# Patient Record
Sex: MALE | Race: WHITE | NOT HISPANIC OR LATINO | Employment: FULL TIME | URBAN - METROPOLITAN AREA
[De-identification: names, ages, dates, MRNs, and addresses within clinical notes are randomized per-mention and may not be internally consistent; named-entity substitution may affect disease eponyms.]

---

## 2018-01-19 ENCOUNTER — RESOLUTE PROFESSIONAL BILLING HOSPITAL PROF FEE (OUTPATIENT)
Dept: HOSPITALIST | Facility: MEDICAL CENTER | Age: 63
End: 2018-01-19
Payer: COMMERCIAL

## 2018-01-19 ENCOUNTER — HOSPITAL ENCOUNTER (INPATIENT)
Facility: MEDICAL CENTER | Age: 63
LOS: 6 days | DRG: 439 | End: 2018-01-25
Attending: EMERGENCY MEDICINE | Admitting: HOSPITALIST
Payer: COMMERCIAL

## 2018-01-19 ENCOUNTER — APPOINTMENT (OUTPATIENT)
Dept: RADIOLOGY | Facility: MEDICAL CENTER | Age: 63
DRG: 439 | End: 2018-01-19
Attending: EMERGENCY MEDICINE
Payer: COMMERCIAL

## 2018-01-19 DIAGNOSIS — R10.13 EPIGASTRIC PAIN: ICD-10-CM

## 2018-01-19 DIAGNOSIS — K85.90 ACUTE PANCREATITIS, UNSPECIFIED COMPLICATION STATUS, UNSPECIFIED PANCREATITIS TYPE: ICD-10-CM

## 2018-01-19 PROBLEM — E66.09 OBESITY DUE TO EXCESS CALORIES: Status: ACTIVE | Noted: 2018-01-19

## 2018-01-19 PROBLEM — M1A.9XX0 CHRONIC GOUT: Chronic | Status: ACTIVE | Noted: 2018-01-19

## 2018-01-19 PROBLEM — I10 ESSENTIAL HYPERTENSION: Chronic | Status: ACTIVE | Noted: 2018-01-19

## 2018-01-19 PROBLEM — N28.89 KIDNEY MASS: Status: ACTIVE | Noted: 2018-01-19

## 2018-01-19 PROBLEM — R73.9 HYPERGLYCEMIA: Status: ACTIVE | Noted: 2018-01-19

## 2018-01-19 PROBLEM — F10.10 ALCOHOL ABUSE: Chronic | Status: ACTIVE | Noted: 2018-01-19

## 2018-01-19 PROBLEM — E66.09 OBESITY DUE TO EXCESS CALORIES: Chronic | Status: ACTIVE | Noted: 2018-01-19

## 2018-01-19 LAB
ALBUMIN SERPL BCP-MCNC: 3.9 G/DL (ref 3.2–4.9)
ALBUMIN/GLOB SERPL: 1.5 G/DL
ALP SERPL-CCNC: 99 U/L (ref 30–99)
ALT SERPL-CCNC: 93 U/L (ref 2–50)
ANION GAP SERPL CALC-SCNC: 12 MMOL/L (ref 0–11.9)
APTT PPP: 22.4 SEC (ref 24.7–36)
AST SERPL-CCNC: 197 U/L (ref 12–45)
BASOPHILS # BLD AUTO: 0.2 % (ref 0–1.8)
BASOPHILS # BLD: 0.02 K/UL (ref 0–0.12)
BILIRUB SERPL-MCNC: 1.9 MG/DL (ref 0.1–1.5)
BNP SERPL-MCNC: 27 PG/ML (ref 0–100)
BUN SERPL-MCNC: 19 MG/DL (ref 8–22)
CALCIUM SERPL-MCNC: 9.1 MG/DL (ref 8.4–10.2)
CHLORIDE SERPL-SCNC: 103 MMOL/L (ref 96–112)
CO2 SERPL-SCNC: 24 MMOL/L (ref 20–33)
CREAT SERPL-MCNC: 1.26 MG/DL (ref 0.5–1.4)
EKG IMPRESSION: NORMAL
EOSINOPHIL # BLD AUTO: 0.13 K/UL (ref 0–0.51)
EOSINOPHIL NFR BLD: 1.3 % (ref 0–6.9)
ERYTHROCYTE [DISTWIDTH] IN BLOOD BY AUTOMATED COUNT: 40.9 FL (ref 35.9–50)
GLOBULIN SER CALC-MCNC: 2.6 G/DL (ref 1.9–3.5)
GLUCOSE SERPL-MCNC: 200 MG/DL (ref 65–99)
HCT VFR BLD AUTO: 40.2 % (ref 42–52)
HGB BLD-MCNC: 14.5 G/DL (ref 14–18)
IMM GRANULOCYTES # BLD AUTO: 0.04 K/UL (ref 0–0.11)
IMM GRANULOCYTES NFR BLD AUTO: 0.4 % (ref 0–0.9)
INR PPP: 1.07 (ref 0.87–1.13)
LIPASE SERPL-CCNC: >400 U/L (ref 7–58)
LYMPHOCYTES # BLD AUTO: 1.27 K/UL (ref 1–4.8)
LYMPHOCYTES NFR BLD: 13.2 % (ref 22–41)
MCH RBC QN AUTO: 31.4 PG (ref 27–33)
MCHC RBC AUTO-ENTMCNC: 36.1 G/DL (ref 33.7–35.3)
MCV RBC AUTO: 87 FL (ref 81.4–97.8)
MONOCYTES # BLD AUTO: 0.22 K/UL (ref 0–0.85)
MONOCYTES NFR BLD AUTO: 2.3 % (ref 0–13.4)
NEUTROPHILS # BLD AUTO: 7.96 K/UL (ref 1.82–7.42)
NEUTROPHILS NFR BLD: 82.6 % (ref 44–72)
NRBC # BLD AUTO: 0 K/UL
NRBC BLD-RTO: 0 /100 WBC
PLATELET # BLD AUTO: 122 K/UL (ref 164–446)
PMV BLD AUTO: 10.1 FL (ref 9–12.9)
POTASSIUM SERPL-SCNC: 3.5 MMOL/L (ref 3.6–5.5)
PROT SERPL-MCNC: 6.5 G/DL (ref 6–8.2)
PROTHROMBIN TIME: 13.5 SEC (ref 12–14.6)
RBC # BLD AUTO: 4.62 M/UL (ref 4.7–6.1)
SODIUM SERPL-SCNC: 139 MMOL/L (ref 135–145)
TROPONIN I SERPL-MCNC: <0.02 NG/ML (ref 0–0.04)
WBC # BLD AUTO: 9.6 K/UL (ref 4.8–10.8)

## 2018-01-19 PROCEDURE — 85025 COMPLETE CBC W/AUTO DIFF WBC: CPT

## 2018-01-19 PROCEDURE — 700105 HCHG RX REV CODE 258: Performed by: HOSPITALIST

## 2018-01-19 PROCEDURE — 85730 THROMBOPLASTIN TIME PARTIAL: CPT

## 2018-01-19 PROCEDURE — 83880 ASSAY OF NATRIURETIC PEPTIDE: CPT

## 2018-01-19 PROCEDURE — 96374 THER/PROPH/DIAG INJ IV PUSH: CPT

## 2018-01-19 PROCEDURE — 99223 1ST HOSP IP/OBS HIGH 75: CPT | Performed by: HOSPITALIST

## 2018-01-19 PROCEDURE — 93005 ELECTROCARDIOGRAM TRACING: CPT | Performed by: EMERGENCY MEDICINE

## 2018-01-19 PROCEDURE — 76705 ECHO EXAM OF ABDOMEN: CPT

## 2018-01-19 PROCEDURE — 85610 PROTHROMBIN TIME: CPT

## 2018-01-19 PROCEDURE — 99285 EMERGENCY DEPT VISIT HI MDM: CPT

## 2018-01-19 PROCEDURE — 83690 ASSAY OF LIPASE: CPT

## 2018-01-19 PROCEDURE — 700111 HCHG RX REV CODE 636 W/ 250 OVERRIDE (IP): Performed by: EMERGENCY MEDICINE

## 2018-01-19 PROCEDURE — 770006 HCHG ROOM/CARE - MED/SURG/GYN SEMI*

## 2018-01-19 PROCEDURE — 36415 COLL VENOUS BLD VENIPUNCTURE: CPT

## 2018-01-19 PROCEDURE — 84484 ASSAY OF TROPONIN QUANT: CPT

## 2018-01-19 PROCEDURE — 80053 COMPREHEN METABOLIC PANEL: CPT

## 2018-01-19 PROCEDURE — 700111 HCHG RX REV CODE 636 W/ 250 OVERRIDE (IP): Performed by: HOSPITALIST

## 2018-01-19 PROCEDURE — HZ2ZZZZ DETOXIFICATION SERVICES FOR SUBSTANCE ABUSE TREATMENT: ICD-10-PCS | Performed by: HOSPITALIST

## 2018-01-19 PROCEDURE — 71045 X-RAY EXAM CHEST 1 VIEW: CPT

## 2018-01-19 RX ORDER — ALLOPURINOL 300 MG/1
300 TABLET ORAL DAILY
Status: DISCONTINUED | OUTPATIENT
Start: 2018-01-20 | End: 2018-01-25 | Stop reason: HOSPADM

## 2018-01-19 RX ORDER — HEPARIN SODIUM 5000 [USP'U]/ML
5000 INJECTION, SOLUTION INTRAVENOUS; SUBCUTANEOUS EVERY 8 HOURS
Status: DISCONTINUED | OUTPATIENT
Start: 2018-01-19 | End: 2018-01-25 | Stop reason: HOSPADM

## 2018-01-19 RX ORDER — LABETALOL HYDROCHLORIDE 5 MG/ML
10 INJECTION, SOLUTION INTRAVENOUS EVERY 4 HOURS PRN
Status: DISCONTINUED | OUTPATIENT
Start: 2018-01-19 | End: 2018-01-25 | Stop reason: HOSPADM

## 2018-01-19 RX ORDER — SODIUM CHLORIDE, SODIUM LACTATE, POTASSIUM CHLORIDE, CALCIUM CHLORIDE 600; 310; 30; 20 MG/100ML; MG/100ML; MG/100ML; MG/100ML
INJECTION, SOLUTION INTRAVENOUS CONTINUOUS
Status: DISCONTINUED | OUTPATIENT
Start: 2018-01-19 | End: 2018-01-24

## 2018-01-19 RX ORDER — LISINOPRIL 20 MG/1
20 TABLET ORAL
Status: DISCONTINUED | OUTPATIENT
Start: 2018-01-20 | End: 2018-01-20

## 2018-01-19 RX ORDER — ONDANSETRON 2 MG/ML
4 INJECTION INTRAMUSCULAR; INTRAVENOUS ONCE
Status: COMPLETED | OUTPATIENT
Start: 2018-01-19 | End: 2018-01-19

## 2018-01-19 RX ORDER — PROMETHAZINE HYDROCHLORIDE 25 MG/1
12.5-25 TABLET ORAL EVERY 4 HOURS PRN
Status: DISCONTINUED | OUTPATIENT
Start: 2018-01-19 | End: 2018-01-25 | Stop reason: HOSPADM

## 2018-01-19 RX ORDER — POLYETHYLENE GLYCOL 3350 17 G/17G
1 POWDER, FOR SOLUTION ORAL
Status: DISCONTINUED | OUTPATIENT
Start: 2018-01-19 | End: 2018-01-25 | Stop reason: HOSPADM

## 2018-01-19 RX ORDER — SIMVASTATIN 20 MG
40 TABLET ORAL DAILY
Status: DISCONTINUED | OUTPATIENT
Start: 2018-01-20 | End: 2018-01-20

## 2018-01-19 RX ORDER — ONDANSETRON 4 MG/1
4 TABLET, ORALLY DISINTEGRATING ORAL EVERY 4 HOURS PRN
Status: DISCONTINUED | OUTPATIENT
Start: 2018-01-19 | End: 2018-01-25 | Stop reason: HOSPADM

## 2018-01-19 RX ORDER — BISACODYL 10 MG
10 SUPPOSITORY, RECTAL RECTAL
Status: DISCONTINUED | OUTPATIENT
Start: 2018-01-19 | End: 2018-01-25 | Stop reason: HOSPADM

## 2018-01-19 RX ORDER — AMOXICILLIN 250 MG
2 CAPSULE ORAL 2 TIMES DAILY
Status: DISCONTINUED | OUTPATIENT
Start: 2018-01-19 | End: 2018-01-25 | Stop reason: HOSPADM

## 2018-01-19 RX ORDER — ONDANSETRON 2 MG/ML
4 INJECTION INTRAMUSCULAR; INTRAVENOUS EVERY 4 HOURS PRN
Status: DISCONTINUED | OUTPATIENT
Start: 2018-01-19 | End: 2018-01-25 | Stop reason: HOSPADM

## 2018-01-19 RX ORDER — PROMETHAZINE HYDROCHLORIDE 25 MG/1
12.5-25 SUPPOSITORY RECTAL EVERY 4 HOURS PRN
Status: DISCONTINUED | OUTPATIENT
Start: 2018-01-19 | End: 2018-01-25 | Stop reason: HOSPADM

## 2018-01-19 RX ORDER — ACETAMINOPHEN 325 MG/1
650 TABLET ORAL EVERY 6 HOURS PRN
Status: DISCONTINUED | OUTPATIENT
Start: 2018-01-19 | End: 2018-01-25 | Stop reason: HOSPADM

## 2018-01-19 RX ADMIN — ONDANSETRON 4 MG: 2 INJECTION INTRAMUSCULAR; INTRAVENOUS at 21:15

## 2018-01-19 RX ADMIN — HYDROMORPHONE HYDROCHLORIDE 0.5 MG: 1 INJECTION, SOLUTION INTRAMUSCULAR; INTRAVENOUS; SUBCUTANEOUS at 23:18

## 2018-01-19 RX ADMIN — ONDANSETRON 4 MG: 2 INJECTION INTRAMUSCULAR; INTRAVENOUS at 23:18

## 2018-01-19 RX ADMIN — SODIUM CHLORIDE, POTASSIUM CHLORIDE, SODIUM LACTATE AND CALCIUM CHLORIDE: 600; 310; 30; 20 INJECTION, SOLUTION INTRAVENOUS at 23:00

## 2018-01-19 ASSESSMENT — LIFESTYLE VARIABLES
ALCOHOL_USE: YES
AVERAGE NUMBER OF DAYS PER WEEK YOU HAVE A DRINK CONTAINING ALCOHOL: 7
HAVE YOU EVER FELT YOU SHOULD CUT DOWN ON YOUR DRINKING: NO
HAVE PEOPLE ANNOYED YOU BY CRITICIZING YOUR DRINKING: NO
CONSUMPTION TOTAL: POSITIVE
EVER_SMOKED: YES
TOTAL SCORE: 0
HOW MANY TIMES IN THE PAST YEAR HAVE YOU HAD 5 OR MORE DRINKS IN A DAY: 0
EVER HAD A DRINK FIRST THING IN THE MORNING TO STEADY YOUR NERVES TO GET RID OF A HANGOVER: NO
TOTAL SCORE: 0
TOTAL SCORE: 0
ON A TYPICAL DAY WHEN YOU DRINK ALCOHOL HOW MANY DRINKS DO YOU HAVE: 3
EVER FELT BAD OR GUILTY ABOUT YOUR DRINKING: NO

## 2018-01-19 ASSESSMENT — ENCOUNTER SYMPTOMS
ABDOMINAL PAIN: 1
VOMITING: 0
WEAKNESS: 0
COUGH: 0
CHILLS: 0
FOCAL WEAKNESS: 0
NAUSEA: 1
MYALGIAS: 0
DIZZINESS: 0
CONSTIPATION: 0
DIARRHEA: 0
PALPITATIONS: 0
HEADACHES: 0
SHORTNESS OF BREATH: 0
FEVER: 0

## 2018-01-19 ASSESSMENT — PAIN SCALES - GENERAL
PAINLEVEL_OUTOF10: 8
PAINLEVEL_OUTOF10: 8

## 2018-01-19 ASSESSMENT — PATIENT HEALTH QUESTIONNAIRE - PHQ9
1. LITTLE INTEREST OR PLEASURE IN DOING THINGS: NOT AT ALL
2. FEELING DOWN, DEPRESSED, IRRITABLE, OR HOPELESS: NOT AT ALL
SUM OF ALL RESPONSES TO PHQ QUESTIONS 1-9: 0
SUM OF ALL RESPONSES TO PHQ9 QUESTIONS 1 AND 2: 0

## 2018-01-20 PROBLEM — E87.6 HYPOKALEMIA: Status: ACTIVE | Noted: 2018-01-20

## 2018-01-20 PROBLEM — N17.9 AKI (ACUTE KIDNEY INJURY) (HCC): Status: ACTIVE | Noted: 2018-01-20

## 2018-01-20 PROBLEM — E66.01 MORBID OBESITY WITH BMI OF 40.0-44.9, ADULT (HCC): Status: ACTIVE | Noted: 2018-01-19

## 2018-01-20 PROBLEM — R74.01 TRANSAMINITIS: Status: ACTIVE | Noted: 2018-01-20

## 2018-01-20 PROBLEM — F10.20 ALCOHOL DEPENDENCE (HCC): Status: ACTIVE | Noted: 2018-01-19

## 2018-01-20 LAB
ALBUMIN SERPL BCP-MCNC: 3.5 G/DL (ref 3.2–4.9)
ALBUMIN/GLOB SERPL: 1.5 G/DL
ALP SERPL-CCNC: 101 U/L (ref 30–99)
ALT SERPL-CCNC: 166 U/L (ref 2–50)
ANION GAP SERPL CALC-SCNC: 9 MMOL/L (ref 0–11.9)
AST SERPL-CCNC: 272 U/L (ref 12–45)
BILIRUB SERPL-MCNC: 4.2 MG/DL (ref 0.1–1.5)
BUN SERPL-MCNC: 17 MG/DL (ref 8–22)
CALCIUM SERPL-MCNC: 8.8 MG/DL (ref 8.4–10.2)
CHLORIDE SERPL-SCNC: 105 MMOL/L (ref 96–112)
CHOLEST SERPL-MCNC: 95 MG/DL (ref 100–199)
CK SERPL-CCNC: 139 U/L (ref 0–154)
CO2 SERPL-SCNC: 24 MMOL/L (ref 20–33)
CORTIS SERPL-MCNC: 25.2 UG/DL (ref 0–23)
CREAT SERPL-MCNC: 1.15 MG/DL (ref 0.5–1.4)
EST. AVERAGE GLUCOSE BLD GHB EST-MCNC: 123 MG/DL
FERRITIN SERPL-MCNC: >1500 NG/ML (ref 22–322)
GLOBULIN SER CALC-MCNC: 2.3 G/DL (ref 1.9–3.5)
GLUCOSE SERPL-MCNC: 142 MG/DL (ref 65–99)
HAV IGM SERPL QL IA: NEGATIVE
HBA1C MFR BLD: 5.9 % (ref 0–5.6)
HBV CORE IGM SER QL: NEGATIVE
HBV SURFACE AG SER QL: NEGATIVE
HCV AB SER QL: NEGATIVE
HDLC SERPL-MCNC: 29 MG/DL
LDLC SERPL CALC-MCNC: 49 MG/DL
LIPASE SERPL-CCNC: >400 U/L (ref 7–58)
POTASSIUM SERPL-SCNC: 3.9 MMOL/L (ref 3.6–5.5)
PROT SERPL-MCNC: 5.8 G/DL (ref 6–8.2)
SODIUM SERPL-SCNC: 138 MMOL/L (ref 135–145)
TRIGL SERPL-MCNC: 85 MG/DL (ref 0–149)
TSH SERPL DL<=0.005 MIU/L-ACNC: 0.61 UIU/ML (ref 0.38–5.33)

## 2018-01-20 PROCEDURE — 700105 HCHG RX REV CODE 258: Performed by: HOSPITALIST

## 2018-01-20 PROCEDURE — A9270 NON-COVERED ITEM OR SERVICE: HCPCS | Performed by: HOSPITALIST

## 2018-01-20 PROCEDURE — 80053 COMPREHEN METABOLIC PANEL: CPT

## 2018-01-20 PROCEDURE — 700102 HCHG RX REV CODE 250 W/ 637 OVERRIDE(OP): Performed by: INTERNAL MEDICINE

## 2018-01-20 PROCEDURE — 83690 ASSAY OF LIPASE: CPT

## 2018-01-20 PROCEDURE — 82550 ASSAY OF CK (CPK): CPT

## 2018-01-20 PROCEDURE — 36415 COLL VENOUS BLD VENIPUNCTURE: CPT

## 2018-01-20 PROCEDURE — 82728 ASSAY OF FERRITIN: CPT

## 2018-01-20 PROCEDURE — 700102 HCHG RX REV CODE 250 W/ 637 OVERRIDE(OP): Performed by: HOSPITALIST

## 2018-01-20 PROCEDURE — 99233 SBSQ HOSP IP/OBS HIGH 50: CPT | Performed by: INTERNAL MEDICINE

## 2018-01-20 PROCEDURE — 770006 HCHG ROOM/CARE - MED/SURG/GYN SEMI*

## 2018-01-20 PROCEDURE — 700111 HCHG RX REV CODE 636 W/ 250 OVERRIDE (IP): Performed by: HOSPITALIST

## 2018-01-20 PROCEDURE — 80074 ACUTE HEPATITIS PANEL: CPT

## 2018-01-20 PROCEDURE — 96374 THER/PROPH/DIAG INJ IV PUSH: CPT

## 2018-01-20 PROCEDURE — 83036 HEMOGLOBIN GLYCOSYLATED A1C: CPT

## 2018-01-20 PROCEDURE — 82533 TOTAL CORTISOL: CPT

## 2018-01-20 PROCEDURE — 84443 ASSAY THYROID STIM HORMONE: CPT

## 2018-01-20 PROCEDURE — 80061 LIPID PANEL: CPT

## 2018-01-20 PROCEDURE — 99285 EMERGENCY DEPT VISIT HI MDM: CPT

## 2018-01-20 PROCEDURE — A9270 NON-COVERED ITEM OR SERVICE: HCPCS | Performed by: INTERNAL MEDICINE

## 2018-01-20 RX ORDER — OMEPRAZOLE 20 MG/1
20 CAPSULE, DELAYED RELEASE ORAL DAILY
Status: DISCONTINUED | OUTPATIENT
Start: 2018-01-20 | End: 2018-01-25 | Stop reason: HOSPADM

## 2018-01-20 RX ORDER — LORAZEPAM 2 MG/ML
1 INJECTION INTRAMUSCULAR
Status: DISCONTINUED | OUTPATIENT
Start: 2018-01-20 | End: 2018-01-21

## 2018-01-20 RX ORDER — ASPIRIN 325 MG
325 TABLET ORAL DAILY
Status: DISCONTINUED | OUTPATIENT
Start: 2018-01-20 | End: 2018-01-25 | Stop reason: HOSPADM

## 2018-01-20 RX ORDER — OXYCODONE HYDROCHLORIDE AND ACETAMINOPHEN 5; 325 MG/1; MG/1
1 TABLET ORAL EVERY 4 HOURS PRN
Status: DISCONTINUED | OUTPATIENT
Start: 2018-01-20 | End: 2018-01-24

## 2018-01-20 RX ADMIN — SODIUM CHLORIDE, POTASSIUM CHLORIDE, SODIUM LACTATE AND CALCIUM CHLORIDE: 600; 310; 30; 20 INJECTION, SOLUTION INTRAVENOUS at 06:30

## 2018-01-20 RX ADMIN — ASPIRIN 325 MG ORAL TABLET 325 MG: 325 PILL ORAL at 12:46

## 2018-01-20 RX ADMIN — ACETAMINOPHEN 650 MG: 325 TABLET, FILM COATED ORAL at 14:21

## 2018-01-20 RX ADMIN — ALLOPURINOL 300 MG: 300 TABLET ORAL at 08:50

## 2018-01-20 RX ADMIN — OMEPRAZOLE 20 MG: 20 CAPSULE, DELAYED RELEASE ORAL at 12:46

## 2018-01-20 RX ADMIN — SODIUM CHLORIDE, POTASSIUM CHLORIDE, SODIUM LACTATE AND CALCIUM CHLORIDE: 600; 310; 30; 20 INJECTION, SOLUTION INTRAVENOUS at 02:47

## 2018-01-20 RX ADMIN — SODIUM CHLORIDE, POTASSIUM CHLORIDE, SODIUM LACTATE AND CALCIUM CHLORIDE: 600; 310; 30; 20 INJECTION, SOLUTION INTRAVENOUS at 18:38

## 2018-01-20 RX ADMIN — HYDROMORPHONE HYDROCHLORIDE 0.5 MG: 1 INJECTION, SOLUTION INTRAMUSCULAR; INTRAVENOUS; SUBCUTANEOUS at 02:47

## 2018-01-20 ASSESSMENT — PAIN SCALES - GENERAL
PAINLEVEL_OUTOF10: 8
PAINLEVEL_OUTOF10: 2

## 2018-01-20 NOTE — DIETARY
NUTRITION SERVICES: BMI - Pt with BMI >40 (40.3). Weight loss counseling not appropriate in acute care setting. RECOMMEND - Referral to outpatient nutrition services for weight management after D/C.

## 2018-01-20 NOTE — H&P
" Hospital Medicine History and Physical    Date of Service  1/19/2018    Chief Complaint  Chief Complaint   Patient presents with   • Epigastric Pain     Started 2 hours ago, pt was \"just hanging out.\" Pain does not radiate. States \"it may have become worst after I ate\", hx GERD.    • Vomiting     Started an hour ago. Denies hematemesis.        History of Presenting Illness  62 y.o. male who presented 1/19/2018 with abdominal pain that started earlier this evening, about 2 hours prior to presentation at the ER. He has been fine until now. He does drink about 3 bourbons a night, but hardly ever more than that. He describes it as sharp epigastric pain without radiation of 8/10 in severity, though it is better now. His lipase in the ER is >400. He has never had pancreatitis previously. He has cholesterol problems but thinks it was generally okay as he takes medication for it.     Primary Care Physician  Juan Manuel FLANAGAN M.D.    Consultants  None    Code Status  Full    Review of Systems  Review of Systems   Constitutional: Negative for chills and fever.   Respiratory: Negative for cough and shortness of breath.    Cardiovascular: Negative for chest pain and palpitations.   Gastrointestinal: Positive for abdominal pain and nausea. Negative for constipation, diarrhea and vomiting.   Genitourinary: Negative for dysuria.   Musculoskeletal: Negative for myalgias.   Skin: Negative for itching.   Neurological: Negative for dizziness, focal weakness, weakness and headaches.   All other systems reviewed and are negative.       Past Medical History  Past Medical History:   Diagnosis Date   • CAD (coronary artery disease)    • HTN (hypertension)    • Hypertension    • Kidney stones    • Near-syncope     worked up with neg results   • Nerve compression    • Tinnitus     pt states x 10 years and stopped tonight       Surgical History  No past surgical history on file.    Medications  No current facility-administered medications " on file prior to encounter.      Current Outpatient Prescriptions on File Prior to Encounter   Medication Sig Dispense Refill   • oxycodone-acetaminophen (PERCOCET) 5-325 MG TABS Take 1 Tab by mouth every four hours as needed. 10 Tab 0   • SPIRONOLACTONE 25 MG PO TABS Take 1 Tab by mouth every day.     • SIMVASTATIN 40 MG PO TABS Take 1 Tab by mouth every day.     • ALLOPURINOL 300 MG PO TABS Take 1 Tab by mouth every day.     • PRILOSEC OTC PO 1 Cap by Oral route QDAY.      • ASPIRIN 325 mg every day.     • LISINOPRIL PO          Family History  History reviewed. No pertinent family history.    Social History  Social History   Substance Use Topics   • Smoking status: Never Smoker   • Smokeless tobacco: Never Used   • Alcohol use Yes      Comment: i drink per night       Allergies  Allergies   Allergen Reactions   • Hydrocodone-Acetaminophen Nausea     Pt states he is sensitive to Vicodin   • Hydrocodone-Acetaminophen Vomiting   • Nkda [No Known Drug Allergy]         Physical Exam  Laboratory   Hemodynamics  Temp (24hrs), Av.6 °C (97.8 °F), Min:36.6 °C (97.8 °F), Max:36.6 °C (97.8 °F)   Temperature: 36.6 °C (97.8 °F)  Pulse  Av  Min: 72  Max: 98    Blood Pressure: 118/63, NIBP: 119/68      Respiratory      Respiration: 18, Pulse Oximetry: 92 %             Physical Exam   Constitutional: He is oriented to person, place, and time. He appears well-developed.   Markedly overnourished    HENT:   Head: Normocephalic and atraumatic.   Mouth/Throat: Oropharynx is clear and moist.   Eyes: Conjunctivae and EOM are normal. Pupils are equal, round, and reactive to light. No scleral icterus.   Neck: Normal range of motion. Neck supple. No tracheal deviation present. No thyromegaly present.   Cardiovascular: Normal rate, regular rhythm, normal heart sounds and intact distal pulses.    No murmur heard.  Pulmonary/Chest: Effort normal and breath sounds normal. No respiratory distress. He has no wheezes.   Abdominal: Soft.  Bowel sounds are normal. He exhibits no distension. There is tenderness (mild at the epigastrum).   Musculoskeletal: Normal range of motion. He exhibits no edema or tenderness.   Lymphadenopathy:     He has no cervical adenopathy.        Right: No supraclavicular adenopathy present.        Left: No supraclavicular adenopathy present.   Neurological: He is alert and oriented to person, place, and time. No cranial nerve deficit.   Skin: Skin is warm and dry.   Vitals reviewed.      Recent Labs      01/19/18 2027   WBC  9.6   RBC  4.62*   HEMOGLOBIN  14.5   HEMATOCRIT  40.2*   MCV  87.0   MCH  31.4   MCHC  36.1*   RDW  40.9   PLATELETCT  122*   MPV  10.1     Recent Labs      01/19/18 2027   SODIUM  139   POTASSIUM  3.5*   CHLORIDE  103   CO2  24   GLUCOSE  200*   BUN  19   CREATININE  1.26   CALCIUM  9.1     Recent Labs      01/19/18 2027   ALTSGPT  93*   ASTSGOT  197*   ALKPHOSPHAT  99   TBILIRUBIN  1.9*   LIPASE  >400*   GLUCOSE  200*     Recent Labs      01/19/18 2027   APTT  22.4*   INR  1.07     Recent Labs      01/19/18 2027   BNPBTYPENAT  27         Lab Results   Component Value Date    TROPONINI <0.02 01/19/2018     Urinalysis:    Lab Results  Component Value Date/Time   SPECGRAVITY 1.025 02/28/2007 1138   GLUCOSEUR Negative 02/28/2007 1138   KETONES 40 (A) 02/28/2007 1138   NITRITE Negative 02/28/2007 1138   WBCURINE 2-5 (A) 02/28/2007 1138   RBCURINE >150 (A) 02/28/2007 1138   BACTERIA Few (A) 02/28/2007 1138   EPITHELCELL Few 02/28/2007 1138        Imaging  I reviewed his ultrasound which does not demonstrate any obvious gallstones.    Assessment/Plan     I anticipate this patient will require at least two midnights for appropriate medical management, necessitating inpatient admission.    Acute pancreatitis- (present on admission)   Assessment & Plan    Due to drinking? He has been drinking the same amount for years without frankly binging, so this is a little unusual but not impossible. No obvious  gallstones. Will treat with NPO and LR infusion. Will trend pain.         Hyperglycemia- (present on admission)   Assessment & Plan    Reactive? Will check HbA1c to rule in/out DM        Obesity due to excess calories- (present on admission)   Assessment & Plan    Counseled, frustrated about trying to eat less but not losing weight.         Alcohol abuse- (present on admission)   Assessment & Plan    He drinks 3 or so 1.5 oz [place] every night, but rarely drinks more than that. I discussed cutting back, but he will think about it.         Questionable kidney mass (on abd u/s)- (present on admission)   Assessment & Plan    Needs outpatient follow up        Essential hypertension- (present on admission)   Assessment & Plan    SBP goal less than 140 mmHg  DBP goal less than 90 mmHg  PRN and antihypertensives to titrate by hospitalist towards goal  Most recent Blood Pressure: 118/63         Chronic gout- (present on admission)   Assessment & Plan    Continue allopurinol.            VTE prophylaxis: heparin.

## 2018-01-20 NOTE — PROGRESS NOTES
[facility] Hospitalist Progress Note    Date of Service: 2018    Chief Complaint  62 y.o. male with CAD, hypertension, GERD, alcohol dependence, drinks 3 bourbons at night, admitted 2018 with acute onset epigastric pain, nausea, and vomiting. Lipase was noted to be elevated at greater than 400. No leukocytosis. Potassium 3.5, creatinine 1.26. BG was elevated at 200. AST was 197, and ALT of 93 with normal alkaline phosphatase, total bilirubin of 1.9. BNP and troponins were negative. Urinalysis showed no pyuria. Gallbladder ultrasound did not show any gallstones or evidence of cholecystitis, with echogenic liver suggesting fatty infiltration, with note of possible right kidney mass, not well evaluated. Started on bowel rest, and IV fluids. Low potassium was replaced.      Interval Problem Update  2018 - no overnight events. Remains hemodynamically stable and afebrile. Stable on RA.  Potassium normalized at 3.9. Creatinine improved 1.15. AST and ALT remain elevated, bilirubin 4.2. LDL 49, HDL 29, triglyceride 85.    > Seen and examined. Still with epigastric pain, rated 4/10 with intermittent flares. No nausea, vomiting. No CP, SOB, leg edema. Denied drinking more than usual 3 bourbons which he has done for several decades now. Denied new meds.       Consultants/Specialty  None    Disposition  Monitor on the floors.         ROS     Pertinent positives/negatives as mentioned above.     A complete review of systems was done. All other systems were negative.      Physical Exam  Laboratory/Imaging   Hemodynamics  Temp (24hrs), Av.8 °C (98.2 °F), Min:36.4 °C (97.5 °F), Max:37.1 °C (98.7 °F)   Temperature: 37.1 °C (98.7 °F)  Pulse  Av.6  Min: 72  Max: 98    Blood Pressure: 102/66, NIBP: 119/68      Respiratory      Respiration: 18, Pulse Oximetry: 91 %             Fluids    Intake/Output Summary (Last 24 hours) at 18 0804  Last data filed at 18 0600   Gross per 24 hour   Intake              1750 ml   Output              500 ml   Net             1250 ml       Nutrition  Orders Placed This Encounter   Procedures   • Diet NPO     Standing Status:   Standing     Number of Occurrences:   1     Order Specific Question:   Restrict to:     Answer:   Sips with Medications [3]     Physical Exam   Constitutional: He is oriented to person, place, and time. He appears well-developed. No distress.   morbid obesity. Body mass index is 40.33 kg/m².   HENT:   Head: Normocephalic and atraumatic.   Mouth/Throat: Oropharynx is clear and moist. No oropharyngeal exudate.   Eyes: EOM are normal. Pupils are equal, round, and reactive to light. Right eye exhibits no discharge. Left eye exhibits no discharge. No scleral icterus.   Neck: Normal range of motion. Neck supple. No thyromegaly present.   Cardiovascular: Normal rate and regular rhythm.  Exam reveals no gallop and no friction rub.    No murmur heard.  Pulmonary/Chest: Effort normal and breath sounds normal. He has no wheezes. He has no rales. He exhibits no tenderness.   Abdominal: Soft. Bowel sounds are normal. There is tenderness (epigastrium). There is no rebound and no guarding.   Musculoskeletal: Normal range of motion. He exhibits no edema or tenderness.   Lymphadenopathy:     He has no cervical adenopathy.   Neurological: He is alert and oriented to person, place, and time. No cranial nerve deficit. Coordination normal.   Skin: Skin is warm and dry. No rash noted. He is not diaphoretic. No erythema.   Psychiatric: He has a normal mood and affect. His behavior is normal. Judgment and thought content normal.   Vitals reviewed.      Recent Labs      01/19/18 2027   WBC  9.6   RBC  4.62*   HEMOGLOBIN  14.5   HEMATOCRIT  40.2*   MCV  87.0   MCH  31.4   MCHC  36.1*   RDW  40.9   PLATELETCT  122*   MPV  10.1     Recent Labs      01/19/18 2027 01/20/18   0420   SODIUM  139  138   POTASSIUM  3.5*  3.9   CHLORIDE  103  105   CO2  24  24   GLUCOSE  200*  142*   BUN   19  17   CREATININE  1.26  1.15   CALCIUM  9.1  8.8     Recent Labs      01/19/18 2027   APTT  22.4*   INR  1.07     Recent Labs      01/19/18 2027   BNPBTYPENAT  27     Recent Labs      01/20/18   0420   TRIGLYCERIDE  85   HDL  29*   LDL  49          Assessment/Plan     Acute pancreatitis- (present on admission)   Assessment & Plan    - Unclear etiology. He does drink regularly, but has been doing so for several decades without any increase in consumption. He does not have any new medications which can explain this. No gallstones on ultrasound. Triglyceride levels were normal.  - Continue supportive care with bowel rest, and aggressive IV fluids with LR at 200 mL per hour. Continue pain control with IV Dilaudid, along with as needed antiemetics.  - Given his concomitant liver function test elevation, I will obtain an MRCP to rule out ductal pathology/obstruction/mass.  - Trend lipase levels.        Transaminitis- (present on admission)   Assessment & Plan    - Likely from pancreatitis. MRCP as above.  Check CPK, cortisol, TSH, ferritin, and viral hepatitis panel.  - Continue to trend. Continue IV fluids.        Hypokalemia- (present on admission)   Assessment & Plan    - Replaced. Potassium normal today. Repeat BMP tomorrow.        LE (acute kidney injury), prerenal (CMS-HCC)- (present on admission)   Assessment & Plan    - Continue IV fluids. Hold lisinopril for now. Continue to monitor renal function closely. Avoid nephrotoxins, and continue to renally dose all medications.          Hyperglycemia- (present on admission)   Assessment & Plan    - Likely reactive from pancreatitis. Await hemoglobin A1c. Continue to monitor.        Questionable kidney mass (on abd u/s)- (present on admission)   Assessment & Plan    - Will see if MRCP would be able to visualize the kidneys. Otherwise, may need dedicated renal ultrasound and/or outpatient follow-up.        Essential hypertension- (present on admission)    Assessment & Plan    - Good control. Holding lisinopril for now due to LE and pancreatitis. Start as needed IV labetalol for significant hypertension. Monitor blood pressure.        Chronic gout- (present on admission)   Assessment & Plan    - Continue allopurinol.        Morbid obesity with BMI of 40.0-44.9, adult (CMS-HCC)- (present on admission)   Assessment & Plan    - Body mass index is 40.33 kg/m²..  - outpatient referral for outpatient weight management.        Alcohol dependence (CMS-HCC)- (present on admission)   Assessment & Plan    - No increased consumption in the last several days, consistent on regular intake.   - Monitor signs for withdrawal.  - Counseled on alcohol drinking cessation.            Reviewed items::  Labs reviewed, Medications reviewed and Radiology images reviewed  Joseph catheter::  No Joseph  DVT prophylaxis pharmacological::  Heparin  Ulcer Prophylaxis::  Not indicated

## 2018-01-20 NOTE — ED NOTES
Hospitalist in with patient.  Patient states that he drank ETOH before he came in tonight.  He says he does every night.

## 2018-01-20 NOTE — ED NOTES
"Chief Complaint   Patient presents with   • Epigastric Pain     Started 2 hours ago, pt was \"just hanging out.\" Pain does not radiate. States \"it may have become worst after I ate\", hx GERD.    • Vomiting     Started an hour ago. Denies hematemesis.      /61   Pulse 72   Temp 36.6 °C (97.8 °F)   Resp 18   Ht 1.829 m (6')   Wt (!) 134.9 kg (297 lb 6.4 oz)   SpO2 94%   BMI 40.33 kg/m²     "

## 2018-01-20 NOTE — PROGRESS NOTES
Patient arrived to floor via gurney.  Ambulated independently from [facility] to hospital bed and to restroom.  Patient had a BM and a bout of emesis upon arrival.  States his pain is 8/10, medicated per MAR.  Patient is nauseous and vomiting, medicated per MAR.  VSS.  Patient is currently NPO and educated regarding the need.  Safety precautions in place.  Patient educated to call for assistance with mobility as he is in pain and receiving pain medication.  No other needs expressed at this time.  Admit profile and skin check complete.  Will continue to monitor.

## 2018-01-20 NOTE — PROGRESS NOTES
Received report from night RN.  Assumed pt care. C/O 3/10 epigastric pain, tolerable. Updated pt w/ POC. Will continue with care.

## 2018-01-20 NOTE — ED PROVIDER NOTES
"ED Provider Note    CHIEF COMPLAINT  Chief Complaint   Patient presents with   • Epigastric Pain     Started 2 hours ago, pt was \"just hanging out.\" Pain does not radiate. States \"it may have become worst after I ate\", hx GERD.    • Vomiting     Started an hour ago. Denies hematemesis.        HPI  Sharon Guardado is a 62 y.o. male who presents for evaluation of epigastric pain. Patient states that 2 hours ago he started having pain. He points to his mid sternal region down to his epigastric region as his site of discomfort. He's had no recent illness. He states he was completely asymptomatic until his symptoms came on. He states he ate a baked potato and then had onset of his symptoms. He states he's never had pain like this before. When the pain was at its worst he rated it as an 8 out of scale of 10. Currently he states it's barely there and at less than 1 on a scale of 10. He did have vomiting and some slight shortness of breath but denies diaphoresis. He has no history of previous abdominal surgeries. Cardiac risk factors are negative for tobacco, negative for diabetes, positive for hypertension, positive for cholesterol, and negative for family history. He states that the pain was constant in nature and did not seem to radiate. When asked to describe the pain he states it just hurt.    REVIEW OF SYSTEMS  See HPI for further details. All other systems are negative.     PAST MEDICAL HISTORY  Past Medical History:   Diagnosis Date   • CAD (coronary artery disease)    • HTN (hypertension)    • Hypertension    • Kidney stones    • Near-syncope     worked up with neg results   • Nerve compression    • Tinnitus     pt states x 10 years and stopped tonight       FAMILY HISTORY  History reviewed. No pertinent family history.    SOCIAL HISTORY  Social History     Social History   • Marital status:      Spouse name: N/A   • Number of children: N/A   • Years of education: N/A     Social History Main Topics "   • Smoking status: Never Smoker   • Smokeless tobacco: Never Used   • Alcohol use Yes      Comment: i drink per night   • Drug use: No   • Sexual activity: Not on file     Other Topics Concern   • Not on file     Social History Narrative    ** Merged History Encounter **            SURGICAL HISTORY  History reviewed. No pertinent surgical history.    CURRENT MEDICATIONS  Home Medications     Reviewed by Dipti Parson R.N. (Registered Nurse) on 01/19/18 at 1954  Med List Status: Partial   Medication Last Dose Status   ALLOPURINOL 300 MG PO TABS 1/19/2018 Active   ASPIRIN 1/19/2018 Active   LISINOPRIL PO  Active   oxycodone-acetaminophen (PERCOCET) 5-325 MG TABS  Active   PRILOSEC OTC PO 5/3/2015 Active   SIMVASTATIN 40 MG PO TABS 1/19/2018 Active   SPIRONOLACTONE 25 MG PO TABS 1/19/2018 Active                ALLERGIES  Allergies   Allergen Reactions   • Hydrocodone-Acetaminophen Nausea     Pt states he is sensitive to Vicodin   • Hydrocodone-Acetaminophen Vomiting   • Nkda [No Known Drug Allergy]        PHYSICAL EXAM  VITAL SIGNS: /61   Pulse 72   Temp 36.6 °C (97.8 °F)   Resp 18   Ht 1.829 m (6')   Wt (!) 134.9 kg (297 lb 6.4 oz)   SpO2 94%   BMI 40.33 kg/m²     Constitutional: Well developed, morbidly obese, No acute distress, Non-toxic appearance.   HENT: Normocephalic, Atraumatic.   Eyes:  EOMI, Conjunctiva normal, No discharge.   Cardiovascular: Normal heart rate, Normal rhythm, No murmurs, No rubs, No gallops.   Thorax & Lungs: Lungs clear to auscultation bilaterally without wheezes, rales or rhonchi. No respiratory distress. No chest tenderness.   Abdomen:  Soft, No tenderness, No masses, No pulsatile masses. No guarding and no rebound.  Skin: Warm, Dry.   Extremities:  No edema, No cyanosis. No calf tenderness or swelling.  Musculoskeletal: Good range of motion in all major joints.  Neurologic: Awake alert, No focal deficits noted.            RADIOLOGY/PROCEDURES  DX-CHEST-PORTABLE (1  VIEW)   Final Result      Hypoinflation without other evidence for acute cardiopulmonary disease.      US-GALLBLADDER    (Results Pending)         COURSE & MEDICAL DECISION MAKING  Pertinent Labs & Imaging studies reviewed. (See chart for details)  Is a 62-year-old here for evaluation of epigastric and chest pain. The patient has multiple cardiac risk factors. His EKG shows no evidence of acute ischemic changes. Laboratories are obtained. These included chemistries which included a glucose of 200. Potassium is minimally low at 3.5. AST of 197 with an ALT of 93. Total bilirubin is elevated at 1.9. Significantly his lipase is elevated at greater than 400. His troponin I is less than 0.02. INR is normal. CBC shows normal white count with differential of 82 polys and 13 lymphocytes. Chest x-ray shows no acute cardiopulmonary process. Results from an gallbladder ultrasound are pending. In speaking with the patient after his return from ultrasound it sounds as if he is a drinking man. I discussed the results of the tests with him. I suspect that this represents acute alcoholic pancreatitis. I have discussed the case with Dr. Suarez of the hospitalist service and he will be the primary admitting physician. The patient had vomiting while over in ultrasound and was treated with Zofran with improvement.    FINAL IMPRESSION  1. Epigastric abdominal pain  2. Acute pancreatitis likely secondary to alcohol  3. Nausea and vomiting  4. Hyperglycemia in a morbidly obese gentleman suggesting possible type II diabetes  5. Elevated liver function studies  6. Patient required 35 minutes of critical care time         Electronically signed by: Rafa Kyle, 1/19/2018 8:13 PM

## 2018-01-20 NOTE — CARE PLAN
Problem: Communication  Goal: The ability to communicate needs accurately and effectively will improve  Outcome: PROGRESSING AS EXPECTED    Intervention: Walls patient and significant other/support system to call light to alert staff of needs  Patient oriented to surroundings and unit policies/routines.  Educated and understands the use of the call button.  Patient calls appropriately.      Problem: Infection  Goal: Will remain free from infection  Outcome: PROGRESSING AS EXPECTED    Intervention: Implement standard precautions and perform hand washing before and after patient contact  Patient educated and understands the importance of proper hand hygiene for herself, her visitors, and her care team.

## 2018-01-21 ENCOUNTER — APPOINTMENT (OUTPATIENT)
Dept: RADIOLOGY | Facility: MEDICAL CENTER | Age: 63
DRG: 439 | End: 2018-01-21
Attending: INTERNAL MEDICINE
Payer: COMMERCIAL

## 2018-01-21 LAB
ALBUMIN SERPL BCP-MCNC: 3.8 G/DL (ref 3.2–4.9)
ALBUMIN/GLOB SERPL: 1.5 G/DL
ALP SERPL-CCNC: 97 U/L (ref 30–99)
ALT SERPL-CCNC: 175 U/L (ref 2–50)
ANION GAP SERPL CALC-SCNC: 12 MMOL/L (ref 0–11.9)
AST SERPL-CCNC: 165 U/L (ref 12–45)
BILIRUB CONJ SERPL-MCNC: 1.4 MG/DL (ref 0.1–0.5)
BILIRUB INDIRECT SERPL-MCNC: 1.8 MG/DL (ref 0–1)
BILIRUB SERPL-MCNC: 3.2 MG/DL (ref 0.1–1.5)
BUN SERPL-MCNC: 14 MG/DL (ref 8–22)
CALCIUM SERPL-MCNC: 9.1 MG/DL (ref 8.4–10.2)
CHLORIDE SERPL-SCNC: 101 MMOL/L (ref 96–112)
CO2 SERPL-SCNC: 22 MMOL/L (ref 20–33)
CREAT SERPL-MCNC: 1.05 MG/DL (ref 0.5–1.4)
GLOBULIN SER CALC-MCNC: 2.6 G/DL (ref 1.9–3.5)
GLUCOSE SERPL-MCNC: 117 MG/DL (ref 65–99)
LIPASE SERPL-CCNC: >400 U/L (ref 7–58)
POTASSIUM SERPL-SCNC: 3.8 MMOL/L (ref 3.6–5.5)
PROT SERPL-MCNC: 6.4 G/DL (ref 6–8.2)
SODIUM SERPL-SCNC: 135 MMOL/L (ref 135–145)

## 2018-01-21 PROCEDURE — 83690 ASSAY OF LIPASE: CPT

## 2018-01-21 PROCEDURE — 82248 BILIRUBIN DIRECT: CPT

## 2018-01-21 PROCEDURE — 700105 HCHG RX REV CODE 258: Performed by: HOSPITALIST

## 2018-01-21 PROCEDURE — 700102 HCHG RX REV CODE 250 W/ 637 OVERRIDE(OP): Performed by: INTERNAL MEDICINE

## 2018-01-21 PROCEDURE — A9270 NON-COVERED ITEM OR SERVICE: HCPCS | Performed by: HOSPITALIST

## 2018-01-21 PROCEDURE — 700102 HCHG RX REV CODE 250 W/ 637 OVERRIDE(OP): Performed by: HOSPITALIST

## 2018-01-21 PROCEDURE — 36415 COLL VENOUS BLD VENIPUNCTURE: CPT

## 2018-01-21 PROCEDURE — 770006 HCHG ROOM/CARE - MED/SURG/GYN SEMI*

## 2018-01-21 PROCEDURE — 700105 HCHG RX REV CODE 258: Performed by: INTERNAL MEDICINE

## 2018-01-21 PROCEDURE — A9270 NON-COVERED ITEM OR SERVICE: HCPCS | Performed by: INTERNAL MEDICINE

## 2018-01-21 PROCEDURE — 700111 HCHG RX REV CODE 636 W/ 250 OVERRIDE (IP): Performed by: HOSPITALIST

## 2018-01-21 PROCEDURE — 99233 SBSQ HOSP IP/OBS HIGH 50: CPT | Performed by: INTERNAL MEDICINE

## 2018-01-21 PROCEDURE — 700101 HCHG RX REV CODE 250: Performed by: INTERNAL MEDICINE

## 2018-01-21 PROCEDURE — 80053 COMPREHEN METABOLIC PANEL: CPT

## 2018-01-21 PROCEDURE — 700111 HCHG RX REV CODE 636 W/ 250 OVERRIDE (IP): Performed by: INTERNAL MEDICINE

## 2018-01-21 RX ORDER — LORAZEPAM 2 MG/ML
1.5 INJECTION INTRAMUSCULAR
Status: DISCONTINUED | OUTPATIENT
Start: 2018-01-21 | End: 2018-01-24

## 2018-01-21 RX ORDER — LORAZEPAM 1 MG/1
2 TABLET ORAL
Status: DISCONTINUED | OUTPATIENT
Start: 2018-01-21 | End: 2018-01-24

## 2018-01-21 RX ORDER — LORAZEPAM 1 MG/1
3 TABLET ORAL
Status: DISCONTINUED | OUTPATIENT
Start: 2018-01-21 | End: 2018-01-24

## 2018-01-21 RX ORDER — LORAZEPAM 0.5 MG/1
0.5 TABLET ORAL EVERY 4 HOURS PRN
Status: DISCONTINUED | OUTPATIENT
Start: 2018-01-21 | End: 2018-01-24

## 2018-01-21 RX ORDER — THIAMINE MONONITRATE (VIT B1) 100 MG
100 TABLET ORAL DAILY
Status: DISCONTINUED | OUTPATIENT
Start: 2018-01-22 | End: 2018-01-24

## 2018-01-21 RX ORDER — LORAZEPAM 2 MG/ML
0.5 INJECTION INTRAMUSCULAR EVERY 4 HOURS PRN
Status: DISCONTINUED | OUTPATIENT
Start: 2018-01-21 | End: 2018-01-24

## 2018-01-21 RX ORDER — LORAZEPAM 2 MG/ML
1 INJECTION INTRAMUSCULAR
Status: DISCONTINUED | OUTPATIENT
Start: 2018-01-21 | End: 2018-01-24

## 2018-01-21 RX ORDER — LORAZEPAM 1 MG/1
1 TABLET ORAL EVERY 4 HOURS PRN
Status: DISCONTINUED | OUTPATIENT
Start: 2018-01-21 | End: 2018-01-24

## 2018-01-21 RX ORDER — LORAZEPAM 1 MG/1
4 TABLET ORAL
Status: DISCONTINUED | OUTPATIENT
Start: 2018-01-21 | End: 2018-01-24

## 2018-01-21 RX ORDER — LORAZEPAM 2 MG/ML
2 INJECTION INTRAMUSCULAR
Status: DISCONTINUED | OUTPATIENT
Start: 2018-01-21 | End: 2018-01-24

## 2018-01-21 RX ORDER — FOLIC ACID 1 MG/1
1 TABLET ORAL DAILY
Status: DISCONTINUED | OUTPATIENT
Start: 2018-01-22 | End: 2018-01-24

## 2018-01-21 RX ADMIN — SODIUM CHLORIDE, POTASSIUM CHLORIDE, SODIUM LACTATE AND CALCIUM CHLORIDE: 600; 310; 30; 20 INJECTION, SOLUTION INTRAVENOUS at 22:31

## 2018-01-21 RX ADMIN — OMEPRAZOLE 20 MG: 20 CAPSULE, DELAYED RELEASE ORAL at 08:50

## 2018-01-21 RX ADMIN — SODIUM CHLORIDE, POTASSIUM CHLORIDE, SODIUM LACTATE AND CALCIUM CHLORIDE: 600; 310; 30; 20 INJECTION, SOLUTION INTRAVENOUS at 05:29

## 2018-01-21 RX ADMIN — ONDANSETRON 4 MG: 2 INJECTION INTRAMUSCULAR; INTRAVENOUS at 06:48

## 2018-01-21 RX ADMIN — LORAZEPAM 1 MG: 2 INJECTION INTRAMUSCULAR; INTRAVENOUS at 07:41

## 2018-01-21 RX ADMIN — POTASSIUM CHLORIDE: 2 INJECTION, SOLUTION, CONCENTRATE INTRAVENOUS at 17:21

## 2018-01-21 RX ADMIN — DOCUSATE SODIUM AND SENNOSIDES 2 TABLET: 8.6; 5 TABLET, FILM COATED ORAL at 20:56

## 2018-01-21 RX ADMIN — LORAZEPAM 1 MG: 1 TABLET ORAL at 17:21

## 2018-01-21 RX ADMIN — LORAZEPAM 1 MG: 2 INJECTION INTRAMUSCULAR; INTRAVENOUS at 14:09

## 2018-01-21 RX ADMIN — ASPIRIN 325 MG ORAL TABLET 325 MG: 325 PILL ORAL at 08:50

## 2018-01-21 RX ADMIN — SODIUM CHLORIDE, POTASSIUM CHLORIDE, SODIUM LACTATE AND CALCIUM CHLORIDE: 600; 310; 30; 20 INJECTION, SOLUTION INTRAVENOUS at 17:21

## 2018-01-21 RX ADMIN — LORAZEPAM 1 MG: 1 TABLET ORAL at 20:55

## 2018-01-21 RX ADMIN — ALLOPURINOL 300 MG: 300 TABLET ORAL at 08:50

## 2018-01-21 ASSESSMENT — PAIN SCALES - GENERAL
PAINLEVEL_OUTOF10: 2
PAINLEVEL_OUTOF10: 2
PAINLEVEL_OUTOF10: 0

## 2018-01-21 ASSESSMENT — LIFESTYLE VARIABLES
ANXIETY: *
HEADACHE, FULLNESS IN HEAD: NOT PRESENT
PAROXYSMAL SWEATS: *
VISUAL DISTURBANCES: NOT PRESENT
TOTAL SCORE: 10
TREMOR: TREMOR NOT VISIBLE BUT CAN BE FELT, FINGERTIP TO FINGERTIP
HEADACHE, FULLNESS IN HEAD: MILD
ORIENTATION AND CLOUDING OF SENSORIUM: DATE DISORIENTATION BY NO MORE THAN TWO CALENDAR DAYS
ORIENTATION AND CLOUDING OF SENSORIUM: ORIENTED AND CAN DO SERIAL ADDITIONS
ANXIETY: MILDLY ANXIOUS
VISUAL DISTURBANCES: NOT PRESENT
NAUSEA AND VOMITING: NO NAUSEA AND NO VOMITING
AUDITORY DISTURBANCES: NOT PRESENT
AUDITORY DISTURBANCES: NOT PRESENT
NAUSEA AND VOMITING: NO NAUSEA AND NO VOMITING
TOTAL SCORE: 8
TREMOR: TREMOR NOT VISIBLE BUT CAN BE FELT, FINGERTIP TO FINGERTIP
AGITATION: SOMEWHAT MORE THAN NORMAL ACTIVITY
AGITATION: *
PAROXYSMAL SWEATS: BARELY PERCEPTIBLE SWEATING. PALMS MOIST

## 2018-01-21 NOTE — PROGRESS NOTES
Report received. Assessment complete. Pt lines and gtts verified. Pt A&O x4, c/o pain 2/10 headache, declines pain meds at this time and verbalizes feeling comfortable, in bed resting/calm. Pt turns self without difficulty. Pt voiding up to bathroom with standby assist.   Fall precautions in place, side rails up x3, call light within reach at all times. Will continue to monitor.

## 2018-01-21 NOTE — PROGRESS NOTES
[facility]ist Progress Note    Date of Service: 2018    Chief Complaint  62 y.o. male with CAD, hypertension, GERD, alcohol dependence, drinks 3 bourbons at night, admitted 2018 with acute onset epigastric pain, nausea, and vomiting. Denied drinking more than usual 3 bourbons which he has done for several decades now. Denied new meds. Lipase was noted to be elevated at greater than 400. No leukocytosis. Potassium 3.5, creatinine 1.26. BG was elevated at 200. AST was 197, and ALT of 93 with normal alkaline phosphatase, total bilirubin of 1.9. BNP and troponins were negative. Urinalysis showed no pyuria. Gallbladder ultrasound did not show any gallstones or evidence of cholecystitis, with echogenic liver suggesting fatty infiltration, with note of possible right kidney mass, not well evaluated. Started on bowel rest, and IV fluids. Low potassium was replaced. LDL 49, HDL 29, triglyceride 85. MRCP ordered.       Interval Problem Update    2018 - uneventful night. VSS. Afebrile. Saturating well on RA.  AST and ALT remain elevated, Alk phos normalized. TB 3.2, both DB and IB high. Lipase remains >400. Ferritin >1500, with high cortisol. Viral hepatitis panel negative. TSH WNL. MRCP pending.     > Seen and examined. Wants to go home, but still with abd pain. No nausea, vomiting, diarrhea.       Consultants/Specialty  None    Disposition  Monitor on the floors.         ROS     Pertinent positives/negatives as mentioned above.     A complete review of systems was done. All other systems were negative.      Physical Exam  Laboratory/Imaging   Hemodynamics  Temp (24hrs), Av °C (98.6 °F), Min:36.7 °C (98 °F), Max:37.3 °C (99.1 °F)   Temperature: 37.1 °C (98.7 °F)  Pulse  Av.3  Min: 72  Max: 106    Blood Pressure: 138/57      Respiratory      Respiration: 18, Pulse Oximetry: 90 %             Fluids    Intake/Output Summary (Last 24 hours) at 18 0721  Last data filed at 18 0600   Gross  per 24 hour   Intake             3000 ml   Output                0 ml   Net             3000 ml       Nutrition  Orders Placed This Encounter   Procedures   • Diet NPO     Standing Status:   Standing     Number of Occurrences:   1     Order Specific Question:   Restrict to:     Answer:   Sips with Medications [3]     Physical Exam   Constitutional: He is oriented to person, place, and time. He appears well-developed. No distress.   Morbid obesity.    HENT:   Head: Normocephalic and atraumatic.   Mouth/Throat: Oropharynx is clear and moist. No oropharyngeal exudate.   Eyes: EOM are normal. Pupils are equal, round, and reactive to light. Right eye exhibits no discharge. Left eye exhibits no discharge. No scleral icterus.   Neck: Normal range of motion. Neck supple. No thyromegaly present.   Cardiovascular: Normal rate and regular rhythm.  Exam reveals no gallop and no friction rub.    No murmur heard.  Pulmonary/Chest: Effort normal and breath sounds normal. He has no wheezes. He has no rales. He exhibits no tenderness.   Abdominal: Soft. Bowel sounds are normal. There is tenderness (modest tenderness epigastrium). There is no rebound and no guarding.   Musculoskeletal: Normal range of motion. He exhibits no edema or tenderness.   Lymphadenopathy:     He has no cervical adenopathy.   Neurological: He is alert and oriented to person, place, and time. No cranial nerve deficit. Coordination normal.   Skin: Skin is warm and dry. No rash noted. He is not diaphoretic. No erythema.   Psychiatric: He has a normal mood and affect. His behavior is normal. Judgment and thought content normal.   Vitals reviewed.      Recent Labs      01/19/18 2027   WBC  9.6   RBC  4.62*   HEMOGLOBIN  14.5   HEMATOCRIT  40.2*   MCV  87.0   MCH  31.4   MCHC  36.1*   RDW  40.9   PLATELETCT  122*   MPV  10.1     Recent Labs      01/19/18 2027 01/20/18   0420  01/21/18   0451   SODIUM  139  138  135   POTASSIUM  3.5*  3.9  3.8   CHLORIDE  103   105  101   CO2  24  24  22   GLUCOSE  200*  142*  117*   BUN  19  17  14   CREATININE  1.26  1.15  1.05   CALCIUM  9.1  8.8  9.1     Recent Labs      01/19/18 2027   APTT  22.4*   INR  1.07     Recent Labs      01/19/18 2027   BNPBTYPENAT  27     Recent Labs      01/20/18   0420   TRIGLYCERIDE  85   HDL  29*   LDL  49          Assessment/Plan     Acute pancreatitis- (present on admission)   Assessment & Plan    - Etiology remains unclear. Likely viral, but he does drink regularly although has been doing so for several decades without any increase in consumption. He does not have any new medications which can explain this. No gallstones on ultrasound. Triglyceride levels were normal.  - Lipase remain significantly elevated. Continue supportive care with aggressive IV fluids with LR at 200 mL per hour. Keep NPO for now. Continue pain control with IV Dilaudid, along with PRN antiemetics.  - Awaiting MRCP to rule out ductal pathology/obstruction/mass.  - Repeat lipase level in the morning.         Transaminitis- (present on admission)   Assessment & Plan    - Likely from pancreatitis. Awaits MRCP as above.    - Continue to trend. Continue IV fluids.        Hypokalemia- (present on admission)   Assessment & Plan    - Replaced. Continue to trend.        LE (acute kidney injury), prerenal (CMS-HCC)- (present on admission)   Assessment & Plan    - Continue IV fluids. Continued to hold lisinopril for now. Continue to monitor renal function closely. Avoid nephrotoxins, and continue to renally dose all medications.          Hyperglycemia- (present on admission)   Assessment & Plan    - HbA1c 5.9.   - Likely reactive from pancreatitis. Continue to monitor.        Questionable kidney mass (on abd u/s)- (present on admission)   Assessment & Plan    - Await MRCP to see if would be able to visualize the kidneys. Otherwise, may need dedicated renal ultrasound and/or outpatient follow-up.        Essential hypertension- (present  on admission)   Assessment & Plan    -Blood pressure started to go up. Continue holding lisinopril for now due to LE and pancreatitis. Continue  PRN IV labetalol for significant hypertension. Continued to monitor blood pressure.        Chronic gout- (present on admission)   Assessment & Plan    - Continue allopurinol.        Morbid obesity with BMI of 40.0-44.9, adult (CMS-HCC)- (present on admission)   Assessment & Plan    - Body mass index is 40.33 kg/m²..  - outpatient referral for outpatient weight management.        Alcohol dependence (CMS-HCC)- (present on admission)   Assessment & Plan    - No increased consumption in the last several days, consistent on regular intake.   - Continue to monitor signs for withdrawal.  - Counseled on alcohol drinking cessation.            Reviewed items::  Labs reviewed, Medications reviewed and Radiology images reviewed  Joseph catheter::  No Joseph  DVT prophylaxis pharmacological::  Heparin  Ulcer Prophylaxis::  Not indicated

## 2018-01-21 NOTE — CARE PLAN
Problem: Safety  Goal: Will remain free from injury    Intervention: Provide assistance with mobility  All safety precautions in place, side rails up x3, call light within reach at all times and pt uses it appropriately. Will continue to monitor.      Problem: Knowledge Deficit  Goal: Knowledge of disease process/condition, treatment plan, diagnostic tests, and medications will improve  Outcome: PROGRESSING AS EXPECTED  POC discussed with pt regarding monitoring vital signs, pain management, NPO for bowel rest with IV fluids per MD order, discussed fall precautions, pt verbalizes understanding, denies further questions/concerns at this time. Will continue to monitor.

## 2018-01-21 NOTE — PROGRESS NOTES
Dr. Bucio at bedside for rounds. Dr. Bucio made aware pt did not tolerate MRCP. Pt has Ativan available and ordered for MRCP cluster phobia. Okay for pt to go down and retry MRCP, per MD. MRI to call RN if additional ativan is needed during procedure. MRI tech made aware, and will be available to do scan around 1400.

## 2018-01-21 NOTE — PROGRESS NOTES
Report given to day RN. Pt sitting at edge of bed resting and calm, verbalizes relief after prn zofran administered per MAR, pt verbalizes all needs met at this time, no signs/symptoms of distress noted.

## 2018-01-21 NOTE — PROGRESS NOTES
Pt off the floor for MRI. Pt premedicated as ordered. Pt and MRI technician made aware to notify RN if pt needs additional dose of ativan during procedure.

## 2018-01-21 NOTE — PROGRESS NOTES
Anjana, from MRI called, and states pt is agitated and not tolerating th MRCP although pt was premedicated. Per tech, she does not think pt will be able to continue with procedure.  Dr. aldrich updated.

## 2018-01-21 NOTE — PROGRESS NOTES
Pt back on IRMA, no signs of distress. Pt alert and oriented, but drowsy. Pulse ox in place, pt continues to be on room air, saturation of 92%. Pt educated on calling before getting out of bed due to the side effects of ativan and drowsiness. Bed alarm in place.

## 2018-01-21 NOTE — DISCHARGE PLANNING
Care Transition Team Assessment    Patient lives with his spouse at home and plans to return home when medically able.  SS will continue to monitor and assist as needed.     Information Source  Orientation : Oriented x 4  Information Given By: Patient  Informant's Name: Sharon  Who is responsible for making decisions for patient? : Patient    Readmission Evaluation  Is this a readmission?: No    Elopement Risk  Legal Hold: No  Ambulatory or Self Mobile in Wheelchair: Yes  Disoriented: No  Psychiatric Symptoms: None  History of Wandering: No  Elopement this Admit: No  Vocalizing Wanting to Leave: No  Displays Behaviors, Body Language Wanting to Leave: No-Not at Risk for Elopement  Elopement Risk: Not at Risk for Elopement    Interdisciplinary Discharge Planning  Does Admitting Nurse Feel This Could be a Complex Discharge?: No  Primary Care Physician: Amrita  Lives with - Patient's Self Care Capacity: Spouse  Patient or legal guardian wants to designate a caregiver (see row info): No  Support Systems: Children, Spouse / Significant Other  Housing / Facility: [address]  Do You Take your Prescribed Medications Regularly: Yes  Able to Return to Previous ADL's: Yes  Mobility Issues: No  Prior Services: None  Patient Expects to be Discharged to:: Home  Assistance Needed: No  Durable Medical Equipment: Not Applicable    Discharge Preparedness  What is your plan after discharge?: Home with help  What are your discharge supports?: Spouse  Prior Functional Level: Independent with Activities of Daily Living    Functional Assesment  Prior Functional Level: Independent with Activities of Daily Living    Finances  Financial Barriers to Discharge: No  Prescription Coverage: Yes    Vision / Hearing Impairment  Vision Impairment : Yes  Right Eye Vision: Impaired, Wears Glasses  Left Eye Vision: Impaired, Wears Glasses  Hearing Impairment : No    Values / Beliefs / Concerns  Values / Beliefs Concerns : No    Advance  Directive  Advance Directive?: None    Domestic Abuse  Have you ever been the victim of abuse or violence?: No  Physical Abuse or Sexual Abuse: No  Verbal Abuse or Emotional Abuse: No  Possible Abuse Reported to:: Not Applicable    Psychological Assessment  History of Substance Abuse: Alcohol  Date Last Used - Alcohol: 1/19  Substance Abuse Comments: 3 drinks per night  History of Psychiatric Problems: No    Discharge Risks or Barriers  Discharge risks or barriers?: Substance abuse    Anticipated Discharge Information  Anticipated discharge disposition: Home

## 2018-01-22 ENCOUNTER — APPOINTMENT (OUTPATIENT)
Dept: RADIOLOGY | Facility: MEDICAL CENTER | Age: 63
DRG: 439 | End: 2018-01-22
Attending: INTERNAL MEDICINE
Payer: COMMERCIAL

## 2018-01-22 PROBLEM — F10.939 ALCOHOL WITHDRAWAL (HCC): Status: ACTIVE | Noted: 2018-01-22

## 2018-01-22 LAB
ALBUMIN SERPL BCP-MCNC: 3.6 G/DL (ref 3.2–4.9)
ALP SERPL-CCNC: 94 U/L (ref 30–99)
ALT SERPL-CCNC: 124 U/L (ref 2–50)
ANION GAP SERPL CALC-SCNC: 8 MMOL/L (ref 0–11.9)
AST SERPL-CCNC: 104 U/L (ref 12–45)
BILIRUB CONJ SERPL-MCNC: 0.9 MG/DL (ref 0.1–0.5)
BILIRUB INDIRECT SERPL-MCNC: 1.1 MG/DL (ref 0–1)
BILIRUB SERPL-MCNC: 2 MG/DL (ref 0.1–1.5)
BUN SERPL-MCNC: 11 MG/DL (ref 8–22)
CALCIUM SERPL-MCNC: 8.9 MG/DL (ref 8.4–10.2)
CHLORIDE SERPL-SCNC: 102 MMOL/L (ref 96–112)
CO2 SERPL-SCNC: 23 MMOL/L (ref 20–33)
CREAT SERPL-MCNC: 1 MG/DL (ref 0.5–1.4)
GLUCOSE SERPL-MCNC: 142 MG/DL (ref 65–99)
LIPASE SERPL-CCNC: 188 U/L (ref 7–58)
MAGNESIUM SERPL-MCNC: 1.5 MG/DL (ref 1.5–2.5)
PHOSPHATE SERPL-MCNC: 2.2 MG/DL (ref 2.5–4.5)
POTASSIUM SERPL-SCNC: 3.7 MMOL/L (ref 3.6–5.5)
PROT SERPL-MCNC: 6.3 G/DL (ref 6–8.2)
SODIUM SERPL-SCNC: 133 MMOL/L (ref 135–145)

## 2018-01-22 PROCEDURE — A9270 NON-COVERED ITEM OR SERVICE: HCPCS | Performed by: HOSPITALIST

## 2018-01-22 PROCEDURE — 84100 ASSAY OF PHOSPHORUS: CPT

## 2018-01-22 PROCEDURE — 700111 HCHG RX REV CODE 636 W/ 250 OVERRIDE (IP): Performed by: HOSPITALIST

## 2018-01-22 PROCEDURE — 700102 HCHG RX REV CODE 250 W/ 637 OVERRIDE(OP): Performed by: HOSPITALIST

## 2018-01-22 PROCEDURE — 99233 SBSQ HOSP IP/OBS HIGH 50: CPT | Performed by: INTERNAL MEDICINE

## 2018-01-22 PROCEDURE — 80048 BASIC METABOLIC PNL TOTAL CA: CPT

## 2018-01-22 PROCEDURE — 36415 COLL VENOUS BLD VENIPUNCTURE: CPT

## 2018-01-22 PROCEDURE — 83690 ASSAY OF LIPASE: CPT

## 2018-01-22 PROCEDURE — 80076 HEPATIC FUNCTION PANEL: CPT

## 2018-01-22 PROCEDURE — 83735 ASSAY OF MAGNESIUM: CPT

## 2018-01-22 PROCEDURE — 700111 HCHG RX REV CODE 636 W/ 250 OVERRIDE (IP): Performed by: INTERNAL MEDICINE

## 2018-01-22 PROCEDURE — 700102 HCHG RX REV CODE 250 W/ 637 OVERRIDE(OP): Performed by: INTERNAL MEDICINE

## 2018-01-22 PROCEDURE — A9270 NON-COVERED ITEM OR SERVICE: HCPCS | Performed by: INTERNAL MEDICINE

## 2018-01-22 PROCEDURE — 700105 HCHG RX REV CODE 258: Performed by: INTERNAL MEDICINE

## 2018-01-22 PROCEDURE — 770006 HCHG ROOM/CARE - MED/SURG/GYN SEMI*

## 2018-01-22 RX ORDER — HYDROMORPHONE HYDROCHLORIDE 2 MG/ML
0.5 INJECTION, SOLUTION INTRAMUSCULAR; INTRAVENOUS; SUBCUTANEOUS
Status: DISCONTINUED | OUTPATIENT
Start: 2018-01-22 | End: 2018-01-23

## 2018-01-22 RX ORDER — LISINOPRIL 20 MG/1
20 TABLET ORAL
Status: DISCONTINUED | OUTPATIENT
Start: 2018-01-22 | End: 2018-01-25 | Stop reason: HOSPADM

## 2018-01-22 RX ORDER — CHLORDIAZEPOXIDE HYDROCHLORIDE 25 MG/1
25 CAPSULE, GELATIN COATED ORAL EVERY 8 HOURS
Status: DISCONTINUED | OUTPATIENT
Start: 2018-01-22 | End: 2018-01-23

## 2018-01-22 RX ORDER — GABAPENTIN 300 MG/1
300 CAPSULE ORAL 3 TIMES DAILY
Status: DISCONTINUED | OUTPATIENT
Start: 2018-01-22 | End: 2018-01-24

## 2018-01-22 RX ADMIN — LORAZEPAM 1 MG: 2 INJECTION INTRAMUSCULAR; INTRAVENOUS at 18:43

## 2018-01-22 RX ADMIN — CHLORDIAZEPOXIDE HYDROCHLORIDE 25 MG: 25 CAPSULE ORAL at 21:24

## 2018-01-22 RX ADMIN — LORAZEPAM 1.5 MG: 2 INJECTION INTRAMUSCULAR; INTRAVENOUS at 16:26

## 2018-01-22 RX ADMIN — LORAZEPAM 1.5 MG: 2 INJECTION INTRAMUSCULAR; INTRAVENOUS at 03:36

## 2018-01-22 RX ADMIN — CHLORDIAZEPOXIDE HYDROCHLORIDE 25 MG: 25 CAPSULE ORAL at 08:49

## 2018-01-22 RX ADMIN — HEPARIN SODIUM 5000 UNITS: 5000 INJECTION, SOLUTION INTRAVENOUS; SUBCUTANEOUS at 16:31

## 2018-01-22 RX ADMIN — LORAZEPAM 1.5 MG: 2 INJECTION INTRAMUSCULAR; INTRAVENOUS at 11:37

## 2018-01-22 RX ADMIN — GABAPENTIN 300 MG: 300 CAPSULE ORAL at 21:24

## 2018-01-22 RX ADMIN — OMEPRAZOLE 20 MG: 20 CAPSULE, DELAYED RELEASE ORAL at 08:50

## 2018-01-22 RX ADMIN — LORAZEPAM 1.5 MG: 2 INJECTION INTRAMUSCULAR; INTRAVENOUS at 12:55

## 2018-01-22 RX ADMIN — ACETAMINOPHEN 650 MG: 325 TABLET, FILM COATED ORAL at 11:37

## 2018-01-22 RX ADMIN — LORAZEPAM 1.5 MG: 2 INJECTION INTRAMUSCULAR; INTRAVENOUS at 08:50

## 2018-01-22 RX ADMIN — LORAZEPAM 1.5 MG: 2 INJECTION INTRAMUSCULAR; INTRAVENOUS at 05:02

## 2018-01-22 RX ADMIN — SODIUM CHLORIDE, POTASSIUM CHLORIDE, SODIUM LACTATE AND CALCIUM CHLORIDE: 600; 310; 30; 20 INJECTION, SOLUTION INTRAVENOUS at 12:54

## 2018-01-22 RX ADMIN — ASPIRIN 325 MG ORAL TABLET 325 MG: 325 PILL ORAL at 08:50

## 2018-01-22 RX ADMIN — LORAZEPAM 2 MG: 2 INJECTION INTRAMUSCULAR; INTRAVENOUS at 04:35

## 2018-01-22 RX ADMIN — LORAZEPAM 2 MG: 1 TABLET ORAL at 00:18

## 2018-01-22 RX ADMIN — MULTIVITAMIN TABLET 1 TABLET: TABLET at 08:50

## 2018-01-22 RX ADMIN — LORAZEPAM 2 MG: 1 TABLET ORAL at 02:26

## 2018-01-22 RX ADMIN — HEPARIN SODIUM 5000 UNITS: 5000 INJECTION, SOLUTION INTRAVENOUS; SUBCUTANEOUS at 22:45

## 2018-01-22 RX ADMIN — ALLOPURINOL 300 MG: 300 TABLET ORAL at 08:49

## 2018-01-22 RX ADMIN — GABAPENTIN 300 MG: 300 CAPSULE ORAL at 08:51

## 2018-01-22 RX ADMIN — Medication 100 MG: at 08:50

## 2018-01-22 RX ADMIN — LORAZEPAM 1 MG: 2 INJECTION INTRAMUSCULAR; INTRAVENOUS at 22:26

## 2018-01-22 RX ADMIN — SODIUM CHLORIDE, POTASSIUM CHLORIDE, SODIUM LACTATE AND CALCIUM CHLORIDE: 600; 310; 30; 20 INJECTION, SOLUTION INTRAVENOUS at 09:02

## 2018-01-22 RX ADMIN — FOLIC ACID 1 MG: 1 TABLET ORAL at 08:50

## 2018-01-22 RX ADMIN — LORAZEPAM 1.5 MG: 2 INJECTION INTRAMUSCULAR; INTRAVENOUS at 05:53

## 2018-01-22 ASSESSMENT — LIFESTYLE VARIABLES
HEADACHE, FULLNESS IN HEAD: MODERATE
NAUSEA AND VOMITING: NO NAUSEA AND NO VOMITING
ANXIETY: *
TREMOR: *
PAROXYSMAL SWEATS: BARELY PERCEPTIBLE SWEATING. PALMS MOIST
TOTAL SCORE: 13
NAUSEA AND VOMITING: NO NAUSEA AND NO VOMITING
PAROXYSMAL SWEATS: BARELY PERCEPTIBLE SWEATING. PALMS MOIST
HEADACHE, FULLNESS IN HEAD: NOT PRESENT
TOTAL SCORE: 20
VISUAL DISTURBANCES: NOT PRESENT
ANXIETY: *
TREMOR: *
ANXIETY: *
ORIENTATION AND CLOUDING OF SENSORIUM: DISORIENTED FOR PLACE AND / OR PERSON
NAUSEA AND VOMITING: NO NAUSEA AND NO VOMITING
ORIENTATION AND CLOUDING OF SENSORIUM: DISORIENTED FOR PLACE AND / OR PERSON
HEADACHE, FULLNESS IN HEAD: MODERATE
AUDITORY DISTURBANCES: NOT PRESENT
TOTAL SCORE: 13
VISUAL DISTURBANCES: NOT PRESENT
ORIENTATION AND CLOUDING OF SENSORIUM: DISORIENTED FOR PLACE AND / OR PERSON
AUDITORY DISTURBANCES: NOT PRESENT
TOTAL SCORE: 13
PAROXYSMAL SWEATS: BARELY PERCEPTIBLE SWEATING. PALMS MOIST
AGITATION: *
NAUSEA AND VOMITING: NO NAUSEA AND NO VOMITING
ANXIETY: MODERATELY ANXIOUS OR GUARDED, SO ANXIETY IS INFERRED
HEADACHE, FULLNESS IN HEAD: MILD
TOTAL SCORE: 13
HEADACHE, FULLNESS IN HEAD: NOT PRESENT
VISUAL DISTURBANCES: NOT PRESENT
ORIENTATION AND CLOUDING OF SENSORIUM: DISORIENTED FOR PLACE AND / OR PERSON
TOTAL SCORE: 13
ORIENTATION AND CLOUDING OF SENSORIUM: DISORIENTED FOR PLACE AND / OR PERSON
PAROXYSMAL SWEATS: *
NAUSEA AND VOMITING: NO NAUSEA AND NO VOMITING
AGITATION: *
ORIENTATION AND CLOUDING OF SENSORIUM: DISORIENTED FOR PLACE AND / OR PERSON
NAUSEA AND VOMITING: NO NAUSEA AND NO VOMITING
AUDITORY DISTURBANCES: NOT PRESENT
AGITATION: *
ORIENTATION AND CLOUDING OF SENSORIUM: DISORIENTED FOR PLACE AND / OR PERSON
HEADACHE, FULLNESS IN HEAD: MODERATE
NAUSEA AND VOMITING: NO NAUSEA AND NO VOMITING
VISUAL DISTURBANCES: NOT PRESENT
ANXIETY: *
TOTAL SCORE: 20
AUDITORY DISTURBANCES: NOT PRESENT
TREMOR: *
PAROXYSMAL SWEATS: *
AUDITORY DISTURBANCES: NOT PRESENT
TREMOR: *
HEADACHE, FULLNESS IN HEAD: MILD
AGITATION: *
HEADACHE, FULLNESS IN HEAD: MILD
TOTAL SCORE: 13
PAROXYSMAL SWEATS: BARELY PERCEPTIBLE SWEATING. PALMS MOIST
AGITATION: MODERATELY FIDGETY AND RESTLESS
VISUAL DISTURBANCES: NOT PRESENT
PAROXYSMAL SWEATS: *
ANXIETY: *
TREMOR: *
TOTAL SCORE: 14
ANXIETY: *
TREMOR: *
AUDITORY DISTURBANCES: NOT PRESENT
VISUAL DISTURBANCES: NOT PRESENT
VISUAL DISTURBANCES: NOT PRESENT
ORIENTATION AND CLOUDING OF SENSORIUM: DATE DISORIENTATION BY MORE THAN TWO CALENDAR DAYS
TREMOR: *
TOTAL SCORE: 12
AGITATION: *
TREMOR: *
ANXIETY: *
VISUAL DISTURBANCES: NOT PRESENT
AGITATION: *
TOTAL SCORE: 26
NAUSEA AND VOMITING: NO NAUSEA AND NO VOMITING
TREMOR: *
AGITATION: *
AUDITORY DISTURBANCES: NOT PRESENT
AUDITORY DISTURBANCES: NOT PRESENT
ORIENTATION AND CLOUDING OF SENSORIUM: DISORIENTED FOR PLACE AND / OR PERSON
PAROXYSMAL SWEATS: BEADS OF SWEAT OBVIOUS ON FOREHEAD
ORIENTATION AND CLOUDING OF SENSORIUM: DATE DISORIENTATION BY NO MORE THAN TWO CALENDAR DAYS
AUDITORY DISTURBANCES: NOT PRESENT
ANXIETY: *
TREMOR: *
TREMOR: *
PAROXYSMAL SWEATS: BARELY PERCEPTIBLE SWEATING. PALMS MOIST
HEADACHE, FULLNESS IN HEAD: MODERATE
NAUSEA AND VOMITING: NO NAUSEA AND NO VOMITING
TOTAL SCORE: 20
PAROXYSMAL SWEATS: *
ORIENTATION AND CLOUDING OF SENSORIUM: DISORIENTED FOR PLACE AND / OR PERSON
AUDITORY DISTURBANCES: NOT PRESENT
AGITATION: *
ANXIETY: *
AGITATION: *
PAROXYSMAL SWEATS: BARELY PERCEPTIBLE SWEATING. PALMS MOIST
AGITATION: SOMEWHAT MORE THAN NORMAL ACTIVITY
TREMOR: *
AUDITORY DISTURBANCES: NOT PRESENT
AUDITORY DISTURBANCES: NOT PRESENT
TOTAL SCORE: 19
ORIENTATION AND CLOUDING OF SENSORIUM: CANNOT DO SERIAL ADDITIONS OR IS UNCERTAIN ABOUT DATE
VISUAL DISTURBANCES: NOT PRESENT
ANXIETY: *
ORIENTATION AND CLOUDING OF SENSORIUM: DISORIENTED FOR PLACE AND / OR PERSON
TREMOR: *
NAUSEA AND VOMITING: NO NAUSEA AND NO VOMITING
TOTAL SCORE: 13
AGITATION: MODERATELY FIDGETY AND RESTLESS
PAROXYSMAL SWEATS: BEADS OF SWEAT OBVIOUS ON FOREHEAD
ORIENTATION AND CLOUDING OF SENSORIUM: DISORIENTED FOR PLACE AND / OR PERSON
NAUSEA AND VOMITING: NO NAUSEA AND NO VOMITING
NAUSEA AND VOMITING: NO NAUSEA AND NO VOMITING
VISUAL DISTURBANCES: NOT PRESENT
NAUSEA AND VOMITING: NO NAUSEA AND NO VOMITING
NAUSEA AND VOMITING: NO NAUSEA AND NO VOMITING
HEADACHE, FULLNESS IN HEAD: NOT PRESENT
VISUAL DISTURBANCES: NOT PRESENT
HEADACHE, FULLNESS IN HEAD: MILD
AGITATION: *
ANXIETY: *
VISUAL DISTURBANCES: NOT PRESENT
AUDITORY DISTURBANCES: NOT PRESENT
AUDITORY DISTURBANCES: NOT PRESENT
PAROXYSMAL SWEATS: BARELY PERCEPTIBLE SWEATING. PALMS MOIST
VISUAL DISTURBANCES: NOT PRESENT
ANXIETY: *
PAROXYSMAL SWEATS: *
HEADACHE, FULLNESS IN HEAD: MILD
VISUAL DISTURBANCES: NOT PRESENT
HEADACHE, FULLNESS IN HEAD: MILD
AGITATION: *
ANXIETY: *
HEADACHE, FULLNESS IN HEAD: MILD
TREMOR: *

## 2018-01-22 ASSESSMENT — PAIN SCALES - GENERAL
PAINLEVEL_OUTOF10: 5
PAINLEVEL_OUTOF10: 0

## 2018-01-22 NOTE — PROGRESS NOTES
Assessment completed.  CIWA score 20.  Medicated.  Agitated and restless, wife Alessandra at bedside assisted with given PO meds.  Pt is resting at this time.

## 2018-01-22 NOTE — PROGRESS NOTES
Pt increasingly agitated, diaphoretic, confused, c/o headache. Pt frequently setting off bed alarm, very unsteady. CIWA 20-25, medicating with Ativan per MAR. Continuous pulse ox in use.

## 2018-01-22 NOTE — PROGRESS NOTES
Pt frequently setting off bed alarm, refusing to change soiled gown. Increasingly agitated. Denies any pain or nausea. CIWA score 13, medicated per MAR.

## 2018-01-22 NOTE — CARE PLAN
Problem: Safety  Goal: Will remain free from injury  CIWA protocol in place, bed alarm on, pulse oximetry monitor in place.

## 2018-01-22 NOTE — CARE PLAN
Problem: Safety  Goal: Will remain free from falls    Intervention: Assess risk factors for falls  Poor safety awareness, impulsive, bed alarm on for safety, and frequent rounding.       Problem: Knowledge Deficit  Goal: Knowledge of disease process/condition, treatment plan, diagnostic tests, and medications will improve    Intervention: Assess knowledge level of disease process/condition, treatment plan, diagnostic tests, and medications  POC dicussed with wife, monitor CIWA and medicate.

## 2018-01-22 NOTE — PROGRESS NOTES
Report received from NOC RN, POC discussed, walking rounds completed, pt restless, all orders, medications, and lines verified, no s/s distress, call light within reach and will continue to monitor.

## 2018-01-22 NOTE — PROGRESS NOTES
Pt sleeping at this time, bed alarm in place. Pt is easily aroused. Pt does have some anxiety and agitation. Dr. aldrich made aware. Pt's wife sates last drink was about two days ago. [facility] protocol ordered.

## 2018-01-22 NOTE — PROGRESS NOTES
[facility] Hospitalist Progress Note    Date of Service: 2018    Chief Complaint  62 y.o. male with CAD, hypertension, GERD, alcohol dependence, drinks 3 bourbons at night, admitted 2018 with acute onset epigastric pain, nausea, and vomiting. Denied drinking more than usual 3 bourbons which he has done for several decades now. Denied new meds. Lipase was noted to be elevated at greater than 400. No leukocytosis. Potassium 3.5, creatinine 1.26. BG was elevated at 200. AST was 197, and ALT of 93 with normal alkaline phosphatase, total bilirubin of 1.9. BNP and troponins were negative. Urinalysis showed no pyuria. Gallbladder ultrasound did not show any gallstones or evidence of cholecystitis, with echogenic liver suggesting fatty infiltration, with note of possible right kidney mass, not well evaluated. Started on bowel rest, and IV fluids. Low potassium was replaced. LDL 49, HDL 29, triglyceride 85. MRCP ordered, but unable to tolerate. Ferritin >1500, with high cortisol. Viral hepatitis panel negative. TSH WNL.    Interval Problem Update  2018 - Remains hemodynamically stable and afebrile. Stable on 2L O2 NC.  Sodium 133. AST and ALT improved, with improved total bilirubin. Lipase down to 188. Phosphorus 2.2, magnesium 1.5. Patient again unable to tolerate MRCP. Had some confusion prior to MRCP sedation, and subsequent agitations felt to have early withdrawal. Scored 20 on CIWA overnight.    > Seen and examined. Confused, trying to get out of bed. Does not recognize his wife.       Consultants/Specialty  None    Disposition  Monitor on the floors.         ROS     Unable to obtain due to confusion     Physical Exam  Laboratory/Imaging   Hemodynamics  Temp (24hrs), Av.2 °C (98.9 °F), Min:37.1 °C (98.7 °F), Max:37.6 °C (99.6 °F)   Temperature: 37.6 °C (99.6 °F)  Pulse  Av.4  Min: 72  Max: 106    Blood Pressure: 147/65      Respiratory      Respiration: 18, Pulse Oximetry: 94 %              Fluids    Intake/Output Summary (Last 24 hours) at 01/22/18 0761  Last data filed at 01/22/18 0444   Gross per 24 hour   Intake             1473 ml   Output              450 ml   Net             1023 ml       Nutrition  Orders Placed This Encounter   Procedures   • Diet NPO     Standing Status:   Standing     Number of Occurrences:   1     Order Specific Question:   Restrict to:     Answer:   Sips with Medications [3]     Physical Exam   Constitutional: He appears well-developed. No distress.   Morbid obesity.    HENT:   Head: Normocephalic and atraumatic.   Mouth/Throat: Oropharynx is clear and moist. No oropharyngeal exudate.   Eyes: EOM are normal. Pupils are equal, round, and reactive to light. Right eye exhibits no discharge. Left eye exhibits no discharge. No scleral icterus.   Neck: Normal range of motion. Neck supple. No thyromegaly present.   Cardiovascular: Normal rate and regular rhythm.  Exam reveals no gallop and no friction rub.    No murmur heard.  Pulmonary/Chest: Effort normal and breath sounds normal. He has no wheezes. He has no rales. He exhibits no tenderness.   Abdominal: Soft. Bowel sounds are normal. There is no tenderness (modest tenderness epigastrium). There is no rebound and no guarding.   Musculoskeletal: Normal range of motion. He exhibits no edema or tenderness.   Lymphadenopathy:     He has no cervical adenopathy.   Neurological: He is alert. No cranial nerve deficit. Coordination normal.   Confused, does not answer coherently. No tremors. Sweaty.   Skin: Skin is warm and dry. No rash noted. He is not diaphoretic. No erythema.   Psychiatric:   Unable to assess. Confused.   Vitals reviewed.      Recent Labs      01/19/18 2027   WBC  9.6   RBC  4.62*   HEMOGLOBIN  14.5   HEMATOCRIT  40.2*   MCV  87.0   MCH  31.4   MCHC  36.1*   RDW  40.9   PLATELETCT  122*   MPV  10.1     Recent Labs      01/20/18   0420  01/21/18   0451  01/22/18   0532   SODIUM  138  135  133*   POTASSIUM  3.9   3.8  3.7   CHLORIDE  105  101  102   CO2  24  22  23   GLUCOSE  142*  117*  142*   BUN  17  14  11   CREATININE  1.15  1.05  1.00   CALCIUM  8.8  9.1  8.9     Recent Labs      01/19/18 2027   APTT  22.4*   INR  1.07     Recent Labs      01/19/18 2027   BNPBTYPENAT  27     Recent Labs      01/20/18   0420   TRIGLYCERIDE  85   HDL  29*   LDL  49          Assessment/Plan     Acute pancreatitis- (present on admission)   Assessment & Plan    - Etiology unclear. Likely viral, in setting of regular alcohol intake. Attempted to obtain MRCP, but patient unable to tolerate due to his alcohol withdrawal and claustrophobia.   - Lipase has normalized. Decrease IV fluids with lactated Ringer to 100 mL per hour. Advance diet to full liquids.  - May need to reattempt MRCP once out of alcohol withdrawal.        Alcohol withdrawal (CMS-[facility])   Assessment & Plan    - I will start him on Librium every 6 hours, in addition to as needed Ativan per [facility] protocol. Continue multivitamins, thiamine, and folate. Start Neurontin 3 times a day.  - Monitor closely for worsening withdrawal/DT as may need higher level of care particularly benzodiazepine drip in the ICU if he does go into severe alcohol withdrawal.        Transaminitis- (present on admission)   Assessment & Plan    - Likely from pancreatitis. Improving. Unable to tolerate MRCP, may be reasonable to try again once we control will improve.   - Continue to trend. Continue IV fluids.        Hypokalemia- (present on admission)   Assessment & Plan    - Replaced. Continue to trend.        LE (acute kidney injury), prerenal (CMS-HCC)- (present on admission)   Assessment & Plan    - Resolved. Continue IV fluids, decrease rate. Can resume home dose lisinopril. Continue to monitor renal function closely. Avoid nephrotoxins, and continue to renally dose all medications.          Hyperglycemia- (present on admission)   Assessment & Plan    - HbA1c 5.9.   - Likely reactive from pancreatitis.  Continue to monitor.        Questionable kidney mass (on abd u/s)- (present on admission)   Assessment & Plan    -Will obtain dedicated renal ultrasound.        Essential hypertension- (present on admission)   Assessment & Plan    - Resume home dose lisinopril. Continue PRN IV labetalol for significant hypertension. Continue to monitor blood pressure.        Chronic gout- (present on admission)   Assessment & Plan    - Continue allopurinol.        Morbid obesity with BMI of 40.0-44.9, adult (CMS-HCC)- (present on admission)   Assessment & Plan    - Body mass index is 40.33 kg/m²..  - outpatient referral for outpatient weight management.        Alcohol dependence (CMS-HCC)- (present on admission)   Assessment & Plan    - Now in withdrawal.            Reviewed items::  Labs reviewed, Medications reviewed and Radiology images reviewed  Joseph catheter::  No Joseph  DVT prophylaxis pharmacological::  Heparin  Ulcer Prophylaxis::  Not indicated

## 2018-01-22 NOTE — CARE PLAN
Problem: Venous Thromboembolism (VTW)/Deep Vein Thrombosis (DVT) Prevention:  Goal: Patient will participate in Venous Thrombosis (VTE)/Deep Vein Thrombosis (DVT)Prevention Measures   01/21/18 0900   OTHER   Risk Assessment Score 3   VTE RISK High   Pharmacologic Prophylaxis Used Unfractionated Heparin   Pt refusing. Pt educated and agrees to ambulate frequently.

## 2018-01-22 NOTE — CARE PLAN
Problem: Safety  Goal: Will remain free from injury  Outcome: PROGRESSING AS EXPECTED  Bed in low locked position, upper bed rails up, treaded slipper socks in place, room free of clutter, bed alarm activated. Pt very unsteady and impulsive. Hourly rounding in place to ensure pt safety and needs are met.         Problem: Psychosocial Needs:  Goal: Level of anxiety will decrease  Outcome: PROGRESSING SLOWER THAN EXPECTED  Medicating with Ativan per CIWA protocol

## 2018-01-22 NOTE — PROGRESS NOTES
"Pt initially sleeping at change of shift, then set off bed alarm attempting to ambulate to the bathroom. Pt irritable and seemed slightly disoriented, poorly following commands, unsteady gait. Pt reports feeling \"terrible\" but refuses to clarify, denies any pain, headache, nausea, itchiness, visual/auditory disturbances. CIWA score 10, medicated with Ativan per protocol. Rally bag infusing.  "

## 2018-01-23 ENCOUNTER — APPOINTMENT (OUTPATIENT)
Dept: RADIOLOGY | Facility: MEDICAL CENTER | Age: 63
DRG: 439 | End: 2018-01-23
Attending: INTERNAL MEDICINE
Payer: COMMERCIAL

## 2018-01-23 LAB
ALBUMIN SERPL BCP-MCNC: 3 G/DL (ref 3.2–4.9)
ALBUMIN/GLOB SERPL: 1.1 G/DL
ALP SERPL-CCNC: 104 U/L (ref 30–99)
ALT SERPL-CCNC: 114 U/L (ref 2–50)
ANION GAP SERPL CALC-SCNC: 8 MMOL/L (ref 0–11.9)
AST SERPL-CCNC: 112 U/L (ref 12–45)
BILIRUB SERPL-MCNC: 1.6 MG/DL (ref 0.1–1.5)
BUN SERPL-MCNC: 11 MG/DL (ref 8–22)
CALCIUM SERPL-MCNC: 8.5 MG/DL (ref 8.4–10.2)
CHLORIDE SERPL-SCNC: 105 MMOL/L (ref 96–112)
CO2 SERPL-SCNC: 24 MMOL/L (ref 20–33)
CREAT SERPL-MCNC: 0.98 MG/DL (ref 0.5–1.4)
GLOBULIN SER CALC-MCNC: 2.8 G/DL (ref 1.9–3.5)
GLUCOSE SERPL-MCNC: 109 MG/DL (ref 65–99)
MAGNESIUM SERPL-MCNC: 1.7 MG/DL (ref 1.5–2.5)
PHOSPHATE SERPL-MCNC: 3.1 MG/DL (ref 2.5–4.5)
POTASSIUM SERPL-SCNC: 3.5 MMOL/L (ref 3.6–5.5)
PROT SERPL-MCNC: 5.8 G/DL (ref 6–8.2)
SODIUM SERPL-SCNC: 137 MMOL/L (ref 135–145)

## 2018-01-23 PROCEDURE — 770006 HCHG ROOM/CARE - MED/SURG/GYN SEMI*

## 2018-01-23 PROCEDURE — 99233 SBSQ HOSP IP/OBS HIGH 50: CPT | Performed by: HOSPITALIST

## 2018-01-23 PROCEDURE — 83735 ASSAY OF MAGNESIUM: CPT

## 2018-01-23 PROCEDURE — 700111 HCHG RX REV CODE 636 W/ 250 OVERRIDE (IP): Performed by: INTERNAL MEDICINE

## 2018-01-23 PROCEDURE — 36415 COLL VENOUS BLD VENIPUNCTURE: CPT

## 2018-01-23 PROCEDURE — 700111 HCHG RX REV CODE 636 W/ 250 OVERRIDE (IP): Performed by: HOSPITALIST

## 2018-01-23 PROCEDURE — A9270 NON-COVERED ITEM OR SERVICE: HCPCS | Performed by: HOSPITALIST

## 2018-01-23 PROCEDURE — 700102 HCHG RX REV CODE 250 W/ 637 OVERRIDE(OP): Performed by: HOSPITALIST

## 2018-01-23 PROCEDURE — 76775 US EXAM ABDO BACK WALL LIM: CPT

## 2018-01-23 PROCEDURE — 84100 ASSAY OF PHOSPHORUS: CPT

## 2018-01-23 PROCEDURE — A9270 NON-COVERED ITEM OR SERVICE: HCPCS | Performed by: INTERNAL MEDICINE

## 2018-01-23 PROCEDURE — 80053 COMPREHEN METABOLIC PANEL: CPT

## 2018-01-23 PROCEDURE — 700102 HCHG RX REV CODE 250 W/ 637 OVERRIDE(OP): Performed by: INTERNAL MEDICINE

## 2018-01-23 RX ORDER — POTASSIUM CHLORIDE 20 MEQ/1
40 TABLET, EXTENDED RELEASE ORAL ONCE
Status: COMPLETED | OUTPATIENT
Start: 2018-01-23 | End: 2018-01-23

## 2018-01-23 RX ORDER — MAGNESIUM SULFATE HEPTAHYDRATE 40 MG/ML
2 INJECTION, SOLUTION INTRAVENOUS ONCE
Status: COMPLETED | OUTPATIENT
Start: 2018-01-23 | End: 2018-01-23

## 2018-01-23 RX ORDER — HALOPERIDOL 5 MG/ML
1 INJECTION INTRAMUSCULAR EVERY 4 HOURS PRN
Status: DISCONTINUED | OUTPATIENT
Start: 2018-01-23 | End: 2018-01-25 | Stop reason: HOSPADM

## 2018-01-23 RX ORDER — DIVALPROEX SODIUM 250 MG/1
250 TABLET, DELAYED RELEASE ORAL EVERY 8 HOURS
Status: DISCONTINUED | OUTPATIENT
Start: 2018-01-23 | End: 2018-01-24

## 2018-01-23 RX ADMIN — LORAZEPAM 1 MG: 2 INJECTION INTRAMUSCULAR; INTRAVENOUS at 00:33

## 2018-01-23 RX ADMIN — MAGNESIUM SULFATE IN WATER 2 G: 40 INJECTION, SOLUTION INTRAVENOUS at 10:25

## 2018-01-23 RX ADMIN — CHLORDIAZEPOXIDE HYDROCHLORIDE 25 MG: 25 CAPSULE ORAL at 05:54

## 2018-01-23 RX ADMIN — Medication 100 MG: at 07:45

## 2018-01-23 RX ADMIN — HALOPERIDOL LACTATE 1 MG: 5 INJECTION, SOLUTION INTRAMUSCULAR at 13:23

## 2018-01-23 RX ADMIN — LISINOPRIL 20 MG: 20 TABLET ORAL at 07:45

## 2018-01-23 RX ADMIN — FOLIC ACID 1 MG: 1 TABLET ORAL at 07:44

## 2018-01-23 RX ADMIN — OMEPRAZOLE 20 MG: 20 CAPSULE, DELAYED RELEASE ORAL at 07:44

## 2018-01-23 RX ADMIN — LORAZEPAM 1.5 MG: 2 INJECTION INTRAMUSCULAR; INTRAVENOUS at 08:01

## 2018-01-23 RX ADMIN — GABAPENTIN 300 MG: 300 CAPSULE ORAL at 22:08

## 2018-01-23 RX ADMIN — HEPARIN SODIUM 5000 UNITS: 5000 INJECTION, SOLUTION INTRAVENOUS; SUBCUTANEOUS at 05:58

## 2018-01-23 RX ADMIN — LORAZEPAM 1 MG: 2 INJECTION INTRAMUSCULAR; INTRAVENOUS at 05:59

## 2018-01-23 RX ADMIN — ASPIRIN 325 MG ORAL TABLET 325 MG: 325 PILL ORAL at 07:44

## 2018-01-23 RX ADMIN — MULTIVITAMIN TABLET 1 TABLET: TABLET at 07:45

## 2018-01-23 RX ADMIN — HEPARIN SODIUM 5000 UNITS: 5000 INJECTION, SOLUTION INTRAVENOUS; SUBCUTANEOUS at 22:08

## 2018-01-23 RX ADMIN — ACETAMINOPHEN 650 MG: 325 TABLET, FILM COATED ORAL at 23:24

## 2018-01-23 RX ADMIN — GABAPENTIN 300 MG: 300 CAPSULE ORAL at 07:44

## 2018-01-23 RX ADMIN — DIVALPROEX SODIUM 250 MG: 250 TABLET, DELAYED RELEASE ORAL at 22:08

## 2018-01-23 RX ADMIN — DOCUSATE SODIUM AND SENNOSIDES 2 TABLET: 8.6; 5 TABLET, FILM COATED ORAL at 22:08

## 2018-01-23 RX ADMIN — ALLOPURINOL 300 MG: 300 TABLET ORAL at 07:44

## 2018-01-23 RX ADMIN — DIVALPROEX SODIUM 250 MG: 250 TABLET, DELAYED RELEASE ORAL at 13:20

## 2018-01-23 RX ADMIN — POTASSIUM CHLORIDE 40 MEQ: 1500 TABLET, EXTENDED RELEASE ORAL at 13:21

## 2018-01-23 RX ADMIN — HALOPERIDOL LACTATE 1 MG: 5 INJECTION, SOLUTION INTRAMUSCULAR at 23:28

## 2018-01-23 RX ADMIN — HEPARIN SODIUM 5000 UNITS: 5000 INJECTION, SOLUTION INTRAVENOUS; SUBCUTANEOUS at 13:26

## 2018-01-23 ASSESSMENT — LIFESTYLE VARIABLES
AGITATION: SOMEWHAT MORE THAN NORMAL ACTIVITY
AUDITORY DISTURBANCES: NOT PRESENT
PAROXYSMAL SWEATS: *
NAUSEA AND VOMITING: NO NAUSEA AND NO VOMITING
ORIENTATION AND CLOUDING OF SENSORIUM: ORIENTED AND CAN DO SERIAL ADDITIONS
ORIENTATION AND CLOUDING OF SENSORIUM: DISORIENTED FOR PLACE AND / OR PERSON
NAUSEA AND VOMITING: NO NAUSEA AND NO VOMITING
ORIENTATION AND CLOUDING OF SENSORIUM: DATE DISORIENTATION BY MORE THAN TWO CALENDAR DAYS
TOTAL SCORE: 13
TREMOR: NO TREMOR
ANXIETY: NO ANXIETY (AT EASE)
NAUSEA AND VOMITING: NO NAUSEA AND NO VOMITING
NAUSEA AND VOMITING: NO NAUSEA AND NO VOMITING
TREMOR: NO TREMOR
HEADACHE, FULLNESS IN HEAD: VERY MILD
PAROXYSMAL SWEATS: NO SWEAT VISIBLE
ANXIETY: MODERATELY ANXIOUS OR GUARDED, SO ANXIETY IS INFERRED
AGITATION: MODERATELY FIDGETY AND RESTLESS
VISUAL DISTURBANCES: NOT PRESENT
TREMOR: TREMOR NOT VISIBLE BUT CAN BE FELT, FINGERTIP TO FINGERTIP
ANXIETY: *
HEADACHE, FULLNESS IN HEAD: NOT PRESENT
VISUAL DISTURBANCES: NOT PRESENT
VISUAL DISTURBANCES: NOT PRESENT
ORIENTATION AND CLOUDING OF SENSORIUM: CANNOT DO SERIAL ADDITIONS OR IS UNCERTAIN ABOUT DATE
AUDITORY DISTURBANCES: NOT PRESENT
ORIENTATION AND CLOUDING OF SENSORIUM: DATE DISORIENTATION BY MORE THAN TWO CALENDAR DAYS
NAUSEA AND VOMITING: NO NAUSEA AND NO VOMITING
HEADACHE, FULLNESS IN HEAD: VERY MILD
HEADACHE, FULLNESS IN HEAD: VERY MILD
TOTAL SCORE: 4
TOTAL SCORE: 16
AUDITORY DISTURBANCES: NOT PRESENT
AUDITORY DISTURBANCES: NOT PRESENT
ORIENTATION AND CLOUDING OF SENSORIUM: ORIENTED AND CAN DO SERIAL ADDITIONS
VISUAL DISTURBANCES: NOT PRESENT
PAROXYSMAL SWEATS: BARELY PERCEPTIBLE SWEATING. PALMS MOIST
AUDITORY DISTURBANCES: NOT PRESENT
ANXIETY: *
TOTAL SCORE: 8
PAROXYSMAL SWEATS: NO SWEAT VISIBLE
ANXIETY: *
HEADACHE, FULLNESS IN HEAD: NOT PRESENT
TREMOR: NO TREMOR
PAROXYSMAL SWEATS: BARELY PERCEPTIBLE SWEATING. PALMS MOIST
ANXIETY: *
AGITATION: *
TREMOR: *
TOTAL SCORE: 4
AGITATION: *
AUDITORY DISTURBANCES: NOT PRESENT
AGITATION: SOMEWHAT MORE THAN NORMAL ACTIVITY
ANXIETY: *
VISUAL DISTURBANCES: NOT PRESENT
HEADACHE, FULLNESS IN HEAD: MILD
AGITATION: SOMEWHAT MORE THAN NORMAL ACTIVITY
PAROXYSMAL SWEATS: BARELY PERCEPTIBLE SWEATING. PALMS MOIST
VISUAL DISTURBANCES: NOT PRESENT
NAUSEA AND VOMITING: NO NAUSEA AND NO VOMITING
ORIENTATION AND CLOUDING OF SENSORIUM: DATE DISORIENTATION BY MORE THAN TWO CALENDAR DAYS
HEADACHE, FULLNESS IN HEAD: VERY MILD
AUDITORY DISTURBANCES: NOT PRESENT
NAUSEA AND VOMITING: NO NAUSEA AND NO VOMITING
VISUAL DISTURBANCES: NOT PRESENT
TREMOR: NO TREMOR
TOTAL SCORE: 12
AGITATION: *
TOTAL SCORE: 13
TREMOR: NO TREMOR
PAROXYSMAL SWEATS: NO SWEAT VISIBLE

## 2018-01-23 ASSESSMENT — PAIN SCALES - GENERAL
PAINLEVEL_OUTOF10: 0
PAINLEVEL_OUTOF10: 0

## 2018-01-23 NOTE — PROGRESS NOTES
[facility] Hospitalist Progress Note    Date of Service: 2018    Chief Complaint  62 y.o. male with acute onset epigastric pain, nausea, and vomiting. Found to have acute pancreatitis. Denied drinking more than usual 3 bourbons which he has done for several decades now. Gallbladder ultrasound did not show any gallstones or evidence of cholecystitis, with echogenic liver suggesting fatty infiltration, with note of possible right kidney mass, not well evaluated. Hospital course complicated by delirium and ?etoh withdrawal.         Interval Problem Update  Sleeping today a lot. Pain controlled.     K  Mag  Stop librium and use depakote  Stop iv dilaudid    Consultants/Specialty  None    Disposition  Monitor on the floors.         Review of Systems   Unable to perform ROS: Medical condition        Unable to obtain due to confusion     Physical Exam  Laboratory/Imaging   Hemodynamics  Temp (24hrs), Av.2 °C (99 °F), Min:36.9 °C (98.5 °F), Max:37.5 °C (99.5 °F)   Temperature: 37.3 °C (99.1 °F)  Pulse  Av.8  Min: 65  Max: 106    Blood Pressure: 143/84      Respiratory      Respiration: 18, Pulse Oximetry: 98 %        RUL Breath Sounds: Clear, RLL Breath Sounds: Diminished, NANCY Breath Sounds: Clear, LLL Breath Sounds: Diminished    Fluids    Intake/Output Summary (Last 24 hours) at 18 0848  Last data filed at 18 0700   Gross per 24 hour   Intake             2400 ml   Output              800 ml   Net             1600 ml       Nutrition  Orders Placed This Encounter   Procedures   • DIET ORDER     Standing Status:   Standing     Number of Occurrences:   1     Order Specific Question:   Diet:     Answer:   Full Liquid [11]     Physical Exam   Constitutional: He appears well-developed and well-nourished. He appears lethargic. No distress.   HENT:   Right Ear: External ear normal.   Left Ear: External ear normal.   Nose: Nose normal.   Eyes: Right eye exhibits no discharge. Left eye exhibits no discharge. No  scleral icterus.   Neck: No JVD present. No tracheal deviation present.   Cardiovascular: Normal rate, normal heart sounds and intact distal pulses.    No murmur heard.  Pulmonary/Chest: Effort normal and breath sounds normal. No respiratory distress. He has no wheezes. He has no rales.   Abdominal: Soft. Bowel sounds are normal. He exhibits no distension. There is no tenderness. There is no guarding.   Musculoskeletal: He exhibits no edema or tenderness.   Neurological: He appears lethargic. He is disoriented.   Skin: Skin is warm and dry. He is not diaphoretic. No erythema.   Psychiatric: He has a normal mood and affect. His behavior is normal.   Nursing note and vitals reviewed.          Recent Labs      01/21/18   0451  01/22/18   0532  01/23/18   0505   SODIUM  135  133*  137   POTASSIUM  3.8  3.7  3.5*   CHLORIDE  101  102  105   CO2  22  23  24   GLUCOSE  117*  142*  109*   BUN  14  11  11   CREATININE  1.05  1.00  0.98   CALCIUM  9.1  8.9  8.5                      Assessment/Plan     Acute pancreatitis- (present on admission)   Assessment & Plan    - Etiology unclear. ??etoh vs viral  rina looks ok  MRI pending but may not be needed  Iv fluids an dpain control.         Alcohol withdrawal (CMS-HCC)   Assessment & Plan    ???real. His delirium started with ativen given for MRI.   Hold librium  Use depakote and minimize ativan if possible. Add prn haldol and will cont neurontin fo rnow    [facility] protocol. Continue multivitamins, thiamine, and folate. Start Neurontin 3 times a day.          Transaminitis- (present on admission)   Assessment & Plan    - Likely viral related of etoh hepatitis. Workup normal. Numbers consistent with hepatitis related to etoh. Improving. Unable to tolerate MRCP, may be reasonable to try again once we control will improve.   - Continue to trend. Continue IV fluids.        Hypokalemia- (present on admission)   Assessment & Plan    - Replaced. Continue to trend.        LE (acute kidney  injury), prerenal (CMS-HCC)- (present on admission)   Assessment & Plan    - Resolved with IV fluids          Hyperglycemia- (present on admission)   Assessment & Plan    reactive from pancreatitis. Continue to monitor.        Questionable kidney mass (on abd u/s)- (present on admission)   Assessment & Plan    -Will obtain dedicated renal ultrasound.        Essential hypertension- (present on admission)   Assessment & Plan    - Resume home dose lisinopril. PRN IV labetalol         Chronic gout- (present on admission)   Assessment & Plan    - Continue allopurinol.        Morbid obesity with BMI of 40.0-44.9, adult (CMS-HCC)- (present on admission)   Assessment & Plan    - Body mass index is 40.33 kg/m²..  - outpatient referral for outpatient weight management.        Alcohol dependence (CMS-HCC)- (present on admission)   Assessment & Plan    - Now in withdrawal.            Reviewed items::  Labs reviewed, Medications reviewed, Radiology images reviewed and EKG reviewed  Joseph catheter::  No Joseph  DVT prophylaxis pharmacological::  Heparin  Ulcer Prophylaxis::  Not indicated

## 2018-01-23 NOTE — PROGRESS NOTES
BS report received. Patient in with eyes closed. Recently medicated with Lorazepam. Breathing even and unlabored and no distress noted at this time. Bed low and locked, bed alarm on. Call light and belonging within reach. Fall precaution in effect. Hourly rounding in place.

## 2018-01-23 NOTE — PROGRESS NOTES
Patient is awake and restless. Given Librium and Gabapentin with apple sauce, able to swallow well. Williams medicated with Ativan IV.

## 2018-01-23 NOTE — PROGRESS NOTES
Report received from night shift RN and care assumed at 0700.  Pt A+Ox3 (person, place, event), unable to get thorough assessment d/t patient's confusion and non compliance.  No complaints of pain at this time. CIWA 16 d/t agitation/anxiety/diaphoresis/stripping of clothes/threats. Administered 1.5 mg ativan. Will continue to monitor Q1.    Lungs clear/diminished on 1L NC.  BS normoactive, +flatus, -N/V, not tolerating PO intake at this time d/t condition, LBM 1/22, voiding well. Urination frequent and urgent.   IV in place and patent at this time, running LR at 100cc/hr.   Patient unable to ambulate d/t withdrawals. Bed alarm on at this time.   Patient resting comfortably in bed and has no further questions or complaints at this time.  Call light within reach. Will check in with patient in an hour.

## 2018-01-23 NOTE — CARE PLAN
Problem: Safety  Goal: Will remain free from falls    Intervention: Implement fall precautions  Room/floor clear. Non skid socks on. Proper signs up. Bed alarm on, bed low and locked. Call light and belonging within reach. Hourly rounding in place to make sure needs are met.         Problem: Infection  Goal: Will remain free from infection    Intervention: Implement standard precautions and perform hand washing before and after patient contact  Hand washing every encounter. IV ports scrubbed with alcohol when hanging medicine. Patient watch for s/s of infection. Patient taught to report s/s of infection, verbalized understanding.         Problem: Respiratory:  Goal: Respiratory status will improve    Intervention: Educate and encourage coughing and deep breathing  Encouraged to perform coughing and deep breathing, no evidence of learning.

## 2018-01-24 LAB
ALBUMIN SERPL BCP-MCNC: 3 G/DL (ref 3.2–4.9)
ALBUMIN/GLOB SERPL: 1 G/DL
ALP SERPL-CCNC: 115 U/L (ref 30–99)
ALT SERPL-CCNC: 115 U/L (ref 2–50)
ANION GAP SERPL CALC-SCNC: 9 MMOL/L (ref 0–11.9)
AST SERPL-CCNC: 112 U/L (ref 12–45)
BASOPHILS # BLD AUTO: 0.5 % (ref 0–1.8)
BASOPHILS # BLD: 0.03 K/UL (ref 0–0.12)
BILIRUB SERPL-MCNC: 1.4 MG/DL (ref 0.1–1.5)
BUN SERPL-MCNC: 10 MG/DL (ref 8–22)
CALCIUM SERPL-MCNC: 8.7 MG/DL (ref 8.4–10.2)
CHLORIDE SERPL-SCNC: 104 MMOL/L (ref 96–112)
CO2 SERPL-SCNC: 24 MMOL/L (ref 20–33)
CREAT SERPL-MCNC: 0.92 MG/DL (ref 0.5–1.4)
EOSINOPHIL # BLD AUTO: 0.23 K/UL (ref 0–0.51)
EOSINOPHIL NFR BLD: 3.7 % (ref 0–6.9)
ERYTHROCYTE [DISTWIDTH] IN BLOOD BY AUTOMATED COUNT: 42.5 FL (ref 35.9–50)
GLOBULIN SER CALC-MCNC: 2.9 G/DL (ref 1.9–3.5)
GLUCOSE SERPL-MCNC: 128 MG/DL (ref 65–99)
HCT VFR BLD AUTO: 41 % (ref 42–52)
HGB BLD-MCNC: 14.2 G/DL (ref 14–18)
IMM GRANULOCYTES # BLD AUTO: 0.02 K/UL (ref 0–0.11)
IMM GRANULOCYTES NFR BLD AUTO: 0.3 % (ref 0–0.9)
LYMPHOCYTES # BLD AUTO: 1.84 K/UL (ref 1–4.8)
LYMPHOCYTES NFR BLD: 29.7 % (ref 22–41)
MAGNESIUM SERPL-MCNC: 1.9 MG/DL (ref 1.5–2.5)
MCH RBC QN AUTO: 30.6 PG (ref 27–33)
MCHC RBC AUTO-ENTMCNC: 34.6 G/DL (ref 33.7–35.3)
MCV RBC AUTO: 88.4 FL (ref 81.4–97.8)
MONOCYTES # BLD AUTO: 0.91 K/UL (ref 0–0.85)
MONOCYTES NFR BLD AUTO: 14.7 % (ref 0–13.4)
NEUTROPHILS # BLD AUTO: 3.16 K/UL (ref 1.82–7.42)
NEUTROPHILS NFR BLD: 51.1 % (ref 44–72)
NRBC # BLD AUTO: 0 K/UL
NRBC BLD-RTO: 0 /100 WBC
PHOSPHATE SERPL-MCNC: 3.3 MG/DL (ref 2.5–4.5)
PLATELET # BLD AUTO: 108 K/UL (ref 164–446)
PMV BLD AUTO: 10.6 FL (ref 9–12.9)
POTASSIUM SERPL-SCNC: 3.6 MMOL/L (ref 3.6–5.5)
PROT SERPL-MCNC: 5.9 G/DL (ref 6–8.2)
RBC # BLD AUTO: 4.64 M/UL (ref 4.7–6.1)
SODIUM SERPL-SCNC: 137 MMOL/L (ref 135–145)
WBC # BLD AUTO: 6.2 K/UL (ref 4.8–10.8)

## 2018-01-24 PROCEDURE — A9270 NON-COVERED ITEM OR SERVICE: HCPCS | Performed by: INTERNAL MEDICINE

## 2018-01-24 PROCEDURE — 36415 COLL VENOUS BLD VENIPUNCTURE: CPT

## 2018-01-24 PROCEDURE — 700111 HCHG RX REV CODE 636 W/ 250 OVERRIDE (IP): Performed by: HOSPITALIST

## 2018-01-24 PROCEDURE — 700111 HCHG RX REV CODE 636 W/ 250 OVERRIDE (IP): Performed by: INTERNAL MEDICINE

## 2018-01-24 PROCEDURE — 83735 ASSAY OF MAGNESIUM: CPT

## 2018-01-24 PROCEDURE — 85025 COMPLETE CBC W/AUTO DIFF WBC: CPT

## 2018-01-24 PROCEDURE — 700102 HCHG RX REV CODE 250 W/ 637 OVERRIDE(OP): Performed by: HOSPITALIST

## 2018-01-24 PROCEDURE — 84100 ASSAY OF PHOSPHORUS: CPT

## 2018-01-24 PROCEDURE — 80053 COMPREHEN METABOLIC PANEL: CPT

## 2018-01-24 PROCEDURE — 99233 SBSQ HOSP IP/OBS HIGH 50: CPT | Performed by: HOSPITALIST

## 2018-01-24 PROCEDURE — 700102 HCHG RX REV CODE 250 W/ 637 OVERRIDE(OP): Performed by: INTERNAL MEDICINE

## 2018-01-24 PROCEDURE — A9270 NON-COVERED ITEM OR SERVICE: HCPCS | Performed by: HOSPITALIST

## 2018-01-24 PROCEDURE — 770006 HCHG ROOM/CARE - MED/SURG/GYN SEMI*

## 2018-01-24 RX ORDER — MAGNESIUM SULFATE HEPTAHYDRATE 40 MG/ML
2 INJECTION, SOLUTION INTRAVENOUS ONCE
Status: COMPLETED | OUTPATIENT
Start: 2018-01-24 | End: 2018-01-24

## 2018-01-24 RX ORDER — POTASSIUM CHLORIDE 20 MEQ/1
40 TABLET, EXTENDED RELEASE ORAL ONCE
Status: COMPLETED | OUTPATIENT
Start: 2018-01-24 | End: 2018-01-24

## 2018-01-24 RX ADMIN — ALLOPURINOL 300 MG: 300 TABLET ORAL at 10:15

## 2018-01-24 RX ADMIN — GABAPENTIN 300 MG: 300 CAPSULE ORAL at 09:32

## 2018-01-24 RX ADMIN — OMEPRAZOLE 20 MG: 20 CAPSULE, DELAYED RELEASE ORAL at 09:33

## 2018-01-24 RX ADMIN — Medication 100 MG: at 09:32

## 2018-01-24 RX ADMIN — ACETAMINOPHEN 650 MG: 325 TABLET, FILM COATED ORAL at 20:23

## 2018-01-24 RX ADMIN — MAGNESIUM SULFATE IN WATER 2 G: 40 INJECTION, SOLUTION INTRAVENOUS at 09:33

## 2018-01-24 RX ADMIN — LISINOPRIL 20 MG: 20 TABLET ORAL at 09:32

## 2018-01-24 RX ADMIN — MULTIVITAMIN TABLET 1 TABLET: TABLET at 09:32

## 2018-01-24 RX ADMIN — LORAZEPAM 1 MG: 2 INJECTION INTRAMUSCULAR; INTRAVENOUS at 00:04

## 2018-01-24 RX ADMIN — ASPIRIN 325 MG ORAL TABLET 325 MG: 325 PILL ORAL at 09:32

## 2018-01-24 RX ADMIN — POTASSIUM CHLORIDE 40 MEQ: 1500 TABLET, EXTENDED RELEASE ORAL at 09:32

## 2018-01-24 RX ADMIN — DIVALPROEX SODIUM 250 MG: 250 TABLET, DELAYED RELEASE ORAL at 06:43

## 2018-01-24 RX ADMIN — HEPARIN SODIUM 5000 UNITS: 5000 INJECTION, SOLUTION INTRAVENOUS; SUBCUTANEOUS at 06:43

## 2018-01-24 RX ADMIN — FOLIC ACID 1 MG: 1 TABLET ORAL at 09:32

## 2018-01-24 ASSESSMENT — LIFESTYLE VARIABLES
TREMOR: NO TREMOR
PAROXYSMAL SWEATS: NO SWEAT VISIBLE
HEADACHE, FULLNESS IN HEAD: MILD
AGITATION: SOMEWHAT MORE THAN NORMAL ACTIVITY
AGITATION: SOMEWHAT MORE THAN NORMAL ACTIVITY
NAUSEA AND VOMITING: NO NAUSEA AND NO VOMITING
VISUAL DISTURBANCES: NOT PRESENT
VISUAL DISTURBANCES: NOT PRESENT
NAUSEA AND VOMITING: NO NAUSEA AND NO VOMITING
PAROXYSMAL SWEATS: NO SWEAT VISIBLE
AUDITORY DISTURBANCES: NOT PRESENT
TOTAL SCORE: 4
TOTAL SCORE: 4
AUDITORY DISTURBANCES: NOT PRESENT
HEADACHE, FULLNESS IN HEAD: MILD
ORIENTATION AND CLOUDING OF SENSORIUM: ORIENTED AND CAN DO SERIAL ADDITIONS
ANXIETY: MILDLY ANXIOUS
ANXIETY: MILDLY ANXIOUS
ORIENTATION AND CLOUDING OF SENSORIUM: ORIENTED AND CAN DO SERIAL ADDITIONS
TREMOR: NO TREMOR

## 2018-01-24 ASSESSMENT — ENCOUNTER SYMPTOMS
ABDOMINAL PAIN: 0
BACK PAIN: 0
DIZZINESS: 0
EYE PAIN: 0
DEPRESSION: 0
PALPITATIONS: 0
NAUSEA: 0
SHORTNESS OF BREATH: 0
NECK PAIN: 0
FEVER: 0
COUGH: 0
TINGLING: 0
SORE THROAT: 0
VOMITING: 0
CHILLS: 0
BLURRED VISION: 0
INSOMNIA: 0
HEADACHES: 0

## 2018-01-24 ASSESSMENT — PAIN SCALES - GENERAL
PAINLEVEL_OUTOF10: ASSUMED PAIN PRESENT
PAINLEVEL_OUTOF10: 0

## 2018-01-24 NOTE — CARE PLAN
Problem: Safety  Goal: Will remain free from falls  Outcome: PROGRESSING AS EXPECTED    Intervention: Implement fall precautions  Room/floor clear. Non skid socks on. Proper signs up. Bed alarm on, bed low and locked.  Call light and belongings within reach.  Hourly rounding in place to make sure needs are met.      Problem: Psychosocial Needs:  Goal: Level of anxiety will decrease  Outcome: PROGRESSING AS EXPECTED    Intervention: Identify and develop with patient strategies to cope with anxiety triggers  Listening to patient. Answering questions.  Medicate per Dr's orders.

## 2018-01-24 NOTE — PROGRESS NOTES
Received report from night RN.  Assumed pt care.  Pt is agitated, trying to get out of bed and asking for toast.  Updated pt w/ POC.  Bed alarm on.  Will continue with care.

## 2018-01-24 NOTE — PROGRESS NOTES
[facility] Hospitalist Progress Note    Date of Service: 2018    Chief Complaint  62 y.o. male with acute onset epigastric pain, nausea, and vomiting. Found to have acute pancreatitis. Denied drinking more than usual 3 bourbons which he has done for several decades now. Gallbladder ultrasound did not show any gallstones or evidence of cholecystitis, with echogenic liver suggesting fatty infiltration, with note of possible right kidney mass, not well evaluated. Hospital course complicated by delirium and ?etoh withdrawal.         Interval Problem Update  Sleeping today a lot. Pain controlled.     K replaced    Consultants/Specialty  None    Disposition  Monitor on the floors.     Us renal:  Focal renal lower pole cortical thickening is worrisome for a solid mass. Enhanced renal mass protocol MRI over CT is recommended to further characterize    No regional lymphadenopathy or hydronephrosis      Review of Systems   Unable to perform ROS: Medical condition   Constitutional: Negative for chills and fever.   HENT: Negative for sore throat.    Eyes: Negative for blurred vision and pain.   Respiratory: Negative for cough and shortness of breath.    Cardiovascular: Negative for chest pain and palpitations.   Gastrointestinal: Negative for abdominal pain, nausea and vomiting.   Genitourinary: Negative for dysuria and urgency.   Musculoskeletal: Negative for back pain and neck pain.   Skin: Negative for itching and rash.   Neurological: Negative for dizziness, tingling and headaches.   Psychiatric/Behavioral: Negative for depression. The patient does not have insomnia.    All other systems reviewed and are negative.       Unable to obtain due to confusion     Physical Exam  Laboratory/Imaging   Hemodynamics  Temp (24hrs), Av.6 °C (97.8 °F), Min:36.6 °C (97.8 °F), Max:36.6 °C (97.9 °F)   Temperature: 36.6 °C (97.8 °F)  Pulse  Av.8  Min: 65  Max: 106    Blood Pressure: 132/70      Respiratory      Respiration: 18,  Pulse Oximetry: 94 %        RUL Breath Sounds: Clear, RLL Breath Sounds: Diminished, NANCY Breath Sounds: Clear, LLL Breath Sounds: Diminished    Fluids    Intake/Output Summary (Last 24 hours) at 01/24/18 0757  Last data filed at 01/24/18 0600   Gross per 24 hour   Intake             3240 ml   Output             4200 ml   Net             -960 ml       Nutrition  Orders Placed This Encounter   Procedures   • DIET ORDER     Standing Status:   Standing     Number of Occurrences:   1     Order Specific Question:   Diet:     Answer:   Full Liquid [11]     Physical Exam   Constitutional: He appears well-developed and well-nourished. No distress.   HENT:   Right Ear: External ear normal.   Left Ear: External ear normal.   Nose: Nose normal.   Eyes: Right eye exhibits no discharge. Left eye exhibits no discharge. No scleral icterus.   Neck: No JVD present. No tracheal deviation present.   Cardiovascular: Normal rate, normal heart sounds and intact distal pulses.    No murmur heard.  Pulmonary/Chest: Effort normal and breath sounds normal. No respiratory distress. He has no wheezes. He has no rales.   Abdominal: Soft. Bowel sounds are normal. He exhibits no distension. There is no tenderness. There is no guarding.   Musculoskeletal: He exhibits no edema or tenderness.   Neurological: He is alert. He is disoriented.   Skin: Skin is warm and dry. He is not diaphoretic. No erythema.   Psychiatric:   impulsive A little slurred   Nursing note and vitals reviewed.      Recent Labs      01/24/18   0515   WBC  6.2   RBC  4.64*   HEMOGLOBIN  14.2   HEMATOCRIT  41.0*   MCV  88.4   MCH  30.6   MCHC  34.6   RDW  42.5   PLATELETCT  108*   MPV  10.6     Recent Labs      01/22/18   0532  01/23/18   0505  01/24/18   0515   SODIUM  133*  137  137   POTASSIUM  3.7  3.5*  3.6   CHLORIDE  102  105  104   CO2  23  24  24   GLUCOSE  142*  109*  128*   BUN  11  11  10   CREATININE  1.00  0.98  0.92   CALCIUM  8.9  8.5  8.7                       Assessment/Plan     Acute pancreatitis- (present on admission)   Assessment & Plan    - Etiology unclear. ??etoh vs viral  rina looks ok  MRI unable to be tolerated  Pain resolved.         Alcohol withdrawal (CMS-[facility])   Assessment & Plan    ???real. His delirium started with ativen given for MRI.   Holding librium has helped a lot  Stop depakote/ativan now and try to not use haldol if p[ossible            Transaminitis- (present on admission)   Assessment & Plan    - Likely viral or etoh hepatitis. Workup normal. Numbers consistent with hepatitis related to etoh. Improving. Unable to tolerate MRCP, may be reasonable to try again once we control will improve.   - Continue to trend. Continue IV fluids.        Hypokalemia- (present on admission)   Assessment & Plan    - Replaced. Continue to trend.        LE (acute kidney injury), prerenal (CMS-[facility])- (present on admission)   Assessment & Plan    - Resolved with IV fluids          Hyperglycemia- (present on admission)   Assessment & Plan    reactive from pancreatitis. Continue to monitor.        Questionable kidney mass (on abd u/s)- (present on admission)   Assessment & Plan    Reviewed ultrasound and this does show a mass. He cant tolerate an MRI- likely needs outpatient followup with urology.         Essential hypertension- (present on admission)   Assessment & Plan    - Resume home dose lisinopril. PRN IV labetalol         Chronic gout- (present on admission)   Assessment & Plan    - Continue allopurinol.        Morbid obesity with BMI of 40.0-44.9, adult (CMS-[facility])- (present on admission)   Assessment & Plan    - Body mass index is 40.33 kg/m²..  - outpatient referral for outpatient weight management.        Alcohol dependence (CMS-[facility])- (present on admission)   Assessment & Plan    - Now in withdrawal.            Reviewed items::  Labs reviewed, Medications reviewed, Radiology images reviewed and EKG reviewed  Joseph catheter::  No Joseph  DVT prophylaxis  pharmacological::  Heparin  Ulcer Prophylaxis::  Not indicated

## 2018-01-25 ENCOUNTER — PATIENT OUTREACH (OUTPATIENT)
Dept: HEALTH INFORMATION MANAGEMENT | Facility: OTHER | Age: 63
End: 2018-01-25

## 2018-01-25 VITALS
OXYGEN SATURATION: 90 % | SYSTOLIC BLOOD PRESSURE: 133 MMHG | HEIGHT: 72 IN | BODY MASS INDEX: 40.28 KG/M2 | TEMPERATURE: 97.3 F | WEIGHT: 297.4 LBS | HEART RATE: 78 BPM | RESPIRATION RATE: 20 BRPM | DIASTOLIC BLOOD PRESSURE: 80 MMHG

## 2018-01-25 LAB
ALBUMIN SERPL BCP-MCNC: 3.3 G/DL (ref 3.2–4.9)
ALBUMIN/GLOB SERPL: 1 G/DL
ALP SERPL-CCNC: 133 U/L (ref 30–99)
ALT SERPL-CCNC: 100 U/L (ref 2–50)
ANION GAP SERPL CALC-SCNC: 10 MMOL/L (ref 0–11.9)
AST SERPL-CCNC: 80 U/L (ref 12–45)
BASOPHILS # BLD AUTO: 0.4 % (ref 0–1.8)
BASOPHILS # BLD: 0.03 K/UL (ref 0–0.12)
BILIRUB SERPL-MCNC: 1.2 MG/DL (ref 0.1–1.5)
BUN SERPL-MCNC: 13 MG/DL (ref 8–22)
CALCIUM SERPL-MCNC: 9.4 MG/DL (ref 8.4–10.2)
CHLORIDE SERPL-SCNC: 104 MMOL/L (ref 96–112)
CO2 SERPL-SCNC: 22 MMOL/L (ref 20–33)
CREAT SERPL-MCNC: 1.02 MG/DL (ref 0.5–1.4)
EOSINOPHIL # BLD AUTO: 0.16 K/UL (ref 0–0.51)
EOSINOPHIL NFR BLD: 2.3 % (ref 0–6.9)
ERYTHROCYTE [DISTWIDTH] IN BLOOD BY AUTOMATED COUNT: 40.3 FL (ref 35.9–50)
GLOBULIN SER CALC-MCNC: 3.2 G/DL (ref 1.9–3.5)
GLUCOSE SERPL-MCNC: 178 MG/DL (ref 65–99)
HCT VFR BLD AUTO: 40.2 % (ref 42–52)
HGB BLD-MCNC: 14.4 G/DL (ref 14–18)
IMM GRANULOCYTES # BLD AUTO: 0.03 K/UL (ref 0–0.11)
IMM GRANULOCYTES NFR BLD AUTO: 0.4 % (ref 0–0.9)
LYMPHOCYTES # BLD AUTO: 2.3 K/UL (ref 1–4.8)
LYMPHOCYTES NFR BLD: 33.5 % (ref 22–41)
MAGNESIUM SERPL-MCNC: 2 MG/DL (ref 1.5–2.5)
MCH RBC QN AUTO: 30.4 PG (ref 27–33)
MCHC RBC AUTO-ENTMCNC: 35.8 G/DL (ref 33.7–35.3)
MCV RBC AUTO: 85 FL (ref 81.4–97.8)
MONOCYTES # BLD AUTO: 0.69 K/UL (ref 0–0.85)
MONOCYTES NFR BLD AUTO: 10 % (ref 0–13.4)
NEUTROPHILS # BLD AUTO: 3.66 K/UL (ref 1.82–7.42)
NEUTROPHILS NFR BLD: 53.4 % (ref 44–72)
NRBC # BLD AUTO: 0 K/UL
NRBC BLD-RTO: 0 /100 WBC
PLATELET # BLD AUTO: 146 K/UL (ref 164–446)
PMV BLD AUTO: 10.4 FL (ref 9–12.9)
POTASSIUM SERPL-SCNC: 3.9 MMOL/L (ref 3.6–5.5)
PROT SERPL-MCNC: 6.5 G/DL (ref 6–8.2)
RBC # BLD AUTO: 4.73 M/UL (ref 4.7–6.1)
SODIUM SERPL-SCNC: 136 MMOL/L (ref 135–145)
WBC # BLD AUTO: 6.9 K/UL (ref 4.8–10.8)

## 2018-01-25 PROCEDURE — 85025 COMPLETE CBC W/AUTO DIFF WBC: CPT

## 2018-01-25 PROCEDURE — 83735 ASSAY OF MAGNESIUM: CPT

## 2018-01-25 PROCEDURE — 80053 COMPREHEN METABOLIC PANEL: CPT

## 2018-01-25 PROCEDURE — 99239 HOSP IP/OBS DSCHRG MGMT >30: CPT | Performed by: HOSPITALIST

## 2018-01-25 PROCEDURE — 36415 COLL VENOUS BLD VENIPUNCTURE: CPT

## 2018-01-25 ASSESSMENT — PAIN SCALES - GENERAL: PAINLEVEL_OUTOF10: 0

## 2018-01-25 NOTE — PROGRESS NOTES
Discharging pt per MD orders.  Pt states understandings of all educational handouts and discharge instructions.  IV discontinued.  Pt off unit by wc with CNA and family,

## 2018-01-25 NOTE — DISCHARGE INSTRUCTIONS
Acute Pancreatitis  Acute pancreatitis is a disease in which the pancreas becomes suddenly irritated (inflamed). The pancreas is a large gland behind your stomach. The pancreas makes enzymes that help digest food. The pancreas also makes 2 hormones that help control your blood sugar. Acute pancreatitis happens when the enzymes attack and damage the pancreas. Most attacks last a couple of days and can cause serious problems.  HOME CARE  · Follow your doctor's diet instructions. You may need to avoid alcohol and limit fat in your diet.  · Eat small meals often.  · Drink enough fluids to keep your pee (urine) clear or pale yellow.  · Only take medicines as told by your doctor.  · Avoid drinking alcohol if it caused your disease.  · Do not smoke.  · Get plenty of rest.  · Check your blood sugar at home as told by your doctor.  · Keep all doctor visits as told.  GET HELP IF:  · You do not get better as quickly as expected.  · You have new or worsening symptoms.  · You have lasting pain, weakness, or feel sick to your stomach (nauseous).  · You get better and then have another pain attack.  GET HELP RIGHT AWAY IF:   · You are unable to eat or keep fluids down.  · Your pain becomes severe.  · You have a fever or lasting symptoms for more than 2 to 3 days.  · You have a fever and your symptoms suddenly get worse.  · Your skin or the white part of your eyes turn yellow (jaundice).  · You throw up (vomit).  · You feel dizzy, or you pass out (faint).  · Your blood sugar is high (over 300 mg/dL).  MAKE SURE YOU:   · Understand these instructions.  · Will watch your condition.  · Will get help right away if you are not doing well or get worse.     This information is not intended to replace advice given to you by your health care provider. Make sure you discuss any questions you have with your health care provider.     Document Released: 06/05/2009 Document Revised: 01/08/2016 Document Reviewed: 03/28/2013  [employer]  Patient Education ©2016 [employer]    Discharge Instructions    Discharged to home by car with relative. Discharged via wheelchair, hospital escort: Yes.  Special equipment needed: Not Applicable    Be sure to schedule a follow-up appointment with your primary care doctor or any specialists as instructed.     Discharge Plan:   Influenza Vaccine Indication: Patient Refuses    I understand that a diet low in cholesterol, fat, and sodium is recommended for good health. Unless I have been given specific instructions below for another diet, I accept this instruction as my diet prescription.   Other diet: Regular    Special Instructions: None    · Is patient discharged on Warfarin / Coumadin?   No     Depression / Suicide Risk    As you are discharged from this [facility] facility, it is important to learn how to keep safe from harming yourself.    Recognize the warning signs:  · Abrupt changes in personality, positive or negative- including increase in energy   · Giving away possessions  · Change in eating patterns- significant weight changes-  positive or negative  · Change in sleeping patterns- unable to sleep or sleeping all the time   · Unwillingness or inability to communicate  · Depression  · Unusual sadness, discouragement and loneliness  · Talk of wanting to die  · Neglect of personal appearance   · Rebelliousness- reckless behavior  · Withdrawal from people/activities they love  · Confusion- inability to concentrate     If you or a loved one observes any of these behaviors or has concerns about self-harm, here's what you can do:  · Talk about it- your feelings and reasons for harming yourself  · Remove any means that you might use to hurt yourself (examples: pills, rope, extension cords, firearm)  · Get professional help from the community (Mental Health, Substance Abuse, psychological counseling)  · Do not be alone:Call your Safe Contact- someone whom you trust who will be there for you.  · Call your  local CRISIS HOTLINE 575-4722 or 096-354-9760  · Call your local Children's Mobile Crisis Response Team [place] (950) 079-1132 or www.Skwibl  · Call the toll free National Suicide Prevention Hotlines   · National Suicide Prevention Lifeline 483-606-ALBY (4119)  · National [employer] Bassline Line Network 800-SUICIDE (468-8361)

## 2018-01-25 NOTE — PROGRESS NOTES
Assumed care of patient at 1900.  Patient is alert and up out of bed frequently, despite bed alarm.  Patient has a steady gait at this time.  Encouraged patient to stay in his room until we can walk with him in the treadwell.

## 2018-01-25 NOTE — DISCHARGE SUMMARY
"CHIEF COMPLAINT ON ADMISSION  Chief Complaint   Patient presents with   • Epigastric Pain     Started 2 hours ago, pt was \"just hanging out.\" Pain does not radiate. States \"it may have become worst after I ate\", hx GERD.    • Vomiting     Started an hour ago. Denies hematemesis.        CODE STATUS  Full Code    HPI & HOSPITAL COURSE  This is a 62 y.o. male with acute onset epigastric pain, nausea, and vomiting. Found to have acute pancreatitis. Thought to be viral vs alcohol related. Likely the former. Gallbladder ultrasound did not show any gallstones or evidence of cholecystitis, with echogenic liver suggesting fatty infiltration, with note of possible right kidney mass. This was evaluated with an ultrasound and appears to be a solid renal mass that he will need outpatient follow up. Hospital course complicated by delirium and possible etoh withdrawal. However this is more likely delirium from ativan given here. He and his wife were very clear that he has been drinking a marked 1 ounce glass of burbon nightly for many years without issues.    His symptoms resolved with discontinuation of meds for alcohol withdrawal and his pancreatitis sx resolved with bowel rest, fluids and pain control.     Therefore, he is discharged in good and stable condition with close outpatient follow-up.    SPECIFIC OUTPATIENT FOLLOW-UP  Pcp and urology    DISCHARGE PROBLEM LIST  Active Problems:    Acute pancreatitis POA: Yes    Hyperglycemia POA: Yes    LE (acute kidney injury), prerenal (CMS-HCC) POA: Yes    Hypokalemia POA: Yes    Transaminitis POA: Yes    Alcohol withdrawal (CMS-HCC) POA: No    Alcohol dependence (CMS-HCC) POA: Yes    Morbid obesity with BMI of 40.0-44.9, adult (CMS-HCC) POA: Yes    Chronic gout (Chronic) POA: Yes    Essential hypertension (Chronic) POA: Yes    Questionable kidney mass (on abd u/s) POA: Yes  Resolved Problems:    * No resolved hospital problems. *      FOLLOW UP  No future appointments.  Juan Manuel" Anastacio FLANAGAN M.D.  [address]  [place] [address]  [phone]            MEDICATIONS ON DISCHARGE   [place], Sharon Grey   Home Medication Instructions CARIN:[id number]    Printed on:01/25/18 0743   Medication Information                      ALLOPURINOL 300 MG PO TABS  Take 1 Tab by mouth every day.             ASPIRIN  325 mg every day.             LISINOPRIL PO               oxycodone-acetaminophen (PERCOCET) 5-325 MG TABS  Take 1 Tab by mouth every four hours as needed.             PRILOSEC OTC PO  1 Cap by Oral route QDAY.              SIMVASTATIN 40 MG PO TABS  Take 1 Tab by mouth every day.             SPIRONOLACTONE 25 MG PO TABS  Take 1 Tab by mouth every day.                 DIET  Orders Placed This Encounter   Procedures   • DIET ORDER     Standing Status:   Standing     Number of Occurrences:   1     Order Specific Question:   Diet:     Answer:   Regular [1]       ACTIVITY  As tolerated.  Weight bearing as tolerated      CONSULTATIONS  none    PROCEDURES  none    LABORATORY  Lab Results   Component Value Date/Time    SODIUM 136 01/25/2018 05:32 AM    POTASSIUM 3.9 01/25/2018 05:32 AM    CHLORIDE 104 01/25/2018 05:32 AM    CO2 22 01/25/2018 05:32 AM    GLUCOSE 178 (H) 01/25/2018 05:32 AM    BUN 13 01/25/2018 05:32 AM    CREATININE 1.02 01/25/2018 05:32 AM    CREATININE 1.0 05/13/2009 03:30 PM        Lab Results   Component Value Date/Time    WBC 6.9 01/25/2018 05:32 AM    HEMOGLOBIN 14.4 01/25/2018 05:32 AM    HEMATOCRIT 40.2 (L) 01/25/2018 05:32 AM    PLATELETCT 146 (L) 01/25/2018 05:32 AM        Total time of the discharge process exceeds 32 minutes

## 2018-01-25 NOTE — CARE PLAN
Problem: Safety  Goal: Will remain free from injury  Outcome: PROGRESSING AS EXPECTED  Bed in low and locked position.  Side rails up X2.  Room free of clutter as much as possible.  Patient encouraged to call for assistance with ambulation and bed alarm remains on.  Patient's room is near the nurses station.  Frequent rounding, at least every hour continues.    Problem: Venous Thromboembolism (VTW)/Deep Vein Thrombosis (DVT) Prevention:  Goal: Patient will participate in Venous Thrombosis (VTE)/Deep Vein Thrombosis (DVT)Prevention Measures  Outcome: PROGRESSING AS EXPECTED   01/24/18 2025   OTHER   Risk Assessment Score 3   VTE RISK High   Pharmacologic Prophylaxis Used Unfractionated Heparin

## 2018-02-23 ENCOUNTER — APPOINTMENT (RX ONLY)
Dept: URBAN - METROPOLITAN AREA CLINIC 4 | Facility: CLINIC | Age: 63
Setting detail: DERMATOLOGY
End: 2018-02-23

## 2018-02-23 DIAGNOSIS — L82.1 OTHER SEBORRHEIC KERATOSIS: ICD-10-CM

## 2018-02-23 DIAGNOSIS — L91.8 OTHER HYPERTROPHIC DISORDERS OF THE SKIN: ICD-10-CM

## 2018-02-23 DIAGNOSIS — D22 MELANOCYTIC NEVI: ICD-10-CM

## 2018-02-23 DIAGNOSIS — L81.4 OTHER MELANIN HYPERPIGMENTATION: ICD-10-CM

## 2018-02-23 DIAGNOSIS — D18.0 HEMANGIOMA: ICD-10-CM

## 2018-02-23 DIAGNOSIS — L73.8 OTHER SPECIFIED FOLLICULAR DISORDERS: ICD-10-CM

## 2018-02-23 PROBLEM — D22.5 MELANOCYTIC NEVI OF TRUNK: Status: ACTIVE | Noted: 2018-02-23

## 2018-02-23 PROBLEM — D48.5 NEOPLASM OF UNCERTAIN BEHAVIOR OF SKIN: Status: ACTIVE | Noted: 2018-02-23

## 2018-02-23 PROBLEM — D18.01 HEMANGIOMA OF SKIN AND SUBCUTANEOUS TISSUE: Status: ACTIVE | Noted: 2018-02-23

## 2018-02-23 PROBLEM — D22.61 MELANOCYTIC NEVI OF RIGHT UPPER LIMB, INCLUDING SHOULDER: Status: ACTIVE | Noted: 2018-02-23

## 2018-02-23 PROBLEM — D22.62 MELANOCYTIC NEVI OF LEFT UPPER LIMB, INCLUDING SHOULDER: Status: ACTIVE | Noted: 2018-02-23

## 2018-02-23 PROCEDURE — ? BIOPSY BY SHAVE METHOD

## 2018-02-23 PROCEDURE — 11100: CPT

## 2018-02-23 PROCEDURE — 99203 OFFICE O/P NEW LOW 30 MIN: CPT | Mod: 25

## 2018-02-23 PROCEDURE — 11101: CPT

## 2018-02-23 PROCEDURE — ? LIQUID NITROGEN

## 2018-02-23 PROCEDURE — ? COUNSELING

## 2018-02-23 ASSESSMENT — LOCATION SIMPLE DESCRIPTION DERM
LOCATION SIMPLE: RIGHT LOWER BACK
LOCATION SIMPLE: LEFT FOREARM
LOCATION SIMPLE: LEFT AXILLARY VAULT
LOCATION SIMPLE: RIGHT HAND
LOCATION SIMPLE: RIGHT UPPER ARM
LOCATION SIMPLE: LEFT ELBOW
LOCATION SIMPLE: LEFT ANTERIOR NECK
LOCATION SIMPLE: LOWER BACK
LOCATION SIMPLE: RIGHT CHEEK
LOCATION SIMPLE: RIGHT ANTERIOR NECK
LOCATION SIMPLE: RIGHT FOREARM
LOCATION SIMPLE: RIGHT AXILLARY VAULT
LOCATION SIMPLE: ABDOMEN
LOCATION SIMPLE: LEFT UPPER BACK
LOCATION SIMPLE: LEFT UPPER ARM
LOCATION SIMPLE: CHEST
LOCATION SIMPLE: LEFT HAND
LOCATION SIMPLE: NOSE

## 2018-02-23 ASSESSMENT — LOCATION DETAILED DESCRIPTION DERM
LOCATION DETAILED: LEFT INFERIOR UPPER BACK
LOCATION DETAILED: LEFT INFERIOR LATERAL NECK
LOCATION DETAILED: LEFT RADIAL DORSAL HAND
LOCATION DETAILED: LEFT ANTERIOR DISTAL UPPER ARM
LOCATION DETAILED: LEFT VENTRAL PROXIMAL FOREARM
LOCATION DETAILED: LEFT AXILLARY VAULT
LOCATION DETAILED: RIGHT CENTRAL MALAR CHEEK
LOCATION DETAILED: LEFT VENTRAL DISTAL FOREARM
LOCATION DETAILED: RIGHT RADIAL DORSAL HAND
LOCATION DETAILED: LEFT MEDIAL INFERIOR CHEST
LOCATION DETAILED: EPIGASTRIC SKIN
LOCATION DETAILED: RIGHT ANTERIOR DISTAL UPPER ARM
LOCATION DETAILED: NASAL INFRATIP
LOCATION DETAILED: RIGHT SUPERIOR MEDIAL MIDBACK
LOCATION DETAILED: RIGHT AXILLARY VAULT
LOCATION DETAILED: RIGHT CLAVICULAR NECK
LOCATION DETAILED: RIGHT VENTRAL PROXIMAL FOREARM
LOCATION DETAILED: SUPERIOR LUMBAR SPINE
LOCATION DETAILED: LEFT ANTECUBITAL SKIN

## 2018-02-23 ASSESSMENT — LOCATION ZONE DERM
LOCATION ZONE: TRUNK
LOCATION ZONE: HAND
LOCATION ZONE: ARM
LOCATION ZONE: AXILLAE
LOCATION ZONE: FACE
LOCATION ZONE: NOSE
LOCATION ZONE: NECK

## 2018-02-23 NOTE — PROCEDURE: BIOPSY BY SHAVE METHOD
Bill 34221 For Specimen Handling/Conveyance To Laboratory?: no
Additional Anesthesia Volume In Cc (Will Not Render If 0): 0
Cryotherapy Text: The wound bed was treated with cryotherapy after the biopsy was performed.
Electrodesiccation And Curettage Text: The wound bed was treated with electrodesiccation and curettage after the biopsy was performed.
Biopsy Type: H and E
Detail Level: Detailed
Lab Facility: 
Lab: [id number]
Biopsy Method: Dermablade
Size Of Lesion In Cm: 0.3
Post-Care Instructions: I reviewed with the patient in detail post-care instructions. Patient is to keep the biopsy site dry overnight, and then apply bacitracin twice daily until healed. Patient may apply hydrogen peroxide soaks to remove any crusting.
Hemostasis: Electrocautery
Silver Nitrate Text: The wound bed was treated with silver nitrate after the biopsy was performed.
Consent: Written consent was obtained and risks were reviewed including but not limited to scarring, infection, bleeding, scabbing, incomplete removal, nerve damage and allergy to anesthesia.
Billing Type: Third-Party Bill
Curettage Text: The wound bed was treated with curettage after the biopsy was performed.
Anesthesia Type: 1% lidocaine with epinephrine
Wound Care: Vaseline
Electrodesiccation Text: The wound bed was treated with electrodesiccation after the biopsy was performed.
Dressing: bandage
Notification Instructions: Patient will be notified of biopsy results. However, patient instructed to call the office if not contacted within 2 weeks.
Anesthesia Volume In Cc: 0.5
Type Of Destruction Used: Electrodesiccation
X Size Of Lesion In Cm: 1.4
Size Of Lesion In Cm: 2.1
Size Of Lesion In Cm: 0.6

## 2018-02-23 NOTE — PROCEDURE: LIQUID NITROGEN
Bill Insurance (You Assume Risk Of Denial Or Audit By Selecting Yes): No
Detail Level: Detailed
Medical Necessity Clause: This procedure was medically necessary because the lesions that were treated were:
Medical Necessity Information: It is in your best interest to select a reason for this procedure from the list below. All of these items fulfill various CMS LCD requirements except the new and changing color options.
Post-Care Instructions: I reviewed with the patient in detail post-care instructions. Patient is to wear sunprotection, and avoid picking at any of the treated lesions. Pt may apply Vaseline to crusted or scabbing areas.
Consent: The patient's consent was obtained including but not limited to risks of crusting, scabbing, blistering, scarring, darker or lighter pigmentary change, recurrence, incomplete removal and infection.
Duration Of Freeze Thaw-Cycle (Seconds): 0

## 2018-03-21 DIAGNOSIS — Z01.812 PRE-OPERATIVE LABORATORY EXAMINATION: ICD-10-CM

## 2018-03-21 LAB
ANION GAP SERPL CALC-SCNC: 10 MMOL/L (ref 0–11.9)
BUN SERPL-MCNC: 15 MG/DL (ref 8–22)
CALCIUM SERPL-MCNC: 10 MG/DL (ref 8.5–10.5)
CHLORIDE SERPL-SCNC: 104 MMOL/L (ref 96–112)
CO2 SERPL-SCNC: 23 MMOL/L (ref 20–33)
CREAT SERPL-MCNC: 0.88 MG/DL (ref 0.5–1.4)
GLUCOSE SERPL-MCNC: 168 MG/DL (ref 65–99)
POTASSIUM SERPL-SCNC: 4 MMOL/L (ref 3.6–5.5)
SODIUM SERPL-SCNC: 137 MMOL/L (ref 135–145)

## 2018-03-21 PROCEDURE — 80048 BASIC METABOLIC PNL TOTAL CA: CPT

## 2018-03-21 PROCEDURE — 36415 COLL VENOUS BLD VENIPUNCTURE: CPT

## 2018-03-28 ENCOUNTER — APPOINTMENT (RX ONLY)
Dept: URBAN - METROPOLITAN AREA CLINIC 4 | Facility: CLINIC | Age: 63
Setting detail: DERMATOLOGY
End: 2018-03-28

## 2018-03-28 DIAGNOSIS — D22 MELANOCYTIC NEVI: ICD-10-CM

## 2018-03-28 PROBLEM — D48.5 NEOPLASM OF UNCERTAIN BEHAVIOR OF SKIN: Status: ACTIVE | Noted: 2018-03-28

## 2018-03-28 PROBLEM — C43.59 MALIGNANT MELANOMA OF OTHER PART OF TRUNK: Status: ACTIVE | Noted: 2018-03-28

## 2018-03-28 PROCEDURE — 13102 CMPLX RPR TRUNK ADDL 5CM/<: CPT

## 2018-03-28 PROCEDURE — 11604 EXC TR-EXT MAL+MARG 3.1-4 CM: CPT

## 2018-03-28 PROCEDURE — ? EXCISION

## 2018-03-28 PROCEDURE — ? COUNSELING

## 2018-03-28 PROCEDURE — 13101 CMPLX RPR TRUNK 2.6-7.5 CM: CPT

## 2018-03-28 PROCEDURE — ? BIOPSY BY SHAVE METHOD

## 2018-03-28 PROCEDURE — 11100: CPT | Mod: 59

## 2018-03-28 ASSESSMENT — LOCATION DETAILED DESCRIPTION DERM: LOCATION DETAILED: RIGHT CENTRAL MALAR CHEEK

## 2018-03-28 ASSESSMENT — LOCATION ZONE DERM: LOCATION ZONE: FACE

## 2018-03-28 ASSESSMENT — LOCATION SIMPLE DESCRIPTION DERM: LOCATION SIMPLE: RIGHT CHEEK

## 2018-03-28 NOTE — PROCEDURE: BIOPSY BY SHAVE METHOD
Type Of Destruction Used: Curettage
Cryotherapy Text: The wound bed was treated with cryotherapy after the biopsy was performed.
Size Of Lesion In Cm: 0
Render Post-Care Instructions In Note?: no
Dressing: bandage
Biopsy Method: Personna blade
Detail Level: Detailed
Curettage Text: The wound bed was treated with curettage after the biopsy was performed.
Wound Care: Petrolatum
Notification Instructions: Patient will be notified of biopsy results. However, patient instructed to call the office if not contacted within 2 weeks.
Lab: [id number]
Biopsy Type: H and E
Billing Type: Third-Party Bill
Electrodesiccation Text: The wound bed was treated with electrodesiccation after the biopsy was performed.
Lab Facility: 
Silver Nitrate Text: The wound bed was treated with silver nitrate after the biopsy was performed.
Anesthesia Type: 1% lidocaine with epinephrine
Electrodesiccation And Curettage Text: The wound bed was treated with electrodesiccation and curettage after the biopsy was performed.
Hemostasis: Drysol
Post-Care Instructions: I reviewed with the patient in detail post-care instructions. Patient is to keep the biopsy site dry overnight, and then apply bacitracin twice daily until healed. Patient may apply hydrogen peroxide soaks to remove any crusting.
Anesthesia Volume In Cc: 0.5
Consent: Written consent was obtained and risks were reviewed including but not limited to scarring, infection, bleeding, scabbing, incomplete removal, nerve damage and allergy to anesthesia.

## 2018-03-28 NOTE — PROCEDURE: EXCISION
O-T Advancement Flap Text: The defect edges were debeveled with a #15 scalpel blade.  Given the location of the defect, shape of the defect and the proximity to free margins an O-T advancement flap was deemed most appropriate.  Using a sterile surgical marker, an appropriate advancement flap was drawn incorporating the defect and placing the expected incisions within the relaxed skin tension lines where possible.    The area thus outlined was incised deep to adipose tissue with a #15 scalpel blade.  The skin margins were undermined to an appropriate distance in all directions utilizing iris scissors.
Lazy S Complex Repair Preamble Text (Leave Blank If You Do Not Want): Extensive wide undermining was performed.
Purse String (Simple) Text: Given the location of the defect and the characteristics of the surrounding skin a purse string simple closure was deemed most appropriate.  Undermining was performed circumferentially around the surgical defect.  A purse string suture was then placed and tightened.
Slit Excision Additional Text (Leave Blank If You Do Not Want): A linear line was drawn on the skin overlying the lesion. An incision was made slowly until the lesion was visualized.  Once visualized, the lesion was removed with blunt dissection.
Show Anatomic Location From Referring Provider Variable: Yes
Lab Facility: [id number]
Previous Accession (Optional): [id number]
Repair Performed By Another Provider Text (Leave Blank If You Do Not Want): After the tissue was excised the defect was repaired by another provider.
Suture Removal: 14 days
Complex Repair And W Plasty Text: The defect edges were debeveled with a #15 scalpel blade.  The primary defect was closed partially with a complex linear closure.  Given the location of the remaining defect, shape of the defect and the proximity to free margins a W plasty was deemed most appropriate for complete closure of the defect.  Using a sterile surgical marker, an appropriate advancement flap was drawn incorporating the defect and placing the expected incisions within the relaxed skin tension lines where possible.    The area thus outlined was incised deep to adipose tissue with a #15 scalpel blade.  The skin margins were undermined to an appropriate distance in all directions utilizing iris scissors.
Bilobed Flap Text: The defect edges were debeveled with a #15 scalpel blade.  Given the location of the defect and the proximity to free margins a bilobe flap was deemed most appropriate.  Using a sterile surgical marker, an appropriate bilobe flap drawn around the defect.    The area thus outlined was incised deep to adipose tissue with a #15 scalpel blade.  The skin margins were undermined to an appropriate distance in all directions utilizing iris scissors.
Intermediate / Complex Repair - Final Wound Length In Cm: 10
Graft Donor Site Bandage (Optional-Leave Blank If You Don't Want In Note): Steri-strips and a pressure bandage were applied to the donor site.
Skin Substitute Text: The defect edges were debeveled with a #15 scalpel blade.  Given the location of the defect, shape of the defect and the proximity to free margins a skin substitute graft was deemed most appropriate.  The graft material was trimmed to fit the size of the defect. The graft was then placed in the primary defect and oriented appropriately.
Dermal Autograft Text: The defect edges were debeveled with a #15 scalpel blade.  Given the location of the defect, shape of the defect and the proximity to free margins a dermal autograft was deemed most appropriate.  Using a sterile surgical marker, the primary defect shape was transferred to the donor site. The area thus outlined was incised deep to adipose tissue with a #15 scalpel blade.  The harvested graft was then trimmed of adipose and epidermal tissue until only dermis was left.  The skin graft was then placed in the primary defect and oriented appropriately.
Spiral Flap Text: The defect edges were debeveled with a #15 scalpel blade.  Given the location of the defect, shape of the defect and the proximity to free margins a spiral flap was deemed most appropriate.  Using a sterile surgical marker, an appropriate rotation flap was drawn incorporating the defect and placing the expected incisions within the relaxed skin tension lines where possible. The area thus outlined was incised deep to adipose tissue with a #15 scalpel blade.  The skin margins were undermined to an appropriate distance in all directions utilizing iris scissors.
Anesthesia Type: 1% lidocaine with epinephrine
Melolabial Transposition Flap Text: The defect edges were debeveled with a #15 scalpel blade.  Given the location of the defect and the proximity to free margins a melolabial flap was deemed most appropriate.  Using a sterile surgical marker, an appropriate melolabial transposition flap was drawn incorporating the defect.    The area thus outlined was incised deep to adipose tissue with a #15 scalpel blade.  The skin margins were undermined to an appropriate distance in all directions utilizing iris scissors.
Complex Repair And Single Advancement Flap Text: The defect edges were debeveled with a #15 scalpel blade.  The primary defect was closed partially with a complex linear closure.  Given the location of the remaining defect, shape of the defect and the proximity to free margins a single advancement flap was deemed most appropriate for complete closure of the defect.  Using a sterile surgical marker, an appropriate advancement flap was drawn incorporating the defect and placing the expected incisions within the relaxed skin tension lines where possible.    The area thus outlined was incised deep to adipose tissue with a #15 scalpel blade.  The skin margins were undermined to an appropriate distance in all directions utilizing iris scissors.
Ftsg Text: The defect edges were debeveled with a #15 scalpel blade.  Given the location of the defect, shape of the defect and the proximity to free margins a full thickness skin graft was deemed most appropriate.  Using a sterile surgical marker, the primary defect shape was transferred to the donor site. The area thus outlined was incised deep to adipose tissue with a #15 scalpel blade.  The harvested graft was then trimmed of adipose tissue until only dermis and epidermis was left.  The skin margins of the secondary defect were undermined to an appropriate distance in all directions utilizing iris scissors.  The secondary defect was closed with interrupted buried subcutaneous sutures.  The skin edges were then re-apposed with running  sutures.  The skin graft was then placed in the primary defect and oriented appropriately.
Epidermal Closure: simple interrupted
Double Island Pedicle Flap Text: The defect edges were debeveled with a #15 scalpel blade.  Given the location of the defect, shape of the defect and the proximity to free margins a double island pedicle advancement flap was deemed most appropriate.  Using a sterile surgical marker, an appropriate advancement flap was drawn incorporating the defect, outlining the appropriate donor tissue and placing the expected incisions within the relaxed skin tension lines where possible.    The area thus outlined was incised deep to adipose tissue with a #15 scalpel blade.  The skin margins were undermined to an appropriate distance in all directions around the primary defect and laterally outward around the island pedicle utilizing iris scissors.  There was minimal undermining beneath the pedicle flap.
No Repair - Repaired With Adjacent Surgical Defect Text (Leave Blank If You Do Not Want): After the excision the defect was repaired concurrently with another surgical defect which was in close approximation.
S Plasty Text: Given the location and shape of the defect, and the orientation of relaxed skin tension lines, an S-plasty was deemed most appropriate for repair.  Using a sterile surgical marker, the appropriate outline of the S-plasty was drawn, incorporating the defect and placing the expected incisions within the relaxed skin tension lines where possible.  The area thus outlined was incised deep to adipose tissue with a #15 scalpel blade.  The skin margins were undermined to an appropriate distance in all directions utilizing iris scissors. The skin flaps were advanced over the defect.  The opposing margins were then approximated with interrupted buried subcutaneous sutures.
Complex Repair And Modified Advancement Flap Text: The defect edges were debeveled with a #15 scalpel blade.  The primary defect was closed partially with a complex linear closure.  Given the location of the remaining defect, shape of the defect and the proximity to free margins a modified advancement flap was deemed most appropriate for complete closure of the defect.  Using a sterile surgical marker, an appropriate advancement flap was drawn incorporating the defect and placing the expected incisions within the relaxed skin tension lines where possible.    The area thus outlined was incised deep to adipose tissue with a #15 scalpel blade.  The skin margins were undermined to an appropriate distance in all directions utilizing iris scissors.
Island Pedicle Flap-Requiring Vessel Identification Text: The defect edges were debeveled with a #15 scalpel blade.  Given the location of the defect, shape of the defect and the proximity to free margins an island pedicle advancement flap was deemed most appropriate.  Using a sterile surgical marker, an appropriate advancement flap was drawn, based on the axial vessel mentioned above, incorporating the defect, outlining the appropriate donor tissue and placing the expected incisions within the relaxed skin tension lines where possible.    The area thus outlined was incised deep to adipose tissue with a #15 scalpel blade.  The skin margins were undermined to an appropriate distance in all directions around the primary defect and laterally outward around the island pedicle utilizing iris scissors.  There was minimal undermining beneath the pedicle flap.
Purse String (Intermediate) Text: Given the location of the defect and the characteristics of the surrounding skin a purse string intermediate closure was deemed most appropriate.  Undermining was performed circumfirentially around the surgical defect.  A purse string suture was then placed and tightened.
Complex Repair And Tissue Cultured Epidermal Autograft Text: The defect edges were debeveled with a #15 scalpel blade.  The primary defect was closed partially with a complex linear closure.  Given the location of the defect, shape of the defect and the proximity to free margins an tissue cultured epidermal autograft was deemed most appropriate to repair the remaining defect.  The graft was trimmed to fit the size of the remaining defect.  The graft was then placed in the primary defect, oriented appropriately, and sutured into place.
Complex Repair And Dermal Autograft Text: The defect edges were debeveled with a #15 scalpel blade.  The primary defect was closed partially with a complex linear closure.  Given the location of the defect, shape of the defect and the proximity to free margins an dermal autograft was deemed most appropriate to repair the remaining defect.  The graft was trimmed to fit the size of the remaining defect.  The graft was then placed in the primary defect, oriented appropriately, and sutured into place.
Ear Star Wedge Flap Text: The defect edges were debeveled with a #15 blade scalpel.  Given the location of the defect and the proximity to free margins (helical rim) an ear star wedge flap was deemed most appropriate.  Using a sterile surgical marker, the appropriate flap was drawn incorporating the defect and placing the expected incisions between the helical rim and antihelix where possible.  The area thus outlined was incised through and through with a #15 scalpel blade.
Interpolation Flap Text: A decision was made to reconstruct the defect utilizing an interpolation axial flap and a staged reconstruction.  A telfa template was made of the defect.  This telfa template was then used to outline the interpolation flap.  The donor area for the pedicle flap was then injected with anesthesia.  The flap was excised through the skin and subcutaneous tissue down to the layer of the underlying musculature.  The interpolation flap was carefully excised within this deep plane to maintain its blood supply.  The edges of the donor site were undermined.   The donor site was closed in a primary fashion.  The pedicle was then rotated into position and sutured.  Once the tube was sutured into place, adequate blood supply was confirmed with blanching and refill.  The pedicle was then wrapped with xeroform gauze and dressed appropriately with a telfa and gauze bandage to ensure continued blood supply and protect the attached pedicle.
Home Suture Removal Text: Patient was provided a home suture removal kit and will remove their sutures at home.  If they have any questions or difficulties they will call the office.
Additional Anesthesia Volume In Cc: 9
Z Plasty Text: The lesion was extirpated to the level of the fat with a #15 scalpel blade.  Given the location of the defect, shape of the defect and the proximity to free margins a Z-plasty was deemed most appropriate for repair.  Using a sterile surgical marker, the appropriate transposition arms of the Z-plasty were drawn incorporating the defect and placing the expected incisions within the relaxed skin tension lines where possible.    The area thus outlined was incised deep to adipose tissue with a #15 scalpel blade.  The skin margins were undermined to an appropriate distance in all directions utilizing iris scissors.  The opposing transposition arms were then transposed into place in opposite direction and anchored with interrupted buried subcutaneous sutures.
Render The Repair Note As A Separate Paragraph: No
Bilobed Transposition Flap Text: The defect edges were debeveled with a #15 scalpel blade.  Given the location of the defect and the proximity to free margins a bilobed transposition flap was deemed most appropriate.  Using a sterile surgical marker, an appropriate bilobe flap drawn around the defect.    The area thus outlined was incised deep to adipose tissue with a #15 scalpel blade.  The skin margins were undermined to an appropriate distance in all directions utilizing iris scissors.
Alar Island Pedicle Flap Text: The defect edges were debeveled with a #15 scalpel blade.  Given the location of the defect, shape of the defect and the proximity to the alar rim an island pedicle advancement flap was deemed most appropriate.  Using a sterile surgical marker, an appropriate advancement flap was drawn incorporating the defect, outlining the appropriate donor tissue and placing the expected incisions within the nasal ala running parallel to the alar rim. The area thus outlined was incised with a #15 scalpel blade.  The skin margins were undermined minimally to an appropriate distance in all directions around the primary defect and laterally outward around the island pedicle utilizing iris scissors.  There was minimal undermining beneath the pedicle flap.
Crescentic Advancement Flap Text: The defect edges were debeveled with a #15 scalpel blade.  Given the location of the defect and the proximity to free margins a crescentic advancement flap was deemed most appropriate.  Using a sterile surgical marker, the appropriate advancement flap was drawn incorporating the defect and placing the expected incisions within the relaxed skin tension lines where possible.    The area thus outlined was incised deep to adipose tissue with a #15 scalpel blade.  The skin margins were undermined to an appropriate distance in all directions utilizing iris scissors.
Size Of Margin In Cm: 1
Detail Level: Detailed
O-L Flap Text: The defect edges were debeveled with a #15 scalpel blade.  Given the location of the defect, shape of the defect and the proximity to free margins an O-L flap was deemed most appropriate.  Using a sterile surgical marker, an appropriate advancement flap was drawn incorporating the defect and placing the expected incisions within the relaxed skin tension lines where possible.    The area thus outlined was incised deep to adipose tissue with a #15 scalpel blade.  The skin margins were undermined to an appropriate distance in all directions utilizing iris scissors.
Complex Repair And Transposition Flap Text: The defect edges were debeveled with a #15 scalpel blade.  The primary defect was closed partially with a complex linear closure.  Given the location of the remaining defect, shape of the defect and the proximity to free margins a transposition flap was deemed most appropriate for complete closure of the defect.  Using a sterile surgical marker, an appropriate advancement flap was drawn incorporating the defect and placing the expected incisions within the relaxed skin tension lines where possible.    The area thus outlined was incised deep to adipose tissue with a #15 scalpel blade.  The skin margins were undermined to an appropriate distance in all directions utilizing iris scissors.
Lip Wedge Excision Repair Text: Given the location of the defect and the proximity to free margins a full thickness wedge repair was deemed most appropriate.  Using a sterile surgical marker, the appropriate repair was drawn incorporating the defect and placing the expected incisions perpendicular to the vermilion border.  The vermilion border was also meticulously outlined to ensure appropriate reapproximation during the repair.  The area thus outlined was incised through and through with a #15 scalpel blade.  The muscularis and dermis were reaproximated with deep sutures following hemostasis. Care was taken to realign the vermilion border before proceeding with the superficial closure.  Once the vermilion was realigned the superfical and mucosal closure was finished.
Estimated Blood Loss (Cc): minimal
Cheek Interpolation Flap Text: A decision was made to reconstruct the defect utilizing an interpolation axial flap and a staged reconstruction.  A telfa template was made of the defect.  This telfa template was then used to outline the Cheek Interpolation flap.  The donor area for the pedicle flap was then injected with anesthesia.  The flap was excised through the skin and subcutaneous tissue down to the layer of the underlying musculature.  The interpolation flap was carefully excised within this deep plane to maintain its blood supply.  The edges of the donor site were undermined.   The donor site was closed in a primary fashion.  The pedicle was then rotated into position and sutured.  Once the tube was sutured into place, adequate blood supply was confirmed with blanching and refill.  The pedicle was then wrapped with xeroform gauze and dressed appropriately with a telfa and gauze bandage to ensure continued blood supply and protect the attached pedicle.
Lab: [id number]
Complex Repair And Rotation Flap Text: The defect edges were debeveled with a #15 scalpel blade.  The primary defect was closed partially with a complex linear closure.  Given the location of the remaining defect, shape of the defect and the proximity to free margins a rotation flap was deemed most appropriate for complete closure of the defect.  Using a sterile surgical marker, an appropriate advancement flap was drawn incorporating the defect and placing the expected incisions within the relaxed skin tension lines where possible.    The area thus outlined was incised deep to adipose tissue with a #15 scalpel blade.  The skin margins were undermined to an appropriate distance in all directions utilizing iris scissors.
Bilateral Helical Rim Advancement Flap Text: The defect edges were debeveled with a #15 blade scalpel.  Given the location of the defect and the proximity to free margins (helical rim) a bilateral helical rim advancement flap was deemed most appropriate.  Using a sterile surgical marker, the appropriate advancement flaps were drawn incorporating the defect and placing the expected incisions between the helical rim and antihelix where possible.  The area thus outlined was incised through and through with a #15 scalpel blade.  With a skin hook and iris scissors, the flaps were gently and sharply undermined and freed up.
Curvilinear Excision Additional Text (Leave Blank If You Do Not Want): The margin was drawn around the clinically apparent lesion.  A curvilinear shape was then drawn on the skin incorporating the lesion and margins.  Incisions were then made along these lines to the appropriate tissue plane and the lesion was extirpated.
Epidermal Autograft Text: The defect edges were debeveled with a #15 scalpel blade.  Given the location of the defect, shape of the defect and the proximity to free margins an epidermal autograft was deemed most appropriate.  Using a sterile surgical marker, the primary defect shape was transferred to the donor site. The epidermal graft was then harvested.  The skin graft was then placed in the primary defect and oriented appropriately.
Complex Repair And Double Advancement Flap Text: The defect edges were debeveled with a #15 scalpel blade.  The primary defect was closed partially with a complex linear closure.  Given the location of the remaining defect, shape of the defect and the proximity to free margins a double advancement flap was deemed most appropriate for complete closure of the defect.  Using a sterile surgical marker, an appropriate advancement flap was drawn incorporating the defect and placing the expected incisions within the relaxed skin tension lines where possible.    The area thus outlined was incised deep to adipose tissue with a #15 scalpel blade.  The skin margins were undermined to an appropriate distance in all directions utilizing iris scissors.
Complex Repair And Rhombic Flap Text: The defect edges were debeveled with a #15 scalpel blade.  The primary defect was closed partially with a complex linear closure.  Given the location of the remaining defect, shape of the defect and the proximity to free margins a rhombic flap was deemed most appropriate for complete closure of the defect.  Using a sterile surgical marker, an appropriate advancement flap was drawn incorporating the defect and placing the expected incisions within the relaxed skin tension lines where possible.    The area thus outlined was incised deep to adipose tissue with a #15 scalpel blade.  The skin margins were undermined to an appropriate distance in all directions utilizing iris scissors.
Transposition Flap Text: The defect edges were debeveled with a #15 scalpel blade.  Given the location of the defect and the proximity to free margins a transposition flap was deemed most appropriate.  Using a sterile surgical marker, an appropriate transposition flap was drawn incorporating the defect.    The area thus outlined was incised deep to adipose tissue with a #15 scalpel blade.  The skin margins were undermined to an appropriate distance in all directions utilizing iris scissors.
Trilobed Flap Text: The defect edges were debeveled with a #15 scalpel blade.  Given the location of the defect and the proximity to free margins a trilobed flap was deemed most appropriate.  Using a sterile surgical marker, an appropriate trilobed flap drawn around the defect.    The area thus outlined was incised deep to adipose tissue with a #15 scalpel blade.  The skin margins were undermined to an appropriate distance in all directions utilizing iris scissors.
Partial Purse String (Intermediate) Text: Given the location of the defect and the characteristics of the surrounding skin an intermediate purse string closure was deemed most appropriate.  Undermining was performed circumferentially around the surgical defect.  A purse string suture was then placed and tightened. Wound tension of the circular defect prevented complete closure of the wound.
Island Pedicle Flap With Canthal Suspension Text: The defect edges were debeveled with a #15 scalpel blade.  Given the location of the defect, shape of the defect and the proximity to free margins an island pedicle advancement flap was deemed most appropriate.  Using a sterile surgical marker, an appropriate advancement flap was drawn incorporating the defect, outlining the appropriate donor tissue and placing the expected incisions within the relaxed skin tension lines where possible. The area thus outlined was incised deep to adipose tissue with a #15 scalpel blade.  The skin margins were undermined to an appropriate distance in all directions around the primary defect and laterally outward around the island pedicle utilizing iris scissors.  There was minimal undermining beneath the pedicle flap. A suspension suture was placed in the canthal tendon to prevent tension and prevent ectropion.
Burow's Advancement Flap Text: The defect edges were debeveled with a #15 scalpel blade.  Given the location of the defect and the proximity to free margins a Burow's advancement flap was deemed most appropriate.  Using a sterile surgical marker, the appropriate advancement flap was drawn incorporating the defect and placing the expected incisions within the relaxed skin tension lines where possible.    The area thus outlined was incised deep to adipose tissue with a #15 scalpel blade.  The skin margins were undermined to an appropriate distance in all directions utilizing iris scissors.
Excision Method: Elliptical
Crescentic Intermediate Repair Preamble Text (Leave Blank If You Do Not Want): Undermining was performed with blunt dissection.
Muscle Hinge Flap Text: The defect edges were debeveled with a #15 scalpel blade.  Given the size, depth and location of the defect and the proximity to free margins a muscle hinge flap was deemed most appropriate.  Using a sterile surgical marker, an appropriate hinge flap was drawn incorporating the defect. The area thus outlined was incised with a #15 scalpel blade.  The skin margins were undermined to an appropriate distance in all directions utilizing iris scissors.
O-Z Plasty Text: The defect edges were debeveled with a #15 scalpel blade.  Given the location of the defect, shape of the defect and the proximity to free margins an O-Z plasty (double transposition flap) was deemed most appropriate.  Using a sterile surgical marker, the appropriate transposition flaps were drawn incorporating the defect and placing the expected incisions within the relaxed skin tension lines where possible.    The area thus outlined was incised deep to adipose tissue with a #15 scalpel blade.  The skin margins were undermined to an appropriate distance in all directions utilizing iris scissors.  Hemostasis was achieved with electrocautery.  The flaps were then transposed into place, one clockwise and the other counterclockwise, and anchored with interrupted buried subcutaneous sutures.
Secondary Defect Width (In Cm): 0
Complex Repair And Epidermal Autograft Text: The defect edges were debeveled with a #15 scalpel blade.  The primary defect was closed partially with a complex linear closure.  Given the location of the defect, shape of the defect and the proximity to free margins an epidermal autograft was deemed most appropriate to repair the remaining defect.  The graft was trimmed to fit the size of the remaining defect.  The graft was then placed in the primary defect, oriented appropriately, and sutured into place.
Melolabial Interpolation Flap Text: A decision was made to reconstruct the defect utilizing an interpolation axial flap and a staged reconstruction.  A telfa template was made of the defect.  This telfa template was then used to outline the melolabial interpolation flap.  The donor area for the pedicle flap was then injected with anesthesia.  The flap was excised through the skin and subcutaneous tissue down to the layer of the underlying musculature.  The pedicle flap was carefully excised within this deep plane to maintain its blood supply.  The edges of the donor site were undermined.   The donor site was closed in a primary fashion.  The pedicle was then rotated into position and sutured.  Once the tube was sutured into place, adequate blood supply was confirmed with blanching and refill.  The pedicle was then wrapped with xeroform gauze and dressed appropriately with a telfa and gauze bandage to ensure continued blood supply and protect the attached pedicle.
Keystone Flap Text: The defect edges were debeveled with a #15 scalpel blade.  Given the location of the defect, shape of the defect a keystone flap was deemed most appropriate.  Using a sterile surgical marker, an appropriate keystone flap was drawn incorporating the defect, outlining the appropriate donor tissue and placing the expected incisions within the relaxed skin tension lines where possible. The area thus outlined was incised deep to adipose tissue with a #15 scalpel blade.  The skin margins were undermined to an appropriate distance in all directions around the primary defect and laterally outward around the flap utilizing iris scissors.
Complex Repair And O-L Flap Text: The defect edges were debeveled with a #15 scalpel blade.  The primary defect was closed partially with a complex linear closure.  Given the location of the remaining defect, shape of the defect and the proximity to free margins an O-L flap was deemed most appropriate for complete closure of the defect.  Using a sterile surgical marker, an appropriate flap was drawn incorporating the defect and placing the expected incisions within the relaxed skin tension lines where possible.    The area thus outlined was incised deep to adipose tissue with a #15 scalpel blade.  The skin margins were undermined to an appropriate distance in all directions utilizing iris scissors.
Post-Care Instructions: I reviewed with the patient in detail post-care instructions. Patient is not to engage in any heavy lifting, exercise, or swimming for the next 14 days. Should the patient develop any fevers, chills, bleeding, severe pain patient will contact the office immediately.
Fusiform Excision Additional Text (Leave Blank If You Do Not Want): The margin was drawn around the clinically apparent lesion.  A fusiform shape was then drawn on the skin incorporating the lesion and margins.  Incisions were then made along these lines to the appropriate tissue plane and the lesion was extirpated.
Cheek-To-Nose Interpolation Flap Text: A decision was made to reconstruct the defect utilizing an interpolation axial flap and a staged reconstruction.  A telfa template was made of the defect.  This telfa template was then used to outline the Cheek-To-Nose Interpolation flap.  The donor area for the pedicle flap was then injected with anesthesia.  The flap was excised through the skin and subcutaneous tissue down to the layer of the underlying musculature.  The interpolation flap was carefully excised within this deep plane to maintain its blood supply.  The edges of the donor site were undermined.   The donor site was closed in a primary fashion.  The pedicle was then rotated into position and sutured.  Once the tube was sutured into place, adequate blood supply was confirmed with blanching and refill.  The pedicle was then wrapped with xeroform gauze and dressed appropriately with a telfa and gauze bandage to ensure continued blood supply and protect the attached pedicle.
Elliptical Excision Additional Text (Leave Blank If You Do Not Want): The margin was drawn around the clinically apparent lesion.  An elliptical shape was then drawn on the skin incorporating the lesion and margins.  Incisions were then made along these lines to the appropriate tissue plane and the lesion was extirpated.
Modified Advancement Flap Text: The defect edges were debeveled with a #15 scalpel blade.  Given the location of the defect, shape of the defect and the proximity to free margins a modified advancement flap was deemed most appropriate.  Using a sterile surgical marker, an appropriate advancement flap was drawn incorporating the defect and placing the expected incisions within the relaxed skin tension lines where possible.    The area thus outlined was incised deep to adipose tissue with a #15 scalpel blade.  The skin margins were undermined to an appropriate distance in all directions utilizing iris scissors.
Complex Repair And Melolabial Flap Text: The defect edges were debeveled with a #15 scalpel blade.  The primary defect was closed partially with a complex linear closure.  Given the location of the remaining defect, shape of the defect and the proximity to free margins a melolabial flap was deemed most appropriate for complete closure of the defect.  Using a sterile surgical marker, an appropriate advancement flap was drawn incorporating the defect and placing the expected incisions within the relaxed skin tension lines where possible.    The area thus outlined was incised deep to adipose tissue with a #15 scalpel blade.  The skin margins were undermined to an appropriate distance in all directions utilizing iris scissors.
Perilesional Excision Additional Text (Leave Blank If You Do Not Want): The margin was drawn around the clinically apparent lesion. Incisions were then made along these lines to the appropriate tissue plane and the lesion was extirpated.
Pre-Excision Curettage Text (Leave Blank If You Do Not Want): Prior to drawing the surgical margin the visible lesion was removed with electrodesiccation and curettage to clearly define the lesion size.
V-Y Flap Text: The defect edges were debeveled with a #15 scalpel blade.  Given the location of the defect, shape of the defect and the proximity to free margins a V-Y flap was deemed most appropriate.  Using a sterile surgical marker, an appropriate advancement flap was drawn incorporating the defect and placing the expected incisions within the relaxed skin tension lines where possible.    The area thus outlined was incised deep to adipose tissue with a #15 scalpel blade.  The skin margins were undermined to an appropriate distance in all directions utilizing iris scissors.
H Plasty Text: Given the location of the defect, shape of the defect and the proximity to free margins a H-plasty was deemed most appropriate for repair.  Using a sterile surgical marker, the appropriate advancement arms of the H-plasty were drawn incorporating the defect and placing the expected incisions within the relaxed skin tension lines where possible. The area thus outlined was incised deep to adipose tissue with a #15 scalpel blade. The skin margins were undermined to an appropriate distance in all directions utilizing iris scissors.  The opposing advancement arms were then advanced into place in opposite direction and anchored with interrupted buried subcutaneous sutures.
Mucosal Advancement Flap Text: Given the location of the defect, shape of the defect and the proximity to free margins a mucosal advancement flap was deemed most appropriate. Incisions were made with a 15 blade scalpel in the appropriate fashion along the cutaneous vermilion border and the mucosal lip. The remaining actinically damaged mucosal tissue was excised.  The mucosal advancement flap was then elevated to the gingival sulcus with care taken to preserve the neurovascular structures and advanced into the primary defect. Care was taken to ensure that precise realignment of the vermilion border was achieved.
Anesthesia Volume In Cc: 22
Complex Repair And Z Plasty Text: The defect edges were debeveled with a #15 scalpel blade.  The primary defect was closed partially with a complex linear closure.  Given the location of the remaining defect, shape of the defect and the proximity to free margins a Z plasty was deemed most appropriate for complete closure of the defect.  Using a sterile surgical marker, an appropriate advancement flap was drawn incorporating the defect and placing the expected incisions within the relaxed skin tension lines where possible.    The area thus outlined was incised deep to adipose tissue with a #15 scalpel blade.  The skin margins were undermined to an appropriate distance in all directions utilizing iris scissors.
Mastoid Interpolation Flap Text: A decision was made to reconstruct the defect utilizing an interpolation axial flap and a staged reconstruction.  A telfa template was made of the defect.  This telfa template was then used to outline the mastoid interpolation flap.  The donor area for the pedicle flap was then injected with anesthesia.  The flap was excised through the skin and subcutaneous tissue down to the layer of the underlying musculature.  The pedicle flap was carefully excised within this deep plane to maintain its blood supply.  The edges of the donor site were undermined.   The donor site was closed in a primary fashion.  The pedicle was then rotated into position and sutured.  Once the tube was sutured into place, adequate blood supply was confirmed with blanching and refill.  The pedicle was then wrapped with xeroform gauze and dressed appropriately with a telfa and gauze bandage to ensure continued blood supply and protect the attached pedicle.
Complex Repair And V-Y Plasty Text: The defect edges were debeveled with a #15 scalpel blade.  The primary defect was closed partially with a complex linear closure.  Given the location of the remaining defect, shape of the defect and the proximity to free margins a V-Y plasty was deemed most appropriate for complete closure of the defect.  Using a sterile surgical marker, an appropriate advancement flap was drawn incorporating the defect and placing the expected incisions within the relaxed skin tension lines where possible.    The area thus outlined was incised deep to adipose tissue with a #15 scalpel blade.  The skin margins were undermined to an appropriate distance in all directions utilizing iris scissors.
Partial Purse String (Simple) Text: Given the location of the defect and the characteristics of the surrounding skin a simple purse string closure was deemed most appropriate.  Undermining was performed circumferentially around the surgical defect.  A purse string suture was then placed and tightened. Wound tension of the circular defect prevented complete closure of the wound.
Star Wedge Flap Text: The defect edges were debeveled with a #15 scalpel blade.  Given the location of the defect, shape of the defect and the proximity to free margins a star wedge flap was deemed most appropriate.  Using a sterile surgical marker, an appropriate rotation flap was drawn incorporating the defect and placing the expected incisions within the relaxed skin tension lines where possible. The area thus outlined was incised deep to adipose tissue with a #15 scalpel blade.  The skin margins were undermined to an appropriate distance in all directions utilizing iris scissors.
Advancement Flap (Double) Text: The defect edges were debeveled with a #15 scalpel blade.  Given the location of the defect and the proximity to free margins a double advancement flap was deemed most appropriate.  Using a sterile surgical marker, the appropriate advancement flaps were drawn incorporating the defect and placing the expected incisions within the relaxed skin tension lines where possible.    The area thus outlined was incised deep to adipose tissue with a #15 scalpel blade.  The skin margins were undermined to an appropriate distance in all directions utilizing iris scissors.
Consent was obtained from the patient. The risks and benefits to therapy were discussed in detail. Specifically, the risks of infection, scarring, bleeding, prolonged wound healing, incomplete removal, allergy to anesthesia, nerve injury and recurrence were addressed. Prior to the procedure, the treatment site was clearly identified and confirmed by the patient.
Complex Repair And Dorsal Nasal Flap Text: The defect edges were debeveled with a #15 scalpel blade.  The primary defect was closed partially with a complex linear closure.  Given the location of the remaining defect, shape of the defect and the proximity to free margins a dorsal nasal flap was deemed most appropriate for complete closure of the defect.  Using a sterile surgical marker, an appropriate flap was drawn incorporating the defect and placing the expected incisions within the relaxed skin tension lines where possible.    The area thus outlined was incised deep to adipose tissue with a #15 scalpel blade.  The skin margins were undermined to an appropriate distance in all directions utilizing iris scissors.
Hemostasis: Electrocautery
Composite Graft Text: The defect edges were debeveled with a #15 scalpel blade.  Given the location of the defect, shape of the defect, the proximity to free margins and the fact the defect was full thickness a composite graft was deemed most appropriate.  The defect was outline and then transferred to the donor site.  A full thickness graft was then excised from the donor site. The graft was then placed in the primary defect, oriented appropriately and then sutured into place.  The secondary defect was then repaired using a primary closure.
Advancement-Rotation Flap Text: The defect edges were debeveled with a #15 scalpel blade.  Given the location of the defect, shape of the defect and the proximity to free margins an advancement-rotation flap was deemed most appropriate.  Using a sterile surgical marker, an appropriate flap was drawn incorporating the defect and placing the expected incisions within the relaxed skin tension lines where possible. The area thus outlined was incised deep to adipose tissue with a #15 scalpel blade.  The skin margins were undermined to an appropriate distance in all directions utilizing iris scissors.
Complex Repair And Ftsg Text: The defect edges were debeveled with a #15 scalpel blade.  The primary defect was closed partially with a complex linear closure.  Given the location of the defect, shape of the defect and the proximity to free margins a full thickness skin graft was deemed most appropriate to repair the remaining defect.  The graft was trimmed to fit the size of the remaining defect.  The graft was then placed in the primary defect, oriented appropriately, and sutured into place.
Deep Sutures: 3-0 PGA
O-T Plasty Text: The defect edges were debeveled with a #15 scalpel blade.  Given the location of the defect, shape of the defect and the proximity to free margins an O-T plasty was deemed most appropriate.  Using a sterile surgical marker, an appropriate O-T plasty was drawn incorporating the defect and placing the expected incisions within the relaxed skin tension lines where possible.    The area thus outlined was incised deep to adipose tissue with a #15 scalpel blade.  The skin margins were undermined to an appropriate distance in all directions utilizing iris scissors.
Dressing: pressure dressing
Dorsal Nasal Flap Text: The defect edges were debeveled with a #15 scalpel blade.  Given the location of the defect and the proximity to free margins a dorsal nasal flap was deemed most appropriate.  Using a sterile surgical marker, an appropriate dorsal nasal flap was drawn around the defect.    The area thus outlined was incised deep to adipose tissue with a #15 scalpel blade.  The skin margins were undermined to an appropriate distance in all directions utilizing iris scissors.
Rhombic Flap Text: The defect edges were debeveled with a #15 scalpel blade.  Given the location of the defect and the proximity to free margins a rhombic flap was deemed most appropriate.  Using a sterile surgical marker, an appropriate rhombic flap was drawn incorporating the defect.    The area thus outlined was incised deep to adipose tissue with a #15 scalpel blade.  The skin margins were undermined to an appropriate distance in all directions utilizing iris scissors.
Billing Type: Third-Party Bill
Epidermal Sutures: 3-0 Nylon
Complex Repair And Skin Substitute Graft Text: The defect edges were debeveled with a #15 scalpel blade.  The primary defect was closed partially with a complex linear closure.  Given the location of the remaining defect, shape of the defect and the proximity to free margins a skin substitute graft was deemed most appropriate to repair the remaining defect.  The graft was trimmed to fit the size of the remaining defect.  The graft was then placed in the primary defect, oriented appropriately, and sutured into place.
Repair Type: Complex
Tissue Cultured Epidermal Autograft Text: The defect edges were debeveled with a #15 scalpel blade.  Given the location of the defect, shape of the defect and the proximity to free margins a tissue cultured epidermal autograft was deemed most appropriate.  The graft was then trimmed to fit the size of the defect.  The graft was then placed in the primary defect and oriented appropriately.
Size Of Lesion In Cm: 1.5
Excisional Biopsy Additional Text (Leave Blank If You Do Not Want): The margin was drawn around the clinically apparent lesion. An elliptical shape was then drawn on the skin incorporating the lesion and margins.  Incisions were then made along these lines to the appropriate tissue plane and the lesion was extirpated.
W Plasty Text: The lesion was extirpated to the level of the fat with a #15 scalpel blade.  Given the location of the defect, shape of the defect and the proximity to free margins a W-plasty was deemed most appropriate for repair.  Using a sterile surgical marker, the appropriate transposition arms of the W-plasty were drawn incorporating the defect and placing the expected incisions within the relaxed skin tension lines where possible.    The area thus outlined was incised deep to adipose tissue with a #15 scalpel blade.  The skin margins were undermined to an appropriate distance in all directions utilizing iris scissors.  The opposing transposition arms were then transposed into place in opposite direction and anchored with interrupted buried subcutaneous sutures.
Complex Repair And Bilobe Flap Text: The defect edges were debeveled with a #15 scalpel blade.  The primary defect was closed partially with a complex linear closure.  Given the location of the remaining defect, shape of the defect and the proximity to free margins a bilobe flap was deemed most appropriate for complete closure of the defect.  Using a sterile surgical marker, an appropriate advancement flap was drawn incorporating the defect and placing the expected incisions within the relaxed skin tension lines where possible.    The area thus outlined was incised deep to adipose tissue with a #15 scalpel blade.  The skin margins were undermined to an appropriate distance in all directions utilizing iris scissors.
Complex Repair And O-T Advancement Flap Text: The defect edges were debeveled with a #15 scalpel blade.  The primary defect was closed partially with a complex linear closure.  Given the location of the remaining defect, shape of the defect and the proximity to free margins an O-T advancement flap was deemed most appropriate for complete closure of the defect.  Using a sterile surgical marker, an appropriate advancement flap was drawn incorporating the defect and placing the expected incisions within the relaxed skin tension lines where possible.    The area thus outlined was incised deep to adipose tissue with a #15 scalpel blade.  The skin margins were undermined to an appropriate distance in all directions utilizing iris scissors.
Helical Rim Advancement Flap Text: The defect edges were debeveled with a #15 blade scalpel.  Given the location of the defect and the proximity to free margins (helical rim) a double helical rim advancement flap was deemed most appropriate.  Using a sterile surgical marker, the appropriate advancement flaps were drawn incorporating the defect and placing the expected incisions between the helical rim and antihelix where possible.  The area thus outlined was incised through and through with a #15 scalpel blade.  With a skin hook and iris scissors, the flaps were gently and sharply undermined and freed up.
Complex Repair And Xenograft Text: The defect edges were debeveled with a #15 scalpel blade.  The primary defect was closed partially with a complex linear closure.  Given the location of the defect, shape of the defect and the proximity to free margins a xenograft was deemed most appropriate to repair the remaining defect.  The graft was trimmed to fit the size of the remaining defect.  The graft was then placed in the primary defect, oriented appropriately, and sutured into place.
Body Location Override (Optional - Billing Will Still Be Based On Selected Body Map Location If Applicable): left superior upper back
Xenograft Text: The defect edges were debeveled with a #15 scalpel blade.  Given the location of the defect, shape of the defect and the proximity to free margins a xenograft was deemed most appropriate.  The graft was then trimmed to fit the size of the defect.  The graft was then placed in the primary defect and oriented appropriately.
Complex Repair And Double M Plasty Text: The defect edges were debeveled with a #15 scalpel blade.  The primary defect was closed partially with a complex linear closure.  Given the location of the remaining defect, shape of the defect and the proximity to free margins a double M plasty was deemed most appropriate for complete closure of the defect.  Using a sterile surgical marker, an appropriate advancement flap was drawn incorporating the defect and placing the expected incisions within the relaxed skin tension lines where possible.    The area thus outlined was incised deep to adipose tissue with a #15 scalpel blade.  The skin margins were undermined to an appropriate distance in all directions utilizing iris scissors.
V-Y Plasty Text: The defect edges were debeveled with a #15 scalpel blade.  Given the location of the defect, shape of the defect and the proximity to free margins an V-Y advancement flap was deemed most appropriate.  Using a sterile surgical marker, an appropriate advancement flap was drawn incorporating the defect and placing the expected incisions within the relaxed skin tension lines where possible.    The area thus outlined was incised deep to adipose tissue with a #15 scalpel blade.  The skin margins were undermined to an appropriate distance in all directions utilizing iris scissors.
Island Pedicle Flap Text: The defect edges were debeveled with a #15 scalpel blade.  Given the location of the defect, shape of the defect and the proximity to free margins an island pedicle advancement flap was deemed most appropriate.  Using a sterile surgical marker, an appropriate advancement flap was drawn incorporating the defect, outlining the appropriate donor tissue and placing the expected incisions within the relaxed skin tension lines where possible.    The area thus outlined was incised deep to adipose tissue with a #15 scalpel blade.  The skin margins were undermined to an appropriate distance in all directions around the primary defect and laterally outward around the island pedicle utilizing iris scissors.  There was minimal undermining beneath the pedicle flap.
Undermining Location (Optional): in the superficial subcutaneous fat
Rotation Flap Text: The defect edges were debeveled with a #15 scalpel blade.  Given the location of the defect, shape of the defect and the proximity to free margins a rotation flap was deemed most appropriate.  Using a sterile surgical marker, an appropriate rotation flap was drawn incorporating the defect and placing the expected incisions within the relaxed skin tension lines where possible.    The area thus outlined was incised deep to adipose tissue with a #15 scalpel blade.  The skin margins were undermined to an appropriate distance in all directions utilizing iris scissors.
Split-Thickness Skin Graft Text: The defect edges were debeveled with a #15 scalpel blade.  Given the location of the defect, shape of the defect and the proximity to free margins a split thickness skin graft was deemed most appropriate.  Using a sterile surgical marker, the primary defect shape was transferred to the donor site. The split thickness graft was then harvested.  The skin graft was then placed in the primary defect and oriented appropriately.
Paramedian Forehead Flap Text: A decision was made to reconstruct the defect utilizing an interpolation axial flap and a staged reconstruction.  A telfa template was made of the defect.  This telfa template was then used to outline the paramedian forehead pedicle flap.  The donor area for the pedicle flap was then injected with anesthesia.  The flap was excised through the skin and subcutaneous tissue down to the layer of the underlying musculature.  The pedicle flap was carefully excised within this deep plane to maintain its blood supply.  The edges of the donor site were undermined.   The donor site was closed in a primary fashion.  The pedicle was then rotated into position and sutured.  Once the tube was sutured into place, adequate blood supply was confirmed with blanching and refill.  The pedicle was then wrapped with xeroform gauze and dressed appropriately with a telfa and gauze bandage to ensure continued blood supply and protect the attached pedicle.
Path Notes (To The Dermatopathologist): Please check margins.
Complex Repair And Split-Thickness Skin Graft Text: The defect edges were debeveled with a #15 scalpel blade.  The primary defect was closed partially with a complex linear closure.  Given the location of the defect, shape of the defect and the proximity to free margins a split thickness skin graft was deemed most appropriate to repair the remaining defect.  The graft was trimmed to fit the size of the remaining defect.  The graft was then placed in the primary defect, oriented appropriately, and sutured into place.
Cartilage Graft Text: The defect edges were debeveled with a #15 scalpel blade.  Given the location of the defect, shape of the defect, the fact the defect involved a full thickness cartilage defect a cartilage graft was deemed most appropriate.  An appropriate donor site was identified, cleansed, and anesthetized. The cartilage graft was then harvested and transferred to the recipient site, oriented appropriately and then sutured into place.  The secondary defect was then repaired using a primary closure.
Positioning (Leave Blank If You Do Not Want): The patient was placed in a comfortable position exposing the surgical site.
Complex Repair And A-T Advancement Flap Text: The defect edges were debeveled with a #15 scalpel blade.  The primary defect was closed partially with a complex linear closure.  Given the location of the remaining defect, shape of the defect and the proximity to free margins an A-T advancement flap was deemed most appropriate for complete closure of the defect.  Using a sterile surgical marker, an appropriate advancement flap was drawn incorporating the defect and placing the expected incisions within the relaxed skin tension lines where possible.    The area thus outlined was incised deep to adipose tissue with a #15 scalpel blade.  The skin margins were undermined to an appropriate distance in all directions utilizing iris scissors.
Bi-Rhombic Flap Text: The defect edges were debeveled with a #15 scalpel blade.  Given the location of the defect and the proximity to free margins a bi-rhombic flap was deemed most appropriate.  Using a sterile surgical marker, an appropriate rhombic flap was drawn incorporating the defect. The area thus outlined was incised deep to adipose tissue with a #15 scalpel blade.  The skin margins were undermined to an appropriate distance in all directions utilizing iris scissors.
Wound Care: Petrolatum
Advancement Flap (Single) Text: The defect edges were debeveled with a #15 scalpel blade.  Given the location of the defect and the proximity to free margins a single advancement flap was deemed most appropriate.  Using a sterile surgical marker, an appropriate advancement flap was drawn incorporating the defect and placing the expected incisions within the relaxed skin tension lines where possible.    The area thus outlined was incised deep to adipose tissue with a #15 scalpel blade.  The skin margins were undermined to an appropriate distance in all directions utilizing iris scissors.
Saucerization Excision Additional Text (Leave Blank If You Do Not Want): The margin was drawn around the clinically apparent lesion.  Incisions were then made along these lines, in a tangential fashion, to the appropriate tissue plane and the lesion was extirpated.
Complex Repair And M Plasty Text: The defect edges were debeveled with a #15 scalpel blade.  The primary defect was closed partially with a complex linear closure.  Given the location of the remaining defect, shape of the defect and the proximity to free margins an M plasty was deemed most appropriate for complete closure of the defect.  Using a sterile surgical marker, an appropriate advancement flap was drawn incorporating the defect and placing the expected incisions within the relaxed skin tension lines where possible.    The area thus outlined was incised deep to adipose tissue with a #15 scalpel blade.  The skin margins were undermined to an appropriate distance in all directions utilizing iris scissors.
Scalpel Size: 15 blade
Hatchet Flap Text: The defect edges were debeveled with a #15 scalpel blade.  Given the location of the defect, shape of the defect and the proximity to free margins a hatchet flap was deemed most appropriate.  Using a sterile surgical marker, an appropriate hatchet flap was drawn incorporating the defect and placing the expected incisions within the relaxed skin tension lines where possible.    The area thus outlined was incised deep to adipose tissue with a #15 scalpel blade.  The skin margins were undermined to an appropriate distance in all directions utilizing iris scissors.
A-T Advancement Flap Text: The defect edges were debeveled with a #15 scalpel blade.  Given the location of the defect, shape of the defect and the proximity to free margins an A-T advancement flap was deemed most appropriate.  Using a sterile surgical marker, an appropriate advancement flap was drawn incorporating the defect and placing the expected incisions within the relaxed skin tension lines where possible.    The area thus outlined was incised deep to adipose tissue with a #15 scalpel blade.  The skin margins were undermined to an appropriate distance in all directions utilizing iris scissors.
Posterior Auricular Interpolation Flap Text: A decision was made to reconstruct the defect utilizing an interpolation axial flap and a staged reconstruction.  A telfa template was made of the defect.  This telfa template was then used to outline the posterior auricular interpolation flap.  The donor area for the pedicle flap was then injected with anesthesia.  The flap was excised through the skin and subcutaneous tissue down to the layer of the underlying musculature.  The pedicle flap was carefully excised within this deep plane to maintain its blood supply.  The edges of the donor site were undermined.   The donor site was closed in a primary fashion.  The pedicle was then rotated into position and sutured.  Once the tube was sutured into place, adequate blood supply was confirmed with blanching and refill.  The pedicle was then wrapped with xeroform gauze and dressed appropriately with a telfa and gauze bandage to ensure continued blood supply and protect the attached pedicle.
Excision Depth: fascia

## 2018-03-29 ENCOUNTER — HOSPITAL ENCOUNTER (OUTPATIENT)
Facility: MEDICAL CENTER | Age: 63
End: 2018-03-29
Attending: INTERNAL MEDICINE | Admitting: INTERNAL MEDICINE
Payer: MEDICARE

## 2018-03-29 VITALS
OXYGEN SATURATION: 97 % | WEIGHT: 280.43 LBS | DIASTOLIC BLOOD PRESSURE: 79 MMHG | BODY MASS INDEX: 37.98 KG/M2 | TEMPERATURE: 97.3 F | SYSTOLIC BLOOD PRESSURE: 116 MMHG | RESPIRATION RATE: 16 BRPM | HEIGHT: 72 IN | HEART RATE: 68 BPM

## 2018-03-29 LAB — PATHOLOGY CONSULT NOTE: NORMAL

## 2018-03-29 PROCEDURE — 700105 HCHG RX REV CODE 258: Performed by: INTERNAL MEDICINE

## 2018-03-29 PROCEDURE — 160002 HCHG RECOVERY MINUTES (STAT): Performed by: INTERNAL MEDICINE

## 2018-03-29 PROCEDURE — 700111 HCHG RX REV CODE 636 W/ 250 OVERRIDE (IP)

## 2018-03-29 PROCEDURE — 160048 HCHG OR STATISTICAL LEVEL 1-5: Performed by: INTERNAL MEDICINE

## 2018-03-29 PROCEDURE — 502000 HCHG MISC OR IMPLANTS RC 0278: Performed by: INTERNAL MEDICINE

## 2018-03-29 PROCEDURE — 88172 CYTP DX EVAL FNA 1ST EA SITE: CPT

## 2018-03-29 PROCEDURE — 160025 RECOVERY II MINUTES (STATS): Performed by: INTERNAL MEDICINE

## 2018-03-29 PROCEDURE — 160203 HCHG ENDO MINUTES - 1ST 30 MINS LEVEL 4: Performed by: INTERNAL MEDICINE

## 2018-03-29 PROCEDURE — 160046 HCHG PACU - 1ST 60 MINS PHASE II: Performed by: INTERNAL MEDICINE

## 2018-03-29 PROCEDURE — 160035 HCHG PACU - 1ST 60 MINS PHASE I: Performed by: INTERNAL MEDICINE

## 2018-03-29 PROCEDURE — 88341 IMHCHEM/IMCYTCHM EA ADD ANTB: CPT | Mod: 91

## 2018-03-29 PROCEDURE — 160036 HCHG PACU - EA ADDL 30 MINS PHASE I: Performed by: INTERNAL MEDICINE

## 2018-03-29 PROCEDURE — 160009 HCHG ANES TIME/MIN: Performed by: INTERNAL MEDICINE

## 2018-03-29 PROCEDURE — 88173 CYTOPATH EVAL FNA REPORT: CPT

## 2018-03-29 PROCEDURE — 88177 CYTP FNA EVAL EA ADDL: CPT

## 2018-03-29 PROCEDURE — 160208 HCHG ENDO MINUTES - EA ADDL 1 MIN LEVEL 4: Performed by: INTERNAL MEDICINE

## 2018-03-29 PROCEDURE — 88305 TISSUE EXAM BY PATHOLOGIST: CPT

## 2018-03-29 PROCEDURE — 500066 HCHG BITE BLOCK, ECT: Performed by: INTERNAL MEDICINE

## 2018-03-29 PROCEDURE — 88342 IMHCHEM/IMCYTCHM 1ST ANTB: CPT

## 2018-03-29 PROCEDURE — 700101 HCHG RX REV CODE 250

## 2018-03-29 RX ORDER — GLYCOPYRROLATE 0.2 MG/ML
INJECTION INTRAMUSCULAR; INTRAVENOUS
Status: COMPLETED
Start: 2018-03-29 | End: 2018-03-29

## 2018-03-29 RX ORDER — SODIUM CHLORIDE, SODIUM LACTATE, POTASSIUM CHLORIDE, CALCIUM CHLORIDE 600; 310; 30; 20 MG/100ML; MG/100ML; MG/100ML; MG/100ML
1000 INJECTION, SOLUTION INTRAVENOUS
Status: DISCONTINUED | OUTPATIENT
Start: 2018-03-29 | End: 2018-03-29 | Stop reason: HOSPADM

## 2018-03-29 RX ADMIN — SODIUM CHLORIDE, POTASSIUM CHLORIDE, SODIUM LACTATE AND CALCIUM CHLORIDE 1000 ML: 600; 310; 30; 20 INJECTION, SOLUTION INTRAVENOUS at 09:07

## 2018-03-29 RX ADMIN — GLYCOPYRROLATE 0.2 MG: 0.2 INJECTION, SOLUTION INTRAMUSCULAR; INTRAVENOUS at 12:10

## 2018-03-29 ASSESSMENT — PAIN SCALES - GENERAL
PAINLEVEL_OUTOF10: 0

## 2018-03-29 NOTE — OR NURSING
1231 patient to stage 2  Patient settled in recliner chair post short ambulation from [facility] - pt dressed with assist by CNA. Pt denies pain or nausea except for sore throat.   1300 Pt/spouse have questions regarding procedure; Dr Collins no longer here and no note in computer; instructed spouse to call office and ask regarding home medications and questions.  1328 D/Corey to care of family post uneventful stay in PACU 2.

## 2018-03-29 NOTE — OR NURSING
0830 Pt. Allergies and NPO status verified, home medications reconciled. Belongings secured. Pt. Verbalizes understanding of pain scale, expected course of stay and plan of care. Surgical site verified with pt. Pt. And family given home care instructions for discharge planning. IV access established.

## 2018-03-29 NOTE — OR NURSING
To PACU from OR via gurney, side rails up x 2 for safety, lungs clear bilaterally, pt awake and denies pain or nausea.

## 2018-03-29 NOTE — DISCHARGE INSTRUCTIONS
ENDOSCOPY HOME CARE INSTRUCTIONS    GASTROSCOPY OR ERCP  1. Don't eat or drink anything for about an hour after the test. You can then resume your regular diet.  2. Don't drive or drink alcohol for 24 hours. The medication you received will make you too drowsy.  3. Don't take any coffee, tea, or aspirin products until after you see your doctor. These can harm the lining of your stomach.  4. If you begin to vomit bloody material, or develop black or bloody stools, call your doctor as soon as possible.  5. If you have any neck, chest, abdominal pain or temp of 100 degrees, call your doctor.  6. See your doctor as instructed  7. Additional instructions: none  8. Prescriptions: none    Dr. Collins [phone]      You should call 911 if you develop problems with breathing or chest pain.  If any questions arise, call your doctor. If your doctor is not available, please feel free to call [phone]. You can also call the HEALTH HOTLINE open 24 hours/day, 7 days/week and speak to a nurse at [phone], or toll free [phone].    Depression / Suicide Risk    As you are discharged from this [facility] facility, it is important to learn how to keep safe from harming yourself.    Recognize the warning signs:  · Abrupt changes in personality, positive or negative- including increase in energy   · Giving away possessions  · Change in eating patterns- significant weight changes-  positive or negative  · Change in sleeping patterns- unable to sleep or sleeping all the time   · Unwillingness or inability to communicate  · Depression  · Unusual sadness, discouragement and loneliness  · Talk of wanting to die  · Neglect of personal appearance   · Rebelliousness- reckless behavior  · Withdrawal from people/activities they love  · Confusion- inability to concentrate     If you or a loved one observes any of these behaviors or has concerns about self-harm, here's what you can do:  · Talk about it- your feelings and  reasons for harming yourself  · Remove any means that you might use to hurt yourself (examples: pills, rope, extension cords, firearm)  · Get professional help from the community (Mental Health, Substance Abuse, psychological counseling)  · Do not be alone:Call your Safe Contact- someone whom you trust who will be there for you.  · Call your local CRISIS HOTLINE 590-3844 or 721-358-0168  · Call your local Children's Mobile Crisis Response Team [place] (018) 550-1850 or wwwQ-Bot  · Call the toll free National Suicide Prevention Hotlines   · National Suicide Prevention Lifeline 562-438-WJXP (6227)  · National Hope Line Network 800-SUICIDE (948-1848)    I acknowledge receipt and understanding of these Home Care Instructions.  Discharge Education for patients on VICENTE (Obstructive Sleep Apnea) Protocol    Prior to receiving sedation or anesthesia, we screen all patients for Obstructive Sleep Apnea.  During your screening, you were identified as having suspected, but not confirmed Obstructive Sleep Apnea(VICENTE).    What is Obstructive Sleep Apnea?  Sleep apnea (AP-ne-ah) is a common disorder which involves breathing pauses that occur during sleep.  These can last from 10 seconds to a minute or longer.  Normal breathing resumes often with a loud snort or choking sound.    Sleep apnea occurs in all age groups and both genders but is more common in men and people over 40 years of age.  It has been estimated that as many as 18 million Americans have sleep apnea.  Most people who have sleep apnea don’t know they have it because it only occurs during sleep.  A family member and/or bed partner may first notice the signs of sleep apnea.  Sleep apnea is a chronic (ongoing) condition that disrupts the quality and quantity of your sleep repeatedly throughout the night.  This often results in excessive daytime sleepiness or fatigue during the day.  It may also contribute to high blood pressure, heart problems, and  complications following medications used for surgery and procedures.    To establish a definitive diagnosis, further testing from a specialist would be needed.  We recommend that you follow up with your primary care physician.    We recommend that you should be with an adult observer for at least 24 hours after your sedation/anesthesia.  If you have a CPAP machine, you should wear it during any sleep period (day or night) for the week following your procedure.  We encourage you to sleep on your side or in a sitting position, even with napping.  Lying flat on your back increases the risk of apnea and airway obstruction during your post procedure recovery period.    It is important to prevent over-sedation that could increase your risk for apnea.  Please take all pain medication as directed by your physician.  If you are not getting pain relief, please contact your physician to discuss possible approaches to relieving pain while minimizing medications that can affect your breathing and oxygen levels.

## 2018-03-29 NOTE — PROCEDURES
DATE OF SERVICE:  03/29/2018    PROCEDURES PERFORMED:  1.  Esophagogastroduodenoscopy.  2.  Endoscopic ultrasound.  3.  Fine needle aspiration of a duodenal lesion.    INDICATION:  This is a 63-year-old gentleman with history of pancreatitis who   presents for EUS to rule out possible etiology to his pancreatitis.    PHYSICIAN:  Rich Collins MD    ANESTHESIOLOGIST:  Ryley Viera MD    MEDICATION:  Deep sedation.    CONSENT:  Procedure, risks and benefits reviewed thoroughly with the patient,   risks including but not limited to bleeding, perforation, side effects of   medication were informed.  Patient voiced understanding and agreed to proceed.    POSTPROCEDURE FINDINGS:  1.  Normal esophagogastroduodenoscopy.  2.  Endoscopic ultrasound reveals some developing minor and major criteria for   chronic pancreatitis.  We will need to review current guidelines before   formalizing this diagnosis, however.  Did appreciate stranding, lobularity,   nodularity within the head of the pancreas.  3.  Possible macrocalcification either within the pancreatic parenchyma or   within the wall of the splenic artery.  4.  No evidence for micro and macro calcifications in the pancreatic duct, no   evidence for pancreatic duct dilatation, no evidence for pancreatic duct   tortuosity.  5.  Subtle 6.1x6.7 mm expansion of the muscularis propria within the duodenal   wall adjacent to the head of the pancreas, status post final aspiration   revealing no significant pathology.    DESCRIPTION OF PROCEDURES:  ESOPHAGOGASTRODUODENOSCOPY:  Patient was placed in a left lateral decubitus   position after sedation was achieved.  Bite block was inserted in the mouth   and a forward-viewing gastroscope was passed carefully and easily under direct   vision into the esophagus.  All esophageal views, forward and retroflexed   views of the stomach, and forward views of the duodenum were otherwise normal.    Scope was removed.    ENDOSCOPIC ULTRASOUND:   A side-viewing radial echoendoscope was passed   carefully and easily under indirect visualization into the esophagus, passed   to the GE junction station.  There, the aorta was visualized.  Celiac artery,   celiac axis, superior mesenteric artery revealed no abnormalities.  Upon   examination of the divergence of the celiac artery to the splenic artery and   hepatic artery and evaluating the splenic artery as it coursed, I could   appreciate a 3.7x2.7 mm focus of hyperechoic focus, which appeared to be   either within the pancreatic parenchyma or within the wall of the splenic   artery.  The pancreatic duct was not dilated in the body of the pancreas;   therefore, the calcification was not within the pancreatic duct.  The   pancreatic duct was not easily visualized.  The pancreatic duct in the tail of   the pancreas measured approximately 1 mm and the pancreatic duct in the body   of the pancreas was not dilated.  Otherwise, the pancreatic parenchyma   revealed some minor stranding, but no lobularity or nodularity.  There was no   evidence for cystic lesion or pancreatic mass.  The spleen, left adrenal   gland, and left kidney was otherwise normal.  Scope was advanced to the   duodenal station.  There, the common bile duct and pancreatic duct were well   visualized.  One could examine the common bile duct to its origin in the liver   and as it form the ampulla with the pancreatic duct.  Adjacent to the common   bile duct, in the head of the pancreas closest to the ampulla, there was a   slight expansion of the muscularis propria measuring 6.1x6.7 mm.  There was   some very mild vascular flow within this area, but no large vessel structures   leading to or from this area.  It is well circumscribed and dark, completely   hypoechoic.  Within the head of the pancreas, there was stranding, lobularity   and nodularity, but no pancreatic duct dilation, micro or macro calcifications   were visualized.  There was no  pancreatic cystic lesion or adan pancreatic   mass.  Scope was removed.    FINE NEEDLE ASPIRATION:  A side-viewing linear echoendoscope was passed   directly to the duodenal station.  The expansion of muscularis propria was   brought into view.  Two passes with a 22-gauge fine needle biopsy needle was   obtained.  No malignant tissue was appreciated upon rapid read analysis.  The   stomach was suctioned, desufflated, scope was removed.    COMPLICATIONS:  None.    BLOOD LOSS:  None.    SPECIMENS:  Obtained.    RECOMMENDATIONS:  Patient to follow up in clinic to review results.  We did   advise the patient to completely abstain from alcohol to prevent future   events.  Patient will also be scheduled for screening colonoscopy as well.       ____________________________________     Rich MD SHAWN Odom / JONNY    DD:  03/29/2018 11:37:22  DT:  03/29/2018 13:06:46    D#:  [id number]  Job#:  [id number]

## 2018-04-11 ENCOUNTER — APPOINTMENT (RX ONLY)
Dept: URBAN - METROPOLITAN AREA CLINIC 4 | Facility: CLINIC | Age: 63
Setting detail: DERMATOLOGY
End: 2018-04-11

## 2018-04-11 DIAGNOSIS — Z48.02 ENCOUNTER FOR REMOVAL OF SUTURES: ICD-10-CM

## 2018-04-11 PROCEDURE — 99024 POSTOP FOLLOW-UP VISIT: CPT

## 2018-04-11 PROCEDURE — ? SUTURE REMOVAL (GLOBAL PERIOD)

## 2018-04-11 ASSESSMENT — LOCATION DETAILED DESCRIPTION DERM: LOCATION DETAILED: LEFT SUPERIOR UPPER BACK

## 2018-04-11 ASSESSMENT — LOCATION SIMPLE DESCRIPTION DERM: LOCATION SIMPLE: LEFT UPPER BACK

## 2018-04-11 ASSESSMENT — LOCATION ZONE DERM: LOCATION ZONE: TRUNK

## 2018-04-11 NOTE — PROCEDURE: SUTURE REMOVAL (GLOBAL PERIOD)
Detail Level: Simple
Add 57088 Cpt? (Important Note: In 2017 The Use Of 51464 Is Being Tracked By Cms To Determine Future Global Period Reimbursement For Global Periods): yes

## 2018-05-02 ENCOUNTER — APPOINTMENT (RX ONLY)
Dept: URBAN - METROPOLITAN AREA CLINIC 4 | Facility: CLINIC | Age: 63
Setting detail: DERMATOLOGY
End: 2018-05-02

## 2018-05-02 DIAGNOSIS — D22 MELANOCYTIC NEVI: ICD-10-CM

## 2018-05-02 PROBLEM — D22.5 MELANOCYTIC NEVI OF TRUNK: Status: ACTIVE | Noted: 2018-05-02

## 2018-05-02 PROBLEM — C43.59 MALIGNANT MELANOMA OF OTHER PART OF TRUNK: Status: ACTIVE | Noted: 2018-05-02

## 2018-05-02 PROBLEM — D22.62 MELANOCYTIC NEVI OF LEFT UPPER LIMB, INCLUDING SHOULDER: Status: ACTIVE | Noted: 2018-05-02

## 2018-05-02 PROCEDURE — ? OBSERVATION

## 2018-05-02 PROCEDURE — 99213 OFFICE O/P EST LOW 20 MIN: CPT

## 2018-05-02 ASSESSMENT — LOCATION SIMPLE DESCRIPTION DERM
LOCATION SIMPLE: LEFT LOWER BACK
LOCATION SIMPLE: LEFT SHOULDER

## 2018-05-02 ASSESSMENT — LOCATION DETAILED DESCRIPTION DERM
LOCATION DETAILED: LEFT POSTERIOR SHOULDER
LOCATION DETAILED: LEFT SUPERIOR MEDIAL LOWER BACK

## 2018-05-02 ASSESSMENT — LOCATION ZONE DERM
LOCATION ZONE: ARM
LOCATION ZONE: TRUNK

## 2018-06-14 ENCOUNTER — APPOINTMENT (RX ONLY)
Dept: URBAN - METROPOLITAN AREA CLINIC 4 | Facility: CLINIC | Age: 63
Setting detail: DERMATOLOGY
End: 2018-06-14

## 2018-06-14 DIAGNOSIS — L81.4 OTHER MELANIN HYPERPIGMENTATION: ICD-10-CM

## 2018-06-14 DIAGNOSIS — D22 MELANOCYTIC NEVI: ICD-10-CM

## 2018-06-14 DIAGNOSIS — L91.8 OTHER HYPERTROPHIC DISORDERS OF THE SKIN: ICD-10-CM

## 2018-06-14 DIAGNOSIS — Z85.820 PERSONAL HISTORY OF MALIGNANT MELANOMA OF SKIN: ICD-10-CM

## 2018-06-14 DIAGNOSIS — D18.0 HEMANGIOMA: ICD-10-CM

## 2018-06-14 DIAGNOSIS — L82.1 OTHER SEBORRHEIC KERATOSIS: ICD-10-CM

## 2018-06-14 PROBLEM — D22.71 MELANOCYTIC NEVI OF RIGHT LOWER LIMB, INCLUDING HIP: Status: ACTIVE | Noted: 2018-06-14

## 2018-06-14 PROBLEM — D22.5 MELANOCYTIC NEVI OF TRUNK: Status: ACTIVE | Noted: 2018-06-14

## 2018-06-14 PROBLEM — D22.61 MELANOCYTIC NEVI OF RIGHT UPPER LIMB, INCLUDING SHOULDER: Status: ACTIVE | Noted: 2018-06-14

## 2018-06-14 PROBLEM — D22.62 MELANOCYTIC NEVI OF LEFT UPPER LIMB, INCLUDING SHOULDER: Status: ACTIVE | Noted: 2018-06-14

## 2018-06-14 PROBLEM — D22.72 MELANOCYTIC NEVI OF LEFT LOWER LIMB, INCLUDING HIP: Status: ACTIVE | Noted: 2018-06-14

## 2018-06-14 PROBLEM — D18.01 HEMANGIOMA OF SKIN AND SUBCUTANEOUS TISSUE: Status: ACTIVE | Noted: 2018-06-14

## 2018-06-14 PROCEDURE — 99213 OFFICE O/P EST LOW 20 MIN: CPT

## 2018-06-14 PROCEDURE — ? OBSERVATION

## 2018-06-14 PROCEDURE — ? COUNSELING

## 2018-06-14 ASSESSMENT — LOCATION SIMPLE DESCRIPTION DERM
LOCATION SIMPLE: RIGHT UPPER ARM
LOCATION SIMPLE: LEFT ANTERIOR NECK
LOCATION SIMPLE: RIGHT LOWER BACK
LOCATION SIMPLE: ABDOMEN
LOCATION SIMPLE: LEFT EAR
LOCATION SIMPLE: RIGHT POPLITEAL SKIN
LOCATION SIMPLE: RIGHT POSTERIOR THIGH
LOCATION SIMPLE: RIGHT AXILLARY VAULT
LOCATION SIMPLE: LEFT POPLITEAL SKIN
LOCATION SIMPLE: SCALP
LOCATION SIMPLE: LEFT HAND
LOCATION SIMPLE: LEFT UPPER ARM
LOCATION SIMPLE: LEFT AXILLARY VAULT
LOCATION SIMPLE: RIGHT HAND
LOCATION SIMPLE: RIGHT CALF
LOCATION SIMPLE: CHEST
LOCATION SIMPLE: RIGHT UPPER BACK
LOCATION SIMPLE: LEFT FOREHEAD
LOCATION SIMPLE: RIGHT EAR
LOCATION SIMPLE: RIGHT FOREARM
LOCATION SIMPLE: LEFT UPPER BACK
LOCATION SIMPLE: LEFT FOREARM
LOCATION SIMPLE: LEFT LOWER BACK
LOCATION SIMPLE: LEFT ELBOW
LOCATION SIMPLE: LEFT CALF
LOCATION SIMPLE: LEFT POSTERIOR THIGH
LOCATION SIMPLE: LEFT FOREARM

## 2018-06-14 ASSESSMENT — LOCATION DETAILED DESCRIPTION DERM
LOCATION DETAILED: RIGHT POPLITEAL SKIN
LOCATION DETAILED: RIGHT INFERIOR UPPER BACK
LOCATION DETAILED: LEFT SCAPHA
LOCATION DETAILED: RIGHT VENTRAL DISTAL FOREARM
LOCATION DETAILED: LEFT DISTAL POSTERIOR THIGH
LOCATION DETAILED: RIGHT MEDIAL UPPER BACK
LOCATION DETAILED: RIGHT RADIAL DORSAL HAND
LOCATION DETAILED: RIGHT ANTERIOR DISTAL UPPER ARM
LOCATION DETAILED: LEFT SUPERIOR UPPER BACK
LOCATION DETAILED: RIGHT PROXIMAL CALF
LOCATION DETAILED: XIPHOID
LOCATION DETAILED: RIGHT INFERIOR MEDIAL MIDBACK
LOCATION DETAILED: LEFT SUPERIOR ANTERIOR NECK
LOCATION DETAILED: PERIUMBILICAL SKIN
LOCATION DETAILED: LEFT ANTECUBITAL SKIN
LOCATION DETAILED: LEFT VENTRAL DISTAL FOREARM
LOCATION DETAILED: LEFT VENTRAL DISTAL FOREARM
LOCATION DETAILED: LEFT PROXIMAL CALF
LOCATION DETAILED: LEFT RADIAL DORSAL HAND
LOCATION DETAILED: RIGHT DISTAL POSTERIOR THIGH
LOCATION DETAILED: LEFT MEDIAL FOREHEAD
LOCATION DETAILED: RIGHT MEDIAL INFERIOR CHEST
LOCATION DETAILED: LEFT AXILLARY VAULT
LOCATION DETAILED: RIGHT SUPERIOR PARIETAL SCALP
LOCATION DETAILED: LEFT SUPERIOR MEDIAL MIDBACK
LOCATION DETAILED: RIGHT ANTERIOR PROXIMAL UPPER ARM
LOCATION DETAILED: RIGHT VENTRAL PROXIMAL FOREARM
LOCATION DETAILED: RIGHT AXILLARY VAULT
LOCATION DETAILED: LEFT ANTERIOR DISTAL UPPER ARM
LOCATION DETAILED: RIGHT SUPERIOR HELIX
LOCATION DETAILED: LEFT POPLITEAL SKIN
LOCATION DETAILED: EPIGASTRIC SKIN

## 2018-06-14 ASSESSMENT — LOCATION ZONE DERM
LOCATION ZONE: SCALP
LOCATION ZONE: NECK
LOCATION ZONE: TRUNK
LOCATION ZONE: EAR
LOCATION ZONE: ARM
LOCATION ZONE: FACE
LOCATION ZONE: LEG
LOCATION ZONE: ARM
LOCATION ZONE: HAND
LOCATION ZONE: AXILLAE

## 2018-07-02 ENCOUNTER — APPOINTMENT (RX ONLY)
Dept: URBAN - METROPOLITAN AREA CLINIC 4 | Facility: CLINIC | Age: 63
Setting detail: DERMATOLOGY
End: 2018-07-02

## 2018-07-02 DIAGNOSIS — Z85.820 PERSONAL HISTORY OF MALIGNANT MELANOMA OF SKIN: ICD-10-CM

## 2018-07-02 DIAGNOSIS — L72.0 EPIDERMAL CYST: ICD-10-CM

## 2018-07-02 PROCEDURE — ? PRESCRIPTION

## 2018-07-02 PROCEDURE — ? ORDER TESTS

## 2018-07-02 PROCEDURE — ? COUNSELING

## 2018-07-02 PROCEDURE — ? ADDITIONAL NOTES

## 2018-07-02 PROCEDURE — 99213 OFFICE O/P EST LOW 20 MIN: CPT

## 2018-07-02 RX ORDER — CEPHALEXIN 750 MG/1
CAPSULE ORAL
Qty: 42 | Refills: 0 | Status: ERX

## 2018-07-02 ASSESSMENT — LOCATION SIMPLE DESCRIPTION DERM
LOCATION SIMPLE: LEFT UPPER BACK
LOCATION SIMPLE: RIGHT UPPER BACK

## 2018-07-02 ASSESSMENT — LOCATION ZONE DERM: LOCATION ZONE: TRUNK

## 2018-07-02 ASSESSMENT — LOCATION DETAILED DESCRIPTION DERM
LOCATION DETAILED: RIGHT INFERIOR MEDIAL UPPER BACK
LOCATION DETAILED: LEFT SUPERIOR UPPER BACK

## 2018-07-02 NOTE — PROCEDURE: ADDITIONAL NOTES
Additional Notes: Lesion is actively draining, wound culture obtained. Long discussion with patient regarding treatment options including I & D, warm compresses and oral antibiotics, or warm compresses alone. Patient declines I & D at this time, opts for warm compresses QID with oral antibiotics. Will recheck in 1 week. \\nTake probiotic while on antibiotics.

## 2018-07-02 NOTE — HPI: BUMPS
How Severe Are Your Bumps?: moderate
Have Your Bumps Been Treated?: not been treated
Is This A New Presentation, Or A Follow-Up?: Bump
Additional History: Patient states very painful and not sleeping at night.

## 2018-07-09 ENCOUNTER — APPOINTMENT (RX ONLY)
Dept: URBAN - METROPOLITAN AREA CLINIC 4 | Facility: CLINIC | Age: 63
Setting detail: DERMATOLOGY
End: 2018-07-09

## 2018-07-09 DIAGNOSIS — Z48.817 ENCOUNTER FOR SURGICAL AFTERCARE FOLLOWING SURGERY ON THE SKIN AND SUBCUTANEOUS TISSUE: ICD-10-CM

## 2018-07-09 DIAGNOSIS — Z85.820 PERSONAL HISTORY OF MALIGNANT MELANOMA OF SKIN: ICD-10-CM

## 2018-07-09 PROCEDURE — 99024 POSTOP FOLLOW-UP VISIT: CPT

## 2018-07-09 PROCEDURE — ? DEFER

## 2018-07-09 PROCEDURE — ? COUNSELING

## 2018-07-09 PROCEDURE — ? ADDITIONAL NOTES

## 2018-07-09 PROCEDURE — ? POST-OP WOUND CHECK (NO GLOBAL PERIOD)

## 2018-07-09 ASSESSMENT — LOCATION DETAILED DESCRIPTION DERM
LOCATION DETAILED: RIGHT INFERIOR MEDIAL UPPER BACK
LOCATION DETAILED: LEFT SUPERIOR UPPER BACK

## 2018-07-09 ASSESSMENT — LOCATION SIMPLE DESCRIPTION DERM
LOCATION SIMPLE: RIGHT UPPER BACK
LOCATION SIMPLE: LEFT UPPER BACK

## 2018-07-09 ASSESSMENT — LOCATION ZONE DERM: LOCATION ZONE: TRUNK

## 2018-07-09 NOTE — HPI: WOUND CHECK
How Is Your Wound Healing?: healing slowly
Additional History: Patient states it has been draining some odor to it, he is currently taking keflex. He states that the pain has subsided.

## 2018-07-09 NOTE — PROCEDURE: POST-OP WOUND CHECK (NO GLOBAL PERIOD)
Detail Level: Simple
Additional Comments: Wound healing well, improved versus last visit. Actively draining. Wound culture negative.

## 2018-07-09 NOTE — PROCEDURE: ADDITIONAL NOTES
Additional Notes: Discussed [place] report with patient in detail. Per patient he had a PET scar from his GI specialist done at [facility]. Will call to obtain those records due to [place] report testing.\\nDiscussed referral/appointment with oncologist, patient declines/refuses referral to oncology at this time.
Additional Notes: Discussed an option on an I&D and patient declines at this time.

## 2018-07-12 ENCOUNTER — APPOINTMENT (RX ONLY)
Dept: URBAN - METROPOLITAN AREA CLINIC 4 | Facility: CLINIC | Age: 63
Setting detail: DERMATOLOGY
End: 2018-07-12

## 2018-07-12 DIAGNOSIS — L72.0 EPIDERMAL CYST: ICD-10-CM

## 2018-07-12 DIAGNOSIS — Z85.820 PERSONAL HISTORY OF MALIGNANT MELANOMA OF SKIN: ICD-10-CM

## 2018-07-12 PROCEDURE — ? COUNSELING

## 2018-07-12 PROCEDURE — ? ADDITIONAL NOTES

## 2018-07-12 PROCEDURE — ? POST-OP WOUND CHECK

## 2018-07-12 PROCEDURE — 99024 POSTOP FOLLOW-UP VISIT: CPT

## 2018-07-12 ASSESSMENT — LOCATION DETAILED DESCRIPTION DERM
LOCATION DETAILED: INFERIOR THORACIC SPINE
LOCATION DETAILED: LEFT SUPERIOR UPPER BACK
LOCATION DETAILED: RIGHT INFERIOR UPPER BACK

## 2018-07-12 ASSESSMENT — LOCATION SIMPLE DESCRIPTION DERM
LOCATION SIMPLE: LEFT UPPER BACK
LOCATION SIMPLE: RIGHT UPPER BACK
LOCATION SIMPLE: UPPER BACK

## 2018-07-12 ASSESSMENT — LOCATION ZONE DERM: LOCATION ZONE: TRUNK

## 2018-07-12 NOTE — PROCEDURE: ADDITIONAL NOTES
Additional Notes: Improved versus last visit. Patient advised to continue with warm compresses\\nIf no improvement by next office visit, will consider I&D at that time.

## 2018-07-12 NOTE — PROCEDURE: POST-OP WOUND CHECK
Wound Evaluated By: Kayla Bailey PA-C
Wound Assessment Override (Optional): improved versus last visit, wound spontaneously draining with gentle pressure.
Add 67533 Cpt? (Important Note: In 2017 The Use Of 14352 Is Being Tracked By Cms To Determine Future Global Period Reimbursement For Global Periods): yes
Detail Level: Simple

## 2018-07-18 ENCOUNTER — APPOINTMENT (RX ONLY)
Dept: URBAN - METROPOLITAN AREA CLINIC 4 | Facility: CLINIC | Age: 63
Setting detail: DERMATOLOGY
End: 2018-07-18

## 2018-07-18 DIAGNOSIS — L72.0 EPIDERMAL CYST: ICD-10-CM

## 2018-07-18 DIAGNOSIS — Z85.820 PERSONAL HISTORY OF MALIGNANT MELANOMA OF SKIN: ICD-10-CM

## 2018-07-18 DIAGNOSIS — L23.1 ALLERGIC CONTACT DERMATITIS DUE TO ADHESIVES: ICD-10-CM

## 2018-07-18 PROCEDURE — ? INCISION AND DRAINAGE

## 2018-07-18 PROCEDURE — ? ADDITIONAL NOTES

## 2018-07-18 PROCEDURE — 10060 I&D ABSCESS SIMPLE/SINGLE: CPT

## 2018-07-18 PROCEDURE — ? COUNSELING

## 2018-07-18 PROCEDURE — 99213 OFFICE O/P EST LOW 20 MIN: CPT | Mod: 25

## 2018-07-18 ASSESSMENT — LOCATION DETAILED DESCRIPTION DERM
LOCATION DETAILED: LEFT INFERIOR MEDIAL UPPER BACK
LOCATION DETAILED: LEFT SUPERIOR UPPER BACK
LOCATION DETAILED: RIGHT INFERIOR MEDIAL UPPER BACK
LOCATION DETAILED: INFERIOR THORACIC SPINE
LOCATION DETAILED: LEFT INFERIOR UPPER BACK
LOCATION DETAILED: RIGHT MEDIAL UPPER BACK
LOCATION DETAILED: RIGHT INFERIOR UPPER BACK

## 2018-07-18 ASSESSMENT — LOCATION SIMPLE DESCRIPTION DERM
LOCATION SIMPLE: LEFT UPPER BACK
LOCATION SIMPLE: RIGHT UPPER BACK
LOCATION SIMPLE: UPPER BACK

## 2018-07-18 ASSESSMENT — LOCATION ZONE DERM: LOCATION ZONE: TRUNK

## 2018-07-18 NOTE — HPI: WOUND CHECK (FOLLOW-UP)
How Is Your Wound Healing?: healing slowly
Additional History: Patient feels this is unchanged since his last visit. He states this has been draining grayish fluid.

## 2018-07-18 NOTE — PROCEDURE: INCISION AND DRAINAGE
Curette Text (Optional): After the contents were expressed a curette was used to partially remove the cyst wall.
Post-Care Instructions: I reviewed with the patient in detail post-care instructions. Patient should keep wound covered and call the office should any redness, pain, swelling or worsening occur.
Epidermal Sutures: steri-strips
Curette: Yes
Anesthesia Type: 1% lidocaine with epinephrine
Method: 11 blade
Render Postcare In Note?: No
Anesthesia Volume In Cc: 3
Lesion Type: Cyst
Preparation Text: The area was prepped in the usual clean fashion.
Wound Care: Petrolatum
Drainage Amount?: minimal
Suture Text: The incision was partially closed with iodiform.
Detail Level: Detailed
Epidermal Closure: simple interrupted
Drainage Type?: purulent  and cyst-like
Consent was obtained and risks were reviewed including but not limited to delayed wound healing, infection, need for multiple I and D's, and pain.
Dressing: dry sterile dressing

## 2018-07-26 ENCOUNTER — APPOINTMENT (RX ONLY)
Dept: URBAN - METROPOLITAN AREA CLINIC 4 | Facility: CLINIC | Age: 63
Setting detail: DERMATOLOGY
End: 2018-07-26

## 2018-07-26 DIAGNOSIS — Z85.820 PERSONAL HISTORY OF MALIGNANT MELANOMA OF SKIN: ICD-10-CM

## 2018-07-26 DIAGNOSIS — Z48.817 ENCOUNTER FOR SURGICAL AFTERCARE FOLLOWING SURGERY ON THE SKIN AND SUBCUTANEOUS TISSUE: ICD-10-CM

## 2018-07-26 PROCEDURE — ? POST-OP WOUND CHECK

## 2018-07-26 PROCEDURE — ? ADDITIONAL NOTES

## 2018-07-26 PROCEDURE — ? COUNSELING

## 2018-07-26 PROCEDURE — 99024 POSTOP FOLLOW-UP VISIT: CPT

## 2018-07-26 ASSESSMENT — LOCATION SIMPLE DESCRIPTION DERM
LOCATION SIMPLE: RIGHT UPPER BACK
LOCATION SIMPLE: UPPER BACK
LOCATION SIMPLE: LEFT UPPER BACK

## 2018-07-26 ASSESSMENT — LOCATION DETAILED DESCRIPTION DERM
LOCATION DETAILED: LEFT SUPERIOR UPPER BACK
LOCATION DETAILED: RIGHT MEDIAL UPPER BACK
LOCATION DETAILED: INFERIOR THORACIC SPINE

## 2018-07-26 ASSESSMENT — LOCATION ZONE DERM: LOCATION ZONE: TRUNK

## 2018-07-26 NOTE — PROCEDURE: ADDITIONAL NOTES
Additional Notes: Patient advised to avoid picking or trying to make these lesions drain. \\nPatient advised to contact our office if lesions worsen or persist.

## 2018-07-26 NOTE — PROCEDURE: POST-OP WOUND CHECK
Detail Level: Detailed
Add 11086 Cpt? (Important Note: In 2017 The Use Of 90481 Is Being Tracked By Cms To Determine Future Global Period Reimbursement For Global Periods): yes
Wound Evaluated By: Kayla Bailey PA-C

## 2018-08-01 ENCOUNTER — HOSPITAL ENCOUNTER (INPATIENT)
Facility: MEDICAL CENTER | Age: 63
LOS: 2 days | DRG: 871 | End: 2018-08-03
Attending: EMERGENCY MEDICINE | Admitting: INTERNAL MEDICINE
Payer: MEDICARE

## 2018-08-01 ENCOUNTER — APPOINTMENT (OUTPATIENT)
Dept: RADIOLOGY | Facility: MEDICAL CENTER | Age: 63
DRG: 871 | End: 2018-08-01
Attending: EMERGENCY MEDICINE
Payer: MEDICARE

## 2018-08-01 ENCOUNTER — APPOINTMENT (OUTPATIENT)
Dept: RADIOLOGY | Facility: MEDICAL CENTER | Age: 63
DRG: 871 | End: 2018-08-01
Attending: INTERNAL MEDICINE
Payer: MEDICARE

## 2018-08-01 DIAGNOSIS — R41.82 ALTERED MENTAL STATUS, UNSPECIFIED ALTERED MENTAL STATUS TYPE: ICD-10-CM

## 2018-08-01 PROBLEM — G92.8 TOXIC METABOLIC ENCEPHALOPATHY: Status: ACTIVE | Noted: 2018-08-01

## 2018-08-01 PROBLEM — R65.20 SEVERE SEPSIS (HCC): Status: ACTIVE | Noted: 2018-08-01

## 2018-08-01 PROBLEM — A41.9 SEPSIS SECONDARY TO UTI (HCC): Status: ACTIVE | Noted: 2018-08-01

## 2018-08-01 PROBLEM — N39.0 SEPSIS SECONDARY TO UTI (HCC): Status: ACTIVE | Noted: 2018-08-01

## 2018-08-01 PROBLEM — Z85.820 HISTORY OF MELANOMA: Status: ACTIVE | Noted: 2018-08-01

## 2018-08-01 PROBLEM — E78.5 HYPERLIPIDEMIA: Status: ACTIVE | Noted: 2018-08-01

## 2018-08-01 LAB
ALBUMIN SERPL BCP-MCNC: 4.1 G/DL (ref 3.2–4.9)
ALBUMIN/GLOB SERPL: 1.4 G/DL
ALP SERPL-CCNC: 82 U/L (ref 30–99)
ALT SERPL-CCNC: 19 U/L (ref 2–50)
ANION GAP SERPL CALC-SCNC: 11 MMOL/L (ref 0–11.9)
APPEARANCE UR: ABNORMAL
AST SERPL-CCNC: 23 U/L (ref 12–45)
BACTERIA #/AREA URNS HPF: ABNORMAL /HPF
BASOPHILS # BLD AUTO: 0.2 % (ref 0–1.8)
BASOPHILS # BLD: 0.04 K/UL (ref 0–0.12)
BILIRUB SERPL-MCNC: 1.7 MG/DL (ref 0.1–1.5)
BILIRUB UR QL STRIP.AUTO: NEGATIVE
BUN SERPL-MCNC: 14 MG/DL (ref 8–22)
CALCIUM SERPL-MCNC: 9.8 MG/DL (ref 8.4–10.2)
CHLORIDE SERPL-SCNC: 98 MMOL/L (ref 96–112)
CO2 SERPL-SCNC: 21 MMOL/L (ref 20–33)
COLOR UR: YELLOW
CREAT SERPL-MCNC: 1.19 MG/DL (ref 0.5–1.4)
EKG IMPRESSION: NORMAL
EOSINOPHIL # BLD AUTO: 0 K/UL (ref 0–0.51)
EOSINOPHIL NFR BLD: 0 % (ref 0–6.9)
ERYTHROCYTE [DISTWIDTH] IN BLOOD BY AUTOMATED COUNT: 41.1 FL (ref 35.9–50)
GLOBULIN SER CALC-MCNC: 3 G/DL (ref 1.9–3.5)
GLUCOSE SERPL-MCNC: 121 MG/DL (ref 65–99)
GLUCOSE UR STRIP.AUTO-MCNC: NEGATIVE MG/DL
HCT VFR BLD AUTO: 38.4 % (ref 42–52)
HGB BLD-MCNC: 13.8 G/DL (ref 14–18)
IMM GRANULOCYTES # BLD AUTO: 0.08 K/UL (ref 0–0.11)
IMM GRANULOCYTES NFR BLD AUTO: 0.4 % (ref 0–0.9)
KETONES UR STRIP.AUTO-MCNC: 15 MG/DL
LACTATE BLD-SCNC: 1.9 MMOL/L (ref 0.5–2)
LEUKOCYTE ESTERASE UR QL STRIP.AUTO: ABNORMAL
LIPASE SERPL-CCNC: 26 U/L (ref 7–58)
LYMPHOCYTES # BLD AUTO: 1.98 K/UL (ref 1–4.8)
LYMPHOCYTES NFR BLD: 10.3 % (ref 22–41)
MCH RBC QN AUTO: 29.3 PG (ref 27–33)
MCHC RBC AUTO-ENTMCNC: 35.9 G/DL (ref 33.7–35.3)
MCV RBC AUTO: 81.5 FL (ref 81.4–97.8)
MICRO URNS: ABNORMAL
MONOCYTES # BLD AUTO: 1.36 K/UL (ref 0–0.85)
MONOCYTES NFR BLD AUTO: 7.1 % (ref 0–13.4)
NEUTROPHILS # BLD AUTO: 15.77 K/UL (ref 1.82–7.42)
NEUTROPHILS NFR BLD: 82 % (ref 44–72)
NITRITE UR QL STRIP.AUTO: NEGATIVE
NRBC # BLD AUTO: 0 K/UL
NRBC BLD-RTO: 0 /100 WBC
PH UR STRIP.AUTO: 6.5 [PH]
PLATELET # BLD AUTO: 142 K/UL (ref 164–446)
PMV BLD AUTO: 10.2 FL (ref 9–12.9)
POTASSIUM SERPL-SCNC: 3.6 MMOL/L (ref 3.6–5.5)
PROT SERPL-MCNC: 7.1 G/DL (ref 6–8.2)
PROT UR QL STRIP: NEGATIVE MG/DL
RBC # BLD AUTO: 4.71 M/UL (ref 4.7–6.1)
RBC # URNS HPF: ABNORMAL /HPF
RBC UR QL AUTO: ABNORMAL
SODIUM SERPL-SCNC: 130 MMOL/L (ref 135–145)
SP GR UR STRIP.AUTO: 1.02
TROPONIN I SERPL-MCNC: <0.02 NG/ML (ref 0–0.04)
TSH SERPL DL<=0.005 MIU/L-ACNC: 0.33 UIU/ML (ref 0.38–5.33)
WBC # BLD AUTO: 19.2 K/UL (ref 4.8–10.8)
WBC #/AREA URNS HPF: ABNORMAL /HPF

## 2018-08-01 PROCEDURE — 99285 EMERGENCY DEPT VISIT HI MDM: CPT

## 2018-08-01 PROCEDURE — 87040 BLOOD CULTURE FOR BACTERIA: CPT | Mod: 91

## 2018-08-01 PROCEDURE — 96368 THER/DIAG CONCURRENT INF: CPT

## 2018-08-01 PROCEDURE — A9270 NON-COVERED ITEM OR SERVICE: HCPCS | Performed by: EMERGENCY MEDICINE

## 2018-08-01 PROCEDURE — 700111 HCHG RX REV CODE 636 W/ 250 OVERRIDE (IP): Performed by: EMERGENCY MEDICINE

## 2018-08-01 PROCEDURE — 84443 ASSAY THYROID STIM HORMONE: CPT

## 2018-08-01 PROCEDURE — 87077 CULTURE AEROBIC IDENTIFY: CPT

## 2018-08-01 PROCEDURE — 96365 THER/PROPH/DIAG IV INF INIT: CPT

## 2018-08-01 PROCEDURE — 85025 COMPLETE CBC W/AUTO DIFF WBC: CPT

## 2018-08-01 PROCEDURE — 99223 1ST HOSP IP/OBS HIGH 75: CPT | Mod: AI | Performed by: INTERNAL MEDICINE

## 2018-08-01 PROCEDURE — 80053 COMPREHEN METABOLIC PANEL: CPT

## 2018-08-01 PROCEDURE — 71045 X-RAY EXAM CHEST 1 VIEW: CPT

## 2018-08-01 PROCEDURE — 83690 ASSAY OF LIPASE: CPT

## 2018-08-01 PROCEDURE — 700105 HCHG RX REV CODE 258: Performed by: EMERGENCY MEDICINE

## 2018-08-01 PROCEDURE — 70450 CT HEAD/BRAIN W/O DYE: CPT

## 2018-08-01 PROCEDURE — 770020 HCHG ROOM/CARE - TELE (206)

## 2018-08-01 PROCEDURE — 700105 HCHG RX REV CODE 258

## 2018-08-01 PROCEDURE — 83036 HEMOGLOBIN GLYCOSYLATED A1C: CPT

## 2018-08-01 PROCEDURE — 87086 URINE CULTURE/COLONY COUNT: CPT

## 2018-08-01 PROCEDURE — 83605 ASSAY OF LACTIC ACID: CPT

## 2018-08-01 PROCEDURE — 81001 URINALYSIS AUTO W/SCOPE: CPT

## 2018-08-01 PROCEDURE — 87186 SC STD MICRODIL/AGAR DIL: CPT

## 2018-08-01 PROCEDURE — 93005 ELECTROCARDIOGRAM TRACING: CPT | Performed by: EMERGENCY MEDICINE

## 2018-08-01 PROCEDURE — 74176 CT ABD & PELVIS W/O CONTRAST: CPT

## 2018-08-01 PROCEDURE — 93308 TTE F-UP OR LMTD: CPT | Mod: 26 | Performed by: INTERNAL MEDICINE

## 2018-08-01 PROCEDURE — 84484 ASSAY OF TROPONIN QUANT: CPT

## 2018-08-01 PROCEDURE — 36415 COLL VENOUS BLD VENIPUNCTURE: CPT

## 2018-08-01 PROCEDURE — 700102 HCHG RX REV CODE 250 W/ 637 OVERRIDE(OP): Performed by: EMERGENCY MEDICINE

## 2018-08-01 PROCEDURE — 700111 HCHG RX REV CODE 636 W/ 250 OVERRIDE (IP)

## 2018-08-01 RX ORDER — SODIUM CHLORIDE 9 MG/ML
500 INJECTION, SOLUTION INTRAVENOUS
Status: COMPLETED | OUTPATIENT
Start: 2018-08-01 | End: 2018-08-02

## 2018-08-01 RX ORDER — ALLOPURINOL 300 MG/1
300 TABLET ORAL DAILY
Status: DISCONTINUED | OUTPATIENT
Start: 2018-08-02 | End: 2018-08-03 | Stop reason: HOSPADM

## 2018-08-01 RX ORDER — ACETAMINOPHEN 325 MG/1
650 TABLET ORAL ONCE
Status: COMPLETED | OUTPATIENT
Start: 2018-08-01 | End: 2018-08-01

## 2018-08-01 RX ORDER — SIMVASTATIN 20 MG
40 TABLET ORAL DAILY
Status: DISCONTINUED | OUTPATIENT
Start: 2018-08-02 | End: 2018-08-03 | Stop reason: HOSPADM

## 2018-08-01 RX ORDER — SPIRONOLACTONE 25 MG/1
25 TABLET ORAL DAILY
Status: DISCONTINUED | OUTPATIENT
Start: 2018-08-02 | End: 2018-08-01

## 2018-08-01 RX ORDER — L. ACIDOPHILUS/L.BULGARICUS 100MM CELL
1 GRANULES IN PACKET (EA) ORAL
Status: DISCONTINUED | OUTPATIENT
Start: 2018-08-01 | End: 2018-08-02

## 2018-08-01 RX ORDER — ACETAMINOPHEN 325 MG/1
650 TABLET ORAL EVERY 6 HOURS PRN
Status: DISCONTINUED | OUTPATIENT
Start: 2018-08-01 | End: 2018-08-03 | Stop reason: HOSPADM

## 2018-08-01 RX ORDER — SODIUM CHLORIDE 9 MG/ML
INJECTION, SOLUTION INTRAVENOUS CONTINUOUS
Status: DISCONTINUED | OUTPATIENT
Start: 2018-08-01 | End: 2018-08-03

## 2018-08-01 RX ORDER — POLYETHYLENE GLYCOL 3350 17 G/17G
1 POWDER, FOR SOLUTION ORAL
Status: DISCONTINUED | OUTPATIENT
Start: 2018-08-01 | End: 2018-08-03 | Stop reason: HOSPADM

## 2018-08-01 RX ORDER — BISACODYL 10 MG
10 SUPPOSITORY, RECTAL RECTAL
Status: DISCONTINUED | OUTPATIENT
Start: 2018-08-01 | End: 2018-08-03 | Stop reason: HOSPADM

## 2018-08-01 RX ORDER — OMEPRAZOLE 20 MG/1
20 CAPSULE, DELAYED RELEASE ORAL DAILY
Status: DISCONTINUED | OUTPATIENT
Start: 2018-08-02 | End: 2018-08-03 | Stop reason: HOSPADM

## 2018-08-01 RX ORDER — SODIUM CHLORIDE 9 MG/ML
30 INJECTION, SOLUTION INTRAVENOUS ONCE
Status: COMPLETED | OUTPATIENT
Start: 2018-08-01 | End: 2018-08-01

## 2018-08-01 RX ORDER — AMOXICILLIN 250 MG
2 CAPSULE ORAL 2 TIMES DAILY
Status: DISCONTINUED | OUTPATIENT
Start: 2018-08-01 | End: 2018-08-03 | Stop reason: HOSPADM

## 2018-08-01 RX ADMIN — VANCOMYCIN HYDROCHLORIDE: 1 INJECTION, POWDER, LYOPHILIZED, FOR SOLUTION INTRAVENOUS at 21:42

## 2018-08-01 RX ADMIN — ACETAMINOPHEN 650 MG: 325 TABLET, FILM COATED ORAL at 20:34

## 2018-08-01 RX ADMIN — PIPERACILLIN SODIUM AND TAZOBACTAM SODIUM 4.5 G: 4; .5 INJECTION, POWDER, FOR SOLUTION INTRAVENOUS at 21:26

## 2018-08-01 RX ADMIN — SODIUM CHLORIDE 3741 ML: 9 INJECTION, SOLUTION INTRAVENOUS at 20:34

## 2018-08-01 ASSESSMENT — COGNITIVE AND FUNCTIONAL STATUS - GENERAL
MOBILITY SCORE: 24
DAILY ACTIVITIY SCORE: 24
SUGGESTED CMS G CODE MODIFIER MOBILITY: CH
SUGGESTED CMS G CODE MODIFIER DAILY ACTIVITY: CH

## 2018-08-01 ASSESSMENT — PAIN SCALES - GENERAL
PAINLEVEL_OUTOF10: 0
PAINLEVEL_OUTOF10: 0

## 2018-08-01 ASSESSMENT — LIFESTYLE VARIABLES
EVER_SMOKED: YES
ALCOHOL_USE: NO

## 2018-08-01 ASSESSMENT — PATIENT HEALTH QUESTIONNAIRE - PHQ9
2. FEELING DOWN, DEPRESSED, IRRITABLE, OR HOPELESS: NOT AT ALL
SUM OF ALL RESPONSES TO PHQ9 QUESTIONS 1 AND 2: 0
1. LITTLE INTEREST OR PLEASURE IN DOING THINGS: NOT AT ALL

## 2018-08-02 ENCOUNTER — APPOINTMENT (OUTPATIENT)
Dept: RADIOLOGY | Facility: MEDICAL CENTER | Age: 63
DRG: 871 | End: 2018-08-02
Attending: INTERNAL MEDICINE
Payer: MEDICARE

## 2018-08-02 PROBLEM — E87.1 HYPONATREMIA: Status: ACTIVE | Noted: 2018-08-02

## 2018-08-02 LAB
ANION GAP SERPL CALC-SCNC: 8 MMOL/L (ref 0–11.9)
BUN SERPL-MCNC: 13 MG/DL (ref 8–22)
CALCIUM SERPL-MCNC: 8.2 MG/DL (ref 8.4–10.2)
CHLORIDE SERPL-SCNC: 105 MMOL/L (ref 96–112)
CHOLEST SERPL-MCNC: 86 MG/DL (ref 100–199)
CO2 SERPL-SCNC: 20 MMOL/L (ref 20–33)
CREAT SERPL-MCNC: 1.05 MG/DL (ref 0.5–1.4)
ERYTHROCYTE [DISTWIDTH] IN BLOOD BY AUTOMATED COUNT: 43 FL (ref 35.9–50)
EST. AVERAGE GLUCOSE BLD GHB EST-MCNC: 137 MG/DL
GLUCOSE SERPL-MCNC: 169 MG/DL (ref 65–99)
GRAM STN SPEC: NORMAL
HBA1C MFR BLD: 6.4 % (ref 0–5.6)
HCT VFR BLD AUTO: 35 % (ref 42–52)
HDLC SERPL-MCNC: 29 MG/DL
HGB BLD-MCNC: 12.2 G/DL (ref 14–18)
LACTATE BLD-SCNC: 1.4 MMOL/L (ref 0.5–2)
LACTATE BLD-SCNC: 1.6 MMOL/L (ref 0.5–2)
LACTATE BLD-SCNC: 1.9 MMOL/L (ref 0.5–2)
LACTATE BLD-SCNC: 2.3 MMOL/L (ref 0.5–2)
LDLC SERPL CALC-MCNC: 47 MG/DL
LV EJECT FRACT  99904: 60
LV EJECT FRACT MOD 2C 99903: 69.18
LV EJECT FRACT MOD 4C 99902: 65.91
LV EJECT FRACT MOD BP 99901: 65.87
MCH RBC QN AUTO: 28.8 PG (ref 27–33)
MCHC RBC AUTO-ENTMCNC: 34.9 G/DL (ref 33.7–35.3)
MCV RBC AUTO: 82.7 FL (ref 81.4–97.8)
PLATELET # BLD AUTO: 116 K/UL (ref 164–446)
PMV BLD AUTO: 10.1 FL (ref 9–12.9)
POTASSIUM SERPL-SCNC: 3.2 MMOL/L (ref 3.6–5.5)
RBC # BLD AUTO: 4.23 M/UL (ref 4.7–6.1)
SIGNIFICANT IND 70042: NORMAL
SITE SITE: NORMAL
SODIUM SERPL-SCNC: 133 MMOL/L (ref 135–145)
SOURCE SOURCE: NORMAL
TRIGL SERPL-MCNC: 51 MG/DL (ref 0–149)
VANCOMYCIN TROUGH SERPL-MCNC: 12.6 UG/ML (ref 10–20)
WBC # BLD AUTO: 17.1 K/UL (ref 4.8–10.8)

## 2018-08-02 PROCEDURE — A9270 NON-COVERED ITEM OR SERVICE: HCPCS | Performed by: INTERNAL MEDICINE

## 2018-08-02 PROCEDURE — 700102 HCHG RX REV CODE 250 W/ 637 OVERRIDE(OP): Performed by: INTERNAL MEDICINE

## 2018-08-02 PROCEDURE — G8998 SWALLOW D/C STATUS: HCPCS | Mod: CH

## 2018-08-02 PROCEDURE — 87070 CULTURE OTHR SPECIMN AEROBIC: CPT

## 2018-08-02 PROCEDURE — 700111 HCHG RX REV CODE 636 W/ 250 OVERRIDE (IP): Performed by: INTERNAL MEDICINE

## 2018-08-02 PROCEDURE — 700105 HCHG RX REV CODE 258: Performed by: INTERNAL MEDICINE

## 2018-08-02 PROCEDURE — G8978 MOBILITY CURRENT STATUS: HCPCS | Mod: CI

## 2018-08-02 PROCEDURE — 93306 TTE W/DOPPLER COMPLETE: CPT

## 2018-08-02 PROCEDURE — 87186 SC STD MICRODIL/AGAR DIL: CPT

## 2018-08-02 PROCEDURE — 97161 PT EVAL LOW COMPLEX 20 MIN: CPT

## 2018-08-02 PROCEDURE — 700102 HCHG RX REV CODE 250 W/ 637 OVERRIDE(OP): Performed by: HOSPITALIST

## 2018-08-02 PROCEDURE — 92610 EVALUATE SWALLOWING FUNCTION: CPT

## 2018-08-02 PROCEDURE — A9270 NON-COVERED ITEM OR SERVICE: HCPCS | Performed by: HOSPITALIST

## 2018-08-02 PROCEDURE — 700111 HCHG RX REV CODE 636 W/ 250 OVERRIDE (IP)

## 2018-08-02 PROCEDURE — 770020 HCHG ROOM/CARE - TELE (206)

## 2018-08-02 PROCEDURE — 94760 N-INVAS EAR/PLS OXIMETRY 1: CPT

## 2018-08-02 PROCEDURE — 87077 CULTURE AEROBIC IDENTIFY: CPT

## 2018-08-02 PROCEDURE — 99233 SBSQ HOSP IP/OBS HIGH 50: CPT | Performed by: HOSPITALIST

## 2018-08-02 PROCEDURE — 83605 ASSAY OF LACTIC ACID: CPT | Mod: 91

## 2018-08-02 PROCEDURE — 700105 HCHG RX REV CODE 258: Performed by: HOSPITALIST

## 2018-08-02 PROCEDURE — 93880 EXTRACRANIAL BILAT STUDY: CPT

## 2018-08-02 PROCEDURE — G8979 MOBILITY GOAL STATUS: HCPCS | Mod: CI

## 2018-08-02 PROCEDURE — G8980 MOBILITY D/C STATUS: HCPCS | Mod: CI

## 2018-08-02 PROCEDURE — G8996 SWALLOW CURRENT STATUS: HCPCS | Mod: CH

## 2018-08-02 PROCEDURE — 87205 SMEAR GRAM STAIN: CPT

## 2018-08-02 PROCEDURE — 80048 BASIC METABOLIC PNL TOTAL CA: CPT

## 2018-08-02 PROCEDURE — 80202 ASSAY OF VANCOMYCIN: CPT

## 2018-08-02 PROCEDURE — G8997 SWALLOW GOAL STATUS: HCPCS | Mod: CH

## 2018-08-02 PROCEDURE — 95819 EEG AWAKE AND ASLEEP: CPT

## 2018-08-02 PROCEDURE — 700111 HCHG RX REV CODE 636 W/ 250 OVERRIDE (IP): Performed by: HOSPITALIST

## 2018-08-02 PROCEDURE — 80061 LIPID PANEL: CPT

## 2018-08-02 PROCEDURE — 4A10X4Z MONITORING OF CENTRAL NERVOUS ELECTRICAL ACTIVITY, EXTERNAL APPROACH: ICD-10-PCS | Performed by: PSYCHIATRY & NEUROLOGY

## 2018-08-02 PROCEDURE — 85027 COMPLETE CBC AUTOMATED: CPT

## 2018-08-02 PROCEDURE — 700105 HCHG RX REV CODE 258

## 2018-08-02 RX ORDER — HYDROXYZINE HYDROCHLORIDE 25 MG/ML
25 INJECTION, SOLUTION INTRAMUSCULAR ONCE
Status: DISPENSED | OUTPATIENT
Start: 2018-08-02 | End: 2018-08-03

## 2018-08-02 RX ORDER — SODIUM CHLORIDE 9 MG/ML
500 INJECTION, SOLUTION INTRAVENOUS ONCE
Status: COMPLETED | OUTPATIENT
Start: 2018-08-02 | End: 2018-08-02

## 2018-08-02 RX ORDER — LACTOBACILLUS RHAMNOSUS GG 10B CELL
1 CAPSULE ORAL
Status: DISCONTINUED | OUTPATIENT
Start: 2018-08-02 | End: 2018-08-03 | Stop reason: HOSPADM

## 2018-08-02 RX ADMIN — ACETAMINOPHEN 650 MG: 325 TABLET, FILM COATED ORAL at 01:10

## 2018-08-02 RX ADMIN — SODIUM CHLORIDE: 9 INJECTION, SOLUTION INTRAVENOUS at 00:21

## 2018-08-02 RX ADMIN — PIPERACILLIN AND TAZOBACTAM 3.38 G: 3; .375 INJECTION, POWDER, LYOPHILIZED, FOR SOLUTION INTRAVENOUS; PARENTERAL at 21:20

## 2018-08-02 RX ADMIN — ASPIRIN 325 MG: 325 TABLET, DELAYED RELEASE ORAL at 06:08

## 2018-08-02 RX ADMIN — VITAMIN D, TAB 1000IU (100/BT) 1000 UNITS: 25 TAB at 12:33

## 2018-08-02 RX ADMIN — SODIUM CHLORIDE 500 ML: 9 INJECTION, SOLUTION INTRAVENOUS at 09:15

## 2018-08-02 RX ADMIN — OMEPRAZOLE 20 MG: 20 CAPSULE, DELAYED RELEASE ORAL at 06:08

## 2018-08-02 RX ADMIN — PIPERACILLIN SODIUM AND TAZOBACTAM SODIUM 4.5 G: 4; .5 INJECTION, POWDER, FOR SOLUTION INTRAVENOUS at 09:09

## 2018-08-02 RX ADMIN — ALLOPURINOL 300 MG: 300 TABLET ORAL at 06:08

## 2018-08-02 RX ADMIN — SODIUM CHLORIDE: 9 INJECTION, SOLUTION INTRAVENOUS at 09:15

## 2018-08-02 RX ADMIN — VITAMIN D, TAB 1000IU (100/BT) 1000 UNITS: 25 TAB at 17:30

## 2018-08-02 RX ADMIN — SODIUM CHLORIDE 500 ML: 9 INJECTION, SOLUTION INTRAVENOUS at 10:29

## 2018-08-02 RX ADMIN — VANCOMYCIN HYDROCHLORIDE 1700 MG: 500 INJECTION, POWDER, LYOPHILIZED, FOR SOLUTION INTRAVENOUS at 12:33

## 2018-08-02 RX ADMIN — PIPERACILLIN SODIUM AND TAZOBACTAM SODIUM 4.5 G: 4; .5 INJECTION, POWDER, LYOPHILIZED, FOR SOLUTION INTRAVENOUS at 00:26

## 2018-08-02 RX ADMIN — Medication 1 CAPSULE: at 09:09

## 2018-08-02 RX ADMIN — SIMVASTATIN 40 MG: 20 TABLET, FILM COATED ORAL at 21:26

## 2018-08-02 RX ADMIN — VITAMIN D, TAB 1000IU (100/BT) 1000 UNITS: 25 TAB at 06:08

## 2018-08-02 ASSESSMENT — ENCOUNTER SYMPTOMS
WEAKNESS: 0
HEMOPTYSIS: 0
FEVER: 0
COUGH: 0

## 2018-08-02 ASSESSMENT — PAIN SCALES - GENERAL
PAINLEVEL_OUTOF10: 0
PAINLEVEL_OUTOF10: 0

## 2018-08-02 ASSESSMENT — GAIT ASSESSMENTS
GAIT LEVEL OF ASSIST: SUPERVISED
DISTANCE (FEET): 700

## 2018-08-02 NOTE — PROGRESS NOTES
[facility] Hospitalist Progress Note    Date of Service: 2018    Chief Complaint  63 y.o. male admitted 2018 with severe Sepsis with AMS, hx of TIA however, also found to have UTI    Interval Problem Update  Patient doing ok this morning, refusing MRI in the morning, states ativan doesn't work, ordered hydroxyzine, however patient refusing it as well. Getting EEG right now    Vitals stable  Afebrile  White count better however still 17.1    Consultants/Specialty  none    Disposition  tbd        Review of Systems   Unable to perform ROS: Acuity of condition   Constitutional: Negative for fever and malaise/fatigue.   HENT: Negative for congestion and hearing loss.    Respiratory: Negative for cough and hemoptysis.    Cardiovascular: Negative for chest pain.   Skin: Negative for itching and rash.   Neurological: Negative for weakness.      Physical Exam  Laboratory/Imaging   Hemodynamics  Temp (24hrs), Av.6 °C (99.7 °F), Min:36.3 °C (97.3 °F), Max:38.7 °C (101.6 °F)   Temperature: 37.6 °C (99.6 °F)  Pulse  Av  Min: 68  Max: 113    Blood Pressure : 119/68, NIBP: 147/75      Respiratory      Respiration: 20, Pulse Oximetry: 96 %, O2 Daily Delivery Respiratory : Room Air with O2 Available             Fluids    Intake/Output Summary (Last 24 hours) at 18 1020  Last data filed at 18 0900   Gross per 24 hour   Intake             1620 ml   Output             2350 ml   Net             -730 ml       Nutrition  Orders Placed This Encounter   Procedures   • Diet Order Cardiac     Standing Status:   Standing     Number of Occurrences:   1     Order Specific Question:   Diet:     Answer:   Cardiac [6]     Physical Exam  Constitutional: He appears well-developed and well-nourished. Just finished getting EEG    HENT:   Head: Normocephalic and atraumatic. afebrile  Eyes: Conjunctivae and EOM are normal. No scleral icterus.   Neck: Normal range of motion. Neck supple.   Cardiovascular: Normal rate and regular  rhythm.  Exam reveals no gallop and no friction rub.    No murmur heard.  Distant   Pulmonary/Chest: Effort normal and breath sounds normal. No respiratory distress. He has no wheezes. He has no rales.   Distant lung sounds   Abdominal: Soft. Bowel sounds are normal. He exhibits distension (Obese). There is no tenderness. There is no rebound and no guarding.   Musculoskeletal: He exhibits no edema (Trace bilateral LE) or tenderness.   Neurological: He is alert.  Confusion  Cognitive deficits  Poor memory   Skin: Skin is warm. Rash (Multiple moles in the back, dysplastic; there are three lesions midback where wife pointed out was draining and has been bandaged; currently not draining) noted. He is diaphoretic.   Psychiatric: He has a normal mood and affect. His behavior is normal.   Recent Labs      08/01/18 2002 08/02/18 0237   WBC  19.2*  17.1*   RBC  4.71  4.23*   HEMOGLOBIN  13.8*  12.2*   HEMATOCRIT  38.4*  35.0*   MCV  81.5  82.7   MCH  29.3  28.8   MCHC  35.9*  34.9   RDW  41.1  43.0   PLATELETCT  142*  116*   MPV  10.2  10.1     Recent Labs      08/01/18 2002 08/02/18   0237   SODIUM  130*  133*   POTASSIUM  3.6  3.2*   CHLORIDE  98  105   CO2  21  20   GLUCOSE  121*  169*   BUN  14  13   CREATININE  1.19  1.05   CALCIUM  9.8  8.2*             Recent Labs      08/02/18   0237   TRIGLYCERIDE  51   HDL  29*   LDL  47     CT-RENAL COLIC EVALUATION(A/P W/O) [[id number]] Resulted: 08/01/18 2255   Order Status: Completed Updated: 08/01/18 2258   Narrative:       8/1/2018 10:02 PM    HISTORY/REASON FOR EXAM:  ALTERED LEVEL OF CONSCIOUSNESS  FEVER  Fever, sepsis. R/O hydronephrosis and UTI    TECHNIQUE/EXAM DESCRIPTION AND NUMBER OF VIEWS:  CT scan renal/colic without contrast.    5 mm helical images of the abdomen and pelvis were obtained from the diaphragmatic domes through the pubic symphysis.    Low dose optimization technique was utilized for this CT exam including automated exposure control and  adjustment of the mA and/or kV according to patient size.    COMPARISON: None.    FINDINGS:    Renal stone: No urinary tract calculus identified. Atrophic bilateral kidneys. No hydronephrosis.    Unremarkable urinary bladder.      Lung Bases:    No pulmonary nodules at the lung bases. No pleural or pericardial fluid.    Abdomen:    Within the limits of noncontrast technique, the liver, spleen, pancreas, and adrenal glands are unremarkable in appearance.    The gallbladder is unremarkable    There is no biliary dilatation. There is no bowel wall thickening or abnormal dilatation.    Few sigmoid diverticula.    Moderate atherosclerotic disease of the abdominal aorta and its branches.    Pelvis:    Enlarged prostate.    Fat-containing right inguinal hernia.    No lymph node enlargement.    No free fluid, or free air in the abdomen or pelvis.    No aggressive bone lesions are seen.    _____________________________________   Impression:         1. No urinary tract calculus identified. Atrophic bilateral kidneys. No hydronephrosis    2. Enlarged prostate.    3. Fat-containing right inguinal hernia.   MR-BRAIN-W/O [[id number]]    Order Status: Sent    CT-HEAD W/O [[id number]] Resulted: 08/01/18 2102   Order Status: Completed Updated: 08/01/18 2105   Narrative:       8/1/2018 8:21 PM    HISTORY/REASON FOR EXAM:  Altered Mental Status      TECHNIQUE/EXAM DESCRIPTION AND NUMBER OF VIEWS:  CT of the head without contrast.    The study was performed on a helical multidetector CT scanner. Contiguous 2.5 mm axial sections were obtained from the skull base through the vertex.    Up to date radiation dose reduction adjustments have been utilized to meet ALARA standards for radiation dose reduction.    COMPARISON:  3/13/2007    FINDINGS:  There is no evidence of acute intracranial hemorrhage, mass, mass-effect or shift of midline structures.    Gray-white matter differentiation is grossly preserved.    Ventricle size and brain  parenchymal volume are appropriate for this patient's stated age.    The basal cisterns are patent.    There are no abnormal extra-axial fluid collections.    No depressed or widely  calvarial fracture is seen.    The visualized paranasal sinuses and temporal bone structures are aerated.    ___________________________________   Impression:         1. No acute intracranial abnormality. No evidence of acute intracranial hemorrhage or mass lesion.       DX-CHEST-PORTABLE (1 VIEW) [[id number]] Resulted: 08/01/18 2038   Order Status: Completed Updated: 08/01/18 2040   Narrative:       8/1/2018 7:51 PM    HISTORY/REASON FOR EXAM:  Fever.      TECHNIQUE/EXAM DESCRIPTION AND NUMBER OF VIEWS:  Single portable view of the chest.    COMPARISON: 1/19/2018    FINDINGS:        No pulmonary infiltrates or consolidations are noted.  No pleural effusion. No pneumothorax.    Stable cardiopericardial silhouette.     Impression:         1. No acute cardiopulmonary abnormalities are identified.            Assessment/Plan     * Severe sepsis due to UTI ([facility])   Assessment & Plan    This is severe sepsis with the following associated acute organ dysfunction(s): metabolic/septic encephalopathy.   Source possible UTI or intraabdominal  U/A with UTI  Also has some draining melanocytic lesions in the back, could have cellulitis there, covered with abx, continue  Has history of alcohol related pancreatitis, currently not drinking  CT renal is neg  Zosyn and vanco continue for now and Follow cultures, monitor vancomycin trough and kidney function        Toxic metabolic encephalopathy   Assessment & Plan    Probably related to sepsis however he also has a history of TIAs  continue stroke work-up Telermetry, neuro-checks, MRI brain( patient refusing), carotid Duplex, ASA, lipids,  PT/OT/ST  Repeat EKG; consider loop recorder or Holter in the outpatient  when discharged          Hypokalemia- (present on admission)   Assessment & Plan     Replace and monitor        Hyponatremia   Assessment & Plan    133 Na  monitor        History of melanoma   Assessment & Plan      Wound Care consulted.        Hyperlipidemia   Assessment & Plan    Continue statin        Essential hypertension- (present on admission)   Assessment & Plan    Hold off spironolactone in the setting of sepsis        Morbid obesity with BMI of 40.0-44.9, adult (HCC)- (present on admission)   Assessment & Plan    When better encourage weight loss, lifestyle changes          Quality-Core Measures   Reviewed items::  Labs reviewed, Medications reviewed and Radiology images reviewed  Joseph catheter::  No Joseph  Antibiotics:  Treating active infection/contamination beyond 24 hours perioperative coverage

## 2018-08-02 NOTE — ASSESSMENT & PLAN NOTE
Probably related to sepsis however he also has a history of TIAs  continue stroke work-up Telermetry, neuro-checks, MRI brain( patient refusing), carotid Duplex, ASA, lipids,  PT/OT/ST  Repeat EKG; consider loop recorder or Holter in the outpatient  when discharged

## 2018-08-02 NOTE — PROGRESS NOTES
Gave bedside report to KOFI Curiel. Plan of care discussed. Safety precautions in place. Personal belongings and call light are with in reach. Patient has no additional needs at this time.

## 2018-08-02 NOTE — PROGRESS NOTES
Received telephone report from Dede, ER nurse. Plan of care discussed. Patient sitting at bedside.

## 2018-08-02 NOTE — PROGRESS NOTES
2 RN skin assessment done with KOFI Lawson; skin WDL except cysts on back that are intact per patient and report they are known to drain. They are not draining at this time.

## 2018-08-02 NOTE — PROGRESS NOTES
"Pt refusing MRI at this time. Refusing to try vistaril IM. Spouse at bedside. Pt insistent he has to be sedated to get MRI done. Explained to pt that we usually try medication first before full sedation and that our facility is not capable of MRI with sedation. Pt states \"well then just write me a script and schedule me at C.\" Explained to pt that he would have to discuss that with MD.   "

## 2018-08-02 NOTE — ASSESSMENT & PLAN NOTE
This is severe sepsis with the following associated acute organ dysfunction(s): metabolic/septic encephalopathy.   Source possible UTI or intraabdominal  U/A with UTI  Also has some draining melanocytic lesions in the back, could have cellulitis there, covered with abx, continue  Has history of alcohol related pancreatitis, currently not drinking  CT renal is neg  Zosyn and vanco continue for now and Follow cultures, monitor vancomycin trough and kidney function

## 2018-08-02 NOTE — PROGRESS NOTES
Bedside report received from Yakov KIRBY. Assumed care. POC discussed. Pt resting comfortably in bed. Safety precautions in place.

## 2018-08-02 NOTE — THERAPY
"Speech Language Therapy Clinical Swallow Evaluation completed.  Functional Status: Clinical swallow evaluation completed on this date.  Patient verbose but pleasant and agreeable; AA&O x4.  He followed directives to the oral [facility] exam with no gross deficits noted.  Presentation of PO included ice chips, purees, mixed consistencies, dry solids, and thin liquids.  The patient presented with functional mastication and bolus manipulation, timely initiation of swallow trigger and complete laryngeal elevation upon palpation. He maintained clear vocal quality throughout the session and had no overt s/sx of aspiration with any consistency consumed.  At this time, recommend the patient continue Cardiac Regular/thins diet as tolerated.  SLP to complete cognitive evaluation as appropriate.     Recommendations - Diet: Diet / Liquid Recommendation: Thin Liquid, Regular                          Strategies: Head of Bed at 90 Degrees                          Medication Administration: Medication Administration : Whole with Liquid Wash  Plan of Care: Patient with no further skilled SLP needs in the acute care setting at this time  Post-Acute Therapy: Currently anticipate no further skilled therapy needs once patient is discharged from the inpatient setting.    See \"Rehab Therapy-Acute\" Patient Summary Report for complete documentation.   "

## 2018-08-02 NOTE — ED NOTES
"Chief Complaint   Patient presents with   • ALOC     Starting today, per family pt has been very confused. Pt has 3 cysts on his back that his Wife states she drains everyday. No other complaints   • Fever     /84   Pulse (!) 113   Temp (!) 38.7 °C (101.6 °F)   Resp 18   Ht 1.88 m (6' 2\")   Wt 124.7 kg (274 lb 14.6 oz)   SpO2 92%   BMI 35.30 kg/m²     Cysts for 6-7 weeks  "

## 2018-08-02 NOTE — DISCHARGE PLANNING
Aware of PMR referral from Dr. Terry. 63 y.o. male admitted for altered mental status and fever. Sepsis/Stroke protocol. W/U ongoing. At present, there is not sufficient information to determine post acute needs. No Physiatry consult ordered per protocol. Would appreciate initial PT/OT evals, as medically appropriate. TCC will follow.

## 2018-08-02 NOTE — PROGRESS NOTES
"Assessment completed. Pt AAOx4. No complaints of pain, numbness/tingling. Neuro check WNL. No deficits noted. Pt repeatedly asking about \"why are they doing all these stupid tests, why are they putting all this stuff in me,\" wants to discharge, spouse demanding copies of all tests. Advised pt and spouse on POC and protocol regarding obtaining medical records. Call light in reach, will monitor.  "

## 2018-08-02 NOTE — ED PROVIDER NOTES
ED Provider Note    CHIEF COMPLAINT  Chief Complaint   Patient presents with   • ALOC     Starting today, per family pt has been very confused. Pt has 3 cysts on his back that his Wife states she drains everyday. No other complaints   • Fever       HPI  Sharon Guardado is a 63 y.o. male who presents to the emergency department for altered mental status and fever.  Wife says that the patient had gone to work earlier today.  Reportedly the patient had left work and went out to his motorcycle to come home at which point he was found by his coworkers with confusion.  They drove him home and the wife is unable to speak with him before they left.  She notes that when she spoke to her  he was quite confused and she sat him at the kitchen table.  She was concerned that he might be having another TIA.  She denies that he felt hot.  Over the last 2 hours since he arrived home he has had progressive improvement of his previous altered mentation.  Currently the patient states that he is feeling well with no complaints.  He notes he does have a mild headache although he states this is chronic and unchanged from baseline.  Denies any chest pain or shortness of breath.  No recent cough.  No leg pain or swelling.  No difficulty with recent urination or diarrhea or vomiting.    REVIEW OF SYSTEMS  See HPI for further details. All other systems are negative.     PAST MEDICAL HISTORY   has a past medical history of Bowel habit changes; CAD (coronary artery disease); Cancer (HCC); HTN (hypertension); Hypertension; Indigestion; Kidney stones; Near-syncope; Nerve compression; and Tinnitus.    SOCIAL HISTORY  Social History     Social History Main Topics   • Smoking status: Never Smoker   • Smokeless tobacco: Never Used   • Alcohol use Yes      Comment: i drink per night   • Drug use: No   • Sexual activity: Not on file       SURGICAL HISTORY   has a past surgical history that includes gastroscopy (3/29/2018); egd  w/endoscopic ultrasound (3/29/2018); and egd with asp/bx (3/29/2018).    CURRENT MEDICATIONS  Home Medications     Reviewed by Yakov Salinas R.N. (Registered Nurse) on 08/01/18 at 2349  Med List Status: Complete   Medication Last Dose Status   ALLOPURINOL 300 MG PO TABS 7/31/2018 Active   ASPIRIN 8/1/2018 Active   PRILOSEC OTC PO 8/1/2018 Active   SIMVASTATIN 40 MG PO TABS 7/31/2018 Active   SPIRONOLACTONE 25 MG PO TABS 7/31/2018 Active   vitamin D (CHOLECALCIFEROL) 1000 UNIT Tab 8/1/2018 Active                ALLERGIES  Allergies   Allergen Reactions   • Ativan    • Vicodin [Hydrocodone-Acetaminophen] Rash     Rash, confusion         PHYSICAL EXAM  VITAL SIGNS: /84   Pulse (!) 104   Temp (!) 38.7 °C (101.6 °F)   Resp 18   Ht 1.829 m (6')   Wt 124.7 kg (274 lb 14.6 oz)   SpO2 93%   BMI 37.29 kg/m²  @NICKOLAS[[id number]::@   Pulse ox interpretation: I interpret this pulse ox as normal.  Constitutional: Alert in no apparent distress.  HENT: No signs of trauma, Bilateral external ears normal, Nose normal.   Eyes: Pupils are equal and reactive, Conjunctiva normal, Non-icteric.   Neck: Normal range of motion, No tenderness, Supple, No stridor.   Cardiovascular: Regular rate and rhythm, no murmurs.   Thorax & Lungs: Normal breath sounds, No respiratory distress, No wheezing, No chest tenderness.   Abdomen: Bowel sounds normal, Soft, No tenderness, No masses, No pulsatile masses. No peritoneal signs.  Skin: Hot, Dry, No erythema, No rash.   Back: No bony tenderness, No CVA tenderness.   Extremities: Intact distal pulses, No edema, No tenderness, No cyanosis  Musculoskeletal: Good range of motion in all major joints. No tenderness to palpation or major deformities noted.   Neurologic: Alert , Normal motor function, Normal sensory function, No focal deficits noted.   Psychiatric: Affect normal, Judgment normal, Mood normal.       DIAGNOSTIC STUDIES / PROCEDURES    EKG  Sinus tach 107, nonspecific ST  changes    LABS  Results for orders placed or performed during the hospital encounter of 08/01/18   Lactic acid (lactate)   Result Value Ref Range    Lactic Acid 1.9 0.5 - 2.0 mmol/L   CBC WITH DIFFERENTIAL   Result Value Ref Range    WBC 19.2 (H) 4.8 - 10.8 K/uL    RBC 4.71 4.70 - 6.10 M/uL    Hemoglobin 13.8 (L) 14.0 - 18.0 g/dL    Hematocrit 38.4 (L) 42.0 - 52.0 %    MCV 81.5 81.4 - 97.8 fL    MCH 29.3 27.0 - 33.0 pg    MCHC 35.9 (H) 33.7 - 35.3 g/dL    RDW 41.1 35.9 - 50.0 fL    Platelet Count 142 (L) 164 - 446 K/uL    MPV 10.2 9.0 - 12.9 fL    Neutrophils-Polys 82.00 (H) 44.00 - 72.00 %    Lymphocytes 10.30 (L) 22.00 - 41.00 %    Monocytes 7.10 0.00 - 13.40 %    Eosinophils 0.00 0.00 - 6.90 %    Basophils 0.20 0.00 - 1.80 %    Immature Granulocytes 0.40 0.00 - 0.90 %    Nucleated RBC 0.00 /100 WBC    Neutrophils (Absolute) 15.77 (H) 1.82 - 7.42 K/uL    Lymphs (Absolute) 1.98 1.00 - 4.80 K/uL    Monos (Absolute) 1.36 (H) 0.00 - 0.85 K/uL    Eos (Absolute) 0.00 0.00 - 0.51 K/uL    Baso (Absolute) 0.04 0.00 - 0.12 K/uL    Immature Granulocytes (abs) 0.08 0.00 - 0.11 K/uL    NRBC (Absolute) 0.00 K/uL   COMP METABOLIC PANEL   Result Value Ref Range    Sodium 130 (L) 135 - 145 mmol/L    Potassium 3.6 3.6 - 5.5 mmol/L    Chloride 98 96 - 112 mmol/L    Co2 21 20 - 33 mmol/L    Anion Gap 11.0 0.0 - 11.9    Glucose 121 (H) 65 - 99 mg/dL    Bun 14 8 - 22 mg/dL    Creatinine 1.19 0.50 - 1.40 mg/dL    Calcium 9.8 8.4 - 10.2 mg/dL    AST(SGOT) 23 12 - 45 U/L    ALT(SGPT) 19 2 - 50 U/L    Alkaline Phosphatase 82 30 - 99 U/L    Total Bilirubin 1.7 (H) 0.1 - 1.5 mg/dL    Albumin 4.1 3.2 - 4.9 g/dL    Total Protein 7.1 6.0 - 8.2 g/dL    Globulin 3.0 1.9 - 3.5 g/dL    A-G Ratio 1.4 g/dL   URINALYSIS   Result Value Ref Range    Color Yellow     Character Hazy (A)     Specific Gravity 1.020 <1.035    Ph 6.5 5.0 - 8.0    Glucose Negative Negative mg/dL    Ketones 15 (A) Negative mg/dL    Protein Negative Negative mg/dL    Bilirubin  Negative Negative    Nitrite Negative Negative    Leukocyte Esterase Moderate (A) Negative    Occult Blood Trace (A) Negative    Micro Urine Req Microscopic    TROPONIN   Result Value Ref Range    Troponin I <0.02 0.00 - 0.04 ng/mL   LIPASE   Result Value Ref Range    Lipase 26 7 - 58 U/L   ESTIMATED GFR   Result Value Ref Range    GFR If African American >60 >60 mL/min/1.73 m 2    GFR If Non African American >60 >60 mL/min/1.73 m 2   URINE MICROSCOPIC (W/UA)   Result Value Ref Range    WBC  (A) /hpf    RBC 0-2 (A) /hpf    Bacteria Many (A) None /hpf   TSH (Thyroid Stimulating Hormone)   Result Value Ref Range    TSH 0.330 (L) 0.380 - 5.330 uIU/mL   EKG   Result Value Ref Range    Report       [facility] Emergency Dept[facility]    Test Date:  2018  Pt Name:    MEHRAN GARCIA               Department: [facility]  MRN:        [id number]                      [facility]  Gender:     Male                         Technician: [id number]  :        1955                   Requested By:SAVANNAH VU  Order #:    [id number]                    Reading MD:    Measurements  Intervals                                Axis  Rate:       107                          P:  WI:         171                          QRS:        22  QRSD:       80                           T:          46  QT:         318  QTc:        425    Interpretive Statements  Atrial-ventricular dual-paced complexes  No further rhythm analysis attempted due to paced rhythm  Low voltage, extremity and precordial leads  Minimal ST elevation, inferior leads  Artifact in lead(s) I,III,aVR,aVL,aVF  Compared to ECG 2018 19:41:23  ST (T wave) deviation now present  Sinus rhythm no longer present         RADIOLOGY  CT-RENAL COLIC EVALUATION(A/P W/O)   Final Result         1. No urinary tract calculus identified. Atrophic bilateral kidneys. No hydronephrosis      2. Enlarged prostate.      3. Fat-containing right inguinal hernia.       CT-HEAD W/O   Final Result         1. No acute intracranial abnormality. No evidence of acute intracranial hemorrhage or mass lesion.               DX-CHEST-PORTABLE (1 VIEW)   Final Result         1. No acute cardiopulmonary abnormalities are identified.      MR-BRAIN-W/O    (Results Pending)   Carotid Duplex ([facility] Only) - Do not order if CTA - Neck done in ER    (Results Pending)   ECHOCARDIOGRAM COMP W/O CONT    (Results Pending)       CRITICAL CARE  The very real possibilty of a deterioration of this patient's condition required the highest level of my preparedness for sudden, emergent intervention.  I provided critical care services, which included medication orders, frequent reevaluations of the patient's condition and response to treatment, ordering and reviewing test results, and discussing the case with various consultants.  The critical care time associated with the care of the patient was 35 minutes. Review chart for interventions. This time is exclusive of any other billable procedures.       COURSE & MEDICAL DECISION MAKING  Pertinent Labs & Imaging studies reviewed. (See chart for details)  Patient presenting with the above complaint.  Patient has a history of prior TIA which her symptoms certainly would be concerning for this again.  Patient is however positive for sepsis with evidence of infection in urine.  Patient was started on broad-spectrum antibiotics before this was a identified and antibiotics can can be continued and narrowed given this etiology.  This point I do not believe the patient will require lumbar puncture.  Initial head CT does not show any acute hemorrhagic stroke.  His altered mental status may certainly be secondary to his fever.  He will need ongoing antipyretics as well.  IV fluids were given as per sepsis protocol and do believe that these will be continued although she does appear slightly better after initial treatment here in the ER.  Wife at bedside  has been made aware of evaluation today and ongoing plan for inpatient care.        FINAL IMPRESSION  1. Altered mental status, unspecified altered mental status type            Electronically signed by: Ollie Ho, 8/1/2018 8:16 PM

## 2018-08-02 NOTE — WOUND TEAM
Wound Team consulted to see pt for discolored area around the T-spine.  Notes indicate that this area drains at times.  Pt states he is followed by a dermatologist.  He states that he had a melanoma excised about 6 months ago and now follows up every 3 months with the dermatologist.  Pt can not remember the dermatologists name.  Today there is an area of 4.0 cm x 1.3 cm with 3 circular areas that are dusky purple with a slightly fluctuant center.  One of the 3 areas is draining a scant amount of serosanguinous fluid.  A culture was taken of this fluid although there are no cardinal signs of infection, the failure of the area to heal is suspicious.  No wound care or dressings are necessary at this time since there is no open areas and drainage during pt's stay here has been none to scant.      Wound Team Recommendations: follow up with dermatologist upon discharge from hospital.

## 2018-08-02 NOTE — CARE PLAN
Problem: Communication  Goal: The ability to communicate needs accurately and effectively will improve  Outcome: PROGRESSING AS EXPECTED  POC discussed including echo, PT/OT, MRI, carotid US, IV ABX. Pt resistant to tests/treatment, questions RN about all procedures. Pt needs some reinforcement with this.    Problem: Safety  Goal: Will remain free from injury  Outcome: PROGRESSING AS EXPECTED  Pt instructed to use call light for assistance. SBA with steady gait to bathroom. Pt verbalizes understanding of safety due to TIA workup.

## 2018-08-02 NOTE — THERAPY
"Physical Therapy Evaluation completed.   Bed Mobility:  Supine to Sit: Modified Independent  Transfers: Sit to Stand: Supervised  Gait: Level Of Assist: Supervised with No Equipment Needed       Plan of Care: Patient with no further skilled PT needs in the acute care setting at this time  Discharge Recommendations: Equipment: No Equipment Needed. Post-acute therapy Currently anticipate no further skilled therapy needs once patient is discharged from the inpatient setting.    See \"Rehab Therapy-Acute\" Patient Summary Report for complete documentation.   Pt is a 63 y.o.m. who presented to the hospital with AMS.  Pt lives with his wife and helps care for his grandchildren.  He is active and exercises daily.  PT states he has slowed down with his tolerance for exercise, and he thinks it it due to age and weight gain.  Pt demonstrated good strength bilat.  No coordination deficits noted.  No visual disturbances noted.  Pt had equal tracking bilat.  Pt demonstrated safe gait w/o LOB.  No further skilled PT indicated in acute care setting.  "

## 2018-08-02 NOTE — H&P
"Hospital Medicine History and Physical    Date of Service  8/1/2018    Chief Complaint  Chief Complaint   Patient presents with   • ALOC     Starting today, per family pt has been very confused. Pt has 3 cysts on his back that his Wife states she drains everyday. No other complaints   • Fever       History of Presenting Illness  63 y.o. male who presented 8/1/2018 with ALOC (Starting today, per family pt has been very confused. Pt has 3 cysts on his back that his Wife states she drains everyday. No other complaints) and Fever  Severe cognitive deficits, not able to give meaningful story. He feels he is fine. Around 2 to 3PM today he was brought home by his son who mentioned he was confused and slow to thought. His wife noted he was incoherent and could not recognize grandchildren quickly. This gradually got better at the ER, but he still remained slow to thought with cognitive deficits. No visual complaints. He has chronic tinnitus. No facial asymmetry. He was ataxic but no apparent focal weakness.  Per wife, he has history of TIAs. He has chronic headaches and follows Neurology. He also has a history of melanoma s/p biopsy located at the back. Per wife, he has a bandage there and sometimes drains but has been draining more than usual. Fluid is reported somewhat cloudy. Wife also mentioned belly may be slightly distended however patient denies pain or tenderness and it is soft on palpation.  At the ED, he was found to be febrile, bit hemodynamically stable. Head CT no head bleed. CXR read as no acute cardiopulmonary findings. EKG unclear if sinus but read as \"paced\". He has no pacemeaker. There are P waves however and seems like sinus tachy. Discussed with ED physician. Leukocytosis. Mild hyponatremia.   When I saw him at ED, he is confused but not agitated. Wife did mention when he was admited for alcohol related pancreatitis before, he easily gets agitated in the hospital. Given body habitus distant heart and lung " sounds/ Trace LE edema. Neurologically generalized weakness. 3 moles in the back, currently not draining, slight erythema around it. Bandage noted.    Primary Care Physician  Juan Manuel FLANAGAN M.D.    Consultants      Code Status  Full    Review of Systems  Review of Systems   Unable to perform ROS: Mental acuity        Past Medical History  Past Medical History:   Diagnosis Date   • Bowel habit changes     constipation   • CAD (coronary artery disease)    • Cancer (HCC)     melanoma skin cancer on his back   • HTN (hypertension)    • Hypertension    • Indigestion    • Kidney stones    • Near-syncope     worked up with neg results   • Nerve compression    • Tinnitus     pt states x 10 years and stopped tonight       Surgical History  Past Surgical History:   Procedure Laterality Date   • GASTROSCOPY  3/29/2018    Procedure: GASTROSCOPY - W/POSS BIOPSY, DILATION, POLYPECTOMY, CONTROL OF HEMORRHAGE;  Surgeon: Rich Collins M.D.;  Location: [facility];  Service: EUS   • EGD W/ENDOSCOPIC ULTRASOUND  3/29/2018    Procedure: EGD W/ENDOSCOPIC ULTRASOUND;  Surgeon: Rich Collins M.D.;  Location: [facility];  Service: EUS   • EGD WITH ASP/BX  3/29/2018    Procedure: EGD WITH ASP/BX - FNA;  Surgeon: Rich Collins M.D.;  Location: [facility];  Service: EUS       Medications  No current facility-administered medications on file prior to encounter.      Current Outpatient Prescriptions on File Prior to Encounter   Medication Sig Dispense Refill   • vitamin D (CHOLECALCIFEROL) 1000 UNIT Tab Take 1,000 Units by mouth 3 times a day.     • SPIRONOLACTONE 25 MG PO TABS Take 1 Tab by mouth every day.     • SIMVASTATIN 40 MG PO TABS Take 1 Tab by mouth every day.     • ALLOPURINOL 300 MG PO TABS Take 1 Tab by mouth every day.     • PRILOSEC OTC PO 1 Cap by Oral route QDAY.      • ASPIRIN 325 mg every day.         Family History  History reviewed. No pertinent family history.    Social  History  Social History   Substance Use Topics   • Smoking status: Never Smoker   • Smokeless tobacco: Never Used   • Alcohol use Yes      Comment: i drink per night       Allergies  Allergies   Allergen Reactions   • Ativan    • Vicodin [Hydrocodone-Acetaminophen] Rash     Rash, confusion          Physical Exam  Laboratory   Hemodynamics  Temp (24hrs), Av.7 °C (101.6 °F), Min:38.7 °C (101.6 °F), Max:38.7 °C (101.6 °F)   Temperature: (!) 38.7 °C (101.6 °F)  Pulse  Av.5  Min: 108  Max: 113    Blood Pressure : 148/84, NIBP: 131/67      Respiratory      Respiration: 18, Pulse Oximetry: 91 %             Physical Exam   Constitutional: He appears well-developed and well-nourished.   Febrile   HENT:   Head: Normocephalic and atraumatic.   Eyes: Conjunctivae and EOM are normal. No scleral icterus.   Neck: Normal range of motion. Neck supple.   Cardiovascular: Normal rate and regular rhythm.  Exam reveals no gallop and no friction rub.    No murmur heard.  Distant   Pulmonary/Chest: Effort normal and breath sounds normal. No respiratory distress. He has no wheezes. He has no rales.   Distant lung sounds   Abdominal: Soft. Bowel sounds are normal. He exhibits distension (Obese). There is no tenderness. There is no rebound and no guarding.   Musculoskeletal: He exhibits no edema (Trace bilateral LE) or tenderness.   Neurological: He is alert.   Generalized weakness  Confusion  Cognitive deficits  Poor memory   Skin: Skin is warm. Rash (Multiple moles in the back, dysplastic; there are three lesions midback where wife pointed out was draining and has been bandaged; currently not draining) noted. He is diaphoretic.   Psychiatric: He has a normal mood and affect. His behavior is normal.       Recent Labs      18   WBC  19.2*   RBC  4.71   HEMOGLOBIN  13.8*   HEMATOCRIT  38.4*   MCV  81.5   MCH  29.3   MCHC  35.9*   RDW  41.1   PLATELETCT  142*   MPV  10.2     Recent Labs      18   SODIUM  130*    POTASSIUM  3.6   CHLORIDE  98   CO2  21   GLUCOSE  121*   BUN  14   CREATININE  1.19   CALCIUM  9.8     Recent Labs      08/01/18 2002   ALTSGPT  19   ASTSGOT  23   ALKPHOSPHAT  82   TBILIRUBIN  1.7*   GLUCOSE  121*                 Lab Results   Component Value Date    TROPONINI <0.02 01/19/2018     Urinalysis:    Lab Results  Component Value Date/Time   SPECGRAVITY 1.020 08/01/2018 2031   GLUCOSEUR Negative 08/01/2018 2031   KETONES 15 (A) 08/01/2018 2031   NITRITE Negative 08/01/2018 2031   WBCURINE  (A) 08/01/2018 2031   RBCURINE 0-2 (A) 08/01/2018 2031   BACTERIA Many (A) 08/01/2018 2031   EPITHELCELL Few 02/28/2007 1138        Imaging  Ct-head W/o    Result Date: 8/1/2018 8/1/2018 8:21 PM HISTORY/REASON FOR EXAM:  Altered Mental Status TECHNIQUE/EXAM DESCRIPTION AND NUMBER OF VIEWS: CT of the head without contrast. The study was performed on a helical multidetector CT scanner. Contiguous 2.5 mm axial sections were obtained from the skull base through the vertex. Up to date radiation dose reduction adjustments have been utilized to meet ALARA standards for radiation dose reduction. COMPARISON:  3/13/2007 FINDINGS: There is no evidence of acute intracranial hemorrhage, mass, mass-effect or shift of midline structures. Gray-white matter differentiation is grossly preserved. Ventricle size and brain parenchymal volume are appropriate for this patient's stated age. The basal cisterns are patent. There are no abnormal extra-axial fluid collections. No depressed or widely  calvarial fracture is seen. The visualized paranasal sinuses and temporal bone structures are aerated. ___________________________________     1. No acute intracranial abnormality. No evidence of acute intracranial hemorrhage or mass lesion.     Dx-chest-portable (1 View)    Result Date: 8/1/2018 8/1/2018 7:51 PM HISTORY/REASON FOR EXAM:  Fever. TECHNIQUE/EXAM DESCRIPTION AND NUMBER OF VIEWS: Single portable view of the chest.  COMPARISON: 1/19/2018 FINDINGS: No pulmonary infiltrates or consolidations are noted. No pleural effusion. No pneumothorax. Stable cardiopericardial silhouette.     1. No acute cardiopulmonary abnormalities are identified.     Assessment/Plan     I anticipate this patient will require at least two midnights for appropriate medical management, necessitating inpatient admission.    * Severe sepsis due to UTI (HCC)   Assessment & Plan    This is severe sepsis with the following associated acute organ dysfunction(s): metabolic/septic encephalopathy.   Source possible UTI or intraabdominal  U/A pending, ultimatey came back with evidence of UTI  Also has some draining melanocytic lesions in the back, could have cellulitis there  Has history of alcohol related pancreatitis, currently not drinking  Will get CT AB/P   Zosyn and vanco started at ED, continue for now  Follow cultures        Toxic metabolic encephalopathy   Assessment & Plan    Probably related to sepsis however he also has a history of TIAs  Will do stroke work-up Telermetry, neuro-checks, MRI brain, carotid Duplex, ASA, ordered lipid panel, PT/OT/ST  Repeat EKG; consider loop recorder or Holter in the outpatient  when discharged          History of melanoma   Assessment & Plan    Ha draining lesion in the midback.  If this is infected, on Zosyn and vanco anyway  Wound Care consulted.        Hyperlipidemia   Assessment & Plan    Continue statin        Essential hypertension- (present on admission)   Assessment & Plan    Hold off spironolactone in the setting of sepsis        Morbid obesity with BMI of 40.0-44.9, adult (HCC)- (present on admission)   Assessment & Plan    When better encourage weight loss, lifestyle changes            VTE prophylaxis: Pharmacologic.    I spent 80 minutes, reviewing the chart, notes, vitals, labs, imaging, ordering labs, evaluating Sharon Guardado for assessment, enacting the plan above. 50% of the time was spent in  counseling Sharon Guardado and answering questions. Discussed with ED physician. Medical decision making is therefore complex. Time was devoted to counseling and coordinating care including review of records, pertinent lab data and studies, as well as discussing diagnostic evaluation and work up, planned therapeutic interventions and future disposition of care. Where indicated, the assessment and plan reflect discussion of patient with consultants, other healthcare providers, family members, and additional research needed to obtain further information in formulating the plan of care for Sharon Guardado.

## 2018-08-02 NOTE — THERAPY
Occupational therapy- OT orders rec'd.  Pt up mobilizing well per PT with no further PT need, and per SLP appears functionally cognitively intact per her interaction with him during swallow eval this am.  Per RN, pt has been up to bathroom with staff, participating in ADL's and appears to be clearing. He has family at home. Pt does not appear to need formal OT eval at this time.  Will defer OT eval at this time based on recommendations from PT, SLP and RN.  Please re-order OT if further needs arise.

## 2018-08-03 VITALS
HEIGHT: 72 IN | BODY MASS INDEX: 37.09 KG/M2 | RESPIRATION RATE: 18 BRPM | SYSTOLIC BLOOD PRESSURE: 120 MMHG | DIASTOLIC BLOOD PRESSURE: 72 MMHG | TEMPERATURE: 98 F | WEIGHT: 273.81 LBS | HEART RATE: 70 BPM | OXYGEN SATURATION: 94 %

## 2018-08-03 PROBLEM — R65.20 SEVERE SEPSIS (HCC): Status: RESOLVED | Noted: 2018-08-01 | Resolved: 2018-08-03

## 2018-08-03 PROBLEM — N30.00 ACUTE CYSTITIS WITHOUT HEMATURIA: Status: ACTIVE | Noted: 2018-08-03

## 2018-08-03 PROBLEM — G92.8 TOXIC METABOLIC ENCEPHALOPATHY: Status: RESOLVED | Noted: 2018-08-01 | Resolved: 2018-08-03

## 2018-08-03 PROBLEM — E87.1 HYPONATREMIA: Status: RESOLVED | Noted: 2018-08-02 | Resolved: 2018-08-03

## 2018-08-03 PROBLEM — L03.312 CELLULITIS OF BACK EXCEPT BUTTOCK: Status: ACTIVE | Noted: 2018-08-03

## 2018-08-03 PROBLEM — E87.6 HYPOKALEMIA: Status: RESOLVED | Noted: 2018-01-20 | Resolved: 2018-08-03

## 2018-08-03 PROBLEM — A41.9 SEVERE SEPSIS (HCC): Status: RESOLVED | Noted: 2018-08-01 | Resolved: 2018-08-03

## 2018-08-03 LAB
ANION GAP SERPL CALC-SCNC: 8 MMOL/L (ref 0–11.9)
BASOPHILS # BLD AUTO: 0.2 % (ref 0–1.8)
BASOPHILS # BLD: 0.02 K/UL (ref 0–0.12)
BUN SERPL-MCNC: 10 MG/DL (ref 8–22)
CALCIUM SERPL-MCNC: 8.9 MG/DL (ref 8.4–10.2)
CHLORIDE SERPL-SCNC: 105 MMOL/L (ref 96–112)
CO2 SERPL-SCNC: 22 MMOL/L (ref 20–33)
CREAT SERPL-MCNC: 1.01 MG/DL (ref 0.5–1.4)
EOSINOPHIL # BLD AUTO: 0.08 K/UL (ref 0–0.51)
EOSINOPHIL NFR BLD: 0.6 % (ref 0–6.9)
ERYTHROCYTE [DISTWIDTH] IN BLOOD BY AUTOMATED COUNT: 42.9 FL (ref 35.9–50)
GLUCOSE SERPL-MCNC: 191 MG/DL (ref 65–99)
HCT VFR BLD AUTO: 37.9 % (ref 42–52)
HGB BLD-MCNC: 13.2 G/DL (ref 14–18)
IMM GRANULOCYTES # BLD AUTO: 0.05 K/UL (ref 0–0.11)
IMM GRANULOCYTES NFR BLD AUTO: 0.4 % (ref 0–0.9)
LYMPHOCYTES # BLD AUTO: 1.64 K/UL (ref 1–4.8)
LYMPHOCYTES NFR BLD: 12.7 % (ref 22–41)
MCH RBC QN AUTO: 29 PG (ref 27–33)
MCHC RBC AUTO-ENTMCNC: 34.8 G/DL (ref 33.7–35.3)
MCV RBC AUTO: 83.3 FL (ref 81.4–97.8)
MONOCYTES # BLD AUTO: 0.63 K/UL (ref 0–0.85)
MONOCYTES NFR BLD AUTO: 4.9 % (ref 0–13.4)
NEUTROPHILS # BLD AUTO: 10.53 K/UL (ref 1.82–7.42)
NEUTROPHILS NFR BLD: 81.2 % (ref 44–72)
NRBC # BLD AUTO: 0 K/UL
NRBC BLD-RTO: 0 /100 WBC
PLATELET # BLD AUTO: 146 K/UL (ref 164–446)
PMV BLD AUTO: 10.6 FL (ref 9–12.9)
POTASSIUM SERPL-SCNC: 3.6 MMOL/L (ref 3.6–5.5)
RBC # BLD AUTO: 4.55 M/UL (ref 4.7–6.1)
SODIUM SERPL-SCNC: 135 MMOL/L (ref 135–145)
WBC # BLD AUTO: 13 K/UL (ref 4.8–10.8)

## 2018-08-03 PROCEDURE — 700102 HCHG RX REV CODE 250 W/ 637 OVERRIDE(OP): Performed by: INTERNAL MEDICINE

## 2018-08-03 PROCEDURE — 99239 HOSP IP/OBS DSCHRG MGMT >30: CPT | Performed by: HOSPITALIST

## 2018-08-03 PROCEDURE — A9270 NON-COVERED ITEM OR SERVICE: HCPCS | Performed by: INTERNAL MEDICINE

## 2018-08-03 PROCEDURE — 80048 BASIC METABOLIC PNL TOTAL CA: CPT

## 2018-08-03 PROCEDURE — 700111 HCHG RX REV CODE 636 W/ 250 OVERRIDE (IP): Performed by: HOSPITALIST

## 2018-08-03 PROCEDURE — 700105 HCHG RX REV CODE 258: Performed by: HOSPITALIST

## 2018-08-03 PROCEDURE — 700105 HCHG RX REV CODE 258: Performed by: INTERNAL MEDICINE

## 2018-08-03 PROCEDURE — 85025 COMPLETE CBC W/AUTO DIFF WBC: CPT

## 2018-08-03 RX ORDER — SULFAMETHOXAZOLE AND TRIMETHOPRIM 800; 160 MG/1; MG/1
1 TABLET ORAL 2 TIMES DAILY
Qty: 14 TAB | Refills: 0 | Status: SHIPPED | OUTPATIENT
Start: 2018-08-03 | End: 2018-08-10

## 2018-08-03 RX ORDER — LEVOFLOXACIN 500 MG/1
500 TABLET, FILM COATED ORAL DAILY
Qty: 5 TAB | Refills: 0 | Status: SHIPPED | OUTPATIENT
Start: 2018-08-03 | End: 2018-08-08

## 2018-08-03 RX ADMIN — PIPERACILLIN AND TAZOBACTAM 3.38 G: 3; .375 INJECTION, POWDER, LYOPHILIZED, FOR SOLUTION INTRAVENOUS; PARENTERAL at 05:31

## 2018-08-03 RX ADMIN — OMEPRAZOLE 20 MG: 20 CAPSULE, DELAYED RELEASE ORAL at 05:37

## 2018-08-03 RX ADMIN — VITAMIN D, TAB 1000IU (100/BT) 1000 UNITS: 25 TAB at 12:08

## 2018-08-03 RX ADMIN — SODIUM CHLORIDE: 9 INJECTION, SOLUTION INTRAVENOUS at 05:32

## 2018-08-03 RX ADMIN — VITAMIN D, TAB 1000IU (100/BT) 1000 UNITS: 25 TAB at 05:37

## 2018-08-03 RX ADMIN — ALLOPURINOL 300 MG: 300 TABLET ORAL at 05:37

## 2018-08-03 RX ADMIN — ASPIRIN 325 MG: 325 TABLET, DELAYED RELEASE ORAL at 05:37

## 2018-08-03 ASSESSMENT — PATIENT HEALTH QUESTIONNAIRE - PHQ9
1. LITTLE INTEREST OR PLEASURE IN DOING THINGS: NOT AT ALL
2. FEELING DOWN, DEPRESSED, IRRITABLE, OR HOPELESS: NOT AT ALL
SUM OF ALL RESPONSES TO PHQ9 QUESTIONS 1 AND 2: 0

## 2018-08-03 ASSESSMENT — PAIN SCALES - GENERAL
PAINLEVEL_OUTOF10: 0
PAINLEVEL_OUTOF10: 0

## 2018-08-03 NOTE — PROGRESS NOTES
Bedside report given to Corinna KIRBY. POC discussed. Pt resting comfortably in bed. Safety precautions in place.    Tele strip at 0652 shows SR w/ HR of 89  Measurements: .20/.08/.36    Tele Shift Summary:  Rhythm: SR   Rate: 80's-90's  Ectopy: Per CCT Cecelia, pt had rare PVC's.    Telemetry monitoring strips placed in pt chart.

## 2018-08-03 NOTE — PROGRESS NOTES
Assessment complete. Due medications discussed and given. Pt is upself, steady gait, no confusion, no dizzines or slurred speech. Drank water with medications, no swallow issues.

## 2018-08-03 NOTE — PROGRESS NOTES
Bedside report received from KOFI Weinstein. POC discussed and updated. Pt. Alert and oriented, sitting on edge of bed.

## 2018-08-03 NOTE — DISCHARGE INSTRUCTIONS
Discharge Instructions    Discharged to home by car with relative. Discharged via walking, hospital escort: Refused.  Special equipment needed: Not Applicable    Be sure to schedule a follow-up appointment with your primary care doctor or any specialists as instructed.     Discharge Plan:   Diet Plan: Discussed  Activity Level: Discussed  Confirmed Follow up Appointment: Patient to Call and Schedule Appointment  Confirmed Symptoms Management: Discussed  Medication Reconciliation Updated: Yes  Pneumococcal Vaccine Administered/Refused: Not given - Patient refused pneumococcal vaccine  Influenza Vaccine Indication: Patient Refuses    I understand that a diet low in cholesterol, fat, and sodium is recommended for good health. Unless I have been given specific instructions below for another diet, I accept this instruction as my diet prescription.   Other diet: regular    Special Instructions: Sulfamethoxazole; Trimethoprim, SMX-TMP tablets  What is this medicine?  SULFAMETHOXAZOLE; TRIMETHOPRIM or SMX-TMP (suhl fuh meth OK lupe zohl; trye METH oh prim) is a combination of a sulfonamide antibiotic and a second antibiotic, trimethoprim. It is used to treat or prevent certain kinds of bacterial infections. It will not work for colds, flu, or other viral infections.  This medicine may be used for other purposes; ask your health care provider or pharmacist if you have questions.  COMMON BRAND NAME(S): Bacter-Aid DS, Bactrim, Bactrim DS, Septra, Septra DS  What should I tell my health care provider before I take this medicine?  They need to know if you have any of these conditions:  -anemia  -asthma  -being treated with anticonvulsants  -if you frequently drink alcohol containing drinks  -kidney disease  -liver disease  -low level of folic acid or glucose-6-phosphate dehydrogenase  -poor nutrition or malabsorption  -porphyria  -severe allergies  -thyroid disorder  -an unusual or allergic reaction to sulfamethoxazole,  trimethoprim, sulfa drugs, other medicines, foods, dyes, or preservatives  -pregnant or trying to get pregnant  -breast-feeding  How should I use this medicine?  Take this medicine by mouth with a full glass of water. Follow the directions on the prescription label. Take your medicine at regular intervals. Do not take it more often than directed. Do not skip doses or stop your medicine early.  Talk to your pediatrician regarding the use of this medicine in children. Special care may be needed. This medicine has been used in children as young as 2 months of age.  Overdosage: If you think you have taken too much of this medicine contact a poison control center or emergency room at once.  NOTE: This medicine is only for you. Do not share this medicine with others.  What if I miss a dose?  If you miss a dose, take it as soon as you can. If it is almost time for your next dose, take only that dose. Do not take double or extra doses.  What may interact with this medicine?  Do not take this medicine with any of the following medications:  -aminobenzoate potassium  -dofetilide  -metronidazole  This medicine may also interact with the following medications:  -ACE inhibitors like benazepril, enalapril, lisinopril, and ramipril  -birth control pills  -cyclosporine  -digoxin  -diuretics  -indomethacin  -medicines for diabetes  -methenamine  -methotrexate  -phenytoin  -potassium supplements  -pyrimethamine  -sulfinpyrazone  -tricyclic antidepressants  -warfarin  This list may not describe all possible interactions. Give your health care provider a list of all the medicines, herbs, non-prescription drugs, or dietary supplements you use. Also tell them if you smoke, drink alcohol, or use illegal drugs. Some items may interact with your medicine.  What should I watch for while using this medicine?  Tell your doctor or health care professional if your symptoms do not improve. Drink several glasses of water a day to reduce the risk  of kidney problems.  Do not treat diarrhea with over the counter products. Contact your doctor if you have diarrhea that lasts more than 2 days or if it is severe and watery.  This medicine can make you more sensitive to the sun. Keep out of the sun. If you cannot avoid being in the sun, wear protective clothing and use a sunscreen. Do not use sun lamps or tanning beds/booths.  What side effects may I notice from receiving this medicine?  Side effects that you should report to your doctor or health care professional as soon as possible:  -allergic reactions like skin rash or hives, swelling of the face, lips, or tongue  -breathing problems  -fever or chills, sore throat  -irregular heartbeat, chest pain  -joint or muscle pain  -pain or difficulty passing urine  -red pinpoint spots on skin  -redness, blistering, peeling or loosening of the skin, including inside the mouth  -unusual bleeding or bruising  -unusually weak or tired  -yellowing of the eyes or skin  Side effects that usually do not require medical attention (report to your doctor or health care professional if they continue or are bothersome):  -diarrhea  -dizziness  -headache  -loss of appetite  -nausea, vomiting  -nervousness  This list may not describe all possible side effects. Call your doctor for medical advice about side effects. You may report side effects to FDA at 2-807-FDA-4314.  Where should I keep my medicine?  Keep out of the reach of children.  Store at room temperature between 20 to 25 degrees C (68 to 77 degrees F). Protect from light. Throw away any unused medicine after the expiration date.  NOTE: This sheet is a summary. It may not cover all possible information. If you have questions about this medicine, talk to your doctor, pharmacist, or health care provider.  © 2018 Elsevier/Gold Standard (2014-07-25 14:38:26)    Acute Urinary Retention, Male  Acute urinary retention is when you are unable to pee (urinate). Acute urinary retention  is common in older men. Prostates can get bigger, which blocks the flow of pee.  Follow these instructions at home:  · Drink enough fluids to keep your pee clear or pale yellow.  · If you are sent home with a tube that drains the bladder (catheter), there will be a drainage bag attached to it. There are two types of bags. One is big that you can wear at night without having to empty it. One is smaller and needs to be emptied more often.  ¨ Keep the drainage bag empty.  ¨ Keep the drainage bag lower than your catheter.  · Only take medicine as told by your doctor.  Contact a doctor if:  · You have a low-grade fever.  · You have spasms or you are leaking pee when you have spasms.  Get help right away if:  · You have chills or a fever.  · Your catheter stops draining pee.  · Your catheter falls out.  · You have increased bleeding that does not stop after you have rested and increased the amount of fluids you had been drinking.  This information is not intended to replace advice given to you by your health care provider. Make sure you discuss any questions you have with your health care provider.  Document Released: 06/05/2009 Document Revised: 05/25/2017 Document Reviewed: 05/29/2014  [employer] Interactive Patient Education © 2017 [employer]  Sepsis, Adult  Sepsis is a serious infection of your blood or tissues that affects your whole body. The infection that causes sepsis may be bacterial, viral, fungal, or parasitic. Sepsis may be life threatening. Sepsis can cause your blood pressure to drop. This may result in shock. Shock causes your central nervous system and your organs to stop working correctly.  What increases the risk?  Sepsis can happen in anyone, but it is more likely to happen in people who have weakened immune systems.  What are the signs or symptoms?  Symptoms of sepsis can include:  · Fever or low body temperature (hypothermia).  · Rapid breathing (hyperventilation).  · Chills.  · Rapid heartbeat  (tachycardia).  · Confusion or light-headedness.  · Trouble breathing.  · Urinating much less than usual.  · Cool, clammy skin or red, flushed skin.  · Other problems with the heart, kidneys, or brain.  How is this diagnosed?  Your health care provider will likely do tests to look for an infection, to see if the infection has spread to your blood, and to see how serious your condition is. Tests can include:  · Blood tests, including cultures of your blood.  · Cultures of other fluids from your body, such as:  ¨ Urine.  ¨ Pus from wounds.  ¨ Mucus coughed up from your lungs.  · Urine tests other than cultures.  · X-ray exams or other imaging tests.  How is this treated?  Treatment will begin with elimination of the source of infection. If your sepsis is likely caused by a bacterial or fungal infection, you will be given antibiotic or antifungal medicines.  You may also receive:  · Oxygen.  · Fluids through an IV tube.  · Medicines to increase your blood pressure.  · A machine to clean your blood (dialysis) if your kidneys fail.  · A machine to help you breathe if your lungs fail.  Get help right away if:  You get an infection or develop any of the signs and symptoms of sepsis after surgery or a hospitalization.  This information is not intended to replace advice given to you by your health care provider. Make sure you discuss any questions you have with your health care provider.  Document Released: 09/15/2004 Document Revised: 05/25/2017 Document Reviewed: 08/25/2014  [employer] Interactive Patient Education © 2017 [employer]      · Is patient discharged on Warfarin / Coumadin?   No     Depression / Suicide Risk    As you are discharged from this [facility] facility, it is important to learn how to keep safe from harming yourself.    Recognize the warning signs:  · Abrupt changes in personality, positive or negative- including increase in energy   · Giving away possessions  · Change in eating patterns-  significant weight changes-  positive or negative  · Change in sleeping patterns- unable to sleep or sleeping all the time   · Unwillingness or inability to communicate  · Depression  · Unusual sadness, discouragement and loneliness  · Talk of wanting to die  · Neglect of personal appearance   · Rebelliousness- reckless behavior  · Withdrawal from people/activities they love  · Confusion- inability to concentrate     If you or a loved one observes any of these behaviors or has concerns about self-harm, here's what you can do:  · Talk about it- your feelings and reasons for harming yourself  · Remove any means that you might use to hurt yourself (examples: pills, rope, extension cords, firearm)  · Get professional help from the community (Mental Health, Substance Abuse, psychological counseling)  · Do not be alone:Call your Safe Contact- someone whom you trust who will be there for you.  · Call your local CRISIS HOTLINE 365-4138 or 938-041-2249  · Call your local Children's Mobile Crisis Response Team [place] (770) 220-7838 or www.[employer]  · Call the toll free National Suicide Prevention Hotlines   · National Suicide Prevention Lifeline 835-223-ZKKN (0095)  · National Hope Line Network 800-SUICIDE (305-8038)

## 2018-08-03 NOTE — DISCHARGE SUMMARY
Discharge Summary    CHIEF COMPLAINT ON ADMISSION  Chief Complaint   Patient presents with   • ALOC     Starting today, per family pt has been very confused. Pt has 3 cysts on his back that his Wife states she drains everyday. No other complaints   • Fever       Reason for Admission  Other     Admission Date  8/1/2018    CODE STATUS  Full Code    HPI & HOSPITAL COURSE  This is a 63 y.o. Male with hx of TIA admitted for sepsis 2/2 UTI and wound on his back. He also presented with some confusion as per his wife which resolved shortly after arrival. Patient was started on IV antibiotics for his UTI and back wound infection. Patient has hx of melanoma and recently had 3 cysts removed from his back which have been draining and erythematous recently. Wound cultures were sent and are + for Staph aureus and urine is + for GNR. Syncope workup was also done and was negative for the carotid US, echo was normal, labs were essentially unremarkable. May need loop recorder /holter outpatient. EEG was done however has not resulted yet. Patient refused MRI brain.  I discussed that we should wait for the sensitives to come back to choose the most appropriate antibiotics, however patient wants to leave AMA and understands the risks of not being on the appropriate antibiotic. I will dc him on oral antibiotics bactrim and levaquin, fu outpatient with PCP. ER precautions given. Patient and his wife understood and agreed.     Therefore, he is discharged in good and stable condition against medcial advice.    The patient recovered much more quickly than anticipated on admission.    Discharge Date  8/3/2018    FOLLOW UP ITEMS POST DISCHARGE  pcp    DISCHARGE DIAGNOSES  Principal Problem (Resolved):    Severe sepsis due to UTI (HCC) POA: Unknown  Active Problems:    Morbid obesity with BMI of 40.0-44.9, adult (HCC) POA: Yes    Essential hypertension (Chronic) POA: Yes    Hyperlipidemia POA: Unknown    History of melanoma POA: Unknown     Acute cystitis without hematuria POA: Unknown    Cellulitis of back except buttock POA: Unknown  Resolved Problems:    Toxic metabolic encephalopathy POA: Unknown    Hypokalemia POA: Yes    Hyponatremia POA: Unknown      FOLLOW UP  No future appointments.  No follow-up provider specified.    MEDICATIONS ON DISCHARGE     Medication List      START taking these medications      Instructions   levoFLOXacin 500 MG tablet  Commonly known as:  LEVAQUIN   Take 1 Tab by mouth every day for 5 days.  Dose:  500 mg     sulfamethoxazole-trimethoprim 800-160 MG tablet  Commonly known as:  BACTRIM DS   Take 1 Tab by mouth 2 times a day for 7 days.  Dose:  1 Tab        CHANGE how you take these medications      Instructions   * aspirin EC 81 MG Tbec  What changed:  You were already taking a medication with the same name, and this prescription was added. Make sure you understand how and when to take each.  Commonly known as:  ECOTRIN   Take 1 Tab by mouth every day.  Dose:  81 mg     * ASPIRIN  What changed:  Another medication with the same name was added. Make sure you understand how and when to take each.   325 mg every day.  Dose:  325 mg        * This list has 2 medication(s) that are the same as other medications prescribed for you. Read the directions carefully, and ask your doctor or other care provider to review them with you.            CONTINUE taking these medications      Instructions   allopurinol 300 MG Tabs  Commonly known as:  ZYLOPRIM   Take 1 Tab by mouth every day.  Dose:  1 Tab     PRILOSEC OTC PO   1 Cap by Oral route QDAY.  Dose:  1 Cap     simvastatin 40 MG Tabs  Commonly known as:  ZOCOR   Take 1 Tab by mouth every day.  Dose:  1 Tab     spironolactone 25 MG Tabs  Commonly known as:  ALDACTONE   Take 1 Tab by mouth every day.  Dose:  1 Tab     vitamin D 1000 UNIT Tabs  Commonly known as:  cholecalciferol   Take 1,000 Units by mouth 3 times a day.  Dose:  1000 Units            Allergies  Allergies   Allergen  Reactions   • Ativan    • Vicodin [Hydrocodone-Acetaminophen] Rash     Rash, confusion         DIET  Orders Placed This Encounter   Procedures   • Diet Order Cardiac     Standing Status:   Standing     Number of Occurrences:   1     Order Specific Question:   Diet:     Answer:   Cardiac [6]       ACTIVITY  As tolerated.  Weight bearing as tolerated    CONSULTATIONS  none    PROCEDURES  none    LABORATORY  Lab Results   Component Value Date    SODIUM 135 08/03/2018    POTASSIUM 3.6 08/03/2018    CHLORIDE 105 08/03/2018    CO2 22 08/03/2018    GLUCOSE 191 (H) 08/03/2018    BUN 10 08/03/2018    CREATININE 1.01 08/03/2018    CREATININE 1.0 05/13/2009        Lab Results   Component Value Date    WBC 13.0 (H) 08/03/2018    HEMOGLOBIN 13.2 (L) 08/03/2018    HEMATOCRIT 37.9 (L) 08/03/2018    PLATELETCT 146 (L) 08/03/2018        Total time of the discharge process exceeds 40 minutes.

## 2018-08-03 NOTE — PROCEDURES
DATE OF SERVICE:  08/02/2018    HISTORY OF PRESENT ILLNESS:  The patient is a 63-year-old male with a history   of altered sensorium and confusion.  An EEG is requested to rule out   underlying seizure activity.    CONDITION OF RECORDING:  This is a 21-channel, portable, digital video EEG   tracing of approximately 30-minute duration.  Bipolar and referential montages   are used.  Only photic stimulation is done.  The patient is awake, drowsy,   and asleep for the tracing.  He is on Zyloprim and aspirin.    TRACING DESCRIPTION:  As the tracing begins, when the patient is awake, alert,   and with his eyes closed, a very slow alpha frequency rhythm is seen   posteriorly at about 7.5 Hz.  Otherwise, the tracing does continue more   generalized pattern of slowing bilaterally, there is no asymmetry to the   slowing, consisting mostly of higher amplitude theta activity.  Occasional   delta activity is seen, with a frontal accentuation.    Drowsiness and sleep are seen, but otherwise the patterns are mostly of   generalized slowing with frontal and delta activity seen.  No paroxysmal   activity is seen.  No seizure activity is seen.    Photic stimulation revealed little driving response, there is no activation.    IMPRESSION:  This is an abnormal EEG for a patient of this age consistent with   diffuse cerebral dysfunction.  No seizure activity is seen.  Clinical   correlation is suggested.           ____________________________________     MD MOHAMUD FRIEND / JONNY    DD:  08/02/2018 19:02:37  DT:  08/02/2018 19:42:45    D#:  [id number]  Job#:  [id number]

## 2018-08-03 NOTE — PROGRESS NOTES
Dr. Oropeza talked to the pt and spouse regarding POC and new lab result. Pt. Concerns (d/c home & antibiotic meds) were clarified. Pt and spouse verbalized understanding.

## 2018-08-03 NOTE — DISCHARGE PLANNING
Follow up for rehab chart reviewed no therapy need for IRF level of care. No physiatry consult ordered per protocol.

## 2018-08-03 NOTE — PROGRESS NOTES
Tele Strip at 1856 shows SR at 81.       Measurements from am strip were as follows:  WV=0.18  QRS=0.08  QT=0.34     Tele Shift Summary:     Rhythm : SR/ST  Rate : 60s-100s  Ectopy : Per CCT Naresh, pt had no ectopy.      Telemetry monitoring strips placed in pt chart.

## 2018-08-03 NOTE — THERAPY
"SPEECH THERAPY NOTE  Patient politely declined cognitive-linguistic assessment. He and his wife report his cognition is \"back to normal.\" He is alert and Ox4. Although not formally assessed, his motor speech, verbal expression, auditory comprehension, and attention skills appear WNL during our brief conversation. Bedside swallow evaluation completed 8/2/18 indicated normal swallow function. Therefore, no further speech therapy services indicated at this time. Thank you.  "

## 2018-08-03 NOTE — CARE PLAN
Problem: Infection  Goal: Will remain free from infection  Outcome: PROGRESSING AS EXPECTED    Intervention: Give CDC handouts for infection prevention (infection prevention/hand washing, disease specific, and device specific) and document in Education  Educated patient on infection prevention, proper hydration, and hand washing.      Problem: Knowledge Deficit  Goal: Knowledge of disease process/condition, treatment plan, diagnostic tests, and medications will improve  Outcome: PROGRESSING AS EXPECTED  Educate pt on antibiotic use and UTI treatment plan.

## 2018-08-04 LAB
BACTERIA UR CULT: ABNORMAL
BACTERIA UR CULT: ABNORMAL
BACTERIA WND AEROBE CULT: ABNORMAL
BACTERIA WND AEROBE CULT: ABNORMAL
GRAM STN SPEC: ABNORMAL
SIGNIFICANT IND 70042: ABNORMAL
SIGNIFICANT IND 70042: ABNORMAL
SITE SITE: ABNORMAL
SITE SITE: ABNORMAL
SOURCE SOURCE: ABNORMAL
SOURCE SOURCE: ABNORMAL

## 2018-08-06 LAB
BACTERIA BLD CULT: NORMAL
SIGNIFICANT IND 70042: NORMAL
SITE SITE: NORMAL
SOURCE SOURCE: NORMAL

## 2018-08-08 LAB
BACTERIA BLD CULT: NORMAL
SIGNIFICANT IND 70042: NORMAL
SITE SITE: NORMAL
SOURCE SOURCE: NORMAL

## 2018-09-07 ENCOUNTER — APPOINTMENT (RX ONLY)
Dept: URBAN - METROPOLITAN AREA CLINIC 4 | Facility: CLINIC | Age: 63
Setting detail: DERMATOLOGY
End: 2018-09-07

## 2018-09-07 DIAGNOSIS — L72.8 OTHER FOLLICULAR CYSTS OF THE SKIN AND SUBCUTANEOUS TISSUE: ICD-10-CM

## 2018-09-07 PROCEDURE — ? COUNSELING

## 2018-09-07 PROCEDURE — ? PRESCRIPTION

## 2018-09-07 PROCEDURE — ? CONSULTATION EXCISION

## 2018-09-07 PROCEDURE — 99212 OFFICE O/P EST SF 10 MIN: CPT

## 2018-09-07 RX ORDER — SULFAMETHOXAZOLE AND TRIMETHOPRIM 800; 160 MG/1; MG/1
TABLET ORAL
Qty: 14 | Refills: 0 | Status: ERX

## 2018-09-07 ASSESSMENT — LOCATION SIMPLE DESCRIPTION DERM: LOCATION SIMPLE: UPPER BACK

## 2018-09-07 ASSESSMENT — LOCATION DETAILED DESCRIPTION DERM: LOCATION DETAILED: INFERIOR THORACIC SPINE

## 2018-09-07 ASSESSMENT — LOCATION ZONE DERM: LOCATION ZONE: TRUNK

## 2018-09-07 NOTE — HPI: CYST
How Severe Is Your Cyst?: moderate
Is This A New Presentation, Or A Follow-Up?: Cysts
Additional History: Patient had these cultured in the hospital 8/3/18. Results showed staph infection. Patient was prescribed antibiotics and had finished the treatment but still has complaints about irritation in that area.

## 2018-09-07 NOTE — PROCEDURE: COUNSELING
Detail Level: Detailed
Patient Specific Counseling (Will Not Stick From Patient To Patient): Patient instructed to leave cyst alone to determine whether it fills and comes back or if past treatment has gotten rid of it. If it returns, we will schedule an excision to remove it completely.  Pt. concerned that he still has a staph infection.

## 2018-10-15 ENCOUNTER — APPOINTMENT (RX ONLY)
Dept: URBAN - METROPOLITAN AREA CLINIC 20 | Facility: CLINIC | Age: 63
Setting detail: DERMATOLOGY
End: 2018-10-15

## 2018-10-15 DIAGNOSIS — D22 MELANOCYTIC NEVI: ICD-10-CM

## 2018-10-15 DIAGNOSIS — L30.0 NUMMULAR DERMATITIS: ICD-10-CM

## 2018-10-15 DIAGNOSIS — Z85.820 PERSONAL HISTORY OF MALIGNANT MELANOMA OF SKIN: ICD-10-CM

## 2018-10-15 PROBLEM — D48.5 NEOPLASM OF UNCERTAIN BEHAVIOR OF SKIN: Status: ACTIVE | Noted: 2018-10-15

## 2018-10-15 PROCEDURE — ? PRESCRIPTION

## 2018-10-15 PROCEDURE — 99214 OFFICE O/P EST MOD 30 MIN: CPT | Mod: 25

## 2018-10-15 PROCEDURE — 11100: CPT

## 2018-10-15 PROCEDURE — ? ADDITIONAL NOTES

## 2018-10-15 PROCEDURE — ? BIOPSY BY SHAVE METHOD

## 2018-10-15 PROCEDURE — ? COUNSELING

## 2018-10-15 PROCEDURE — 11101: CPT

## 2018-10-15 RX ORDER — TRIAMCINOLONE ACETONIDE 1 MG/G
CREAM TOPICAL BID
Qty: 1 | Refills: 0 | Status: CANCELLED
Stop reason: CLARIF

## 2018-10-15 RX ORDER — FLUOCINONIDE 0.5 MG/G
CREAM TOPICAL
Qty: 1 | Refills: 3 | Status: ERX | COMMUNITY
Start: 2018-10-15

## 2018-10-15 RX ADMIN — FLUOCINONIDE 1: 0.5 CREAM TOPICAL at 00:00

## 2018-10-15 ASSESSMENT — LOCATION DETAILED DESCRIPTION DERM
LOCATION DETAILED: LEFT MID-UPPER BACK
LOCATION DETAILED: RIGHT INFERIOR MEDIAL UPPER BACK
LOCATION DETAILED: LEFT PROXIMAL DORSAL FOREARM

## 2018-10-15 ASSESSMENT — LOCATION ZONE DERM
LOCATION ZONE: ARM
LOCATION ZONE: TRUNK

## 2018-10-15 ASSESSMENT — LOCATION SIMPLE DESCRIPTION DERM
LOCATION SIMPLE: LEFT FOREARM
LOCATION SIMPLE: LEFT UPPER BACK
LOCATION SIMPLE: RIGHT UPPER BACK

## 2018-10-15 NOTE — PROCEDURE: ADDITIONAL NOTES
Detail Level: Simple
Additional Notes: Cerave cream to skin
Additional Notes: Will refer to Dr. augustin at [facility].

## 2018-10-15 NOTE — PROCEDURE: BIOPSY BY SHAVE METHOD
Notification Instructions: Patient will be notified of biopsy results. However, patient instructed to call the office if not contacted within 2 weeks.
Wound Care: Petrolatum
Cryotherapy Text: The wound bed was treated with cryotherapy after the biopsy was performed.
Lab: [id number]
Biopsy Type: H and E
Silver Nitrate Text: The wound bed was treated with silver nitrate after the biopsy was performed.
Render Post-Care Instructions In Note?: no
Additional Anesthesia Volume In Cc (Will Not Render If 0): 0
Post-Care Instructions: I reviewed with the patient in detail post-care instructions. Patient is to keep the biopsy site dry overnight, and then apply bacitracin twice daily until healed. Patient may apply hydrogen peroxide soaks to remove any crusting.
Electrodesiccation And Curettage Text: The wound bed was treated with electrodesiccation and curettage after the biopsy was performed.
Type Of Destruction Used: Curettage
Curettage Text: The wound bed was treated with curettage after the biopsy was performed.
Electrodesiccation Text: The wound bed was treated with electrodesiccation after the biopsy was performed.
Anesthesia Type: 1% lidocaine with epinephrine
Anesthesia Volume In Cc: 0.5
Billing Type: Third-Party Bill
Dressing: bandage
Biopsy Method: Personna blade
Depth Of Biopsy: dermis
Hemostasis: Drysol
Detail Level: Detailed
Consent: Written consent was obtained and risks were reviewed including but not limited to scarring, infection, bleeding, scabbing, incomplete removal, nerve damage and allergy to anesthesia.
Lab Facility: 
Was A Bandage Applied: Yes

## 2018-11-05 ENCOUNTER — APPOINTMENT (RX ONLY)
Dept: URBAN - METROPOLITAN AREA CLINIC 4 | Facility: CLINIC | Age: 63
Setting detail: DERMATOLOGY
End: 2018-11-05

## 2018-11-05 PROBLEM — C44.519 BASAL CELL CARCINOMA OF SKIN OF OTHER PART OF TRUNK: Status: ACTIVE | Noted: 2018-11-05

## 2018-11-05 PROCEDURE — ? CURETTAGE AND DESTRUCTION

## 2018-11-05 PROCEDURE — 17261 DSTRJ MAL LES T/A/L .6-1.0CM: CPT

## 2018-11-05 NOTE — PROCEDURE: CURETTAGE AND DESTRUCTION
Additional Information: (Optional): The wound was cleaned, and a pressure dressing was applied.  The patient received detailed post-op instructions.
Anesthesia Type: 1% lidocaine with epinephrine
Hide Accession Number?: No
Consent was obtained from the patient. The risks, benefits and alternatives to therapy were discussed in detail. Specifically, the risks of infection, scarring, bleeding, prolonged wound healing, nerve injury, incomplete removal, allergy to anesthesia and recurrence were addressed. Alternatives to ED&C, such as: surgical removal and XRT were also discussed.  Prior to the procedure, the treatment site was clearly identified and confirmed by the patient. All components of Universal Protocol/PAUSE Rule completed.
Total Volume (Ccs): 1
Biopsy Photograph Reviewed: Yes
Bill As A Line Item Or As Units: Line Item
Size Of Lesion In Cm: 0.8
What Was Performed First?: Curettage
Number Of Curettages: 3
Cautery Type: electrodesiccation
Detail Level: Detailed
Concentration (Mg/Ml Or Millions Of Plaque Forming Units/Cc): 0.01
Post-Care Instructions: I reviewed with the patient in detail post-care instructions. Patient is to keep the area dry for 48 hours, and not to engage in any swimming until the area is healed. Should the patient develop any fevers, chills, bleeding, severe pain patient will contact the office immediately.

## 2018-11-20 ENCOUNTER — OFFICE VISIT (OUTPATIENT)
Dept: HEMATOLOGY ONCOLOGY | Facility: MEDICAL CENTER | Age: 63
End: 2018-11-20
Payer: MEDICARE

## 2018-11-20 VITALS
RESPIRATION RATE: 16 BRPM | HEART RATE: 74 BPM | TEMPERATURE: 97.9 F | BODY MASS INDEX: 39.28 KG/M2 | SYSTOLIC BLOOD PRESSURE: 110 MMHG | HEIGHT: 72 IN | DIASTOLIC BLOOD PRESSURE: 80 MMHG | WEIGHT: 290.01 LBS | OXYGEN SATURATION: 96 %

## 2018-11-20 DIAGNOSIS — Z85.820 HISTORY OF MELANOMA: ICD-10-CM

## 2018-11-20 PROCEDURE — 99203 OFFICE O/P NEW LOW 30 MIN: CPT | Performed by: INTERNAL MEDICINE

## 2018-11-20 RX ORDER — AMLODIPINE BESYLATE 5 MG/1
5 TABLET ORAL DAILY
COMMUNITY
End: 2022-07-27 | Stop reason: SDUPTHER

## 2018-11-20 RX ORDER — LOSARTAN POTASSIUM 50 MG/1
50 TABLET ORAL DAILY
COMMUNITY
End: 2022-07-27 | Stop reason: SDUPTHER

## 2018-11-20 ASSESSMENT — PAIN SCALES - GENERAL: PAINLEVEL: NO PAIN

## 2018-11-20 NOTE — PROGRESS NOTES
Consult Note: Oncology    Date of consultation: 11/20/2018 1:55 PM    Referring provider: Deondre Atkins, *    Reason for consultation: Stage I melanoma    History of presenting illness:     Status post wide resection of melanoma of the left superior upper back-  Initial path        Sent to [facility] for second opinion      He also had severe episode of presumed alcohol related pancreatitis and had an EUS done which showed a duodenal lesion, pathology was consistent with gist.  He had a PET scan done 5/2018 which was reportedly negative.  He is following up closely with dermatology and recently had one biopsies done.  He is here as he had a [place] DX gene profiling done from his prior melanoma which apparently came back showing class IIA risk.  He currently denies any acute complaints.      Past Medical History:    Past Medical History:   Diagnosis Date   • Bowel habit changes     constipation   • CAD (coronary artery disease)    • Cancer (HCC)     melanoma skin cancer on his back   • HTN (hypertension)    • Hypertension    • Indigestion    • Kidney stones    • Near-syncope     worked up with neg results   • Nerve compression    • Tinnitus     pt states x 10 years and stopped tonight       Past surgical history:    Past Surgical History:   Procedure Laterality Date   • GASTROSCOPY  3/29/2018    Procedure: GASTROSCOPY - W/POSS BIOPSY, DILATION, POLYPECTOMY, CONTROL OF HEMORRHAGE;  Surgeon: Rich Collins M.D.;  Location: [facility];  Service: EUS   • EGD W/ENDOSCOPIC ULTRASOUND  3/29/2018    Procedure: EGD W/ENDOSCOPIC ULTRASOUND;  Surgeon: Rich Collins M.D.;  Location: [facility];  Service: EUS   • EGD WITH ASP/BX  3/29/2018    Procedure: EGD WITH ASP/BX - FNA;  Surgeon: Rich Collins M.D.;  Location: [facility];  Service: EUS       Allergies:  Ativan and Vicodin [hydrocodone-acetaminophen]    Medications:    Current Outpatient Prescriptions   Medication Sig Dispense  Refill   • amLODIPine (NORVASC) 5 MG Tab Take 5 mg by mouth every day.     • losartan (COZAAR) 50 MG Tab Take 50 mg by mouth every day.     • aspirin EC (ECOTRIN) 81 MG Tablet Delayed Response Take 1 Tab by mouth every day. 30 Tab 0   • vitamin D (CHOLECALCIFEROL) 1000 UNIT Tab Take 1,000 Units by mouth 3 times a day.     • SPIRONOLACTONE 25 MG PO TABS Take 1 Tab by mouth every day.     • SIMVASTATIN 40 MG PO TABS Take 1 Tab by mouth every day.     • ALLOPURINOL 300 MG PO TABS Take 1 Tab by mouth every day.     • PRILOSEC OTC PO 1 Cap by Oral route QDAY.      • ASPIRIN 325 mg every day.       No current facility-administered medications for this visit.        Social History:     Social History     Social History   • Marital status:      Spouse name: N/A   • Number of children: N/A   • Years of education: N/A     Occupational History   • Not on file.     Social History Main Topics   • Smoking status: Never Smoker   • Smokeless tobacco: Never Used   • Alcohol use Yes      Comment: i drink per night   • Drug use: No   • Sexual activity: Not on file     Other Topics Concern   • Not on file     Social History Narrative    ** Merged History Encounter **            Family History:   No family history on file.    Review of Systems:  All other review of systems are negative except what was mentioned above in the HPI.    Constitutional: No fever, chills, weight loss ,malaise/fatigue.    HEENT: No new auditory or visual complaints. No sore throat and neck pain.     Respiratory:No new cough, sputum production, shortness of breath and wheezing.    Cardiovascular: No new chest pain, palpitations, orthopnea and leg swelling.    Gastrointestinal: No heartburn, nausea, vomiting ,abdominal pain, hematochezia or melena     Genitourinary: Negative for dysuria, hematuria    Musculoskeletal: No new arthralgias or myalgias   Skin: Negative for rash and itching.    Neurological: Negative for focal weakness or headaches.     Endo/Heme/Allergies: No abnormal bleed/bruise.    Psychiatric/Behavioral: No new depression/anxiety.    Physical Exam:  Vitals:   /80 (BP Location: Right arm, Patient Position: Sitting, BP Cuff Size: Large adult)   Pulse 74   Temp 36.6 °C (97.9 °F) (Temporal)   Resp 16   Ht 1.829 m (6')   Wt (!) 131.5 kg (290 lb 0.2 oz)   SpO2 96%   BMI 39.33 kg/m²     General: Not in acute distress, alert and oriented x 3  HEENT: No pallor, icterus. Oropharynx clear.   Neck: Supple, no palpable masses.  Lymph nodes: No palpable cervical, supraclavicular, axillary or inguinal lymphadenopathy.    CVS: regular rate and rhythm, no rubs or gallops  RESP: Clear to auscultate bilaterally, no wheezing or crackles.   ABD: Soft, non tender, non distended, positive bowel sounds, no palpable organomegaly  EXT: No edema or cyanosis  CNS: Alert and oriented x3, No focal deficits.  Skin-multiple skin lesions and evidence of prior biopsies    Labs:   No results for input(s): RBC, HEMOGLOBIN, HEMATOCRIT, PLATELETCT, PROTHROMBTM, APTT, INR, IRON, FERRITIN, TOTIRONBC in the last 72 hours.  Lab Results   Component Value Date/Time    SODIUM 135 08/03/2018 08:39 AM    POTASSIUM 3.6 08/03/2018 08:39 AM    CHLORIDE 105 08/03/2018 08:39 AM    CO2 22 08/03/2018 08:39 AM    GLUCOSE 191 (H) 08/03/2018 08:39 AM    BUN 10 08/03/2018 08:39 AM    CREATININE 1.01 08/03/2018 08:39 AM    CREATININE 1.0 05/13/2009 03:30 PM        Assessment and Plan:     Stage I melanoma T1aNx of the left upper back.  He reportedly had wide resection.  There is no documented ulceration.  Mitotic figures were low.  [facility] second opinion did take into account possible retraction of the melanoma giving it 7 mm depth.  He did not have sentinel lymph node biopsy done at that time.  He had a PET scan done in 5/2018 which did not reveal any obvious metastatic disease.   [place] DX GEP reportedly shows high risk indicating her risk of systemic relapse and metastasis.  Informed  the patient that currently gene expression profiling data is not used for any standard recommendation when it comes to surveillance or treatment options.    Given the high risk, I would recommend a follow-up PET scan one year from the last one which will be in May 2019.  Will return to the clinic after that.  Annual surveillance CT scans after that will be reasonable.  Also discussed the fact that he had a small gist tumor found in the duodenum and will also keep on eye on the imaging studies to make sure that these are not progressing.  No other recommendation at this point        He agreed and verbalized his agreement and understanding with the current plan.  I answered all questions and concerns he has at this time.              Thank you for allowing me to participate in his care.    Please note that this dictation was created using voice recognition software. I have made every reasonable attempt to correct obvious errors, but I expect that there are errors of grammar and possibly content that I did not discover before finalizing the note.      SIGNATURES:  Dinesh Barr    CC:  KYA Madrigal Jeffrey B, *

## 2019-01-21 ENCOUNTER — APPOINTMENT (RX ONLY)
Dept: URBAN - METROPOLITAN AREA CLINIC 4 | Facility: CLINIC | Age: 64
Setting detail: DERMATOLOGY
End: 2019-01-21

## 2019-01-21 DIAGNOSIS — L91.8 OTHER HYPERTROPHIC DISORDERS OF THE SKIN: ICD-10-CM

## 2019-01-21 DIAGNOSIS — L82.1 OTHER SEBORRHEIC KERATOSIS: ICD-10-CM

## 2019-01-21 DIAGNOSIS — Z85.820 PERSONAL HISTORY OF MALIGNANT MELANOMA OF SKIN: ICD-10-CM

## 2019-01-21 DIAGNOSIS — Z85.828 PERSONAL HISTORY OF OTHER MALIGNANT NEOPLASM OF SKIN: ICD-10-CM

## 2019-01-21 DIAGNOSIS — D22 MELANOCYTIC NEVI: ICD-10-CM

## 2019-01-21 PROBLEM — D22.5 MELANOCYTIC NEVI OF TRUNK: Status: ACTIVE | Noted: 2019-01-21

## 2019-01-21 PROBLEM — D22.4 MELANOCYTIC NEVI OF SCALP AND NECK: Status: ACTIVE | Noted: 2019-01-21

## 2019-01-21 PROCEDURE — 99214 OFFICE O/P EST MOD 30 MIN: CPT

## 2019-01-21 PROCEDURE — ? OBSERVATION

## 2019-01-21 PROCEDURE — ? COUNSELING

## 2019-01-21 ASSESSMENT — LOCATION DETAILED DESCRIPTION DERM
LOCATION DETAILED: LEFT CLAVICULAR NECK
LOCATION DETAILED: RIGHT OCCIPITAL SCALP
LOCATION DETAILED: EPIGASTRIC SKIN
LOCATION DETAILED: LEFT POSTERIOR SHOULDER
LOCATION DETAILED: RIGHT SUPERIOR LATERAL LOWER BACK
LOCATION DETAILED: LEFT SUPERIOR UPPER BACK

## 2019-01-21 ASSESSMENT — LOCATION SIMPLE DESCRIPTION DERM
LOCATION SIMPLE: LEFT ANTERIOR NECK
LOCATION SIMPLE: LEFT UPPER BACK
LOCATION SIMPLE: LEFT SHOULDER
LOCATION SIMPLE: ABDOMEN
LOCATION SIMPLE: RIGHT LOWER BACK
LOCATION SIMPLE: POSTERIOR SCALP

## 2019-01-21 ASSESSMENT — LOCATION ZONE DERM
LOCATION ZONE: TRUNK
LOCATION ZONE: NECK
LOCATION ZONE: ARM
LOCATION ZONE: SCALP

## 2019-04-22 ENCOUNTER — TELEPHONE (OUTPATIENT)
Dept: HEMATOLOGY ONCOLOGY | Facility: MEDICAL CENTER | Age: 64
End: 2019-04-22

## 2019-04-22 ENCOUNTER — APPOINTMENT (OUTPATIENT)
Dept: HEMATOLOGY ONCOLOGY | Facility: MEDICAL CENTER | Age: 64
End: 2019-04-22
Payer: MEDICARE

## 2019-04-22 NOTE — TELEPHONE ENCOUNTER
1st attempt:     Patient left a message on Sunday stating he would need to reschedule his appointment for today. I left a message for him to contact our office to confirm the new date and time of his next appointment.

## 2019-04-23 ENCOUNTER — APPOINTMENT (OUTPATIENT)
Dept: HEMATOLOGY ONCOLOGY | Facility: MEDICAL CENTER | Age: 64
End: 2019-04-23
Payer: MEDICARE

## 2019-04-23 NOTE — TELEPHONE ENCOUNTER
2nd attempt:     I spoke with the patient to reschedule his appointment. He is picking up his kids and will call the office back.

## 2019-05-22 ENCOUNTER — APPOINTMENT (RX ONLY)
Dept: URBAN - METROPOLITAN AREA CLINIC 4 | Facility: CLINIC | Age: 64
Setting detail: DERMATOLOGY
End: 2019-05-22

## 2019-05-22 DIAGNOSIS — Z85.820 PERSONAL HISTORY OF MALIGNANT MELANOMA OF SKIN: ICD-10-CM

## 2019-05-22 DIAGNOSIS — Z85.828 PERSONAL HISTORY OF OTHER MALIGNANT NEOPLASM OF SKIN: ICD-10-CM

## 2019-05-22 DIAGNOSIS — D22 MELANOCYTIC NEVI: ICD-10-CM

## 2019-05-22 PROBLEM — D22.5 MELANOCYTIC NEVI OF TRUNK: Status: ACTIVE | Noted: 2019-05-22

## 2019-05-22 PROCEDURE — 99214 OFFICE O/P EST MOD 30 MIN: CPT

## 2019-05-22 PROCEDURE — ? COUNSELING

## 2019-05-22 ASSESSMENT — LOCATION DETAILED DESCRIPTION DERM
LOCATION DETAILED: LEFT INFERIOR MEDIAL MIDBACK
LOCATION DETAILED: LEFT POSTERIOR SHOULDER
LOCATION DETAILED: RIGHT MEDIAL UPPER BACK
LOCATION DETAILED: RIGHT SUPERIOR MEDIAL MIDBACK
LOCATION DETAILED: LEFT SUPERIOR UPPER BACK

## 2019-05-22 ASSESSMENT — LOCATION SIMPLE DESCRIPTION DERM
LOCATION SIMPLE: RIGHT UPPER BACK
LOCATION SIMPLE: LEFT SHOULDER
LOCATION SIMPLE: LEFT LOWER BACK
LOCATION SIMPLE: RIGHT LOWER BACK
LOCATION SIMPLE: LEFT UPPER BACK

## 2019-05-22 ASSESSMENT — LOCATION ZONE DERM
LOCATION ZONE: ARM
LOCATION ZONE: TRUNK

## 2019-05-22 NOTE — PROCEDURE: COUNSELING
When Should The Patient Follow-Up For Their Next Full-Body Skin Exam?: 3 Months
Quality 137: Melanoma: Continuity Of Care - Recall System: Patient information entered into a recall system that includes: target date for the next exam specified AND a process to follow up with patients regarding missed or unscheduled appointments
Detail Level: Detailed
Detail Level: Generalized

## 2019-09-11 ENCOUNTER — APPOINTMENT (RX ONLY)
Dept: URBAN - METROPOLITAN AREA CLINIC 4 | Facility: CLINIC | Age: 64
Setting detail: DERMATOLOGY
End: 2019-09-11

## 2019-09-11 DIAGNOSIS — L82.1 OTHER SEBORRHEIC KERATOSIS: ICD-10-CM

## 2019-09-11 DIAGNOSIS — D18.0 HEMANGIOMA: ICD-10-CM

## 2019-09-11 DIAGNOSIS — D22 MELANOCYTIC NEVI: ICD-10-CM

## 2019-09-11 DIAGNOSIS — Z85.820 PERSONAL HISTORY OF MALIGNANT MELANOMA OF SKIN: ICD-10-CM

## 2019-09-11 PROBLEM — D18.01 HEMANGIOMA OF SKIN AND SUBCUTANEOUS TISSUE: Status: ACTIVE | Noted: 2019-09-11

## 2019-09-11 PROBLEM — D22.5 MELANOCYTIC NEVI OF TRUNK: Status: ACTIVE | Noted: 2019-09-11

## 2019-09-11 PROCEDURE — ? OBSERVATION

## 2019-09-11 PROCEDURE — ? COUNSELING

## 2019-09-11 PROCEDURE — 99214 OFFICE O/P EST MOD 30 MIN: CPT

## 2019-09-11 ASSESSMENT — LOCATION DETAILED DESCRIPTION DERM
LOCATION DETAILED: EPIGASTRIC SKIN
LOCATION DETAILED: RIGHT MEDIAL UPPER BACK
LOCATION DETAILED: RIGHT INFERIOR MEDIAL UPPER BACK
LOCATION DETAILED: LEFT SUPERIOR LATERAL MIDBACK
LOCATION DETAILED: LEFT MID-UPPER BACK

## 2019-09-11 ASSESSMENT — LOCATION SIMPLE DESCRIPTION DERM
LOCATION SIMPLE: LEFT UPPER BACK
LOCATION SIMPLE: ABDOMEN
LOCATION SIMPLE: RIGHT UPPER BACK
LOCATION SIMPLE: LEFT LOWER BACK

## 2019-09-11 ASSESSMENT — LOCATION ZONE DERM: LOCATION ZONE: TRUNK

## 2019-09-11 NOTE — PROCEDURE: COUNSELING
Detail Level: Zone
Detail Level: Generalized
Detail Level: Detailed
Quality 137: Melanoma: Continuity Of Care - Recall System: Patient information entered into a recall system that includes: target date for the next exam specified AND a process to follow up with patients regarding missed or unscheduled appointments
When Should The Patient Follow-Up For Their Next Full-Body Skin Exam?: 3 Months

## 2019-09-11 NOTE — HPI: EVALUATION OF SKIN LESION(S)
What Type Of Note Output Would You Prefer (Optional)?: Standard Output
Hpi Title: Evaluation of Skin Lesions
Location: Left upper back
Year Removed: 2016

## 2019-12-11 ENCOUNTER — APPOINTMENT (RX ONLY)
Dept: URBAN - METROPOLITAN AREA CLINIC 4 | Facility: CLINIC | Age: 64
Setting detail: DERMATOLOGY
End: 2019-12-11

## 2019-12-11 DIAGNOSIS — D22 MELANOCYTIC NEVI: ICD-10-CM

## 2019-12-11 DIAGNOSIS — Z85.820 PERSONAL HISTORY OF MALIGNANT MELANOMA OF SKIN: ICD-10-CM

## 2019-12-11 DIAGNOSIS — L82.1 OTHER SEBORRHEIC KERATOSIS: ICD-10-CM

## 2019-12-11 PROBLEM — D23.0 OTHER BENIGN NEOPLASM OF SKIN OF LIP: Status: ACTIVE | Noted: 2019-12-11

## 2019-12-11 PROBLEM — D22.5 MELANOCYTIC NEVI OF TRUNK: Status: ACTIVE | Noted: 2019-12-11

## 2019-12-11 PROCEDURE — 99214 OFFICE O/P EST MOD 30 MIN: CPT

## 2019-12-11 PROCEDURE — ? COUNSELING

## 2019-12-11 PROCEDURE — ? OBSERVATION

## 2019-12-11 ASSESSMENT — LOCATION SIMPLE DESCRIPTION DERM
LOCATION SIMPLE: RIGHT UPPER BACK
LOCATION SIMPLE: LEFT UPPER BACK

## 2019-12-11 ASSESSMENT — LOCATION DETAILED DESCRIPTION DERM
LOCATION DETAILED: LEFT LATERAL UPPER BACK
LOCATION DETAILED: RIGHT INFERIOR MEDIAL UPPER BACK
LOCATION DETAILED: LEFT SUPERIOR UPPER BACK

## 2019-12-11 ASSESSMENT — LOCATION ZONE DERM: LOCATION ZONE: TRUNK

## 2019-12-11 NOTE — PROCEDURE: OBSERVATION
Size Of Lesion In Cm (Optional): 0
Morphology Per Location (Optional): Skin colored papule
Detail Level: Detailed

## 2019-12-11 NOTE — HPI: EVALUATION OF SKIN LESION(S)
What Type Of Note Output Would You Prefer (Optional)?: Bullet Format
Hpi Title: Evaluation of a Skin Lesion
Location: Left superior upper back 0.7 mm
Year Removed: 2018

## 2019-12-11 NOTE — PROCEDURE: COUNSELING
Detail Level: Generalized
When Should The Patient Follow-Up For Their Next Full-Body Skin Exam?: 3 Months
Quality 137: Melanoma: Continuity Of Care - Recall System: Patient information entered into a recall system that includes: target date for the next exam specified AND a process to follow up with patients regarding missed or unscheduled appointments
Detail Level: Detailed

## 2020-03-11 ENCOUNTER — RX ONLY (OUTPATIENT)
Age: 65
Setting detail: RX ONLY
End: 2020-03-11

## 2020-03-11 ENCOUNTER — APPOINTMENT (RX ONLY)
Dept: URBAN - METROPOLITAN AREA CLINIC 4 | Facility: CLINIC | Age: 65
Setting detail: DERMATOLOGY
End: 2020-03-11

## 2020-03-11 DIAGNOSIS — L20.89 OTHER ATOPIC DERMATITIS: ICD-10-CM

## 2020-03-11 DIAGNOSIS — D22 MELANOCYTIC NEVI: ICD-10-CM | Status: UNCHANGED

## 2020-03-11 DIAGNOSIS — L82.1 OTHER SEBORRHEIC KERATOSIS: ICD-10-CM

## 2020-03-11 DIAGNOSIS — Z85.828 PERSONAL HISTORY OF OTHER MALIGNANT NEOPLASM OF SKIN: ICD-10-CM

## 2020-03-11 DIAGNOSIS — Z85.820 PERSONAL HISTORY OF MALIGNANT MELANOMA OF SKIN: ICD-10-CM

## 2020-03-11 PROBLEM — D22.5 MELANOCYTIC NEVI OF TRUNK: Status: ACTIVE | Noted: 2020-03-11

## 2020-03-11 PROBLEM — D23.0 OTHER BENIGN NEOPLASM OF SKIN OF LIP: Status: ACTIVE | Noted: 2020-03-11

## 2020-03-11 PROBLEM — L20.84 INTRINSIC (ALLERGIC) ECZEMA: Status: ACTIVE | Noted: 2020-03-11

## 2020-03-11 PROCEDURE — 99214 OFFICE O/P EST MOD 30 MIN: CPT

## 2020-03-11 PROCEDURE — ? OBSERVATION

## 2020-03-11 PROCEDURE — ? COUNSELING

## 2020-03-11 RX ORDER — FLUOCINONIDE 1 MG/G
CREAM TOPICAL
Qty: 1 | Refills: 2 | Status: ERX | COMMUNITY
Start: 2020-03-11

## 2020-03-11 ASSESSMENT — LOCATION SIMPLE DESCRIPTION DERM
LOCATION SIMPLE: LEFT UPPER BACK
LOCATION SIMPLE: RIGHT LOWER BACK
LOCATION SIMPLE: LEFT SHOULDER
LOCATION SIMPLE: LEFT TEMPLE

## 2020-03-11 ASSESSMENT — LOCATION DETAILED DESCRIPTION DERM
LOCATION DETAILED: LEFT CENTRAL TEMPLE
LOCATION DETAILED: LEFT SUPERIOR UPPER BACK
LOCATION DETAILED: LEFT POSTERIOR SHOULDER
LOCATION DETAILED: RIGHT SUPERIOR LATERAL LOWER BACK

## 2020-03-11 ASSESSMENT — LOCATION ZONE DERM
LOCATION ZONE: FACE
LOCATION ZONE: ARM
LOCATION ZONE: TRUNK

## 2020-03-11 NOTE — PROCEDURE: MIPS QUALITY
Quality 111:Pneumonia Vaccination Status For Older Adults: Pneumococcal Vaccination Previously Received
Detail Level: Detailed
Quality 137: Melanoma: Continuity Of Care - Recall System: Patient information entered into a recall system that includes: target date for the next exam specified AND a process to follow up with patients regarding missed or unscheduled appointments
Quality 397: Melanoma: Reporting: The pathology report includes a pT Category and statement on thickness and ulceration for pT1, mitotic rate.
Quality 130: Documentation Of Current Medications In The Medical Record: Current Medications Documented
Quality 138: Melanoma: Coordination Of Care: A treatment plan was communicated to the physicians providing continuing care within one month of diagnosis outlining: diagnosis, tumor thickness and a plan for surgery or alternate care.
Quality 265: Biopsy Follow-Up: Biopsy results reviewed, communicated, tracked, and documented

## 2020-03-11 NOTE — PROCEDURE: COUNSELING
Detail Level: Detailed
Quality 137: Melanoma: Continuity Of Care - Recall System: Patient information entered into a recall system that includes: target date for the next exam specified AND a process to follow up with patients regarding missed or unscheduled appointments
When Should The Patient Follow-Up For Their Next Full-Body Skin Exam?: 3 Months
Detail Level: Simple

## 2020-03-17 RX ORDER — FLUOCINONIDE 0.5 MG/G
CREAM TOPICAL
Qty: 1 | Refills: 3 | Status: ERX

## 2020-09-14 ENCOUNTER — APPOINTMENT (RX ONLY)
Dept: URBAN - METROPOLITAN AREA CLINIC 4 | Facility: CLINIC | Age: 65
Setting detail: DERMATOLOGY
End: 2020-09-14

## 2020-09-14 DIAGNOSIS — I87.2 VENOUS INSUFFICIENCY (CHRONIC) (PERIPHERAL): ICD-10-CM

## 2020-09-14 DIAGNOSIS — L82.1 OTHER SEBORRHEIC KERATOSIS: ICD-10-CM

## 2020-09-14 DIAGNOSIS — D22 MELANOCYTIC NEVI: ICD-10-CM

## 2020-09-14 DIAGNOSIS — Z85.820 PERSONAL HISTORY OF MALIGNANT MELANOMA OF SKIN: ICD-10-CM

## 2020-09-14 PROBLEM — D22.5 MELANOCYTIC NEVI OF TRUNK: Status: ACTIVE | Noted: 2020-09-14

## 2020-09-14 PROCEDURE — ? COUNSELING

## 2020-09-14 PROCEDURE — ? TREATMENT REGIMEN

## 2020-09-14 PROCEDURE — 99214 OFFICE O/P EST MOD 30 MIN: CPT

## 2020-09-14 PROCEDURE — ? OBSERVATION

## 2020-09-14 PROCEDURE — ? PRESCRIPTION

## 2020-09-14 RX ORDER — DOXYCYCLINE HYCLATE 100 MG/1
CAPSULE, GELATIN COATED ORAL
Qty: 20 | Refills: 0 | Status: ERX | COMMUNITY
Start: 2020-09-14

## 2020-09-14 RX ORDER — TRIAMCINOLONE ACETONIDE 1 MG/G
OINTMENT TOPICAL
Qty: 1 | Refills: 0 | Status: ERX | COMMUNITY
Start: 2020-09-14

## 2020-09-14 RX ADMIN — DOXYCYCLINE HYCLATE: 100 CAPSULE, GELATIN COATED ORAL at 00:00

## 2020-09-14 RX ADMIN — TRIAMCINOLONE ACETONIDE 1: 1 OINTMENT TOPICAL at 00:00

## 2020-09-14 ASSESSMENT — LOCATION DETAILED DESCRIPTION DERM
LOCATION DETAILED: LEFT POSTERIOR EAR
LOCATION DETAILED: RIGHT PROXIMAL PRETIBIAL REGION
LOCATION DETAILED: LEFT SUPERIOR LATERAL MIDBACK
LOCATION DETAILED: LEFT PROXIMAL PRETIBIAL REGION
LOCATION DETAILED: LEFT SUPERIOR UPPER BACK

## 2020-09-14 ASSESSMENT — LOCATION SIMPLE DESCRIPTION DERM
LOCATION SIMPLE: LEFT LOWER BACK
LOCATION SIMPLE: LEFT PRETIBIAL REGION
LOCATION SIMPLE: LEFT UPPER BACK
LOCATION SIMPLE: RIGHT PRETIBIAL REGION
LOCATION SIMPLE: LEFT EAR

## 2020-09-14 ASSESSMENT — LOCATION ZONE DERM
LOCATION ZONE: LEG
LOCATION ZONE: TRUNK
LOCATION ZONE: EAR

## 2020-09-14 NOTE — PROCEDURE: TREATMENT REGIMEN
Detail Level: Zone
Discontinue Regimen: Rubbing alcohol
Initiate Treatment: Compression stockings to the knees, 20mm strength . Can order this online. Refer back to pcp for possibly different blood pressure medicine. Have lungs evaluated to make sure Pts is not retaining fluid in the lungs.

## 2020-09-14 NOTE — HPI: RASH
What Type Of Note Output Would You Prefer (Optional)?: Standard Output
Is The Patient Presenting As Previously Scheduled?: Yes
How Severe Is Your Rash?: moderate
Is This A New Presentation, Or A Follow-Up?: Rash
Additional History: History of gout, no recent visits with pcp.

## 2020-09-17 ENCOUNTER — HOSPITAL ENCOUNTER (OUTPATIENT)
Dept: LAB | Facility: MEDICAL CENTER | Age: 65
End: 2020-09-17
Attending: FAMILY MEDICINE
Payer: MEDICARE

## 2020-09-17 ENCOUNTER — HOSPITAL ENCOUNTER (OUTPATIENT)
Dept: RADIOLOGY | Facility: MEDICAL CENTER | Age: 65
End: 2020-09-17
Attending: FAMILY MEDICINE
Payer: MEDICARE

## 2020-09-17 DIAGNOSIS — R05.9 COUGH: ICD-10-CM

## 2020-09-17 LAB
ALBUMIN SERPL BCP-MCNC: 4.3 G/DL (ref 3.2–4.9)
ALBUMIN/GLOB SERPL: 1.6 G/DL
ALP SERPL-CCNC: 100 U/L (ref 30–99)
ALT SERPL-CCNC: 22 U/L (ref 2–50)
ANION GAP SERPL CALC-SCNC: 16 MMOL/L (ref 7–16)
APPEARANCE UR: CLEAR
AST SERPL-CCNC: 30 U/L (ref 12–45)
BASOPHILS # BLD AUTO: 0.2 % (ref 0–1.8)
BASOPHILS # BLD: 0.02 K/UL (ref 0–0.12)
BILIRUB SERPL-MCNC: 0.8 MG/DL (ref 0.1–1.5)
BILIRUB UR QL STRIP.AUTO: NEGATIVE
BUN SERPL-MCNC: 23 MG/DL (ref 8–22)
CALCIUM SERPL-MCNC: 9.6 MG/DL (ref 8.4–10.2)
CHLORIDE SERPL-SCNC: 97 MMOL/L (ref 96–112)
CO2 SERPL-SCNC: 24 MMOL/L (ref 20–33)
COLOR UR: YELLOW
CREAT SERPL-MCNC: 0.93 MG/DL (ref 0.5–1.4)
EOSINOPHIL # BLD AUTO: 0.38 K/UL (ref 0–0.51)
EOSINOPHIL NFR BLD: 4.5 % (ref 0–6.9)
ERYTHROCYTE [DISTWIDTH] IN BLOOD BY AUTOMATED COUNT: 42 FL (ref 35.9–50)
FASTING STATUS PATIENT QL REPORTED: NORMAL
GLOBULIN SER CALC-MCNC: 2.7 G/DL (ref 1.9–3.5)
GLUCOSE SERPL-MCNC: 180 MG/DL (ref 65–99)
GLUCOSE UR STRIP.AUTO-MCNC: NEGATIVE MG/DL
HCT VFR BLD AUTO: 44.5 % (ref 42–52)
HGB BLD-MCNC: 15.6 G/DL (ref 14–18)
IMM GRANULOCYTES # BLD AUTO: 0.03 K/UL (ref 0–0.11)
IMM GRANULOCYTES NFR BLD AUTO: 0.4 % (ref 0–0.9)
KETONES UR STRIP.AUTO-MCNC: NEGATIVE MG/DL
LEUKOCYTE ESTERASE UR QL STRIP.AUTO: NEGATIVE
LYMPHOCYTES # BLD AUTO: 1.47 K/UL (ref 1–4.8)
LYMPHOCYTES NFR BLD: 17.5 % (ref 22–41)
MCH RBC QN AUTO: 29.3 PG (ref 27–33)
MCHC RBC AUTO-ENTMCNC: 35.1 G/DL (ref 33.7–35.3)
MCV RBC AUTO: 83.6 FL (ref 81.4–97.8)
MICRO URNS: NORMAL
MONOCYTES # BLD AUTO: 0.58 K/UL (ref 0–0.85)
MONOCYTES NFR BLD AUTO: 6.9 % (ref 0–13.4)
NEUTROPHILS # BLD AUTO: 5.91 K/UL (ref 1.82–7.42)
NEUTROPHILS NFR BLD: 70.5 % (ref 44–72)
NITRITE UR QL STRIP.AUTO: NEGATIVE
NRBC # BLD AUTO: 0 K/UL
NRBC BLD-RTO: 0 /100 WBC
NT-PROBNP SERPL IA-MCNC: 12 PG/ML (ref 0–125)
PH UR STRIP.AUTO: 5.5 [PH] (ref 5–8)
PLATELET # BLD AUTO: 120 K/UL (ref 164–446)
PMV BLD AUTO: 10.8 FL (ref 9–12.9)
POTASSIUM SERPL-SCNC: 4.3 MMOL/L (ref 3.6–5.5)
PROT SERPL-MCNC: 7 G/DL (ref 6–8.2)
PROT UR QL STRIP: NEGATIVE MG/DL
PSA SERPL-MCNC: 9.94 NG/ML (ref 0–4)
RBC # BLD AUTO: 5.32 M/UL (ref 4.7–6.1)
RBC UR QL AUTO: NEGATIVE
SODIUM SERPL-SCNC: 137 MMOL/L (ref 135–145)
SP GR UR REFRACTOMETRY: 1.03
WBC # BLD AUTO: 8.4 K/UL (ref 4.8–10.8)

## 2020-09-17 PROCEDURE — 84153 ASSAY OF PSA TOTAL: CPT

## 2020-09-17 PROCEDURE — 85025 COMPLETE CBC W/AUTO DIFF WBC: CPT

## 2020-09-17 PROCEDURE — 36415 COLL VENOUS BLD VENIPUNCTURE: CPT

## 2020-09-17 PROCEDURE — 71046 X-RAY EXAM CHEST 2 VIEWS: CPT

## 2020-09-17 PROCEDURE — 81003 URINALYSIS AUTO W/O SCOPE: CPT

## 2020-09-17 PROCEDURE — 83880 ASSAY OF NATRIURETIC PEPTIDE: CPT

## 2020-09-17 PROCEDURE — 80053 COMPREHEN METABOLIC PANEL: CPT

## 2020-09-20 ENCOUNTER — OFFICE VISIT (OUTPATIENT)
Dept: URGENT CARE | Facility: CLINIC | Age: 65
End: 2020-09-20
Payer: MEDICARE

## 2020-09-20 ENCOUNTER — HOSPITAL ENCOUNTER (OUTPATIENT)
Dept: RADIOLOGY | Facility: MEDICAL CENTER | Age: 65
End: 2020-09-20
Attending: FAMILY MEDICINE
Payer: MEDICARE

## 2020-09-20 VITALS
WEIGHT: 307 LBS | DIASTOLIC BLOOD PRESSURE: 72 MMHG | SYSTOLIC BLOOD PRESSURE: 140 MMHG | TEMPERATURE: 97.5 F | HEIGHT: 72 IN | RESPIRATION RATE: 14 BRPM | OXYGEN SATURATION: 94 % | HEART RATE: 84 BPM | BODY MASS INDEX: 41.58 KG/M2

## 2020-09-20 DIAGNOSIS — M79.661 RIGHT CALF PAIN: ICD-10-CM

## 2020-09-20 DIAGNOSIS — L50.9 URTICARIA: ICD-10-CM

## 2020-09-20 PROCEDURE — 93971 EXTREMITY STUDY: CPT | Mod: RT

## 2020-09-20 PROCEDURE — 99204 OFFICE O/P NEW MOD 45 MIN: CPT | Performed by: FAMILY MEDICINE

## 2020-09-20 RX ORDER — PREDNISONE 20 MG/1
40 TABLET ORAL DAILY
Qty: 10 TAB | Refills: 0 | Status: SHIPPED | OUTPATIENT
Start: 2020-09-20 | End: 2020-09-25

## 2020-09-20 RX ORDER — SILDENAFIL 25 MG/1
TABLET, FILM COATED ORAL
COMMUNITY
End: 2022-07-27

## 2020-09-20 RX ORDER — NAPROXEN 500 MG/1
TABLET ORAL
COMMUNITY
End: 2022-07-27

## 2020-09-20 ASSESSMENT — ENCOUNTER SYMPTOMS
NERVOUS/ANXIOUS: 0
NAUSEA: 0
EYE DISCHARGE: 0
FLANK PAIN: 0
HEMOPTYSIS: 0
SHORTNESS OF BREATH: 1
MYALGIAS: 0
WEIGHT LOSS: 0
PALPITATIONS: 0
COUGH: 0
FOCAL WEAKNESS: 0
SENSORY CHANGE: 0
VOMITING: 0
DEPRESSION: 0
EYE REDNESS: 0

## 2020-09-20 ASSESSMENT — FIBROSIS 4 INDEX: FIB4 SCORE: 3.57

## 2020-09-20 NOTE — PROGRESS NOTES
Subjective:      Sharon Guardado is a 65 y.o. male who presents with Rash (arm legs feet)            Chronic lower extremity edema with stasis dermatitis.  Right leg is significantly more swollen with mild calf pain.  No chest pain or shortness of breath.  No PMH/FH DVT/PE/thrombophilia.  He has been treating skin changes of lower extremities with topical corticosteroid and oral doxycycline.  He has had minimal relief of symptoms and now has itching rash extending to upper extremity.  He has tried topical antihistamine but no oral antihistamine.      Review of Systems   Constitutional: Negative for malaise/fatigue and weight loss.   HENT: Negative for ear discharge and ear pain.         No loss of taste or smell   Eyes: Negative for discharge and redness.   Respiratory: Positive for shortness of breath (mild unchanged). Negative for cough and hemoptysis.    Cardiovascular: Negative for chest pain and palpitations.   Gastrointestinal: Negative for nausea and vomiting.   Genitourinary: Negative for flank pain and hematuria.   Musculoskeletal: Negative for joint pain and myalgias.   Skin: Positive for itching and rash.   Neurological: Negative for sensory change and focal weakness.   Psychiatric/Behavioral: Negative for depression. The patient is not nervous/anxious.      .  Medications, Allergies, and current problem list reviewed today in Epic  PMH:  has a past medical history of Bowel habit changes, CAD (coronary artery disease), Cancer (HCC), HTN (hypertension), Hypertension, Indigestion, Kidney stones, Near-syncope, Nerve compression, and Tinnitus.  MEDS:   Current Outpatient Medications:   •  TADALAFIL PO, Tadalafil lozenges 17.5 mg  take one lozenge 30-60 min prior to intercourse prn, Disp: , Rfl:   •  predniSONE (DELTASONE) 20 MG Tab, Take 2 Tabs by mouth every day for 5 days., Disp: 10 Tab, Rfl: 0  •  amLODIPine (NORVASC) 5 MG Tab, Take 5 mg by mouth every day., Disp: , Rfl:   •  losartan (COZAAR) 50 MG  Tab, Take 50 mg by mouth every day., Disp: , Rfl:   •  vitamin D (CHOLECALCIFEROL) 1000 UNIT Tab, Take 1,000 Units by mouth 3 times a day., Disp: , Rfl:   •  SPIRONOLACTONE 25 MG PO TABS, Take 1 Tab by mouth every day., Disp: , Rfl:   •  SIMVASTATIN 40 MG PO TABS, Take 1 Tab by mouth every day., Disp: , Rfl:   •  ALLOPURINOL 300 MG PO TABS, Take 1 Tab by mouth every day., Disp: , Rfl:   •  PRILOSEC OTC PO, 1 Cap by Oral route QDAY. , Disp: , Rfl:   •  ASPIRIN, 325 mg every day., Disp: , Rfl:   •  sildenafil citrate (VIAGRA) 25 MG Tab, sildenafil 25 mg tablet  take 1-4 tabs by mouth 30-60 min before intercourse. max dose 100mg per 24 hours. Do not take with alpha-blocker and nitroglycerin, Disp: , Rfl:   •  naproxen (NAPROSYN) 500 MG Tab, naproxen 500 mg tablet, Disp: , Rfl:   ALLERGIES:   Allergies   Allergen Reactions   • Ativan    • Vicodin [Hydrocodone-Acetaminophen] Rash     Rash, confusion       SURGHX:   Past Surgical History:   Procedure Laterality Date   • GASTROSCOPY  3/29/2018    Procedure: GASTROSCOPY - W/POSS BIOPSY, DILATION, POLYPECTOMY, CONTROL OF HEMORRHAGE;  Surgeon: Rich Collins M.D.;  Location: [facility];  Service: EUS   • EGD W/ENDOSCOPIC ULTRASOUND  3/29/2018    Procedure: EGD W/ENDOSCOPIC ULTRASOUND;  Surgeon: Rich Collins M.D.;  Location: [facility];  Service: EUS   • EGD WITH ASP/BX  3/29/2018    Procedure: EGD WITH ASP/BX - FNA;  Surgeon: Rich Collins M.D.;  Location: [facility];  Service: EUS     SOCHX:  reports that he has never smoked. He has never used smokeless tobacco. He reports current alcohol use. He reports that he does not use drugs.  FH: Reviewed with patient, not pertinent to this visit.        Objective:     /72   Pulse 84   Temp 36.4 °C (97.5 °F) (Temporal)   Resp 14   Ht 1.829 m (6')   Wt (!) 139.3 kg (307 lb)   SpO2 94%   BMI 41.64 kg/m²      Physical Exam  Constitutional:       General: He is not in acute  distress.     Appearance: He is well-developed.   HENT:      Head: Normocephalic and atraumatic.      Nose: Nose normal.      Mouth/Throat:      Mouth: Mucous membranes are moist.      Pharynx: No oropharyngeal exudate.   Eyes:      Conjunctiva/sclera: Conjunctivae normal.   Neck:      Musculoskeletal: Normal range of motion and neck supple.   Cardiovascular:      Rate and Rhythm: Normal rate and regular rhythm.      Heart sounds: Normal heart sounds. No murmur.      Comments: No JVD  Pulmonary:      Effort: Pulmonary effort is normal.      Breath sounds: Normal breath sounds. No wheezing.   Musculoskeletal:      Comments: 3+ pitting edema right pretibial.  Entire leg is swollen.  Calf tenderness without cords.    2+ pitting edema to left.  No calf tenderness or cords.  Both legs exhibit venous stasis skin changes.  Right leg has redness and warmth past the knee.   Skin:     General: Skin is warm and dry.      Findings: Rash ( Diffuse pruritic wheals most notable on forearms and thighs.) present.   Neurological:      Mental Status: He is alert and oriented to person, place, and time.   Psychiatric:         Behavior: Behavior normal.                 Assessment/Plan:   Lab results from 9/17/2020 reviewed.      1. Urticaria  predniSONE (DELTASONE) 20 MG Tab   2. Right calf pain  US-EXTREMITY VENOUS LOWER UNILAT RIGHT     Differential diagnosis, natural history, supportive care, and indications for immediate follow-up discussed at length.     R/O DVT, will f/u US    Discussed risk versus benefit of oral corticosteroid at length.  He does not have lab findings consistent with heart failure.  He does have an elevated blood sugar and is treated for high blood pressure.  He and his family understand that corticosteroids will elevate both blood pressure and blood sugar.  They will start with systemic antihistamine to try and improve rash but if no resolution or worse will use prednisone.    Patient will discuss with primary  care regarding calcium channel blocker and lower extremity edema.

## 2020-09-22 ENCOUNTER — HOSPITAL ENCOUNTER (OUTPATIENT)
Dept: RADIOLOGY | Facility: MEDICAL CENTER | Age: 65
End: 2020-09-22
Attending: FAMILY MEDICINE
Payer: MEDICARE

## 2020-09-22 DIAGNOSIS — R18.8 OTHER ASCITES: ICD-10-CM

## 2020-09-22 PROCEDURE — 74177 CT ABD & PELVIS W/CONTRAST: CPT

## 2020-09-22 PROCEDURE — 700117 HCHG RX CONTRAST REV CODE 255: Performed by: FAMILY MEDICINE

## 2020-09-22 RX ADMIN — IOHEXOL 100 ML: 350 INJECTION, SOLUTION INTRAVENOUS at 15:09

## 2020-09-22 RX ADMIN — IOHEXOL 25 ML: 240 INJECTION, SOLUTION INTRATHECAL; INTRAVASCULAR; INTRAVENOUS; ORAL at 15:10

## 2020-10-12 ENCOUNTER — RX ONLY (OUTPATIENT)
Age: 65
Setting detail: RX ONLY
End: 2020-10-12

## 2020-10-12 ENCOUNTER — APPOINTMENT (RX ONLY)
Dept: URBAN - METROPOLITAN AREA CLINIC 4 | Facility: CLINIC | Age: 65
Setting detail: DERMATOLOGY
End: 2020-10-12

## 2020-10-12 DIAGNOSIS — R21 RASH AND OTHER NONSPECIFIC SKIN ERUPTION: ICD-10-CM

## 2020-10-12 DIAGNOSIS — L30.2 CUTANEOUS AUTOSENSITIZATION: ICD-10-CM

## 2020-10-12 PROCEDURE — ? INTRAMUSCULAR KENALOG

## 2020-10-12 PROCEDURE — 96372 THER/PROPH/DIAG INJ SC/IM: CPT

## 2020-10-12 PROCEDURE — 99214 OFFICE O/P EST MOD 30 MIN: CPT | Mod: 25

## 2020-10-12 PROCEDURE — ? TREATMENT REGIMEN

## 2020-10-12 RX ORDER — TRIAMCINOLONE ACETONIDE 1 MG/G
CREAM TOPICAL
Qty: 1 | Refills: 3 | Status: ERX | COMMUNITY
Start: 2020-10-12

## 2020-10-12 ASSESSMENT — LOCATION SIMPLE DESCRIPTION DERM
LOCATION SIMPLE: LEFT FOREARM
LOCATION SIMPLE: RIGHT FOREARM
LOCATION SIMPLE: LEFT SHOULDER

## 2020-10-12 ASSESSMENT — LOCATION DETAILED DESCRIPTION DERM
LOCATION DETAILED: RIGHT VENTRAL DISTAL FOREARM
LOCATION DETAILED: LEFT LATERAL SHOULDER
LOCATION DETAILED: LEFT VENTRAL DISTAL FOREARM

## 2020-10-12 ASSESSMENT — LOCATION ZONE DERM: LOCATION ZONE: ARM

## 2020-10-12 NOTE — PROCEDURE: INTRAMUSCULAR KENALOG
Lot # (Optional): [id number]
Concentration (Mg/Ml): 40.0
Expiration Date (Optional): feb2022
Total Volume (Ccs): 1.5
Kenalog Preparation: kenalog
Concentration (Mg/Ml) Of Additional Medication: 2.5
Add Option For Additional Mediation: No
Detail Level: None
Consent: The risks of atrophy were reviewed with the patient.

## 2020-10-12 NOTE — PROCEDURE: TREATMENT REGIMEN
Initiate Treatment: Cerave cream to entire body \\nCerave itch relief cream prn\\nTriamcinalone 0.1% cream bid to rash x three weeks
Discontinue Regimen: Allegra
Detail Level: Zone
Plan: Intramuscular TAC given today, return in one month to clinic. If not better will further investigate cause. Pt advised to return to pcp to possibly increase Las in dose due to swelling in lower legs. Pt advised to continue to wear compression stockings everyday.

## 2021-01-06 ENCOUNTER — HOSPITAL ENCOUNTER (OUTPATIENT)
Dept: LAB | Facility: MEDICAL CENTER | Age: 66
End: 2021-01-06
Attending: FAMILY MEDICINE
Payer: MEDICARE

## 2021-01-06 LAB
ALBUMIN SERPL BCP-MCNC: 4.4 G/DL (ref 3.2–4.9)
ALBUMIN/GLOB SERPL: 1.6 G/DL
ALP SERPL-CCNC: 109 U/L (ref 30–99)
ALT SERPL-CCNC: 49 U/L (ref 2–50)
ANION GAP SERPL CALC-SCNC: 13 MMOL/L (ref 7–16)
AST SERPL-CCNC: 95 U/L (ref 12–45)
BILIRUB SERPL-MCNC: 0.7 MG/DL (ref 0.1–1.5)
BUN SERPL-MCNC: 17 MG/DL (ref 8–22)
CALCIUM SERPL-MCNC: 9.6 MG/DL (ref 8.4–10.2)
CHLORIDE SERPL-SCNC: 103 MMOL/L (ref 96–112)
CO2 SERPL-SCNC: 23 MMOL/L (ref 20–33)
CREAT SERPL-MCNC: 0.88 MG/DL (ref 0.5–1.4)
FASTING STATUS PATIENT QL REPORTED: NORMAL
GLOBULIN SER CALC-MCNC: 2.7 G/DL (ref 1.9–3.5)
GLUCOSE SERPL-MCNC: 166 MG/DL (ref 65–99)
POTASSIUM SERPL-SCNC: 4.3 MMOL/L (ref 3.6–5.5)
PROT SERPL-MCNC: 7.1 G/DL (ref 6–8.2)
SODIUM SERPL-SCNC: 139 MMOL/L (ref 135–145)

## 2021-01-06 PROCEDURE — 86038 ANTINUCLEAR ANTIBODIES: CPT

## 2021-01-06 PROCEDURE — 36415 COLL VENOUS BLD VENIPUNCTURE: CPT

## 2021-01-06 PROCEDURE — 80053 COMPREHEN METABOLIC PANEL: CPT

## 2021-01-06 PROCEDURE — 86200 CCP ANTIBODY: CPT

## 2021-01-08 LAB
CCP IGG SERPL-ACNC: 3 UNITS (ref 0–19)
NUCLEAR IGG SER QL IA: NORMAL

## 2021-03-03 DIAGNOSIS — Z23 NEED FOR VACCINATION: ICD-10-CM

## 2021-03-23 ENCOUNTER — HOSPITAL ENCOUNTER (OUTPATIENT)
Dept: LAB | Facility: MEDICAL CENTER | Age: 66
End: 2021-03-23
Attending: INTERNAL MEDICINE
Payer: MEDICARE

## 2021-03-23 LAB
ANION GAP SERPL CALC-SCNC: 15 MMOL/L (ref 7–16)
BUN SERPL-MCNC: 25 MG/DL (ref 8–22)
CALCIUM SERPL-MCNC: 9.8 MG/DL (ref 8.4–10.2)
CHLORIDE SERPL-SCNC: 95 MMOL/L (ref 96–112)
CO2 SERPL-SCNC: 25 MMOL/L (ref 20–33)
CREAT SERPL-MCNC: 1.03 MG/DL (ref 0.5–1.4)
GLUCOSE SERPL-MCNC: 203 MG/DL (ref 65–99)
POTASSIUM SERPL-SCNC: 4.1 MMOL/L (ref 3.6–5.5)
SODIUM SERPL-SCNC: 135 MMOL/L (ref 135–145)

## 2021-03-23 PROCEDURE — 36415 COLL VENOUS BLD VENIPUNCTURE: CPT

## 2021-03-23 PROCEDURE — 80048 BASIC METABOLIC PNL TOTAL CA: CPT

## 2021-06-10 ENCOUNTER — HOSPITAL ENCOUNTER (EMERGENCY)
Facility: MEDICAL CENTER | Age: 66
End: 2021-06-10
Attending: EMERGENCY MEDICINE
Payer: MEDICARE

## 2021-06-10 ENCOUNTER — APPOINTMENT (OUTPATIENT)
Dept: RADIOLOGY | Facility: MEDICAL CENTER | Age: 66
End: 2021-06-10
Attending: EMERGENCY MEDICINE
Payer: MEDICARE

## 2021-06-10 VITALS
OXYGEN SATURATION: 96 % | SYSTOLIC BLOOD PRESSURE: 160 MMHG | RESPIRATION RATE: 16 BRPM | BODY MASS INDEX: 41.45 KG/M2 | WEIGHT: 306 LBS | TEMPERATURE: 97.7 F | DIASTOLIC BLOOD PRESSURE: 84 MMHG | HEIGHT: 72 IN | HEART RATE: 74 BPM

## 2021-06-10 DIAGNOSIS — R07.9 CHEST PAIN, UNSPECIFIED TYPE: ICD-10-CM

## 2021-06-10 LAB
ALBUMIN SERPL BCP-MCNC: 4.5 G/DL (ref 3.2–4.9)
ALBUMIN/GLOB SERPL: 1.7 G/DL
ALP SERPL-CCNC: 135 U/L (ref 30–99)
ALT SERPL-CCNC: 46 U/L (ref 2–50)
ANION GAP SERPL CALC-SCNC: 14 MMOL/L (ref 7–16)
AST SERPL-CCNC: 68 U/L (ref 12–45)
BASOPHILS # BLD AUTO: 0.3 % (ref 0–1.8)
BASOPHILS # BLD: 0.03 K/UL (ref 0–0.12)
BILIRUB SERPL-MCNC: 0.8 MG/DL (ref 0.1–1.5)
BUN SERPL-MCNC: 20 MG/DL (ref 8–22)
CALCIUM SERPL-MCNC: 9.9 MG/DL (ref 8.4–10.2)
CHLORIDE SERPL-SCNC: 94 MMOL/L (ref 96–112)
CO2 SERPL-SCNC: 26 MMOL/L (ref 20–33)
CREAT SERPL-MCNC: 1.12 MG/DL (ref 0.5–1.4)
EKG IMPRESSION: NORMAL
EOSINOPHIL # BLD AUTO: 0.06 K/UL (ref 0–0.51)
EOSINOPHIL NFR BLD: 0.6 % (ref 0–6.9)
ERYTHROCYTE [DISTWIDTH] IN BLOOD BY AUTOMATED COUNT: 40.7 FL (ref 35.9–50)
GLOBULIN SER CALC-MCNC: 2.7 G/DL (ref 1.9–3.5)
GLUCOSE SERPL-MCNC: 264 MG/DL (ref 65–99)
HCT VFR BLD AUTO: 43.2 % (ref 42–52)
HGB BLD-MCNC: 15.1 G/DL (ref 14–18)
IMM GRANULOCYTES # BLD AUTO: 0.05 K/UL (ref 0–0.11)
IMM GRANULOCYTES NFR BLD AUTO: 0.5 % (ref 0–0.9)
LYMPHOCYTES # BLD AUTO: 1.86 K/UL (ref 1–4.8)
LYMPHOCYTES NFR BLD: 19.7 % (ref 22–41)
MCH RBC QN AUTO: 29.8 PG (ref 27–33)
MCHC RBC AUTO-ENTMCNC: 35 G/DL (ref 33.7–35.3)
MCV RBC AUTO: 85.4 FL (ref 81.4–97.8)
MONOCYTES # BLD AUTO: 0.6 K/UL (ref 0–0.85)
MONOCYTES NFR BLD AUTO: 6.4 % (ref 0–13.4)
NEUTROPHILS # BLD AUTO: 6.82 K/UL (ref 1.82–7.42)
NEUTROPHILS NFR BLD: 72.5 % (ref 44–72)
NRBC # BLD AUTO: 0 K/UL
NRBC BLD-RTO: 0 /100 WBC
PLATELET # BLD AUTO: 152 K/UL (ref 164–446)
PMV BLD AUTO: 10.8 FL (ref 9–12.9)
POTASSIUM SERPL-SCNC: 4.7 MMOL/L (ref 3.6–5.5)
PROT SERPL-MCNC: 7.2 G/DL (ref 6–8.2)
RBC # BLD AUTO: 5.06 M/UL (ref 4.7–6.1)
SODIUM SERPL-SCNC: 134 MMOL/L (ref 135–145)
TROPONIN T SERPL-MCNC: 13 NG/L (ref 6–19)
WBC # BLD AUTO: 9.4 K/UL (ref 4.8–10.8)

## 2021-06-10 PROCEDURE — 85025 COMPLETE CBC W/AUTO DIFF WBC: CPT

## 2021-06-10 PROCEDURE — 80053 COMPREHEN METABOLIC PANEL: CPT

## 2021-06-10 PROCEDURE — 700111 HCHG RX REV CODE 636 W/ 250 OVERRIDE (IP): Performed by: EMERGENCY MEDICINE

## 2021-06-10 PROCEDURE — 99283 EMERGENCY DEPT VISIT LOW MDM: CPT

## 2021-06-10 PROCEDURE — 93005 ELECTROCARDIOGRAM TRACING: CPT | Performed by: EMERGENCY MEDICINE

## 2021-06-10 PROCEDURE — 36415 COLL VENOUS BLD VENIPUNCTURE: CPT

## 2021-06-10 PROCEDURE — 84484 ASSAY OF TROPONIN QUANT: CPT

## 2021-06-10 PROCEDURE — 93005 ELECTROCARDIOGRAM TRACING: CPT

## 2021-06-10 RX ORDER — ONDANSETRON 4 MG/1
4 TABLET, ORALLY DISINTEGRATING ORAL ONCE
Status: COMPLETED | OUTPATIENT
Start: 2021-06-10 | End: 2021-06-10

## 2021-06-10 RX ORDER — FAMOTIDINE 20 MG/1
20 TABLET, FILM COATED ORAL 2 TIMES DAILY
Qty: 60 TABLET | Refills: 0 | Status: SHIPPED | OUTPATIENT
Start: 2021-06-10 | End: 2022-07-27

## 2021-06-10 RX ADMIN — ONDANSETRON 4 MG: 4 TABLET, ORALLY DISINTEGRATING ORAL at 21:36

## 2021-06-10 ASSESSMENT — PAIN DESCRIPTION - PAIN TYPE: TYPE: ACUTE PAIN

## 2021-06-10 ASSESSMENT — FIBROSIS 4 INDEX: FIB4 SCORE: 7.69

## 2021-06-11 NOTE — ED PROVIDER NOTES
ED Provider Note    ER Provider Note         CHIEF COMPLAINT  Chief Complaint   Patient presents with   • Dizziness     Patient report of dizziness and heart burn for a couple of days.  Patient took tums with no relief.    • Gastrophageal Reflux   • Nausea       HPI  Sharon Guardado is a 66 y.o. male who presents to the Emergency Department with some mild dizziness as well as heartburn has been on for the past couple days.  The patient says he has none of the symptoms when he is exerting himself.  The patient took some Tums and did not have any relief.  He describes it as burning.  He says there is some nausea associated with it.  He denies it being positional and again denies exertional.  He denies any leg swelling.  There is no cough.  There is no shortness of breath.    REVIEW OF SYSTEMS  See HPI for further details. All other systems are negative.     PAST MEDICAL HISTORY   has a past medical history of Bowel habit changes, CAD (coronary artery disease), Cancer (HCC), HTN (hypertension), Hypertension, Indigestion, Kidney stones, Near-syncope, Nerve compression, and Tinnitus.    SURGICAL HISTORY   has a past surgical history that includes gastroscopy (3/29/2018); egd w/endoscopic ultrasound (3/29/2018); and egd with asp/bx (3/29/2018).    SOCIAL HISTORY  Social History     Tobacco Use   • Smoking status: Never Smoker   • Smokeless tobacco: Never Used   Vaping Use   • Vaping Use: Never used   Substance Use Topics   • Alcohol use: Yes     Comment: i drink per night   • Drug use: No      Social History     Substance and Sexual Activity   Drug Use No       FAMILY HISTORY  History reviewed. No pertinent family history.    CURRENT MEDICATIONS  Home Medications     Reviewed by Sonia Mcclelland R.N. (Registered Nurse) on 06/10/21 at 2113  Med List Status: Complete   Medication Last Dose Status   ALLOPURINOL 300 MG PO TABS 6/10/2021 Active   amLODIPine (NORVASC) 5 MG Tab 6/10/2021 Active   aspirin EC (ECOTRIN) 81 MG  Tablet Delayed Response 6/10/2021 Active   losartan (COZAAR) 50 MG Tab  Active   naproxen (NAPROSYN) 500 MG Tab  Active   PRILOSEC OTC PO 6/10/2021 Active   sildenafil citrate (VIAGRA) 25 MG Tab  Active   SIMVASTATIN 40 MG PO TABS 6/10/2021 Active   SPIRONOLACTONE 25 MG PO TABS 6/10/2021 Active   TADALAFIL PO  Active   vitamin D (CHOLECALCIFEROL) 1000 UNIT Tab 6/10/2021 Active                ALLERGIES  Allergies   Allergen Reactions   • Ativan    • Vicodin [Hydrocodone-Acetaminophen] Rash     Rash, confusion         PHYSICAL EXAM  VITAL SIGNS: /84   Pulse 74   Temp 36.5 °C (97.7 °F) (Temporal)   Resp 16   Ht 1.829 m (6')   Wt (!) 139 kg (306 lb)   SpO2 96%   BMI 41.50 kg/m²      Constitutional: Alert in no apparent distress.  HENT: No signs of trauma, Bilateral external ears normal, Nose normal.   Eyes: Pupils are equal and reactive, Conjunctiva normal, Non-icteric.   Neck: Normal range of motion, No tenderness, Supple, No stridor.   Lymphatic: No lymphadenopathy noted.   Cardiovascular: Regular rate and rhythm, nondisplaced PMI  Thorax & Lungs: No respiratory distress,  No chest tenderness.   Abdomen: Bowel sounds normal, Soft, No tenderness, No masses, No pulsatile masses. No peritoneal signs.  Skin: Warm, Dry, No erythema, No rash.   Back: No bony tenderness, No CVA tenderness.   Extremities: Intact distal pulses, No edema, No tenderness, No cyanosis.  Musculoskeletal: Good range of motion in all major joints. No tenderness to palpation or major deformities noted.   Neurologic: Alert , Normal motor function, Normal sensory function, No focal deficits noted.   Psychiatric: Affect normal, Judgment normal, Mood normal.     DIAGNOSTIC STUDIES / PROCEDURES    EKG Interpretation:  Interpreted by me  Sinus rate 86 poor R wave progression no ST elevation, depression or T wave inversion.       LABS  Labs Reviewed   CBC WITH DIFFERENTIAL - Abnormal; Notable for the following components:       Result Value     Platelet Count 152 (*)     Neutrophils-Polys 72.50 (*)     Lymphocytes 19.70 (*)     All other components within normal limits   COMP METABOLIC PANEL - Abnormal; Notable for the following components:    Sodium 134 (*)     Chloride 94 (*)     Glucose 264 (*)     AST(SGOT) 68 (*)     Alkaline Phosphatase 135 (*)     All other components within normal limits   TROPONIN   ESTIMATED GFR       All labs reviewed by me.    RADIOLOGY  No orders to display      declines cxr    The radiologist's interpretation of all radiological studies have been reviewed by me.    COURSE & MEDICAL DECISION MAKING  Pertinent Labs & Imaging studies reviewed. (See chart for details)    This is a 66 y.o. male that presents with some chest pain.  Is atypical.  His EKG shows no evidence of ischemia.  There is no change from prior.  At this time I will get basic labs including a troponin to evaluate for myocardial ischemia.  The patient is declining chest x-ray at this time.  I do doubt that his pneumonia pneumothorax given there is no cough and he is not having any difficulty breathing..     10:40 PM - Patient seen and examined at bedside.    Patient has a mildly elevated AST.  There is no anemia or leukocytosis.  At this time he is stable discharge home with strict return precautions and follow-up.  I will give him Pepcid for home.          FINAL IMPRESSION  1. Chest pain, unspecified type              Electronically signed by: Pal Sexton M.D., 6/10/2021

## 2021-06-11 NOTE — ED TRIAGE NOTES
66 yr old male to triage  Chief Complaint   Patient presents with   • Dizziness     Patient report of dizziness and heart burn for a couple of days.  Patient took tums with no relief.    • Gastrophageal Reflux     /85   Pulse 85   Temp 36.6 °C (97.8 °F) (Oral)   Resp 16   Ht 1.829 m (6')   Wt (!) 139 kg (306 lb)   SpO2 93%   BMI 41.50 kg/m²

## 2021-08-11 ENCOUNTER — HOSPITAL ENCOUNTER (OUTPATIENT)
Dept: LAB | Facility: MEDICAL CENTER | Age: 66
End: 2021-08-11
Attending: INTERNAL MEDICINE
Payer: MEDICARE

## 2021-08-11 LAB
ALBUMIN SERPL BCP-MCNC: 4.1 G/DL (ref 3.2–4.9)
ALBUMIN/GLOB SERPL: 1.5 G/DL
ALP SERPL-CCNC: 115 U/L (ref 30–99)
ALT SERPL-CCNC: 34 U/L (ref 2–50)
ANION GAP SERPL CALC-SCNC: 11 MMOL/L (ref 7–16)
AST SERPL-CCNC: 48 U/L (ref 12–45)
BILIRUB SERPL-MCNC: 0.7 MG/DL (ref 0.1–1.5)
BUN SERPL-MCNC: 17 MG/DL (ref 8–22)
CALCIUM SERPL-MCNC: 9.6 MG/DL (ref 8.4–10.2)
CHLORIDE SERPL-SCNC: 101 MMOL/L (ref 96–112)
CO2 SERPL-SCNC: 25 MMOL/L (ref 20–33)
CREAT SERPL-MCNC: 0.9 MG/DL (ref 0.5–1.4)
GLOBULIN SER CALC-MCNC: 2.8 G/DL (ref 1.9–3.5)
GLUCOSE SERPL-MCNC: 146 MG/DL (ref 65–99)
POTASSIUM SERPL-SCNC: 3.9 MMOL/L (ref 3.6–5.5)
PROT SERPL-MCNC: 6.9 G/DL (ref 6–8.2)
SODIUM SERPL-SCNC: 137 MMOL/L (ref 135–145)
URATE SERPL-MCNC: 5 MG/DL (ref 2.5–8.3)

## 2021-08-11 PROCEDURE — 80053 COMPREHEN METABOLIC PANEL: CPT

## 2021-08-11 PROCEDURE — 36415 COLL VENOUS BLD VENIPUNCTURE: CPT

## 2021-08-11 PROCEDURE — 84550 ASSAY OF BLOOD/URIC ACID: CPT

## 2021-08-20 ENCOUNTER — OFFICE VISIT (OUTPATIENT)
Dept: URGENT CARE | Facility: CLINIC | Age: 66
End: 2021-08-20
Payer: MEDICARE

## 2021-08-20 ENCOUNTER — HOSPITAL ENCOUNTER (OUTPATIENT)
Dept: LAB | Facility: MEDICAL CENTER | Age: 66
End: 2021-08-20
Attending: PHYSICIAN ASSISTANT
Payer: MEDICARE

## 2021-08-20 VITALS
HEIGHT: 72 IN | RESPIRATION RATE: 16 BRPM | HEART RATE: 75 BPM | OXYGEN SATURATION: 95 % | SYSTOLIC BLOOD PRESSURE: 110 MMHG | TEMPERATURE: 97.3 F | DIASTOLIC BLOOD PRESSURE: 64 MMHG | WEIGHT: 307 LBS | BODY MASS INDEX: 41.58 KG/M2

## 2021-08-20 DIAGNOSIS — R60.0 BILATERAL LOWER EXTREMITY EDEMA: ICD-10-CM

## 2021-08-20 DIAGNOSIS — M79.661 PAIN IN BOTH LOWER LEGS: ICD-10-CM

## 2021-08-20 DIAGNOSIS — M79.662 PAIN IN BOTH LOWER LEGS: ICD-10-CM

## 2021-08-20 PROBLEM — R97.20 RAISED PROSTATE SPECIFIC ANTIGEN: Status: ACTIVE | Noted: 2020-02-13

## 2021-08-20 PROBLEM — N52.9 PRIMARY ERECTILE DYSFUNCTION: Status: ACTIVE | Noted: 2020-02-13

## 2021-08-20 LAB
D DIMER PPP IA.FEU-MCNC: 0.28 UG/ML (FEU) (ref 0–0.5)
NT-PROBNP SERPL IA-MCNC: 31 PG/ML (ref 0–125)

## 2021-08-20 PROCEDURE — 85379 FIBRIN DEGRADATION QUANT: CPT

## 2021-08-20 PROCEDURE — 83880 ASSAY OF NATRIURETIC PEPTIDE: CPT | Mod: GA

## 2021-08-20 PROCEDURE — 36415 COLL VENOUS BLD VENIPUNCTURE: CPT

## 2021-08-20 PROCEDURE — 99214 OFFICE O/P EST MOD 30 MIN: CPT | Performed by: PHYSICIAN ASSISTANT

## 2021-08-20 RX ORDER — TADALAFIL 20 MG/1
20 TABLET ORAL PRN
COMMUNITY
End: 2023-03-19

## 2021-08-20 RX ORDER — NAPROXEN 500 MG/1
TABLET ORAL
Qty: 30 TABLET | Refills: 0 | Status: SHIPPED | OUTPATIENT
Start: 2021-08-20 | End: 2022-07-27

## 2021-08-20 RX ORDER — POTASSIUM CHLORIDE 750 MG/1
TABLET, EXTENDED RELEASE ORAL
COMMUNITY
Start: 2021-07-30 | End: 2021-08-20

## 2021-08-20 RX ORDER — FUROSEMIDE 40 MG/1
TABLET ORAL
COMMUNITY
Start: 2021-06-05 | End: 2021-08-20

## 2021-08-20 RX ORDER — POTASSIUM CHLORIDE 750 MG/1
TABLET, EXTENDED RELEASE ORAL
Status: ON HOLD | COMMUNITY
End: 2022-07-19

## 2021-08-20 RX ORDER — TRIAMCINOLONE ACETONIDE 1 MG/G
1 CREAM TOPICAL EVERY EVENING
COMMUNITY
Start: 2021-08-07 | End: 2023-09-24

## 2021-08-20 RX ORDER — FUROSEMIDE 40 MG/1
TABLET ORAL
Status: ON HOLD | COMMUNITY
End: 2022-07-19

## 2021-08-20 RX ORDER — METHYLPREDNISOLONE 4 MG/1
TABLET ORAL
COMMUNITY
End: 2022-07-27

## 2021-08-20 RX ORDER — TRIAMCINOLONE ACETONIDE 1 MG/G
OINTMENT TOPICAL
COMMUNITY
End: 2022-07-27

## 2021-08-20 RX ORDER — PREDNISONE 20 MG/1
TABLET ORAL
COMMUNITY
End: 2021-08-20

## 2021-08-20 RX ORDER — TRIAMCINOLONE ACETONIDE 5 MG/G
CREAM TOPICAL
COMMUNITY
End: 2021-08-20

## 2021-08-20 ASSESSMENT — FIBROSIS 4 INDEX: FIB4 SCORE: 3.68

## 2021-08-20 NOTE — PROGRESS NOTES
Subjective:   Sharon Guardado is a 66 y.o. male who presents for Leg Pain (x several weeks, bilateral leg pain, swelling, R hip/knee/ankle, L knee)      HPI  Patient is a 66-year-old male who presents to clinic with complaints of bilateral lower extremity swelling onset approximately 2 weeks ago.  History of similar swelling before in the past.  He was told by his PCP this was congestive heart failure.  He was referred to cardiologist and cardiologist reports he has fluid.  He was prescribed Lasix.  The patient states he has been compliant with his Lasix and potassium.  He has associated pain more so with weightbearing.  He states he is in a chair half the time of the day and walking around half the time of the day.  He had ultrasound last year which showed no DVT.  He has some chronic intermittent bilateral knee pain.  Denies any fever, chills, cough, chest pain, shortness of breath, hemoptysis.  He has no other complaints concerns.        Medications:    • allopurinol Tabs  • amLODIPine Tabs  • aspirin EC Tbec  • diclofenac sodium Gel  • famotidine Tabs  • furosemide Tabs  • losartan Tabs  • methylPREDNISolone Tabs  • naproxen Tabs  • potassium chloride SA Tbcr  • PRILOSEC OTC PO  • sildenafil citrate Tabs  • simvastatin Tabs  • spironolactone Tabs  • triamcinolone acetonide Crea  • triamcinolone acetonide Oint  • vitamin D Tabs    Allergies: Ativan and Vicodin [hydrocodone-acetaminophen]    Problem List: Sharon Guardado does not have any pertinent problems on file.    Surgical History:  Past Surgical History:   Procedure Laterality Date   • GASTROSCOPY  3/29/2018    Procedure: GASTROSCOPY - W/POSS BIOPSY, DILATION, POLYPECTOMY, CONTROL OF HEMORRHAGE;  Surgeon: Rich Collins M.D.;  Location: [facility];  Service: EUS   • EGD W/ENDOSCOPIC ULTRASOUND  3/29/2018    Procedure: EGD W/ENDOSCOPIC ULTRASOUND;  Surgeon: Rich Collins M.D.;  Location: [facility];  Service: EUS    • EGD WITH ASP/BX  3/29/2018    Procedure: EGD WITH ASP/BX - FNA;  Surgeon: Rich Collins M.D.;  Location: [facility];  Service: EUS       Past Social Hx: Sharon Guardado  reports that he has never smoked. He has never used smokeless tobacco. He reports current alcohol use. He reports that he does not use drugs.     Past Family Hx:  Sharon Guardado family history is not on file.     Problem list, medications, and allergies reviewed by myself today in Epic.     Objective:     /64 (BP Location: Left arm, Patient Position: Sitting, BP Cuff Size: Adult)   Pulse 75   Temp 36.3 °C (97.3 °F) (Temporal)   Resp 16   Ht 1.829 m (6')   Wt (!) 139 kg (307 lb)   SpO2 95%   BMI 41.64 kg/m²     Physical Exam  Vitals reviewed.   Constitutional:       General: He is not in acute distress.     Appearance: He is well-developed. He is not ill-appearing or toxic-appearing.   Eyes:      Conjunctiva/sclera: Conjunctivae normal.      Pupils: Pupils are equal, round, and reactive to light.   Cardiovascular:      Rate and Rhythm: Normal rate and regular rhythm.      Heart sounds: Normal heart sounds.   Pulmonary:      Effort: Pulmonary effort is normal. No respiratory distress.      Breath sounds: Normal breath sounds. No wheezing, rhonchi or rales.   Musculoskeletal:      Cervical back: Neck supple.      Right lower le+ Edema present.      Left lower le+ Edema present.      Comments: Pulses 2+ to dorsalis pedis and posterior tibial  Capillary refill less than 2 seconds.  No pallor or cool to touch.     Negative Homans bilaterally  Negative tenderness to palpation to distal legs bilaterally  Negative palpable cords, masses.   Lymphadenopathy:      Cervical: No cervical adenopathy.   Skin:     General: Skin is warm and dry.   Neurological:      General: No focal deficit present.      Mental Status: He is alert and oriented to person, place, and time.   Psychiatric:         Mood and Affect:  Mood normal.         Behavior: Behavior normal.         Diagnosis and associated orders:     1. Bilateral lower extremity edema  proBrain Natriuretic Peptide, NT    D-DIMER    REFERRAL TO LYMPHEDEMA-PHYSICAL THERAPY      Comments/MDM:     • Discussed with patient this is most likely dependent edema.  He does have a history of this.  Does have a history of lymphedema.   • I have low suspicion for DVT, however we will evaluate with D-dimer.  I have very little to no suspicion for CHF exacerbation, however we will evaluate with BNP.  Will call with results and appropriate further management or evaluation.  • Recommend Tylenol.  Elevation to the level of heart or above, compression stockings.  Continue Lasix as prescribed.  • Recommend calling cardiologist for follow up, and consideration of possible adjustment of Lasix,.    Spoke to the patient on the phone regarding negative D-dimer and negative BNP.        I personally reviewed prior external notes and test results pertinent to today's visit. Red flags discussed and indications to present to the Emergency Department. Supportive care, natural history, differential diagnoses, and indications for immediate follow-up discussed. Patient expresses understanding and agrees to plan. Patient denies any other questions or concerns.     Advised the patient to follow-up with the primary care physician for recheck, reevaluation, and consideration of further management.    Please note that this dictation was created using voice recognition software. I have made a reasonable attempt to correct obvious errors, but I expect that there are errors of grammar and possibly content that I did not discover before finalizing the note.    This note was electronically signed by Haris Souza PA-C

## 2021-10-06 NOTE — PROCEDURE: COUNSELING
Quality 137: Melanoma: Continuity Of Care - Recall System: Patient information entered into a recall system that includes: target date for the next exam specified AND a process to follow up with patients regarding missed or unscheduled appointments
Detail Level: Detailed
Detail Level: Generalized
Detail Level: Zone
RHINORRHEA/CONGESTION

## 2022-07-15 ENCOUNTER — HOSPITAL ENCOUNTER (INPATIENT)
Facility: MEDICAL CENTER | Age: 67
LOS: 4 days | DRG: 872 | End: 2022-07-19
Attending: EMERGENCY MEDICINE | Admitting: INTERNAL MEDICINE
Payer: MEDICARE

## 2022-07-15 ENCOUNTER — APPOINTMENT (OUTPATIENT)
Dept: RADIOLOGY | Facility: MEDICAL CENTER | Age: 67
DRG: 872 | End: 2022-07-15
Attending: EMERGENCY MEDICINE
Payer: MEDICARE

## 2022-07-15 DIAGNOSIS — E11.9 TYPE 2 DIABETES MELLITUS WITHOUT COMPLICATION, WITHOUT LONG-TERM CURRENT USE OF INSULIN (HCC): ICD-10-CM

## 2022-07-15 DIAGNOSIS — R60.0 BILATERAL LOWER EXTREMITY EDEMA: ICD-10-CM

## 2022-07-15 PROBLEM — R50.9 FEVER: Status: ACTIVE | Noted: 2022-07-15

## 2022-07-15 PROBLEM — A41.9 SEPSIS (HCC): Status: ACTIVE | Noted: 2022-07-15

## 2022-07-15 PROBLEM — G93.40 ACUTE ENCEPHALOPATHY: Status: ACTIVE | Noted: 2018-08-01

## 2022-07-15 LAB
ALBUMIN SERPL BCP-MCNC: 4.3 G/DL (ref 3.2–4.9)
ALBUMIN/GLOB SERPL: 1.6 G/DL
ALP SERPL-CCNC: 125 U/L (ref 30–99)
ALT SERPL-CCNC: 34 U/L (ref 2–50)
ANION GAP SERPL CALC-SCNC: 15 MMOL/L (ref 7–16)
APPEARANCE UR: CLEAR
AST SERPL-CCNC: 51 U/L (ref 12–45)
BASOPHILS # BLD AUTO: 0.2 % (ref 0–1.8)
BASOPHILS # BLD: 0.03 K/UL (ref 0–0.12)
BILIRUB SERPL-MCNC: 1 MG/DL (ref 0.1–1.5)
BILIRUB UR QL STRIP.AUTO: NEGATIVE
BUN SERPL-MCNC: 18 MG/DL (ref 8–22)
CALCIUM SERPL-MCNC: 9.5 MG/DL (ref 8.5–10.5)
CHLORIDE SERPL-SCNC: 96 MMOL/L (ref 96–112)
CO2 SERPL-SCNC: 21 MMOL/L (ref 20–33)
COLOR UR: YELLOW
CREAT SERPL-MCNC: 0.99 MG/DL (ref 0.5–1.4)
EOSINOPHIL # BLD AUTO: 0 K/UL (ref 0–0.51)
EOSINOPHIL NFR BLD: 0 % (ref 0–6.9)
ERYTHROCYTE [DISTWIDTH] IN BLOOD BY AUTOMATED COUNT: 42.4 FL (ref 35.9–50)
FLUAV RNA SPEC QL NAA+PROBE: NEGATIVE
FLUBV RNA SPEC QL NAA+PROBE: NEGATIVE
GFR SERPLBLD CREATININE-BSD FMLA CKD-EPI: 82 ML/MIN/1.73 M 2
GLOBULIN SER CALC-MCNC: 2.7 G/DL (ref 1.9–3.5)
GLUCOSE SERPL-MCNC: 243 MG/DL (ref 65–99)
GLUCOSE UR STRIP.AUTO-MCNC: 500 MG/DL
HCT VFR BLD AUTO: 43 % (ref 42–52)
HGB BLD-MCNC: 15.1 G/DL (ref 14–18)
IMM GRANULOCYTES # BLD AUTO: 0.1 K/UL (ref 0–0.11)
IMM GRANULOCYTES NFR BLD AUTO: 0.6 % (ref 0–0.9)
KETONES UR STRIP.AUTO-MCNC: 15 MG/DL
LACTATE SERPL-SCNC: 4.2 MMOL/L (ref 0.5–2)
LEUKOCYTE ESTERASE UR QL STRIP.AUTO: NEGATIVE
LIPASE SERPL-CCNC: 25 U/L (ref 11–82)
LYMPHOCYTES # BLD AUTO: 0.78 K/UL (ref 1–4.8)
LYMPHOCYTES NFR BLD: 5 % (ref 22–41)
MCH RBC QN AUTO: 30.1 PG (ref 27–33)
MCHC RBC AUTO-ENTMCNC: 35.1 G/DL (ref 33.7–35.3)
MCV RBC AUTO: 85.8 FL (ref 81.4–97.8)
MICRO URNS: ABNORMAL
MONOCYTES # BLD AUTO: 0.92 K/UL (ref 0–0.85)
MONOCYTES NFR BLD AUTO: 5.9 % (ref 0–13.4)
NEUTROPHILS # BLD AUTO: 13.71 K/UL (ref 1.82–7.42)
NEUTROPHILS NFR BLD: 88.3 % (ref 44–72)
NITRITE UR QL STRIP.AUTO: NEGATIVE
NRBC # BLD AUTO: 0 K/UL
NRBC BLD-RTO: 0 /100 WBC
PH UR STRIP.AUTO: 6.5 [PH] (ref 5–8)
PLATELET # BLD AUTO: 127 K/UL (ref 164–446)
PMV BLD AUTO: 11.3 FL (ref 9–12.9)
POTASSIUM SERPL-SCNC: 4.2 MMOL/L (ref 3.6–5.5)
PROT SERPL-MCNC: 7 G/DL (ref 6–8.2)
PROT UR QL STRIP: NEGATIVE MG/DL
RBC # BLD AUTO: 5.01 M/UL (ref 4.7–6.1)
RBC UR QL AUTO: NEGATIVE
RSV RNA SPEC QL NAA+PROBE: NEGATIVE
SARS-COV-2 RNA RESP QL NAA+PROBE: NOTDETECTED
SODIUM SERPL-SCNC: 132 MMOL/L (ref 135–145)
SP GR UR STRIP.AUTO: 1.03
SPECIMEN SOURCE: NORMAL
UROBILINOGEN UR STRIP.AUTO-MCNC: 1 MG/DL
WBC # BLD AUTO: 15.5 K/UL (ref 4.8–10.8)

## 2022-07-15 PROCEDURE — 89051 BODY FLUID CELL COUNT: CPT

## 2022-07-15 PROCEDURE — 80053 COMPREHEN METABOLIC PANEL: CPT

## 2022-07-15 PROCEDURE — 81003 URINALYSIS AUTO W/O SCOPE: CPT

## 2022-07-15 PROCEDURE — 99285 EMERGENCY DEPT VISIT HI MDM: CPT

## 2022-07-15 PROCEDURE — A9270 NON-COVERED ITEM OR SERVICE: HCPCS | Performed by: EMERGENCY MEDICINE

## 2022-07-15 PROCEDURE — 82140 ASSAY OF AMMONIA: CPT

## 2022-07-15 PROCEDURE — 86788 WEST NILE VIRUS AB IGM: CPT

## 2022-07-15 PROCEDURE — 80307 DRUG TEST PRSMV CHEM ANLYZR: CPT

## 2022-07-15 PROCEDURE — 700105 HCHG RX REV CODE 258: Performed by: INTERNAL MEDICINE

## 2022-07-15 PROCEDURE — C9803 HOPD COVID-19 SPEC COLLECT: HCPCS | Performed by: EMERGENCY MEDICINE

## 2022-07-15 PROCEDURE — 62270 DX LMBR SPI PNXR: CPT

## 2022-07-15 PROCEDURE — 84443 ASSAY THYROID STIM HORMONE: CPT

## 2022-07-15 PROCEDURE — 82803 BLOOD GASES ANY COMBINATION: CPT

## 2022-07-15 PROCEDURE — 70450 CT HEAD/BRAIN W/O DYE: CPT | Mod: MG

## 2022-07-15 PROCEDURE — 700105 HCHG RX REV CODE 258: Performed by: EMERGENCY MEDICINE

## 2022-07-15 PROCEDURE — 770020 HCHG ROOM/CARE - TELE (206)

## 2022-07-15 PROCEDURE — 87205 SMEAR GRAM STAIN: CPT

## 2022-07-15 PROCEDURE — 96365 THER/PROPH/DIAG IV INF INIT: CPT

## 2022-07-15 PROCEDURE — 700102 HCHG RX REV CODE 250 W/ 637 OVERRIDE(OP): Performed by: EMERGENCY MEDICINE

## 2022-07-15 PROCEDURE — 84157 ASSAY OF PROTEIN OTHER: CPT

## 2022-07-15 PROCEDURE — 87070 CULTURE OTHR SPECIMN AEROBIC: CPT

## 2022-07-15 PROCEDURE — 700111 HCHG RX REV CODE 636 W/ 250 OVERRIDE (IP): Performed by: EMERGENCY MEDICINE

## 2022-07-15 PROCEDURE — 82077 ASSAY SPEC XCP UR&BREATH IA: CPT

## 2022-07-15 PROCEDURE — 82550 ASSAY OF CK (CPK): CPT

## 2022-07-15 PROCEDURE — 83605 ASSAY OF LACTIC ACID: CPT

## 2022-07-15 PROCEDURE — 700111 HCHG RX REV CODE 636 W/ 250 OVERRIDE (IP): Performed by: INTERNAL MEDICINE

## 2022-07-15 PROCEDURE — 87040 BLOOD CULTURE FOR BACTERIA: CPT

## 2022-07-15 PROCEDURE — 83690 ASSAY OF LIPASE: CPT

## 2022-07-15 PROCEDURE — 0241U HCHG SARS-COV-2 COVID-19 NFCT DS RESP RNA 4 TRGT MIC: CPT

## 2022-07-15 PROCEDURE — 85025 COMPLETE CBC W/AUTO DIFF WBC: CPT

## 2022-07-15 PROCEDURE — 87483 CNS DNA AMP PROBE TYPE 12-25: CPT

## 2022-07-15 PROCEDURE — 71045 X-RAY EXAM CHEST 1 VIEW: CPT

## 2022-07-15 PROCEDURE — 82945 GLUCOSE OTHER FLUID: CPT

## 2022-07-15 PROCEDURE — 86789 WEST NILE VIRUS ANTIBODY: CPT

## 2022-07-15 PROCEDURE — 87086 URINE CULTURE/COLONY COUNT: CPT

## 2022-07-15 PROCEDURE — 009U3ZX DRAINAGE OF SPINAL CANAL, PERCUTANEOUS APPROACH, DIAGNOSTIC: ICD-10-PCS | Performed by: EMERGENCY MEDICINE

## 2022-07-15 PROCEDURE — 99223 1ST HOSP IP/OBS HIGH 75: CPT | Mod: AI | Performed by: INTERNAL MEDICINE

## 2022-07-15 PROCEDURE — 96375 TX/PRO/DX INJ NEW DRUG ADDON: CPT

## 2022-07-15 RX ORDER — CEFTRIAXONE 2 G/1
2 INJECTION, POWDER, FOR SOLUTION INTRAMUSCULAR; INTRAVENOUS ONCE
Status: COMPLETED | OUTPATIENT
Start: 2022-07-15 | End: 2022-07-15

## 2022-07-15 RX ORDER — ENOXAPARIN SODIUM 100 MG/ML
40 INJECTION SUBCUTANEOUS DAILY
Status: DISCONTINUED | OUTPATIENT
Start: 2022-07-16 | End: 2022-07-19 | Stop reason: HOSPADM

## 2022-07-15 RX ORDER — ONDANSETRON 4 MG/1
4 TABLET, ORALLY DISINTEGRATING ORAL EVERY 4 HOURS PRN
Status: DISCONTINUED | OUTPATIENT
Start: 2022-07-15 | End: 2022-07-19 | Stop reason: HOSPADM

## 2022-07-15 RX ORDER — DEXTROSE MONOHYDRATE 25 G/50ML
25 INJECTION, SOLUTION INTRAVENOUS
Status: DISCONTINUED | OUTPATIENT
Start: 2022-07-15 | End: 2022-07-18

## 2022-07-15 RX ORDER — LOSARTAN POTASSIUM 50 MG/1
50 TABLET ORAL DAILY
Status: DISCONTINUED | OUTPATIENT
Start: 2022-07-16 | End: 2022-07-19 | Stop reason: HOSPADM

## 2022-07-15 RX ORDER — POLYETHYLENE GLYCOL 3350 17 G/17G
1 POWDER, FOR SOLUTION ORAL
Status: DISCONTINUED | OUTPATIENT
Start: 2022-07-15 | End: 2022-07-19 | Stop reason: HOSPADM

## 2022-07-15 RX ORDER — AMOXICILLIN 250 MG
2 CAPSULE ORAL 2 TIMES DAILY
Status: DISCONTINUED | OUTPATIENT
Start: 2022-07-15 | End: 2022-07-19 | Stop reason: HOSPADM

## 2022-07-15 RX ORDER — FAMOTIDINE 20 MG/1
20 TABLET, FILM COATED ORAL 2 TIMES DAILY
Status: DISCONTINUED | OUTPATIENT
Start: 2022-07-16 | End: 2022-07-19 | Stop reason: HOSPADM

## 2022-07-15 RX ORDER — HYDRALAZINE HYDROCHLORIDE 20 MG/ML
10 INJECTION INTRAMUSCULAR; INTRAVENOUS EVERY 4 HOURS PRN
Status: DISCONTINUED | OUTPATIENT
Start: 2022-07-15 | End: 2022-07-19 | Stop reason: HOSPADM

## 2022-07-15 RX ORDER — SODIUM CHLORIDE, SODIUM LACTATE, POTASSIUM CHLORIDE, AND CALCIUM CHLORIDE .6; .31; .03; .02 G/100ML; G/100ML; G/100ML; G/100ML
1000 INJECTION, SOLUTION INTRAVENOUS ONCE
Status: COMPLETED | OUTPATIENT
Start: 2022-07-15 | End: 2022-07-15

## 2022-07-15 RX ORDER — ONDANSETRON 2 MG/ML
4 INJECTION INTRAMUSCULAR; INTRAVENOUS EVERY 4 HOURS PRN
Status: DISCONTINUED | OUTPATIENT
Start: 2022-07-15 | End: 2022-07-19 | Stop reason: HOSPADM

## 2022-07-15 RX ORDER — FUROSEMIDE 40 MG/1
40 TABLET ORAL
Status: DISCONTINUED | OUTPATIENT
Start: 2022-07-16 | End: 2022-07-19 | Stop reason: HOSPADM

## 2022-07-15 RX ORDER — BISACODYL 10 MG
10 SUPPOSITORY, RECTAL RECTAL
Status: DISCONTINUED | OUTPATIENT
Start: 2022-07-15 | End: 2022-07-19 | Stop reason: HOSPADM

## 2022-07-15 RX ADMIN — ACYCLOVIR SODIUM 776 MG: 50 INJECTION, SOLUTION INTRAVENOUS at 23:45

## 2022-07-15 RX ADMIN — SODIUM CHLORIDE, POTASSIUM CHLORIDE, SODIUM LACTATE AND CALCIUM CHLORIDE 1000 ML: 600; 310; 30; 20 INJECTION, SOLUTION INTRAVENOUS at 21:10

## 2022-07-15 RX ADMIN — IBUPROFEN 800 MG: 600 TABLET ORAL at 21:10

## 2022-07-15 RX ADMIN — CEFTRIAXONE SODIUM 2 G: 2 INJECTION, POWDER, FOR SOLUTION INTRAMUSCULAR; INTRAVENOUS at 23:39

## 2022-07-15 ASSESSMENT — FIBROSIS 4 INDEX: FIB4 SCORE: 3.74

## 2022-07-16 ENCOUNTER — APPOINTMENT (OUTPATIENT)
Dept: RADIOLOGY | Facility: MEDICAL CENTER | Age: 67
DRG: 872 | End: 2022-07-16
Attending: NURSE PRACTITIONER
Payer: MEDICARE

## 2022-07-16 PROBLEM — I25.10 CAD (CORONARY ARTERY DISEASE): Status: ACTIVE | Noted: 2022-07-16

## 2022-07-16 PROBLEM — R73.9 HYPERGLYCEMIA: Status: RESOLVED | Noted: 2018-01-19 | Resolved: 2022-07-16

## 2022-07-16 PROBLEM — K21.9 GERD (GASTROESOPHAGEAL REFLUX DISEASE): Status: ACTIVE | Noted: 2022-07-16

## 2022-07-16 PROBLEM — E11.9 TYPE 2 DIABETES MELLITUS WITHOUT COMPLICATION, WITHOUT LONG-TERM CURRENT USE OF INSULIN (HCC): Status: ACTIVE | Noted: 2022-07-16

## 2022-07-16 LAB
ALBUMIN SERPL BCP-MCNC: 4.3 G/DL (ref 3.2–4.9)
ALBUMIN/GLOB SERPL: 1.7 G/DL
ALP SERPL-CCNC: 114 U/L (ref 30–99)
ALT SERPL-CCNC: 34 U/L (ref 2–50)
AMMONIA PLAS-SCNC: 18 UMOL/L (ref 11–45)
AMPHET UR QL SCN: NEGATIVE
ANION GAP SERPL CALC-SCNC: 14 MMOL/L (ref 7–16)
AST SERPL-CCNC: 49 U/L (ref 12–45)
BARBITURATES UR QL SCN: NEGATIVE
BASE EXCESS BLDV CALC-SCNC: -2 MMOL/L
BASOPHILS # BLD AUTO: 0.2 % (ref 0–1.8)
BASOPHILS # BLD: 0.03 K/UL (ref 0–0.12)
BENZODIAZ UR QL SCN: NEGATIVE
BILIRUB SERPL-MCNC: 0.8 MG/DL (ref 0.1–1.5)
BODY TEMPERATURE: ABNORMAL CENTIGRADE
BUN SERPL-MCNC: 17 MG/DL (ref 8–22)
BURR CELLS/RBC NFR CSF MANUAL: <1 %
BZE UR QL SCN: NEGATIVE
C GATTII+NEOFOR DNA CSF QL NAA+NON-PROBE: NOT DETECTED
CALCIUM SERPL-MCNC: 9.4 MG/DL (ref 8.5–10.5)
CANNABINOIDS UR QL SCN: NEGATIVE
CHLORIDE SERPL-SCNC: 99 MMOL/L (ref 96–112)
CK SERPL-CCNC: 224 U/L (ref 0–154)
CLARITY CSF: CLEAR
CMV DNA CSF QL NAA+NON-PROBE: NOT DETECTED
CO2 SERPL-SCNC: 22 MMOL/L (ref 20–33)
COLOR CSF: COLORLESS
COLOR SPUN CSF: COLORLESS
CREAT SERPL-MCNC: 0.92 MG/DL (ref 0.5–1.4)
E COLI K1 DNA CSF QL NAA+NON-PROBE: NOT DETECTED
EOSINOPHIL # BLD AUTO: 0 K/UL (ref 0–0.51)
EOSINOPHIL NFR BLD: 0 % (ref 0–6.9)
ERYTHROCYTE [DISTWIDTH] IN BLOOD BY AUTOMATED COUNT: 42.3 FL (ref 35.9–50)
EST. AVERAGE GLUCOSE BLD GHB EST-MCNC: 206 MG/DL
ETHANOL BLD-MCNC: <10.1 MG/DL
EV RNA CSF QL NAA+NON-PROBE: NOT DETECTED
GFR SERPLBLD CREATININE-BSD FMLA CKD-EPI: 90 ML/MIN/1.73 M 2
GLOBULIN SER CALC-MCNC: 2.6 G/DL (ref 1.9–3.5)
GLUCOSE BLD STRIP.AUTO-MCNC: 192 MG/DL (ref 65–99)
GLUCOSE BLD STRIP.AUTO-MCNC: 217 MG/DL (ref 65–99)
GLUCOSE BLD STRIP.AUTO-MCNC: 219 MG/DL (ref 65–99)
GLUCOSE BLD STRIP.AUTO-MCNC: 240 MG/DL (ref 65–99)
GLUCOSE CSF-MCNC: 143 MG/DL (ref 40–80)
GLUCOSE SERPL-MCNC: 222 MG/DL (ref 65–99)
GP B STREP DNA CSF QL NAA+NON-PROBE: NOT DETECTED
GRAM STN SPEC: NORMAL
HAEM INFLU DNA CSF QL NAA+NON-PROBE: NOT DETECTED
HBA1C MFR BLD: 8.8 % (ref 4–5.6)
HCO3 BLDV-SCNC: 21 MMOL/L (ref 24–28)
HCT VFR BLD AUTO: 40.7 % (ref 42–52)
HGB BLD-MCNC: 14.5 G/DL (ref 14–18)
HHV6 DNA CSF QL NAA+NON-PROBE: NOT DETECTED
HSV1 DNA CSF QL NAA+NON-PROBE: NOT DETECTED
HSV2 DNA CSF QL NAA+NON-PROBE: NOT DETECTED
IMM GRANULOCYTES # BLD AUTO: 0.08 K/UL (ref 0–0.11)
IMM GRANULOCYTES NFR BLD AUTO: 0.5 % (ref 0–0.9)
INR PPP: 1.18 (ref 0.87–1.13)
L MONOCYTOG DNA CSF QL NAA+NON-PROBE: NOT DETECTED
LACTATE SERPL-SCNC: 1.9 MMOL/L (ref 0.5–2)
LACTATE SERPL-SCNC: 2.7 MMOL/L (ref 0.5–2)
LACTATE SERPL-SCNC: 2.9 MMOL/L (ref 0.5–2)
LACTATE SERPL-SCNC: 2.9 MMOL/L (ref 0.5–2)
LACTATE SERPL-SCNC: 3.4 MMOL/L (ref 0.5–2)
LACTATE SERPL-SCNC: 5.3 MMOL/L (ref 0.5–2)
LYMPHOCYTES # BLD AUTO: 1.2 K/UL (ref 1–4.8)
LYMPHOCYTES NFR BLD: 7.7 % (ref 22–41)
LYMPHOCYTES NFR CSF: 33 %
MCH RBC QN AUTO: 30 PG (ref 27–33)
MCHC RBC AUTO-ENTMCNC: 35.6 G/DL (ref 33.7–35.3)
MCV RBC AUTO: 84.1 FL (ref 81.4–97.8)
METHADONE UR QL SCN: NEGATIVE
MONOCYTES # BLD AUTO: 0.76 K/UL (ref 0–0.85)
MONOCYTES NFR BLD AUTO: 4.9 % (ref 0–13.4)
MONONUC CELLS NFR CSF: 67 %
N MEN DNA CSF QL NAA+NON-PROBE: NOT DETECTED
NEUTROPHILS # BLD AUTO: 13.55 K/UL (ref 1.82–7.42)
NEUTROPHILS NFR BLD: 86.7 % (ref 44–72)
NRBC # BLD AUTO: 0 K/UL
NRBC BLD-RTO: 0 /100 WBC
NT-PROBNP SERPL IA-MCNC: 186 PG/ML (ref 0–125)
OPIATES UR QL SCN: NEGATIVE
OXYCODONE UR QL SCN: NEGATIVE
PARECHOVIRUS A RNA CSF QL NAA+NON-PROBE: NOT DETECTED
PCO2 BLDV: 30.5 MMHG (ref 41–51)
PCP UR QL SCN: NEGATIVE
PH BLDV: 7.45 [PH] (ref 7.31–7.45)
PLATELET # BLD AUTO: 137 K/UL (ref 164–446)
PMV BLD AUTO: 10.6 FL (ref 9–12.9)
PO2 BLDV: 37 MMHG (ref 25–40)
POTASSIUM SERPL-SCNC: 3.8 MMOL/L (ref 3.6–5.5)
PROPOXYPH UR QL SCN: NEGATIVE
PROT CSF-MCNC: 69 MG/DL (ref 15–45)
PROT SERPL-MCNC: 6.9 G/DL (ref 6–8.2)
PROTHROMBIN TIME: 14.8 SEC (ref 12–14.6)
RBC # BLD AUTO: 4.84 M/UL (ref 4.7–6.1)
RBC # CSF: 1 CELLS/UL
S PNEUM DNA CSF QL NAA+NON-PROBE: NOT DETECTED
SAO2 % BLDV: 69.7 %
SCCMEC + MECA PNL NOSE NAA+PROBE: NEGATIVE
SIGNIFICANT IND 70042: NORMAL
SITE SITE: NORMAL
SODIUM SERPL-SCNC: 135 MMOL/L (ref 135–145)
SOURCE SOURCE: NORMAL
SPECIMEN VOL CSF: 8 ML
TSH SERPL DL<=0.005 MIU/L-ACNC: 0.42 UIU/ML (ref 0.38–5.33)
TUBE # CSF: 3
TUBE # CSF: 4
VZV DNA CSF QL NAA+NON-PROBE: NOT DETECTED
WBC # BLD AUTO: 15.6 K/UL (ref 4.8–10.8)
WBC # CSF: 1 CELLS/UL (ref 0–10)

## 2022-07-16 PROCEDURE — 36415 COLL VENOUS BLD VENIPUNCTURE: CPT

## 2022-07-16 PROCEDURE — 82962 GLUCOSE BLOOD TEST: CPT | Mod: 91

## 2022-07-16 PROCEDURE — 700111 HCHG RX REV CODE 636 W/ 250 OVERRIDE (IP): Performed by: INTERNAL MEDICINE

## 2022-07-16 PROCEDURE — 700102 HCHG RX REV CODE 250 W/ 637 OVERRIDE(OP): Performed by: NURSE PRACTITIONER

## 2022-07-16 PROCEDURE — 700102 HCHG RX REV CODE 250 W/ 637 OVERRIDE(OP): Performed by: INTERNAL MEDICINE

## 2022-07-16 PROCEDURE — 87040 BLOOD CULTURE FOR BACTERIA: CPT

## 2022-07-16 PROCEDURE — 85610 PROTHROMBIN TIME: CPT

## 2022-07-16 PROCEDURE — 83880 ASSAY OF NATRIURETIC PEPTIDE: CPT

## 2022-07-16 PROCEDURE — 83036 HEMOGLOBIN GLYCOSYLATED A1C: CPT

## 2022-07-16 PROCEDURE — 87641 MR-STAPH DNA AMP PROBE: CPT

## 2022-07-16 PROCEDURE — 770020 HCHG ROOM/CARE - TELE (206)

## 2022-07-16 PROCEDURE — 700105 HCHG RX REV CODE 258: Performed by: INTERNAL MEDICINE

## 2022-07-16 PROCEDURE — A9270 NON-COVERED ITEM OR SERVICE: HCPCS | Performed by: INTERNAL MEDICINE

## 2022-07-16 PROCEDURE — A9270 NON-COVERED ITEM OR SERVICE: HCPCS | Performed by: NURSE PRACTITIONER

## 2022-07-16 PROCEDURE — 99232 SBSQ HOSP IP/OBS MODERATE 35: CPT | Performed by: NURSE PRACTITIONER

## 2022-07-16 PROCEDURE — 71260 CT THORAX DX C+: CPT | Mod: MG

## 2022-07-16 PROCEDURE — 86694 HERPES SIMPLEX NES ANTBDY: CPT

## 2022-07-16 PROCEDURE — 700105 HCHG RX REV CODE 258: Performed by: EMERGENCY MEDICINE

## 2022-07-16 PROCEDURE — 700117 HCHG RX CONTRAST REV CODE 255: Performed by: NURSE PRACTITIONER

## 2022-07-16 PROCEDURE — 80053 COMPREHEN METABOLIC PANEL: CPT

## 2022-07-16 PROCEDURE — 83605 ASSAY OF LACTIC ACID: CPT

## 2022-07-16 PROCEDURE — 85025 COMPLETE CBC W/AUTO DIFF WBC: CPT

## 2022-07-16 RX ORDER — ALLOPURINOL 300 MG/1
300 TABLET ORAL DAILY
Status: DISCONTINUED | OUTPATIENT
Start: 2022-07-16 | End: 2022-07-19 | Stop reason: HOSPADM

## 2022-07-16 RX ORDER — SIMVASTATIN 40 MG
40 TABLET ORAL DAILY
Status: DISCONTINUED | OUTPATIENT
Start: 2022-07-16 | End: 2022-07-16

## 2022-07-16 RX ORDER — SODIUM CHLORIDE, SODIUM LACTATE, POTASSIUM CHLORIDE, CALCIUM CHLORIDE 600; 310; 30; 20 MG/100ML; MG/100ML; MG/100ML; MG/100ML
1000 INJECTION, SOLUTION INTRAVENOUS ONCE
Status: COMPLETED | OUTPATIENT
Start: 2022-07-16 | End: 2022-07-16

## 2022-07-16 RX ORDER — TRAMADOL HYDROCHLORIDE 50 MG/1
50 TABLET ORAL EVERY 6 HOURS PRN
Status: DISCONTINUED | OUTPATIENT
Start: 2022-07-16 | End: 2022-07-19 | Stop reason: HOSPADM

## 2022-07-16 RX ORDER — KETOROLAC TROMETHAMINE 30 MG/ML
15 INJECTION, SOLUTION INTRAMUSCULAR; INTRAVENOUS ONCE
Status: ACTIVE | OUTPATIENT
Start: 2022-07-16 | End: 2022-07-17

## 2022-07-16 RX ORDER — SIMVASTATIN 40 MG
40 TABLET ORAL EVERY EVENING
Status: DISCONTINUED | OUTPATIENT
Start: 2022-07-16 | End: 2022-07-19 | Stop reason: HOSPADM

## 2022-07-16 RX ADMIN — ACYCLOVIR SODIUM 776 MG: 50 INJECTION, SOLUTION INTRAVENOUS at 21:03

## 2022-07-16 RX ADMIN — TRAMADOL HYDROCHLORIDE 50 MG: 50 TABLET ORAL at 21:09

## 2022-07-16 RX ADMIN — IOHEXOL 75 ML: 350 INJECTION, SOLUTION INTRAVENOUS at 13:00

## 2022-07-16 RX ADMIN — ACYCLOVIR SODIUM 776 MG: 50 INJECTION, SOLUTION INTRAVENOUS at 14:00

## 2022-07-16 RX ADMIN — SIMVASTATIN 40 MG: 40 TABLET, FILM COATED ORAL at 18:06

## 2022-07-16 RX ADMIN — FAMOTIDINE 20 MG: 20 TABLET ORAL at 06:45

## 2022-07-16 RX ADMIN — TRAMADOL HYDROCHLORIDE 50 MG: 50 TABLET ORAL at 09:30

## 2022-07-16 RX ADMIN — INSULIN HUMAN 2 UNITS: 100 INJECTION, SOLUTION PARENTERAL at 08:01

## 2022-07-16 RX ADMIN — ASPIRIN 81 MG: 81 TABLET, COATED ORAL at 06:45

## 2022-07-16 RX ADMIN — SODIUM CHLORIDE, POTASSIUM CHLORIDE, SODIUM LACTATE AND CALCIUM CHLORIDE 1000 ML: 600; 310; 30; 20 INJECTION, SOLUTION INTRAVENOUS at 00:30

## 2022-07-16 RX ADMIN — FAMOTIDINE 20 MG: 20 TABLET ORAL at 18:06

## 2022-07-16 RX ADMIN — INSULIN HUMAN 2 UNITS: 100 INJECTION, SOLUTION PARENTERAL at 21:14

## 2022-07-16 RX ADMIN — VANCOMYCIN HYDROCHLORIDE 3 G: 500 INJECTION, POWDER, LYOPHILIZED, FOR SOLUTION INTRAVENOUS at 08:45

## 2022-07-16 RX ADMIN — CEFTRIAXONE SODIUM 2 G: 10 INJECTION, POWDER, FOR SOLUTION INTRAVENOUS at 06:45

## 2022-07-16 RX ADMIN — ACYCLOVIR SODIUM 776 MG: 50 INJECTION, SOLUTION INTRAVENOUS at 06:46

## 2022-07-16 RX ADMIN — ENOXAPARIN SODIUM 40 MG: 40 INJECTION SUBCUTANEOUS at 18:06

## 2022-07-16 RX ADMIN — ALLOPURINOL 300 MG: 300 TABLET ORAL at 15:01

## 2022-07-16 ASSESSMENT — ENCOUNTER SYMPTOMS
PALPITATIONS: 0
WEIGHT LOSS: 0
COUGH: 0
SEIZURES: 0
HEADACHES: 0
WEAKNESS: 0
ORTHOPNEA: 0
CHILLS: 0
VOMITING: 0
DIARRHEA: 0
TINGLING: 0
FALLS: 0
SPEECH CHANGE: 0
INSOMNIA: 0
ABDOMINAL PAIN: 0
NERVOUS/ANXIOUS: 0
SHORTNESS OF BREATH: 0
FLANK PAIN: 0
FEVER: 0
BLURRED VISION: 0
SORE THROAT: 0
NAUSEA: 0
DOUBLE VISION: 0
DIZZINESS: 0
TREMORS: 0
MYALGIAS: 0

## 2022-07-16 ASSESSMENT — COPD QUESTIONNAIRES
DO YOU EVER COUGH UP ANY MUCUS OR PHLEGM?: NO/ONLY WITH OCCASIONAL COLDS OR INFECTIONS
HAVE YOU SMOKED AT LEAST 100 CIGARETTES IN YOUR ENTIRE LIFE: NO/DON'T KNOW
DURING THE PAST 4 WEEKS HOW MUCH DID YOU FEEL SHORT OF BREATH: NONE/LITTLE OF THE TIME
COPD SCREENING SCORE: 3

## 2022-07-16 ASSESSMENT — LIFESTYLE VARIABLES
SUBSTANCE_ABUSE: 1
EVER FELT BAD OR GUILTY ABOUT YOUR DRINKING: NO
HAVE YOU EVER FELT YOU SHOULD CUT DOWN ON YOUR DRINKING: NO
DOES PATIENT WANT TO STOP DRINKING: NO
HOW MANY TIMES IN THE PAST YEAR HAVE YOU HAD 5 OR MORE DRINKS IN A DAY: 0
AVERAGE NUMBER OF DAYS PER WEEK YOU HAVE A DRINK CONTAINING ALCOHOL: 1
HAVE PEOPLE ANNOYED YOU BY CRITICIZING YOUR DRINKING: NO
CONSUMPTION TOTAL: NEGATIVE
EVER HAD A DRINK FIRST THING IN THE MORNING TO STEADY YOUR NERVES TO GET RID OF A HANGOVER: NO
TOTAL SCORE: 0
TOTAL SCORE: 0
ALCOHOL_USE: YES
ON A TYPICAL DAY WHEN YOU DRINK ALCOHOL HOW MANY DRINKS DO YOU HAVE: 4
TOTAL SCORE: 0

## 2022-07-16 ASSESSMENT — PAIN DESCRIPTION - PAIN TYPE: TYPE: ACUTE PAIN

## 2022-07-16 ASSESSMENT — FIBROSIS 4 INDEX: FIB4 SCORE: 4.23

## 2022-07-16 NOTE — PROGRESS NOTES
Brief neurology note    Brief neurology evaluation on arrival.  Patient was brought in a stroke alert.  Last known well was 1 PM today.  Patient is febrile on arrival.  Stroke alert was called due to left-sided weakness.  However on arrival no obvious weakness questionable left lower leg weakness only.  Patient is awake and alert he is following commands able to repeat words and simple phrases.  Somewhat slow response.  Given nonspecific findings and concern for metabolic encephalopathy stroke alert was canceled.  Neurology will sign off at this time please call back if new issues arises.

## 2022-07-16 NOTE — ASSESSMENT & PLAN NOTE
History of.   Reports he drinks bourbon daily on nonwork days  Blood alcohol levels <0.10  No signs of withdrawal, patient out of timeframe for withdrawals  AST mildly elevated.    CTAP and RUQ ultrasound showed hepatic steatosis.  Encourage cessation

## 2022-07-16 NOTE — ASSESSMENT & PLAN NOTE
"Per OP clinic note 08/20/2021, patient reported he was told by PCP that he had CHF and referred to cardiologist who stated he had \"fluid\". No record of either encounter with PCP or cardiologist.   Last echo in 2018 was normal.   Patient wife states patient on diuretic for fluid retention but does not take when working, has had 20 lb weight gain.   No cardiomegaly or pulmonary edema on CXR.  BNP ordered and will consider echo if elevated.     Continue losartan, Lasix, hold amlodipine and spironolactone  Resuming amlodipine  Monitor blood pressure  "

## 2022-07-16 NOTE — PROGRESS NOTES
[facility] Medicine Daily Progress Note    Date of Service  7/16/2022    Chief Complaint  Sharon Guardado is a 67 y.o. male admitted 7/15/2022 with altered level of consciousness, fever    Hospital Course  This is a 67-year-old male with a past medical history of hypertension, coronary artery disease, melanoma, alcohol dependence who presented 7/15/2022 with altered mental status, fever.  Last known normal was 7/15/2022 1300.  Patient initially presented to the emergency department as a code stroke due to questionable left-sided weakness, this canceled by neurology.  Patient son reported at 1800, patient was going to the restroom and spent an hour sitting and stairs confused.  In the emergency department, patient met sepsis criteria with fever, tachycardia, leukocytosis.  He was awake and alert but confused/disoriented.  Lumbar puncture was performed, blood alcohol <0.10, UDS negative, CT head negative for acute findings, no metabolic acidosis but lactic acidosis noted greater than 5. No seizures or trauma is reported.  Patient was started on Rocephin IV and admitted for further evaluation.      Interval Problem Update  7/16/2022: Patient AAOx4.  Denies pain, chest pain, headache, shortness of breath.  Patient's wife at bedside.  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  She states patient returned home after working his night shift and full gear, and had slept approximately half an hour before he was awake at 1300.  She states patient had difficulty with ambulation and later in the evening and had vomited, raising concern for heat exhaustion.  Furthermore, she states patient is supposed to be on a diuretic but does not take diuretics as prescribed when he is working and has gained approximately 20 pounds in the last couple months despite eating moderate amounts of food including salads, eggs- but patient had a Mendez soup for lunch.     I have  discussed this patient's plan of care and discharge plan at IDT rounds today with Case Management, Nursing, Nursing leadership, and other members of the IDT team.    Consultants/Specialty  neurology-signed off    Code Status  Full Code    Disposition  Patient is not medically cleared for discharge.   Anticipate discharge to to home with close outpatient follow-up.  I have placed the appropriate orders for post-discharge needs.    Review of Systems  Review of Systems   Constitutional: Negative for chills, fever and weight loss.        +20lb weight gain over last few months   HENT: Negative for congestion and sore throat.    Eyes: Negative for blurred vision and double vision.   Respiratory: Negative for cough and shortness of breath.    Cardiovascular: Positive for leg swelling. Negative for chest pain, palpitations and orthopnea.   Gastrointestinal: Negative for abdominal pain, diarrhea, nausea and vomiting.   Genitourinary: Negative for dysuria and flank pain.   Musculoskeletal: Negative for falls and myalgias.   Neurological: Negative for dizziness, tingling, tremors, speech change, seizures, weakness and headaches.   Psychiatric/Behavioral: Positive for substance abuse (alcohol). The patient is not nervous/anxious and does not have insomnia.    All other systems reviewed and are negative.       Physical Exam  Temp:  [37.3 °C (99.1 °F)-38.7 °C (101.6 °F)] 37.3 °C (99.2 °F)  Pulse:  [] 95  Resp:  [17-31] 18  BP: ()/(56-95) 124/75  SpO2:  [89 %-95 %] 91 %    Physical Exam  Vitals and nursing note reviewed.   Constitutional:       General: He is not in acute distress.     Appearance: Normal appearance. He is morbidly obese. He is not ill-appearing or toxic-appearing.   HENT:      Head: Normocephalic.      Nose: Nose normal.      Mouth/Throat:      Mouth: Mucous membranes are moist.      Pharynx: Oropharynx is clear.   Eyes:      General: No scleral icterus.     Conjunctiva/sclera: Conjunctivae normal.       Pupils: Pupils are equal, round, and reactive to light.   Cardiovascular:      Rate and Rhythm: Normal rate and regular rhythm.      Pulses: Normal pulses.      Heart sounds: Normal heart sounds. No murmur heard.    No gallop.   Pulmonary:      Effort: Pulmonary effort is normal. No respiratory distress.      Breath sounds: Normal breath sounds. No wheezing or rhonchi.   Chest:      Chest wall: No tenderness.   Abdominal:      General: Abdomen is flat. Bowel sounds are normal. There is no distension.      Palpations: Abdomen is soft.      Tenderness: There is no abdominal tenderness.   Musculoskeletal:         General: Normal range of motion.      Cervical back: Normal range of motion and neck supple. No rigidity or tenderness.      Right lower le+ Edema present.      Left lower le+ Edema present.   Skin:     General: Skin is warm and dry.      Capillary Refill: Capillary refill takes less than 2 seconds.      Coloration: Skin is not jaundiced.   Neurological:      General: No focal deficit present.      Mental Status: He is alert and oriented to person, place, and time. Mental status is at baseline.      Motor: No weakness.   Psychiatric:         Mood and Affect: Mood normal.         Behavior: Behavior normal.         Thought Content: Thought content normal.         Judgment: Judgment normal.         Fluids    Intake/Output Summary (Last 24 hours) at 2022 1316  Last data filed at 2022 1202  Gross per 24 hour   Intake 1920 ml   Output 500 ml   Net 1420 ml       Laboratory  Recent Labs     07/15/22  2043 07/16/22  0105   WBC 15.5* 15.6*   RBC 5.01 4.84   HEMOGLOBIN 15.1 14.5   HEMATOCRIT 43.0 40.7*   MCV 85.8 84.1   MCH 30.1 30.0   MCHC 35.1 35.6*   RDW 42.4 42.3   PLATELETCT 127* 137*   MPV 11.3 10.6     Recent Labs     07/15/22  2043 07/16/22  0105   SODIUM 132* 135   POTASSIUM 4.2 3.8   CHLORIDE 96 99   CO2 21 22   GLUCOSE 243* 222*   BUN 18 17   CREATININE 0.99 0.92   CALCIUM 9.5 9.4     Recent  Labs     07/16/22  0105   INR 1.18*               Imaging  CT-CHEST,ABDOMEN,PELVIS WITH   Final Result      1.  No acute infectious process in the chest, abdomen or pelvis.   2.  Hepatic steatosis.   3.  Cholelithiasis.   4.  Colonic diverticulosis.      DX-CHEST-PORTABLE (1 VIEW)   Final Result         1. No acute cardiopulmonary abnormalities are identified.      CT-HEAD W/O   Final Result         1. No acute intracranial abnormality. No evidence of acute intracranial hemorrhage or mass lesion.                          Assessment/Plan  * Sepsis (HCC)- (present on admission)  Assessment & Plan  This is Sepsis Present on admission  SIRS criteria identified on my evaluation include: Tachycardia, with heart rate greater than 90 BPM and Leukocytosis, with WBC greater than 12,000  Source is unknown  Sepsis protocol initiated  Fluid resuscitation ordered per protocol  Crystalloid Fluid Administration: Fluid resuscitation ordered per standard protocol - 30 mL/kg per current or ideal body weight  IV antibiotics as appropriate for source of sepsis  Reassessment: I have reassessed the patient's hemodynamic status     7/16: Leukocytosis on morning labs. Vancomycin IV started.   MRSA PCR ordered.   Tachycardic in morning, improved by afternoon, afebrile.   CSF unremarkable.   UA negative  CT chest, abdomen, pelvis ordered- negative for acute findings  Lactic acid down trending    Fever  Assessment & Plan  On admission 100.6  COVID-19/influenza/RSV negative.    RVP pending.  CSF unremarkable, no meningeal signs on exam, therefore bacterial meningitis seems to be less likely.        Acute encephalopathy  Assessment & Plan  Resolved.  Unknown etiology- Suspect may been temporarily due to sleep deprivation, heat exposure, decreased oral intake.  No metabolic disturbances including metabolic acidosis, DKA  No hypoxia or hypercapnia  Patient AAO x4, no focal weakness, aphasia, neurological deficits-CT head negative for acute  "findings on admission  Patient hyperglycemic in 200s without acidosis  No seizures or traumas reported/witnessed  No uremia  Ammonia normal  UDS and alcohol levels negative.  CSF fluids unremarkable- cultures pending  Lactic acidosis improved after IV fluids.  Continue IV/p.o. thiamine    GERD (gastroesophageal reflux disease)  Assessment & Plan  Continue home prilosec    CAD (coronary artery disease)  Assessment & Plan  Continue atorvastatin and asa    Type 2 diabetes mellitus without complication, without long-term current use of insulin ([facility])  Assessment & Plan  New onset.  A1c 8.8  SSI orders in place  Diabetic diet  Diabetic education and nutrition consultation orders placed  Accuchecks and hypoglycemic protocol ordered.       Essential hypertension- (present on admission)  Assessment & Plan  Per OP clinic note 08/20/2021, patient reported he was told by PCP that he had CHF and referred to cardiologist who stated he had \"fluid\". No record of either encounter with PCP or cardiologist.   Last echo in 2018 was normal.   Patient wife states patient on diuretic for fluid retention but does not take when working, has had 20 lb weight gain.   No cardiomegaly or pulmonary edema on CXR.  BNP ordered and will consider echo if elevated.     Continue losartan, Lasix, hold amlodipine and spironolactone  Monitor blood pressure    Chronic gout- (present on admission)  Assessment & Plan  Continue allopurinol    Morbid obesity with BMI of 40.0-44.9, adult ([facility])- (present on admission)  Assessment & Plan  Encourage lifestyle and diet modifications.     Alcohol dependence ([facility])- (present on admission)  Assessment & Plan  History of.   Blood alcohol levels <0.10  No signs of withdrawal   AST mildly elevated.   Continue to monitor for any signs of withdrawals and initiate CIWA protocol PRN       VTE prophylaxis: enoxaparin ppx and Xarelto 10 mg daily as prophylaxis    I have performed a physical exam and reviewed and updated ROS " and Plan today (7/16/2022). In review of yesterday's note (7/15/2022), there are no changes except as documented above.

## 2022-07-16 NOTE — PROGRESS NOTES
Pharmacy Vancomycin Kinetics Note for 7/16/2022     67 y.o. male on Vancomycin day # 1     Vancomycin Indication (AUC Dosing): Sepsis     Provider specified end date:  (TBD)    Active Antibiotics (From admission, onward)    Ordered     Ordering Provider       Sat Jul 16, 2022 10:18 AM    07/16/22 1018  vancomycin (VANCOCIN) 2,000 mg in  mL IVPB  (vancomycin (VANCOCIN) IV (LD + Maintenance))  EVERY 18 HOURS         Isra Yu M.D.       Sat Jul 16, 2022  8:22 AM    07/16/22 0822  vancomycin (VANCOCIN) 3 g in  mL IVPB  (vancomycin (VANCOCIN) IV (LD + Maintenance))  ONCE         Isra Yu M.D.       Sat Jul 16, 2022  7:13 AM    07/16/22 0713  MD Alert...Vancomycin per Pharmacy  PHARMACY TO DOSE        Question:  Indication(s) for vancomycin?  Answer:  Unknown source of infection    Isra Yu M.D.       Fri Jul 15, 2022 11:28 PM    07/15/22 2328  cefTRIAXone (Rocephin) syringe 2 g  EVERY 24 HOURS         Ulises Baer M.D.       Fri Jul 15, 2022 11:28 PM    07/15/22 2328  acyclovir (ZOVIRAX) 776 mg in  mL IVPB  EVERY 8 HOURS         Ulises Baer M.D.          Dosing Weight: 134 kg (295 lb 6.7 oz)      Admission History: Admitted on 7/15/2022 for Sepsis (HCC) [A41.9]  Pertinent history: 66 yo male admitted on 7/15/22 for ALOC diagnosied with Sepsis of unknown origin and encephalopathy.    Allergies:     Ativan and Vicodin [hydrocodone-acetaminophen]     Pertinent cultures to date:     Results     Procedure Component Value Units Date/Time    Blood Culture [[id number]] Collected: 07/16/22 0105    Order Status: Completed Specimen: Blood from Peripheral Updated: 07/16/22 0744     Significant Indicator NEG     Source BLD     Site PERIPHERAL     Culture Result No Growth  Note: Blood cultures are incubated for 5 days and  are monitored continuously.Positive blood cultures  are called to the RN and reported as soon as  they are identified.      Narrative:      2 of 2 blood culture x2  Sites order. Per  "Hospital Policy:  Only change Specimen Src: to \"Line\" if specified by physician  order.  Left Forearm/Arm    Blood Culture [[id number]] Collected: 07/15/22 2045    Order Status: Completed Specimen: Blood from Peripheral Updated: 07/16/22 0744     Significant Indicator NEG     Source BLD     Site PERIPHERAL     Culture Result No Growth  Note: Blood cultures are incubated for 5 days and  are monitored continuously.Positive blood cultures  are called to the RN and reported as soon as  they are identified.      Narrative:      1 of 2 for Blood Culture x 2 sites order. Per Hospital  Policy: Only change Specimen Src: to \"Line\" if specified by  physician order.  No site indicated    GRAM STAIN [[id number]] Collected: 07/15/22 2300    Order Status: Completed Specimen: CSF Updated: 07/16/22 0038     Significant Indicator .     Source CSF     Site TAP     Gram Stain Result Rare WBCs.  No organisms seen.      CSF CULTURE [[id number]] Collected: 07/15/22 2300    Order Status: Sent Specimen: CSF from Tap Updated: 07/16/22 0036     Significant Indicator NEG     Source CSF     Site TAP     Culture Result -     Gram Stain Result Rare WBCs.  No organisms seen.      Respiratory Panel By PCR [[id number]]     Order Status: Sent Specimen: Respirate from Nasopharyngeal     GRAM STAIN ONLY [[id number]] Collected: 07/15/22 2300    Order Status: Canceled Specimen: Other from Spinal Fluid     CoV-2, FLU A/B, and RSV by PCR (2-4 Hours CEPHEID) : Collect NP swab in VTM [[id number]] Collected: 07/15/22 2043    Order Status: Completed Specimen: Respirate Updated: 07/15/22 2159     Influenza virus A RNA Negative     Influenza virus B, PCR Negative     RSV, PCR Negative     SARS-CoV-2 by PCR NotDetected     Comment: PATIENTS: Important information regarding your results and instructions can  be found at [email or web address]   \"After your  Covid-19 Test\"    [facility] providers: PLEASE REFER TO DE-ESCALATION AND RETESTING " PROTOCOL  on insideSt. [email or web address]    **The [facility] GeneXpert Xpress SARS-CoV-2 RT-PCR Test has been made  available for use under the Emergency Use Authorization (EUA) only.          SARS-CoV-2 Source NP Swab    Urinalysis [[id number]]  (Abnormal) Collected: 07/15/22 2115    Order Status: Completed Specimen: Urine Updated: 07/15/22 2128     Color Yellow     Character Clear     Specific Gravity 1.027     Ph 6.5     Glucose 500 mg/dL      Ketones 15 mg/dL      Protein Negative mg/dL      Bilirubin Negative     Urobilinogen, Urine 1.0     Nitrite Negative     Leukocyte Esterase Negative     Occult Blood Negative     Micro Urine Req see below     Comment: Microscopic examination not performed when specimen is clear  and chemically negative for protein, blood, leukocyte esterase  and nitrite.         Narrative:      Indication for culture:->Evaluation for sepsis without a  clear source of infection    Urine Culture (NEW) [[id number]] Collected: 07/15/22 2115    Order Status: Sent Specimen: Urine Updated: 07/15/22 2119    Narrative:      Indication for culture:->Evaluation for sepsis without a  clear source of infection          Labs:     Estimated Creatinine Clearance: 107.2 mL/min (by C-G formula based on SCr of 0.92 mg/dL).  Recent Labs     07/15/22  2043 07/16/22  0105   WBC 15.5* 15.6*   NEUTSPOLYS 88.30* 86.70*     Recent Labs     07/15/22  2043 07/16/22  0105   BUN 18 17   CREATININE 0.99 0.92   ALBUMIN 4.3 4.3       Intake/Output Summary (Last 24 hours) at 2022 1018  Last data filed at 2022 0508  Gross per 24 hour   Intake 1800 ml   Output 500 ml   Net 1300 ml      BP (!) 147/77   Pulse (!) 109   Temp 37.8 °C (100.1 °F) (Temporal)   Resp 18   Ht 1.829 m (6')   Wt (!) 134 kg (295 lb 10.2 oz)   SpO2 92%  Temp (24hrs), Av.9 °C (100.2 °F), Min:37.3 °C (99.1 °F), Max:38.7 °C (101.6 °F)      List concerns for Vancomycin clearance:     Age;Obesity     Pharmacokinetics:     AUC kinetics:   Ke (hr ^-1):  0.0934 hr^-1  Half life: 7.42 hr  Clearance: 5.091  Estimated TDD: 2545.5  Estimated Dose: 997  Estimated interval: 9.4      A/P:     -  Vancomycin dose: Loading dose 3,000 mg (25 mg/kg) IV x one this morning at ~ 0900 followed by 2,000 mg (15 mg/kg)  IV q18h starting tomorrow (7/17) at 0300.     -  Next vancomycin level(s): TBD, pending continuation     -  Predicted vancomycin AUC from initial AUC test calculator: 524 mg·hr/L    Veena Mcnamara PharmD, BCPS

## 2022-07-16 NOTE — H&P
Hospital Medicine History & Physical Note    Date of Service  7/15/2022    Primary Care Physician  Juan Manuel FLANAGAN M.D.    Consultants  Neurology Dr. Guardado    Code Status  Full Code    Chief Complaint  Chief Complaint   Patient presents with   • ALOC     LKW at 1300, pt with L sided weakness, expressive aphasia. Pt A&Ox2-3 disoriented to time and place. Pt found w/ temp of 102.1f.        History of Presenting Illness  Sharon Guardado is a 67 y.o. male with past medical history of hypertension, CAD, melanoma, alcohol dependence/abuse, who presented 7/15/2022 with  altered mental status and fever, onset a few hours ago.  Last known normal 1 PM.  Initially presented as a code stroke due to questionable left-sided weakness, that was canceled per neurology.  According to his son, at 6 PM he was going to the bathroom and then spent 1 hour sitting on stairs, being confused.  Patient and son denies any focal weakness.  In ER patient noted to meet sepsis criteria with fever, tachycardia, leukocytosis.  He is alert, confused and disoriented in time and place.  Following commands.  He was started on ceftriaxone.  LP obtained, analysis is pending..  CT head without contrast showed no acute findings.  Neurology recommended encephalopathy work-up  According to patient's son, he is independent and does not have dementia.    I discussed the plan of care with family.    Review of Systems  Review of Systems   Unable to perform ROS: Mental status change       Past Medical History   has a past medical history of Bowel habit changes, CAD (coronary artery disease), Cancer (HCC), HTN (hypertension), Hypertension, Indigestion, Kidney stones, Near-syncope, Nerve compression, and Tinnitus.    Surgical History   has a past surgical history that includes gastroscopy (3/29/2018); egd w/endoscopic ultrasound (3/29/2018); and egd with asp/bx (3/29/2018).     Family History     Family history reviewed with patient. There is no family  history that is pertinent to the chief complaint.     Social History   reports that he has never smoked. He has never used smokeless tobacco. He reports current alcohol use. He reports that he does not use drugs.    Allergies  Allergies   Allergen Reactions   • Ativan    • Vicodin [Hydrocodone-Acetaminophen] Rash     Rash, confusion         Medications  Prior to Admission Medications   Prescriptions Last Dose Informant Patient Reported? Taking?   ALLOPURINOL 300 MG PO TABS   Yes No   Sig: Take 1 Tab by mouth every day.   PRILOSEC OTC PO   Yes No   Si Cap by Oral route QDAY.    SIMVASTATIN 40 MG PO TABS   Yes No   Sig: Take 1 Tab by mouth every day.   SPIRONOLACTONE 25 MG PO TABS   Yes No   Sig: Take 1 Tab by mouth every day.   Tadalafil (CIALIS PO)   Yes No   Sig: tadalafil   15mg   amLODIPine (NORVASC) 5 MG Tab   Yes No   Sig: Take 5 mg by mouth every day.   aspirin EC (ECOTRIN) 81 MG Tablet Delayed Response   Yes No   Sig: Take 81 mg by mouth every day.   diclofenac sodium (VOLTAREN) 1 % Gel   Yes No   Sig: diclofenac 1 % topical gel   APPLY 2 GRAMS TO THE AFFECTED AREA(S) BY TOPICAL ROUTE 4 TIMES PER DAY AS NEEDED FOR HAND PAIN   famotidine (PEPCID) 20 MG Tab   No No   Sig: Take 1 tablet by mouth 2 times a day.   furosemide (LASIX) 40 MG Tab   Yes No   Sig: furosemide 40 mg tablet   TAKE 1 TABLET BY MOUTH TWICE A DAY   losartan (COZAAR) 50 MG Tab   Yes No   Sig: Take 50 mg by mouth every day.   methylPREDNISolone (MEDROL) 4 MG Tab   Yes No   Sig: methylprednisolone 4 mg tablets in a dose pack   TAKE 6 TABLETS ON DAY 1 AS DIRECTED ON PACKAGE AND DECREASE BY 1 TAB EACH DAY FOR A TOTAL OF 6 DAYS   naproxen (NAPROSYN) 500 MG Tab   Yes No   Sig: naproxen 500 mg tablet   naproxen (NAPROSYN) 500 MG Tab   No No   Sig: Take one tablet by mouth once daily with meals   potassium chloride SA (K-DUR) 10 MEQ Tab CR   Yes No   Sig: potassium chloride ER 10 mEq tablet,extended release(part/cryst)   sildenafil citrate  (VIAGRA) 25 MG Tab   Yes No   Sig: sildenafil 25 mg tablet   take 1-4 tabs by mouth 30-60 min before intercourse. max dose 100mg per 24 hours. Do not take with alpha-blocker and nitroglycerin   triamcinolone acetonide (KENALOG) 0.1 % Cream   Yes No   triamcinolone acetonide (KENALOG) 0.1 % Ointment   Yes No   Sig: triamcinolone acetonide 0.1 % topical ointment   APPLY TO AFFECTED AREA TWICE A DAY FOR 2 WEEKS   vitamin D (CHOLECALCIFEROL) 1000 UNIT Tab   Yes No   Sig: Take 1,000 Units by mouth 3 times a day.      Facility-Administered Medications: None       Physical Exam  Temp:  [37.8 °C (100.1 °F)-38.7 °C (101.6 °F)] 37.8 °C (100.1 °F)  Pulse:  [113-123] 119  Resp:  [20-31] 20  BP: (134-199)/(70-95) 149/70  SpO2:  [89 %-95 %] 90 %  Blood Pressure : (!) 149/70   Temperature: 37.8 °C (100.1 °F)   Pulse: (!) 119   Respiration: 20   Pulse Oximetry: 90 %       Physical Exam  Vitals and nursing note reviewed.   Constitutional:       General: He is not in acute distress.     Appearance: Normal appearance.   HENT:      Head: Normocephalic and atraumatic.      Nose: Nose normal.      Mouth/Throat:      Mouth: Mucous membranes are moist.   Eyes:      Extraocular Movements: Extraocular movements intact.      Pupils: Pupils are equal, round, and reactive to light.   Neck:      Comments: No neck rigidity on exam  Cardiovascular:      Rate and Rhythm: Normal rate and regular rhythm.   Pulmonary:      Effort: Pulmonary effort is normal.      Breath sounds: Normal breath sounds.   Abdominal:      General: Abdomen is flat. There is no distension.      Tenderness: There is no abdominal tenderness.   Musculoskeletal:         General: No swelling or deformity. Normal range of motion.      Cervical back: Normal range of motion and neck supple.   Skin:     General: Skin is warm and dry.   Neurological:      General: No focal deficit present.      Mental Status: He is alert. He is disoriented.   Psychiatric:         Mood and Affect: Mood  is elated. Affect is flat.         Behavior: Behavior is slowed.         Laboratory:  Recent Labs     07/15/22  2043   WBC 15.5*   RBC 5.01   HEMOGLOBIN 15.1   HEMATOCRIT 43.0   MCV 85.8   MCH 30.1   MCHC 35.1   RDW 42.4   PLATELETCT 127*   MPV 11.3     Recent Labs     07/15/22  2043   SODIUM 132*   POTASSIUM 4.2   CHLORIDE 96   CO2 21   GLUCOSE 243*   BUN 18   CREATININE 0.99   CALCIUM 9.5     Recent Labs     07/15/22  2043   ALTSGPT 34   ASTSGOT 51*   ALKPHOSPHAT 125*   TBILIRUBIN 1.0   LIPASE 25   GLUCOSE 243*         No results for input(s): NTPROBNP in the last 72 hours.      No results for input(s): TROPONINT in the last 72 hours.    Imaging:  DX-CHEST-PORTABLE (1 VIEW)   Final Result         1. No acute cardiopulmonary abnormalities are identified.      CT-HEAD W/O   Final Result         1. No acute intracranial abnormality. No evidence of acute intracranial hemorrhage or mass lesion.                         X-Ray:  I have personally reviewed the images and compared with prior images.    Assessment/Plan:  Justification for Admission Status  I anticipate this patient will require at least two midnights for appropriate medical management, necessitating inpatient admission because Sepsis, encephalopathy    * Sepsis (HCC)- (present on admission)  Assessment & Plan  This is Sepsis Present on admission  SIRS criteria identified on my evaluation include: Tachycardia, with heart rate greater than 90 BPM and Leukocytosis, with WBC greater than 12,000  Source is unknown  Sepsis protocol initiated  Fluid resuscitation ordered per protocol  Crystalloid Fluid Administration: Fluid resuscitation ordered per standard protocol - 30 mL/kg per current or ideal body weight  IV antibiotics as appropriate for source of sepsis  Reassessment: I have reassessed the patient's hemodynamic status          Fever  Assessment & Plan  COVID-19/influenza/RSV negative.  Ordered extended PCR respiratory panel.  Sepsis work-up, empiric  antibiotics  No meningeal signs on exam, therefore bacterial meningitis seems to be less likely.  Will cover with acyclovir until CSF HSV PCR is back      Acute encephalopathy  Assessment & Plan  ? secondary to sepsis versus alcohol related  No definite motor deficit.  CT head without contrast negative for acute findings  Plan: Obtain ammonia, urine drug screen, alcohol level  Neurochecks every 4 hours, treatment for sepsis with with ceftriaxone and acyclovir until CSF studies back,   Since patient does not have neck rigidity, bacterial meningitis seems to be less likely, therefore we will not start vancomycin and amoxicillin  IV/p.o. thiamine      Hyperglycemia- (present on admission)  Assessment & Plan  Denies history of diabetes, random blood sugar 200  Ordered insulin sliding scale  A1c    Essential hypertension- (present on admission)  Assessment & Plan  Continue losartan, Lasix, hold amlodipine and spironolactone  Monitor blood pressure      VTE prophylaxis: enoxaparin ppx

## 2022-07-16 NOTE — ED NOTES
Pt restless, pulling off monitor cords, trying to get up. Pt assisted to use urinal then back to side of bed, set up for LP.

## 2022-07-16 NOTE — CARE PLAN
The patient is Stable - Low risk of patient condition declining or worsening    Shift Goals  Clinical Goals: rest, monitor lactic, Q4 neuro  Patient Goals: sleep    Progress made toward(s) clinical / shift goals:  progressing    Patient is not progressing towards the following goals:

## 2022-07-16 NOTE — ASSESSMENT & PLAN NOTE
Resolved.  Unknown etiology- Suspect may been temporarily due to sleep deprivation, heat exposure, decreased oral intake.  No metabolic disturbances including metabolic acidosis, DKA  No hypoxia or hypercapnia  Patient AAO x4, no focal weakness, aphasia, neurological deficits-CT head negative for acute findings on admission  Patient hyperglycemic in 200s without acidosis  No seizures or traumas reported/witnessed  No uremia  Ammonia normal  UDS and alcohol levels negative.  CSF fluids unremarkable-preliminary cultures no growth to date. Final cultures pending  Lactic acidosis improved after IV and oral fluids.  Continue IV/p.o. thiamine

## 2022-07-16 NOTE — PROGRESS NOTES
Patient transferred to the floor on a zol monitor. Patient walked from the gurney to the bed. Patient AxO 4, O2 2 L thru nasal cannula, VSS. Patient hooked up to the tele box, monitor room notified. Bed alarm on. Call light within reach.

## 2022-07-16 NOTE — ASSESSMENT & PLAN NOTE
Encourage lifestyle and diet modifications.   Hemoglobin A1c 8.8.  New diagnosis of type 2 diabetes mellitus.  Diabetic education pending

## 2022-07-16 NOTE — PROGRESS NOTES
4 Eyes Skin Assessment Completed by KOFI Carrasco and KOFI Carballo.    Head WDL  Ears WDL  Nose WDL  Mouth WDL  Neck WDL  Breast/Chest WDL  Shoulder Blades WDL  Spine WDL spinal tap site with band aid over the top  (R) Arm/Elbow/Hand WDL  (L) Arm/Elbow/Hand WDL  Abdomen WDL  Groin WDL  Scrotum/Coccyx/Buttocks WDL  (R) Leg Edema  (L) Leg Edema  (R) Heel/Foot/Toe Discolored, dry, toe nails discolored  (L) Heel/Foot/Toe  Discolored, dry, toe nails discolored/ missing          Devices In Places Tele Box, Blood Pressure Cuff, Pulse Ox and Nasal Cannula      Interventions In Place Pillows    Possible Skin Injury No    Pictures Uploaded Into Epic Yes  Wound Consult Placed N/A  RN Wound Prevention Protocol Ordered No

## 2022-07-16 NOTE — ED NOTES
Pt no longer restless/agitated, GCS 14, oriented to person/place/situation but not to year. Pt's son is with him, states that is his baseline.

## 2022-07-16 NOTE — ASSESSMENT & PLAN NOTE
New onset.  A1c 8.8  7/18/2022 patient remains hyperglycemic overnight-Lantus 10 units ordered and SSI increased to medium scale  Diabetic diet  Dietary consult completed  Diabetic education pending  Accuchecks and hypoglycemic protocol ordered.

## 2022-07-16 NOTE — ASSESSMENT & PLAN NOTE
This is Sepsis Present on admission  SIRS criteria identified on my evaluation include: Tachycardia, with heart rate greater than 90 BPM and Leukocytosis, with WBC greater than 12,000  Source is unknown  Sepsis protocol initiated  Fluid resuscitation ordered per protocol  Crystalloid Fluid Administration: Fluid resuscitation ordered per standard protocol - 30 mL/kg per current or ideal body weight  IV antibiotics as appropriate for source of sepsis  Reassessment: I have reassessed the patient's hemodynamic status     7/16: Leukocytosis on morning labs. Vancomycin IV started.   MRSA PCR ordered.   Tachycardic in morning, improved by afternoon, afebrile.   CSF unremarkable.   UA negative  CT chest, abdomen, pelvis ordered- negative for acute findings  Lactic acid down trending    7/17/2022: Resolved.  No leukocytosis.  Vital signs stable, afebrile.  MRSA by PCR negative, discontinued vancomycin.  Repeat lactic acid normal.

## 2022-07-16 NOTE — ASSESSMENT & PLAN NOTE
Resolved  On admission 100.6  COVID-19/influenza/RSV negative.    RVP pending.  CSF unremarkable, no meningeal signs on exam, therefore bacterial meningitis seems to be less likely.

## 2022-07-16 NOTE — ED PROVIDER NOTES
ED Provider Note    CHIEF COMPLAINT  Chief Complaint   Patient presents with   • ALOC     LKW at 1300, pt with L sided weakness, expressive aphasia. Pt A&Ox2-3 disoriented to time and place. Pt found w/ temp of 102.1f.        HPI  Sharon Guardado is a 67 y.o. male who presents to the emergency department as a chief complaint of a code stroke.  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  When EMS arrived there is a questionable expressive aphasia versus some weakness on the left side.  Patient was found to be febrile currently he will open his eyes he will attempt to follow commands he is following all commands and does not appear to have a focal deficit while this aphasia seems more to be just global confusion especially in the light of his fever.  No history of trauma but history is limited to EMS report glucose was within normal limits    REVIEW OF SYSTEMS  Limited secondary to altered mental status  All other review of systems are negative    PAST MEDICAL HISTORY   has a past medical history of Bowel habit changes, CAD (coronary artery disease), Cancer (HCC), HTN (hypertension), Hypertension, Indigestion, Kidney stones, Near-syncope, Nerve compression, and Tinnitus.    SOCIAL HISTORY  Social History     Tobacco Use   • Smoking status: Never Smoker   • Smokeless tobacco: Never Used   Vaping Use   • Vaping Use: Never used   Substance and Sexual Activity   • Alcohol use: Yes     Comment: i drink per night   • Drug use: No   • Sexual activity: Not on file       SURGICAL HISTORY   has a past surgical history that includes gastroscopy (3/29/2018); egd w/endoscopic ultrasound (3/29/2018); and egd with asp/bx (3/29/2018).    CURRENT MEDICATIONS  Home Medications    **Home medications have not yet been reviewed for this encounter**         ALLERGIES  Allergies   Allergen Reactions   • Ativan    • Vicodin  [Hydrocodone-Acetaminophen] Rash     Rash, confusion         PHYSICAL EXAM  VITAL SIGNS: BP (!) 149/70   Pulse (!) 119   Temp (!) 38.7 °C (101.6 °F) (Temporal)   Resp (!) 31   Ht 1.829 m (6')   Wt (!) 138 kg (305 lb)   SpO2 90%   BMI 41.37 kg/m²    Pulse ox interpretation: I interpret this pulse ox as normal.  Constitutional: Aler somewhat sleepy but not in any acute distress  HENT: Normocephalic atraumatic, MMM  Eyes: PER, Conjunctiva normal, Non-icteric.   Neck: Normal range of motion, No tenderness, Supple, No stridor.   Cardiovascular: Irregular tachycardic, no murmurs.   Thorax & Lungs: Normal breath sounds, No respiratory distress, No wheezing, No chest tenderness.   Abdomen: Bowel sounds normal, Soft, No tenderness, No pulsatile masses. No peritoneal signs.  Skin: Hot to touch, Dry, No erythema, No rash.   Back: No bony tenderness, No CVA tenderness.   Extremities/MSK: Intact equal distal pulses, No edema, No tenderness, No cyanosis, no major deformities noted  Neurologic: Alert and oriented to person cranial nerves II through XII appear to be intact strength 5 out of 5 bilateral upper lower extremity sensation intact light touch distally the left lower leg may be appears mildly weak on the initial examination but repeat examination showed equal strength  Patient is little repetitive but not really having any slurred speech more just confused      DIFFERENTIAL DIAGNOSIS AND WORK UP PLAN    This is a 67 y.o. male who presents with a little bit of confusion otherwise a nonfocal neurologic examination neurology was at the bedside and agree that the code stroke should be called off at this time we agreed for head CT she has fact that he has altered mental status as well as a fever I suspect this is likely a sepsis leading to his altered mental status.  Will evaluate for viral pathogens urinary tract infection abdomen is soft nontender there is no evidence of cellulitis or abscess on my examination his  "lungs are clear although he is requiring 1 to 2 L nasal cannula.  This could be pneumonia but this could also be an meningitis.    DIAGNOSTIC STUDIES / PROCEDURES      LABS  Pertinent Lab Findings    Labs Reviewed   CBC WITH DIFFERENTIAL - Abnormal; Notable for the following components:       Result Value    WBC 15.5 (*)     Platelet Count 127 (*)     Neutrophils-Polys 88.30 (*)     Lymphocytes 5.00 (*)     Neutrophils (Absolute) 13.71 (*)     Lymphs (Absolute) 0.78 (*)     Monos (Absolute) 0.92 (*)     All other components within normal limits   COMP METABOLIC PANEL - Abnormal; Notable for the following components:    Sodium 132 (*)     Glucose 243 (*)     AST(SGOT) 51 (*)     Alkaline Phosphatase 125 (*)     All other components within normal limits   LACTIC ACID - Abnormal; Notable for the following components:    Lactic Acid 4.2 (*)     All other components within normal limits   URINALYSIS - Abnormal; Notable for the following components:    Glucose 500 (*)     Ketones 15 (*)     All other components within normal limits    Narrative:     Indication for culture:->Evaluation for sepsis without a  clear source of infection   LIPASE   COV-2, FLU A/B, AND RSV BY PCR ([employer])   ESTIMATED GFR   LACTIC ACID   LACTIC ACID   BLOOD CULTURE    Narrative:     1 of 2 for Blood Culture x 2 sites order. Per Hospital  Policy: Only change Specimen Src: to \"Line\" if specified by  physician order.   BLOOD CULTURE   URINE CULTURE(NEW)    Narrative:     Indication for culture:->Evaluation for sepsis without a  clear source of infection   CSF CELL COUNT   CSF CULTURE   CSF GLUCOSE   CSF PROTEIN   WEST NILE VIRUS IGG/IGM CSF   HSV 1/2 IGG W/ TYPE SPECIFIC RFLX   GRAM STAIN ONLY       RADIOLOGY  DX-CHEST-PORTABLE (1 VIEW)   Final Result         1. No acute cardiopulmonary abnormalities are identified.      CT-HEAD W/O   Final Result         1. No acute intracranial abnormality. No evidence of acute intracranial hemorrhage or mass " lesion.                       The radiologist's interpretation of all radiological studies have been reviewed by me.    LUMBAR PUNCTURE PROCEDURE NOTE  Patient identification was confirmed and consent was obtained.   Indication: AMS, fever  Puncture Site: L4/5  Sterile procedures observed  Patient position: upright  Needle size: 20  Anesthetic used (type and amt): lidocaine 1% without epi  Intracranial pressure: na  Amount CSF collected: 12  Color of CSF collected: clear  Site anesthetized, puncture made at indicated site, CSF collected and sent for further lab testing (see lab ). Pt tolerated procedure well without complications.       COURSE & MEDICAL DECISION MAKING  Pertinent Labs & Imaging studies reviewed. (See chart for details)    10:59 PM  I reassessed patient at the bedside with the son he stated that the patient was in his normal state health this afternoon when he woke up just kind of altered he had been complaining of anything before today the patient is still maybe a little agitated but responding to fluids appropriately.  His chest x-ray reveals no signs of pneumonia normal COVID influenza RSV his abdomen examination is soft and nontender no evidence of bacterial pneumonia on the chest.  At this time the patient tolerated a lumbar puncture for concern for sepsis without a cause and altered mental status.  Patient tolerated the LP quite well    11:06 PM  Spoke w Dr Le with the hospitalist service who is excepted the patient for hospitalization for sepsis with an unknown cause at this time    After lumbar puncture the patient was treated with IV Rocephin      CRITICAL CARE  The very real possibilty of a deterioration of this patient's condition required the highest level of my preparedness for sudden, emergent intervention.  I provided critical care services, which included medication orders, frequent reevaluations of the patient's condition and response to treatment, ordering and reviewing  test results, and discussing the case with various consultants.  The critical care time associated with the care of the patient was 35 minutes. Review chart for interventions. This time is exclusive of any other billable procedures.       I verified that the patient was wearing a mask and I was wearing appropriate PPE every time I entered the room. The patient's mask was on the patient at all times during my encounter except for a brief view of the oropharynx.          FINAL IMPRESSION  1. Sepsis  2. ALOC  3. Lactic acidosis    Critical care time 35 min   Lumbar puncture       Electronically signed by: Shara Lr M.D., 7/15/2022 8:26 PM    This dictation has been created using voice recognition software and/or scribes. The accuracy of the dictation is limited by the abilities of the software and the expertise of the scribes. I expect there may be some errors of grammar and possibly content. I made every attempt to manually correct the errors within my dictation. However, errors related to voice recognition software and/or scribes may still exist and should be interpreted within the appropriate context.

## 2022-07-16 NOTE — HOSPITAL COURSE
This is a 67-year-old male with a past medical history of hypertension, coronary artery disease, melanoma, alcohol dependence who presented 7/15/2022 with altered mental status, fever.  Last known normal was 7/15/2022 1300.  Patient initially presented to the emergency department as a code stroke due to questionable left-sided weakness, this canceled by neurology.  Patient son reported at 1800, patient was going to the restroom and spent an hour sitting and stairs confused.  In the emergency department, patient met sepsis criteria with fever, tachycardia, leukocytosis.  He was awake and alert but confused/disoriented.  Lumbar puncture was performed, blood alcohol <0.10, UDS negative, CT head negative for acute findings, no metabolic acidosis but lactic acidosis noted greater than 5. No seizures or trauma is reported.  Patient was started on Rocephin IV and admitted for further evaluation.

## 2022-07-17 ENCOUNTER — APPOINTMENT (OUTPATIENT)
Dept: RADIOLOGY | Facility: MEDICAL CENTER | Age: 67
DRG: 872 | End: 2022-07-17
Attending: NURSE PRACTITIONER
Payer: MEDICARE

## 2022-07-17 LAB
ANION GAP SERPL CALC-SCNC: 12 MMOL/L (ref 7–16)
BUN SERPL-MCNC: 13 MG/DL (ref 8–22)
CALCIUM SERPL-MCNC: 8.7 MG/DL (ref 8.5–10.5)
CHLORIDE SERPL-SCNC: 98 MMOL/L (ref 96–112)
CO2 SERPL-SCNC: 21 MMOL/L (ref 20–33)
CREAT SERPL-MCNC: 0.72 MG/DL (ref 0.5–1.4)
ERYTHROCYTE [DISTWIDTH] IN BLOOD BY AUTOMATED COUNT: 44.2 FL (ref 35.9–50)
GFR SERPLBLD CREATININE-BSD FMLA CKD-EPI: 99 ML/MIN/1.73 M 2
GLUCOSE BLD STRIP.AUTO-MCNC: 226 MG/DL (ref 65–99)
GLUCOSE BLD STRIP.AUTO-MCNC: 237 MG/DL (ref 65–99)
GLUCOSE BLD STRIP.AUTO-MCNC: 247 MG/DL (ref 65–99)
GLUCOSE BLD STRIP.AUTO-MCNC: 256 MG/DL (ref 65–99)
GLUCOSE SERPL-MCNC: 231 MG/DL (ref 65–99)
HCT VFR BLD AUTO: 40.4 % (ref 42–52)
HGB BLD-MCNC: 13.8 G/DL (ref 14–18)
MCH RBC QN AUTO: 29.6 PG (ref 27–33)
MCHC RBC AUTO-ENTMCNC: 34.2 G/DL (ref 33.7–35.3)
MCV RBC AUTO: 86.5 FL (ref 81.4–97.8)
PLATELET # BLD AUTO: 107 K/UL (ref 164–446)
PMV BLD AUTO: 11.1 FL (ref 9–12.9)
POTASSIUM SERPL-SCNC: 3.7 MMOL/L (ref 3.6–5.5)
RBC # BLD AUTO: 4.67 M/UL (ref 4.7–6.1)
SODIUM SERPL-SCNC: 131 MMOL/L (ref 135–145)
WBC # BLD AUTO: 7.3 K/UL (ref 4.8–10.8)

## 2022-07-17 PROCEDURE — 76705 ECHO EXAM OF ABDOMEN: CPT

## 2022-07-17 PROCEDURE — A9270 NON-COVERED ITEM OR SERVICE: HCPCS | Performed by: INTERNAL MEDICINE

## 2022-07-17 PROCEDURE — 700102 HCHG RX REV CODE 250 W/ 637 OVERRIDE(OP): Performed by: NURSE PRACTITIONER

## 2022-07-17 PROCEDURE — 770020 HCHG ROOM/CARE - TELE (206)

## 2022-07-17 PROCEDURE — 700105 HCHG RX REV CODE 258: Performed by: INTERNAL MEDICINE

## 2022-07-17 PROCEDURE — 700111 HCHG RX REV CODE 636 W/ 250 OVERRIDE (IP): Performed by: INTERNAL MEDICINE

## 2022-07-17 PROCEDURE — 82962 GLUCOSE BLOOD TEST: CPT | Mod: 91

## 2022-07-17 PROCEDURE — 700102 HCHG RX REV CODE 250 W/ 637 OVERRIDE(OP): Performed by: INTERNAL MEDICINE

## 2022-07-17 PROCEDURE — 80048 BASIC METABOLIC PNL TOTAL CA: CPT

## 2022-07-17 PROCEDURE — A9270 NON-COVERED ITEM OR SERVICE: HCPCS | Performed by: NURSE PRACTITIONER

## 2022-07-17 PROCEDURE — 85027 COMPLETE CBC AUTOMATED: CPT

## 2022-07-17 PROCEDURE — 99232 SBSQ HOSP IP/OBS MODERATE 35: CPT | Performed by: NURSE PRACTITIONER

## 2022-07-17 PROCEDURE — 36415 COLL VENOUS BLD VENIPUNCTURE: CPT

## 2022-07-17 RX ADMIN — SIMVASTATIN 40 MG: 40 TABLET, FILM COATED ORAL at 18:00

## 2022-07-17 RX ADMIN — INSULIN HUMAN 3 UNITS: 100 INJECTION, SOLUTION PARENTERAL at 14:08

## 2022-07-17 RX ADMIN — ACYCLOVIR SODIUM 776 MG: 50 INJECTION, SOLUTION INTRAVENOUS at 21:33

## 2022-07-17 RX ADMIN — ASPIRIN 81 MG: 81 TABLET, COATED ORAL at 05:09

## 2022-07-17 RX ADMIN — ENOXAPARIN SODIUM 40 MG: 40 INJECTION SUBCUTANEOUS at 18:00

## 2022-07-17 RX ADMIN — ACYCLOVIR SODIUM 776 MG: 50 INJECTION, SOLUTION INTRAVENOUS at 05:15

## 2022-07-17 RX ADMIN — ALLOPURINOL 300 MG: 300 TABLET ORAL at 05:09

## 2022-07-17 RX ADMIN — INSULIN HUMAN 3 UNITS: 100 INJECTION, SOLUTION PARENTERAL at 08:37

## 2022-07-17 RX ADMIN — INSULIN HUMAN 2 UNITS: 100 INJECTION, SOLUTION PARENTERAL at 18:14

## 2022-07-17 RX ADMIN — VANCOMYCIN HYDROCHLORIDE 2000 MG: 500 INJECTION, POWDER, LYOPHILIZED, FOR SOLUTION INTRAVENOUS at 02:08

## 2022-07-17 RX ADMIN — FAMOTIDINE 20 MG: 20 TABLET ORAL at 18:00

## 2022-07-17 RX ADMIN — INSULIN HUMAN 2 UNITS: 100 INJECTION, SOLUTION PARENTERAL at 21:42

## 2022-07-17 RX ADMIN — FAMOTIDINE 20 MG: 20 TABLET ORAL at 05:09

## 2022-07-17 RX ADMIN — LOSARTAN POTASSIUM 50 MG: 50 TABLET, FILM COATED ORAL at 05:09

## 2022-07-17 RX ADMIN — ACYCLOVIR SODIUM 776 MG: 50 INJECTION, SOLUTION INTRAVENOUS at 14:00

## 2022-07-17 RX ADMIN — TRAMADOL HYDROCHLORIDE 50 MG: 50 TABLET ORAL at 03:58

## 2022-07-17 RX ADMIN — CEFTRIAXONE SODIUM 2 G: 10 INJECTION, POWDER, FOR SOLUTION INTRAVENOUS at 05:11

## 2022-07-17 ASSESSMENT — COGNITIVE AND FUNCTIONAL STATUS - GENERAL
DAILY ACTIVITIY SCORE: 24
MOBILITY SCORE: 24
SUGGESTED CMS G CODE MODIFIER MOBILITY: CH
SUGGESTED CMS G CODE MODIFIER DAILY ACTIVITY: CH

## 2022-07-17 ASSESSMENT — PAIN DESCRIPTION - PAIN TYPE: TYPE: ACUTE PAIN

## 2022-07-17 ASSESSMENT — ENCOUNTER SYMPTOMS
HEADACHES: 1
VOMITING: 0
ABDOMINAL PAIN: 0
DOUBLE VISION: 0
SPEECH CHANGE: 0
DIARRHEA: 0
COUGH: 0
INSOMNIA: 0
PALPITATIONS: 0
SORE THROAT: 0
SHORTNESS OF BREATH: 0
ORTHOPNEA: 0
BLURRED VISION: 0
NERVOUS/ANXIOUS: 0
TREMORS: 0
WEAKNESS: 0
SENSORY CHANGE: 1
FLANK PAIN: 0
WEIGHT LOSS: 0
MYALGIAS: 0
SEIZURES: 0
TINGLING: 0
NAUSEA: 0
DIZZINESS: 0
FALLS: 0
FEVER: 0
CHILLS: 0

## 2022-07-17 ASSESSMENT — LIFESTYLE VARIABLES: SUBSTANCE_ABUSE: 1

## 2022-07-17 NOTE — DIETARY
Nutrition Services: Diabetes Education Consult   Day 2 of admit.  Sharon Guardado is a 67 y.o. male with admitting DX of Sepsis (HCC) [A41.9]    RD able to visit pt at bedside to provide diabetic education. RD discussed carb controlled diet, provided handout reinforcing topics discussed. Pt demonstrated appropriate readiness and adequate evidence of learning. RD able to answer all questions to patient's satisfaction.     No other education needs identified at this time. Consider referral to outpatient nutrition services for continuation of education as indicated or per pt preferences.     Pt with BMI >40 (=Body mass index is 40.1 kg/m².), Class III obesity. Weight loss counseling not appropriate in acute care setting.     If appropriate at DC please refer to outpatient nutrition services for weight management.     Please re-consult RD as indicated.

## 2022-07-17 NOTE — PROGRESS NOTES
Received bedside report from day RN, pt care assumed. VS WDL, pt assessment complete. Pt A&Ox4, no c/o pain at this time. POC discussed with pt and verbalizes no questions. Pt denies any additional needs at this time. Bed locked and in lowest position, bed alarm off as client is up to self. Pt educated on fall risk and verbalized understanding, call light within reach, hourly rounding initiated.

## 2022-07-17 NOTE — CARE PLAN
"The patient is Stable - Low risk of patient condition declining or worsening    Shift Goals  Clinical Goals: Patient will maintain baseline neuro status. Patient will acheive adequate pain relief throughout shift.  Patient Goals: \"Less pain. Sleep well.\"  Family Goals: DEENA    Progress made toward(s) clinical / shift goals:  Provided patient with a verbal discussion related to POC. Patient verbalized understanding and had no questions or concerns. Educated patient about 1-10 pain scale, regular pain assessments, and available pharmaciological and non-pharmacological pain relief. Patient expressed understanding and had no further questions at this time. Pt remains free from falls at this time. Safety precautions in place. Pt educated on calling for assistance when needed.        Patient is not progressing towards the following goals: N/A       "

## 2022-07-17 NOTE — CARE PLAN
"The patient is Stable - Low risk of patient condition declining or worsening    Shift Goals  Clinical Goals: Patient will maintain baseline neuro status. Patient will acheive adequate pain relief throughout shift.  Patient Goals: \"Less pain. Sleep well.\"  Family Goals: DEENA    Progress made toward(s) clinical / shift goals:  progressing    Patient is not progressing towards the following goals:      "

## 2022-07-17 NOTE — PROGRESS NOTES
[facility] Medicine Daily Progress Note    Date of Service  7/17/2022    Chief Complaint  Sharon Guardado is a 67 y.o. male admitted 7/15/2022 with altered level of consciousness, fever    Hospital Course  This is a 67-year-old male with a past medical history of hypertension, coronary artery disease, melanoma, alcohol dependence who presented 7/15/2022 with altered mental status, fever.  Last known normal was 7/15/2022 1300.  Patient initially presented to the emergency department as a code stroke due to questionable left-sided weakness, this canceled by neurology.  Patient son reported at 1800, patient was going to the restroom and spent an hour sitting and stairs confused.  In the emergency department, patient met sepsis criteria with fever, tachycardia, leukocytosis.  He was awake and alert but confused/disoriented.  Lumbar puncture was performed, blood alcohol <0.10, UDS negative, CT head negative for acute findings, no metabolic acidosis but lactic acidosis noted greater than 5. No seizures or trauma is reported.  Patient was started on Rocephin IV and admitted for further evaluation.      Interval Problem Update  7/16/2022: Patient AAOx4.  Denies pain, chest pain, headache, shortness of breath.  Patient's wife at bedside.  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  She states patient returned home after working his night shift and full gear, and had slept approximately half an hour before he was awake at 1300.  She states patient had difficulty with ambulation and later in the evening and had vomited, raising concern for heat exhaustion.  Furthermore, she states patient is supposed to be on a diuretic but does not take diuretics as prescribed when he is working and has gained approximately 20 pounds in the last couple months despite eating moderate amounts of food including salads, eggs- but patient had a Mendez soup for lunch.     7/17/2022:  "Patient AAOx4.  Reports ongoing/chronic headache.  Wife at bedside.  Discussed with patient and wife glycohemoglobin levels diagnostic for diabetes mellitus and consultation with diabetic educator has been ordered.  On chart review, clinic note from 8/20/2021 states patient was told by PCP he had CHF and was referred to cardiologist who then told patient he had \"fluid\".  Neither encounter available in [facility].  However, patient is on losartan, amlodipine, Lasix, spironolactone.  Last echocardiogram was 2018.  Repeat echocardiogram ordered.  Steatosis seen on CT- Right upper quadrant ultrasound ordered to fully evaluate for any liver disease.       I have discussed this patient's plan of care and discharge plan at IDT rounds today with Case Management, Nursing, Nursing leadership, and other members of the IDT team.    Consultants/Specialty  neurology-signed off    Code Status  Full Code    Disposition  Patient is not medically cleared for discharge.   Anticipate discharge to to home with close outpatient follow-up.  I have placed the appropriate orders for post-discharge needs.    Review of Systems  Review of Systems   Constitutional: Negative for chills, fever and weight loss.        +20lb weight gain over last few months   HENT: Negative for congestion and sore throat.    Eyes: Negative for blurred vision and double vision.   Respiratory: Negative for cough and shortness of breath.    Cardiovascular: Positive for leg swelling. Negative for chest pain, palpitations and orthopnea.   Gastrointestinal: Negative for abdominal pain, diarrhea, nausea and vomiting.   Genitourinary: Negative for dysuria and flank pain.   Musculoskeletal: Negative for falls and myalgias.   Neurological: Positive for sensory change ( Numbness to bilateral hands) and headaches. Negative for dizziness, tingling, tremors, speech change, seizures and weakness.   Psychiatric/Behavioral: Positive for substance abuse (alcohol-Daily bourbon on nonwork " days). The patient is not nervous/anxious and does not have insomnia.    All other systems reviewed and are negative.       Physical Exam  Temp:  [-17.7 °C (0.2 °F)-36.8 °C (98.2 °F)] 36.1 °C (96.9 °F)  Pulse:  [80-97] 82  Resp:  [14-18] 16  BP: (124-146)/(76-85) 124/76  SpO2:  [1 %-96 %] 95 %    Physical Exam  Vitals and nursing note reviewed.   Constitutional:       General: He is not in acute distress.     Appearance: Normal appearance. He is morbidly obese. He is not ill-appearing or toxic-appearing.   HENT:      Head: Normocephalic.      Nose: Nose normal.      Mouth/Throat:      Mouth: Mucous membranes are moist.      Pharynx: Oropharynx is clear.   Eyes:      General: No scleral icterus.     Conjunctiva/sclera: Conjunctivae normal.      Pupils: Pupils are equal, round, and reactive to light.   Cardiovascular:      Rate and Rhythm: Normal rate and regular rhythm.      Pulses: Normal pulses.      Heart sounds: Normal heart sounds. No murmur heard.    No gallop.   Pulmonary:      Effort: Pulmonary effort is normal. No respiratory distress.      Breath sounds: Normal breath sounds. No wheezing or rhonchi.   Chest:      Chest wall: No tenderness.   Abdominal:      General: Abdomen is flat. Bowel sounds are normal. There is no distension.      Palpations: Abdomen is soft.      Tenderness: There is no abdominal tenderness. There is no guarding.   Musculoskeletal:         General: No tenderness. Normal range of motion.      Cervical back: Normal range of motion and neck supple. No rigidity or tenderness.      Right lower le+ Edema present.      Left lower le+ Edema present.   Skin:     General: Skin is warm and dry.      Capillary Refill: Capillary refill takes less than 2 seconds.      Coloration: Skin is not jaundiced.   Neurological:      General: No focal deficit present.      Mental Status: He is alert and oriented to person, place, and time. Mental status is at baseline.      Motor: No weakness.    Psychiatric:         Mood and Affect: Mood normal.         Behavior: Behavior normal.         Thought Content: Thought content normal.         Judgment: Judgment normal.         Fluids    Intake/Output Summary (Last 24 hours) at 7/17/2022 1232  Last data filed at 7/17/2022 1114  Gross per 24 hour   Intake 3100 ml   Output 1600 ml   Net 1500 ml       Laboratory  Recent Labs     07/15/22  2043 07/16/22  0105 07/17/22  0319   WBC 15.5* 15.6* 7.3   RBC 5.01 4.84 4.67*   HEMOGLOBIN 15.1 14.5 13.8*   HEMATOCRIT 43.0 40.7* 40.4*   MCV 85.8 84.1 86.5   MCH 30.1 30.0 29.6   MCHC 35.1 35.6* 34.2   RDW 42.4 42.3 44.2   PLATELETCT 127* 137* 107*   MPV 11.3 10.6 11.1     Recent Labs     07/15/22  2043 07/16/22  0105 07/17/22  0319   SODIUM 132* 135 131*   POTASSIUM 4.2 3.8 3.7   CHLORIDE 96 99 98   CO2 21 22 21   GLUCOSE 243* 222* 231*   BUN 18 17 13   CREATININE 0.99 0.92 0.72   CALCIUM 9.5 9.4 8.7     Recent Labs     07/16/22  0105   INR 1.18*               Imaging  CT-CHEST,ABDOMEN,PELVIS WITH   Final Result      1.  No acute infectious process in the chest, abdomen or pelvis.   2.  Hepatic steatosis.   3.  Cholelithiasis.   4.  Colonic diverticulosis.      DX-CHEST-PORTABLE (1 VIEW)   Final Result         1. No acute cardiopulmonary abnormalities are identified.      CT-HEAD W/O   Final Result         1. No acute intracranial abnormality. No evidence of acute intracranial hemorrhage or mass lesion.                     EC-ECHOCARDIOGRAM COMPLETE W/O CONT    (Results Pending)   US-RUQ    (Results Pending)        Assessment/Plan  * Sepsis (HCC)- (present on admission)  Assessment & Plan  This is Sepsis Present on admission  SIRS criteria identified on my evaluation include: Tachycardia, with heart rate greater than 90 BPM and Leukocytosis, with WBC greater than 12,000  Source is unknown  Sepsis protocol initiated  Fluid resuscitation ordered per protocol  Crystalloid Fluid Administration: Fluid resuscitation ordered per  standard protocol - 30 mL/kg per current or ideal body weight  IV antibiotics as appropriate for source of sepsis  Reassessment: I have reassessed the patient's hemodynamic status     7/16: Leukocytosis on morning labs. Vancomycin IV started.   MRSA PCR ordered.   Tachycardic in morning, improved by afternoon, afebrile.   CSF unremarkable.   UA negative  CT chest, abdomen, pelvis ordered- negative for acute findings  Lactic acid down trending    7/17/2022: Resolved.  No leukocytosis.  Vital signs stable, afebrile.  MRSA by PCR negative, discontinued vancomycin.  Repeat lactic acid normal.    Fever  Assessment & Plan  Resolved  On admission 100.6  COVID-19/influenza/RSV negative.    RVP pending.  CSF unremarkable, no meningeal signs on exam, therefore bacterial meningitis seems to be less likely.        Acute encephalopathy  Assessment & Plan  Resolved.  Unknown etiology- Suspect may been temporarily due to sleep deprivation, heat exposure, decreased oral intake.  No metabolic disturbances including metabolic acidosis, DKA  No hypoxia or hypercapnia  Patient AAO x4, no focal weakness, aphasia, neurological deficits-CT head negative for acute findings on admission  Patient hyperglycemic in 200s without acidosis  No seizures or traumas reported/witnessed  No uremia  Ammonia normal  UDS and alcohol levels negative.  CSF fluids unremarkable-preliminary cultures no growth to date. Final cultures pending  Lactic acidosis improved after IV and oral fluids.  Continue IV/p.o. thiamine    Type 2 diabetes mellitus without complication, without long-term current use of insulin (HCC)  Assessment & Plan  New onset.  A1c 8.8  SSI orders in place  Diabetic diet  Diabetic education and nutrition consultation evaluations pending  Accuchecks and hypoglycemic protocol ordered.       Essential hypertension- (present on admission)  Assessment & Plan  Per OP clinic note 08/20/2021, patient reported he was told by PCP that he had CHF and  "referred to cardiologist who stated he had \"fluid\". No record of either encounter with PCP or cardiologist.   Last echo in 2018 was normal.   Patient wife states patient on diuretic for fluid retention but does not take when working, has had 20 lb weight gain.   No cardiomegaly or pulmonary edema on CXR.  BNP ordered and will consider echo if elevated.     Continue losartan, Lasix, hold amlodipine and spironolactone  Consider resuming amlodipine tomorrow  Monitor blood pressure    Alcohol dependence (HCC)- (present on admission)  Assessment & Plan  History of.   Reports he drinks bourbon daily on nonwork days  Blood alcohol levels <0.10  No signs of withdrawal   AST mildly elevated.    CTAP showed hepatic steatosis.  Right upper quadrant ultrasound ordered  Continue to monitor for any signs of withdrawals and initiate CIWA protocol PRN    Morbid obesity with BMI of 40.0-44.9, adult (HCC)- (present on admission)  Assessment & Plan  Encourage lifestyle and diet modifications.   Hemoglobin A1c 8.8.  New diagnosis of type 2 diabetes mellitus.  Diabetic education ordered.    GERD (gastroesophageal reflux disease)  Assessment & Plan  Continue home prilosec    CAD (coronary artery disease)  Assessment & Plan  Continue atorvastatin and asa    Chronic gout- (present on admission)  Assessment & Plan  Continue allopurinol       VTE prophylaxis: enoxaparin ppx and Xarelto 10 mg daily as prophylaxis    I have performed a physical exam and reviewed and updated ROS and Plan today (7/17/2022). In review of yesterday's note (7/16/2022), there are no changes except as documented above.      "

## 2022-07-17 NOTE — PROGRESS NOTES
Patient complaining of numbness to his hands bilaterally. Patient denies known history of neuropathy, but does have history of diabetes. Patient claims he has expereinces numbness like this in the past. Denies chest pain, SOB, but claims to have head ache 3/10 relieved by dimming lights and resting.

## 2022-07-18 ENCOUNTER — APPOINTMENT (OUTPATIENT)
Dept: CARDIOLOGY | Facility: MEDICAL CENTER | Age: 67
DRG: 872 | End: 2022-07-18
Attending: INTERNAL MEDICINE
Payer: MEDICARE

## 2022-07-18 LAB
ANION GAP SERPL CALC-SCNC: 12 MMOL/L (ref 7–16)
BACTERIA UR CULT: NORMAL
BUN SERPL-MCNC: 12 MG/DL (ref 8–22)
CALCIUM SERPL-MCNC: 9 MG/DL (ref 8.5–10.5)
CHLORIDE SERPL-SCNC: 104 MMOL/L (ref 96–112)
CO2 SERPL-SCNC: 22 MMOL/L (ref 20–33)
CREAT SERPL-MCNC: 0.83 MG/DL (ref 0.5–1.4)
ERYTHROCYTE [DISTWIDTH] IN BLOOD BY AUTOMATED COUNT: 43.1 FL (ref 35.9–50)
GFR SERPLBLD CREATININE-BSD FMLA CKD-EPI: 95 ML/MIN/1.73 M 2
GLUCOSE BLD STRIP.AUTO-MCNC: 184 MG/DL (ref 65–99)
GLUCOSE BLD STRIP.AUTO-MCNC: 195 MG/DL (ref 65–99)
GLUCOSE BLD STRIP.AUTO-MCNC: 214 MG/DL (ref 65–99)
GLUCOSE BLD STRIP.AUTO-MCNC: 248 MG/DL (ref 65–99)
GLUCOSE SERPL-MCNC: 227 MG/DL (ref 65–99)
HCT VFR BLD AUTO: 43.1 % (ref 42–52)
HGB BLD-MCNC: 14.8 G/DL (ref 14–18)
LV EJECT FRACT  99904: 60
LV EJECT FRACT MOD 2C 99903: 51.6
LV EJECT FRACT MOD 4C 99902: 45.33
LV EJECT FRACT MOD BP 99901: 48.55
MCH RBC QN AUTO: 29.5 PG (ref 27–33)
MCHC RBC AUTO-ENTMCNC: 34.3 G/DL (ref 33.7–35.3)
MCV RBC AUTO: 86 FL (ref 81.4–97.8)
PLATELET # BLD AUTO: 116 K/UL (ref 164–446)
PMV BLD AUTO: 12.1 FL (ref 9–12.9)
POTASSIUM SERPL-SCNC: 3.9 MMOL/L (ref 3.6–5.5)
RBC # BLD AUTO: 5.01 M/UL (ref 4.7–6.1)
SIGNIFICANT IND 70042: NORMAL
SITE SITE: NORMAL
SODIUM SERPL-SCNC: 138 MMOL/L (ref 135–145)
SOURCE SOURCE: NORMAL
WBC # BLD AUTO: 5.8 K/UL (ref 4.8–10.8)

## 2022-07-18 PROCEDURE — A9270 NON-COVERED ITEM OR SERVICE: HCPCS | Performed by: NURSE PRACTITIONER

## 2022-07-18 PROCEDURE — 700105 HCHG RX REV CODE 258: Performed by: INTERNAL MEDICINE

## 2022-07-18 PROCEDURE — 82962 GLUCOSE BLOOD TEST: CPT | Mod: 91

## 2022-07-18 PROCEDURE — 93306 TTE W/DOPPLER COMPLETE: CPT

## 2022-07-18 PROCEDURE — A9270 NON-COVERED ITEM OR SERVICE: HCPCS | Performed by: INTERNAL MEDICINE

## 2022-07-18 PROCEDURE — 93306 TTE W/DOPPLER COMPLETE: CPT | Mod: 26 | Performed by: INTERNAL MEDICINE

## 2022-07-18 PROCEDURE — 700102 HCHG RX REV CODE 250 W/ 637 OVERRIDE(OP): Performed by: INTERNAL MEDICINE

## 2022-07-18 PROCEDURE — 80048 BASIC METABOLIC PNL TOTAL CA: CPT

## 2022-07-18 PROCEDURE — 700102 HCHG RX REV CODE 250 W/ 637 OVERRIDE(OP): Performed by: NURSE PRACTITIONER

## 2022-07-18 PROCEDURE — 99232 SBSQ HOSP IP/OBS MODERATE 35: CPT | Performed by: NURSE PRACTITIONER

## 2022-07-18 PROCEDURE — 85027 COMPLETE CBC AUTOMATED: CPT

## 2022-07-18 PROCEDURE — 36415 COLL VENOUS BLD VENIPUNCTURE: CPT

## 2022-07-18 PROCEDURE — 770020 HCHG ROOM/CARE - TELE (206)

## 2022-07-18 PROCEDURE — 700111 HCHG RX REV CODE 636 W/ 250 OVERRIDE (IP): Performed by: INTERNAL MEDICINE

## 2022-07-18 RX ORDER — AMLODIPINE BESYLATE 5 MG/1
5 TABLET ORAL DAILY
Status: DISCONTINUED | OUTPATIENT
Start: 2022-07-18 | End: 2022-07-19 | Stop reason: HOSPADM

## 2022-07-18 RX ORDER — DEXTROSE MONOHYDRATE 25 G/50ML
25 INJECTION, SOLUTION INTRAVENOUS
Status: DISCONTINUED | OUTPATIENT
Start: 2022-07-18 | End: 2022-07-19 | Stop reason: HOSPADM

## 2022-07-18 RX ADMIN — LOSARTAN POTASSIUM 50 MG: 50 TABLET, FILM COATED ORAL at 06:22

## 2022-07-18 RX ADMIN — ALLOPURINOL 300 MG: 300 TABLET ORAL at 06:21

## 2022-07-18 RX ADMIN — INSULIN HUMAN 1 UNITS: 100 INJECTION, SOLUTION PARENTERAL at 08:23

## 2022-07-18 RX ADMIN — FAMOTIDINE 20 MG: 20 TABLET ORAL at 17:30

## 2022-07-18 RX ADMIN — SIMVASTATIN 40 MG: 40 TABLET, FILM COATED ORAL at 17:30

## 2022-07-18 RX ADMIN — ACYCLOVIR SODIUM 776 MG: 50 INJECTION, SOLUTION INTRAVENOUS at 06:21

## 2022-07-18 RX ADMIN — INSULIN GLARGINE-YFGN 10 UNITS: 100 INJECTION, SOLUTION SUBCUTANEOUS at 17:34

## 2022-07-18 RX ADMIN — FAMOTIDINE 20 MG: 20 TABLET ORAL at 06:21

## 2022-07-18 RX ADMIN — ENOXAPARIN SODIUM 40 MG: 40 INJECTION SUBCUTANEOUS at 17:30

## 2022-07-18 RX ADMIN — ASPIRIN 81 MG: 81 TABLET, COATED ORAL at 06:21

## 2022-07-18 RX ADMIN — CEFTRIAXONE SODIUM 2 G: 10 INJECTION, POWDER, FOR SOLUTION INTRAVENOUS at 06:21

## 2022-07-18 RX ADMIN — SENNOSIDES AND DOCUSATE SODIUM 2 TABLET: 50; 8.6 TABLET ORAL at 17:30

## 2022-07-18 RX ADMIN — AMLODIPINE BESYLATE 5 MG: 5 TABLET ORAL at 15:26

## 2022-07-18 ASSESSMENT — ENCOUNTER SYMPTOMS
DOUBLE VISION: 0
ABDOMINAL PAIN: 0
NERVOUS/ANXIOUS: 0
SPEECH CHANGE: 0
VOMITING: 0
MYALGIAS: 0
SORE THROAT: 0
TREMORS: 0
WEIGHT LOSS: 0
DIARRHEA: 0
INSOMNIA: 0
TINGLING: 0
NAUSEA: 0
BLURRED VISION: 0
WEAKNESS: 0
FALLS: 0
SENSORY CHANGE: 1
SHORTNESS OF BREATH: 0
DIZZINESS: 0
PALPITATIONS: 0
CHILLS: 0
HEADACHES: 0
ORTHOPNEA: 0
FLANK PAIN: 0
SEIZURES: 0
FEVER: 0
COUGH: 0

## 2022-07-18 ASSESSMENT — LIFESTYLE VARIABLES: SUBSTANCE_ABUSE: 1

## 2022-07-18 ASSESSMENT — FIBROSIS 4 INDEX: FIB4 SCORE: 5

## 2022-07-18 NOTE — PROGRESS NOTES
[facility] Medicine Daily Progress Note    Date of Service  7/18/2022    Chief Complaint  Sharon Guardado is a 67 y.o. male admitted 7/15/2022 with altered level of consciousness, fever    Hospital Course  This is a 67-year-old male with a past medical history of hypertension, coronary artery disease, melanoma, alcohol dependence who presented 7/15/2022 with altered mental status, fever.  Last known normal was 7/15/2022 1300.  Patient initially presented to the emergency department as a code stroke due to questionable left-sided weakness, this canceled by neurology.  Patient son reported at 1800, patient was going to the restroom and spent an hour sitting and stairs confused.  In the emergency department, patient met sepsis criteria with fever, tachycardia, leukocytosis.  He was awake and alert but confused/disoriented.  Lumbar puncture was performed, blood alcohol <0.10, UDS negative, CT head negative for acute findings, no metabolic acidosis but lactic acidosis noted greater than 5. No seizures or trauma is reported.  Patient was started on Rocephin IV and admitted for further evaluation.      Interval Problem Update  7/16/2022: Patient AAOx4.  Denies pain, chest pain, headache, shortness of breath.  Patient's wife at bedside.  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  She states patient returned home after working his night shift and full gear, and had slept approximately half an hour before he was awake at 1300.  She states patient had difficulty with ambulation and later in the evening and had vomited, raising concern for heat exhaustion.  Furthermore, she states patient is supposed to be on a diuretic but does not take diuretics as prescribed when he is working and has gained approximately 20 pounds in the last couple months despite eating moderate amounts of food including salads, eggs- but patient had a Mendez soup for lunch.     7/17/2022:  "Patient AAOx4.  Reports ongoing/chronic headache.  Wife at bedside.  Discussed with patient and wife glycohemoglobin levels diagnostic for diabetes mellitus and consultation with diabetic educator has been ordered.  On chart review, clinic note from 8/20/2021 states patient was told by PCP he had CHF and was referred to cardiologist who then told patient he had \"fluid\".  Neither encounter available in [facility].  However, patient is on losartan, amlodipine, Lasix, spironolactone.  Last echocardiogram was 2018.  Repeat echocardiogram ordered.  Steatosis seen on CT- Right upper quadrant ultrasound ordered to fully evaluate for any liver disease.     7/18/2022: Patient AAOx4.  Patient reports feeling well and states bilateral lower extremity edema has much improved.  Echocardiogram done, EF 60%.  Right upper quadrant ultrasound showing hepatic steatosis, no nodules or masses seen.  Diabetic education pending.     I have discussed this patient's plan of care and discharge plan at IDT rounds today with Case Management, Nursing, Nursing leadership, and other members of the IDT team.    Consultants/Specialty  neurology-signed off    Code Status  Full Code    Disposition  Patient is not medically cleared for discharge.   Anticipate discharge to to home with close outpatient follow-up.  I have placed the appropriate orders for post-discharge needs.    Review of Systems  Review of Systems   Constitutional: Negative for chills, fever and weight loss.        +20lb weight gain over last few months   HENT: Negative for congestion and sore throat.    Eyes: Negative for blurred vision and double vision.   Respiratory: Negative for cough and shortness of breath.    Cardiovascular: Negative for chest pain, palpitations, orthopnea and leg swelling.   Gastrointestinal: Negative for abdominal pain, diarrhea, nausea and vomiting.   Genitourinary: Negative for dysuria and flank pain.   Musculoskeletal: Negative for falls and myalgias. "   Neurological: Positive for sensory change ( Numbness to bilateral hands). Negative for dizziness, tingling, tremors, speech change, seizures, weakness and headaches.   Psychiatric/Behavioral: Positive for substance abuse (alcohol-Daily bourbon on nonwork days). The patient is not nervous/anxious and does not have insomnia.    All other systems reviewed and are negative.       Physical Exam  Temp:  [36.2 °C (97.1 °F)-36.7 °C (98.1 °F)] 36.3 °C (97.3 °F)  Pulse:  [70-86] 86  Resp:  [16-18] 18  BP: (119-147)/(62-82) 147/78  SpO2:  [92 %-95 %] 92 %    Physical Exam  Vitals and nursing note reviewed.   Constitutional:       General: He is not in acute distress.     Appearance: Normal appearance. He is morbidly obese. He is not ill-appearing or toxic-appearing.   HENT:      Head: Normocephalic.      Nose: Nose normal.      Mouth/Throat:      Mouth: Mucous membranes are moist.      Pharynx: Oropharynx is clear.   Eyes:      General: No scleral icterus.     Conjunctiva/sclera: Conjunctivae normal.      Pupils: Pupils are equal, round, and reactive to light.   Cardiovascular:      Rate and Rhythm: Normal rate and regular rhythm.      Pulses: Normal pulses.      Heart sounds: Normal heart sounds. No murmur heard.    No gallop.   Pulmonary:      Effort: Pulmonary effort is normal. No respiratory distress.      Breath sounds: Normal breath sounds. No wheezing or rhonchi.   Chest:      Chest wall: No tenderness.   Abdominal:      General: Abdomen is flat. Bowel sounds are normal. There is no distension.      Palpations: Abdomen is soft.      Tenderness: There is no abdominal tenderness. There is no guarding.   Musculoskeletal:         General: No tenderness. Normal range of motion.      Cervical back: Normal range of motion and neck supple. No rigidity or tenderness.      Right lower leg: Edema ( Trace) present.      Left lower leg: Edema ( Trace) present.   Skin:     General: Skin is warm and dry.      Capillary Refill:  Capillary refill takes less than 2 seconds.      Coloration: Skin is not jaundiced.   Neurological:      General: No focal deficit present.      Mental Status: He is alert and oriented to person, place, and time. Mental status is at baseline.      Motor: No weakness.   Psychiatric:         Mood and Affect: Mood normal.         Behavior: Behavior normal.         Thought Content: Thought content normal.         Judgment: Judgment normal.         Fluids    Intake/Output Summary (Last 24 hours) at 7/18/2022 1449  Last data filed at 7/18/2022 1029  Gross per 24 hour   Intake 960 ml   Output 475 ml   Net 485 ml       Laboratory  Recent Labs     07/16/22  0105 07/17/22  0319 07/18/22  0210   WBC 15.6* 7.3 5.8   RBC 4.84 4.67* 5.01   HEMOGLOBIN 14.5 13.8* 14.8   HEMATOCRIT 40.7* 40.4* 43.1   MCV 84.1 86.5 86.0   MCH 30.0 29.6 29.5   MCHC 35.6* 34.2 34.3   RDW 42.3 44.2 43.1   PLATELETCT 137* 107* 116*   MPV 10.6 11.1 12.1     Recent Labs     07/16/22  0105 07/17/22  0319 07/18/22  0210   SODIUM 135 131* 138   POTASSIUM 3.8 3.7 3.9   CHLORIDE 99 98 104   CO2 22 21 22   GLUCOSE 222* 231* 227*   BUN 17 13 12   CREATININE 0.92 0.72 0.83   CALCIUM 9.4 8.7 9.0     Recent Labs     07/16/22  0105   INR 1.18*               Imaging  EC-ECHOCARDIOGRAM COMPLETE W/O CONT   Final Result      US-RUQ   Final Result      1.  Hepatomegaly and hepatic steatosis.   2.  Gallbladder neck stone is not seen on this ultrasound, but was well seen on the recent CT. No sonographic evidence of acute cholecystitis.      CT-CHEST,ABDOMEN,PELVIS WITH   Final Result      1.  No acute infectious process in the chest, abdomen or pelvis.   2.  Hepatic steatosis.   3.  Cholelithiasis.   4.  Colonic diverticulosis.      DX-CHEST-PORTABLE (1 VIEW)   Final Result         1. No acute cardiopulmonary abnormalities are identified.      CT-HEAD W/O   Final Result         1. No acute intracranial abnormality. No evidence of acute intracranial hemorrhage or mass  lesion.                          Assessment/Plan  * Sepsis (HCC)- (present on admission)  Assessment & Plan  This is Sepsis Present on admission  SIRS criteria identified on my evaluation include: Tachycardia, with heart rate greater than 90 BPM and Leukocytosis, with WBC greater than 12,000  Source is unknown  Sepsis protocol initiated  Fluid resuscitation ordered per protocol  Crystalloid Fluid Administration: Fluid resuscitation ordered per standard protocol - 30 mL/kg per current or ideal body weight  IV antibiotics as appropriate for source of sepsis  Reassessment: I have reassessed the patient's hemodynamic status     7/16: Leukocytosis on morning labs. Vancomycin IV started.   MRSA PCR ordered.   Tachycardic in morning, improved by afternoon, afebrile.   CSF unremarkable.   UA negative  CT chest, abdomen, pelvis ordered- negative for acute findings  Lactic acid down trending    7/17/2022: Resolved.  No leukocytosis.  Vital signs stable, afebrile.  MRSA by PCR negative, discontinued vancomycin.  Repeat lactic acid normal.    Fever  Assessment & Plan  Resolved  On admission 100.6  COVID-19/influenza/RSV negative.    RVP pending.  CSF unremarkable, no meningeal signs on exam, therefore bacterial meningitis seems to be less likely.        Acute encephalopathy  Assessment & Plan  Resolved.  Unknown etiology- Suspect may been temporarily due to sleep deprivation, heat exposure, decreased oral intake.  No metabolic disturbances including metabolic acidosis, DKA  No hypoxia or hypercapnia  Patient AAO x4, no focal weakness, aphasia, neurological deficits-CT head negative for acute findings on admission  Patient hyperglycemic in 200s without acidosis  No seizures or traumas reported/witnessed  No uremia  Ammonia normal  UDS and alcohol levels negative.  CSF fluids unremarkable-preliminary cultures no growth to date. Final cultures pending  Lactic acidosis improved after IV and oral fluids.  Continue IV/p.o.  "thiamine    Type 2 diabetes mellitus without complication, without long-term current use of insulin (HCC)  Assessment & Plan  New onset.  A1c 8.8  7/18/2022 patient remains hyperglycemic overnight-Lantus 10 units ordered and SSI increased to medium scale  Diabetic diet  Dietary consult completed  Diabetic education pending  Accuchecks and hypoglycemic protocol ordered.       Essential hypertension- (present on admission)  Assessment & Plan  Per OP clinic note 08/20/2021, patient reported he was told by PCP that he had CHF and referred to cardiologist who stated he had \"fluid\". No record of either encounter with PCP or cardiologist.   Last echo in 2018 was normal.   Patient wife states patient on diuretic for fluid retention but does not take when working, has had 20 lb weight gain.   No cardiomegaly or pulmonary edema on CXR.  BNP ordered and will consider echo if elevated.     Continue losartan, Lasix, hold amlodipine and spironolactone  Resuming amlodipine  Monitor blood pressure    Alcohol dependence (HCC)- (present on admission)  Assessment & Plan  History of.   Reports he drinks bourbon daily on nonwork days  Blood alcohol levels <0.10  No signs of withdrawal, patient out of timeframe for withdrawals  AST mildly elevated.    CTAP and RUQ ultrasound showed hepatic steatosis.  Encourage cessation    Morbid obesity with BMI of 40.0-44.9, adult (HCC)- (present on admission)  Assessment & Plan  Encourage lifestyle and diet modifications.   Hemoglobin A1c 8.8.  New diagnosis of type 2 diabetes mellitus.  Diabetic education pending    GERD (gastroesophageal reflux disease)  Assessment & Plan  Continue home prilosec    CAD (coronary artery disease)  Assessment & Plan  Continue atorvastatin and asa    Chronic gout- (present on admission)  Assessment & Plan  Continue allopurinol       VTE prophylaxis: enoxaparin ppx and Xarelto 10 mg daily as prophylaxis    I have performed a physical exam and reviewed and updated ROS " and Plan today (7/18/2022). In review of yesterday's note (7/17/2022), there are no changes except as documented above.

## 2022-07-18 NOTE — PROGRESS NOTES
12 hour chart check.    Have a great shift!    Monitor Summary     SB/SR 46-79  1st degree  25/05/42

## 2022-07-18 NOTE — PROGRESS NOTES
Handoff report received from day shift nurse. Patient care assumed. Patient is currently resting in bed. Plan of care discussed with the patient and the patient verbalizes no questions at this time. Patient is A&O x4, on room air, telemetry monitoring in place and rhythm verified, and vital signs are stable. Patient states their pain is 0/10 at this time. Call light and belongings within reach, patient educated on the use of call light. All fall precautions are in place, bed is in locked and lowest position. Hourly rounding in place. Will continue plan of care.

## 2022-07-18 NOTE — CARE PLAN
"The patient is Watcher - Medium risk of patient condition declining or worsening    Shift Goals  Clinical Goals: Patient will maintain baseline neuro status. Patient will acheive adequate pain relief throughout shift.  Patient Goals: \"Less pain. Sleep well.\"  Family Goals: DEENA    Progress made toward(s) clinical / shift goals:    Problem: Knowledge Deficit - Standard  Goal: Patient and family/care givers will demonstrate understanding of plan of care, disease process/condition, diagnostic tests and medications  Outcome: Progressing     Problem: Hemodynamics  Goal: Patient's hemodynamics, fluid balance and neurologic status will be stable or improve  Outcome: Progressing     Problem: Fluid Volume  Goal: Fluid volume balance will be maintained  Outcome: Progressing     Problem: Urinary - Renal Perfusion  Goal: Ability to achieve and maintain adequate renal perfusion and functioning will improve  Outcome: Progressing       Patient is not progressing towards the following goals:      "

## 2022-07-18 NOTE — DISCHARGE PLANNING
Case Management Discharge Planning    Admission Date: 7/15/2022  GMLOS: 4.8  ALOS: 3    6-Clicks ADL Score: 24  6-Clicks Mobility Score: 24      Anticipated Discharge Dispo: Discharge Disposition: Discharged to home/self care (01)    DME Needed: No    Action(s) Taken: OTHER-chart review    Escalations Completed: None    Medically Clear: No    Next Steps: f/u with pt and medical team to discuss dc needs and barriers.    Barriers to Discharge: Medical clearance

## 2022-07-19 ENCOUNTER — PHARMACY VISIT (OUTPATIENT)
Dept: PHARMACY | Facility: MEDICAL CENTER | Age: 67
End: 2022-07-19
Payer: MEDICARE

## 2022-07-19 VITALS
TEMPERATURE: 97.1 F | RESPIRATION RATE: 18 BRPM | HEART RATE: 73 BPM | SYSTOLIC BLOOD PRESSURE: 121 MMHG | HEIGHT: 72 IN | DIASTOLIC BLOOD PRESSURE: 83 MMHG | BODY MASS INDEX: 39.42 KG/M2 | WEIGHT: 291.01 LBS | OXYGEN SATURATION: 94 %

## 2022-07-19 PROBLEM — G93.40 ACUTE ENCEPHALOPATHY: Status: RESOLVED | Noted: 2018-08-01 | Resolved: 2022-07-19

## 2022-07-19 PROBLEM — A41.9 SEPSIS (HCC): Status: RESOLVED | Noted: 2022-07-15 | Resolved: 2022-07-19

## 2022-07-19 PROBLEM — R50.9 FEVER: Status: RESOLVED | Noted: 2022-07-15 | Resolved: 2022-07-19

## 2022-07-19 LAB
ANION GAP SERPL CALC-SCNC: 13 MMOL/L (ref 7–16)
BACTERIA CSF CULT: NORMAL
BUN SERPL-MCNC: 14 MG/DL (ref 8–22)
CALCIUM SERPL-MCNC: 9.3 MG/DL (ref 8.5–10.5)
CHLORIDE SERPL-SCNC: 104 MMOL/L (ref 96–112)
CO2 SERPL-SCNC: 21 MMOL/L (ref 20–33)
CREAT SERPL-MCNC: 0.77 MG/DL (ref 0.5–1.4)
ERYTHROCYTE [DISTWIDTH] IN BLOOD BY AUTOMATED COUNT: 43 FL (ref 35.9–50)
GFR SERPLBLD CREATININE-BSD FMLA CKD-EPI: 97 ML/MIN/1.73 M 2
GLUCOSE SERPL-MCNC: 204 MG/DL (ref 65–99)
GRAM STN SPEC: NORMAL
HCT VFR BLD AUTO: 42.1 % (ref 42–52)
HGB BLD-MCNC: 14.5 G/DL (ref 14–18)
HSV1+2 IGG SER IA-ACNC: 0.28 IV
MCH RBC QN AUTO: 29.3 PG (ref 27–33)
MCHC RBC AUTO-ENTMCNC: 34.4 G/DL (ref 33.7–35.3)
MCV RBC AUTO: 85.1 FL (ref 81.4–97.8)
PLATELET # BLD AUTO: 144 K/UL (ref 164–446)
PMV BLD AUTO: 10.3 FL (ref 9–12.9)
POTASSIUM SERPL-SCNC: 3.8 MMOL/L (ref 3.6–5.5)
RBC # BLD AUTO: 4.95 M/UL (ref 4.7–6.1)
SIGNIFICANT IND 70042: NORMAL
SITE SITE: NORMAL
SODIUM SERPL-SCNC: 138 MMOL/L (ref 135–145)
SOURCE SOURCE: NORMAL
WBC # BLD AUTO: 6.6 K/UL (ref 4.8–10.8)
WNV IGG CSF IA-ACNC: 0.08 IV
WNV IGM CSF IA-ACNC: 0.01 IV

## 2022-07-19 PROCEDURE — RXMED WILLOW AMBULATORY MEDICATION CHARGE: Performed by: INTERNAL MEDICINE

## 2022-07-19 PROCEDURE — 700102 HCHG RX REV CODE 250 W/ 637 OVERRIDE(OP): Performed by: INTERNAL MEDICINE

## 2022-07-19 PROCEDURE — 36415 COLL VENOUS BLD VENIPUNCTURE: CPT

## 2022-07-19 PROCEDURE — A9270 NON-COVERED ITEM OR SERVICE: HCPCS | Performed by: INTERNAL MEDICINE

## 2022-07-19 PROCEDURE — 700111 HCHG RX REV CODE 636 W/ 250 OVERRIDE (IP): Performed by: INTERNAL MEDICINE

## 2022-07-19 PROCEDURE — 85027 COMPLETE CBC AUTOMATED: CPT

## 2022-07-19 PROCEDURE — A9270 NON-COVERED ITEM OR SERVICE: HCPCS | Performed by: NURSE PRACTITIONER

## 2022-07-19 PROCEDURE — 99239 HOSP IP/OBS DSCHRG MGMT >30: CPT | Performed by: INTERNAL MEDICINE

## 2022-07-19 PROCEDURE — 82962 GLUCOSE BLOOD TEST: CPT

## 2022-07-19 PROCEDURE — 700102 HCHG RX REV CODE 250 W/ 637 OVERRIDE(OP): Performed by: NURSE PRACTITIONER

## 2022-07-19 PROCEDURE — 80048 BASIC METABOLIC PNL TOTAL CA: CPT

## 2022-07-19 RX ORDER — GLIPIZIDE 10 MG/1
10 TABLET ORAL DAILY
Qty: 30 TABLET | Refills: 0 | Status: SHIPPED | OUTPATIENT
Start: 2022-07-19 | End: 2022-07-27 | Stop reason: SDUPTHER

## 2022-07-19 RX ADMIN — AMLODIPINE BESYLATE 5 MG: 5 TABLET ORAL at 06:27

## 2022-07-19 RX ADMIN — ASPIRIN 81 MG: 81 TABLET, COATED ORAL at 06:27

## 2022-07-19 RX ADMIN — FAMOTIDINE 20 MG: 20 TABLET ORAL at 06:27

## 2022-07-19 RX ADMIN — LOSARTAN POTASSIUM 50 MG: 50 TABLET, FILM COATED ORAL at 06:27

## 2022-07-19 RX ADMIN — ALLOPURINOL 300 MG: 300 TABLET ORAL at 06:27

## 2022-07-19 RX ADMIN — CEFTRIAXONE SODIUM 2 G: 10 INJECTION, POWDER, FOR SOLUTION INTRAVENOUS at 06:26

## 2022-07-19 RX ADMIN — SENNOSIDES AND DOCUSATE SODIUM 2 TABLET: 50; 8.6 TABLET ORAL at 06:26

## 2022-07-19 ASSESSMENT — PAIN DESCRIPTION - PAIN TYPE: TYPE: ACUTE PAIN

## 2022-07-19 NOTE — CONSULTS
Diabetes education: Pt is newly dx with diabetes. Blood sugar on admit was 243 and Hga1c was 8.8%.  Pt is currently on Glargine 10  units pm with regular insulin per sliding scale ac and hs with blood sugars of  214 ( 3 units)  and 184 ( 2 units).  Met with pt this afternoon. Reviewed what diabetes was, what effects blood sugars, goals for blood sugars, hyperglycemia and hypoglycemia. Discussed Hga1c, and insulin as well. Asked that the pt give his insulin and stick his finger with nursing ( pt states he has an old meter from long ago).   Pt wants wife present for insulin education.   Plan: CDE to return tomorrow when wife available to instruct on insulin pens and new meter ( One touch verio reflect/flex). Pt will need prescriptions for Lantus solostar pen and pen needles if discharging on insulin ( enough for 30 days with one refill), a one touch verio flex meter, test strips and lancets . Please use diabetes supplies, F2 for correct meter, supplies and dx code. Not sure if pt's [employer] is his part D?

## 2022-07-19 NOTE — PROGRESS NOTES
Bedside report received, pt care assumed, tele box on. VSS, pt assessment complete. Pt aaox4, no signs of distress noted at this time. POC discussed with pt and verbalizes no questions.Pt denies any additional needs at this time. Bed in lowest position, pt educated on fall risk and verbalized understanding, call light within reach.

## 2022-07-19 NOTE — CARE PLAN
The patient is Watcher - Medium risk of patient condition declining or worsening    Shift Goals  Clinical Goals: glucose control, echo, diabetes education  Patient Goals: shower, rest, comfort  Family Goals: DEENA    Progress made toward(s) clinical / shift goals:    Problem: Knowledge Deficit - Standard  Goal: Patient and family/care givers will demonstrate understanding of plan of care, disease process/condition, diagnostic tests and medications  Outcome: Progressing     Problem: Hemodynamics  Goal: Patient's hemodynamics, fluid balance and neurologic status will be stable or improve  Outcome: Progressing     Problem: Fluid Volume  Goal: Fluid volume balance will be maintained  Outcome: Progressing     Problem: Urinary - Renal Perfusion  Goal: Ability to achieve and maintain adequate renal perfusion and functioning will improve  Outcome: Progressing     Problem: Respiratory  Goal: Patient will achieve/maintain optimum respiratory ventilation and gas exchange  Outcome: Progressing       Patient is not progressing towards the following goals:

## 2022-07-19 NOTE — CARE PLAN
"The patient is Stable - Low risk of patient condition declining or worsening    Shift Goals  Clinical Goals: diabetes education'  Patient Goals: \"Go home\"  Family Goals: DEENA      Problem: Knowledge Deficit - Standard  Goal: Patient and family/care givers will demonstrate understanding of plan of care, disease process/condition, diagnostic tests and medications  Outcome: Progressing     Problem: Fall Risk  Goal: Patient will remain free from falls  Outcome: Progressing     Problem: Pain - Standard  Goal: Alleviation of pain or a reduction in pain to the patient’s comfort goal  Outcome: Progressing         "

## 2022-07-19 NOTE — PROGRESS NOTES
Diabetes education: met with pt this afternoon. Please see consult note.  Plan: CDE to return tomorrow when wife available to instruct on insulin pens and new meter ( One touch verio reflect/flex). Pt will need prescriptions for Lantus solostar pen and pen needles if discharging on insulin ( enough for 30 days with one refill), a one touch verio flex meter, test strips and lancets . Please use diabetes supplies, F2 for correct meter, supplies and dx code. Not sure if pt's [employer] is his part D?

## 2022-07-20 LAB
BACTERIA BLD CULT: NORMAL
GLUCOSE BLD STRIP.AUTO-MCNC: 247 MG/DL (ref 65–99)
SIGNIFICANT IND 70042: NORMAL
SITE SITE: NORMAL
SOURCE SOURCE: NORMAL

## 2022-07-20 NOTE — PROGRESS NOTES
Diabetes education: met with pt and wife before discharge. Pt had an old one touch ultra mini with strips from 2008. CDE gave and instructed on a One touch verio reflect meter and delica plus lancet device. Reviewed finger sticks and diabetes with pt and wife. Questions answered.

## 2022-07-21 ENCOUNTER — TELEPHONE (OUTPATIENT)
Dept: SCHEDULING | Facility: IMAGING CENTER | Age: 67
End: 2022-07-21

## 2022-07-21 LAB
BACTERIA BLD CULT: NORMAL
SIGNIFICANT IND 70042: NORMAL
SITE SITE: NORMAL
SOURCE SOURCE: NORMAL

## 2022-07-26 NOTE — DISCHARGE SUMMARY
Discharge Summary    CHIEF COMPLAINT ON ADMISSION  Chief Complaint   Patient presents with   • ALOC     LKW at 1300, pt with L sided weakness, expressive aphasia. Pt A&Ox2-3 disoriented to time and place. Pt found w/ temp of 102.1f.        Reason for Admission  Stroke     Admission Date  7/15/2022    CODE STATUS  Prior    HPI & HOSPITAL COURSE    This is a 67-year-old male with a past medical history of hypertension, coronary artery disease, melanoma, alcohol dependence who presented 7/15/2022 with altered mental status, fever.  Last known normal was 7/15/2022 1300.  Patient initially presented to the emergency department as a code stroke due to questionable left-sided weakness, this canceled by neurology.  Patient son reported at 1800, patient was going to the restroom and spent an hour sitting and stairs confused.  In the emergency department, patient met sepsis criteria with fever, tachycardia, leukocytosis.  He was awake and alert but confused/disoriented.  Lumbar puncture was performed, blood alcohol <0.10, UDS negative, CT head negative for acute findings, no metabolic acidosis but lactic acidosis noted greater than 5. No seizures or trauma is reported.  Patient was started on IV fluids, Rocephin IV and admitted for further evaluation.  Lactic acidosis resolved.  Patient underwent 2D echo that revealed LVEF 60%.  Patient symptoms improved and she was resumed on her home regimen of medications.  She has been instructed to follow-up with her PCP      Therefore, he is discharged in fair and stable condition to home with close outpatient follow-up.    The patient met 2-midnight criteria for an inpatient stay at the time of discharge.    Discharge Date  7/19/2022    FOLLOW UP ITEMS POST DISCHARGE  Follow-up with PCP    DISCHARGE DIAGNOSES  Principal Problem (Resolved):    Sepsis (HCC) POA: Yes  Active Problems:    Alcohol dependence (HCC) POA: Yes    Morbid obesity with BMI of 40.0-44.9, adult (HCC) POA: Yes     Chronic gout (Chronic) POA: Yes    Essential hypertension (Chronic) POA: Yes    Type 2 diabetes mellitus without complication, without long-term current use of insulin (HCC) POA: Yes    CAD (coronary artery disease) POA: Yes    GERD (gastroesophageal reflux disease) POA: Yes  Resolved Problems:    Hyperglycemia POA: Yes    Acute encephalopathy POA: Unknown    Fever POA: Unknown      FOLLOW UP  Future Appointments   Date Time Provider Department Center   7/27/2022  9:40 AM KYA Ralph M.D.  [address]  [facility] [address]  [phone]            MEDICATIONS ON DISCHARGE     Medication List      START taking these medications      Instructions   glipiZIDE 10 MG Tabs  Commonly known as: GLUCOTROL   Take 1 Tablet by mouth every day for 30 days.  Dose: 10 mg     metFORMIN 500 MG Tabs  Commonly known as: GLUCOPHAGE   Take 1 Tablet by mouth 2 times a day with meals for 30 days.  Dose: 500 mg        CONTINUE taking these medications      Instructions   allopurinol 300 MG Tabs  Commonly known as: ZYLOPRIM   Take 1 Tab by mouth every day.  Dose: 1 Tablet     amLODIPine 5 MG Tabs  Commonly known as: NORVASC   Take 5 mg by mouth every day.  Dose: 5 mg     aspirin EC 81 MG Tbec  Commonly known as: ECOTRIN   Take 81 mg by mouth every day.  Dose: 81 mg     CIALIS PO   tadalafil   15mg     diclofenac sodium 1 % Gel  Commonly known as: Voltaren   diclofenac 1 % topical gel   APPLY 2 GRAMS TO THE AFFECTED AREA(S) BY TOPICAL ROUTE 4 TIMES PER DAY AS NEEDED FOR HAND PAIN     famotidine 20 MG Tabs  Commonly known as: PEPCID   Take 1 tablet by mouth 2 times a day.  Dose: 20 mg     losartan 50 MG Tabs  Commonly known as: COZAAR   Take 50 mg by mouth every day.  Dose: 50 mg     methylPREDNISolone 4 MG Tabs  Commonly known as: MEDROL   methylprednisolone 4 mg tablets in a dose pack   TAKE 6 TABLETS ON DAY 1 AS DIRECTED ON PACKAGE AND DECREASE BY 1 TAB EACH DAY FOR A TOTAL  OF 6 DAYS     * naproxen 500 MG Tabs  Commonly known as: NAPROSYN   naproxen 500 mg tablet     * naproxen 500 MG Tabs  Commonly known as: NAPROSYN   Take one tablet by mouth once daily with meals     PRILOSEC OTC PO   1 Cap by Oral route QDAY.  Dose: 1 Capsule     sildenafil citrate 25 MG Tabs  Commonly known as: VIAGRA   sildenafil 25 mg tablet   take 1-4 tabs by mouth 30-60 min before intercourse. max dose 100mg per 24 hours. Do not take with alpha-blocker and nitroglycerin     simvastatin 40 MG Tabs  Commonly known as: ZOCOR   Take 1 Tab by mouth every day.  Dose: 1 Tablet     spironolactone 25 MG Tabs  Commonly known as: ALDACTONE   Take 1 Tab by mouth every day.  Dose: 1 Tablet     * triamcinolone acetonide 0.1 % Oint  Commonly known as: KENALOG   triamcinolone acetonide 0.1 % topical ointment   APPLY TO AFFECTED AREA TWICE A DAY FOR 2 WEEKS     * triamcinolone acetonide 0.1 % Crea  Commonly known as: KENALOG      vitamin D 1000 Unit (25 mcg) Tabs  Commonly known as: cholecalciferol   Take 1,000 Units by mouth 3 times a day.  Dose: 1,000 Units         * This list has 4 medication(s) that are the same as other medications prescribed for you. Read the directions carefully, and ask your doctor or other care provider to review them with you.            STOP taking these medications    furosemide 40 MG Tabs  Commonly known as: LASIX     potassium chloride SA 10 MEQ Tbcr  Commonly known as: K-DUR            Allergies  Allergies   Allergen Reactions   • Ativan    • Vicodin [Hydrocodone-Acetaminophen] Rash     Rash, confusion         DIET  No orders of the defined types were placed in this encounter.      ACTIVITY  As tolerated.  Weight bearing as tolerated    CONSULTATIONS  None    PROCEDURES  None    LABORATORY  Lab Results   Component Value Date    SODIUM 138 07/19/2022    POTASSIUM 3.8 07/19/2022    CHLORIDE 104 07/19/2022    CO2 21 07/19/2022    GLUCOSE 204 (H) 07/19/2022    BUN 14 07/19/2022    CREATININE 0.77  07/19/2022    CREATININE 1.0 05/13/2009        Lab Results   Component Value Date    WBC 6.6 07/19/2022    HEMOGLOBIN 14.5 07/19/2022    HEMATOCRIT 42.1 07/19/2022    PLATELETCT 144 (L) 07/19/2022        Total time of the discharge process exceeds 35 minutes.

## 2022-07-27 ENCOUNTER — OFFICE VISIT (OUTPATIENT)
Dept: MEDICAL GROUP | Facility: MEDICAL CENTER | Age: 67
End: 2022-07-27
Payer: MEDICARE

## 2022-07-27 ENCOUNTER — HOSPITAL ENCOUNTER (OUTPATIENT)
Facility: MEDICAL CENTER | Age: 67
End: 2022-07-27
Attending: INTERNAL MEDICINE
Payer: MEDICARE

## 2022-07-27 VITALS
DIASTOLIC BLOOD PRESSURE: 68 MMHG | HEART RATE: 72 BPM | OXYGEN SATURATION: 93 % | BODY MASS INDEX: 41.66 KG/M2 | HEIGHT: 70 IN | WEIGHT: 291.01 LBS | TEMPERATURE: 97.2 F | SYSTOLIC BLOOD PRESSURE: 138 MMHG

## 2022-07-27 DIAGNOSIS — I10 ESSENTIAL HYPERTENSION: Chronic | ICD-10-CM

## 2022-07-27 DIAGNOSIS — E11.9 TYPE 2 DIABETES MELLITUS WITHOUT COMPLICATION, WITHOUT LONG-TERM CURRENT USE OF INSULIN (HCC): ICD-10-CM

## 2022-07-27 DIAGNOSIS — H26.9 CATARACT OF BOTH EYES, UNSPECIFIED CATARACT TYPE: ICD-10-CM

## 2022-07-27 DIAGNOSIS — M1A.9XX0 CHRONIC GOUT WITHOUT TOPHUS, UNSPECIFIED CAUSE, UNSPECIFIED SITE: Chronic | ICD-10-CM

## 2022-07-27 DIAGNOSIS — Z12.11 COLON CANCER SCREENING: ICD-10-CM

## 2022-07-27 DIAGNOSIS — Z09 HOSPITAL DISCHARGE FOLLOW-UP: ICD-10-CM

## 2022-07-27 DIAGNOSIS — E11.9 NEWLY DIAGNOSED DIABETES (HCC): ICD-10-CM

## 2022-07-27 PROBLEM — L03.312 CELLULITIS OF BACK EXCEPT BUTTOCK: Status: RESOLVED | Noted: 2018-08-03 | Resolved: 2022-07-27

## 2022-07-27 PROBLEM — K57.90 DIVERTICULOSIS: Status: ACTIVE | Noted: 2022-07-27

## 2022-07-27 PROBLEM — N17.9 AKI (ACUTE KIDNEY INJURY) (HCC): Status: RESOLVED | Noted: 2018-01-20 | Resolved: 2022-07-27

## 2022-07-27 PROBLEM — F10.939 ALCOHOL WITHDRAWAL (HCC): Status: RESOLVED | Noted: 2018-01-22 | Resolved: 2022-07-27

## 2022-07-27 PROBLEM — K76.0 HEPATIC STEATOSIS: Status: ACTIVE | Noted: 2022-07-27

## 2022-07-27 PROCEDURE — 82043 UR ALBUMIN QUANTITATIVE: CPT

## 2022-07-27 PROCEDURE — 82570 ASSAY OF URINE CREATININE: CPT

## 2022-07-27 PROCEDURE — 99214 OFFICE O/P EST MOD 30 MIN: CPT | Performed by: INTERNAL MEDICINE

## 2022-07-27 RX ORDER — GLIPIZIDE 10 MG/1
10 TABLET ORAL DAILY
Qty: 90 TABLET | Refills: 2 | Status: SHIPPED | OUTPATIENT
Start: 2022-07-27 | End: 2022-08-26

## 2022-07-27 RX ORDER — LOSARTAN POTASSIUM 50 MG/1
50 TABLET ORAL DAILY
Qty: 90 TABLET | Refills: 2 | Status: SHIPPED | OUTPATIENT
Start: 2022-07-27 | End: 2023-03-10 | Stop reason: SDUPTHER

## 2022-07-27 RX ORDER — AMLODIPINE BESYLATE 5 MG/1
5 TABLET ORAL DAILY
Qty: 90 TABLET | Refills: 2 | Status: SHIPPED | OUTPATIENT
Start: 2022-07-27 | End: 2023-03-14

## 2022-07-27 ASSESSMENT — ENCOUNTER SYMPTOMS
DEPRESSION: 0
VOMITING: 0
FEVER: 0
CHILLS: 0
NAUSEA: 0

## 2022-07-27 ASSESSMENT — FIBROSIS 4 INDEX: FIB4 SCORE: 4.03

## 2022-07-27 NOTE — LETTER
[employer] [facility]  Noreen Wong M.D.  [address]  [place] [address]  Fax: [phone]   Authorization for Release/Disclosure of   Protected Health Information   Name: MEHRAN GUARDADO : 1955 SSN: [id number]   Address: [address] [place] [address]  [place] [address] Phone:    [phone] (home)    I authorize the entity listed below to release/disclose the PHI below to:   [facility]/Noreen Wong M.D. and Noreen Wong M.D.   Provider or Entity Name:  [facility]   Address   City, State, [facility]   Phone:      Fax:     Reason for request: continuity of care   Information to be released:    [  ] LAST COLONOSCOPY,  including any PATH REPORT and follow-up  [  ] LAST FIT/COLOGUARD RESULT [  ] LAST DEXA  [  ] LAST MAMMOGRAM  [  ] LAST PAP  [  ] LAST LABS [  ] RETINA EXAM REPORT  [  ] IMMUNIZATION RECORDS  [  ] Release all info      [  ] Check here and initial the line next to each item to release ALL health information INCLUDING  _____ Care and treatment for drug and / or alcohol abuse  _____ HIV testing, infection status, or AIDS  _____ Genetic Testing    DATES OF SERVICE OR TIME PERIOD TO BE DISCLOSED: _____________  I understand and acknowledge that:  * This Authorization may be revoked at any time by you in writing, except if your health information has already been used or disclosed.  * Your health information that will be used or disclosed as a result of you signing this authorization could be re-disclosed by the recipient. If this occurs, your re-disclosed health information may no longer be protected by State or Federal laws.  * You may refuse to sign this Authorization. Your refusal will not affect your ability to obtain treatment.  * This Authorization becomes effective upon signing and will  on (date) __________.      If no date is indicated, this Authorization will  one (1) year from the signature date.    Name: Mehran Guardado    Signature:   Date:      7/27/2022       PLEASE FAX REQUESTED RECORDS BACK TO: [phone]

## 2022-07-27 NOTE — PROGRESS NOTES
Subjective:      Diagnoses of Hospital discharge follow-up, Type 2 diabetes mellitus without complication, without long-term current use of insulin (HCC), Colon cancer screening, Cataract of both eyes, unspecified cataract type, Chronic gout without tophus, unspecified cause, unspecified site, Essential hypertension, and Newly diagnosed diabetes ([facility]) were pertinent to this visit.    HISTORY OF THE PRESENT ILLNESS: Patient is a 67 y.o. male. This pleasant patient is here today to establish care. His prior PCP was Dr. Chaves.    Problem   Hospital Discharge Follow-Up    Patient was admitted from 7/16/2022 to 7/19/2022 due to altered mental status and suspected stroke.  Patient was treated for sepsis with IV antibiotics and IV fluids.  He has undergone extensive work-up including UDS, lumbar puncture, CT of the head without contrast.           Cataracts, Bilateral    Records requested from optometry.     Newly Diagnosed Diabetes (Hcc)    This is a chronic condition.  Current medications:    - Glipizide 10 mg daily, metformin 500 mg twice daily.  Last A1c:   Lab Results   Component Value Date/Time    HBA1C 8.8 (H) 07/16/2022 0105    AVGLUC 206 07/16/2022 0105     Fasting sugars: 90-160s  Last diabetic foot exam: 7/27/2022  Last retinal eye exam: 3 weeks ago, records requested   ACEi/ARB? Losartan  Statin: simvastatin  Aspirin:  81 mg   Concomitant HTN: Yes   Nightly foot checks: advised        Chronic Gout    Stable on allopurinol.     Essential Hypertension    This is a chronic problem, controlled on amlodipine 5 and losartan 40 mg daily.        Past Medical History:   Diagnosis Date   • LE (acute kidney injury), prerenal (CMS-HCC) 1/20/2018   • Alcohol withdrawal (HCC) 1/22/2018   • Bowel habit changes     constipation   • CAD (coronary artery disease)    • Cancer (HCC)     melanoma skin cancer on his back   • Cellulitis of back except buttock 8/3/2018   • HTN (hypertension)    • Hyperglycemia 1/19/2018   •  Hypertension    • Indigestion    • Kidney stones    • Near-syncope     worked up with neg results   • Nerve compression    • Tinnitus     pt states x 10 years and stopped tonight     Past Surgical History:   Procedure Laterality Date   • GASTROSCOPY  3/29/2018    Procedure: GASTROSCOPY - W/POSS BIOPSY, DILATION, POLYPECTOMY, CONTROL OF HEMORRHAGE;  Surgeon: Rich Collins M.D.;  Location: [facility];  Service: EUS   • EGD W/ENDOSCOPIC ULTRASOUND  3/29/2018    Procedure: EGD W/ENDOSCOPIC ULTRASOUND;  Surgeon: Rich Collins M.D.;  Location: [facility];  Service: EUS   • EGD WITH ASP/BX  3/29/2018    Procedure: EGD WITH ASP/BX - FNA;  Surgeon: Rich Collins M.D.;  Location: [facility];  Service: EUS     History reviewed. No pertinent family history.  Social History     Tobacco Use   • Smoking status: Never Smoker   • Smokeless tobacco: Never Used   Vaping Use   • Vaping Use: Never used   Substance Use Topics   • Alcohol use: Yes     Comment: i drink per night   • Drug use: No     Current Outpatient Medications Ordered in Epic   Medication Sig Dispense Refill   • glucose blood strip 1 Strip by Other route 2 (two) times a day. 200 Strip 2   • amLODIPine (NORVASC) 5 MG Tab Take 1 Tablet by mouth every day. 90 Tablet 2   • glipiZIDE (GLUCOTROL) 10 MG Tab Take 1 Tablet by mouth every day for 30 days. 90 Tablet 2   • losartan (COZAAR) 50 MG Tab Take 1 Tablet by mouth every day. 90 Tablet 2   • metFORMIN (GLUCOPHAGE) 500 MG Tab Take 1 Tablet by mouth 2 times a day with meals for 30 days. 180 Tablet 2   • triamcinolone acetonide (KENALOG) 0.1 % Cream      • Tadalafil (CIALIS PO) tadalafil   15mg     • aspirin EC (ECOTRIN) 81 MG Tablet Delayed Response Take 81 mg by mouth every day.     • vitamin D (CHOLECALCIFEROL) 1000 UNIT Tab Take 1,000 Units by mouth 3 times a day.     • SPIRONOLACTONE 25 MG PO TABS Take 1 Tab by mouth every day.     • SIMVASTATIN 40 MG PO TABS Take 1 Tab by  "mouth every day.     • ALLOPURINOL 300 MG PO TABS Take 1 Tab by mouth every day.     • PRILOSEC OTC PO 1 Cap by Oral route QDAY.        No current Epic-ordered facility-administered medications on file.     Health Maintenance: Requested from prior PCP    Review of Systems   Constitutional: Negative for chills and fever.   Cardiovascular: Negative for chest pain.   Gastrointestinal: Negative for nausea and vomiting.   Psychiatric/Behavioral: Negative for depression.     Objective:     Exam: /68 (BP Location: Left arm, Patient Position: Sitting, BP Cuff Size: Adult)   Pulse 72   Temp 36.2 °C (97.2 °F) (Temporal)   Ht 1.778 m (5' 10\")   Wt (!) 132 kg (291 lb 0.1 oz)   SpO2 93%  Body mass index is 41.76 kg/m².    Physical Exam  Constitutional:       Appearance: Normal appearance. He is obese.   HENT:      Head: Normocephalic and atraumatic.   Eyes:      General: No scleral icterus.  Cardiovascular:      Rate and Rhythm: Normal rate and regular rhythm.      Pulses: Normal pulses.      Heart sounds: Normal heart sounds.   Neurological:      Mental Status: He is alert and oriented to person, place, and time.       Monofilament testing with a 10 gram force: sensation intact: decreased bilaterally  Visual Inspection: Feet without maceration, ulcers, fissures.  Pedal pulses: intact bilaterally    Labs: I have reviewed CBC from 7/18/2022, BMP from 7/17/2022, A1c from 7/16/2022.    Assessment & Plan:   67 y.o. male with the following -    Problem List Items Addressed This Visit     Chronic gout (Chronic)     Continue allopurinol.           Essential hypertension (Chronic)     Continue losartan and amlodipine.           Relevant Medications    amLODIPine (NORVASC) 5 MG Tab    losartan (COZAAR) 50 MG Tab    Newly diagnosed diabetes (HCC) (Chronic)     Newly diagnosed, uncontrolled.  Upon the discharge patient was started on glipizide 10 mg daily and metformin 500 mg twice daily.   I have recommended to decrease the " dose of glipizide to 5 mg daily if patient starts experiencing symptoms of hypoglycemia and or fasting blood glucose will run below .  Referred to diabetic education endocrinology.  Advised on low carbohydrate diet.             Relevant Medications    glucose blood strip    glipiZIDE (GLUCOTROL) 10 MG Tab    metFORMIN (GLUCOPHAGE) 500 MG Tab    Cataracts, bilateral     Records requested from optometry.  Patient is not aware of any details.           Hospital discharge follow-up      Other Visit Diagnoses     Colon cancer screening        Relevant Orders    Referral to GI for Colonoscopy        Return in about 3 months (around 10/27/2022), or if symptoms worsen or fail to improve.    Please note that this dictation was created using voice recognition software. I have made every reasonable attempt to correct obvious errors, but I expect that there are errors of grammar and possibly content that I did not discover before finalizing the note.

## 2022-07-27 NOTE — LETTER
[employer]  Noreen Wong M.D.  [address]  [place] [address]  Fax: [phone]   Authorization for Release/Disclosure of   Protected Health Information   Name: MEHRAN GARCIA : 1955 SSN: [id number]   Address: [address] [place] [address]  [place] [address] Phone:    [phone] (home)    I authorize the entity listed below to release/disclose the PHI below to:   [employer]/Noreen Wong M.D. and Noreen Wong M.D.   Provider or Entity Name:  [employer]    Address   City, State, [facility]   Phone:      Fax:     Reason for request: continuity of care   Information to be released:    [  ] LAST COLONOSCOPY,  including any PATH REPORT and follow-up  [  ] LAST FIT/COLOGUARD RESULT [  ] LAST DEXA  [  ] LAST MAMMOGRAM  [  ] LAST PAP  [  ] LAST LABS [  ] RETINA EXAM REPORT  [  ] IMMUNIZATION RECORDS  [  ] Release all info      [  ] Check here and initial the line next to each item to release ALL health information INCLUDING  _____ Care and treatment for drug and / or alcohol abuse  _____ HIV testing, infection status, or AIDS  _____ Genetic Testing    DATES OF SERVICE OR TIME PERIOD TO BE DISCLOSED: _____________  I understand and acknowledge that:  * This Authorization may be revoked at any time by you in writing, except if your health information has already been used or disclosed.  * Your health information that will be used or disclosed as a result of you signing this authorization could be re-disclosed by the recipient. If this occurs, your re-disclosed health information may no longer be protected by State or Federal laws.  * You may refuse to sign this Authorization. Your refusal will not affect your ability to obtain treatment.  * This Authorization becomes effective upon signing and will  on (date) __________.      If no date is indicated, this Authorization will  one (1) year from the signature date.    Name: Mehran Edmonds  Geo    Signature:   Date:     7/27/2022       PLEASE FAX REQUESTED RECORDS BACK TO: [phone]

## 2022-07-27 NOTE — ASSESSMENT & PLAN NOTE
Newly diagnosed, uncontrolled.  Upon the discharge patient was started on glipizide 10 mg daily and metformin 500 mg twice daily.   I have recommended to decrease the dose of glipizide to 5 mg daily if patient starts experiencing symptoms of hypoglycemia and or fasting blood glucose will run below .  Referred to diabetic education endocrinology.  Advised on low carbohydrate diet.

## 2022-07-27 NOTE — LETTER
[employer] [facility]  Noreen Wong M.D.  [address]  [place] [address]  Fax: [phone]   Authorization for Release/Disclosure of   Protected Health Information   Name: MEHRAN GARCIA : 1955 SSN: [id number]   Address: [address] [place] [address]  [place] [address] Phone:    [phone] (home)    I authorize the entity listed below to release/disclose the PHI below to:   [facility]/Noreen Wong M.D. and Noreen Wogn M.D.   Provider or Entity Name:  Dr. Chaves    Address   City, [facility]   Phone:      Fax:     Reason for request: continuity of care   Information to be released:    [  ] LAST COLONOSCOPY,  including any PATH REPORT and follow-up  [  ] LAST FIT/COLOGUARD RESULT [  ] LAST DEXA  [  ] LAST MAMMOGRAM  [  ] LAST PAP  [  ] LAST LABS [  ] RETINA EXAM REPORT  [  ] IMMUNIZATION RECORDS  [  ] Release all info      [  ] Check here and initial the line next to each item to release ALL health information INCLUDING  _____ Care and treatment for drug and / or alcohol abuse  _____ HIV testing, infection status, or AIDS  _____ Genetic Testing    DATES OF SERVICE OR TIME PERIOD TO BE DISCLOSED: _____________  I understand and acknowledge that:  * This Authorization may be revoked at any time by you in writing, except if your health information has already been used or disclosed.  * Your health information that will be used or disclosed as a result of you signing this authorization could be re-disclosed by the recipient. If this occurs, your re-disclosed health information may no longer be protected by State or Federal laws.  * You may refuse to sign this Authorization. Your refusal will not affect your ability to obtain treatment.  * This Authorization becomes effective upon signing and will  on (date) __________.      If no date is indicated, this Authorization will  one (1) year from the signature date.    Name: Mehran Edmonds  Geo    Signature:   Date:     7/27/2022       PLEASE FAX REQUESTED RECORDS BACK TO: [phone]

## 2022-07-28 DIAGNOSIS — E11.9 TYPE 2 DIABETES MELLITUS WITHOUT COMPLICATION, WITHOUT LONG-TERM CURRENT USE OF INSULIN (HCC): ICD-10-CM

## 2022-07-28 LAB
CREAT UR-MCNC: 75.62 MG/DL
MICROALBUMIN UR-MCNC: <1.2 MG/DL
MICROALBUMIN/CREAT UR: NORMAL MG/G (ref 0–30)

## 2022-11-10 ENCOUNTER — PATIENT MESSAGE (OUTPATIENT)
Dept: HEALTH INFORMATION MANAGEMENT | Facility: OTHER | Age: 67
End: 2022-11-10

## 2023-02-03 ENCOUNTER — HOSPITAL ENCOUNTER (OUTPATIENT)
Facility: MEDICAL CENTER | Age: 68
End: 2023-02-03
Attending: PHYSICIAN ASSISTANT
Payer: MEDICARE

## 2023-02-03 LAB — PSA SERPL-MCNC: 15.3 NG/ML (ref 0–4)

## 2023-02-03 PROCEDURE — 84153 ASSAY OF PSA TOTAL: CPT

## 2023-03-10 DIAGNOSIS — I10 ESSENTIAL HYPERTENSION: Chronic | ICD-10-CM

## 2023-03-10 RX ORDER — LOSARTAN POTASSIUM 50 MG/1
50 TABLET ORAL DAILY
Qty: 90 TABLET | Refills: 2 | Status: SHIPPED | OUTPATIENT
Start: 2023-03-10

## 2023-03-10 NOTE — TELEPHONE ENCOUNTER
Received request via: Pharmacy    Was the patient seen in the last year in this department? Yes  7/27/2022  Does the patient have an active prescription (recently filled or refills available) for medication(s) requested? No    Does the patient have [facility] Plus and need 100 day supply (blood pressure, diabetes and cholesterol meds only)? Patient does not have SCP

## 2023-03-14 DIAGNOSIS — I10 ESSENTIAL HYPERTENSION: Chronic | ICD-10-CM

## 2023-03-14 RX ORDER — AMLODIPINE BESYLATE 5 MG/1
5 TABLET ORAL DAILY
Qty: 90 TABLET | Refills: 2 | Status: SHIPPED | OUTPATIENT
Start: 2023-03-14

## 2023-03-14 NOTE — TELEPHONE ENCOUNTER
Received request via: Pharmacy    Was the patient seen in the last year in this department? Yes    Does the patient have an active prescription (recently filled or refills available) for medication(s) requested? No    Does the patient have [facility] Plus and need 100 day supply (blood pressure, diabetes and cholesterol meds only)? Patient does not have SCP    Requested Prescriptions     Pending Prescriptions Disp Refills    amLODIPine (NORVASC) 5 MG Tab [Pharmacy Med Name: AMLODIPINE BESYLATE 5 MG TAB] 90 Tablet 2     Sig: TAKE 1 TABLET BY MOUTH EVERY DAY

## 2023-03-15 ENCOUNTER — APPOINTMENT (OUTPATIENT)
Dept: RADIOLOGY | Facility: MEDICAL CENTER | Age: 68
End: 2023-03-15
Attending: EMERGENCY MEDICINE
Payer: MEDICARE

## 2023-03-15 ENCOUNTER — HOSPITAL ENCOUNTER (EMERGENCY)
Facility: MEDICAL CENTER | Age: 68
End: 2023-03-15
Attending: EMERGENCY MEDICINE
Payer: MEDICARE

## 2023-03-15 VITALS
RESPIRATION RATE: 16 BRPM | OXYGEN SATURATION: 91 % | WEIGHT: 300.71 LBS | SYSTOLIC BLOOD PRESSURE: 152 MMHG | DIASTOLIC BLOOD PRESSURE: 73 MMHG | BODY MASS INDEX: 40.73 KG/M2 | TEMPERATURE: 95.6 F | HEIGHT: 72 IN | HEART RATE: 85 BPM

## 2023-03-15 DIAGNOSIS — R05.1 ACUTE COUGH: ICD-10-CM

## 2023-03-15 DIAGNOSIS — M25.562 ACUTE PAIN OF LEFT KNEE: ICD-10-CM

## 2023-03-15 DIAGNOSIS — S06.6X0A SUBARACHNOID HEMORRHAGE FOLLOWING INJURY, NO LOSS OF CONSCIOUSNESS, INITIAL ENCOUNTER (HCC): ICD-10-CM

## 2023-03-15 LAB
FLUAV RNA SPEC QL NAA+PROBE: NEGATIVE
FLUBV RNA SPEC QL NAA+PROBE: NEGATIVE
RSV RNA SPEC QL NAA+PROBE: NEGATIVE
SARS-COV-2 RNA RESP QL NAA+PROBE: NOTDETECTED
SPECIMEN SOURCE: NORMAL

## 2023-03-15 PROCEDURE — 0241U HCHG SARS-COV-2 COVID-19 NFCT DS RESP RNA 4 TRGT MIC: CPT

## 2023-03-15 PROCEDURE — 700102 HCHG RX REV CODE 250 W/ 637 OVERRIDE(OP): Performed by: EMERGENCY MEDICINE

## 2023-03-15 PROCEDURE — C9803 HOPD COVID-19 SPEC COLLECT: HCPCS | Performed by: EMERGENCY MEDICINE

## 2023-03-15 PROCEDURE — 73564 X-RAY EXAM KNEE 4 OR MORE: CPT | Mod: LT

## 2023-03-15 PROCEDURE — 99284 EMERGENCY DEPT VISIT MOD MDM: CPT

## 2023-03-15 PROCEDURE — A9270 NON-COVERED ITEM OR SERVICE: HCPCS | Performed by: EMERGENCY MEDICINE

## 2023-03-15 PROCEDURE — 71046 X-RAY EXAM CHEST 2 VIEWS: CPT

## 2023-03-15 PROCEDURE — 70450 CT HEAD/BRAIN W/O DYE: CPT

## 2023-03-15 RX ORDER — BENZONATATE 100 MG/1
100 CAPSULE ORAL 3 TIMES DAILY PRN
Qty: 30 CAPSULE | Refills: 0 | Status: SHIPPED | OUTPATIENT
Start: 2023-03-15 | End: 2023-03-25

## 2023-03-15 RX ORDER — LEVETIRACETAM 500 MG/1
500 TABLET ORAL 2 TIMES DAILY
Qty: 8 TABLET | Refills: 0 | Status: SHIPPED | OUTPATIENT
Start: 2023-03-15 | End: 2023-03-19

## 2023-03-15 RX ORDER — LEVETIRACETAM 500 MG/1
500 TABLET ORAL ONCE
Status: COMPLETED | OUTPATIENT
Start: 2023-03-15 | End: 2023-03-15

## 2023-03-15 RX ADMIN — LEVETIRACETAM 500 MG: 500 TABLET, FILM COATED ORAL at 07:55

## 2023-03-15 ASSESSMENT — FIBROSIS 4 INDEX: FIB4 SCORE: 4.09

## 2023-03-15 NOTE — ED TRIAGE NOTES
Pt into ER with  Chief Complaint   Patient presents with    Knee Pain     Pt into ER with L knee pain that started x2 days ago after a GLF that happened x4 days ago. Pt reports recently within the last 2 days he developed a cough and has been having coughing fits. Pt also reports a recent cortizone shot to his L knee x3 days ago. Pt denies sick contact.    Cough     BP (!) 172/78   Pulse (!) 108   Temp 36.4 °C (97.5 °F) (Temporal)   Resp 20   Ht 1.829 m (6')   Wt (!) 136 kg (300 lb 11.3 oz)   SpO2 94%   BMI 40.78 kg/m²

## 2023-03-15 NOTE — DISCHARGE INSTRUCTIONS
You were seen in the emergency department for a cough, knee pain, and a slip on ice while at work.  You were found to have a very small bleed inside of your head.  Please hold your aspirin for the next 10 days.  You are being prescribed several days of medication to prevent seizure.  Please take this as prescribed.    For pain, you may take acetaminophen (Tylenol) 1000 mg every 8 hours.  Please do not exceed 3000 mg in any 24-hour period.  Please note that cough medications such as NyQuil often contain acetaminophen.    Your chest x-ray shows no signs of pneumonia.  Your COVID-19 testing was reassuring, there is no signs of COVID-19, influenza, or RSV.  You likely have a different virus causing your cough.  This should improve over time.      You are being sent home with a cough medication to use as needed.  You may also use over-the-counter cough medications too.    For your knee pain, the x-ray shows no signs of fracture.  You do have some arthritis.  Please follow this up with your orthopedic provider.    Please return to the emergency department seek medical attention if you develop:  Seizure, progressive worsening headache, confusion, vomiting, difficulty breathing, any other new or concerning findings

## 2023-03-15 NOTE — ED PROVIDER NOTES
ED Provider Note        CHIEF COMPLAINT  Chief Complaint   Patient presents with    Knee Pain     Pt into ER with L knee pain that started x2 days ago after a GLF that happened x4 days ago. Pt reports recently within the last 2 days he developed a cough and has been having coughing fits. Pt also reports a recent cortizone shot to his L knee x3 days ago. Pt denies sick contact.    Cough         [facility]  LIMITATION TO HISTORY   Select: : None  Additional historian: Wife    Sharon Guardado is a 68 y.o. male who presents to the Emergency Department with 2 separate complaints.  He reports that approximate 4 days ago he was at work whether there was poor clearing of the ice.  He suffered a slip and fall, injuring his left knee, causing a scrape to his left foot, and striking his head.  He denies any loss of conscious but did have an episode of being dazed.  He takes aspirin.  He reports that since that time he has been having ongoing left knee pain, making it difficult for him to walk.  He did get a cortisone injection approximately 3 days ago in the affected knee.  He also reports for the past 2 days that he has been having a dry cough.  He denies any fevers, change in nasal congestion, abdominal pain, chest pain, shortness of breath.  He did have 1 episode of posttussive emesis.    Upon arrival later, wife reports that after the patient initiated his coughing, other family members developed a cough as well.  Reports that he has been behaving his normal state of mentation since the fall.    REVIEW OF SYSTEMS  See HPI for further details. All other systems are negative.     PAST MEDICAL HISTORY     Past Medical History:   Diagnosis Date    LE (acute kidney injury), prerenal (CMS-HCC) 1/20/2018    Alcohol withdrawal (HCC) 1/22/2018    Bowel habit changes     constipation    CAD (coronary artery disease)     Cancer ([facility])     melanoma skin cancer on his back    Cellulitis of back except buttock 8/3/2018    HTN  (hypertension)     Hyperglycemia 1/19/2018    Hypertension     Indigestion     Kidney stones     Near-syncope     worked up with neg results    Nerve compression     Tinnitus     pt states x 10 years and stopped tonight       SURGICAL HISTORY  Past Surgical History:   Procedure Laterality Date    GASTROSCOPY  3/29/2018    Procedure: GASTROSCOPY - W/POSS BIOPSY, DILATION, POLYPECTOMY, CONTROL OF HEMORRHAGE;  Surgeon: Rich Collins M.D.;  Location: [facility];  Service: EUS    EGD W/ENDOSCOPIC ULTRASOUND  3/29/2018    Procedure: EGD W/ENDOSCOPIC ULTRASOUND;  Surgeon: Rich Collins M.D.;  Location: SUR[facility];  Service: EUS    EGD WITH ASP/BX  3/29/2018    Procedure: EGD WITH ASP/BX - FNA;  Surgeon: Rich Collins M.D.;  Location: [facility];  Service: EUS       FAMILY HISTORY  History reviewed. No pertinent family history.    SOCIAL HISTORY    reports that he has never smoked. He has never used smokeless tobacco. He reports current alcohol use. He reports that he does not use drugs.    CURRENT MEDICATIONS  Home Medications       Reviewed by Vishal Lisa R.N. (Registered Nurse) on 03/15/23 at 0552  Med List Status: Not Addressed     Medication Last Dose Status   ALLOPURINOL 300 MG PO TABS  Active   amLODIPine (NORVASC) 5 MG Tab  Active   aspirin EC (ECOTRIN) 81 MG Tablet Delayed Response  Active   glucose blood strip  Active   losartan (COZAAR) 50 MG Tab  Active   PRILOSEC OTC PO  Active   SIMVASTATIN 40 MG PO TABS  Active   SPIRONOLACTONE 25 MG PO TABS  Active   Tadalafil (CIALIS PO)  Active   triamcinolone acetonide (KENALOG) 0.1 % Cream  Active   vitamin D (CHOLECALCIFEROL) 1000 UNIT Tab  Active                    ALLERGIES  Allergies   Allergen Reactions    Ativan     Vicodin [Hydrocodone-Acetaminophen] Rash     Rash, confusion         PHYSICAL EXAM  VITAL SIGNS: BP (!) 152/73   Pulse 85   Temp (!) 35.3 °C (95.6 °F) (Temporal)   Resp 16   Ht 1.829 m (6')   Wt (!)  136 kg (300 lb 11.3 oz)   SpO2 91%   BMI 40.78 kg/m²   Gen: Alert, oriented  HENT: No racoon eyes, septal hematoma, facial instability  Eye: EOMI, no chemosis, PERRL  Neck: trachea midline, no tenderness  Resp: no respiratory distress,  no chest wall tenderness or crepitus  CV: No JVD, RRR.  + peripheral pulses  Abd: soft, non-distended, non-tender. No ecchymosis  Back: no spinal tenderness or deformities  Ext: Mild swelling to left knee.  No erythema.  Able to actively range knee.  Pain but no laxity to valgus stress test.  Normal varus stress test.  Normal Lachman test.  Otherwise, no deformities, tenderness or edema.  Abrasion dorsum left foot  Psych: normal mood  Neuro: speech fluent, moves all extremities. GCS 15    DIAGNOSTIC STUDIES / PROCEDURES    LABS  Labs Reviewed   COV-2, FLU A/B, AND RSV BY PCR (CEPHEID)   COV-2, FLU A/B, AND RSV BY PCR (CEPHEID)         RADIOLOGY  I have independently interpreted the diagnostic imaging associated with this visit:  CT head: No subdural hematoma    DX-KNEE COMPLETE 4+ LEFT   Final Result         1.  No acute traumatic bony injury.   2.  Tricompartmental degenerative changes of the knee   3.  Atherosclerosis      DX-CHEST-2 VIEWS   Final Result         1.  No focal infiltrates.   2.  Atherosclerosis      CT-HEAD W/O   Final Result         1.  Subtle left frontal subarachnoid hemorrhages.   2.  Bilateral ethmoid and maxillary sinusitis changes   3.  Atherosclerosis.      Based solely on CT findings, the brain injury guideline category is mBIG 1.      SDH < 4mm   IPH < 4mm   SAH < 3 sulci and < 1mm      The original BIG retrospective analysis found radiographic progression in 0% of BIG 1 patients and 2.6% BIG 2.      These findings were discussed with the patient's clinician, Chino Sandra, on 3/15/2023 6:54 AM.          COURSE & MEDICAL DECISION MAKING  Pertinent Labs & Imaging studies were reviewed. (See chart for details)    ED Observation Status? Yes; I am placing the  patient in to an observation status due to a diagnostic uncertainty as well as therapeutic intensity. Patient placed in observation status at 6:54 AM, 3/15/2023.     Observation plan is as follows: Multiple consultations, follow-up results of remaining imaging    Upon Reevaluation, the patient's condition has: Improved; and will be discharged.    Patient discharged from ED Observation status at 0755 (Time) 3/15/23 (Date).     EXTERNAL RECORDS REVIEWED  Inpatient Notes most recent hospitalization 7/15/2022 for sepsis of unclear etiology with altered mental status likely metabolic encephalopathy      INITIAL ASSESSMENT AND PLAN  Care Narrative: Patient presents after a slip and fall while at work with pain to his left knee.  Will obtain x-ray to evaluate for occult fracture, however he appears to have stable ligaments.  Low suspicion for occult dislocation, complications of arterial insufficiency.  Given his head strike, on aspirin, greater than 65, there is concern for possible subdural hematoma, will obtain CT head.  Remainder of trauma evaluation is reassuring.  He does have a dry cough, will obtain x-ray to evaluate for possible walking pneumonia, test for COVID-19.  Patient has normal oxygen saturation, no increased work of breathing.  No indication for D-dimer or CTA chest at this time.    I was notified by radiology that the patient has subtle subarachnoid hemorrhage.  This was discussed with Dr. Kilgore, trauma surgery, who  recommends discussion with neurosurgery as this fits mBIG 3 criteria due to aspirin, and although this is a subacute presentation, she cannot clear the patient herself as she cannot evaluate the patient directly.     Case discussed with Dr. Vang, who is in agreement with my assessment that as this is a subacute presentation of the patient is already out of the initial window that he is likely stable for discharge home and does not need transfer to the trauma center.  She does recommend  holding the aspirin for 10 days, 4 days of Keppra to complete the 1 week of antiseizure prophylaxis.    Upon returning to the room, I discussed with the wife who provides additional history on the patient's cough that he has not been sleeping from it, and also brings up his alcohol use.  He is counseled on not using alcohol during the acute phase while he has his subarachnoid hemorrhage, however monitor for signs of alcohol withdrawal.  The patient and wife were agreeable with the plan for Keppra, holding aspirin.  The subarachnoid component of his presentation as well as the pain in his knee appear to be related to his work injury.  The cough does not, likely viral etiology.    Chest x-ray on my review as well as the radiology review demonstrates no signs of pneumonia.  Testing for COVID-19, influenza, RSV negative.  Likely alternate viral etiology.  The wife did note that after his cough began, other family members began having a similar cough.  X-ray demonstrates no signs of lung tumors at this time.  No obvious sinus congestion for postnasal drip.  No significant history suggesting GERD as etiology for his acute cough.    Regarding the patient's knee pain, he does have evidence of arthritis on x-ray, no signs of fracture.  His ligaments are stable.  He likely has an acute worsening of his pain due to his workplace fall on top of the setting of pre-existing arthritis.        ADDITIONAL PROBLEM LIST AND DISPOSITION  1.  Fall: Work-related due to slip on ice  2.  subarachnoid hemorrhage: New problem, unclear prognosis.  Complicated by aspirin use.  Hold aspirin for 10 days, p.o. Keppra, return for worsening symptoms.  3.  Knee pain: Acute on chronic Tylenol, diclofenac gel, orthopedic follow-up with the patient's established orthopedist  4.  Cough: Acute.  No signs of pneumonia, low suspicion for PE.  Likely viral etiology.  Conservative management, cough suppressant medications  5.  Obesity: Likely contributing to  patient's knee pain      I have discussed management of the patient with the following physicians and JOSEPH's: Dr. Monsivais, radiology; Dr. Kilgore, trauma surgery; Dr. Vang, neurosurgery    Discussion of management with other [facility] or appropriate source(s): : We do not run TEG tests at [facility].     Escalation of care considered, and ultimately not performed: blood analysis and diagnostic imaging.       Decision tools and prescription drugs considered including, but not limited to: Antibiotics considered, however no evidence of pneumonia, I do not believe this would benefit the patient's viral cough .    HTN/IDDM FOLLOW UP:  The patient is referred to a primary physician for blood pressure management, diabetic screening, and for all other preventive health concerns    The patient was given return precautions, anticipatory guidance, and the opportunity to ask questions prior to discharge.         FINAL IMPRESSION  1. Subarachnoid hemorrhage following injury, no loss of consciousness, initial encounter ([facility])    2. Acute pain of left knee    3. Acute cough           DISPOSITION:  Patient will be discharged home in stable condition.    FOLLOW UP:  Noreen Wong M.D.  [address]  [address]  [facility] [id number] [phone]    Schedule an appointment as soon as possible for a visit       [facility], Emergency Dept  [address]  [facility] [id number] [phone]    If symptoms worsen    Tram Vang M.D.  [address]  [facility] [id number] [phone]    Schedule an appointment as soon as possible for a visit   As needed      OUTPATIENT MEDICATIONS:  Discharge Medication List as of 3/15/2023  7:58 AM        START taking these medications    Details   diclofenac sodium (VOLTAREN) 1 % Gel Apply 2 g topically 4 times a day as needed (knee pain)., Disp-150 g, R-0, Normal      levETIRAcetam (KEPPRA) 500 MG Tab Take 1 Tablet by mouth 2 times a day for 4  days., Disp-8 Tablet, R-0, Normal      benzonatate (TESSALON PERLES) 100 MG Cap Take 1 Capsule by mouth 3 times a day as needed for Cough for up to 10 days., Disp-30 Capsule, R-0, Normal

## 2023-03-19 ENCOUNTER — HOSPITAL ENCOUNTER (EMERGENCY)
Facility: MEDICAL CENTER | Age: 68
End: 2023-03-19
Attending: EMERGENCY MEDICINE
Payer: COMMERCIAL

## 2023-03-19 ENCOUNTER — APPOINTMENT (OUTPATIENT)
Dept: RADIOLOGY | Facility: MEDICAL CENTER | Age: 68
End: 2023-03-19
Attending: EMERGENCY MEDICINE
Payer: COMMERCIAL

## 2023-03-19 VITALS
BODY MASS INDEX: 42.93 KG/M2 | TEMPERATURE: 97.5 F | RESPIRATION RATE: 16 BRPM | OXYGEN SATURATION: 95 % | WEIGHT: 306.66 LBS | DIASTOLIC BLOOD PRESSURE: 69 MMHG | SYSTOLIC BLOOD PRESSURE: 146 MMHG | HEART RATE: 69 BPM | HEIGHT: 71 IN

## 2023-03-19 DIAGNOSIS — R05.9 COUGH, UNSPECIFIED TYPE: ICD-10-CM

## 2023-03-19 DIAGNOSIS — T14.8XXA INFECTED ABRASION: ICD-10-CM

## 2023-03-19 DIAGNOSIS — M79.89 LEFT LEG SWELLING: ICD-10-CM

## 2023-03-19 DIAGNOSIS — S06.6XAD TRAUMATIC SUBARACHNOID HEMORRHAGE WITH UNKNOWN LOSS OF CONSCIOUSNESS STATUS, SUBSEQUENT ENCOUNTER: ICD-10-CM

## 2023-03-19 DIAGNOSIS — L08.9 INFECTED ABRASION: ICD-10-CM

## 2023-03-19 DIAGNOSIS — R73.9 HYPERGLYCEMIA: ICD-10-CM

## 2023-03-19 DIAGNOSIS — S93.602A SPRAIN OF LEFT FOOT, INITIAL ENCOUNTER: ICD-10-CM

## 2023-03-19 LAB
ALBUMIN SERPL BCP-MCNC: 3.7 G/DL (ref 3.2–4.9)
ALBUMIN/GLOB SERPL: 1.3 G/DL
ALP SERPL-CCNC: 169 U/L (ref 30–99)
ALT SERPL-CCNC: 24 U/L (ref 2–50)
ANION GAP SERPL CALC-SCNC: 12 MMOL/L (ref 7–16)
AST SERPL-CCNC: 33 U/L (ref 12–45)
B-OH-BUTYR SERPL-MCNC: 0.07 MMOL/L (ref 0.02–0.27)
BASOPHILS # BLD AUTO: 0.2 % (ref 0–1.8)
BASOPHILS # BLD: 0.02 K/UL (ref 0–0.12)
BILIRUB SERPL-MCNC: 0.5 MG/DL (ref 0.1–1.5)
BUN SERPL-MCNC: 22 MG/DL (ref 8–22)
CALCIUM ALBUM COR SERPL-MCNC: 9.5 MG/DL (ref 8.5–10.5)
CALCIUM SERPL-MCNC: 9.3 MG/DL (ref 8.4–10.2)
CHLORIDE SERPL-SCNC: 103 MMOL/L (ref 96–112)
CO2 SERPL-SCNC: 24 MMOL/L (ref 20–33)
CREAT SERPL-MCNC: 0.84 MG/DL (ref 0.5–1.4)
EOSINOPHIL # BLD AUTO: 0.15 K/UL (ref 0–0.51)
EOSINOPHIL NFR BLD: 1.7 % (ref 0–6.9)
ERYTHROCYTE [DISTWIDTH] IN BLOOD BY AUTOMATED COUNT: 42.5 FL (ref 35.9–50)
GFR SERPLBLD CREATININE-BSD FMLA CKD-EPI: 94 ML/MIN/1.73 M 2
GLOBULIN SER CALC-MCNC: 2.8 G/DL (ref 1.9–3.5)
GLUCOSE SERPL-MCNC: 143 MG/DL (ref 65–99)
HCT VFR BLD AUTO: 42.1 % (ref 42–52)
HGB BLD-MCNC: 14.8 G/DL (ref 14–18)
IMM GRANULOCYTES # BLD AUTO: 0.16 K/UL (ref 0–0.11)
IMM GRANULOCYTES NFR BLD AUTO: 1.8 % (ref 0–0.9)
LYMPHOCYTES # BLD AUTO: 1.99 K/UL (ref 1–4.8)
LYMPHOCYTES NFR BLD: 23 % (ref 22–41)
MCH RBC QN AUTO: 30.8 PG (ref 27–33)
MCHC RBC AUTO-ENTMCNC: 35.2 G/DL (ref 33.7–35.3)
MCV RBC AUTO: 87.7 FL (ref 81.4–97.8)
MONOCYTES # BLD AUTO: 0.9 K/UL (ref 0–0.85)
MONOCYTES NFR BLD AUTO: 10.4 % (ref 0–13.4)
NEUTROPHILS # BLD AUTO: 5.44 K/UL (ref 1.82–7.42)
NEUTROPHILS NFR BLD: 62.9 % (ref 44–72)
NRBC # BLD AUTO: 0 K/UL
NRBC BLD-RTO: 0 /100 WBC
PLATELET # BLD AUTO: 169 K/UL (ref 164–446)
PMV BLD AUTO: 9.8 FL (ref 9–12.9)
POTASSIUM SERPL-SCNC: 4.4 MMOL/L (ref 3.6–5.5)
PROT SERPL-MCNC: 6.5 G/DL (ref 6–8.2)
RBC # BLD AUTO: 4.8 M/UL (ref 4.7–6.1)
SODIUM SERPL-SCNC: 139 MMOL/L (ref 135–145)
WBC # BLD AUTO: 8.7 K/UL (ref 4.8–10.8)

## 2023-03-19 PROCEDURE — 82010 KETONE BODYS QUAN: CPT

## 2023-03-19 PROCEDURE — 80053 COMPREHEN METABOLIC PANEL: CPT

## 2023-03-19 PROCEDURE — 700111 HCHG RX REV CODE 636 W/ 250 OVERRIDE (IP): Performed by: EMERGENCY MEDICINE

## 2023-03-19 PROCEDURE — 71045 X-RAY EXAM CHEST 1 VIEW: CPT

## 2023-03-19 PROCEDURE — 70450 CT HEAD/BRAIN W/O DYE: CPT

## 2023-03-19 PROCEDURE — 93971 EXTREMITY STUDY: CPT | Mod: LT

## 2023-03-19 PROCEDURE — 99285 EMERGENCY DEPT VISIT HI MDM: CPT

## 2023-03-19 PROCEDURE — 96365 THER/PROPH/DIAG IV INF INIT: CPT

## 2023-03-19 PROCEDURE — 85025 COMPLETE CBC W/AUTO DIFF WBC: CPT

## 2023-03-19 PROCEDURE — 700105 HCHG RX REV CODE 258: Performed by: EMERGENCY MEDICINE

## 2023-03-19 PROCEDURE — 73630 X-RAY EXAM OF FOOT: CPT | Mod: LT

## 2023-03-19 PROCEDURE — 36415 COLL VENOUS BLD VENIPUNCTURE: CPT

## 2023-03-19 RX ORDER — AMOXICILLIN AND CLAVULANATE POTASSIUM 875; 125 MG/1; MG/1
1 TABLET, FILM COATED ORAL 2 TIMES DAILY
Qty: 14 TABLET | Refills: 0 | Status: SHIPPED | OUTPATIENT
Start: 2023-03-19 | End: 2023-03-26

## 2023-03-19 RX ORDER — GLIPIZIDE 10 MG/1
10 TABLET, FILM COATED, EXTENDED RELEASE ORAL DAILY
Status: SHIPPED | COMMUNITY
End: 2023-05-15 | Stop reason: SDUPTHER

## 2023-03-19 RX ORDER — NAPROXEN 500 MG/1
500 TABLET ORAL 2 TIMES DAILY PRN
Status: ON HOLD | COMMUNITY
End: 2023-10-30

## 2023-03-19 RX ADMIN — AMPICILLIN AND SULBACTAM 3 G: 1; 2 INJECTION, POWDER, FOR SOLUTION INTRAMUSCULAR; INTRAVENOUS at 10:32

## 2023-03-19 ASSESSMENT — FIBROSIS 4 INDEX: FIB4 SCORE: 4.09

## 2023-03-19 NOTE — ED PROVIDER NOTES
"ED Provider Note    CHIEF COMPLAINT  Chief Complaint   Patient presents with    Head Injury     C/o ongoing head pain from fall sustained last week, pt reports \"minor bleed from fall\"    Cough     persistant    Foot Pain     L side foot pain from fall, c/o swelling       EXTERNAL RECORDS REVIEWED  Inpatient Notes discharge summary from July 2022 for altered mental status, weakness concerning for stroke    HPI/ROS  LIMITATION TO HISTORY   Select: : None  OUTSIDE HISTORIAN(S):  Significant other patient's wife at the bedside discussing his symptoms, timing and onset.    Sharon Guardado is a 68 y.o. male who presents for evaluation of ongoing headache, multiple other conditions.  Patient states he fell 1 week ago slipping on the ice striking his head on the ground.  Work-up at the time with CT scan of the head positive for traumatic subarachnoid hemorrhage, evaluated in the emergency department and ultimately released after going through head injury protocol.  Patient has been holding his aspirin over the last week.  Patient describes his headache as frontal, now intermittent where previously described as constant.  He states the severity of the headache is unchanged over the course of the last 4 days since his last evaluation.  Other complaints are left leg injury, negative x-ray noted of his left knee.  Today patient states in addition to the left knee still hurting, his left foot and ankle are painful, swollen.  Review of chart shows no x-ray of the area.  Next complaint, blood sugars have been running higher than usual, usually at 150, today was 210, his wife was expressing concern regarding this.  Next complaint is cough.  Patient states had a cough for 2 to 3 weeks, tested negative for COVID-19 last week, persistent cough this week.  No fever.  No chest pain.  No shortness of breath.  Patient has noticed his left leg swollen compared to the right, denies history of DVT.  Next complaint, abrasion to the " dorsum of the left foot has developed a reddish penumbra, both patient and wife state they are concerned given his history of diabetes at the wound has become infected.  He has developed 2 thin-walled blisters to his left second toe, he states this is secondary to the boots he wears rubbing the skin.    Patient states the fall with head injury and leg pain as well as abrasion that appears to be getting infected are related to an injury he had while at work.    PAST MEDICAL HISTORY   has a past medical history of LE (acute kidney injury), prerenal (CMS-HCC) (1/20/2018), Alcohol withdrawal (HCC) (1/22/2018), Bowel habit changes, CAD (coronary artery disease), Cancer (HCC), Cellulitis of back except buttock (8/3/2018), HTN (hypertension), Hyperglycemia (1/19/2018), Hypertension, Indigestion, Kidney stones, Near-syncope, Nerve compression, and Tinnitus.    SURGICAL HISTORY   has a past surgical history that includes gastroscopy (3/29/2018); egd w/endoscopic ultrasound (3/29/2018); and egd with asp/bx (3/29/2018).    FAMILY HISTORY  History reviewed. No pertinent family history.    SOCIAL HISTORY  Social History     Tobacco Use    Smoking status: Never    Smokeless tobacco: Never   Vaping Use    Vaping Use: Never used   Substance and Sexual Activity    Alcohol use: Yes     Comment: i drink per night    Drug use: No    Sexual activity: Not on file       CURRENT MEDICATIONS  Home Medications       Reviewed by Maria G Cotter R.N. (Registered Nurse) on 03/19/23 at 0853  Med List Status: Not Addressed     Medication Last Dose Status   ALLOPURINOL 300 MG PO TABS  Active   amLODIPine (NORVASC) 5 MG Tab  Active   benzonatate (TESSALON PERLES) 100 MG Cap  Active   diclofenac sodium (VOLTAREN) 1 % Gel  Active   glucose blood strip  Active   levETIRAcetam (KEPPRA) 500 MG Tab  Active   losartan (COZAAR) 50 MG Tab  Active   PRILOSEC OTC PO  Active   SIMVASTATIN 40 MG PO TABS  Active   SPIRONOLACTONE 25 MG PO TABS  Active  "  Tadalafil (CIALIS PO)  Active   triamcinolone acetonide (KENALOG) 0.1 % Cream  Active   vitamin D (CHOLECALCIFEROL) 1000 UNIT Tab  Active                    ALLERGIES  Allergies   Allergen Reactions    Ativan     Vicodin [Hydrocodone-Acetaminophen] Rash     Rash, confusion         PHYSICAL EXAM  VITAL SIGNS: BP (!) 168/94   Pulse 76   Temp 36.3 °C (97.3 °F) (Temporal)   Resp 16   Ht 1.803 m (5' 11\")   Wt (!) 139 kg (306 lb 10.6 oz)   SpO2 96%   BMI 42.77 kg/m²    Constitutional: Well-nourished in no distress  Respiratory: Clear lung sounds  Cardiac: Regular rate, regular rhythm  GI: Abdomen is soft and nontender, mildly protuberant  Skin: Left leg edema greater than right.  2 mm thin-walled clear fluid blister dorsum left toe.  1 cm of proximal dorsal foot abrasion with surrounding erythematous change, no proximal lymphangitis.  Neurologic: Strength intact, speech clear, patient is alert and cooperative  ENT: No facial trauma    DIAGNOSTIC STUDIES / PROCEDURES      LABS  Results for orders placed or performed during the hospital encounter of 03/19/23   CBC WITH DIFFERENTIAL   Result Value Ref Range    WBC 8.7 4.8 - 10.8 K/uL    RBC 4.80 4.70 - 6.10 M/uL    Hemoglobin 14.8 14.0 - 18.0 g/dL    Hematocrit 42.1 42.0 - 52.0 %    MCV 87.7 81.4 - 97.8 fL    MCH 30.8 27.0 - 33.0 pg    MCHC 35.2 33.7 - 35.3 g/dL    RDW 42.5 35.9 - 50.0 fL    Platelet Count 169 164 - 446 K/uL    MPV 9.8 9.0 - 12.9 fL    Neutrophils-Polys 62.90 44.00 - 72.00 %    Lymphocytes 23.00 22.00 - 41.00 %    Monocytes 10.40 0.00 - 13.40 %    Eosinophils 1.70 0.00 - 6.90 %    Basophils 0.20 0.00 - 1.80 %    Immature Granulocytes 1.80 (H) 0.00 - 0.90 %    Nucleated RBC 0.00 /100 WBC    Neutrophils (Absolute) 5.44 1.82 - 7.42 K/uL    Lymphs (Absolute) 1.99 1.00 - 4.80 K/uL    Monos (Absolute) 0.90 (H) 0.00 - 0.85 K/uL    Eos (Absolute) 0.15 0.00 - 0.51 K/uL    Baso (Absolute) 0.02 0.00 - 0.12 K/uL    Immature Granulocytes (abs) 0.16 (H) 0.00 - " 0.11 K/uL    NRBC (Absolute) 0.00 K/uL   COMP METABOLIC PANEL   Result Value Ref Range    Sodium 139 135 - 145 mmol/L    Potassium 4.4 3.6 - 5.5 mmol/L    Chloride 103 96 - 112 mmol/L    Co2 24 20 - 33 mmol/L    Anion Gap 12.0 7.0 - 16.0    Glucose 143 (H) 65 - 99 mg/dL    Bun 22 8 - 22 mg/dL    Creatinine 0.84 0.50 - 1.40 mg/dL    Calcium 9.3 8.4 - 10.2 mg/dL    AST(SGOT) 33 12 - 45 U/L    ALT(SGPT) 24 2 - 50 U/L    Alkaline Phosphatase 169 (H) 30 - 99 U/L    Total Bilirubin 0.5 0.1 - 1.5 mg/dL    Albumin 3.7 3.2 - 4.9 g/dL    Total Protein 6.5 6.0 - 8.2 g/dL    Globulin 2.8 1.9 - 3.5 g/dL    A-G Ratio 1.3 g/dL   BETA-HYDROXYBUTYRIC ACID   Result Value Ref Range    beta-Hydroxybutyric Acid 0.07 0.02 - 0.27 mmol/L   CORRECTED CALCIUM   Result Value Ref Range    Correct Calcium 9.5 8.5 - 10.5 mg/dL   ESTIMATED GFR   Result Value Ref Range    GFR (CKD-EPI) 94 >60 mL/min/1.73 m 2         RADIOLOGY  I have independently interpreted the diagnostic imaging associated with this visit and am waiting the final reading from the radiologist.   My preliminary interpretation is as follows: CT scan of the head shows residual subarachnoid hemorrhage, no subdural hematoma  Radiologist interpretation:   CT-HEAD W/O   Final Result      No noncontrast CT evidence of new intracranial hemorrhage.      Left frontal vertex subarachnoid hemorrhage small volume is stable and there is no hydrocephalus.      Moderate white matter hypodensity is present.  This is a nonspecific finding which usually is found to represent chronic microvascular disease in patient's of this demographic.  Demyelination, age indeterminant ischemia and gliosis are also common    possibilities.         DX-FOOT-COMPLETE 3+ LEFT   Final Result      No radiographic evidence of subacute traumatic injury or osteomyelitis      Forefoot soft tissue swelling is nonspecific and could indicate cellulitis or bland edema      Chronic plantar fasciitis, Achilles enthesopathy       DX-CHEST-PORTABLE (1 VIEW)   Final Result      No radiographic evidence of acute cardiopulmonary process.      US-EXTREMITY VENOUS LOWER UNILAT LEFT   Final Result      No deep venous thrombosis.      Subcutaneous fat edema            COURSE & MEDICAL DECISION MAKING    ED Observation Status? Yes; I am placing the patient in to an observation status due to a diagnostic uncertainty as well as therapeutic intensity. Patient placed in observation status at 9:20 AM, 3/19/2023.     Observation plan is as follows: Serial exam, reassessment of neurologic status in the setting of ongoing headaches with history of traumatic subarachnoid hemorrhage    Upon Reevaluation, the patient's condition has: Improved; and will be discharged.    Patient discharged from ED Observation status at 11:20 AM (Time) March 19, 2023 (Date).     INITIAL ASSESSMENT, COURSE AND PLAN  Care Narrative: Patient presents with multiple complaints.  His cough has been now present for 2 to 3 weeks, negative chest x-ray, suspect viral in etiology.  His pulse oximetry is good, no respiratory distress.  Patient's left leg is swollen after injury, differential diagnosis including occult fracture, new DVT, sprain with associated swelling.  Left foot was x-rayed secondary to symptoms, this was negative for fracture.  There is a small infected abrasion there with his history of diabetes, he will be prescribed antibiotics, Augmentin.  Patient received IV Unasyn in the emergency department.  I was concerned the swelling may be related to development of a DVT following extremity injury, ultrasound obtained was negative.  With ongoing headaches, CT scan of the head is obtained to rule out worsening bleed, this was a negative study.  Patient dates he is again confirming that this was a work-related injury.  I have filed the C4 form, excepting the cough and high blood sugar this appears to be related to his work injury.  Patient had reported blood sugar of 210 at  home, repeat here shows 143 which the patient states is his usual level, no further intervention at this time.  There is no evidence of diabetic ketoacidosis at this time  HTN/IDDM FOLLOW UP:  The patient has known diabetes and is being followed by their primary care doctor      ADDITIONAL PROBLEM LIST  Left leg injury from fall: X-rays obtained of left foot, previous x-ray left knee are both negative.  Swelling also negative for DVT.     Head injury with subarachnoid hemorrhage: Bleed unchanged on CT scan, ongoing headaches likely secondary to head injury.  Patient will follow-up with work comp    Infected abrasion left foot.  Abrasion from his fall, he is at risk for soft tissue infection with his diabetes, prescribed Augmentin for treatment, given dose of IV Unasyn in the emergency department to start therapy.    Hyperglycemia: Blood sugar 143, this is his usual level of blood sugar according the patient, no evidence of DKA.  No further intervention at this time, patient encouraged to continue to take his diabetic medication    Cough: Suspect viral.  Chest x-ray negative for pneumonia.  Patient tested negative for COVID and influenza on his last visit      DISPOSITION AND DISCUSSIONS    Discussion of management with other [facility] or appropriate source(s): Case Management discussed patient's work comp related injury       Escalation of care considered, and ultimately not performed:acute inpatient care management, however at this time, the patient is most appropriate for outpatient management      Decision tools and prescription drugs considered including, but not limited to: Pain Medications not prescribed, advised use of over-the-counter Tylenol for pain control if needed .    FINAL DIAGNOSIS  1. Sprain of left foot, initial encounter    2. Infected abrasion    3. Cough, unspecified type    4. Left leg swelling    5. Traumatic subarachnoid hemorrhage with unknown loss of consciousness status, subsequent encounter     6. Hyperglycemia           Electronically signed by: Anders Rojas M.D., 3/19/2023 9:20 AM

## 2023-03-19 NOTE — LETTER
"  FORM C-4:  EMPLOYEE’S CLAIM FOR COMPENSATION/ REPORT OF INITIAL TREATMENT  EMPLOYEE’S CLAIM - PROVIDE ALL INFORMATION REQUESTED   First Name Sharon Last Name Geo Birthdate 1955  Sex male Claim Number   Home Address [address]             Zip [address]                                   Age  68 y.o. 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 Be specific and answer in detail. Use additional sheet if necessary)  Slip & fell   If you believe that you have an occupational disease, when did you first have knowledge of the disability and it relationship to your employment? N/A Witnesses to the Accident  None   Nature of Injury or Occupational Disease  Workers' Compensation Part(s) of Body Injured or Affected  Skull, Foot (L), Ankle (L)    I CERTIFY THAT THE ABOVE IS TRUE AND CORRECT TO THE BEST OF MY KNOWLEDGE AND THAT I HAVE PROVIDED THIS INFORMATION IN ORDER TO OBTAIN THE BENEFITS OF [place] OR [place]).  I HEREBY AUTHORIZE ANY PHYSICIAN, CHIROPRACTOR, SURGEON, " PRACTITIONER, OR OTHER PERSON, ANY HOSPITAL, INCLUDING [facility], ANY MEDICAL SERVICE ORGANIZATION, ANY INSURANCE COMPANY, OR OTHER INSTITUTION OR ORGANIZATION TO RELEASE TO EACH OTHER, ANY MEDICAL OR OTHER INFORMATION, INCLUDING BENEFITS PAID OR PAYABLE, PERTINENT TO THIS INJURY OR DISEASE, EXCEPT INFORMATION RELATIVE TO DIAGNOSIS, TREATMENT AND/OR COUNSELING FOR AIDS, PSYCHOLOGICAL CONDITIONS, ALCOHOL OR CONTROLLED SUBSTANCES, FOR WHICH I MUST GIVE SPECIFIC AUTHORIZATION.  A PHOTOSTAT OF THIS AUTHORIZATION SHALL BE AS VALID AS THE ORIGINAL.  Date: 03/19/2023                                    Place: [facility] [place]                   Employee’s Signature:   THIS REPORT MUST BE COMPLETED AND MAILED WITHIN 3 WORKING DAYS OF TREATMENT   Place [facility], EMERGENCY DEPT                       Name of Facility [facility]   Date  3/19/2023 Diagnosis  (S93.602A) Sprain of left foot, initial encounter  (T14.8XXA,  L08.9) Infected abrasion  (R05.9) Cough, unspecified type  (M79.89) Left leg swelling  (S06.6XAD) Traumatic subarachnoid hemorrhage with unknown loss of consciousness status, subsequent encounter  (R73.9) Hyperglycemia Is there evidence the injured employee was under the influence of alcohol and/or another controlled substance at the time of accident?   Hour  11:28 AM Description of Injury or Disease  Sprain of left foot, initial encounter  Infected abrasion  Cough, unspecified type  Left leg swelling  Traumatic subarachnoid hemorrhage with unknown loss of consciousness status, subsequent encounter  Hyperglycemia No   Treatment  Patient being treated with oral antibiotics for infected abrasion from injury from the fall.  Have you advised the patient to remain off work five days or more?         Yes   X-Ray Findings  Positive  Comments:CT head with small area of traumatic subarachnoid hemorrhage.  Negative x-rays of left  "knee and left foot If Yes   From Date  3/19/2023 To Date  3/25/2023   From information given by the employee, together with medical evidence, can you directly connect this injury or occupational disease as job incurred? Yes  Comments:Left leg injury and head injury with subarachnoid hemorrhage are related to the job injury.  Infected left foot abrasion also likely related to his work injury If No, is employee capable of: Full Duty  No Modified Duty  No   Is additional medical care by a physician indicated? Yes  Comments:Follow-up at work comp provider for return to work instructions. If Modified Duty, Specify any Limitations / Restrictions       Do you know of any previous injury or disease contributing to this condition or occupational disease? Yes  Comments:Patient with history of type 2 diabetes, risk for soft tissue infection    Date 3/19/2023 Print Doctor’s Name Anders Rojas certify the employer’s copy of this form was mailed on:   Address [address]  [place] [address]  [phone] INSURER’S USE ONLY   Provider’s Tax ID Number [id number] Telephone Dept: [phone]    Doctor’s Signature e-ANDERS Gaming M.D. Degree MD      Form C-4 (rev.10/07)                                                                         BRIEF DESCRIPTION OF RIGHTS AND BENEFITS  (Pursuant to [place] 616C.050)    Notice of Injury or Occupational Disease (Incident Report Form C-1): If an injury or occupational disease (OD) arises out of and in the course of employment, you must provide written notice to your employer as soon as practicable, but no later than 7 days after the accident or OD. Your employer shall maintain a sufficient supply of the required forms.    Claim for Compensation (Form C-4): If medical treatment is sought, the form C-4 is available at the place of initial treatment. A completed \"Claim for Compensation\" (Form C-4) must be filed within 90 days after an accident or OD. The treating physician " or chiropractor must, within 3 working days after treatment, complete and mail to the employer, the employer's insurer and third-party , the Claim for Compensation.    Medical Treatment: If you require medical treatment for your on-the-job injury or OD, you may be required to select a physician or chiropractor from a list provided by your workers’ compensation insurer, if it has contracted with an Organization for Managed Care (MCO) or Preferred Provider Organization (PPO) or providers of health care. If your employer has not entered into a contract with an MCO or PPO, you may select a physician or chiropractor from the Panel of Physicians and Chiropractors. Any medical costs related to your industrial injury or OD will be paid by your insurer.    Temporary Total Disability (TTD): If your doctor has certified that you are unable to work for a period of at least 5 consecutive days, or 5 cumulative days in a 20-day period, or places restrictions on you that your employer does not accommodate, you may be entitled to TTD compensation.    Temporary Partial Disability (TPD): If the wage you receive upon reemployment is less than the compensation for TTD to which you are entitled, the insurer may be required to pay you TPD compensation to make up the difference. TPD can only be paid for a maximum of 24 months.    Permanent Partial Disability (PPD): When your medical condition is stable and there is an indication of a PPD as a result of your injury or OD, within 30 days, your insurer must arrange for an evaluation by a rating physician or chiropractor to determine the degree of your PPD. The amount of your PPD award depends on the date of injury, the results of the PPD evaluation, your age and wage.    Permanent Total Disability (PTD): If you are medically certified by a treating physician or chiropractor as permanently and totally disabled and have been granted a PTD status by your insurer, you are entitled to  receive monthly benefits not to exceed 66 2/3% of your average monthly wage. The amount of your PTD payments is subject to reduction if you previously received a lump-sum PPD award.    Vocational Rehabilitation Services: You may be eligible for vocational rehabilitation services if you are unable to return to the job due to a permanent physical impairment or permanent restrictions as a result of your injury or occupational disease.    Transportation and Per Emmy Reimbursement: You may be eligible for travel expenses and per emmy associated with medical treatment.    Reopening: You may be able to reopen your claim if your condition worsens after claim closure.     Appeal Process: If you disagree with a written determination issued by the insurer or the insurer does not respond to your request, you may appeal to the Department of Administration, , by following the instructions contained in your determination letter. You must appeal the determination within 70 days from the date of the determination letter at [address], [address], [place], [place] [address], or [address], [place] [address]. If you disagree with the  decision, you may appeal to the Department of Administration, . You must file your appeal within 30 days from the date of the  decision letter at [address], [address], [place], [place] [address], or [address], [address], [place] [address]. If you disagree with a decision of an , you may file a petition for judicial review with the District Court. You must do so within 30 days of the Appeal Officer’s decision. You may be represented by an  at your own expense or you may contact the [facility] for possible representation.    [place]  for Injured Workers (NAIW): If you disagree with a  decision, you may request that NAIW represent you without  charge at an  Hearing. For information regarding denial of benefits, you may contact the [facility] at: [address], [address], [place] [address], [phone], or [address], [address], [place] [address], [phone]    To File a Complaint with the Division: If you wish to file a complaint with the  of the Division of Industrial Relations (DIR),  please contact the Workers’ Compensation Section, [address], [address], [place], [place] [address], telephone [phone], or [address], [address], [place], [place] [address], telephone [phone].    For assistance with Workers’ Compensation Issues: You may contact the [facility] Office for Consumer Health Assistance, [address], [address], [place], [place] [address], Toll Free 1-405.800.4931, Web site: http://[facility].[place].gov/Programs/MASHA E-mail: masha@[facility].nv.gov  D-2 (rev. 10/20)              __________________________________________________________________                                    _________________            Employee Name / Signature                                                                                                                            Date

## 2023-03-19 NOTE — ED TRIAGE NOTES
"Chief Complaint   Patient presents with    Head Injury     C/o ongoing head pain from fall sustained last week, pt reports \"minor bleed from fall\"    Cough     persistant    Foot Pain     L side foot pain from fall, c/o swelling     BP (!) 168/94   Pulse 76   Temp 36.3 °C (97.3 °F) (Temporal)   Resp 16   Ht 1.803 m (5' 11\")   Wt (!) 139 kg (306 lb 10.6 oz)   SpO2 96%   BMI 42.77 kg/m²     Pt BIB family for above concern  "

## 2023-03-19 NOTE — Clinical Note
Sharon Guardado was seen and treated in our emergency department on 3/19/2023.  He may return to work on 03/26/2023.       If you have any questions or concerns, please don't hesitate to call.      Anders Rojas M.D.

## 2023-03-19 NOTE — PROGRESS NOTES
Med rec updated and complete, per pt   Allergies reviewed, per pt  Interviewed pt with wife at bedside with permission from pt.  Pt had some RX bottles at bedside, went over RX bottles and returned RX bottles back to pt at bedside.  Pt reports that he has not used VOLTAREN or DICLOFENAC gel.

## 2023-03-19 NOTE — ED NOTES
Pt c/o swelling in bilateral lower extremities from feet up into calfs, mild pitting edema; small abrasion wound on L side top of foot; pt is a diabetic type 2

## 2023-03-19 NOTE — DISCHARGE INSTRUCTIONS
Please see your doctor for recheck next week, return if worse or for any concerns.  For worsening headache, stroke symptoms, go to [facility] emergency department where neurosurgery and neurology are available if needed.    You need to follow-up with your work comp/occupational medicine provider for reevaluation and return to work instructions

## 2023-03-19 NOTE — ED NOTES
Pt states they had a fall on Wednesday. Pt states they still have a cough. Pt states they are experiencing swelling in the left foot and is bleeding from the left foot. Pt states they are concerned about blood sugar levels. Pt states they had a minor brain bleed from the injury sustained on Wednesday.

## 2023-03-19 NOTE — ED NOTES
Discharge instructions given and discussed. RX for ABX sent to preferred pharmacy. Pt educated to come back to ER for new or worsening symptoms and follow up with PCP as instructed. Pt verbalized understanding. Pt  Discharged in stable condition.

## 2023-03-28 ENCOUNTER — HOSPITAL ENCOUNTER (EMERGENCY)
Facility: MEDICAL CENTER | Age: 68
End: 2023-03-28
Attending: EMERGENCY MEDICINE
Payer: COMMERCIAL

## 2023-03-28 ENCOUNTER — APPOINTMENT (OUTPATIENT)
Dept: RADIOLOGY | Facility: MEDICAL CENTER | Age: 68
End: 2023-03-28
Attending: EMERGENCY MEDICINE
Payer: COMMERCIAL

## 2023-03-28 VITALS
SYSTOLIC BLOOD PRESSURE: 148 MMHG | DIASTOLIC BLOOD PRESSURE: 110 MMHG | HEART RATE: 68 BPM | HEIGHT: 72 IN | BODY MASS INDEX: 41.15 KG/M2 | TEMPERATURE: 97 F | OXYGEN SATURATION: 92 % | RESPIRATION RATE: 18 BRPM | WEIGHT: 303.79 LBS

## 2023-03-28 DIAGNOSIS — I10 PRIMARY HYPERTENSION: ICD-10-CM

## 2023-03-28 DIAGNOSIS — F07.81 POST CONCUSSIVE SYNDROME: ICD-10-CM

## 2023-03-28 LAB
ALBUMIN SERPL BCP-MCNC: 4.1 G/DL (ref 3.2–4.9)
ALBUMIN/GLOB SERPL: 1.6 G/DL
ALP SERPL-CCNC: 124 U/L (ref 30–99)
ALT SERPL-CCNC: 28 U/L (ref 2–50)
ANION GAP SERPL CALC-SCNC: 13 MMOL/L (ref 7–16)
AST SERPL-CCNC: 39 U/L (ref 12–45)
BASOPHILS # BLD AUTO: 0.3 % (ref 0–1.8)
BASOPHILS # BLD: 0.03 K/UL (ref 0–0.12)
BILIRUB SERPL-MCNC: 0.8 MG/DL (ref 0.1–1.5)
BUN SERPL-MCNC: 20 MG/DL (ref 8–22)
CALCIUM ALBUM COR SERPL-MCNC: 9.7 MG/DL (ref 8.5–10.5)
CALCIUM SERPL-MCNC: 9.8 MG/DL (ref 8.4–10.2)
CHLORIDE SERPL-SCNC: 103 MMOL/L (ref 96–112)
CO2 SERPL-SCNC: 24 MMOL/L (ref 20–33)
CREAT SERPL-MCNC: 0.92 MG/DL (ref 0.5–1.4)
EKG IMPRESSION: NORMAL
EOSINOPHIL # BLD AUTO: 0.1 K/UL (ref 0–0.51)
EOSINOPHIL NFR BLD: 1.1 % (ref 0–6.9)
ERYTHROCYTE [DISTWIDTH] IN BLOOD BY AUTOMATED COUNT: 43.6 FL (ref 35.9–50)
GFR SERPLBLD CREATININE-BSD FMLA CKD-EPI: 89 ML/MIN/1.73 M 2
GLOBULIN SER CALC-MCNC: 2.6 G/DL (ref 1.9–3.5)
GLUCOSE SERPL-MCNC: 135 MG/DL (ref 65–99)
HCT VFR BLD AUTO: 45 % (ref 42–52)
HGB BLD-MCNC: 15.9 G/DL (ref 14–18)
IMM GRANULOCYTES # BLD AUTO: 0.03 K/UL (ref 0–0.11)
IMM GRANULOCYTES NFR BLD AUTO: 0.3 % (ref 0–0.9)
LYMPHOCYTES # BLD AUTO: 2.44 K/UL (ref 1–4.8)
LYMPHOCYTES NFR BLD: 27.4 % (ref 22–41)
MCH RBC QN AUTO: 31 PG (ref 27–33)
MCHC RBC AUTO-ENTMCNC: 35.3 G/DL (ref 33.7–35.3)
MCV RBC AUTO: 87.7 FL (ref 81.4–97.8)
MONOCYTES # BLD AUTO: 0.76 K/UL (ref 0–0.85)
MONOCYTES NFR BLD AUTO: 8.5 % (ref 0–13.4)
NEUTROPHILS # BLD AUTO: 5.56 K/UL (ref 1.82–7.42)
NEUTROPHILS NFR BLD: 62.4 % (ref 44–72)
NRBC # BLD AUTO: 0 K/UL
NRBC BLD-RTO: 0 /100 WBC
PLATELET # BLD AUTO: 166 K/UL (ref 164–446)
PMV BLD AUTO: 10.4 FL (ref 9–12.9)
POTASSIUM SERPL-SCNC: 4.2 MMOL/L (ref 3.6–5.5)
PROT SERPL-MCNC: 6.7 G/DL (ref 6–8.2)
RBC # BLD AUTO: 5.13 M/UL (ref 4.7–6.1)
SODIUM SERPL-SCNC: 140 MMOL/L (ref 135–145)
TROPONIN T SERPL-MCNC: 20 NG/L (ref 6–19)
TROPONIN T SERPL-MCNC: 21 NG/L (ref 6–19)
WBC # BLD AUTO: 8.9 K/UL (ref 4.8–10.8)

## 2023-03-28 PROCEDURE — 85025 COMPLETE CBC W/AUTO DIFF WBC: CPT

## 2023-03-28 PROCEDURE — 36415 COLL VENOUS BLD VENIPUNCTURE: CPT

## 2023-03-28 PROCEDURE — 93005 ELECTROCARDIOGRAM TRACING: CPT | Performed by: EMERGENCY MEDICINE

## 2023-03-28 PROCEDURE — 99284 EMERGENCY DEPT VISIT MOD MDM: CPT

## 2023-03-28 PROCEDURE — 70450 CT HEAD/BRAIN W/O DYE: CPT

## 2023-03-28 PROCEDURE — 700102 HCHG RX REV CODE 250 W/ 637 OVERRIDE(OP): Performed by: EMERGENCY MEDICINE

## 2023-03-28 PROCEDURE — 84484 ASSAY OF TROPONIN QUANT: CPT | Mod: 91

## 2023-03-28 PROCEDURE — A9270 NON-COVERED ITEM OR SERVICE: HCPCS | Performed by: EMERGENCY MEDICINE

## 2023-03-28 PROCEDURE — 80053 COMPREHEN METABOLIC PANEL: CPT

## 2023-03-28 RX ORDER — SILDENAFIL 100 MG/1
100 TABLET, FILM COATED ORAL
Status: SHIPPED | COMMUNITY
End: 2023-09-24

## 2023-03-28 RX ORDER — LOSARTAN POTASSIUM 25 MG/1
50 TABLET ORAL
Status: DISCONTINUED | OUTPATIENT
Start: 2023-03-28 | End: 2023-03-28 | Stop reason: HOSPADM

## 2023-03-28 RX ORDER — BENZONATATE 100 MG/1
100 CAPSULE ORAL 3 TIMES DAILY PRN
Status: SHIPPED | COMMUNITY
End: 2023-09-24

## 2023-03-28 RX ORDER — LEVETIRACETAM 500 MG/1
500 TABLET ORAL 2 TIMES DAILY
Status: SHIPPED | COMMUNITY
End: 2023-09-24

## 2023-03-28 RX ORDER — AMOXICILLIN AND CLAVULANATE POTASSIUM 875; 125 MG/1; MG/1
1 TABLET, FILM COATED ORAL 2 TIMES DAILY
Status: SHIPPED | COMMUNITY
End: 2023-09-24

## 2023-03-28 RX ORDER — LOSARTAN POTASSIUM 25 MG/1
50 TABLET ORAL
Status: DISCONTINUED | OUTPATIENT
Start: 2023-03-29 | End: 2023-03-28

## 2023-03-28 RX ADMIN — LOSARTAN POTASSIUM 50 MG: 25 TABLET, FILM COATED ORAL at 18:20

## 2023-03-28 ASSESSMENT — FIBROSIS 4 INDEX: FIB4 SCORE: 2.79

## 2023-03-28 NOTE — ED TRIAGE NOTES
"Patient presents by self after being sent over from work when his supervisor was concern. Patient fell at work 3/8 but waited nearly a week to be first evaluated. Patient has had intermittent episodes of \"feeling foggy\" and headaches. Has a headache again when he woke up at 0400. Has been evaluated multiple times for these intermittent symptoms. Last CT scan was 3/19 and showed small, unchanged hemorrhage. Patient denies any changes in vision, strength equal bilaterally.  "

## 2023-03-28 NOTE — ED NOTES
Med rec updated and complete, per pt   Allergies reviewed, per pt  Pt reports that his doctor told him to hold his ASPRIN 81MG for about a week.

## 2023-03-28 NOTE — ED PROVIDER NOTES
"ED Provider Note    CHIEF COMPLAINT  Chief Complaint   Patient presents with    Forgetful     Feels \"foggy\" intermittently since his fall 3/8       EXTERNAL RECORDS REVIEWED  None    HPI/ROS  LIMITATION TO HISTORY   None  OUTSIDE HISTORIAN(S):  None    Sharon Guardado is a 68 y.o. male who presents here for evaluation of intermittent forgetfulness.  The patient states that he has history of recent fall, with \"a head bleed\" that has been stable since his last CT scan.  He states that today he woke up to get up for work, and he has had some headaches, but no paresthesias or weakness, no repeat fall, and no blood thinner use.  Patient has no chest pain or shortness of breath, no vomiting, no back pain.  He has not taken anything prior to or for the same.    PAST MEDICAL HISTORY   has a past medical history of LE (acute kidney injury), prerenal (CMS-HCC) (1/20/2018), Alcohol withdrawal (HCC) (1/22/2018), Bowel habit changes, CAD (coronary artery disease), Cancer (HCC), Cellulitis of back except buttock (8/3/2018), HTN (hypertension), Hyperglycemia (1/19/2018), Hypertension, Indigestion, Kidney stones, Near-syncope, Nerve compression, and Tinnitus.    SURGICAL HISTORY   has a past surgical history that includes gastroscopy (3/29/2018); egd w/endoscopic ultrasound (3/29/2018); and egd with asp/bx (3/29/2018).    FAMILY HISTORY  No family history on file.    SOCIAL HISTORY  Social History     Tobacco Use    Smoking status: Never    Smokeless tobacco: Never   Vaping Use    Vaping Use: Never used   Substance and Sexual Activity    Alcohol use: Yes     Comment: i drink per night    Drug use: No    Sexual activity: Not on file       CURRENT MEDICATIONS  Home Medications       Reviewed by Steffany Rubin R.N. (Registered Nurse) on 03/28/23 at 1342  Med List Status: Not Addressed     Medication Last Dose Status   ALLOPURINOL 300 MG PO TABS  Active   amLODIPine (NORVASC) 5 MG Tab  Active   aspirin EC (ECOTRIN) 81 MG Tablet " Delayed Response  Active   Cholecalciferol (D3 PO)  Active   diclofenac sodium (VOLTAREN) 1 % Gel  Active   DM-Doxylamine-Acetaminophen (QC NIGHTTIME COLD & FLU PO)  Active   glipiZIDE SR (GLUCOTROL) 10 MG TABLET SR 24 HR  Active   glucose blood strip  Active   losartan (COZAAR) 50 MG Tab  Active   metFORMIN (GLUCOPHAGE) 500 MG Tab  Active   naproxen (NAPROSYN) 500 MG Tab  Active   omeprazole (PRILOSEC OTC) 20 MG tablet  Active   Phenylephrine-DM-GG (ROBITUSSIN COUGH/COLD CF PO)  Active   SIMVASTATIN 40 MG PO TABS  Active   SPIRONOLACTONE 25 MG PO TABS  Active   triamcinolone acetonide (KENALOG) 0.1 % Cream  Active                    ALLERGIES  Allergies   Allergen Reactions    Ativan Hives    Vicodin [Hydrocodone-Acetaminophen] Rash     Rash, confusion         PHYSICAL EXAM  VITAL SIGNS: BP (!) 155/82   Pulse 86   Temp 36.1 °C (97 °F) (Temporal)   Resp 18   Ht 1.829 m (6')   Wt (!) 138 kg (303 lb 12.7 oz)   SpO2 94%   BMI 41.20 kg/m²    Constitutional: Well developed, well nourished. No acute distress.  HEENT: Normocephalic, atraumatic. Posterior pharynx clear and moist.  Eyes:  EOMI. Normal sclera.  Neck: Supple, Full range of motion, nontender.  Chest/Pulmonary: clear to ausculation. Symmetrical expansion.   Cardio: Regular rate and rhythm with no murmur.   Abdomen: Soft, nontender. No peritoneal signs. No guarding. No palpable masses.  Back: No CVA tenderness, nontender midline, no step offs.  Musculoskeletal: No deformity, no edema, neurovascular intact.   Neuro: Clear speech, appropriate, cooperative, cranial nerves II-XII grossly intact.  Moves all extremities x4, ANO x4  Psych: Normal mood and affect      DIAGNOSTIC STUDIES / PROCEDURES  Results for orders placed or performed during the hospital encounter of 03/28/23   CBC WITH DIFFERENTIAL   Result Value Ref Range    WBC 8.9 4.8 - 10.8 K/uL    RBC 5.13 4.70 - 6.10 M/uL    Hemoglobin 15.9 14.0 - 18.0 g/dL    Hematocrit 45.0 42.0 - 52.0 %    MCV 87.7  81.4 - 97.8 fL    MCH 31.0 27.0 - 33.0 pg    MCHC 35.3 33.7 - 35.3 g/dL    RDW 43.6 35.9 - 50.0 fL    Platelet Count 166 164 - 446 K/uL    MPV 10.4 9.0 - 12.9 fL    Neutrophils-Polys 62.40 44.00 - 72.00 %    Lymphocytes 27.40 22.00 - 41.00 %    Monocytes 8.50 0.00 - 13.40 %    Eosinophils 1.10 0.00 - 6.90 %    Basophils 0.30 0.00 - 1.80 %    Immature Granulocytes 0.30 0.00 - 0.90 %    Nucleated RBC 0.00 /100 WBC    Neutrophils (Absolute) 5.56 1.82 - 7.42 K/uL    Lymphs (Absolute) 2.44 1.00 - 4.80 K/uL    Monos (Absolute) 0.76 0.00 - 0.85 K/uL    Eos (Absolute) 0.10 0.00 - 0.51 K/uL    Baso (Absolute) 0.03 0.00 - 0.12 K/uL    Immature Granulocytes (abs) 0.03 0.00 - 0.11 K/uL    NRBC (Absolute) 0.00 K/uL   COMP METABOLIC PANEL   Result Value Ref Range    Sodium 140 135 - 145 mmol/L    Potassium 4.2 3.6 - 5.5 mmol/L    Chloride 103 96 - 112 mmol/L    Co2 24 20 - 33 mmol/L    Anion Gap 13.0 7.0 - 16.0    Glucose 135 (H) 65 - 99 mg/dL    Bun 20 8 - 22 mg/dL    Creatinine 0.92 0.50 - 1.40 mg/dL    Calcium 9.8 8.4 - 10.2 mg/dL    AST(SGOT) 39 12 - 45 U/L    ALT(SGPT) 28 2 - 50 U/L    Alkaline Phosphatase 124 (H) 30 - 99 U/L    Total Bilirubin 0.8 0.1 - 1.5 mg/dL    Albumin 4.1 3.2 - 4.9 g/dL    Total Protein 6.7 6.0 - 8.2 g/dL    Globulin 2.6 1.9 - 3.5 g/dL    A-G Ratio 1.6 g/dL   TROPONIN   Result Value Ref Range    Troponin T 21 (H) 6 - 19 ng/L   CORRECTED CALCIUM   Result Value Ref Range    Correct Calcium 9.7 8.5 - 10.5 mg/dL   ESTIMATED GFR   Result Value Ref Range    GFR (CKD-EPI) 89 >60 mL/min/1.73 m 2   TROPONIN   Result Value Ref Range    Troponin T 20 (H) 6 - 19 ng/L   EKG   Result Value Ref Range    Report       [facility] Emergency Dept.    Test Date:  2023  Pt Name:    MEHRAN GARCIA               Department: [facility]  MRN:        [id number]                      Room:       -ROOM 2  Gender:     Male                         Technician: [id number]  :        1955                    Requested By:TEO NEVES  Order #:    [id number]                    Reading MD:    Measurements  Intervals                                Axis  Rate:       72                           P:          78  GA:         211                          QRS:        51  QRSD:       83                           T:          69  QT:         404  QTc:        443    Interpretive Statements  Sinus rhythm  Borderline low voltage, extremity leads  Compared to ECG 06/10/2021 21:01:06  Atrial fibrillation no longer present     EKG; normal sinus rhythm at a rate of 72.  No ST elevation, no ST depression.  QTc is 443.  EKG compared to 6/10/2021.    RADIOLOGY  I have independently interpreted the diagnostic imaging associated with this visit and am waiting the final reading from the radiologist.   My preliminary interpretation is as follows: See below  Radiologist interpretation:   CT-HEAD W/O   Final Result      1.  No evidence of acute intracranial process.      2.  Cerebral atrophy as well as periventricular chronic small vessel ischemic change.      3.  Previously described left frontal subarachnoid hemorrhage no longer identified.               COURSE & MEDICAL DECISION MAKING        INITIAL ASSESSMENT, COURSE AND PLAN  Care Narrative: This is a 5-year-old male here for evaluation of intermittent forgetfulness.  Patient states that he has been experiencing this for the last few weeks, and states this has been since his fall.  He initially was seen and evaluated, had a small subarachnoid hemorrhage, but has had subsequent CTs for this recently.  All without change.  It is noted on today's CT, that he had a CT scan done that shows resolution of the subarachnoid hemorrhage.  Patient has no focal deficits, he is alert and oriented x3, and his cardiac work-up is unremarkable.  He did not have any chest pain or shortness of breath.  Patient has no weakness, and no syncopal events.  He is not on blood thinners, he has not fallen since his  last fall.    6:23 PM  At this time, the pt is hypertensive, and has just taken his home medicine. He had declined to stay until his pressure comes down, and agrees to sign out ama    I discussed with the patient the risks of their decision to leave without receiving the appropriate medical care. I discussed with the patient the risks of their decision to refuse or withhold consent to receive appropriate medical care. The patient has the capacity to understand the risks and benefits described above. The patient is not intoxicated clinically, the patient's is alert and oriented and able to make a good decision in my opinion. I discussed alternative treatments with the patient. The patient was given discharge instructions and a followup plan as documented in the medical record. I have asked the patient to return at any time to the emergency department for any reason.            DISPOSITION AND DISCUSSIONS  I have discussed management of the patient with the following physicians and JOSEPH's: None    Discussion of management with other QHP or appropriate source(s): None    Escalation of care considered, and ultimately not performed: None    Barriers to care at this time, including but not limited to: Patient does not have established PCP.     Decision tools and prescription drugs considered including, but not limited to:  None .    FINAL DIAGNOSIS  Concussive syndrome  Hypertension  AMA       Electronically signed by: Francesco Lopez D.O., 3/28/2023 2:17 PM

## 2023-03-28 NOTE — ED NOTES
ERP at bedside. Pt agrees with plan of care discussed by ERP. AIDET acknowledged with patient. Carlos in low position, side rail up for pt safety. Call light within reach. Will continue to monitor.     IV established. Blood sent to lab.

## 2023-03-28 NOTE — LETTER
[facility], EMERGENCY DEPT   [address] [place] [address]  Phone: Dept: [phone] - Fax:        [facility] Progress Report and Disability Certification  Date of Service: 3/28/2023   No Show:  No  Date / Time of Next Visit:     Claim Information   Patient Name: Sharon Guardado  Claim Number:     Employer: [employer] Date of Injury: 3/8/2023     Insurer / TPA: Esis ID / SSN:    Occupation: Security Diagnosis: Diagnoses of Primary hypertension and Post concussive syndrome were pertinent to this visit.    Medical Information   Related to Industrial Injury?      Subjective Complaints:      Objective Findings:     Pre-Existing Condition(s):     Assessment:        Status:    Permanent Disability:     Plan:      Diagnostics:      Comments:       Disability Information   Status:      From:     Through:   Restrictions are:     Physical Restrictions   Sitting:    Standing:    Stooping:    Bending:      Squatting:    Walking:    Climbing:    Pushing:      Pulling:    Other:    Reaching Above Shoulder (L):   Reaching Above Shoulder (R):       Reaching Below Shoulder (L):    Reaching Below Shoulder (R):      Not to exceed Weight Limits   Carrying(hrs):   Weight Limit(lb):   Lifting(hrs):   Weight  Limit(lb):     Comments:      Repetitive Actions   Hands: i.e. Fine Manipulations from Grasping:     Feet: i.e. Operating Foot Controls:     Driving / Operate Machinery:     Physician Name: Francesco Lopez Physician Signature: FRANCESCO Casey D.O. e-Signature: MD, Medical Director   Clinic Name / Location: [facility], EMERGENCY DEPT  [address] [place] [address] [phone]     Clinic Phone Number: Dept: [phone]   Appointment Time:  Visit Start Time:    Check-In Time:  1:23 PM Visit Discharge Time:    Original-Treating Physician or Chiropractor     Page 2-Insurer/TPA    Page 3-Employer    Page 4-Employee

## 2023-03-29 NOTE — ED NOTES
"BP continues to be high, pt took out his \"morning meds\" states he forgot to take, water provided and instructed to take his meds.  ERP aware and would like to wait 30 minutes and re evaluate.    "

## 2023-04-05 ENCOUNTER — HOSPITAL ENCOUNTER (EMERGENCY)
Facility: MEDICAL CENTER | Age: 68
End: 2023-04-05
Attending: EMERGENCY MEDICINE
Payer: MEDICARE

## 2023-04-05 ENCOUNTER — APPOINTMENT (OUTPATIENT)
Dept: RADIOLOGY | Facility: MEDICAL CENTER | Age: 68
End: 2023-04-05
Attending: EMERGENCY MEDICINE
Payer: MEDICARE

## 2023-04-05 VITALS
OXYGEN SATURATION: 96 % | HEIGHT: 72 IN | BODY MASS INDEX: 40.37 KG/M2 | HEART RATE: 59 BPM | DIASTOLIC BLOOD PRESSURE: 73 MMHG | SYSTOLIC BLOOD PRESSURE: 133 MMHG | RESPIRATION RATE: 18 BRPM | WEIGHT: 298.06 LBS | TEMPERATURE: 97.3 F

## 2023-04-05 DIAGNOSIS — S09.90XA CLOSED HEAD INJURY, INITIAL ENCOUNTER: ICD-10-CM

## 2023-04-05 DIAGNOSIS — H53.8 BLURRED VISION, RIGHT EYE: ICD-10-CM

## 2023-04-05 DIAGNOSIS — R51.9 INTRACTABLE HEADACHE, UNSPECIFIED CHRONICITY PATTERN, UNSPECIFIED HEADACHE TYPE: ICD-10-CM

## 2023-04-05 DIAGNOSIS — H53.9 VISUAL CHANGES: ICD-10-CM

## 2023-04-05 DIAGNOSIS — R41.0 CONFUSION: ICD-10-CM

## 2023-04-05 LAB
ALBUMIN SERPL BCP-MCNC: 4.1 G/DL (ref 3.2–4.9)
ALBUMIN/GLOB SERPL: 1.8 G/DL
ALP SERPL-CCNC: 137 U/L (ref 30–99)
ALT SERPL-CCNC: 27 U/L (ref 2–50)
ANION GAP SERPL CALC-SCNC: 11 MMOL/L (ref 7–16)
AST SERPL-CCNC: 33 U/L (ref 12–45)
BASOPHILS # BLD AUTO: 0.4 % (ref 0–1.8)
BASOPHILS # BLD: 0.03 K/UL (ref 0–0.12)
BILIRUB SERPL-MCNC: 0.9 MG/DL (ref 0.1–1.5)
BUN SERPL-MCNC: 27 MG/DL (ref 8–22)
CALCIUM ALBUM COR SERPL-MCNC: 9.5 MG/DL (ref 8.5–10.5)
CALCIUM SERPL-MCNC: 9.6 MG/DL (ref 8.4–10.2)
CHLORIDE SERPL-SCNC: 103 MMOL/L (ref 96–112)
CO2 SERPL-SCNC: 25 MMOL/L (ref 20–33)
CREAT SERPL-MCNC: 1.04 MG/DL (ref 0.5–1.4)
EOSINOPHIL # BLD AUTO: 0.17 K/UL (ref 0–0.51)
EOSINOPHIL NFR BLD: 2.4 % (ref 0–6.9)
ERYTHROCYTE [DISTWIDTH] IN BLOOD BY AUTOMATED COUNT: 44.8 FL (ref 35.9–50)
GFR SERPLBLD CREATININE-BSD FMLA CKD-EPI: 77 ML/MIN/1.73 M 2
GLOBULIN SER CALC-MCNC: 2.3 G/DL (ref 1.9–3.5)
GLUCOSE SERPL-MCNC: 174 MG/DL (ref 65–99)
HCT VFR BLD AUTO: 44.7 % (ref 42–52)
HGB BLD-MCNC: 15.6 G/DL (ref 14–18)
IMM GRANULOCYTES # BLD AUTO: 0.02 K/UL (ref 0–0.11)
IMM GRANULOCYTES NFR BLD AUTO: 0.3 % (ref 0–0.9)
LYMPHOCYTES # BLD AUTO: 1.51 K/UL (ref 1–4.8)
LYMPHOCYTES NFR BLD: 21.6 % (ref 22–41)
MCH RBC QN AUTO: 31.1 PG (ref 27–33)
MCHC RBC AUTO-ENTMCNC: 34.9 G/DL (ref 33.7–35.3)
MCV RBC AUTO: 89 FL (ref 81.4–97.8)
MONOCYTES # BLD AUTO: 0.61 K/UL (ref 0–0.85)
MONOCYTES NFR BLD AUTO: 8.7 % (ref 0–13.4)
NEUTROPHILS # BLD AUTO: 4.64 K/UL (ref 1.82–7.42)
NEUTROPHILS NFR BLD: 66.6 % (ref 44–72)
NRBC # BLD AUTO: 0 K/UL
NRBC BLD-RTO: 0 /100 WBC
PLATELET # BLD AUTO: 97 K/UL (ref 164–446)
PMV BLD AUTO: 11.5 FL (ref 9–12.9)
POTASSIUM SERPL-SCNC: 4.6 MMOL/L (ref 3.6–5.5)
PROT SERPL-MCNC: 6.4 G/DL (ref 6–8.2)
RBC # BLD AUTO: 5.02 M/UL (ref 4.7–6.1)
SODIUM SERPL-SCNC: 139 MMOL/L (ref 135–145)
WBC # BLD AUTO: 7 K/UL (ref 4.8–10.8)

## 2023-04-05 PROCEDURE — 99283 EMERGENCY DEPT VISIT LOW MDM: CPT | Mod: 25

## 2023-04-05 PROCEDURE — 85025 COMPLETE CBC W/AUTO DIFF WBC: CPT

## 2023-04-05 PROCEDURE — 80053 COMPREHEN METABOLIC PANEL: CPT

## 2023-04-05 PROCEDURE — 70450 CT HEAD/BRAIN W/O DYE: CPT

## 2023-04-05 PROCEDURE — 36415 COLL VENOUS BLD VENIPUNCTURE: CPT

## 2023-04-05 ASSESSMENT — PAIN DESCRIPTION - PAIN TYPE: TYPE: OTHER (COMMENT)

## 2023-04-05 ASSESSMENT — FIBROSIS 4 INDEX: FIB4 SCORE: 3.11

## 2023-04-05 NOTE — ED NOTES
Visual acuity test done.  Right eye- 20/70  Left eye- 20/50  Both- 20/40.  Test performed without glasses, pt reported after the test they wear glasses but usually just to read.  Pt was able to ambulate with steady gate around 1000 feet without incident.  Pt reported they feel better on their feet right now.

## 2023-04-05 NOTE — DISCHARGE INSTRUCTIONS
Follow-up with neurology, sports medicine/head injury specialist, ENT, ophthalmology, and Workman's Compensation.    Call your regular doctor regarding your elevated blood sugars and alkaline phosphatase.

## 2023-04-05 NOTE — LETTER
[facility], EMERGENCY DEPT   [address] [place] [address]  Phone: Dept: [phone] - Fax:        [facility] Progress Report and Disability Certification  Date of Service: 4/5/2023   No Show:  No  Date / Time of Next Visit:     Claim Information   Patient Name: Sharon Guardado  Claim Number:     Employer:    Date of Injury: 3/8/2023     Insurer / TPA: Esis ID / SSN:    Occupation: Security Diagnosis: Diagnoses of Intractable headache, unspecified chronicity pattern, unspecified headache type, Blurred vision, right eye, Visual changes, Confusion, and Closed head injury, initial encounter were pertinent to this visit.    Medical Information   Related to Industrial Injury?   Comments:Unclear but possibly    Subjective Complaints:  Headache, tinnitus bilaterally, blurred vision in the right eye, bilateral floaters, confusion, unsteady gait   Objective Findings: No cerebellar findings, steady gait; CT head negative for intracranial hemorrhage; hyperglycemia and elevated alkaline phosphatase on labs   Pre-Existing Condition(s):     Assessment:   Condition Same    Status: Additional Care Required  Comments:Referral to ENT, ophthalmology, neurology  Permanent Disability:  Comments:Unable to be determined at this time    Plan:      Diagnostics: CT  Comments:CT head negative for intracranial hemorrhage; cerebral atrophy noted    Comments:       Disability Information   Status: Temporarily Totally Disabled    From:     Through:   Restrictions are: Temporary   Physical Restrictions   Sitting:    Standing:    Stooping:    Bending:      Squatting:    Walking:    Climbing:    Pushing:      Pulling:    Other:    Reaching Above Shoulder (L):   Reaching Above Shoulder (R):       Reaching Below Shoulder (L):    Reaching Below Shoulder (R):      Not to exceed Weight Limits   Carrying(hrs):   Weight Limit(lb):   Lifting(hrs):   Weight   Limit(lb):     Comments:      Repetitive Actions   Hands: i.e. Fine Manipulations from Grasping:     Feet: i.e. Operating Foot Controls:     Driving / Operate Machinery:     Physician Name: Alicia Seo Physician Signature: ALICIA Puga M.D. e-Signature:  , Medical Director   Clinic Name / Location: [facility], EMERGENCY DEPT  [address]  [place] [address]  [phone]     Clinic Phone Number: Dept: [phone]   Appointment Time:  Visit Start Time:    Check-In Time:  7:13 AM Visit Discharge Time:    Original-Treating Physician or Chiropractor    Page 2-Insurer/TPA    Page 3-Employer    Page 4-Employee

## 2023-04-05 NOTE — ED TRIAGE NOTES
Pt fell on ice 3/08/23 and hit right side of head. Pt reports they had ct scan after they fell and a brain bleed was noted.   Pt reports following ct scans showed bleeding had resolved.   Pt came in today for worsening headache and vision problems.  Pt is currently alert and orients, vitals as noted. Pt denies using blood thinners at this time or taking pain medication prior to coming in.   [facility] and they were recommended to go to the ER for worsening symptoms.

## 2023-04-05 NOTE — ED PROVIDER NOTES
"ED Provider Note    CHIEF COMPLAINT  Chief Complaint   Patient presents with    Headache     10/10 pain. Headache since 3/08/23, yesterday headache became 10/10    Blurred Vision     Started 3/09/23, increased vision blurring reported by pt.       EXTERNAL RECORDS REVIEWED  Other ER note from 3/28/2023 reviewed.  Patient was seen at [facility] ER for intermittent forgetfulness since a fall after which he sustained a head bleed.  CT negative for ICH that visit.    HPI/ROS  LIMITATION TO HISTORY   Select: Patient is a vague historian  OUTSIDE HISTORIAN(S):  Significant other patient's wife is at the bedside and contributes much to the history    Sharon Guardado is a 68 y.o. male with a history of coronary artery disease, hypertension, hyperglycemia, skin cancer, and EtOH use who presents for evaluation of ongoing symptoms since a head injury on 3/8/2023.    Patient states he is \"offkilter\" since his fall.  He reports a bilateral frontal headache since the incident along with visual changes, ringing in his ears, and feeling off balance.    Patient's wife reports that the patient was referred to the ER yesterday when he was seen at [facility] Compensation clinic for follow-up of his head injury but states that her  is \"so stubborn\" he would not come at that time.  The patient states he did not come to the ER yesterday \"because I had to have my bourbon.\"  Patient's wife states that the practitioner yesterday shined a light in the patient's eyes which the patient reported to be painful and that he needed to go to the ER immediately.  No further information was given to the patient or his wife as to why this was necessary but the patient did not want to come at that time so they are here today.  Patient states that he sees \"snakes in his vision\" in both of his eyes and that his right eye is blurry at times.  Patient reports ringing in his ears.    Patient states that he drinks bourbon daily.  He denies " history of alcohol withdrawal and states he does not become tremulous    Patient's wife states that he is confused.  She is concerned that he has had a stroke.  Patient states that he sometimes forgets where he is going or what his purpose was walking into a room.  She states this seems to have gotten worse in the last 4 days.    They were given instructions regarding postconcussive syndrome on their last visit to the ER on 3/28.    Patient's wife states referrals to neurology have been made but no appointment given yet.  She feels the patient requires more emergent evaluation.    PAST MEDICAL HISTORY   has a past medical history of LE (acute kidney injury), prerenal (CMS-HCC) (1/20/2018), Alcohol withdrawal (HCC) (1/22/2018), Bowel habit changes, CAD (coronary artery disease), Cancer (HCC), Cellulitis of back except buttock (8/3/2018), HTN (hypertension), Hyperglycemia (1/19/2018), Hypertension, Indigestion, Kidney stones, Near-syncope, Nerve compression, and Tinnitus.    SURGICAL HISTORY   has a past surgical history that includes gastroscopy (3/29/2018); egd w/endoscopic ultrasound (3/29/2018); and egd with asp/bx (3/29/2018).    FAMILY HISTORY  No family history on file.    SOCIAL HISTORY  Social History     Tobacco Use    Smoking status: Never    Smokeless tobacco: Never   Vaping Use    Vaping Use: Never used   Substance and Sexual Activity    Alcohol use: Yes     Comment: i drink per night    Drug use: No    Sexual activity: Not on file       CURRENT MEDICATIONS  Glucophage  Norvasc  Cozaar    ALLERGIES  Allergies   Allergen Reactions    Ativan      Pt reports that he gets very agitated     Vicodin [Hydrocodone-Acetaminophen] Rash     Rash, confusion, and gets very agitated        PHYSICAL EXAM  VITAL SIGNS: /76   Pulse 75   Temp 36.5 °C (97.7 °F) (Temporal)   Resp 18   Ht 1.829 m (6')   Wt (!) 135 kg (298 lb 1 oz)   SpO2 95%   BMI 40.42 kg/m²    General:  WD male with elevated BMI, nontoxic  appearing in NAD; A+Ox3; V/S as above  Skin: warm and dry; good color; no rash  HEENT: NCAT; EOMs intact; PERRL; no scleral icterus   Neck: FROM;  Cardiovascular: Regular heart rate and rhythm.  No murmurs, rubs, or gallops; pulses 2+ bilaterally radially  Lungs: No respiratory distress or tachypnea; Clear to auscultation with good air movement bilaterally.  No wheezes, rhonchi, or rales.   Extremities: CARR x 4; no e/o trauma; no pedal edema; neg Kyle's  Neurologic: CNs III-XII formally intact; speech clear; distal sensation intact; strength 5/5 UE/LEs  Psychiatric: Appropriate affect, normal mood      DIAGNOSTIC STUDIES / PROCEDURES  LABS  Results for orders placed or performed during the hospital encounter of 04/05/23   CBC WITH DIFFERENTIAL   Result Value Ref Range    WBC 7.0 4.8 - 10.8 K/uL    RBC 5.02 4.70 - 6.10 M/uL    Hemoglobin 15.6 14.0 - 18.0 g/dL    Hematocrit 44.7 42.0 - 52.0 %    MCV 89.0 81.4 - 97.8 fL    MCH 31.1 27.0 - 33.0 pg    MCHC 34.9 33.7 - 35.3 g/dL    RDW 44.8 35.9 - 50.0 fL    Platelet Count 97 (L) 164 - 446 K/uL    MPV 11.5 9.0 - 12.9 fL    Neutrophils-Polys 66.60 44.00 - 72.00 %    Lymphocytes 21.60 (L) 22.00 - 41.00 %    Monocytes 8.70 0.00 - 13.40 %    Eosinophils 2.40 0.00 - 6.90 %    Basophils 0.40 0.00 - 1.80 %    Immature Granulocytes 0.30 0.00 - 0.90 %    Nucleated RBC 0.00 /100 WBC    Neutrophils (Absolute) 4.64 1.82 - 7.42 K/uL    Lymphs (Absolute) 1.51 1.00 - 4.80 K/uL    Monos (Absolute) 0.61 0.00 - 0.85 K/uL    Eos (Absolute) 0.17 0.00 - 0.51 K/uL    Baso (Absolute) 0.03 0.00 - 0.12 K/uL    Immature Granulocytes (abs) 0.02 0.00 - 0.11 K/uL    NRBC (Absolute) 0.00 K/uL   CMP   Result Value Ref Range    Sodium 139 135 - 145 mmol/L    Potassium 4.6 3.6 - 5.5 mmol/L    Chloride 103 96 - 112 mmol/L    Co2 25 20 - 33 mmol/L    Anion Gap 11.0 7.0 - 16.0    Glucose 174 (H) 65 - 99 mg/dL    Bun 27 (H) 8 - 22 mg/dL    Creatinine 1.04 0.50 - 1.40 mg/dL    Calcium 9.6 8.4 - 10.2 mg/dL     AST(SGOT) 33 12 - 45 U/L    ALT(SGPT) 27 2 - 50 U/L    Alkaline Phosphatase 137 (H) 30 - 99 U/L    Total Bilirubin 0.9 0.1 - 1.5 mg/dL    Albumin 4.1 3.2 - 4.9 g/dL    Total Protein 6.4 6.0 - 8.2 g/dL    Globulin 2.3 1.9 - 3.5 g/dL    A-G Ratio 1.8 g/dL   CORRECTED CALCIUM   Result Value Ref Range    Correct Calcium 9.5 8.5 - 10.5 mg/dL   ESTIMATED GFR   Result Value Ref Range    GFR (CKD-EPI) 77 >60 mL/min/1.73 m 2         RADIOLOGY  Radiologist interpretation:   CT-HEAD W/O   Final Result      1.  No acute intracranial abnormality      2.  Cerebral volume loss and white matter change               COURSE & MEDICAL DECISION MAKING    ED Observation Status? No; Patient does not meet criteria for ED Observation.     INITIAL ASSESSMENT, COURSE AND PLAN    Care Narrative: This is a 68-year-old who presents for evaluation of visual changes, headache, ringing in his ears, concern for issues with prior intracranial hemorrhage seen on CT following a work-related injury on 3/8/2023.    Patient has a normal neurologic exam.  His gait is steady here.  Visual acuity is adequate but I have no baseline with which to compare.    Labs indicate elevated glucose of 174, BUN 27, alk phos 137.    CT head on 3/15/2023 demonstrated subtle left frontal subarachnoid hemorrhages which were classified as m BIG 1.    CT head on 3/19/2023 demonstrated a stable left frontal vertex subarachnoid hemorrhage.    CT head on 3/28/2023 demonstrated no acute pathology.  No intracranial hemorrhage.    CT head today demonstrates no intracranial hemorrhage/acute pathology.  There is cerebral volume loss and white matter change.    Given that the patient has no acute lateralizing symptoms or obvious stroke symptoms, I do not feel that he requires further neuroimaging here.  He may benefit from having an MRI as an outpatient.  I have recommended neurology follow-up as well.  We also discussed ENT for tinnitus and ophthalmology follow-up for visual  complaints.  I was unable to determine who the practitioner who deals with head injuries/postconcussive issues specifically is in [facility].  I discussed this plan with the wife and patient.  They are amenable to following up with Workman's Compensation but expressed frustration with the duration of symptoms and delayed timing for referrals/follow-up visits.  We discussed lab results and the elevated alkaline phosphatase that will need follow-up.        ADDITIONAL PROBLEM LIST  Alcohol use  Diabetes  Hypertension      FINAL DIAGNOSIS  1. Intractable headache, unspecified chronicity pattern, unspecified headache type    2. Blurred vision, right eye    3. Visual changes    4. Confusion    5. Closed head injury, initial encounter      Electronically signed by: Alicia Seo M.D., 4/5/2023 7:53 AM

## 2023-04-05 NOTE — ED NOTES
Pt discharged home with wife.  Pt reports they understand they need to follow up with neurology for their continuing symptoms.  At this time pt is Aox4, ambulates to the waiting area with spouse without incident, reports no questions at this time.

## 2023-04-11 ENCOUNTER — DOCUMENTATION (OUTPATIENT)
Dept: HEALTH INFORMATION MANAGEMENT | Facility: OTHER | Age: 68
End: 2023-04-11
Payer: COMMERCIAL

## 2023-04-11 ENCOUNTER — TELEPHONE (OUTPATIENT)
Dept: HEALTH INFORMATION MANAGEMENT | Facility: OTHER | Age: 68
End: 2023-04-11
Payer: COMMERCIAL

## 2023-05-03 ENCOUNTER — APPOINTMENT (RX ONLY)
Dept: URBAN - METROPOLITAN AREA CLINIC 4 | Facility: CLINIC | Age: 68
Setting detail: DERMATOLOGY
End: 2023-05-03

## 2023-05-03 DIAGNOSIS — L30.0 NUMMULAR DERMATITIS: ICD-10-CM | Status: INADEQUATELY CONTROLLED

## 2023-05-03 DIAGNOSIS — Z85.820 PERSONAL HISTORY OF MALIGNANT MELANOMA OF SKIN: ICD-10-CM

## 2023-05-03 DIAGNOSIS — L82.1 OTHER SEBORRHEIC KERATOSIS: ICD-10-CM

## 2023-05-03 DIAGNOSIS — Z85.828 PERSONAL HISTORY OF OTHER MALIGNANT NEOPLASM OF SKIN: ICD-10-CM

## 2023-05-03 PROCEDURE — ? PRESCRIPTION

## 2023-05-03 PROCEDURE — 99214 OFFICE O/P EST MOD 30 MIN: CPT

## 2023-05-03 PROCEDURE — ? COUNSELING

## 2023-05-03 RX ORDER — TRIAMCINOLONE ACETONIDE 1 MG/G
1 CREAM TOPICAL BID
Qty: 80 | Refills: 1 | Status: ERX

## 2023-05-03 ASSESSMENT — LOCATION SIMPLE DESCRIPTION DERM
LOCATION SIMPLE: RIGHT LOWER BACK
LOCATION SIMPLE: LEFT SHOULDER
LOCATION SIMPLE: LEFT EAR
LOCATION SIMPLE: LEFT UPPER BACK

## 2023-05-03 ASSESSMENT — LOCATION DETAILED DESCRIPTION DERM
LOCATION DETAILED: RIGHT INFERIOR MEDIAL MIDBACK
LOCATION DETAILED: LEFT SUPERIOR UPPER BACK
LOCATION DETAILED: LEFT POSTERIOR SHOULDER
LOCATION DETAILED: LEFT POSTERIOR EAR

## 2023-05-03 ASSESSMENT — LOCATION ZONE DERM
LOCATION ZONE: EAR
LOCATION ZONE: ARM
LOCATION ZONE: TRUNK

## 2023-05-15 RX ORDER — GLIPIZIDE 10 MG/1
10 TABLET, FILM COATED, EXTENDED RELEASE ORAL DAILY
Qty: 30 TABLET | Refills: 0 | Status: SHIPPED | OUTPATIENT
Start: 2023-05-15 | End: 2023-09-24

## 2023-05-15 NOTE — TELEPHONE ENCOUNTER
Received request via: Pharmacy    Was the patient seen in the last year in this department? Yes    Does the patient have an active prescription (recently filled or refills available) for medication(s) requested? No    Does the patient have [facility] and need 100 day supply (blood pressure, diabetes and cholesterol meds only)? Patient does not have SCP     Requested Prescriptions     Pending Prescriptions Disp Refills    glipiZIDE SR (GLUCOTROL) 10 MG TABLET SR 24 HR 30 Tablet 0     Sig: Take 1 Tablet by mouth every day.        Pt spouse requesting temporary refill of medication until he can get into see his pcp   Juan Manuel FLANAGAN M.D. pt had injury at work and is unable to schedule appt at present

## 2023-05-16 NOTE — TELEPHONE ENCOUNTER
"Patient's wife Alessandra has contacted office for glipizide refill request.  Patient was seen once in office on 7/27/2022 for hospital follow up.  It is unclear weather his diabetes is controlled, because he has never followed through with his follow-up appointments for diabetes care.    Lab Results   Component Value Date/Time    HBA1C 8.8 (H) 07/16/2022 01:05 AM      Per patient \"he was not impressed and he is not interested in appointment with Dr. ZIMMERMAN\", he says he will come back to his prior PCP Dr. Chaves.    His wife was crying on the phone and saying we are not helpful, even though we have offered to schedule appointment and a refill request to last him until his appointment with PCP.    Patient was provided 30-day emergency refill for his glipizide and strongly advised to follow-up with his prior PCP Dr. Juan Manuel Chaves.     All questions were answered.      "

## 2023-05-17 ENCOUNTER — TELEPHONE (OUTPATIENT)
Dept: MEDICAL GROUP | Facility: MEDICAL CENTER | Age: 68
End: 2023-05-17
Payer: COMMERCIAL

## 2023-05-17 NOTE — TELEPHONE ENCOUNTER
Spoke to patient in regards to medication refill being on hold at the Pharmacy. Stated the medication that was prescribed upon discharge was glipiZIDE SR (GLUCOTROL) 10 MG TABLET. This medication was sent into pharmacy by Dr lomeli 30-day courtesy refill patient to contact Dr. Juan Manuel Chaves for follow up and further refills. Verbalized understanding had no questions.

## 2023-06-07 ENCOUNTER — HOSPITAL ENCOUNTER (OUTPATIENT)
Dept: LAB | Facility: MEDICAL CENTER | Age: 68
End: 2023-06-07
Attending: FAMILY MEDICINE
Payer: MEDICARE

## 2023-06-07 LAB
ALBUMIN SERPL BCP-MCNC: 4.2 G/DL (ref 3.2–4.9)
ALBUMIN/GLOB SERPL: 1.8 G/DL
ALP SERPL-CCNC: 116 U/L (ref 30–99)
ALT SERPL-CCNC: 33 U/L (ref 2–50)
ANION GAP SERPL CALC-SCNC: 15 MMOL/L (ref 7–16)
AST SERPL-CCNC: 41 U/L (ref 12–45)
BASOPHILS # BLD AUTO: 0.5 % (ref 0–1.8)
BASOPHILS # BLD: 0.04 K/UL (ref 0–0.12)
BILIRUB SERPL-MCNC: 0.9 MG/DL (ref 0.1–1.5)
BUN SERPL-MCNC: 18 MG/DL (ref 8–22)
CALCIUM ALBUM COR SERPL-MCNC: 9.4 MG/DL (ref 8.5–10.5)
CALCIUM SERPL-MCNC: 9.6 MG/DL (ref 8.4–10.2)
CHLORIDE SERPL-SCNC: 103 MMOL/L (ref 96–112)
CHOLEST SERPL-MCNC: 107 MG/DL (ref 100–199)
CO2 SERPL-SCNC: 21 MMOL/L (ref 20–33)
CREAT SERPL-MCNC: 1.02 MG/DL (ref 0.5–1.4)
CREAT UR-MCNC: 143.55 MG/DL
EOSINOPHIL # BLD AUTO: 0.27 K/UL (ref 0–0.51)
EOSINOPHIL NFR BLD: 3.1 % (ref 0–6.9)
ERYTHROCYTE [DISTWIDTH] IN BLOOD BY AUTOMATED COUNT: 43.7 FL (ref 35.9–50)
EST. AVERAGE GLUCOSE BLD GHB EST-MCNC: 117 MG/DL
FASTING STATUS PATIENT QL REPORTED: NORMAL
GFR SERPLBLD CREATININE-BSD FMLA CKD-EPI: 79 ML/MIN/1.73 M 2
GLOBULIN SER CALC-MCNC: 2.4 G/DL (ref 1.9–3.5)
GLUCOSE SERPL-MCNC: 113 MG/DL (ref 65–99)
HBA1C MFR BLD: 5.7 % (ref 4–5.6)
HCT VFR BLD AUTO: 44.8 % (ref 42–52)
HDLC SERPL-MCNC: 36 MG/DL
HGB BLD-MCNC: 15.8 G/DL (ref 14–18)
IMM GRANULOCYTES # BLD AUTO: 0.04 K/UL (ref 0–0.11)
IMM GRANULOCYTES NFR BLD AUTO: 0.5 % (ref 0–0.9)
LDLC SERPL CALC-MCNC: 46 MG/DL
LYMPHOCYTES # BLD AUTO: 2.29 K/UL (ref 1–4.8)
LYMPHOCYTES NFR BLD: 26.1 % (ref 22–41)
MCH RBC QN AUTO: 31.3 PG (ref 27–33)
MCHC RBC AUTO-ENTMCNC: 35.3 G/DL (ref 32.3–36.5)
MCV RBC AUTO: 88.9 FL (ref 81.4–97.8)
MICROALBUMIN UR-MCNC: <1.2 MG/DL
MICROALBUMIN/CREAT UR: NORMAL MG/G (ref 0–30)
MONOCYTES # BLD AUTO: 0.8 K/UL (ref 0–0.85)
MONOCYTES NFR BLD AUTO: 9.1 % (ref 0–13.4)
NEUTROPHILS # BLD AUTO: 5.34 K/UL (ref 1.82–7.42)
NEUTROPHILS NFR BLD: 60.7 % (ref 44–72)
NRBC # BLD AUTO: 0 K/UL
NRBC BLD-RTO: 0 /100 WBC (ref 0–0.2)
NT-PROBNP SERPL IA-MCNC: <36 PG/ML (ref 0–125)
PLATELET # BLD AUTO: 157 K/UL (ref 164–446)
PMV BLD AUTO: 10.7 FL (ref 9–12.9)
POTASSIUM SERPL-SCNC: 4.4 MMOL/L (ref 3.6–5.5)
PROT SERPL-MCNC: 6.6 G/DL (ref 6–8.2)
RBC # BLD AUTO: 5.04 M/UL (ref 4.7–6.1)
SODIUM SERPL-SCNC: 139 MMOL/L (ref 135–145)
TRIGL SERPL-MCNC: 123 MG/DL (ref 0–149)
TSH SERPL DL<=0.005 MIU/L-ACNC: 1.4 UIU/ML (ref 0.38–5.33)
WBC # BLD AUTO: 8.8 K/UL (ref 4.8–10.8)

## 2023-06-07 PROCEDURE — 85025 COMPLETE CBC W/AUTO DIFF WBC: CPT

## 2023-06-07 PROCEDURE — 83036 HEMOGLOBIN GLYCOSYLATED A1C: CPT | Mod: GA

## 2023-06-07 PROCEDURE — 83880 ASSAY OF NATRIURETIC PEPTIDE: CPT | Mod: GA

## 2023-06-07 PROCEDURE — 82570 ASSAY OF URINE CREATININE: CPT

## 2023-06-07 PROCEDURE — 80053 COMPREHEN METABOLIC PANEL: CPT

## 2023-06-07 PROCEDURE — 82043 UR ALBUMIN QUANTITATIVE: CPT

## 2023-06-07 PROCEDURE — 36415 COLL VENOUS BLD VENIPUNCTURE: CPT | Mod: GA

## 2023-06-07 PROCEDURE — 80061 LIPID PANEL: CPT

## 2023-06-07 PROCEDURE — 84443 ASSAY THYROID STIM HORMONE: CPT

## 2023-06-20 NOTE — PROGRESS NOTES
Received report from night RN, assumed pt care.  Pt is ambulating in hallways.  Pt is alert and oriented asking for food.  Pt states he feels better this AM.  Updated pt w/ POC.  Will continue with care.   [Nutrition/ Exercise/ Weight Management] : nutrition, exercise, weight management [Vitamins/Supplements] : vitamins/supplements [Breast Self Exam] : breast self exam [Bladder Hygiene] : bladder hygiene [Contraception/ Emergency Contraception/ Safe Sexual Practices] : contraception, emergency contraception, safe sexual practices [STD (testing, results, tx)] : STD (testing, results, tx) [Lab Results] : lab results [Medication Management] : medication management

## 2023-09-24 ENCOUNTER — HOSPITAL ENCOUNTER (INPATIENT)
Facility: MEDICAL CENTER | Age: 68
LOS: 36 days | DRG: 870 | End: 2023-10-30
Attending: EMERGENCY MEDICINE | Admitting: INTERNAL MEDICINE
Payer: MEDICARE

## 2023-09-24 ENCOUNTER — APPOINTMENT (OUTPATIENT)
Dept: RADIOLOGY | Facility: MEDICAL CENTER | Age: 68
DRG: 870 | End: 2023-09-24
Attending: EMERGENCY MEDICINE
Payer: MEDICARE

## 2023-09-24 DIAGNOSIS — I60.9 SAH (SUBARACHNOID HEMORRHAGE) (HCC): ICD-10-CM

## 2023-09-24 DIAGNOSIS — A41.9 SEVERE SEPSIS (HCC): ICD-10-CM

## 2023-09-24 DIAGNOSIS — J18.9 PNEUMONIA DUE TO INFECTIOUS ORGANISM, UNSPECIFIED LATERALITY, UNSPECIFIED PART OF LUNG: ICD-10-CM

## 2023-09-24 DIAGNOSIS — R41.0 DELIRIUM: ICD-10-CM

## 2023-09-24 DIAGNOSIS — N12 PYELONEPHRITIS: ICD-10-CM

## 2023-09-24 DIAGNOSIS — R40.2423 GLASGOW COMA SCALE TOTAL SCORE 9-12, AT HOSPITAL ADMISSION: ICD-10-CM

## 2023-09-24 DIAGNOSIS — D69.6 THROMBOCYTOPENIA (HCC): ICD-10-CM

## 2023-09-24 DIAGNOSIS — E87.20 LACTIC ACIDOSIS: ICD-10-CM

## 2023-09-24 DIAGNOSIS — Z09 HOSPITAL DISCHARGE FOLLOW-UP: ICD-10-CM

## 2023-09-24 DIAGNOSIS — R65.20 SEVERE SEPSIS (HCC): ICD-10-CM

## 2023-09-24 DIAGNOSIS — E11.69 TYPE 2 DIABETES MELLITUS WITH OTHER SPECIFIED COMPLICATION, UNSPECIFIED WHETHER LONG TERM INSULIN USE (HCC): ICD-10-CM

## 2023-09-24 DIAGNOSIS — K21.9 GASTROESOPHAGEAL REFLUX DISEASE, UNSPECIFIED WHETHER ESOPHAGITIS PRESENT: ICD-10-CM

## 2023-09-24 DIAGNOSIS — I61.9 INTRAPARENCHYMAL HEMORRHAGE OF BRAIN (HCC): ICD-10-CM

## 2023-09-24 DIAGNOSIS — A41.9 SEPSIS WITHOUT ACUTE ORGAN DYSFUNCTION, DUE TO UNSPECIFIED ORGANISM (HCC): ICD-10-CM

## 2023-09-24 DIAGNOSIS — J96.01 ACUTE RESPIRATORY FAILURE WITH HYPOXIA (HCC): ICD-10-CM

## 2023-09-24 DIAGNOSIS — S06.359S: ICD-10-CM

## 2023-09-24 DIAGNOSIS — K83.8 PNEUMOBILIA: ICD-10-CM

## 2023-09-24 DIAGNOSIS — K57.90 DIVERTICULOSIS: ICD-10-CM

## 2023-09-24 DIAGNOSIS — I63.9 CEREBROVASCULAR ACCIDENT (CVA), UNSPECIFIED MECHANISM (HCC): ICD-10-CM

## 2023-09-24 LAB
ABO GROUP BLD: NORMAL
ALBUMIN SERPL BCP-MCNC: 4.3 G/DL (ref 3.2–4.9)
ALBUMIN/GLOB SERPL: 1.4 G/DL
ALP SERPL-CCNC: 118 U/L (ref 30–99)
ALT SERPL-CCNC: 26 U/L (ref 2–50)
ANION GAP SERPL CALC-SCNC: 17 MMOL/L (ref 7–16)
APPEARANCE UR: CLEAR
APPEARANCE UR: CLEAR
AST SERPL-CCNC: 39 U/L (ref 12–45)
BACTERIA #/AREA URNS HPF: ABNORMAL /HPF
BACTERIA #/AREA URNS HPF: NEGATIVE /HPF
BASOPHILS # BLD AUTO: 0.2 % (ref 0–1.8)
BASOPHILS # BLD: 0.04 K/UL (ref 0–0.12)
BILIRUB SERPL-MCNC: 1.2 MG/DL (ref 0.1–1.5)
BILIRUB UR QL STRIP.AUTO: NEGATIVE
BILIRUB UR QL STRIP.AUTO: NEGATIVE
BLD GP AB SCN SERPL QL: NORMAL
BUN SERPL-MCNC: 16 MG/DL (ref 8–22)
CALCIUM ALBUM COR SERPL-MCNC: 8.9 MG/DL (ref 8.5–10.5)
CALCIUM SERPL-MCNC: 9.1 MG/DL (ref 8.5–10.5)
CHLORIDE SERPL-SCNC: 101 MMOL/L (ref 96–112)
CO2 SERPL-SCNC: 20 MMOL/L (ref 20–33)
COLOR UR: YELLOW
COLOR UR: YELLOW
CREAT SERPL-MCNC: 1.03 MG/DL (ref 0.5–1.4)
EOSINOPHIL # BLD AUTO: 0.02 K/UL (ref 0–0.51)
EOSINOPHIL NFR BLD: 0.1 % (ref 0–6.9)
EPI CELLS #/AREA URNS HPF: NEGATIVE /HPF
EPI CELLS #/AREA URNS HPF: NEGATIVE /HPF
ERYTHROCYTE [DISTWIDTH] IN BLOOD BY AUTOMATED COUNT: 45.7 FL (ref 35.9–50)
ETHANOL BLD-MCNC: <10.1 MG/DL
FLUAV RNA SPEC QL NAA+PROBE: NEGATIVE
FLUBV RNA SPEC QL NAA+PROBE: NEGATIVE
GFR SERPLBLD CREATININE-BSD FMLA CKD-EPI: 78 ML/MIN/1.73 M 2
GLOBULIN SER CALC-MCNC: 3.1 G/DL (ref 1.9–3.5)
GLUCOSE BLD STRIP.AUTO-MCNC: 144 MG/DL (ref 65–99)
GLUCOSE SERPL-MCNC: 160 MG/DL (ref 65–99)
GLUCOSE UR STRIP.AUTO-MCNC: NEGATIVE MG/DL
GLUCOSE UR STRIP.AUTO-MCNC: NEGATIVE MG/DL
HCT VFR BLD AUTO: 45.4 % (ref 42–52)
HGB BLD-MCNC: 15.4 G/DL (ref 14–18)
HYALINE CASTS #/AREA URNS LPF: ABNORMAL /LPF
HYALINE CASTS #/AREA URNS LPF: ABNORMAL /LPF
IMM GRANULOCYTES # BLD AUTO: 0.12 K/UL (ref 0–0.11)
IMM GRANULOCYTES NFR BLD AUTO: 0.7 % (ref 0–0.9)
INR PPP: 1.11 (ref 0.87–1.13)
KETONES UR STRIP.AUTO-MCNC: 15 MG/DL
KETONES UR STRIP.AUTO-MCNC: ABNORMAL MG/DL
LACTATE SERPL-SCNC: 2.3 MMOL/L (ref 0.5–2)
LACTATE SERPL-SCNC: 4.6 MMOL/L (ref 0.5–2)
LEUKOCYTE ESTERASE UR QL STRIP.AUTO: ABNORMAL
LEUKOCYTE ESTERASE UR QL STRIP.AUTO: ABNORMAL
LYMPHOCYTES # BLD AUTO: 1.06 K/UL (ref 1–4.8)
LYMPHOCYTES NFR BLD: 6.5 % (ref 22–41)
MCH RBC QN AUTO: 31.2 PG (ref 27–33)
MCHC RBC AUTO-ENTMCNC: 33.9 G/DL (ref 32.3–36.5)
MCV RBC AUTO: 92.1 FL (ref 81.4–97.8)
MICRO URNS: ABNORMAL
MICRO URNS: ABNORMAL
MONOCYTES # BLD AUTO: 1.04 K/UL (ref 0–0.85)
MONOCYTES NFR BLD AUTO: 6.3 % (ref 0–13.4)
NEUTROPHILS # BLD AUTO: 14.13 K/UL (ref 1.82–7.42)
NEUTROPHILS NFR BLD: 86.2 % (ref 44–72)
NITRITE UR QL STRIP.AUTO: POSITIVE
NITRITE UR QL STRIP.AUTO: POSITIVE
NRBC # BLD AUTO: 0 K/UL
NRBC BLD-RTO: 0 /100 WBC (ref 0–0.2)
PH UR STRIP.AUTO: 5.5 [PH] (ref 5–8)
PH UR STRIP.AUTO: 5.5 [PH] (ref 5–8)
PLATELET # BLD AUTO: 115 K/UL (ref 164–446)
PMV BLD AUTO: 11.5 FL (ref 9–12.9)
POTASSIUM SERPL-SCNC: 4.3 MMOL/L (ref 3.6–5.5)
PROT SERPL-MCNC: 7.4 G/DL (ref 6–8.2)
PROT UR QL STRIP: 30 MG/DL
PROT UR QL STRIP: 30 MG/DL
PROTHROMBIN TIME: 14.5 SEC (ref 12–14.6)
RBC # BLD AUTO: 4.93 M/UL (ref 4.7–6.1)
RBC # URNS HPF: ABNORMAL /HPF
RBC # URNS HPF: ABNORMAL /HPF
RBC UR QL AUTO: ABNORMAL
RBC UR QL AUTO: ABNORMAL
RH BLD: NORMAL
RSV RNA SPEC QL NAA+PROBE: NEGATIVE
SARS-COV-2 RNA RESP QL NAA+PROBE: NOTDETECTED
SODIUM SERPL-SCNC: 138 MMOL/L (ref 135–145)
SP GR UR STRIP.AUTO: 1.02
SP GR UR STRIP.AUTO: 1.02
SPECIMEN SOURCE: NORMAL
UROBILINOGEN UR STRIP.AUTO-MCNC: 1 MG/DL
UROBILINOGEN UR STRIP.AUTO-MCNC: 1 MG/DL
WBC # BLD AUTO: 16.4 K/UL (ref 4.8–10.8)
WBC #/AREA URNS HPF: ABNORMAL /HPF
WBC #/AREA URNS HPF: ABNORMAL /HPF

## 2023-09-24 PROCEDURE — 99291 CRITICAL CARE FIRST HOUR: CPT

## 2023-09-24 PROCEDURE — 96375 TX/PRO/DX INJ NEW DRUG ADDON: CPT

## 2023-09-24 PROCEDURE — 80053 COMPREHEN METABOLIC PANEL: CPT | Mod: 91

## 2023-09-24 PROCEDURE — 96366 THER/PROPH/DIAG IV INF ADDON: CPT

## 2023-09-24 PROCEDURE — 70450 CT HEAD/BRAIN W/O DYE: CPT

## 2023-09-24 PROCEDURE — 87040 BLOOD CULTURE FOR BACTERIA: CPT | Mod: 91

## 2023-09-24 PROCEDURE — 700111 HCHG RX REV CODE 636 W/ 250 OVERRIDE (IP): Performed by: INTERNAL MEDICINE

## 2023-09-24 PROCEDURE — 770022 HCHG ROOM/CARE - ICU (200)

## 2023-09-24 PROCEDURE — 81001 URINALYSIS AUTO W/SCOPE: CPT | Mod: 91

## 2023-09-24 PROCEDURE — 700102 HCHG RX REV CODE 250 W/ 637 OVERRIDE(OP): Performed by: EMERGENCY MEDICINE

## 2023-09-24 PROCEDURE — 700111 HCHG RX REV CODE 636 W/ 250 OVERRIDE (IP): Mod: JZ | Performed by: EMERGENCY MEDICINE

## 2023-09-24 PROCEDURE — 87086 URINE CULTURE/COLONY COUNT: CPT

## 2023-09-24 PROCEDURE — 85025 COMPLETE CBC W/AUTO DIFF WBC: CPT

## 2023-09-24 PROCEDURE — 700101 HCHG RX REV CODE 250: Performed by: EMERGENCY MEDICINE

## 2023-09-24 PROCEDURE — 82962 GLUCOSE BLOOD TEST: CPT

## 2023-09-24 PROCEDURE — 96365 THER/PROPH/DIAG IV INF INIT: CPT

## 2023-09-24 PROCEDURE — 85610 PROTHROMBIN TIME: CPT

## 2023-09-24 PROCEDURE — C9803 HOPD COVID-19 SPEC COLLECT: HCPCS | Performed by: EMERGENCY MEDICINE

## 2023-09-24 PROCEDURE — 700105 HCHG RX REV CODE 258: Performed by: EMERGENCY MEDICINE

## 2023-09-24 PROCEDURE — 86850 RBC ANTIBODY SCREEN: CPT

## 2023-09-24 PROCEDURE — A9270 NON-COVERED ITEM OR SERVICE: HCPCS | Performed by: EMERGENCY MEDICINE

## 2023-09-24 PROCEDURE — 83605 ASSAY OF LACTIC ACID: CPT | Mod: 91

## 2023-09-24 PROCEDURE — 71045 X-RAY EXAM CHEST 1 VIEW: CPT

## 2023-09-24 PROCEDURE — 99291 CRITICAL CARE FIRST HOUR: CPT | Performed by: INTERNAL MEDICINE

## 2023-09-24 PROCEDURE — 0241U HCHG SARS-COV-2 COVID-19 NFCT DS RESP RNA 4 TRGT MIC: CPT

## 2023-09-24 PROCEDURE — 74176 CT ABD & PELVIS W/O CONTRAST: CPT

## 2023-09-24 PROCEDURE — 86900 BLOOD TYPING SEROLOGIC ABO: CPT

## 2023-09-24 PROCEDURE — 36415 COLL VENOUS BLD VENIPUNCTURE: CPT

## 2023-09-24 PROCEDURE — 700105 HCHG RX REV CODE 258: Performed by: INTERNAL MEDICINE

## 2023-09-24 PROCEDURE — 86901 BLOOD TYPING SEROLOGIC RH(D): CPT

## 2023-09-24 PROCEDURE — 82077 ASSAY SPEC XCP UR&BREATH IA: CPT

## 2023-09-24 RX ORDER — CEFTRIAXONE 2 G/1
2000 INJECTION, POWDER, FOR SOLUTION INTRAMUSCULAR; INTRAVENOUS ONCE
Status: COMPLETED | OUTPATIENT
Start: 2023-09-24 | End: 2023-09-24

## 2023-09-24 RX ORDER — LORAZEPAM 2 MG/ML
2 INJECTION INTRAMUSCULAR ONCE
Status: COMPLETED | OUTPATIENT
Start: 2023-09-24 | End: 2023-09-24

## 2023-09-24 RX ORDER — BISACODYL 10 MG
10 SUPPOSITORY, RECTAL RECTAL
Status: DISCONTINUED | OUTPATIENT
Start: 2023-09-24 | End: 2023-09-25

## 2023-09-24 RX ORDER — PIOGLITAZONEHYDROCHLORIDE 15 MG/1
15 TABLET ORAL DAILY
COMMUNITY

## 2023-09-24 RX ORDER — DIPHENHYDRAMINE HYDROCHLORIDE 50 MG/ML
50 INJECTION INTRAMUSCULAR; INTRAVENOUS ONCE
Status: COMPLETED | OUTPATIENT
Start: 2023-09-24 | End: 2023-09-24

## 2023-09-24 RX ORDER — METRONIDAZOLE 500 MG/100ML
500 INJECTION, SOLUTION INTRAVENOUS EVERY 12 HOURS
Status: COMPLETED | OUTPATIENT
Start: 2023-09-24 | End: 2023-09-29

## 2023-09-24 RX ORDER — DEXMEDETOMIDINE HYDROCHLORIDE 4 UG/ML
.1-1.5 INJECTION, SOLUTION INTRAVENOUS CONTINUOUS
Status: DISCONTINUED | OUTPATIENT
Start: 2023-09-24 | End: 2023-09-25

## 2023-09-24 RX ORDER — SIMVASTATIN 40 MG
40 TABLET ORAL NIGHTLY
Status: ON HOLD | COMMUNITY
End: 2023-10-30

## 2023-09-24 RX ORDER — ALLOPURINOL 300 MG/1
300 TABLET ORAL DAILY
COMMUNITY

## 2023-09-24 RX ORDER — POTASSIUM CHLORIDE 750 MG/1
10 TABLET, EXTENDED RELEASE ORAL DAILY
COMMUNITY

## 2023-09-24 RX ORDER — LABETALOL HYDROCHLORIDE 5 MG/ML
10 INJECTION, SOLUTION INTRAVENOUS EVERY 4 HOURS PRN
Status: DISCONTINUED | OUTPATIENT
Start: 2023-09-24 | End: 2023-09-25

## 2023-09-24 RX ORDER — SODIUM CHLORIDE, SODIUM LACTATE, POTASSIUM CHLORIDE, AND CALCIUM CHLORIDE .6; .31; .03; .02 G/100ML; G/100ML; G/100ML; G/100ML
1000 INJECTION, SOLUTION INTRAVENOUS
Status: DISCONTINUED | OUTPATIENT
Start: 2023-09-24 | End: 2023-10-01

## 2023-09-24 RX ORDER — FUROSEMIDE 20 MG/1
20 TABLET ORAL DAILY
Status: ON HOLD | COMMUNITY
End: 2023-10-30

## 2023-09-24 RX ORDER — POLYETHYLENE GLYCOL 3350 17 G/17G
1 POWDER, FOR SOLUTION ORAL
Status: DISCONTINUED | OUTPATIENT
Start: 2023-09-24 | End: 2023-09-25

## 2023-09-24 RX ORDER — ACETAMINOPHEN 325 MG/1
650 TABLET ORAL ONCE
Status: COMPLETED | OUTPATIENT
Start: 2023-09-24 | End: 2023-09-24

## 2023-09-24 RX ORDER — SODIUM CHLORIDE 9 MG/ML
1000 INJECTION, SOLUTION INTRAVENOUS ONCE
Status: COMPLETED | OUTPATIENT
Start: 2023-09-24 | End: 2023-09-24

## 2023-09-24 RX ORDER — SODIUM CHLORIDE, SODIUM LACTATE, POTASSIUM CHLORIDE, CALCIUM CHLORIDE 600; 310; 30; 20 MG/100ML; MG/100ML; MG/100ML; MG/100ML
INJECTION, SOLUTION INTRAVENOUS CONTINUOUS
Status: DISCONTINUED | OUTPATIENT
Start: 2023-09-24 | End: 2023-09-28

## 2023-09-24 RX ORDER — DEXTROSE MONOHYDRATE 25 G/50ML
25 INJECTION, SOLUTION INTRAVENOUS
Status: DISCONTINUED | OUTPATIENT
Start: 2023-09-24 | End: 2023-10-05

## 2023-09-24 RX ORDER — OXYCODONE HYDROCHLORIDE 10 MG/1
10 TABLET ORAL ONCE
Status: DISPENSED | OUTPATIENT
Start: 2023-09-24 | End: 2023-09-25

## 2023-09-24 RX ORDER — SODIUM CHLORIDE, SODIUM LACTATE, POTASSIUM CHLORIDE, CALCIUM CHLORIDE 600; 310; 30; 20 MG/100ML; MG/100ML; MG/100ML; MG/100ML
INJECTION, SOLUTION INTRAVENOUS CONTINUOUS
Status: DISCONTINUED | OUTPATIENT
Start: 2023-09-24 | End: 2023-09-24

## 2023-09-24 RX ORDER — OMEPRAZOLE 20 MG/1
20 CAPSULE, DELAYED RELEASE ORAL DAILY
COMMUNITY

## 2023-09-24 RX ORDER — HALOPERIDOL 5 MG/ML
5 INJECTION INTRAMUSCULAR ONCE
Status: COMPLETED | OUTPATIENT
Start: 2023-09-24 | End: 2023-09-24

## 2023-09-24 RX ORDER — MEMANTINE HYDROCHLORIDE 5 MG/1
5 TABLET ORAL 2 TIMES DAILY
Status: ON HOLD | COMMUNITY
End: 2023-10-30

## 2023-09-24 RX ORDER — AMOXICILLIN 250 MG
2 CAPSULE ORAL 2 TIMES DAILY
Status: DISCONTINUED | OUTPATIENT
Start: 2023-09-24 | End: 2023-09-25

## 2023-09-24 RX ORDER — GALCANEZUMAB 120 MG/ML
120 INJECTION, SOLUTION SUBCUTANEOUS
Status: ON HOLD | COMMUNITY
End: 2023-10-30

## 2023-09-24 RX ADMIN — DEXMEDETOMIDINE 0.2 MCG/KG/HR: 100 INJECTION, SOLUTION INTRAVENOUS at 20:15

## 2023-09-24 RX ADMIN — CEFTRIAXONE SODIUM 2000 MG: 2 INJECTION, POWDER, FOR SOLUTION INTRAMUSCULAR; INTRAVENOUS at 19:50

## 2023-09-24 RX ADMIN — METRONIDAZOLE 500 MG: 5 INJECTION, SOLUTION INTRAVENOUS at 23:34

## 2023-09-24 RX ADMIN — SODIUM CHLORIDE, POTASSIUM CHLORIDE, SODIUM LACTATE AND CALCIUM CHLORIDE: 600; 310; 30; 20 INJECTION, SOLUTION INTRAVENOUS at 22:43

## 2023-09-24 RX ADMIN — HALOPERIDOL LACTATE 5 MG: 5 INJECTION, SOLUTION INTRAMUSCULAR at 18:25

## 2023-09-24 RX ADMIN — FENTANYL CITRATE 200 MCG: 50 INJECTION, SOLUTION INTRAMUSCULAR; INTRAVENOUS at 17:59

## 2023-09-24 RX ADMIN — ACETAMINOPHEN 650 MG: 325 TABLET, FILM COATED ORAL at 18:57

## 2023-09-24 RX ADMIN — SODIUM CHLORIDE 1000 ML: 9 INJECTION, SOLUTION INTRAVENOUS at 19:49

## 2023-09-24 RX ADMIN — DIPHENHYDRAMINE HYDROCHLORIDE 50 MG: 50 INJECTION, SOLUTION INTRAMUSCULAR; INTRAVENOUS at 18:25

## 2023-09-24 ASSESSMENT — FIBROSIS 4 INDEX
FIB4 SCORE: 4.66
FIB4 SCORE: 3.18

## 2023-09-25 ENCOUNTER — APPOINTMENT (OUTPATIENT)
Dept: RADIOLOGY | Facility: MEDICAL CENTER | Age: 68
DRG: 870 | End: 2023-09-25
Attending: INTERNAL MEDICINE
Payer: MEDICARE

## 2023-09-25 PROBLEM — D69.6 THROMBOCYTOPENIA (HCC): Status: ACTIVE | Noted: 2023-09-25

## 2023-09-25 PROBLEM — K83.8 PNEUMOBILIA: Status: ACTIVE | Noted: 2023-09-25

## 2023-09-25 PROBLEM — J96.01 ACUTE RESPIRATORY FAILURE WITH HYPOXIA (HCC): Status: ACTIVE | Noted: 2023-09-25

## 2023-09-25 PROBLEM — E87.20 LACTIC ACIDOSIS: Status: ACTIVE | Noted: 2023-09-25

## 2023-09-25 LAB
ABO + RH BLD: NORMAL
ALBUMIN SERPL BCP-MCNC: 3.9 G/DL (ref 3.2–4.9)
ALBUMIN SERPL BCP-MCNC: 4.1 G/DL (ref 3.2–4.9)
ALBUMIN/GLOB SERPL: 1.6 G/DL
ALBUMIN/GLOB SERPL: 1.6 G/DL
ALP SERPL-CCNC: 124 U/L (ref 30–99)
ALP SERPL-CCNC: 98 U/L (ref 30–99)
ALT SERPL-CCNC: 23 U/L (ref 2–50)
ALT SERPL-CCNC: 28 U/L (ref 2–50)
ANION GAP SERPL CALC-SCNC: 12 MMOL/L (ref 7–16)
ANION GAP SERPL CALC-SCNC: 13 MMOL/L (ref 7–16)
ANION GAP SERPL CALC-SCNC: 15 MMOL/L (ref 7–16)
ARTERIAL PATENCY WRIST A: ABNORMAL
AST SERPL-CCNC: 30 U/L (ref 12–45)
AST SERPL-CCNC: 47 U/L (ref 12–45)
BASE EXCESS BLDA CALC-SCNC: -4 MMOL/L (ref -4–3)
BASOPHILS # BLD AUTO: 0.2 % (ref 0–1.8)
BASOPHILS # BLD AUTO: 0.3 % (ref 0–1.8)
BASOPHILS # BLD: 0.03 K/UL (ref 0–0.12)
BASOPHILS # BLD: 0.04 K/UL (ref 0–0.12)
BILIRUB SERPL-MCNC: 1 MG/DL (ref 0.1–1.5)
BILIRUB SERPL-MCNC: 1.4 MG/DL (ref 0.1–1.5)
BODY TEMPERATURE: ABNORMAL DEGREES
BREATHS SETTING VENT: 20
BUN SERPL-MCNC: 16 MG/DL (ref 8–22)
BUN SERPL-MCNC: 16 MG/DL (ref 8–22)
BUN SERPL-MCNC: 17 MG/DL (ref 8–22)
CA-I SERPL-SCNC: 1.1 MMOL/L (ref 1.1–1.3)
CALCIUM ALBUM COR SERPL-MCNC: 8.4 MG/DL (ref 8.5–10.5)
CALCIUM ALBUM COR SERPL-MCNC: 8.6 MG/DL (ref 8.5–10.5)
CALCIUM SERPL-MCNC: 7.4 MG/DL (ref 8.5–10.5)
CALCIUM SERPL-MCNC: 8.3 MG/DL (ref 8.5–10.5)
CALCIUM SERPL-MCNC: 8.7 MG/DL (ref 8.5–10.5)
CFT BLD TEG: 4.1 MIN (ref 4.6–9.1)
CFT P HPASE BLD TEG: 3.7 MIN (ref 4.3–8.3)
CHLORIDE SERPL-SCNC: 102 MMOL/L (ref 96–112)
CHLORIDE SERPL-SCNC: 102 MMOL/L (ref 96–112)
CHLORIDE SERPL-SCNC: 104 MMOL/L (ref 96–112)
CLOT ANGLE BLD TEG: 75.9 DEGREES (ref 63–78)
CLOT LYSIS 30M P MA LENFR BLD TEG: 0 % (ref 0–2.6)
CO2 BLDA-SCNC: 23 MMOL/L (ref 20–33)
CO2 SERPL-SCNC: 21 MMOL/L (ref 20–33)
CO2 SERPL-SCNC: 21 MMOL/L (ref 20–33)
CO2 SERPL-SCNC: 25 MMOL/L (ref 20–33)
CREAT SERPL-MCNC: 1.04 MG/DL (ref 0.5–1.4)
CREAT SERPL-MCNC: 1.07 MG/DL (ref 0.5–1.4)
CREAT SERPL-MCNC: 1.11 MG/DL (ref 0.5–1.4)
CT.EXTRINSIC BLD ROTEM: 1.3 MIN (ref 0.8–2.1)
DELSYS IDSYS: ABNORMAL
END TIDAL CARBON DIOXIDE IECO2: 43 MMHG
EOSINOPHIL # BLD AUTO: 0.01 K/UL (ref 0–0.51)
EOSINOPHIL # BLD AUTO: 0.15 K/UL (ref 0–0.51)
EOSINOPHIL NFR BLD: 0.1 % (ref 0–6.9)
EOSINOPHIL NFR BLD: 0.8 % (ref 0–6.9)
ERYTHROCYTE [DISTWIDTH] IN BLOOD BY AUTOMATED COUNT: 45.1 FL (ref 35.9–50)
ERYTHROCYTE [DISTWIDTH] IN BLOOD BY AUTOMATED COUNT: 45.5 FL (ref 35.9–50)
FIBRINOGEN PPP-MCNC: 569 MG/DL (ref 215–460)
GFR SERPLBLD CREATININE-BSD FMLA CKD-EPI: 71 ML/MIN/1.73 M 2
GFR SERPLBLD CREATININE-BSD FMLA CKD-EPI: 74 ML/MIN/1.73 M 2
GFR SERPLBLD CREATININE-BSD FMLA CKD-EPI: 77 ML/MIN/1.73 M 2
GLOBULIN SER CALC-MCNC: 2.4 G/DL (ref 1.9–3.5)
GLOBULIN SER CALC-MCNC: 2.6 G/DL (ref 1.9–3.5)
GLUCOSE BLD STRIP.AUTO-MCNC: 260 MG/DL (ref 65–99)
GLUCOSE BLD STRIP.AUTO-MCNC: 279 MG/DL (ref 65–99)
GLUCOSE BLD STRIP.AUTO-MCNC: 313 MG/DL (ref 65–99)
GLUCOSE SERPL-MCNC: 154 MG/DL (ref 65–99)
GLUCOSE SERPL-MCNC: 230 MG/DL (ref 65–99)
GLUCOSE SERPL-MCNC: 256 MG/DL (ref 65–99)
GRAM STN SPEC: NORMAL
HCO3 BLDA-SCNC: 21.6 MMOL/L (ref 17–25)
HCT VFR BLD AUTO: 40.3 % (ref 42–52)
HCT VFR BLD AUTO: 44.3 % (ref 42–52)
HGB BLD-MCNC: 13.6 G/DL (ref 14–18)
HGB BLD-MCNC: 15.1 G/DL (ref 14–18)
HOROWITZ INDEX BLDA+IHG-RTO: 120 MM[HG]
IMM GRANULOCYTES # BLD AUTO: 0.11 K/UL (ref 0–0.11)
IMM GRANULOCYTES # BLD AUTO: 0.2 K/UL (ref 0–0.11)
IMM GRANULOCYTES NFR BLD AUTO: 1 % (ref 0–0.9)
IMM GRANULOCYTES NFR BLD AUTO: 1 % (ref 0–0.9)
LACTATE SERPL-SCNC: 1.8 MMOL/L (ref 0.5–2)
LACTATE SERPL-SCNC: 2.6 MMOL/L (ref 0.5–2)
LACTATE SERPL-SCNC: 2.6 MMOL/L (ref 0.5–2)
LACTATE SERPL-SCNC: 3 MMOL/L (ref 0.5–2)
LACTATE SERPL-SCNC: 3.1 MMOL/L (ref 0.5–2)
LYMPHOCYTES # BLD AUTO: 0.91 K/UL (ref 1–4.8)
LYMPHOCYTES # BLD AUTO: 2.36 K/UL (ref 1–4.8)
LYMPHOCYTES NFR BLD: 11.9 % (ref 22–41)
LYMPHOCYTES NFR BLD: 8.7 % (ref 22–41)
MAGNESIUM SERPL-MCNC: 1.1 MG/DL (ref 1.5–2.5)
MCF BLD TEG: 59.2 MM (ref 52–69)
MCF.PLATELET INHIB BLD ROTEM: 32.7 MM (ref 15–32)
MCH RBC QN AUTO: 30.5 PG (ref 27–33)
MCH RBC QN AUTO: 31.1 PG (ref 27–33)
MCHC RBC AUTO-ENTMCNC: 33.7 G/DL (ref 32.3–36.5)
MCHC RBC AUTO-ENTMCNC: 34.1 G/DL (ref 32.3–36.5)
MCV RBC AUTO: 90.4 FL (ref 81.4–97.8)
MCV RBC AUTO: 91.3 FL (ref 81.4–97.8)
MODE IMODE: ABNORMAL
MONOCYTES # BLD AUTO: 0.27 K/UL (ref 0–0.85)
MONOCYTES # BLD AUTO: 1.48 K/UL (ref 0–0.85)
MONOCYTES NFR BLD AUTO: 2.6 % (ref 0–13.4)
MONOCYTES NFR BLD AUTO: 7.5 % (ref 0–13.4)
NEUTROPHILS # BLD AUTO: 15.52 K/UL (ref 1.82–7.42)
NEUTROPHILS # BLD AUTO: 9.17 K/UL (ref 1.82–7.42)
NEUTROPHILS NFR BLD: 78.6 % (ref 44–72)
NEUTROPHILS NFR BLD: 87.3 % (ref 44–72)
NRBC # BLD AUTO: 0 K/UL
NRBC # BLD AUTO: 0.02 K/UL
NRBC BLD-RTO: 0 /100 WBC (ref 0–0.2)
NRBC BLD-RTO: 0.2 /100 WBC (ref 0–0.2)
O2/TOTAL GAS SETTING VFR VENT: 50 %
PCO2 BLDA: 41 MMHG (ref 26–37)
PCO2 TEMP ADJ BLDA: 39.2 MMHG (ref 26–37)
PEEP END EXPIRATORY PRESSURE IPEEP: 8 CMH20
PH BLDA: 7.33 [PH] (ref 7.4–7.5)
PH TEMP ADJ BLDA: 7.34 [PH] (ref 7.4–7.5)
PHOSPHATE SERPL-MCNC: 1.4 MG/DL (ref 2.5–4.5)
PHOSPHATE SERPL-MCNC: 3 MG/DL (ref 2.5–4.5)
PLATELET # BLD AUTO: 85 K/UL (ref 164–446)
PLATELET # BLD AUTO: 86 K/UL (ref 164–446)
PLATELETS.RETICULATED NFR BLD AUTO: 7.6 % (ref 0.6–13.1)
PLATELETS.RETICULATED NFR BLD AUTO: 8.8 % (ref 0.6–13.1)
PMV BLD AUTO: 11.2 FL (ref 9–12.9)
PMV BLD AUTO: 12.3 FL (ref 9–12.9)
PO2 BLDA: 60 MMHG (ref 64–87)
PO2 TEMP ADJ BLDA: 56 MMHG (ref 64–87)
POTASSIUM SERPL-SCNC: 3.4 MMOL/L (ref 3.6–5.5)
POTASSIUM SERPL-SCNC: 3.6 MMOL/L (ref 3.6–5.5)
POTASSIUM SERPL-SCNC: 3.7 MMOL/L (ref 3.6–5.5)
PROT SERPL-MCNC: 6.3 G/DL (ref 6–8.2)
PROT SERPL-MCNC: 6.7 G/DL (ref 6–8.2)
RBC # BLD AUTO: 4.46 M/UL (ref 4.7–6.1)
RBC # BLD AUTO: 4.85 M/UL (ref 4.7–6.1)
SAO2 % BLDA: 89 % (ref 93–99)
SIGNIFICANT IND 70042: NORMAL
SITE SITE: NORMAL
SODIUM SERPL-SCNC: 137 MMOL/L (ref 135–145)
SODIUM SERPL-SCNC: 138 MMOL/L (ref 135–145)
SODIUM SERPL-SCNC: 140 MMOL/L (ref 135–145)
SOURCE SOURCE: NORMAL
SPECIMEN DRAWN FROM PATIENT: ABNORMAL
TEG ALGORITHM TGALG: ABNORMAL
TIDAL VOLUME IVT: 470 ML
TRIGL SERPL-MCNC: 69 MG/DL (ref 0–149)
WBC # BLD AUTO: 10.5 K/UL (ref 4.8–10.8)
WBC # BLD AUTO: 19.8 K/UL (ref 4.8–10.8)

## 2023-09-25 PROCEDURE — 80053 COMPREHEN METABOLIC PANEL: CPT

## 2023-09-25 PROCEDURE — 700105 HCHG RX REV CODE 258: Performed by: INTERNAL MEDICINE

## 2023-09-25 PROCEDURE — 700101 HCHG RX REV CODE 250: Performed by: INTERNAL MEDICINE

## 2023-09-25 PROCEDURE — 700105 HCHG RX REV CODE 258

## 2023-09-25 PROCEDURE — 700111 HCHG RX REV CODE 636 W/ 250 OVERRIDE (IP): Performed by: INTERNAL MEDICINE

## 2023-09-25 PROCEDURE — 70496 CT ANGIOGRAPHY HEAD: CPT

## 2023-09-25 PROCEDURE — 85025 COMPLETE CBC W/AUTO DIFF WBC: CPT

## 2023-09-25 PROCEDURE — 70498 CT ANGIOGRAPHY NECK: CPT

## 2023-09-25 PROCEDURE — 700101 HCHG RX REV CODE 250

## 2023-09-25 PROCEDURE — 85384 FIBRINOGEN ACTIVITY: CPT

## 2023-09-25 PROCEDURE — 700102 HCHG RX REV CODE 250 W/ 637 OVERRIDE(OP)

## 2023-09-25 PROCEDURE — 94799 UNLISTED PULMONARY SVC/PX: CPT

## 2023-09-25 PROCEDURE — A9270 NON-COVERED ITEM OR SERVICE: HCPCS

## 2023-09-25 PROCEDURE — 94002 VENT MGMT INPAT INIT DAY: CPT

## 2023-09-25 PROCEDURE — 94003 VENT MGMT INPAT SUBQ DAY: CPT

## 2023-09-25 PROCEDURE — 302978 HCHG BRONCHOSCOPY-DIAGNOSTIC

## 2023-09-25 PROCEDURE — 36600 WITHDRAWAL OF ARTERIAL BLOOD: CPT

## 2023-09-25 PROCEDURE — C1751 CATH, INF, PER/CENT/MIDLINE: HCPCS

## 2023-09-25 PROCEDURE — 95822 EEG COMA OR SLEEP ONLY: CPT | Mod: 26 | Performed by: PSYCHIATRY & NEUROLOGY

## 2023-09-25 PROCEDURE — 700102 HCHG RX REV CODE 250 W/ 637 OVERRIDE(OP): Performed by: INTERNAL MEDICINE

## 2023-09-25 PROCEDURE — 0B9F8ZX DRAINAGE OF RIGHT LOWER LUNG LOBE, VIA NATURAL OR ARTIFICIAL OPENING ENDOSCOPIC, DIAGNOSTIC: ICD-10-PCS | Performed by: INTERNAL MEDICINE

## 2023-09-25 PROCEDURE — 0B9J8ZZ DRAINAGE OF LEFT LOWER LUNG LOBE, VIA NATURAL OR ARTIFICIAL OPENING ENDOSCOPIC: ICD-10-PCS | Performed by: INTERNAL MEDICINE

## 2023-09-25 PROCEDURE — 36556 INSERT NON-TUNNEL CV CATH: CPT | Performed by: INTERNAL MEDICINE

## 2023-09-25 PROCEDURE — 700111 HCHG RX REV CODE 636 W/ 250 OVERRIDE (IP)

## 2023-09-25 PROCEDURE — 85347 COAGULATION TIME ACTIVATED: CPT

## 2023-09-25 PROCEDURE — 84100 ASSAY OF PHOSPHORUS: CPT

## 2023-09-25 PROCEDURE — 85576 BLOOD PLATELET AGGREGATION: CPT

## 2023-09-25 PROCEDURE — 82330 ASSAY OF CALCIUM: CPT

## 2023-09-25 PROCEDURE — 700117 HCHG RX CONTRAST REV CODE 255: Performed by: INTERNAL MEDICINE

## 2023-09-25 PROCEDURE — 31500 INSERT EMERGENCY AIRWAY: CPT | Performed by: INTERNAL MEDICINE

## 2023-09-25 PROCEDURE — 92950 HEART/LUNG RESUSCITATION CPR: CPT

## 2023-09-25 PROCEDURE — 99291 CRITICAL CARE FIRST HOUR: CPT | Mod: 25 | Performed by: INTERNAL MEDICINE

## 2023-09-25 PROCEDURE — 83605 ASSAY OF LACTIC ACID: CPT | Mod: 91

## 2023-09-25 PROCEDURE — 02HV33Z INSERTION OF INFUSION DEVICE INTO SUPERIOR VENA CAVA, PERCUTANEOUS APPROACH: ICD-10-PCS | Performed by: INTERNAL MEDICINE

## 2023-09-25 PROCEDURE — 5A1945Z RESPIRATORY VENTILATION, 24-96 CONSECUTIVE HOURS: ICD-10-PCS | Performed by: INTERNAL MEDICINE

## 2023-09-25 PROCEDURE — 85055 RETICULATED PLATELET ASSAY: CPT

## 2023-09-25 PROCEDURE — 31624 DX BRONCHOSCOPE/LAVAGE: CPT | Performed by: INTERNAL MEDICINE

## 2023-09-25 PROCEDURE — 82803 BLOOD GASES ANY COMBINATION: CPT

## 2023-09-25 PROCEDURE — 87205 SMEAR GRAM STAIN: CPT

## 2023-09-25 PROCEDURE — 99152 MOD SED SAME PHYS/QHP 5/>YRS: CPT

## 2023-09-25 PROCEDURE — 71045 X-RAY EXAM CHEST 1 VIEW: CPT

## 2023-09-25 PROCEDURE — 36556 INSERT NON-TUNNEL CV CATH: CPT

## 2023-09-25 PROCEDURE — 83735 ASSAY OF MAGNESIUM: CPT

## 2023-09-25 PROCEDURE — 99222 1ST HOSP IP/OBS MODERATE 55: CPT | Performed by: SURGERY

## 2023-09-25 PROCEDURE — 31645 BRNCHSC W/THER ASPIR 1ST: CPT | Performed by: INTERNAL MEDICINE

## 2023-09-25 PROCEDURE — 4A10X4Z MONITORING OF CENTRAL NERVOUS ELECTRICAL ACTIVITY, EXTERNAL APPROACH: ICD-10-PCS | Performed by: PSYCHIATRY & NEUROLOGY

## 2023-09-25 PROCEDURE — 700111 HCHG RX REV CODE 636 W/ 250 OVERRIDE (IP): Mod: JZ

## 2023-09-25 PROCEDURE — 84478 ASSAY OF TRIGLYCERIDES: CPT

## 2023-09-25 PROCEDURE — 87070 CULTURE OTHR SPECIMN AEROBIC: CPT

## 2023-09-25 PROCEDURE — 82962 GLUCOSE BLOOD TEST: CPT | Mod: 91

## 2023-09-25 PROCEDURE — 31500 INSERT EMERGENCY AIRWAY: CPT

## 2023-09-25 PROCEDURE — 95822 EEG COMA OR SLEEP ONLY: CPT | Performed by: PSYCHIATRY & NEUROLOGY

## 2023-09-25 PROCEDURE — 80048 BASIC METABOLIC PNL TOTAL CA: CPT

## 2023-09-25 PROCEDURE — 770022 HCHG ROOM/CARE - ICU (200)

## 2023-09-25 PROCEDURE — 0BH17EZ INSERTION OF ENDOTRACHEAL AIRWAY INTO TRACHEA, VIA NATURAL OR ARTIFICIAL OPENING: ICD-10-PCS | Performed by: INTERNAL MEDICINE

## 2023-09-25 RX ORDER — CALCIUM GLUCONATE 20 MG/ML
2 INJECTION, SOLUTION INTRAVENOUS ONCE
Status: COMPLETED | OUTPATIENT
Start: 2023-09-25 | End: 2023-09-25

## 2023-09-25 RX ORDER — BISACODYL 10 MG
10 SUPPOSITORY, RECTAL RECTAL
Status: DISCONTINUED | OUTPATIENT
Start: 2023-09-25 | End: 2023-09-29

## 2023-09-25 RX ORDER — AMOXICILLIN 250 MG
2 CAPSULE ORAL 2 TIMES DAILY
Status: DISCONTINUED | OUTPATIENT
Start: 2023-09-25 | End: 2023-09-29

## 2023-09-25 RX ORDER — MIDAZOLAM HYDROCHLORIDE 1 MG/ML
2 INJECTION INTRAMUSCULAR; INTRAVENOUS ONCE
Status: COMPLETED | OUTPATIENT
Start: 2023-09-25 | End: 2023-09-25

## 2023-09-25 RX ORDER — NOREPINEPHRINE BITARTRATE 0.03 MG/ML
0-1 INJECTION, SOLUTION INTRAVENOUS CONTINUOUS
Status: DISCONTINUED | OUTPATIENT
Start: 2023-09-25 | End: 2023-09-28

## 2023-09-25 RX ORDER — INSULIN LISPRO 100 [IU]/ML
2-9 INJECTION, SOLUTION INTRAVENOUS; SUBCUTANEOUS EVERY 6 HOURS
Status: DISCONTINUED | OUTPATIENT
Start: 2023-09-25 | End: 2023-10-05

## 2023-09-25 RX ORDER — FAMOTIDINE 20 MG/1
20 TABLET, FILM COATED ORAL EVERY 12 HOURS
Status: DISCONTINUED | OUTPATIENT
Start: 2023-09-25 | End: 2023-09-25

## 2023-09-25 RX ORDER — FOLIC ACID 1 MG/1
1 TABLET ORAL DAILY
Status: DISCONTINUED | OUTPATIENT
Start: 2023-09-25 | End: 2023-09-25

## 2023-09-25 RX ORDER — LABETALOL HYDROCHLORIDE 5 MG/ML
10-20 INJECTION, SOLUTION INTRAVENOUS EVERY 4 HOURS PRN
Status: DISCONTINUED | OUTPATIENT
Start: 2023-09-25 | End: 2023-09-25

## 2023-09-25 RX ORDER — FOLIC ACID 1 MG/1
1 TABLET ORAL DAILY
Status: COMPLETED | OUTPATIENT
Start: 2023-09-25 | End: 2023-09-28

## 2023-09-25 RX ORDER — FAMOTIDINE 20 MG/1
20 TABLET, FILM COATED ORAL EVERY 12 HOURS
Status: DISCONTINUED | OUTPATIENT
Start: 2023-09-25 | End: 2023-09-29

## 2023-09-25 RX ORDER — ACETAMINOPHEN 325 MG/1
650 TABLET ORAL EVERY 4 HOURS PRN
Status: DISCONTINUED | OUTPATIENT
Start: 2023-09-25 | End: 2023-09-30

## 2023-09-25 RX ORDER — ROCURONIUM BROMIDE 10 MG/ML
50 INJECTION, SOLUTION INTRAVENOUS ONCE
Status: DISCONTINUED | OUTPATIENT
Start: 2023-09-25 | End: 2023-09-25

## 2023-09-25 RX ORDER — MAGNESIUM SULFATE HEPTAHYDRATE 40 MG/ML
4 INJECTION, SOLUTION INTRAVENOUS ONCE
Status: COMPLETED | OUTPATIENT
Start: 2023-09-25 | End: 2023-09-25

## 2023-09-25 RX ORDER — SODIUM CHLORIDE, SODIUM LACTATE, POTASSIUM CHLORIDE, AND CALCIUM CHLORIDE .6; .31; .03; .02 G/100ML; G/100ML; G/100ML; G/100ML
1000 INJECTION, SOLUTION INTRAVENOUS ONCE
Status: COMPLETED | OUTPATIENT
Start: 2023-09-25 | End: 2023-09-25

## 2023-09-25 RX ORDER — ROCURONIUM BROMIDE 10 MG/ML
100 INJECTION, SOLUTION INTRAVENOUS ONCE
Status: COMPLETED | OUTPATIENT
Start: 2023-09-25 | End: 2023-09-25

## 2023-09-25 RX ORDER — MIDAZOLAM HYDROCHLORIDE 1 MG/ML
INJECTION INTRAMUSCULAR; INTRAVENOUS
Status: COMPLETED
Start: 2023-09-25 | End: 2023-09-25

## 2023-09-25 RX ORDER — NOREPINEPHRINE BITARTRATE 0.03 MG/ML
INJECTION, SOLUTION INTRAVENOUS
Status: ACTIVE
Start: 2023-09-25 | End: 2023-09-25

## 2023-09-25 RX ORDER — IPRATROPIUM BROMIDE AND ALBUTEROL SULFATE 2.5; .5 MG/3ML; MG/3ML
3 SOLUTION RESPIRATORY (INHALATION)
Status: DISCONTINUED | OUTPATIENT
Start: 2023-09-25 | End: 2023-10-03

## 2023-09-25 RX ORDER — ETOMIDATE 2 MG/ML
20 INJECTION INTRAVENOUS ONCE
Status: COMPLETED | OUTPATIENT
Start: 2023-09-25 | End: 2023-09-25

## 2023-09-25 RX ORDER — POLYETHYLENE GLYCOL 3350 17 G/17G
1 POWDER, FOR SOLUTION ORAL
Status: DISCONTINUED | OUTPATIENT
Start: 2023-09-25 | End: 2023-09-29

## 2023-09-25 RX ADMIN — FENTANYL CITRATE 50 MCG: 50 INJECTION, SOLUTION INTRAMUSCULAR; INTRAVENOUS at 06:11

## 2023-09-25 RX ADMIN — PROPOFOL 60 MCG/KG/MIN: 10 INJECTION, EMULSION INTRAVENOUS at 11:43

## 2023-09-25 RX ADMIN — IOHEXOL 100 ML: 350 INJECTION, SOLUTION INTRAVENOUS at 12:21

## 2023-09-25 RX ADMIN — THIAMINE HYDROCHLORIDE 200 MG: 100 INJECTION, SOLUTION INTRAMUSCULAR; INTRAVENOUS at 05:51

## 2023-09-25 RX ADMIN — PROPOFOL 5 MCG/KG/MIN: 10 INJECTION, EMULSION INTRAVENOUS at 03:24

## 2023-09-25 RX ADMIN — FOLIC ACID: 5 INJECTION, SOLUTION INTRAMUSCULAR; INTRAVENOUS; SUBCUTANEOUS at 03:25

## 2023-09-25 RX ADMIN — ETOMIDATE INJECTION 20 MG: 2 SOLUTION INTRAVENOUS at 02:35

## 2023-09-25 RX ADMIN — SODIUM CHLORIDE, POTASSIUM CHLORIDE, SODIUM LACTATE AND CALCIUM CHLORIDE: 600; 310; 30; 20 INJECTION, SOLUTION INTRAVENOUS at 00:34

## 2023-09-25 RX ADMIN — THIAMINE HYDROCHLORIDE 200 MG: 100 INJECTION, SOLUTION INTRAMUSCULAR; INTRAVENOUS at 14:29

## 2023-09-25 RX ADMIN — FENTANYL CITRATE 100 MCG: 50 INJECTION, SOLUTION INTRAMUSCULAR; INTRAVENOUS at 23:49

## 2023-09-25 RX ADMIN — INSULIN GLARGINE-YFGN 5 UNITS: 100 INJECTION, SOLUTION SUBCUTANEOUS at 14:30

## 2023-09-25 RX ADMIN — PROPOFOL 50 MCG/KG/MIN: 10 INJECTION, EMULSION INTRAVENOUS at 19:11

## 2023-09-25 RX ADMIN — PROPOFOL 40 MCG/KG/MIN: 10 INJECTION, EMULSION INTRAVENOUS at 08:22

## 2023-09-25 RX ADMIN — NOREPINEPHRINE BITARTRATE 0.4 MCG/KG/MIN: 1 INJECTION, SOLUTION, CONCENTRATE INTRAVENOUS at 11:54

## 2023-09-25 RX ADMIN — ROCURONIUM BROMIDE 100 MG: 50 INJECTION, SOLUTION INTRAVENOUS at 02:36

## 2023-09-25 RX ADMIN — CALCIUM GLUCONATE 2 G: 20 INJECTION, SOLUTION INTRAVENOUS at 17:32

## 2023-09-25 RX ADMIN — INSULIN LISPRO 5 UNITS: 100 INJECTION, SOLUTION INTRAVENOUS; SUBCUTANEOUS at 17:41

## 2023-09-25 RX ADMIN — MIDAZOLAM 2 MG: 1 INJECTION, SOLUTION INTRAMUSCULAR; INTRAVENOUS at 02:00

## 2023-09-25 RX ADMIN — SENNOSIDES AND DOCUSATE SODIUM 2 TABLET: 50; 8.6 TABLET ORAL at 17:31

## 2023-09-25 RX ADMIN — INSULIN LISPRO 3 UNITS: 100 INJECTION, SOLUTION INTRAVENOUS; SUBCUTANEOUS at 23:58

## 2023-09-25 RX ADMIN — FENTANYL CITRATE 100 MCG: 50 INJECTION, SOLUTION INTRAMUSCULAR; INTRAVENOUS at 20:06

## 2023-09-25 RX ADMIN — FOLIC ACID 1 MG: 1 TABLET ORAL at 05:04

## 2023-09-25 RX ADMIN — INSULIN HUMAN 6 UNITS: 100 INJECTION, SOLUTION PARENTERAL at 05:15

## 2023-09-25 RX ADMIN — POTASSIUM PHOSPHATE, MONOBASIC AND POTASSIUM PHOSPHATE, DIBASIC 15 MMOL: 224; 236 INJECTION, SOLUTION, CONCENTRATE INTRAVENOUS at 17:19

## 2023-09-25 RX ADMIN — INSULIN LISPRO 5 UNITS: 100 INJECTION, SOLUTION INTRAVENOUS; SUBCUTANEOUS at 12:58

## 2023-09-25 RX ADMIN — FENTANYL CITRATE 100 MCG/HR: 50 INJECTION, SOLUTION INTRAMUSCULAR; INTRAVENOUS at 08:08

## 2023-09-25 RX ADMIN — THIAMINE HYDROCHLORIDE 200 MG: 100 INJECTION, SOLUTION INTRAMUSCULAR; INTRAVENOUS at 21:09

## 2023-09-25 RX ADMIN — PROPOFOL 30 MCG/KG/MIN: 10 INJECTION, EMULSION INTRAVENOUS at 02:53

## 2023-09-25 RX ADMIN — SODIUM CHLORIDE, POTASSIUM CHLORIDE, SODIUM LACTATE AND CALCIUM CHLORIDE: 600; 310; 30; 20 INJECTION, SOLUTION INTRAVENOUS at 07:00

## 2023-09-25 RX ADMIN — MIDAZOLAM 2 MG: 1 INJECTION, SOLUTION INTRAMUSCULAR; INTRAVENOUS at 01:51

## 2023-09-25 RX ADMIN — SODIUM CHLORIDE, POTASSIUM CHLORIDE, SODIUM LACTATE AND CALCIUM CHLORIDE 1000 ML: 600; 310; 30; 20 INJECTION, SOLUTION INTRAVENOUS at 10:14

## 2023-09-25 RX ADMIN — FAMOTIDINE 20 MG: 20 TABLET ORAL at 05:04

## 2023-09-25 RX ADMIN — MAGNESIUM SULFATE HEPTAHYDRATE 4 G: 4 INJECTION, SOLUTION INTRAVENOUS at 06:19

## 2023-09-25 RX ADMIN — PROPOFOL 60 MCG/KG/MIN: 10 INJECTION, EMULSION INTRAVENOUS at 23:00

## 2023-09-25 RX ADMIN — FENTANYL CITRATE 100 MCG: 50 INJECTION, SOLUTION INTRAMUSCULAR; INTRAVENOUS at 07:25

## 2023-09-25 RX ADMIN — NOREPINEPHRINE BITARTRATE 0.1 MCG/KG/MIN: 1 INJECTION, SOLUTION, CONCENTRATE INTRAVENOUS at 04:57

## 2023-09-25 RX ADMIN — PROPOFOL 60 MCG/KG/MIN: 10 INJECTION, EMULSION INTRAVENOUS at 15:21

## 2023-09-25 RX ADMIN — VASOPRESSIN 0.03 UNITS/MIN: 20 INJECTION INTRAVENOUS at 21:23

## 2023-09-25 RX ADMIN — LABETALOL HYDROCHLORIDE 20 MG: 5 INJECTION INTRAVENOUS at 03:10

## 2023-09-25 RX ADMIN — POTASSIUM PHOSPHATE, MONOBASIC AND POTASSIUM PHOSPHATE, DIBASIC 30 MMOL: 224; 236 INJECTION, SOLUTION, CONCENTRATE INTRAVENOUS at 10:10

## 2023-09-25 RX ADMIN — ACETAMINOPHEN 650 MG: 325 TABLET, FILM COATED ORAL at 22:39

## 2023-09-25 RX ADMIN — NOREPINEPHRINE BITARTRATE 0.35 MCG/KG/MIN: 1 INJECTION, SOLUTION, CONCENTRATE INTRAVENOUS at 15:57

## 2023-09-25 RX ADMIN — METRONIDAZOLE 500 MG: 5 INJECTION, SOLUTION INTRAVENOUS at 10:41

## 2023-09-25 RX ADMIN — VASOPRESSIN 0.03 UNITS/MIN: 20 INJECTION INTRAVENOUS at 10:37

## 2023-09-25 RX ADMIN — SODIUM CHLORIDE, POTASSIUM CHLORIDE, SODIUM LACTATE AND CALCIUM CHLORIDE: 600; 310; 30; 20 INJECTION, SOLUTION INTRAVENOUS at 18:03

## 2023-09-25 RX ADMIN — FAMOTIDINE 20 MG: 20 TABLET ORAL at 17:31

## 2023-09-25 RX ADMIN — NOREPINEPHRINE BITARTRATE 0.2 MCG/KG/MIN: 1 INJECTION, SOLUTION, CONCENTRATE INTRAVENOUS at 21:34

## 2023-09-25 RX ADMIN — THERA TABS 1 TABLET: TAB at 05:04

## 2023-09-25 RX ADMIN — CEFTRIAXONE SODIUM 2000 MG: 10 INJECTION, POWDER, FOR SOLUTION INTRAVENOUS at 17:35

## 2023-09-25 RX ADMIN — METRONIDAZOLE 500 MG: 5 INJECTION, SOLUTION INTRAVENOUS at 22:39

## 2023-09-25 RX ADMIN — FENTANYL CITRATE 150 MCG/HR: 50 INJECTION, SOLUTION INTRAMUSCULAR; INTRAVENOUS at 19:10

## 2023-09-25 ASSESSMENT — FIBROSIS 4 INDEX: FIB4 SCORE: 7.23

## 2023-09-25 ASSESSMENT — PAIN DESCRIPTION - PAIN TYPE
TYPE: ACUTE PAIN

## 2023-09-25 NOTE — ED NOTES
Pt attempting to climb out of bed, not following commands. Family at bedside and 3 RN's. ERP contacted for orders.

## 2023-09-25 NOTE — PROGRESS NOTES
Nurse notified that patient was agitated. Assessed at bedside and patient was shaking and not responsive. Concern that agitation could be related to alcohol withdrawal. 2 mg of versed was given with some improvement in symptoms, though patient remained agitated, not responsive and concern that he was not protecting his airway. He was emergently intubated using etomidate and rocuronium. He was started on fentanyl drip for sedation. Chest x-ray ordered to confirm placement of ET tube.

## 2023-09-25 NOTE — CARE PLAN
The patient is Watcher - Medium risk of patient condition declining or worsening    Shift Goals  Clinical Goals: Stable hemodynamics  Patient Goals: sleep  Family Goals: DEENA    Progress made toward(s) clinical / shift goals:    Problem: Hemodynamics  Goal: Patient's hemodynamics, fluid balance and neurologic status will be stable or improve  Outcome: Progressing     Problem: Respiratory  Goal: Patient will achieve/maintain optimum respiratory ventilation and gas exchange  Outcome: Progressing   Patient intubated.    Problem: Skin Integrity  Goal: Skin integrity is maintained or improved  Outcome: Progressing     Problem: Pain - Standard  Goal: Alleviation of pain or a reduction in pain to the patient’s comfort goal  Outcome: Progressing     Problem: Safety - Medical Restraint  Goal: Remains free of injury from restraints (Restraint for Interference with Medical Device)  Outcome: Progressing  Goal: Free from restraint(s) (Restraint for Interference with Medical Device)  Outcome: Progressing       Patient is not progressing towards the following goals:      Problem: Urinary - Renal Perfusion  Goal: Ability to achieve and maintain adequate renal perfusion and functioning will improve  Outcome: Not Met

## 2023-09-25 NOTE — ASSESSMENT & PLAN NOTE
Small traumatic subarachnoid hemorrhage s/p mechanical fall  Not c/w aneurysm  -Continue serial neurologic exams  - No NSAIDs or anticoagulation

## 2023-09-25 NOTE — DIETARY
NUTRITION SERVICES: BMI - Pt with BMI >40 (=Body mass index is 40.65 kg/m².), weight measured via bed scale. Edema: none documented in flowsheets. Class III obesity. Weight loss counseling not appropriate in acute care setting.     RECOMMEND - If appropriate at DC please refer to outpatient nutrition services for weight management.

## 2023-09-25 NOTE — PROGRESS NOTES
4 Eyes Skin Assessment Completed by Roger RN and Obey RN.    Head WDL  Ears WDL  Nose WDL  Mouth WDL  Neck WDL  Breast/Chest Redness and Blanching, purple, dry  Shoulder Blades WDL  Spine WDL  (R) Arm/Elbow/Hand WDL  (L) Arm/Elbow/Hand Redness, Blanching, and Bruising  Abdomen WDL Distended, tight  Groin WDL  Scrotum/Coccyx/Buttocks Redness and Blanching  (R) Leg WDL  (L) Leg Redness, Blanching, and Scar  (R) Heel/Foot/Toe Boggy  (L) Heel/Foot/Toe Boggy          Devices In Places Blood Pressure Cuff, Pulse Ox, and SCD's      Interventions In Place Pillows and Low Air Loss Mattress    Possible Skin Injury No    Pictures Uploaded Into Epic N/A  Wound Consult Placed N/A  RN Wound Prevention Protocol Ordered No

## 2023-09-25 NOTE — CARE PLAN
Problem: Ventilation  Goal: Ability to achieve and maintain unassisted ventilation or tolerate decreased levels of ventilator support  Description: Target End Date:  4 days      Document on Vent flowsheet     1.  Support and monitor invasive and noninvasive mechanical ventilation  2.  Monitor ventilator weaning response  3.  Perform ventilator associated pneumonia prevention interventions  4.  Manage ventilation therapy by monitoring diagnostic test results  Outcome: Not Met                        Ventilator Daily Summary     Vent Day # 1     Ventilator settings: 20/470/+8/50%     Weaning trials: N/A     Respiratory Procedures: Bronchoscopy     Plan: Continue current ventilator settings and wean mechanical ventilation as tolerated per physician orders

## 2023-09-25 NOTE — CONSULTS
Critical Care Consultation    Date of consult: 9/24/2023    Referring Physician  Med Peña M.D.    Reason for Consultation  Urosepsis    History of Presenting Illness  68 y.o. male patient of LE, nephrolithiasis, hypertension who presented 9/24/2023 with altered mental status with a mechanical fall and was subsequently renown [facility] emergency department.  During his evaluation be febrile tachycardic used.  CT head revealed small frontal subarachnoid hemorrhage, lactate 4.6,Leukocytosis with purulent urinalysis with many RBC's.  Given his history of nephrolithiasis a CT abdomen and pelvis was performed to exclude infected stone.  Nephrolithiasis was not noted.  However there was hepatic biliary air.      Code Status  Full Code    Review of Systems  Review of Systems   Unable to perform ROS: Medical condition       Past Medical History   has a past medical history of LE (acute kidney injury), prerenal (CMS-HCC) (1/20/2018), Alcohol withdrawal (HCC) (1/22/2018), Bowel habit changes, CAD (coronary artery disease), Cancer (HCC), Cellulitis of back except buttock (8/3/2018), HTN (hypertension), Hyperglycemia (1/19/2018), Hypertension, Indigestion, Kidney stones, Near-syncope, Nerve compression, and Tinnitus.    Surgical History   has a past surgical history that includes gastroscopy (3/29/2018); egd w/endoscopic ultrasound (3/29/2018); and egd with asp/bx (3/29/2018).    Family History  family history is not on file.    Social History   reports that he has never smoked. He has never used smokeless tobacco. He reports current alcohol use. He reports that he does not use drugs.    Medications  Home Medications       Reviewed by Faith Camp (Pharmacy Tech) on 09/24/23 at 2106  Med List Status: Complete     Medication Last Dose Status   allopurinol (ZYLOPRIM) 300 MG Tab 9/24/2023 Active   amLODIPine (NORVASC) 5 MG Tab 9/24/2023 Active   aspirin EC (ECOTRIN) 81 MG Tablet Delayed Response  9/24/2023 Active   furosemide (LASIX) 20 MG Tab 9/24/2023 Active   Galcanezumab-gnlm (EMGALITY) 120 MG/ML Solution Auto-injector 9/21/2023 Active   losartan (COZAAR) 50 MG Tab 9/24/2023 Active   memantine (NAMENDA) 5 MG Tab 9/24/2023 Active   metFORMIN (GLUCOPHAGE) 500 MG Tab 9/24/2023 Active   naproxen (NAPROSYN) 500 MG Tab 9/24/2023 Active   omeprazole (PRILOSEC) 20 MG delayed-release capsule 9/24/2023 Active   pioglitazone (ACTOS) 15 MG Tab 9/24/2023 Active   potassium chloride SA (K-DUR) 10 MEQ Tab CR 9/24/2023 Active   simvastatin (ZOCOR) 40 MG Tab 9/23/2023 Active                  Current Facility-Administered Medications   Medication Dose Route Frequency Provider Last Rate Last Admin    oxyCODONE immediate release (Roxicodone) tablet 10 mg  10 mg Oral Once Madhu Zambrano M.D.        dexmedetomidine (PRECEDEX) 400 mcg/100mL NS premix infusion  0.1-1.5 mcg/kg/hr (Ideal) Intravenous Continuous Madhu Zambrano M.D. 7.8 mL/hr at 09/24/23 2129 0.4 mcg/kg/hr at 09/24/23 2129    lactated ringers infusion (BOLUS)  1,000 mL Intravenous Once PRN Med Peña M.D.        lactated ringers infusion   Intravenous Continuous Med Peña M.D. 75 mL/hr at 09/24/23 2243 New Bag at 09/24/23 2243    [START ON 9/25/2023] cefTRIAXone (Rocephin) syringe 2,000 mg  2,000 mg Intravenous Q EVENING Med Peña M.D.        metroNIDAZOLE (Flagyl) IVPB 500 mg  500 mg Intravenous Q12HR Med Peña M.D.        senna-docusate (Pericolace Or Senokot S) 8.6-50 MG per tablet 2 Tablet  2 Tablet Oral BID Med Peña M.D.        And    polyethylene glycol/lytes (Miralax) PACKET 1 Packet  1 Packet Oral QDAY PRN Med Peña M.D.        And    magnesium hydroxide (Milk Of Magnesia) suspension 30 mL  30 mL Oral QDAY PRN Med Peña M.D.        And    bisacodyl (Dulcolax) suppository 10 mg  10 mg Rectal QDAY PRN Med Peña M.D.        lactated ringers infusion   Intravenous Continuous Med Peña M.D.        labetalol  (Normodyne/Trandate) injection 10 mg  10 mg Intravenous Q4HRS PRN Med Peña M.D.        [START ON 9/25/2023] insulin regular (HumuLIN R,NovoLIN R) injection  2-9 Units Subcutaneous Q6HRS Med Peña M.D.        And    dextrose 50% (D50W) injection 25 g  25 g Intravenous Q15 MIN PRN Med Peña M.D.           Allergies  Allergies   Allergen Reactions    Ativan      Pt reports that he gets very agitated     Hydrocodone Rash     Confusion   Agitation     Lorazepam Unspecified     Agitation        Vital Signs last 24 hours  Temp:  [37.1 °C (98.8 °F)-39.7 °C (103.4 °F)] 37.1 °C (98.8 °F)  Pulse:  [110-131] 119  Resp:  [25-30] 27  BP: (159-207)/() 159/72  SpO2:  [88 %-94 %] 94 %      Physical Exam  Constitutional:       General: He is not in acute distress.  HENT:      Mouth/Throat:      Mouth: Mucous membranes are moist.   Eyes:      Extraocular Movements: Extraocular movements intact.      Pupils: Pupils are equal, round, and reactive to light.   Cardiovascular:      Rate and Rhythm: Regular rhythm. Tachycardia present.      Heart sounds: No murmur heard.     No friction rub. No gallop.   Pulmonary:      Effort: Pulmonary effort is normal. No respiratory distress.      Breath sounds: Normal breath sounds.   Abdominal:      General: There is distension.      Palpations: There is no mass.      Tenderness: There is no abdominal tenderness. There is no guarding or rebound.   Musculoskeletal:      Cervical back: Neck supple.      Right lower leg: No edema.      Left lower leg: No edema.   Skin:     General: Skin is warm and dry.   Neurological:      General: No focal deficit present.      Mental Status: He is alert and oriented to person, place, and time.      Cranial Nerves: No cranial nerve deficit.   Psychiatric:         Behavior: Behavior normal.         Fluids    Intake/Output Summary (Last 24 hours) at 9/24/2023 8308  Last data filed at 9/24/2023 2129  Gross per 24 hour   Intake 5.69 ml   Output --    Net 5.69 ml       Laboratory  Recent Results (from the past 48 hour(s))   CBC WITH DIFFERENTIAL    Collection Time: 09/24/23  6:24 PM   Result Value Ref Range    WBC 16.4 (H) 4.8 - 10.8 K/uL    RBC 4.93 4.70 - 6.10 M/uL    Hemoglobin 15.4 14.0 - 18.0 g/dL    Hematocrit 45.4 42.0 - 52.0 %    MCV 92.1 81.4 - 97.8 fL    MCH 31.2 27.0 - 33.0 pg    MCHC 33.9 32.3 - 36.5 g/dL    RDW 45.7 35.9 - 50.0 fL    Platelet Count 115 (L) 164 - 446 K/uL    MPV 11.5 9.0 - 12.9 fL    Neutrophils-Polys 86.20 (H) 44.00 - 72.00 %    Lymphocytes 6.50 (L) 22.00 - 41.00 %    Monocytes 6.30 0.00 - 13.40 %    Eosinophils 0.10 0.00 - 6.90 %    Basophils 0.20 0.00 - 1.80 %    Immature Granulocytes 0.70 0.00 - 0.90 %    Nucleated RBC 0.00 0.00 - 0.20 /100 WBC    Neutrophils (Absolute) 14.13 (H) 1.82 - 7.42 K/uL    Lymphs (Absolute) 1.06 1.00 - 4.80 K/uL    Monos (Absolute) 1.04 (H) 0.00 - 0.85 K/uL    Eos (Absolute) 0.02 0.00 - 0.51 K/uL    Baso (Absolute) 0.04 0.00 - 0.12 K/uL    Immature Granulocytes (abs) 0.12 (H) 0.00 - 0.11 K/uL    NRBC (Absolute) 0.00 K/uL   COMP METABOLIC PANEL    Collection Time: 09/24/23  6:24 PM   Result Value Ref Range    Sodium 138 135 - 145 mmol/L    Potassium 4.3 3.6 - 5.5 mmol/L    Chloride 101 96 - 112 mmol/L    Co2 20 20 - 33 mmol/L    Anion Gap 17.0 (H) 7.0 - 16.0    Glucose 160 (H) 65 - 99 mg/dL    Bun 16 8 - 22 mg/dL    Creatinine 1.03 0.50 - 1.40 mg/dL    Calcium 9.1 8.5 - 10.5 mg/dL    Correct Calcium 8.9 8.5 - 10.5 mg/dL    AST(SGOT) 39 12 - 45 U/L    ALT(SGPT) 26 2 - 50 U/L    Alkaline Phosphatase 118 (H) 30 - 99 U/L    Total Bilirubin 1.2 0.1 - 1.5 mg/dL    Albumin 4.3 3.2 - 4.9 g/dL    Total Protein 7.4 6.0 - 8.2 g/dL    Globulin 3.1 1.9 - 3.5 g/dL    A-G Ratio 1.4 g/dL   DIAGNOSTIC ALCOHOL    Collection Time: 09/24/23  6:24 PM   Result Value Ref Range    Diagnostic Alcohol <10.1 <10.1 mg/dL   ESTIMATED GFR    Collection Time: 09/24/23  6:24 PM   Result Value Ref Range    GFR (CKD-EPI) 78 >60  mL/min/1.73 m 2   Lactic Acid    Collection Time: 09/24/23  6:34 PM   Result Value Ref Range    Lactic Acid 4.6 (HH) 0.5 - 2.0 mmol/L   Urinalysis    Collection Time: 09/24/23  7:06 PM    Specimen: Urine   Result Value Ref Range    Color Yellow     Character Clear     Specific Gravity 1.021 <1.035    Ph 5.5 5.0 - 8.0    Glucose Negative Negative mg/dL    Ketones 15 (A) Negative mg/dL    Protein 30 (A) Negative mg/dL    Bilirubin Negative Negative    Urobilinogen, Urine 1.0 Negative    Nitrite Positive (A) Negative    Leukocyte Esterase Moderate (A) Negative    Occult Blood Large (A) Negative    Micro Urine Req Microscopic    CoV-2, Flu A/B, And RSV by PCR ([employer])    Collection Time: 09/24/23  7:06 PM    Specimen: Respirate   Result Value Ref Range    Influenza virus A RNA Negative Negative    Influenza virus B, PCR Negative Negative    RSV, PCR Negative Negative    SARS-CoV-2 by PCR NotDetected     SARS-CoV-2 Source NP Swab    URINE MICROSCOPIC (W/UA)    Collection Time: 09/24/23  7:06 PM   Result Value Ref Range    WBC Packed (A) /hpf    RBC 2-5 (A) /hpf    Bacteria Many (A) None /hpf    Epithelial Cells Negative /hpf    Hyaline Cast 3-5 (A) /lpf   Lactic Acid -STAT Once    Collection Time: 09/24/23 10:40 PM   Result Value Ref Range    Lactic Acid 2.3 (H) 0.5 - 2.0 mmol/L   Prothrombin time (INR)    Collection Time: 09/24/23 10:40 PM   Result Value Ref Range    PT 14.5 12.0 - 14.6 sec    INR 1.11 0.87 - 1.13   Urinalysis    Collection Time: 09/24/23 10:40 PM    Specimen: Blood   Result Value Ref Range    Color Yellow     Character Clear     Specific Gravity 1.022 <1.035    Ph 5.5 5.0 - 8.0    Glucose Negative Negative mg/dL    Ketones Trace (A) Negative mg/dL    Protein 30 (A) Negative mg/dL    Bilirubin Negative Negative    Urobilinogen, Urine 1.0 Negative    Nitrite Positive (A) Negative    Leukocyte Esterase Moderate (A) Negative    Occult Blood Moderate (A) Negative    Micro Urine Req Microscopic    URINE  MICROSCOPIC (W/UA)    Collection Time: 09/24/23 10:40 PM   Result Value Ref Range    -150 (A) /hpf    RBC 10-20 (A) /hpf    Bacteria Negative None /hpf    Epithelial Cells Negative /hpf    Hyaline Cast 3-5 (A) /lpf       Imaging  CT-RENAL COLIC EVALUATION(A/P W/O)   Final Result         1.  Intrahepatic biliary air and nondependent air within the gallbladder, consider history of instrumentation or changes of cholangitis in the appropriate clinical setting.   2.  Large fat-containing bilateral inguinal hernias   3.  Enlarged prostate, consider causes of prostate enlargement with additional workup as clinically appropriate   4.  Diverticulosis   5.  Cholelithiasis   6.  Atherosclerosis      CT-HEAD W/O   Final Result         1.  Hazy left frontal subarachnoid hemorrhage.   2.  Nonspecific white matter changes, commonly associated with small vessel ischemic disease.  Associated mild cerebral atrophy is noted.      Based solely on CT findings, the brain injury guideline category is mBIG 1.      SDH < 4mm   IPH < 4mm   SAH < 3 sulci and < 1mm      The original BIG retrospective analysis found radiographic progression in 0% of BIG 1 patients and 2.6% BIG 2.      These findings were discussed with the patient's clinician, Madhu Zambrano, on 9/24/2023 10:10 PM.      DX-CHEST-PORTABLE (1 VIEW)   Final Result      No acute cardiopulmonary abnormality identified.          Assessment/Plan  * Severe sepsis (HCC)- (present on admission)  Assessment & Plan  This is Sepsis Present on admission  SIRS criteria identified on my evaluation include: Fever, with temperature greater than 100.9 deg F, Tachycardia, with heart rate greater than 90 BPM and Leukocytosis, with WBC greater than 12,000  Clinical indicators of end organ dysfunction include Toxic Metabolic Encephalopathy  Source is GI vs .   Sepsis protocol initiated  Crystalloid Fluid Administration: Fluid resuscitation ordered per standard protocol - 30 mL/kg per current  or ideal body weight  IV antibiotics as appropriate for source of sepsis, C3, Flagyl  Reassessment: I have reassessed the patient's hemodynamic status    CT abdomen shows signs of hepatobiliary air.   General surgery consult.    SAH (subarachnoid hemorrhage) ([facility])  Assessment & Plan  Small traumatic subarachnoid hemorrhage.  Not c/w aneurysm  Serial neurologic exams  No anticoagulation or NSAIDs    Newly diagnosed diabetes (HCC)- (present on admission)  Assessment & Plan  Moderate glycemic control with Insulin therapy.  Hold Metformin      Essential hypertension- (present on admission)  Assessment & Plan  Currently hypertensive despite sepsis  Cautiously continue amlodipine 5  Hold Losartan for now.  Labetalol for break through HTN  Goal -140    Alcohol dependence (HCC)- (present on admission)  Assessment & Plan  Monitor and treat alcohol withdrawal as clinically indicated.        Discussed patient condition and risk of morbidity and/or mortality with RN, Patient, and general surgery.      The patient remains critically ill,  I have assessed and reassessed the blood pressure,  cardiovascular status, labs and response to interventions. This patient remains at high risk for worsening shock and death without the above critical care interventions.    Critical care time = 60 minutes in directly providing and coordinating critical care and extensive data review.  No time overlap and excludes procedures.

## 2023-09-25 NOTE — ED NOTES
Received bedside report from Loni KIRBY; assumed care of Pt.    No oxygen; Pt removed oxygen via NC  No SI precautions.  Security at bedside for 1:1. High fall risk precautions.  Cardiac monitor in place.    Introduced self to Pt; informed him that I am part of his care team.  Pt not following conversation; Pt alert.  No acute distress at this time.  Will continue to monitor.

## 2023-09-25 NOTE — ED PROVIDER NOTES
ED Provider Note    Primary care provider: Juan Manuel FLANAGAN M.D.    CHIEF COMPLAINT  Chief Complaint   Patient presents with    Head Injury     Pt fell and hit his head last night     ALOC     Pt arrives A&O1        Additional history obtained from: EMS states the patient was fully weightbearing at home.  EMS was contacted by the patient's wife who states that he was more confused than normal this morning, not clear when the onset was.  They report that he was walking up the stairs yesterday, stumbled in the evening, possibly hit his right side onto the wall.  Did not have any loss of consciousness, not on any anticoagulation.  Limitation to History:  Select: Altered mental status / Confusion    HPI  Sharon Guardado is a 68 y.o. male who presents to the Emergency Department for confusion.  The patient cannot give me the date or president.  He tells me he has to use a bathroom as he is on medications that cause him to urinate a lot.  He denies any headache, neck pain, chest pain, numbness or focal weakness.  He knows that he is here for confusion and that his wife called the ambulance.  He states that she does this fairly often.      External Record Review: Patient has been in the ER multiple times with similar issues.  Most recent ER visit was at [facility] in April, the patient has had several falls, after the fall he presents with some confusion.  He has had at least 4 CTs this year all negative for intracranial hemorrhage.  There is an outpatient clinic note for chronic migraine visit.    REVIEW OF SYSTEMS  See HPI.     PAST MEDICAL HISTORY   has a past medical history of LE (acute kidney injury), prerenal (CMS-HCC) (1/20/2018), Alcohol withdrawal (HCC) (1/22/2018), Bowel habit changes, CAD (coronary artery disease), Cancer ([facility]), Cellulitis of back except buttock (8/3/2018), HTN (hypertension), Hyperglycemia (1/19/2018), Hypertension, Indigestion, Kidney stones, Near-syncope, Nerve  compression, and Tinnitus.    SURGICAL HISTORY   has a past surgical history that includes gastroscopy (3/29/2018); egd w/endoscopic ultrasound (3/29/2018); and egd with asp/bx (3/29/2018).    SOCIAL HISTORY  Social History     Tobacco Use    Smoking status: Never    Smokeless tobacco: Never   Vaping Use    Vaping Use: Never used   Substance Use Topics    Alcohol use: Yes     Comment: i drink per night    Drug use: No      Social History     Substance and Sexual Activity   Drug Use No       FAMILY HISTORY  No family history on file.    CURRENT MEDICATIONS  Reviewed.  See Encounter Summary.     ALLERGIES  Allergies   Allergen Reactions    Ativan      Pt reports that he gets very agitated     Hydrocodone Rash     Confusion   Agitation     Lorazepam Unspecified     Agitation        PHYSICAL EXAM  VITAL SIGNS: BP (!) 159/72   Pulse (!) 119   Temp (!) 39.7 °C (103.4 °F) (Temporal)   Resp (!) 27   Ht 1.829 m (6')   Wt (!) 136 kg (300 lb)   SpO2 94%   BMI 40.69 kg/m²   Constitutional: Awake, alert in no apparent distress.  HENT: Normocephalic, atraumatic.  No midline neck tenderness.  Bilateral external ears normal. Nose normal.   Eyes: Conjunctiva normal, non-icteric, EOMI.    Thorax & Lungs: Easy unlabored respirations, Clear to ascultation bilaterally.  Cardiovascular: Regular rate, Regular rhythm, No murmurs, rubs or gallops. Bilateral pulses symmetrical.   Abdomen:  Soft, nontender, nondistended, normal active bowel sounds.   :    Skin: Visualized skin is  Dry, No erythema, No rash.   Musculoskeletal:   No cyanosis, clubbing or edema. No leg asymmetry.   Neurologic: Alert, Grossly non-focal.   Psychiatric: Normal affect, Normal mood  Lymphatic:          RADIOLOGY  I have independently interpreted the diagnostic imaging associated with this visit and am waiting the final reading from the radiologist.   My preliminary interpretation is as follows: No stones in the collecting system, did not see any large area  of intracranial hemorrhage.    Radiologist interpretation:   CT-RENAL COLIC EVALUATION(A/P W/O)   Final Result         1.  Intrahepatic biliary air and nondependent air within the gallbladder, consider history of instrumentation or changes of cholangitis in the appropriate clinical setting.   2.  Large fat-containing bilateral inguinal hernias   3.  Enlarged prostate, consider causes of prostate enlargement with additional workup as clinically appropriate   4.  Diverticulosis   5.  Cholelithiasis   6.  Atherosclerosis      CT-HEAD W/O   Final Result         1.  Hazy left frontal subarachnoid hemorrhage.   2.  Nonspecific white matter changes, commonly associated with small vessel ischemic disease.  Associated mild cerebral atrophy is noted.      Based solely on CT findings, the brain injury guideline category is mBIG 1.      SDH < 4mm   IPH < 4mm   SAH < 3 sulci and < 1mm      The original BIG retrospective analysis found radiographic progression in 0% of BIG 1 patients and 2.6% BIG 2.      These findings were discussed with the patient's clinician, Madhu Zambrano, on 9/24/2023 10:10 PM.      DX-CHEST-PORTABLE (1 VIEW)   Final Result      No acute cardiopulmonary abnormality identified.          COURSE & MEDICAL DECISION MAKING  Pertinent Labs & Imaging studies reviewed. (See chart for details)    COURSE & MEDICAL DECISION MAKING  Pertinent Labs & Imaging studies reviewed. (See chart for details)    Differential diagnoses include but are not limited to: Intracranial hemorrhage, contusion, concussion, alcohol withdrawal, delirium, dementia, sepsis    5:25 PM - Nursing notes reviewed, patient seen and examined at bedside.    5:48 PM: Nursing states the patient was having a panic attack and refused to lay flat for the CT scanner.  Plan to medicate with 200 mcg of fentanyl to see if we can get him through CT.    6:09 PM: 2 mg of Ativan ordered as the patient still would not sit through CT with fentanyl.  Family at  bedside states not to give him the medication as this caused him to be even more combative.  Patient right now is sitting up, pulling off restraints, pulling off monitoring.  Haldol and Benadryl be given at this time.    6:22 PM: Patient still not cooperative, will not lay flat for CT, he is more sedated with the Haldol and Benadryl and at least will remain still for IV, labs, Joseph catheter.    6:45 PM patient now febrile, increasing tachycardia, Tylenol be given, Rocephin given for possible UTI as the urine was grossly infected.    7:49 PM: Labs indicative of nitrate positive UTI, will admit for urosepsis.    8:15 PM Case discussed with Dr. Peña, we evaluated the patient again at bedside.  Will start Precedex so that we can obtain some imaging.  There is some concern about an infected stone given his sepsis appearance.    Case discussed with Dr. Alvarado, radiology.  The patient has a tiny subarachnoid hemorrhage, this would measure out to be big 1.      Discussion of management with other medical personnel: Radiology, intensivist    Barriers to care at this time, including but not limited to: Chronic pain.     Decision tools and prescription drugs considered including, but not limited to: NIH stroke scale 0    Decision Making:  This is a pleasant 68 y.o. year old male who presents with mental status.  Initially the patient appeared to be possibly concussed.  He has been evaluated for this several times back in March with a similar presentation.  Throughout the ED course however he started to develop more more signs of delirium and sepsis.  He got increasingly combative, tachycardic, febrile.  He was initially dosed with 200 mcg of fentanyl to attempt to obtain a CT head, this did not improve his sensorium and perhaps even worsen the situation.  Family at bedside, they state that he does not do well with sedative medications.  They deny any recent alcohol use.  They do not feel that this is alcohol  withdrawal.    He is now clearly septic febrile, leukocytosis of 16.4, neutrophil predominance.  Source appears to be pyelonephritis/acute UTI.  The patient was treated accordingly for this.  I discussed the case with Dr. Peña, given his appearance we did obtain a CT to look for an infected stone which fortunately he does not have.  There is some intrahepatic biliary air, unclear etiology of this, exam limited due to his body habitus, does not have any focal tenderness his location.  He also was able to undergo CT head for his fall that shows a tiny subarachnoid hemorrhage, I do not feel this is the cause of the patient's altered mental status.    At this time the patient will require admission for sepsis, delirium, observation from a big 1 criteria as well.    CRITICAL CARE  The very real possibilty of a deterioration of this patient's condition required the highest level of my preparedness for sudden, emergent intervention.  I provided critical care services, which included medication orders, frequent reevaluations of the patient's condition and response to treatment, ordering and reviewing test results, and discussing the case with various consultants.  The critical care time associated with the care of the patient was 65 minutes. Review chart for interventions. This time is exclusive of any other billable procedures.         FINAL IMPRESSION  1. Sepsis without acute organ dysfunction, due to unspecified organism (HCC)    2. Pyelonephritis    3. Delirium    4. SAH (subarachnoid hemorrhage) ([facility])

## 2023-09-25 NOTE — HOSPITAL COURSE
9/25: surgery was consulted Dr Hirsch. Continue resuscitation with IVF, antibiotics, If worsens, consider jamila tube. Patient was intubated acutely overnight.  9/26: requiring high levels of sedatives. Had to start a second vasopressor. Failed SAT and SBT due to agitation. Vasopressors are weaning down. Will start dvt chemical prophylaxis tonight.  9/27: changed propofol to precedex, hopes to move towards extubation. Consider tube feeds if  not extubated  9/28: he is now following commands, maximizing status for extubation.    9/29-9/30: Transferred to floor, under care with hospitalist.    9/30: patient was found with AMS, suspected ETOH withdrawal, s/p benzos. CT head showed parenchymal hemorrhage. Transferred to ICU. Intubated, CVC placed, vascular neurology and neurosurgery consulted    10/1 - Will work on extubation.  23% saline bolus.  Palliative consutled.  Fevers - reculture and empiric unasyn    10/2 -    vent day 3.  Force diuresis.  SAT/SBT.  Increase amlodipine.  10/3 -    vent day 4.  Force diuresis.  SAT/SBT.  10/4 -    vent day 5.  Force diuresis.  SAT/SBT.  Continue amlodipine, 10 mg daily.  Increase losartan, 75 mg daily.  Begin NPH, 10 units twice daily.  10/5 -    vent day 6.  Force diuresis.  SAT/SBT.  Continue amlodipine.  Increase losartan, 100 mg daily.  Stop NPH and sliding scale insulin.  Begin insulin drip.  Culture blood, sputum and check UA.  Stop Unasyn.  Begin empiric Zosyn and vancomycin.  Check RUQ ultrasound.  10/6 -    vent day 7.  Force diuresis.  SAT/SBT.  Continue amlodipine and losartan.  Continue insulin drip.  Begin salt tabs, 1 g every 8 hours.  Surgery opinion regarding cholecystostomy tube.  10/7 -    vent day 8.  Force diuresis.  SAT/SBT.  Continue amlodipine and losartan.  Continue insulin drip.  Stop salt tabs.  HIDA scan normal.  Stop vancomycin.  Continue Zosyn.  10/8 -    liberated from the ventilator yesterday.  Continue amlodipine and losartan.  Titrating insulin  drip.  Continue Zosyn.  10/9- stable neuro exam, discontinued insulin gtt

## 2023-09-25 NOTE — PROGRESS NOTES
"Pulmonary/Critical Care Progress Note    Date of admission  9/24/2023    Chief Complaint  68 y.o. male admitted 9/24/2023 with septic shock and found a small frontal subarachnoid hemorrhage and pneumobilia.    Hospital Course  Per Dr Peña note from 9/24: \"68 y.o. male patient of LE, nephrolithiasis, hypertension who presented 9/24/2023 with altered mental status with a mechanical fall and was subsequently [facility] emergency department.  During his evaluation be febrile tachycardic used.  CT head revealed small frontal subarachnoid hemorrhage, lactate 4.6,Leukocytosis with purulent urinalysis with many RBC's.  Given his history of nephrolithiasis a CT abdomen and pelvis was performed to exclude infected stone. Nephrolithiasis was not noted.  However there was hepatic biliary air.\"    9/25: surgery was consulted Dr Hirsch. Continue resuscitation with IVF, antibiotics, If worsens, consider jamila tube. Patient was intubated acutely overnight.      Interval Problem Update  Reviewed last 24 hour events: overnight he was intubated due to acute hypoxic respiratory failure. Also suspected to have a seizure like activity.  Neuro: earlier today following commands for nursing staff. Pending EEG spot. Imaging showed a small SAH.  Pulm: intubated on mechanical ventilation.  Continue with ABCDEF bundle according to protocol.  Continue with lung protective strategies   Cards: SR. No ectopy.  Has become hypotensive requiring 2 vasopressors.  On Levophed and vasopressin.  We are giving him a bolus IV fluids 1 L today. Last lactic acid 3.1.  Nephro: appropriate/normal GFR at 77, Co2 25-->21. Will repeat labs at 1pm. Continue thiamine and IVF bolus for lactic acidosis 2.6-->3.1. surgery following  ID: No leukocytosis although with sepsis and pneumobilia, UA possible UTI, continues with flagyl and ceftriaxone.  Hem: H&H stable at 15/41, thrombocytopenia 157-->115-->86 suspected inflammatory block given sepsis and " pneumobilia.  GI: Pepcid prophylaxis  : Appropriate urine output, continue monitoring  Endo: keep goal of glucose 140-180, at goal, started medium ranges doses of insulin.       Review of Systems  Review of Systems   Unable to perform ROS: Intubated        Vital Signs for last 24 hours   Temp:  [37.1 °C (98.8 °F)-39.7 °C (103.4 °F)] 37.6 °C (99.7 °F)  Pulse:  [] 74  Resp:  [17-39] 20  BP: ()/() 95/50  SpO2:  [88 %-99 %] 99 %    Hemodynamic parameters for last 24 hours       Respiratory Information for the last 24 hours  Vent Mode: APVCMV  Rate (breaths/min): 20  Vt Target (mL): 470  PEEP/CPAP: 8  MAP: 12  Control VTE (exp VT): 469    Physical Exam   Physical Exam  Vitals and nursing note reviewed.   Constitutional:       General: He is not in acute distress.     Appearance: He is ill-appearing. He is not toxic-appearing.   HENT:      Head: Normocephalic and atraumatic.      Nose: Nose normal.      Mouth/Throat:      Comments: ETT in place  Eyes:      Extraocular Movements: Extraocular movements intact.   Cardiovascular:      Rate and Rhythm: Normal rate and regular rhythm.      Pulses: Normal pulses.      Heart sounds: No murmur heard.     No friction rub. No gallop.   Pulmonary:      Effort: Pulmonary effort is normal.      Breath sounds: Normal breath sounds.   Abdominal:      General: Bowel sounds are normal.   Musculoskeletal:         General: Normal range of motion.      Cervical back: Normal range of motion. No rigidity.      Right lower leg: No edema.      Left lower leg: No edema.   Skin:     General: Skin is warm.      Capillary Refill: Capillary refill takes less than 2 seconds.      Findings: No rash.         Medications  Current Facility-Administered Medications   Medication Dose Route Frequency Provider Last Rate Last Admin    Respiratory Therapy Consult   Nebulization Continuous RT Florian Cole M.D.        MD Alert...ICU Electrolyte Replacement per Pharmacy   Other PHARMACY TO  DOSE Florian Cole M.D.        lidocaine (Xylocaine) 1 % injection 2 mL  2 mL Tracheal Tube Q30 MIN PRN Florian Cole M.D.        propofol (DIPRIVAN) injection  0-80 mcg/kg/min (Ideal) Intravenous Continuous Florian Cole M.D. 27.9 mL/hr at 09/25/23 0910 60 mcg/kg/min at 09/25/23 0910    fentaNYL (SUBLIMAZE) 50 mcg/mL in 50mL   Intravenous Continuous Florian Cole M.D. 3 mL/hr at 09/25/23 0910 150 mcg/hr at 09/25/23 0910    fentaNYL (Sublimaze) injection 25 mcg  25 mcg Intravenous Q HOUR PRN Florian Cole M.D.        Or    fentaNYL (Sublimaze) injection 50 mcg  50 mcg Intravenous Q HOUR PRN Florian Cole M.D.   50 mcg at 09/25/23 0611    Or    fentaNYL (Sublimaze) injection 100 mcg  100 mcg Intravenous Q HOUR PRN Florian Cole M.D.   100 mcg at 09/25/23 0725    ipratropium-albuterol (DUONEB) nebulizer solution  3 mL Nebulization Q2HRS PRN (RT) Florian Cole M.D.        labetalol (Normodyne/Trandate) injection 10-20 mg  10-20 mg Intravenous Q4HRS PRN Florian Cole M.D.   20 mg at 09/25/23 0310    folic acid (Folvite) tablet 1 mg  1 mg Enteral Tube DAILY Gilberto Levine M.D.   1 mg at 09/25/23 0504    And    multivitamin tablet 1 Tablet  1 Tablet Enteral Tube DAILY Gilberto Levine M.D.   1 Tablet at 09/25/23 0504    famotidine (Pepcid) tablet 20 mg  20 mg Enteral Tube Q12HRS Gilberto Levine M.D.   20 mg at 09/25/23 0504    Or    famotidine (Pepcid) injection 20 mg  20 mg Intravenous Q12HRS Gilberto Levine M.D.        senna-docusate (Pericolace Or Senokot S) 8.6-50 MG per tablet 2 Tablet  2 Tablet Enteral Tube BID Gilberto Levine M.D.        And    polyethylene glycol/lytes (Miralax) PACKET 1 Packet  1 Packet Enteral Tube QDAY PRN Gilberto Levine M.D.        And    magnesium hydroxide (Milk Of Magnesia) suspension 30 mL  30 mL Enteral Tube QDAY PRN Gilberto Levine M.D.        And    bisacodyl (Dulcolax) suppository 10 mg  10 mg Rectal QDAY PRN Gilberto Levine M.D.        NOREPINEPHRINE-SODIUM  CHLORIDE 8-0.9 MG/250ML-% IV SOLN        Dose not Required at 09/25/23 0445    norepinephrine (Levophed) 8 mg in 250 mL NS infusion (premix)  0-1 mcg/kg/min (Ideal) Intravenous Continuous Gilberto Levine M.D. 58.2 mL/hr at 09/25/23 0952 0.4 mcg/kg/min at 09/25/23 0952    insulin lispro (HumaLOG,AdmeLOG) injection  2-9 Units Subcutaneous Q6HRS Rhoda Argueta M.D.        insulin GLARGINE (Lantus,Semglee) injection  5 Units Subcutaneous Q EVENING Rhoda Argueta M.D.        thiamine (B-1) 200 mg in dextrose 5% 100 mL IVPB  200 mg Intravenous TID Lizandro Greenwood M.D.        potassium phosphate IVPB 30 mmol in 500 mL D5W (premix)  30 mmol Intravenous Once Lizandro Greenwood M.D. 83.3 mL/hr at 09/25/23 1010 30 mmol at 09/25/23 1010    Followed by    potassium phosphate 15 mmol in dextrose 5% 250 mL ivpb  15 mmol Intravenous Once Lizandro Greenwood M.D.        vasopressin (Vasostrict) 20 Units in  mL Infusion  0.03 Units/min Intravenous Continuous Lizandro Greenwood M.D.        oxyCODONE immediate release (Roxicodone) tablet 10 mg  10 mg Oral Once Madhu Zambrano M.D.        lactated ringers infusion (BOLUS)  1,000 mL Intravenous Once PRN Med Peña M.D.        cefTRIAXone (Rocephin) syringe 2,000 mg  2,000 mg Intravenous Q EVENING Med Peña M.D.        metroNIDAZOLE (Flagyl) IVPB 500 mg  500 mg Intravenous Q12HR Med Peña M.D.   Stopped at 09/25/23 0034    lactated ringers infusion   Intravenous Continuous Lizandro Greenwood M.D. 125 mL/hr at 09/25/23 0734 Rate Change at 09/25/23 0734    dextrose 50% (D50W) injection 25 g  25 g Intravenous Q15 MIN PRN Med Peña M.D.           Fluids    Intake/Output Summary (Last 24 hours) at 9/25/2023 1036  Last data filed at 9/25/2023 1000  Gross per 24 hour   Intake 5843.55 ml   Output 525 ml   Net 5318.55 ml       Laboratory  Recent Labs     09/25/23  0324   ISTATAPH 7.330*   ISTATAPCO2 41.0*   ISTATAPO2 60*   ISTATATCO2 23    VUKEGSI2KEM 89*   ISTATARTHCO3 21.6   ISTATARTBE -4   ISTATTEMP 96.7 F   ISTATFIO2 50   ISTATSPEC Arterial   ISTATAPHTC 7.345*   RYEZAXBE3ES 56*         Recent Labs     09/24/23 1824 09/24/23 2323 09/25/23 0319   SODIUM 138 140 138   POTASSIUM 4.3 3.7 3.4*   CHLORIDE 101 102 102   CO2 20 25 21   BUN 16 16 16   CREATININE 1.03 1.11 1.04   MAGNESIUM  --   --  1.1*   PHOSPHORUS  --   --  1.4*   CALCIUM 9.1 8.3* 8.7     Recent Labs     09/24/23 1824 09/24/23 2323 09/25/23 0319   ALTSGPT 26 23 28   ASTSGOT 39 30 47*   ALKPHOSPHAT 118* 98 124*   TBILIRUBIN 1.2 1.0 1.4   GLUCOSE 160* 154* 230*     Recent Labs     09/24/23 1824 09/24/23 2323 09/25/23 0319   WBC 16.4*  --  10.5   NEUTSPOLYS 86.20*  --  87.30*   LYMPHOCYTES 6.50*  --  8.70*   MONOCYTES 6.30  --  2.60   EOSINOPHILS 0.10  --  0.10   BASOPHILS 0.20  --  0.30   ASTSGOT 39 30 47*   ALTSGPT 26 23 28   ALKPHOSPHAT 118* 98 124*   TBILIRUBIN 1.2 1.0 1.4     Recent Labs     09/24/23 1824 09/24/23 2240 09/25/23 0319   RBC 4.93  --  4.85   HEMOGLOBIN 15.4  --  15.1   HEMATOCRIT 45.4  --  44.3   PLATELETCT 115*  --  86*   PROTHROMBTM  --  14.5  --    INR  --  1.11  --        Imaging  X-Ray:  I have personally reviewed the images and compared with prior images.    CT head and neck pending.    Assessment/Plan  * Severe sepsis (HCC)- (present on admission)  Assessment & Plan  This is Sepsis Present on admission  SIRS criteria identified on my evaluation include: Fever, with temperature greater than 100.9 deg F, Tachycardia, with heart rate greater than 90 BPM and Leukocytosis, with WBC greater than 12,000  Clinical indicators of end organ dysfunction include Toxic Metabolic Encephalopathy  Source is GI vs .   Sepsis protocol initiated  Crystalloid Fluid Administration: Fluid resuscitation ordered per standard protocol - 30 mL/kg per current or ideal body weight  IV antibiotics as appropriate for source of sepsis, C3, Flagyl  Reassessment: I have reassessed  "the patient's hemodynamic status    CT abdomen shows signs of hepatobiliary air.   General surgery consulted: Dr Hirsch following.    I examined the patient 9/25/2023 10:29 AM  Vital Signs:BP 95/50   Pulse 74   Temp 37.6 °C (99.7 °F) (Axillary)   Resp 20   Ht 1.829 m (6' 0.01\")   Wt (!) 136 kg (299 lb 13.2 oz)   SpO2 99%   BMI 40.65 kg/m²   Cardiac examination significant for Tachycardia  Pulmonary examination significant for Clear lung fileds  Capillary refill is brisk  Peripheral Pulse is 2+   Skin is normal   Continues with hypotension, actively resuscitating with IVF bolus and vasopressors.      Acute respiratory failure with hypoxia (HCC)  Assessment & Plan  Patient was intubated from the night of 9/24 towards 9/25 and started on mechanical ventilation.  ABCDEF bundle according to protocol.  Continue with lung protective strategies.  AST and SBT evaluation daily as appropriate.  Continue to wean FiO2 as tolerated, as well as vent settings.    Pneumobilia- (present on admission)  Assessment & Plan  Consulted Surgery Dr Hirsch  Continue with resuscitation with IVF,   Continue vasopressor support  Monitor Lactic acid  Continue with antibiotics: ceftriaxone and flagyl.    SAH (subarachnoid hemorrhage) (HCC)  Assessment & Plan  Small traumatic subarachnoid hemorrhage.  Not c/w aneurysm  Serial neurologic exams  No anticoagulation or NSAIDs  Neurology consultation  CT head and neck ordered per prior providers, scheduled for today    Lactic acidosis  Assessment & Plan  Continue with resuscitation with IVF   Continue high dose thiamine  Surgery consulted.   Source is hypoperfusion possible type A and B lactic acidosis but also abdominal source with pneumobilia.    Thrombocytopenia (HCC)  Assessment & Plan  Repeat labs at 1pm  Will obtain a TEG.  Suspected inflammatory block.    Type 2 diabetes mellitus (HCC)- (present on admission)  Assessment & Plan  Moderate glycemic control with Insulin therapy.  Hold " Metformin    Essential hypertension- (present on admission)  Assessment & Plan  Currently hypotensive on pressors  hold amlodipine and Hold Losartan for now.      Alcohol dependence (HCC)- (present on admission)  Assessment & Plan  Monitor and treat alcohol withdrawal as clinically indicated.         VTE:   SCDs  Ulcer: H2 Antagonist  Lines: Joseph Catheter  Ongoing indication addressed    I have performed a physical exam and reviewed and updated ROS and Plan today (9/25/2023). In review of yesterday's note (9/24/2023), there are no changes except as documented above.     Discussed patient condition and risk of morbidity and/or mortality with Family, RN, RT, Therapies, Pharmacy, and Charge nurse / hot rounds  The patient remains critically ill.  Critical care time = 68 minutes in directly providing and coordinating critical care and extensive data review.  No time overlap and excludes procedures.    Lizandro Greenwood MD FACP FCCP  Pulmonary/Critical Care

## 2023-09-25 NOTE — ASSESSMENT & PLAN NOTE
As seen on CT renal (9/25/23).    9/26/23: No need for further interventions at this time per gen surg

## 2023-09-25 NOTE — ED NOTES
Med rec updated and complete. Allergies reviewed and  updated.   Pt unable to recall medications.  Placed call to family( wife) to confirm current medications and last doses taken.    Wife reports no  antibiotic use in last 30 days.  No anticoagulant medication.  Last ASA dose 09/24/23.  Wife stated since starting the emgality injections that ptisno longer taking propranolol, rizatriptan, sumatriptan , or topiramate.    Home pharmacy  [facility] = [phone]

## 2023-09-25 NOTE — ASSESSMENT & PLAN NOTE
ACGS consulted, no indication for intervention  Completed 5 days of ceftriaxone and metronidazole on 9/28  RUQ ultrasound with minimal gallbladder wall thickening and possible sludge and minimal pericholecystic fluid  HIDA scan normal on 10/7  Continue empiric Zosyn and plan 7 days of therapy for now

## 2023-09-25 NOTE — PROCEDURES
VIDEO ELECTROENCEPHALOGRAM REPORT      Referring provider: Dr. Bone     DOS:  09/25/23  (total recording of 24 minutes).     INDICATION:  Sharon Guardado 68 y.o. male presenting with SAH    CURRENT ANTIEPILEPTIC REGIMEN: PRO     TECHNIQUE: 30 channel video electroencephalogram (EEG) was performed in accordance with the international 10-20 system. The study was reviewed in bipolar and referential montages. The recording examined the patient during sedated state.     DESCRIPTION OF THE RECORD:  The background consisted of diffuse mixed frequencies with predominant theta and beta range, with intermixed delta activities seen as well. Rudimentary vertex waves and symmetrical spindles were noted.     ACTIVATION PROCEDURE:  hyperventilation was not performed    Intermittent Photic stimulation was performed in a stepwise fashion from 1 to 30 Hz and elicited no photic driving response.     ICTAL AND/OR INTERICTAL FINDINGS:   No focal or generalized epileptiform activity noted. No regional slowing was seen during this routine study.  No clinical events or seizures were reported or recorded during the study.   Rare left temporal sharp transients were seen.     EKG: sampling of the EKG recording demonstrated sinus rhythm.     EVENTS: none     INTERPRETATION:    This is an abnormal video EEG recording in the sedated state.  1) The diffuse background slowing and excessive fast frequencies are consistent with sedation effect and encephalopathy.    2) The presence of left temporal sharp transients is of unclear clinical significance, consider a follow up EEG study after sedation weans off.   3) No epileptiform discharges or seizures were seen. This does not preclude a diagnosis of epilepsy.     Satish Morel MD  Diplomate in Neurology&Epilepsy  Office: [phone]  Fax: [phone]

## 2023-09-25 NOTE — ED TRIAGE NOTES
Chief Complaint   Patient presents with    Head Injury     Pt fell and hit his head last night     ALOC     Pt arrives A&O1      Pt ANDREI ZUÑIGA from home due to above concerns. Pt had GLF last night, hit his head, and is now A&O1. +head strike, - thinners.

## 2023-09-25 NOTE — PROCEDURES
"Date of Service: 9/25/2023    Procedure:  Diagnostic and therapeutic bronchoscopy with BAL    Indication: Respiratory failure, ? pneumonia    Physician:  Dr. Florian Cole MD    Post Procedure Diagnosis:  1.  Normal-appearing airways with no significant secretions  2.  Therapeutic airway lavage RLL and LLL airway segments  3.  BAL from RLL sent for culture    Narrative:  Appropriate consent was obtained and \"time out\" was performed.  A flexible fiberoptic bronchoscope was then inserted through the ETT without difficulty.  All airways were evaluated to the sub-segmental level.  The airway mucosa was relatively normal in appearance with no significant formation and no purulent secretions.  RLL and LLL airway segments were therapeutically lavaged with small aliquots of saline with minimal return of clear fluid.  No endobronchial lesions were seen.  The bronchoscope was then wedged in a segment of the RLL bronchus.  30 cc of saline was instilled with moderate return of mostly clear, slightly cloudy, nonpurulent BAL fluid.  The BAL specimen will be sent for appropriate culture.  No immediate complications.  EBL = 0.      Florian Cole M.D.      "

## 2023-09-25 NOTE — PROGRESS NOTES
MONITOR SUMMARY:     Rhythm: Sinus Rhythm, sinus Tach  Rate: 80s - 150  Ectopy: (O) PVC, BBB  Measurements: .18/.12/.44

## 2023-09-25 NOTE — ASSESSMENT & PLAN NOTE
Continue with resuscitation with IVF   Continue high dose thiamine  Surgery consulted.   Source is hypoperfusion possible type A and B lactic acidosis but also abdominal source with pneumobilia.  resolved

## 2023-09-25 NOTE — CONSULTS
CHIEF COMPLAINT: Pneumobilia     HISTORY OF PRESENT ILLNESS: The patient is a 68 year-old White man who presents to the Emergency Department confusion and small ICH.  WBC was 16K and has history of nephrolithiasis.  Due to that,  CT abdomen was obtained. Found small amount of pneumobilia.  LFTs nl.     PAST MEDICAL HISTORY:  has a past medical history of LE (acute kidney injury), prerenal (CMS-HCC) (1/20/2018), Alcohol withdrawal (HCC) (1/22/2018), Bowel habit changes, CAD (coronary artery disease), Cancer (HCC), Cellulitis of back except buttock (8/3/2018), HTN (hypertension), Hyperglycemia (1/19/2018), Hypertension, Indigestion, Kidney stones, Near-syncope, Nerve compression, and Tinnitus.    PAST SURGICAL HISTORY:  has a past surgical history that includes gastroscopy (3/29/2018); egd w/endoscopic ultrasound (3/29/2018); and egd with asp/bx (3/29/2018).    ALLERGIES:   Allergies   Allergen Reactions    Ativan      Pt reports that he gets very agitated     Hydrocodone Rash     Confusion   Agitation     Lorazepam Unspecified     Agitation        CURRENT MEDICATIONS:    Home Medications       Reviewed by Faith Camp (Pharmacy Tech) on 09/24/23 at 2106  Med List Status: Complete     Medication Last Dose Status   allopurinol (ZYLOPRIM) 300 MG Tab 9/24/2023 Active   amLODIPine (NORVASC) 5 MG Tab 9/24/2023 Active   aspirin EC (ECOTRIN) 81 MG Tablet Delayed Response 9/24/2023 Active   furosemide (LASIX) 20 MG Tab 9/24/2023 Active   Galcanezumab-gnlm (EMGALITY) 120 MG/ML Solution Auto-injector 9/21/2023 Active   losartan (COZAAR) 50 MG Tab 9/24/2023 Active   memantine (NAMENDA) 5 MG Tab 9/24/2023 Active   metFORMIN (GLUCOPHAGE) 500 MG Tab 9/24/2023 Active   naproxen (NAPROSYN) 500 MG Tab 9/24/2023 Active   omeprazole (PRILOSEC) 20 MG delayed-release capsule 9/24/2023 Active   pioglitazone (ACTOS) 15 MG Tab 9/24/2023 Active   potassium chloride SA (K-DUR) 10 MEQ Tab CR 9/24/2023 Active   simvastatin (ZOCOR) 40  "MG Tab 9/23/2023 Active                    FAMILY HISTORY: family history is not on file.    SOCIAL HISTORY:  reports that he has never smoked. He has never used smokeless tobacco. He reports current alcohol use. He reports that he does not use drugs.    REVIEW OF SYSTEMS: Comprehensive review of systems is not able to be elicited from the patient secondary to the acuity of the clinical situation.    PHYSICAL EXAMINATION:      Constitutional:     Vital Signs: /58   Pulse 78   Temp 37.9 °C (100.2 °F) (Oral)   Resp (!) 23   Ht 1.829 m (6' 0.01\")   Wt (!) 137 kg (300 lb 14.9 oz)   SpO2 98%    General Appearance: appears stated age.  HEENT: The sclera are anicteric. Nares and oropharynx are clear.   Neck: Supple.   Respiratory:   Inspection: Mechanically ventilated.      Cardiovascular:   Inspection: The skin is warm.     Abdomen:      Palpation: Palpation is remarkable for no significant tenderness, guarding, or peritoneal findings.    Extremities:   Examination of the upper and lower extremities demonstrates no cyanosis edema or clubbing.  Neurologic:   Sedated    LABORATORY VALUES:   Recent Labs     09/24/23 1824   WBC 16.4*   RBC 4.93   HEMOGLOBIN 15.4   HEMATOCRIT 45.4   MCV 92.1   MCH 31.2   MCHC 33.9   RDW 45.7   PLATELETCT 115*   MPV 11.5     Recent Labs     09/24/23 1824 09/24/23  2323   SODIUM 138 140   POTASSIUM 4.3 3.7   CHLORIDE 101 102   CO2 20 25   GLUCOSE 160* 154*   BUN 16 16   CREATININE 1.03 1.11   CALCIUM 9.1 8.3*     Recent Labs     09/24/23 1824 09/24/23  2240 09/24/23  2323   ASTSGOT 39  --  30   ALTSGPT 26  --  23   TBILIRUBIN 1.2  --  1.0   ALKPHOSPHAT 118*  --  98   GLOBULIN 3.1  --  2.4   INR  --  1.11  --      Recent Labs     09/24/23 2240   INR 1.11        IMAGING:   CT-RENAL COLIC EVALUATION(A/P W/O)   Final Result         1.  Intrahepatic biliary air and nondependent air within the gallbladder, consider history of instrumentation or changes of cholangitis in the appropriate " clinical setting.   2.  Large fat-containing bilateral inguinal hernias   3.  Enlarged prostate, consider causes of prostate enlargement with additional workup as clinically appropriate   4.  Diverticulosis   5.  Cholelithiasis   6.  Atherosclerosis      CT-HEAD W/O   Final Result         1.  Hazy left frontal subarachnoid hemorrhage.   2.  Nonspecific white matter changes, commonly associated with small vessel ischemic disease.  Associated mild cerebral atrophy is noted.      Based solely on CT findings, the brain injury guideline category is mBIG 1.      SDH < 4mm   IPH < 4mm   SAH < 3 sulci and < 1mm      The original BIG retrospective analysis found radiographic progression in 0% of BIG 1 patients and 2.6% BIG 2.      These findings were discussed with the patient's clinician, Madhu Zambrano, on 9/24/2023 10:10 PM.      DX-CHEST-PORTABLE (1 VIEW)   Final Result      No acute cardiopulmonary abnormality identified.          ASSESSMENT AND PLAN:     Pneumobilia- (present on admission)  Assessment & Plan  Presented with sepsis  CT shows small amount of air in GB and central duct  LFTs nl  WBC 16K  IV abx   Fluid resuscitation  If worsens, consider jamila tube      DISPOSITION: Medical evaluation and admission. The patient was admitted to the Medical Service prior to surgical consultation. [facility] Service will follow.     ____________________________________     Gloria Hirsch M.D.    DD: 9/25/2023  1:25 AM    [facility] Guidelines for Acute Cholecystitis 2018  AAST Grading System for EGS Conditions  ACS NSQIP Surgical Risk Calculator

## 2023-09-25 NOTE — PROCEDURES
Date: 9/25/2023    Procedure:  Emergent orotracheal intubation    Indication: Respiratory failure    Physician:  Dr. Florian Cole MD    Consent:  Emergent     Procedure:  This patient has developed increasing respiratory failure and requires emergent intubation.  RSI given with etomidate and rocuronium.  Using a #4 glidescope, an 8.0 ETT was placed on the first attempt w/o complication.  Tube placement confirmed by direct visualization of placement, end-tidal CO2 monitor color change and equal breath sounds.  No immediate complications.  Vitals remained stable throughout the procedure.  A post-procedure CXR will be reviewed.

## 2023-09-25 NOTE — ASSESSMENT & PLAN NOTE
Admitted with sepsis, now off vasopressor however increasing blood sugars over past day.  Mild tenderness in right-upper quadrant on exam.  Mild leukocytosis, no acidosis, no significant elevation of LFTs.  Unclear if he has a history of sphincterotomy, he has had an EGD with EUS and biopsy for evaluation of pancreatitis.  Ultrasound with mild wall thickening not definitive for cholecystitis, pericholecystitic fluid also could be result of resuscitation.  HIDA with filling of the gallbladder not consistent with cholecystitis, no indication for cholecystostomy tube at this time.

## 2023-09-25 NOTE — ASSESSMENT & PLAN NOTE
A1c 5.7% (6/2023)  Lantus insulin to 50 units in the evening with sliding scale throughout the day every 6 hours with tube feeds    10/24/23  Patient becoming hypoglycemic with blood glucose in the 90s.  Decrease glargine insulin to 50 units in the evenings.  Continue regular insulin sign scale.    10/27/23  Discontinue glargine insulin since patient may go to group home.  Start metformin 850 mg twice a day.  Change regular insulin to lispro insulin 3 times a day with meals.  Hypoglycemia protocol

## 2023-09-25 NOTE — ASSESSMENT & PLAN NOTE
Likely traumatic  Now day 7  Keppra BID x7 days starting 10/1 for new bleed  - can dc if ok with neuro/nsgy

## 2023-09-25 NOTE — ASSESSMENT & PLAN NOTE
This is Sepsis Present on admission  SIRS criteria identified on my evaluation include: Fever, with temperature greater than 100.9 deg F, Tachycardia, with heart rate greater than 90 BPM and Leukocytosis, with WBC greater than 12,000  Clinical indicators of end organ dysfunction include Toxic Metabolic Encephalopathy  Source is GI vs .   Sepsis protocol initiated  Crystalloid Fluid Administration: Fluid resuscitation ordered per standard protocol - 30 mL/kg per current or ideal body weight  IV antibiotics as appropriate for source of sepsis, C3, Flagyl  Reassessment: I have reassessed the patient's hemodynamic status    CT abdomen shows signs of hepatobiliary air.   General surgery consulted: Dr Hirsch signed off  Patient markedly improving, off pressors

## 2023-09-25 NOTE — PROGRESS NOTES
[facility] ICU Progress Note      Admit Date: 9/24/2023    Resident(s): Rhoda Argueta M.D.   Attending:  KOLBY GONSALES/ Dr. Greenwood    Patient ID:    Name:  Sharon Guardado   YOB: 1955  Age:  68 y.o.  male   MRN:  [id number]    Hospital Course (carried forward and updated):  Sharon Guardado is a 68 y.o. male with notable PMH of HTN, HLD, GERD, BPH, gout, morbid obesity (BMI 40), alcohol use disorder, nephrolithiasis, hx of SAH (3/2023), and T2DM who presented 9/24/23 with altered mental status (A&Ox1) with a mechanical fall. Per wife, patient was walking up the stairs on 9/24/23, stumbled, and hit the right side of his body on the wall.     In the ED, patient was febrile (103.4), WBC 16, lactate 4.6, U/a with WBC, LE, nitrite, RBCs. CT head with small frontal SAH. CT renal with hepatic biliary air, no nephrolithiasis.    9/25/23: Febrile overnight, WBC improving 16->10; APVCMV 20/470/40%/8    Consultants:  Critical Care  General Surgery  Neurology    Interval Events:  9/25/23:   - Neuro: RASS +2/+3; propofol 80mcq, fentanyl 150 mcq   - CTA head and neck with improvement of SAH   - EEG pending  - CV: Levophed 0.3->0.28; goal MAP>65  - Resp: APVCMV 22/470/40%/8   - SBT today  - GI/Nut: NG tube in place, clamped  - Renal/IVF/lytes: Renal fcn stable   - Continue IVF   - Trend renal function, electrolytes  - ID: Febrile overnight, most recently 0400; WBC 16->10->19->15  - Continue CTX, flagyl  - Heme/onc: Lovenox dvt ppx added; Trend cbc  - Endo: Blood glucose goal 140-180; increase glargine 15u to obtain goal      Vitals Range last 24h:  Temp:  [37.4 °C (99.3 °F)-38.3 °C (100.9 °F)] 38 °C (100.4 °F)  Pulse:  [] 77  Resp:  [13-39] 22  BP: ()/(37-73) 107/53  SpO2:  [91 %-98 %] 96 %    Review of Systems   Unable to perform ROS: Intubated     PHYSICAL EXAM:  Vitals:    09/26/23 0900 09/26/23 0915 09/26/23 0930 09/26/23 1009   BP: 114/53 107/53 107/53    Pulse: 69 74 72 77   Resp:  (!) 22 (!) 22 (!) 22 (!) 22   Temp:       TempSrc:       SpO2: 96% 96% 97% 96%   Weight:       Height:        Body mass index is 44.84 kg/m².    O2 therapy: Pulse Oximetry: 96 %, O2 Delivery Device: Ventilator  Date 09/26/23 0700 - 09/27/23 0659   Shift 6635-6025 5955-8211 7417-1106 24 Hour Total   INTAKE   I.V. 1496.1   1496.1     Fentanyl Volume 20.8   20.8     Magnesium Sulfate Volume 49.7   49.7     Vasopressin Volume 59   59     Propofol Volume 225.3   225.3     Norepinephrine Volume 289.1   289.1     Volume (mL) (lactated ringers infusion) 852.2   852.2   IV Piggyback 481.4   481.4     Volume (mL) (metroNIDAZOLE (Flagyl) IVPB 500 mg) 100   100     Volume (mL) (thiamine (B-1) 200 mg in dextrose 5% 100 mL IVPB) 100.1   100.1     Volume (mL) (potassium phosphate IVPB 30 mmol in 500 mL D5W (premix)) 281.3   281.3   Shift Total 1977.5   1977.5   OUTPUT   Urine 400   400     Output (mL) (Urethral Catheter) 400   400   Stool         Number of Times Stooled 0 x   0 x   Shift Total 400   400   NET 1577.5   1577.5       Intake/Output Summary (Last 24 hours) at 9/26/2023 1420  Last data filed at 9/26/2023 1256  Gross per 24 hour   Intake 6318.47 ml   Output 2025 ml   Net 4293.47 ml      Net IO Since Admission: 3,942.51 mL [09/25/23 0718]       Physical Exam  Vitals and nursing note reviewed.   Constitutional:       Appearance: He is obese.   HENT:      Nose: Nose normal.   Cardiovascular:      Rate and Rhythm: Normal rate and regular rhythm.   Pulmonary:      Comments: vented  Abdominal:      General: There is distension.      Palpations: Abdomen is soft.   Musculoskeletal:         General: Swelling present.   Skin:     General: Skin is warm and dry.   Neurological:      Motor: No weakness.      Comments: sedated   Psychiatric:      Comments: Sedated, agitated when sedation decreased         Recent Labs     09/25/23  0324 09/26/23  0403   ISTATAPH 7.330* 7.334*   ISTATAPCO2 41.0* 43.7*   ISTATAPO2 60* 59*   ISTATATCO2 23  25   BCXSUFF3PRZ 89* 88*   ISTATARTHCO3 21.6 23.2   ISTATARTBE -4 -3   ISTATTEMP 96.7 F 100.8 F   ISTATFIO2 50 40   ISTATSPEC Arterial Arterial   ISTATAPHTC 7.345* 7.317*   QFOAYRHO8VR 56* 65     Recent Labs     09/25/23 0319 09/25/23 1250 09/25/23 2100 09/26/23  0342   SODIUM 138 137  --  137   POTASSIUM 3.4* 3.6  --  3.7   CHLORIDE 102 104  --  104   CO2 21 21  --  24   BUN 16 17  --  13   CREATININE 1.04 1.07  --  0.92   MAGNESIUM 1.1*  --   --  1.6   PHOSPHORUS 1.4*  --  3.0 1.9*   CALCIUM 8.7 7.4*  --  8.2*     Recent Labs     09/24/23 2323 09/25/23 0319 09/25/23 1250 09/26/23 0342   ALTSGPT 23 28  --  22   ASTSGOT 30 47*  --  38   ALKPHOSPHAT 98 124*  --  103*   TBILIRUBIN 1.0 1.4  --  0.9   LIPASE  --   --   --  12   GLUCOSE 154* 230* 256* 234*     Recent Labs     09/24/23 2240 09/25/23 0319 09/25/23 1250 09/26/23 0342   RBC  --  4.85 4.46* 4.52*   HEMOGLOBIN  --  15.1 13.6* 14.0   HEMATOCRIT  --  44.3 40.3* 41.2*   PLATELETCT  --  86* 85* 84*   PROTHROMBTM 14.5  --   --   --    INR 1.11  --   --   --      Recent Labs     09/24/23 2323 09/25/23 0319 09/25/23 1250 09/26/23 0342   WBC  --  10.5 19.8* 15.5*   NEUTSPOLYS  --  87.30* 78.60* 78.80*   LYMPHOCYTES  --  8.70* 11.90* 13.70*   MONOCYTES  --  2.60 7.50 5.90   EOSINOPHILS  --  0.10 0.80 0.50   BASOPHILS  --  0.30 0.20 0.30   ASTSGOT 30 47*  --  38   ALTSGPT 23 28  --  22   ALKPHOSPHAT 98 124*  --  103*   TBILIRUBIN 1.0 1.4  --  0.9       Meds:   potassium phosphate  30 mmol 30 mmol (09/26/23 0932)    insulin GLARGINE  15 Units      enoxaparin (LOVENOX) injection  40 mg      Respiratory Therapy Consult        MD Alert...Adult ICU Electrolyte Replacement per Pharmacy        lidocaine  2 mL      propofol  0-80 mcg/kg/min (Ideal) 80 mcg/kg/min (09/26/23 1413)    fentaNYL   150 mcg/hr (09/26/23 1131)    fentaNYL  25 mcg      Or    fentaNYL  50 mcg      Or    fentaNYL  100 mcg      ipratropium-albuterol  3 mL      folic acid  1 mg      And     multivitamin  1 Tablet      famotidine  20 mg      Or    famotidine  20 mg      senna-docusate  2 Tablet      And    polyethylene glycol/lytes  1 Packet      And    magnesium hydroxide  30 mL      And    bisacodyl  10 mg      NORepinephrine  0-1 mcg/kg/min (Ideal) 0.2 mcg/kg/min (09/26/23 1238)    insulin lispro  2-9 Units      thiamine  200 mg Stopped (09/26/23 0933)    vasopressin (Vasostrict) 20 Units in  mL Infusion  0.03 Units/min 0.03 Units/min (09/26/23 0622)    acetaminophen  650 mg      LR  1,000 mL      cefTRIAXone (ROCEPHIN) IV  2,000 mg      metroNIDAZOLE (FLAGYL) IV  500 mg Stopped (09/26/23 1234)    LR   125 mL/hr at 09/26/23 1259    dextrose bolus  25 g          Procedures:  - Intubation (9/25/23)  - Bronch + BAL (9/25/23)    Imaging:  DX-CHEST-PORTABLE (1 VIEW)   Final Result         1.  Interstitial edema and/or infiltrates, slightly decreased since prior study.   2.  Cardiomegaly   3.  Atherosclerosis      DX-CHEST-LIMITED (1 VIEW)   Final Result      Right IJ catheter tip projects in the SVC. No pneumothorax.      CT-CTA NECK WITH & W/O-POST PROCESSING   Final Result      1.  Estimated 50-75% stenosis of the BILATERAL carotid arteries   2.  Estimated greater than 50% stenosis at the vertebral artery ostium on each side      CT-CTA HEAD WITH & W/O-POST PROCESS   Final Result      1.  No large vessel occlusion or aneurysm.   2.  Hazy left frontal lobe subarachnoid hemorrhage, decreased in conspicuity from prior. No new hemorrhage seen.   3.  Scattered opacifications of the ethmoid sinuses, likely related to support hardware.      DX-ABDOMEN FOR TUBE PLACEMENT   Final Result         1.  Nonspecific bowel gas pattern in the upper abdomen.   2.  Dobbhoff tube with tip terminating overlying the expected location of the first or second duodenal segment.      DX-CHEST-PORTABLE (1 VIEW)   Final Result         1.  Interstitial edema and/or infiltrates.   2.  Cardiomegaly   3.  Nasogastric tube  terminates in the distal esophagus, recommend advancement   4.  Atherosclerosis      DX-ABDOMEN FOR TUBE PLACEMENT   Final Result         1.  Nonspecific bowel gas pattern in the upper abdomen.   2.  Nasogastric tube terminates just distal to the gastroesophageal junction, recommend advancement      CT-RENAL COLIC EVALUATION(A/P W/O)   Final Result         1.  Intrahepatic biliary air and nondependent air within the gallbladder, consider history of instrumentation or changes of cholangitis in the appropriate clinical setting.   2.  Large fat-containing bilateral inguinal hernias   3.  Enlarged prostate, consider causes of prostate enlargement with additional workup as clinically appropriate   4.  Diverticulosis   5.  Cholelithiasis   6.  Atherosclerosis      CT-HEAD W/O   Final Result         1.  Hazy left frontal subarachnoid hemorrhage.   2.  Nonspecific white matter changes, commonly associated with small vessel ischemic disease.  Associated mild cerebral atrophy is noted.      Based solely on CT findings, the brain injury guideline category is mBIG 1.      SDH < 4mm   IPH < 4mm   SAH < 3 sulci and < 1mm      The original BIG retrospective analysis found radiographic progression in 0% of BIG 1 patients and 2.6% BIG 2.      These findings were discussed with the patient's clinician, Madhu aZmbrano, on 9/24/2023 10:10 PM.      DX-CHEST-PORTABLE (1 VIEW)   Final Result      No acute cardiopulmonary abnormality identified.          ASSESSEMENT and PLAN:    * Severe sepsis (HCC)- (present on admission)  Assessment & Plan  This is Sepsis Present on admission  SIRS criteria identified on my evaluation include: Fever, with temperature greater than 100.9 deg F, Tachycardia, with heart rate greater than 90 BPM and Leukocytosis, with WBC greater than 12,000  Clinical indicators of end organ dysfunction include Toxic Metabolic Encephalopathy  Source is GI vs .   Sepsis protocol initiated  Crystalloid Fluid Administration:  Fluid resuscitation ordered per standard protocol - 30 mL/kg per current or ideal body weight  IV antibiotics as appropriate for source of sepsis, C3, Flagyl  Reassessment: I have reassessed the patient's hemodynamic status     9/24/23: CT abdomen shows signs of hepatobiliary air.  9/25/23: Leukocytosis (16->10) & tachycardia resolved  9/26/23: afebrile, WBC 10->19->15    - Ceftriaxone, flagyl (end date 9/29/23)  - IV fluids  - F/u blood, urine cultures -> NGTD    Acute respiratory failure with hypoxia (HCC)  Assessment & Plan  Emergent intubation 9/25/23 9/25/23: 7.330/42/60  APVCMV 20/470/40%/8  9/26/23: 22/470/40%/8    - ABCDE protocol    Pneumobilia- (present on admission)  Assessment & Plan  As seen on CT renal (9/25/23).    9/26/23: No need for further interventions at this time per gen surg    SAH (subarachnoid hemorrhage) ([facility])  Assessment & Plan  Small traumatic subarachnoid hemorrhage s/p mechanical fall  Not c/w aneurysm  CTA head and neck with improvement of SAH  -Continue serial neurologic exams  - No NSAIDs  - Okay to start DVT prophylaxis given imaging documentation of SAH improvement    Lactic acidosis  Assessment & Plan  4.6 at presentation  Secondary to GI vs  infection    9/26/23: normalized    Thrombocytopenia (HCC)  Assessment & Plan  Baseline ~110-125  Possibly secondary to alcohol use disorder although normal H&H and LFTs    9/25/23: 86  9/26/23: 84    - trend    Type 2 diabetes mellitus (HCC)- (present on admission)  Assessment & Plan  A1c 5.7% (6/2023)  Home meds: metformin, pioglitazone    9/26/23: 15u glargine    - hold home meds for now  - basal and SSI to glucose goal 140-180; titrate as clinically indicated     Essential hypertension- (present on admission)  Assessment & Plan  Hypertensive at presentation despite sepsis  Outpatient meds: amlodipine 5mg, losartan 40; hold for now    9/25/23: requiring pressors (levo 0.3), titrate down as able  9/26/23: levo 0.28    - Goal SBP  120-140, MAP >65    Alcohol dependence (HCC)- (present on admission)  Assessment & Plan  Multivitamin, folate, thiamine  Monitor, additional therapies as clinically indicated    BMI 40.0-44.9, adult (HCC)  Assessment & Plan  Noted  Increased risk for decompensation        DISPO: CICU    CODE STATUS: FULL CODE    Quality Measures:  Feeding: npo  Analgesia: fentanyl  Sedation: propofol  Thromboprophylaxis: lovenox  Head of bed: >30 degrees  Ulcer prophylaxis: famotidine 20bid  Glycemic control: Correctional: 2-9u lispro / Basal: 15u glargine  Bowel care: bowel regimen ordered  Indwelling lines: PIV R am, PIV L hand, PIV R hand, indwelling cath, ETT 8.0, enteral tube (clamped)  Deescalation of antibiotics: CTX & flagyl (end 9/29/23)      Rhoda Argueta M.D.  R Internal Medicine Resident

## 2023-09-25 NOTE — ASSESSMENT & PLAN NOTE
Multivitamin, folate, thiamine  Monitor, additional therapies as clinically indicated  9/29/2023 patient was at least 2-3 hard liquor drinks daily although this is unclear per patient and his wife's history.  .    Patient s/p treatment for acute alcohol withdrawal

## 2023-09-25 NOTE — FLOWSHEET NOTE
09/25/23 0237   Events/Summary/Plan   Events/Summary/Plan Pt. intubated at this time by MD Cole.  Bilateral BrS, Colormetric change noted.  Vitals stable throughout.   General Vent Information   Vent Mode APVCMV   Vent Settings   FiO2% 100 %   Rate (breaths/min) 20   Vt Target (mL) 470   TI (Seconds) 0.9   PEEP/CPAP 8

## 2023-09-25 NOTE — ASSESSMENT & PLAN NOTE
This is Sepsis Present on admission  SIRS criteria identified on my evaluation include: Fever, with temperature greater than 100.9 deg F, Tachycardia, with heart rate greater than 90 BPM and Leukocytosis, with WBC greater than 12,000  Clinical indicators of end organ dysfunction include Toxic Metabolic Encephalopathy  Source is GI vs .   Sepsis protocol initiated  Crystalloid Fluid Administration: Fluid resuscitation ordered per standard protocol - 30 mL/kg per current or ideal body weight  IV antibiotics as appropriate for source of sepsis, C3, Flagyl  Reassessment: I have reassessed the patient's hemodynamic status     9/24/23: CT abdomen shows signs of hepatobiliary air.  9/25/23: Leukocytosis (16->10) & tachycardia resolved  9/26/23: afebrile, WBC 10->19->15    - Ceftriaxone, flagyl (end date 9/29/23)  - IV fluids  - F/u blood, urine cultures -> NGTD

## 2023-09-25 NOTE — ASSESSMENT & PLAN NOTE
Glycohemoglobin 6.8  I am titrating an insulin drip to achieve glycemic control    Will attempt discontinuation of insulin infusion today

## 2023-09-26 ENCOUNTER — APPOINTMENT (OUTPATIENT)
Dept: RADIOLOGY | Facility: MEDICAL CENTER | Age: 68
DRG: 870 | End: 2023-09-26
Attending: INTERNAL MEDICINE
Payer: MEDICARE

## 2023-09-26 LAB
ALBUMIN SERPL BCP-MCNC: 3.2 G/DL (ref 3.2–4.9)
ALBUMIN/GLOB SERPL: 1.5 G/DL
ALP SERPL-CCNC: 103 U/L (ref 30–99)
ALT SERPL-CCNC: 22 U/L (ref 2–50)
ANION GAP SERPL CALC-SCNC: 9 MMOL/L (ref 7–16)
AST SERPL-CCNC: 38 U/L (ref 12–45)
BACTERIA UR CULT: NORMAL
BASE EXCESS BLDA CALC-SCNC: -3 MMOL/L (ref -4–3)
BASOPHILS # BLD AUTO: 0.3 % (ref 0–1.8)
BASOPHILS # BLD: 0.05 K/UL (ref 0–0.12)
BILIRUB SERPL-MCNC: 0.9 MG/DL (ref 0.1–1.5)
BODY TEMPERATURE: ABNORMAL DEGREES
BREATHS SETTING VENT: 20
BUN SERPL-MCNC: 13 MG/DL (ref 8–22)
CALCIUM ALBUM COR SERPL-MCNC: 8.8 MG/DL (ref 8.5–10.5)
CALCIUM SERPL-MCNC: 8.2 MG/DL (ref 8.5–10.5)
CHLORIDE SERPL-SCNC: 104 MMOL/L (ref 96–112)
CO2 BLDA-SCNC: 25 MMOL/L (ref 20–33)
CO2 SERPL-SCNC: 24 MMOL/L (ref 20–33)
CREAT SERPL-MCNC: 0.92 MG/DL (ref 0.5–1.4)
DELSYS IDSYS: ABNORMAL
END TIDAL CARBON DIOXIDE IECO2: 42 MMHG
EOSINOPHIL # BLD AUTO: 0.08 K/UL (ref 0–0.51)
EOSINOPHIL NFR BLD: 0.5 % (ref 0–6.9)
ERYTHROCYTE [DISTWIDTH] IN BLOOD BY AUTOMATED COUNT: 46.8 FL (ref 35.9–50)
GFR SERPLBLD CREATININE-BSD FMLA CKD-EPI: 89 ML/MIN/1.73 M 2
GLOBULIN SER CALC-MCNC: 2.2 G/DL (ref 1.9–3.5)
GLUCOSE BLD STRIP.AUTO-MCNC: 175 MG/DL (ref 65–99)
GLUCOSE BLD STRIP.AUTO-MCNC: 180 MG/DL (ref 65–99)
GLUCOSE BLD STRIP.AUTO-MCNC: 208 MG/DL (ref 65–99)
GLUCOSE BLD STRIP.AUTO-MCNC: 230 MG/DL (ref 65–99)
GLUCOSE BLD STRIP.AUTO-MCNC: 239 MG/DL (ref 65–99)
GLUCOSE BLD STRIP.AUTO-MCNC: 245 MG/DL (ref 65–99)
GLUCOSE SERPL-MCNC: 234 MG/DL (ref 65–99)
HCO3 BLDA-SCNC: 23.2 MMOL/L (ref 17–25)
HCT VFR BLD AUTO: 41.2 % (ref 42–52)
HGB BLD-MCNC: 14 G/DL (ref 14–18)
HOROWITZ INDEX BLDA+IHG-RTO: 148 MM[HG]
IMM GRANULOCYTES # BLD AUTO: 0.12 K/UL (ref 0–0.11)
IMM GRANULOCYTES NFR BLD AUTO: 0.8 % (ref 0–0.9)
LACTATE SERPL-SCNC: 1.5 MMOL/L (ref 0.5–2)
LIPASE SERPL-CCNC: 12 U/L (ref 11–82)
LYMPHOCYTES # BLD AUTO: 2.12 K/UL (ref 1–4.8)
LYMPHOCYTES NFR BLD: 13.7 % (ref 22–41)
MAGNESIUM SERPL-MCNC: 1.6 MG/DL (ref 1.5–2.5)
MCH RBC QN AUTO: 31 PG (ref 27–33)
MCHC RBC AUTO-ENTMCNC: 34 G/DL (ref 32.3–36.5)
MCV RBC AUTO: 91.2 FL (ref 81.4–97.8)
MODE IMODE: ABNORMAL
MONOCYTES # BLD AUTO: 0.92 K/UL (ref 0–0.85)
MONOCYTES NFR BLD AUTO: 5.9 % (ref 0–13.4)
NEUTROPHILS # BLD AUTO: 12.21 K/UL (ref 1.82–7.42)
NEUTROPHILS NFR BLD: 78.8 % (ref 44–72)
NRBC # BLD AUTO: 0 K/UL
NRBC BLD-RTO: 0 /100 WBC (ref 0–0.2)
O2/TOTAL GAS SETTING VFR VENT: 40 %
PCO2 BLDA: 43.7 MMHG (ref 26–37)
PCO2 TEMP ADJ BLDA: 46 MMHG (ref 26–37)
PEEP END EXPIRATORY PRESSURE IPEEP: 8 CMH20
PH BLDA: 7.33 [PH] (ref 7.4–7.5)
PH TEMP ADJ BLDA: 7.32 [PH] (ref 7.4–7.5)
PHOSPHATE SERPL-MCNC: 1.9 MG/DL (ref 2.5–4.5)
PLATELET # BLD AUTO: 84 K/UL (ref 164–446)
PLATELETS.RETICULATED NFR BLD AUTO: 11.7 % (ref 0.6–13.1)
PMV BLD AUTO: 12 FL (ref 9–12.9)
PO2 BLDA: 59 MMHG (ref 64–87)
PO2 TEMP ADJ BLDA: 65 MMHG (ref 64–87)
POTASSIUM SERPL-SCNC: 3.7 MMOL/L (ref 3.6–5.5)
PROT SERPL-MCNC: 5.4 G/DL (ref 6–8.2)
RBC # BLD AUTO: 4.52 M/UL (ref 4.7–6.1)
SAO2 % BLDA: 88 % (ref 93–99)
SIGNIFICANT IND 70042: NORMAL
SITE SITE: NORMAL
SODIUM SERPL-SCNC: 137 MMOL/L (ref 135–145)
SOURCE SOURCE: NORMAL
SPECIMEN DRAWN FROM PATIENT: ABNORMAL
TIDAL VOLUME IVT: 470 ML
WBC # BLD AUTO: 15.5 K/UL (ref 4.8–10.8)

## 2023-09-26 PROCEDURE — 36600 WITHDRAWAL OF ARTERIAL BLOOD: CPT

## 2023-09-26 PROCEDURE — 99291 CRITICAL CARE FIRST HOUR: CPT | Performed by: INTERNAL MEDICINE

## 2023-09-26 PROCEDURE — 700105 HCHG RX REV CODE 258: Performed by: INTERNAL MEDICINE

## 2023-09-26 PROCEDURE — 700101 HCHG RX REV CODE 250: Performed by: INTERNAL MEDICINE

## 2023-09-26 PROCEDURE — 83605 ASSAY OF LACTIC ACID: CPT

## 2023-09-26 PROCEDURE — 700111 HCHG RX REV CODE 636 W/ 250 OVERRIDE (IP): Performed by: INTERNAL MEDICINE

## 2023-09-26 PROCEDURE — 700101 HCHG RX REV CODE 250

## 2023-09-26 PROCEDURE — 770022 HCHG ROOM/CARE - ICU (200)

## 2023-09-26 PROCEDURE — 71045 X-RAY EXAM CHEST 1 VIEW: CPT

## 2023-09-26 PROCEDURE — 700102 HCHG RX REV CODE 250 W/ 637 OVERRIDE(OP)

## 2023-09-26 PROCEDURE — 83735 ASSAY OF MAGNESIUM: CPT

## 2023-09-26 PROCEDURE — 85055 RETICULATED PLATELET ASSAY: CPT

## 2023-09-26 PROCEDURE — 85025 COMPLETE CBC W/AUTO DIFF WBC: CPT

## 2023-09-26 PROCEDURE — 99232 SBSQ HOSP IP/OBS MODERATE 35: CPT | Performed by: NURSE PRACTITIONER

## 2023-09-26 PROCEDURE — 82962 GLUCOSE BLOOD TEST: CPT

## 2023-09-26 PROCEDURE — 84100 ASSAY OF PHOSPHORUS: CPT

## 2023-09-26 PROCEDURE — 82803 BLOOD GASES ANY COMBINATION: CPT

## 2023-09-26 PROCEDURE — 94799 UNLISTED PULMONARY SVC/PX: CPT

## 2023-09-26 PROCEDURE — 83690 ASSAY OF LIPASE: CPT

## 2023-09-26 PROCEDURE — 700111 HCHG RX REV CODE 636 W/ 250 OVERRIDE (IP): Mod: JZ

## 2023-09-26 PROCEDURE — 94003 VENT MGMT INPAT SUBQ DAY: CPT

## 2023-09-26 PROCEDURE — 700105 HCHG RX REV CODE 258

## 2023-09-26 PROCEDURE — A9270 NON-COVERED ITEM OR SERVICE: HCPCS

## 2023-09-26 PROCEDURE — 80053 COMPREHEN METABOLIC PANEL: CPT

## 2023-09-26 RX ORDER — MAGNESIUM SULFATE HEPTAHYDRATE 40 MG/ML
2 INJECTION, SOLUTION INTRAVENOUS ONCE
Status: COMPLETED | OUTPATIENT
Start: 2023-09-26 | End: 2023-09-26

## 2023-09-26 RX ORDER — ENOXAPARIN SODIUM 100 MG/ML
40 INJECTION SUBCUTANEOUS EVERY 12 HOURS
Status: DISCONTINUED | OUTPATIENT
Start: 2023-09-26 | End: 2023-09-30

## 2023-09-26 RX ADMIN — FAMOTIDINE 20 MG: 20 TABLET ORAL at 17:42

## 2023-09-26 RX ADMIN — PROPOFOL 80 MCG/KG/MIN: 10 INJECTION, EMULSION INTRAVENOUS at 16:34

## 2023-09-26 RX ADMIN — SENNOSIDES AND DOCUSATE SODIUM 2 TABLET: 50; 8.6 TABLET ORAL at 17:42

## 2023-09-26 RX ADMIN — PROPOFOL 80 MCG/KG/MIN: 10 INJECTION, EMULSION INTRAVENOUS at 19:33

## 2023-09-26 RX ADMIN — PROPOFOL 80 MCG/KG/MIN: 10 INJECTION, EMULSION INTRAVENOUS at 14:13

## 2023-09-26 RX ADMIN — POTASSIUM PHOSPHATE, MONOBASIC AND POTASSIUM PHOSPHATE, DIBASIC 30 MMOL: 224; 236 INJECTION, SOLUTION, CONCENTRATE INTRAVENOUS at 09:32

## 2023-09-26 RX ADMIN — FENTANYL CITRATE 100 MCG: 50 INJECTION, SOLUTION INTRAMUSCULAR; INTRAVENOUS at 04:59

## 2023-09-26 RX ADMIN — FENTANYL CITRATE 150 MCG/HR: 50 INJECTION, SOLUTION INTRAMUSCULAR; INTRAVENOUS at 11:31

## 2023-09-26 RX ADMIN — INSULIN LISPRO 2 UNITS: 100 INJECTION, SOLUTION INTRAVENOUS; SUBCUTANEOUS at 23:17

## 2023-09-26 RX ADMIN — CEFTRIAXONE SODIUM 2000 MG: 10 INJECTION, POWDER, FOR SOLUTION INTRAVENOUS at 17:42

## 2023-09-26 RX ADMIN — MAGNESIUM SULFATE HEPTAHYDRATE 2 G: 2 INJECTION, SOLUTION INTRAVENOUS at 09:01

## 2023-09-26 RX ADMIN — VASOPRESSIN 0.03 UNITS/MIN: 20 INJECTION INTRAVENOUS at 06:22

## 2023-09-26 RX ADMIN — PROPOFOL 70 MCG/KG/MIN: 10 INJECTION, EMULSION INTRAVENOUS at 22:25

## 2023-09-26 RX ADMIN — INSULIN LISPRO 3 UNITS: 100 INJECTION, SOLUTION INTRAVENOUS; SUBCUTANEOUS at 17:53

## 2023-09-26 RX ADMIN — NOREPINEPHRINE BITARTRATE 0.35 MCG/KG/MIN: 1 INJECTION, SOLUTION, CONCENTRATE INTRAVENOUS at 11:11

## 2023-09-26 RX ADMIN — THIAMINE HYDROCHLORIDE 200 MG: 100 INJECTION, SOLUTION INTRAMUSCULAR; INTRAVENOUS at 21:08

## 2023-09-26 RX ADMIN — FAMOTIDINE 20 MG: 20 TABLET ORAL at 05:30

## 2023-09-26 RX ADMIN — METRONIDAZOLE 500 MG: 5 INJECTION, SOLUTION INTRAVENOUS at 11:34

## 2023-09-26 RX ADMIN — PROPOFOL 60 MCG/KG/MIN: 10 INJECTION, EMULSION INTRAVENOUS at 02:56

## 2023-09-26 RX ADMIN — SENNOSIDES AND DOCUSATE SODIUM 2 TABLET: 50; 8.6 TABLET ORAL at 05:30

## 2023-09-26 RX ADMIN — FENTANYL CITRATE 100 MCG: 50 INJECTION, SOLUTION INTRAMUSCULAR; INTRAVENOUS at 03:59

## 2023-09-26 RX ADMIN — INSULIN LISPRO 3 UNITS: 100 INJECTION, SOLUTION INTRAVENOUS; SUBCUTANEOUS at 05:40

## 2023-09-26 RX ADMIN — FENTANYL CITRATE 100 MCG: 50 INJECTION, SOLUTION INTRAMUSCULAR; INTRAVENOUS at 06:45

## 2023-09-26 RX ADMIN — SODIUM CHLORIDE, POTASSIUM CHLORIDE, SODIUM LACTATE AND CALCIUM CHLORIDE: 600; 310; 30; 20 INJECTION, SOLUTION INTRAVENOUS at 17:59

## 2023-09-26 RX ADMIN — PROPOFOL 70 MCG/KG/MIN: 10 INJECTION, EMULSION INTRAVENOUS at 06:08

## 2023-09-26 RX ADMIN — FENTANYL CITRATE 100 MCG: 50 INJECTION, SOLUTION INTRAMUSCULAR; INTRAVENOUS at 06:09

## 2023-09-26 RX ADMIN — METRONIDAZOLE 500 MG: 5 INJECTION, SOLUTION INTRAVENOUS at 23:14

## 2023-09-26 RX ADMIN — NOREPINEPHRINE BITARTRATE 0.25 MCG/KG/MIN: 1 INJECTION, SOLUTION, CONCENTRATE INTRAVENOUS at 06:07

## 2023-09-26 RX ADMIN — SODIUM CHLORIDE, POTASSIUM CHLORIDE, SODIUM LACTATE AND CALCIUM CHLORIDE: 600; 310; 30; 20 INJECTION, SOLUTION INTRAVENOUS at 12:59

## 2023-09-26 RX ADMIN — PROPOFOL 80 MCG/KG/MIN: 10 INJECTION, EMULSION INTRAVENOUS at 09:00

## 2023-09-26 RX ADMIN — THIAMINE HYDROCHLORIDE 200 MG: 100 INJECTION, SOLUTION INTRAMUSCULAR; INTRAVENOUS at 15:41

## 2023-09-26 RX ADMIN — FOLIC ACID 1 MG: 1 TABLET ORAL at 05:30

## 2023-09-26 RX ADMIN — THIAMINE HYDROCHLORIDE 200 MG: 100 INJECTION, SOLUTION INTRAMUSCULAR; INTRAVENOUS at 09:02

## 2023-09-26 RX ADMIN — THERA TABS 1 TABLET: TAB at 05:30

## 2023-09-26 RX ADMIN — SODIUM CHLORIDE, POTASSIUM CHLORIDE, SODIUM LACTATE AND CALCIUM CHLORIDE: 600; 310; 30; 20 INJECTION, SOLUTION INTRAVENOUS at 01:41

## 2023-09-26 RX ADMIN — ENOXAPARIN SODIUM 40 MG: 100 INJECTION SUBCUTANEOUS at 17:42

## 2023-09-26 RX ADMIN — ACETAMINOPHEN 650 MG: 325 TABLET, FILM COATED ORAL at 05:34

## 2023-09-26 RX ADMIN — PROPOFOL 80 MCG/KG/MIN: 10 INJECTION, EMULSION INTRAVENOUS at 11:29

## 2023-09-26 RX ADMIN — INSULIN LISPRO 3 UNITS: 100 INJECTION, SOLUTION INTRAVENOUS; SUBCUTANEOUS at 11:48

## 2023-09-26 ASSESSMENT — FIBROSIS 4 INDEX: FIB4 SCORE: 7.31

## 2023-09-26 ASSESSMENT — PAIN DESCRIPTION - PAIN TYPE
TYPE: ACUTE PAIN

## 2023-09-26 NOTE — PROGRESS NOTES
MONITOR SUMMARY:     Rhythm: Sinus Rhythm  Rate: 60s - 94  Ectopy: (o) PVC

## 2023-09-26 NOTE — CARE PLAN
The patient is Watcher - Medium risk of patient condition declining or worsening    Shift Goals  Clinical Goals: Maintain hemodynamic stability, maintain pt comfort, family updates  Patient Goals: DEENA  Family Goals: DEENA    Progress made toward(s) clinical / shift goals:    Problem: Hemodynamics  Goal: Patient's hemodynamics, fluid balance and neurologic status will be stable or improve  Outcome: Progressing     Problem: Pain - Standard  Goal: Alleviation of pain or a reduction in pain to the patient’s comfort goal  Outcome: Progressing     Problem: Safety - Medical Restraint  Goal: Remains free of injury from restraints (Restraint for Interference with Medical Device)  Outcome: Progressing       Patient is not progressing towards the following goals:

## 2023-09-26 NOTE — CARE PLAN
Problem: Ventilation  Goal: Ability to achieve and maintain unassisted ventilation or tolerate decreased levels of ventilator support  Description: Target End Date:  4 days     Document on Vent flowsheet    1.  Support and monitor invasive and noninvasive mechanical ventilation  2.  Monitor ventilator weaning response  3.  Perform ventilator associated pneumonia prevention interventions  4.  Manage ventilation therapy by monitoring diagnostic test results  Outcome: Progressing     Ventilator Daily Summary    Vent Day #2  Settings: 22/470/8/40%  Weaning trails: None  Respiratory procedures: None    Pt tolerating well, will continue to monitor

## 2023-09-26 NOTE — CARE PLAN
The patient is Watcher - Medium risk of patient condition declining or worsening    Shift Goals  Clinical Goals: Stable hemodynamics, Wean vasopressors  Patient Goals: DEENA  Family Goals: Updates    Progress made toward(s) clinical / shift goals:    Problem: Hemodynamics  Goal: Patient's hemodynamics, fluid balance and neurologic status will be stable or improve  Outcome: Progressing   Patient's vital signs stable. Patient on Levo.     Problem: Urinary - Renal Perfusion  Goal: Ability to achieve and maintain adequate renal perfusion and functioning will improve  Outcome: Progressing     Problem: Skin Integrity  Goal: Skin integrity is maintained or improved  Outcome: Progressing   Patient turned Q2hrs. Heel & sacral mepelix in place. Barrier cream applied to sacrum.    Problem: Pain - Standard  Goal: Alleviation of pain or a reduction in pain to the patient’s comfort goal  Outcome: Progressing     Problem: Safety - Medical Restraint  Goal: Remains free of injury from restraints (Restraint for Interference with Medical Device)  Outcome: Progressing  Goal: Free from restraint(s) (Restraint for Interference with Medical Device)  Outcome: Progressing       Patient is not progressing towards the following goals:

## 2023-09-26 NOTE — PROGRESS NOTES
"Pulmonary/Critical Care Progress Note    Date of admission  9/24/2023    Chief Complaint  68 y.o. male admitted 9/24/2023 with septic shock and found a small frontal subarachnoid hemorrhage and pneumobilia.    Hospital Course  Per Dr Peña note from 9/24: \"68 y.o. male patient of LE, nephrolithiasis, hypertension who presented 9/24/2023 with altered mental status with a mechanical fall and was subsequently [facility] emergency department.  During his evaluation be febrile tachycardic used.  CT head revealed small frontal subarachnoid hemorrhage, lactate 4.6,Leukocytosis with purulent urinalysis with many RBC's.  Given his history of nephrolithiasis a CT abdomen and pelvis was performed to exclude infected stone. Nephrolithiasis was not noted.  However there was hepatic biliary air.\"    9/25: surgery was consulted Dr Hirsch. Continue resuscitation with IVF, antibiotics, If worsens, consider jamila tube. Patient was intubated acutely overnight.    9/26: requiring high levels of sedatives. Had to start a second vasopressor.    9/27: failed SAT and SBT due to agitation. Vasopressors are weaning down. Will start dvt chemical prophylaxis tonight.      Interval Problem Update  Reviewed last 24 hour events: he failed SAT due to agitation  Neuro: difficult today to follow commands due to agitation, neg EEG spot, consider off sedation eeg,  follow up Imaging showed a small SAH is improved.  Pulm: intubated on mechanical ventilation.  Continue with ABCDEF bundle according to protocol.  Continue with lung protective strategies, SAT and SBT as possible/able but failed today due to agitation, consider transition to precedex.  Cards: SR. No ectopy.  Has become hypotensive requiring 2 vasopressors but finally able to wean.  On Levophed and vasopressin.actively titrating pressors.  Nephro: appropriate/normal GFR at 77, Co2 25-->21-->24. Continue thiamine and IVF. surgery following  ID: mild leukocytosis at 15 with " sepsis and pneumobilia, UA possible UTI, continues with flagyl and ceftriaxone.  Hem: H&H stable at 14/41, thrombocytopenia 157-->115-->86-->84 suspected inflammatory block given sepsis and pneumobilia.  GI: Pepcid prophylaxis, consider start tube feeds  : Appropriate urine output, continue monitoring  Endo: keep goal of glucose 140-180, at goal, started medium ranges doses of insulin. Adjusting lantus today.       Review of Systems  Review of Systems   Unable to perform ROS: Intubated        Vital Signs for last 24 hours   Temp:  [37.4 °C (99.3 °F)-38.3 °C (100.9 °F)] 38 °C (100.4 °F)  Pulse:  [] 55  Resp:  [13-39] 22  BP: ()/(37-84) 137/67  SpO2:  [91 %-98 %] 97 %    Hemodynamic parameters for last 24 hours       Respiratory Information for the last 24 hours  Vent Mode: APVCMV  Rate (breaths/min): 22  Vt Target (mL): 470  PEEP/CPAP: 8  MAP: 12  Control VTE (exp VT): 471    Physical Exam   Physical Exam  Vitals and nursing note reviewed.   Constitutional:       General: He is not in acute distress.     Appearance: He is ill-appearing. He is not toxic-appearing.   HENT:      Head: Normocephalic and atraumatic.      Nose: Nose normal.      Mouth/Throat:      Comments: ETT in place  Eyes:      Extraocular Movements: Extraocular movements intact.   Cardiovascular:      Rate and Rhythm: Normal rate and regular rhythm.      Pulses: Normal pulses.      Heart sounds: No murmur heard.     No friction rub. No gallop.   Pulmonary:      Effort: Pulmonary effort is normal.      Breath sounds: Normal breath sounds.   Abdominal:      General: Bowel sounds are normal.   Musculoskeletal:         General: Normal range of motion.      Cervical back: Normal range of motion. No rigidity.      Right lower leg: No edema.      Left lower leg: No edema.   Skin:     General: Skin is warm.      Capillary Refill: Capillary refill takes less than 2 seconds.      Findings: No rash.         Medications  Current  Facility-Administered Medications   Medication Dose Route Frequency Provider Last Rate Last Admin    insulin GLARGINE (Lantus,Semglee) injection  15 Units Subcutaneous Q EVENING Lizandro Greenwood M.D.        enoxaparin (Lovenox) inj 40 mg  40 mg Subcutaneous Q12HRS Rhoda Argueta M.D.        Respiratory Therapy Consult   Nebulization Continuous RT Florian Cole M.D.        MD Alert...ICU Electrolyte Replacement per Pharmacy   Other PHARMACY TO DOSE Florian Cole M.D.        lidocaine (Xylocaine) 1 % injection 2 mL  2 mL Tracheal Tube Q30 MIN PRN Florian Cole M.D.        propofol (DIPRIVAN) injection  0-80 mcg/kg/min (Ideal) Intravenous Continuous Florian Cole M.D. 37.2 mL/hr at 09/26/23 1413 80 mcg/kg/min at 09/26/23 1413    fentaNYL (SUBLIMAZE) 50 mcg/mL in 50mL   Intravenous Continuous Florian Cole M.D. 3 mL/hr at 09/26/23 1131 150 mcg/hr at 09/26/23 1131    fentaNYL (Sublimaze) injection 25 mcg  25 mcg Intravenous Q HOUR PRN Florian Cole M.D.        Or    fentaNYL (Sublimaze) injection 50 mcg  50 mcg Intravenous Q HOUR PRN Florian Cole M.D.   50 mcg at 09/25/23 0611    Or    fentaNYL (Sublimaze) injection 100 mcg  100 mcg Intravenous Q HOUR PRN Florian Cole M.D.   100 mcg at 09/26/23 0609    ipratropium-albuterol (DUONEB) nebulizer solution  3 mL Nebulization Q2HRS PRN (RT) Florian Cole M.D.        folic acid (Folvite) tablet 1 mg  1 mg Enteral Tube DAILY Gilberto Levine M.D.   1 mg at 09/26/23 0530    And    multivitamin tablet 1 Tablet  1 Tablet Enteral Tube DAILY Gilberto Levine M.D.   1 Tablet at 09/26/23 0530    famotidine (Pepcid) tablet 20 mg  20 mg Enteral Tube Q12HRS Gilberto Levine M.D.   20 mg at 09/26/23 0530    Or    famotidine (Pepcid) injection 20 mg  20 mg Intravenous Q12HRS Gilberto Levine M.D.        senna-docusate (Pericolace Or Senokot S) 8.6-50 MG per tablet 2 Tablet  2 Tablet Enteral Tube BID Gilberto Levine M.D.   2 Tablet at 09/26/23 0530    And     polyethylene glycol/lytes (Miralax) PACKET 1 Packet  1 Packet Enteral Tube QDAY PRN Gilberto Levine M.D.        And    magnesium hydroxide (Milk Of Magnesia) suspension 30 mL  30 mL Enteral Tube QDAY PRN Gilberto Levine M.D.        And    bisacodyl (Dulcolax) suppository 10 mg  10 mg Rectal QDAY PRN Gilberto Levine M.D.        norepinephrine (Levophed) 8 mg in 250 mL NS infusion (premix)  0-1 mcg/kg/min (Ideal) Intravenous Continuous Gilberto Levine M.D. 29.1 mL/hr at 09/26/23 1238 0.2 mcg/kg/min at 09/26/23 1238    insulin lispro (HumaLOG,AdmeLOG) injection  2-9 Units Subcutaneous Q6HRS Rhoda Argueta M.D.   3 Units at 09/26/23 1148    thiamine (B-1) 200 mg in dextrose 5% 100 mL IVPB  200 mg Intravenous TID Lizandro Greenwood M.D. 200 mL/hr at 09/26/23 1541 200 mg at 09/26/23 1541    vasopressin (Vasostrict) 20 Units in  mL Infusion  0.03 Units/min Intravenous Continuous Lizandro Greenwood M.D. 9 mL/hr at 09/26/23 0622 0.03 Units/min at 09/26/23 0622    acetaminophen (Tylenol) tablet 650 mg  650 mg Oral Q4HRS PRN Gilberto Levine M.D.   650 mg at 09/26/23 0534    lactated ringers infusion (BOLUS)  1,000 mL Intravenous Once PRN Med Peña M.D.        cefTRIAXone (Rocephin) syringe 2,000 mg  2,000 mg Intravenous Q EVENING Med Peña M.D.   2,000 mg at 09/25/23 1735    metroNIDAZOLE (Flagyl) IVPB 500 mg  500 mg Intravenous Q12HR Med Peña M.D.   Stopped at 09/26/23 1234    lactated ringers infusion   Intravenous Continuous Lizandro Greenwood M.D. 125 mL/hr at 09/26/23 1259 New Bag at 09/26/23 1259    dextrose 50% (D50W) injection 25 g  25 g Intravenous Q15 MIN PRN Med Peña M.D.           Fluids    Intake/Output Summary (Last 24 hours) at 9/26/2023 1542  Last data filed at 9/26/2023 1256  Gross per 24 hour   Intake 6318.47 ml   Output 1725 ml   Net 4593.47 ml       Laboratory  Recent Labs     09/25/23  0324 09/26/23  0403   ISTATAPH 7.330* 7.334*   ISTATAPCO2 41.0* 43.7*    ISTATAPO2 60* 59*   ISTATATCO2 23 25   JSTQTCT5MNP 89* 88*   ISTATARTHCO3 21.6 23.2   ISTATARTBE -4 -3   ISTATTEMP 96.7 F 100.8 F   ISTATFIO2 50 40   ISTATSPEC Arterial Arterial   ISTATAPHTC 7.345* 7.317*   NVMGQXLI5AQ 56* 65         Recent Labs     09/25/23 0319 09/25/23 1250 09/25/23  2100 09/26/23  0342   SODIUM 138 137  --  137   POTASSIUM 3.4* 3.6  --  3.7   CHLORIDE 102 104  --  104   CO2 21 21  --  24   BUN 16 17  --  13   CREATININE 1.04 1.07  --  0.92   MAGNESIUM 1.1*  --   --  1.6   PHOSPHORUS 1.4*  --  3.0 1.9*   CALCIUM 8.7 7.4*  --  8.2*     Recent Labs     09/24/23 2323 09/25/23 0319 09/25/23 1250 09/26/23  0342   ALTSGPT 23 28  --  22   ASTSGOT 30 47*  --  38   ALKPHOSPHAT 98 124*  --  103*   TBILIRUBIN 1.0 1.4  --  0.9   LIPASE  --   --   --  12   GLUCOSE 154* 230* 256* 234*     Recent Labs     09/24/23 2323 09/25/23 0319 09/25/23 1250 09/26/23  0342   WBC  --  10.5 19.8* 15.5*   NEUTSPOLYS  --  87.30* 78.60* 78.80*   LYMPHOCYTES  --  8.70* 11.90* 13.70*   MONOCYTES  --  2.60 7.50 5.90   EOSINOPHILS  --  0.10 0.80 0.50   BASOPHILS  --  0.30 0.20 0.30   ASTSGOT 30 47*  --  38   ALTSGPT 23 28  --  22   ALKPHOSPHAT 98 124*  --  103*   TBILIRUBIN 1.0 1.4  --  0.9     Recent Labs     09/24/23 2240 09/25/23 0319 09/25/23 1250 09/26/23  0342   RBC  --  4.85 4.46* 4.52*   HEMOGLOBIN  --  15.1 13.6* 14.0   HEMATOCRIT  --  44.3 40.3* 41.2*   PLATELETCT  --  86* 85* 84*   PROTHROMBTM 14.5  --   --   --    INR 1.11  --   --   --        Imaging  X-Ray:  I have personally reviewed the images and compared with prior images.    CT head and neck: decrease in size of SAH.    Assessment/Plan  * Severe sepsis (HCC)- (present on admission)  Assessment & Plan  This is Sepsis Present on admission  SIRS criteria identified on my evaluation include: Fever, with temperature greater than 100.9 deg F, Tachycardia, with heart rate greater than 90 BPM and Leukocytosis, with WBC greater than 12,000  Clinical  "indicators of end organ dysfunction include Toxic Metabolic Encephalopathy  Source is GI vs .   Sepsis protocol initiated  Crystalloid Fluid Administration: Fluid resuscitation ordered per standard protocol - 30 mL/kg per current or ideal body weight  IV antibiotics as appropriate for source of sepsis, C3, Flagyl  Reassessment: I have reassessed the patient's hemodynamic status    CT abdomen shows signs of hepatobiliary air.   General surgery consulted: Dr Hirsch following.    I examined the patient 9/26/2023 3:32 PM  Vital Signs:/67   Pulse (!) 55   Temp 38 °C (100.4 °F) (Esophageal)   Resp (!) 22   Ht 1.829 m (6' 0.01\")   Wt (!) 150 kg (330 lb 11 oz)   SpO2 97%   BMI 44.84 kg/m²   Cardiac examination significant for Tachycardia  Pulmonary examination significant for Clear lung fileds  Capillary refill is brisk  Peripheral Pulse is 2+   Skin is normal   Coming down of vasopressors      Acute respiratory failure with hypoxia (HCC)  Assessment & Plan  Patient was intubated from the night of 9/24 towards 9/25 and started on mechanical ventilation.  ABCDEF bundle according to protocol.  Continue with lung protective strategies.  AST and SBT evaluation daily as appropriate.  Continue to wean FiO2 as tolerated, as well as vent settings.  Consider propofol to precedex transition tomorrow    Pneumobilia- (present on admission)  Assessment & Plan  Consulted Surgery Dr Hirsch  Continue with resuscitation with IVF,   Continue vasopressor support is weaning  Monitor Lactic acid  Continue with antibiotics: ceftriaxone and flagyl.    SAH (subarachnoid hemorrhage) (HCC)  Assessment & Plan  Small traumatic subarachnoid hemorrhage.  Not c/w aneurysm  Serial neurologic exams  No NSAIDs  Neurology consulted, no immediate needs  CT head and neck showed decrease in the SAH.  EEG spot was negative, consider repeat when off sedation    Lactic acidosis- (present on admission)  Assessment & Plan  Continue with resuscitation " with IVF   Continue high dose thiamine  Surgery consulted.   Source is hypoperfusion possible type A and B lactic acidosis but also abdominal source with pneumobilia.    Thrombocytopenia (HCC)  Assessment & Plan  Follow up platelets daily  Suspected inflammatory block.    Type 2 diabetes mellitus (HCC)- (present on admission)  Assessment & Plan  Moderate glycemic control with Insulin therapy.  Hold Metformin    Essential hypertension- (present on admission)  Assessment & Plan  Currently hypotensive on pressors  hold amlodipine and Hold Losartan for now.      Alcohol dependence (HCC)- (present on admission)  Assessment & Plan  Monitor and treat alcohol withdrawal as clinically indicated.         VTE:   SCDs  Ulcer: H2 Antagonist  Lines: Joseph Catheter  Ongoing indication addressed    I have performed a physical exam and reviewed and updated ROS and Plan today (9/26/2023). In review of yesterday's note (9/25/2023), there are no changes except as documented above.     Discussed patient condition and risk of morbidity and/or mortality with Family, RN, RT, Therapies, Pharmacy, and Charge nurse / hot rounds  The patient remains critically ill.  Critical care time = 65 minutes in directly providing and coordinating critical care and extensive data review.  No time overlap and excludes procedures.    Lizandro Greenwood MD FACP FCCP  Pulmonary/Critical Care

## 2023-09-26 NOTE — PROGRESS NOTES
"    DATE: 9/26/2023    Hospital Day 2  pneumobilia    INTERVAL EVENTS:  Remains intubated and sedated  Vasopressor requirements decreased  WBC trend down     REVIEW OF SYSTEMS:  Review of Systems   Unable to perform ROS: Intubated       PHYSICAL EXAMINATION:  Vital Signs: Blood Pressure 107/53   Pulse 77   Temperature 38 °C (100.4 °F) (Esophageal)   Respiration (Abnormal) 22   Height 1.829 m (6' 0.01\")   Weight (Abnormal) 150 kg (330 lb 11 oz)   Oxygen Saturation 96%   Physical Exam  Constitutional:       Appearance: He is not toxic-appearing.      Interventions: He is sedated, intubated and restrained.   HENT:      Nose:      Comments: Nasoenteric tube in place, clamped  Cardiovascular:      Rate and Rhythm: Normal rate.   Pulmonary:      Effort: He is intubated.   Abdominal:      General: There is no distension.      Palpations: Abdomen is soft.      Tenderness: There is no abdominal tenderness. There is no guarding.         LABORATORY VALUES:   Recent Labs     09/25/23  0319 09/25/23  1250 09/26/23  0342   WBC 10.5 19.8* 15.5*   RBC 4.85 4.46* 4.52*   HEMOGLOBIN 15.1 13.6* 14.0   HEMATOCRIT 44.3 40.3* 41.2*   MCV 91.3 90.4 91.2   MCH 31.1 30.5 31.0   MCHC 34.1 33.7 34.0   RDW 45.5 45.1 46.8   PLATELETCT 86* 85* 84*   MPV 11.2 12.3 12.0       Recent Labs     09/24/23  2240 09/24/23  2323 09/25/23  0319 09/26/23  0342   ASTSGOT  --  30 47* 38   ALTSGPT  --  23 28 22   TBILIRUBIN  --  1.0 1.4 0.9   ALKPHOSPHAT  --  98 124* 103*   GLOBULIN  --  2.4 2.6 2.2   INR 1.11  --   --   --          ASSESSMENT AND PLAN:   Pneumobilia- (present on admission)  Assessment & Plan  Presented with sepsis  CT shows small amount of air in GB and central duct  LFTs nl  IV abx   9/26 WBC trend down, LFT's normal     - Vasopressor requirements decreasing  - No current indications for jamila tube or surgical intervention  - Surgery will sign off, please call with any questions or concerns    Discussed patient condition with RN, " Patient, and general surgery, Dr. Dixon.

## 2023-09-26 NOTE — PROCEDURES
Central Line Insertion    Date/Time: 9/25/2023 5:15 PM    Performed by: Lizandro Greenwood M.D.  Authorized by: Lizandro Greenwood M.D.    Consent:     Consent obtained:  Verbal    Consent given by:  Spouse    Risks discussed:  Incorrect placement, bleeding, arterial puncture, infection, nerve damage and pneumothorax    Alternatives discussed:  No treatment and delayed treatment  Pre-procedure details:     Hand hygiene: Hand hygiene performed prior to insertion      Sterile barrier technique: All elements of maximal sterile technique followed      Skin preparation:  ChloraPrep    Skin preparation agent: Skin preparation agent completely dried prior to procedure    Sedation:     Sedation type: under mechanical ventilation and sedation.  Anesthesia:     Anesthesia method:  Local infiltration    Local anesthetic:  Lidocaine 1% w/o epi  Procedure details:     Location:  R internal jugular    Site selection rationale:  No predominant disease and right readily available    Patient position:  Trendelenburg    Procedural supplies:  Triple lumen    Catheter size:  7 Fr    Landmarks identified: yes      Ultrasound guidance: yes      Sterile ultrasound techniques: Sterile gel and sterile probe covers were used      Number of attempts:  1    Successful placement: yes    Post-procedure details:     Post-procedure:  Dressing applied and line sutured    Guidewire: guidewire removal confirmed      Assessment:  Blood return through all ports, free fluid flow, no pneumothorax on x-ray and placement verified by x-ray    Patient tolerance of procedure:  Tolerated well, no immediate complications

## 2023-09-27 ENCOUNTER — APPOINTMENT (OUTPATIENT)
Dept: RADIOLOGY | Facility: MEDICAL CENTER | Age: 68
DRG: 870 | End: 2023-09-27
Attending: INTERNAL MEDICINE
Payer: MEDICARE

## 2023-09-27 LAB
ALBUMIN SERPL BCP-MCNC: 2.7 G/DL (ref 3.2–4.9)
ALBUMIN/GLOB SERPL: 1.1 G/DL
ALP SERPL-CCNC: 96 U/L (ref 30–99)
ALT SERPL-CCNC: 21 U/L (ref 2–50)
ANION GAP SERPL CALC-SCNC: 8 MMOL/L (ref 7–16)
AST SERPL-CCNC: 33 U/L (ref 12–45)
BACTERIA SPEC RESP CULT: NORMAL
BASE EXCESS BLDA CALC-SCNC: 0 MMOL/L (ref -4–3)
BASOPHILS # BLD AUTO: 0.2 % (ref 0–1.8)
BASOPHILS # BLD: 0.02 K/UL (ref 0–0.12)
BILIRUB SERPL-MCNC: 0.7 MG/DL (ref 0.1–1.5)
BODY TEMPERATURE: ABNORMAL DEGREES
BREATHS SETTING VENT: 22
BUN SERPL-MCNC: 8 MG/DL (ref 8–22)
CALCIUM ALBUM COR SERPL-MCNC: 8.9 MG/DL (ref 8.5–10.5)
CALCIUM SERPL-MCNC: 7.9 MG/DL (ref 8.5–10.5)
CHLORIDE SERPL-SCNC: 105 MMOL/L (ref 96–112)
CO2 BLDA-SCNC: 25 MMOL/L (ref 20–33)
CO2 SERPL-SCNC: 25 MMOL/L (ref 20–33)
CREAT SERPL-MCNC: 0.83 MG/DL (ref 0.5–1.4)
DELSYS IDSYS: ABNORMAL
END TIDAL CARBON DIOXIDE IECO2: 37 MMHG
EOSINOPHIL # BLD AUTO: 0.14 K/UL (ref 0–0.51)
EOSINOPHIL NFR BLD: 1.6 % (ref 0–6.9)
ERYTHROCYTE [DISTWIDTH] IN BLOOD BY AUTOMATED COUNT: 46.6 FL (ref 35.9–50)
GFR SERPLBLD CREATININE-BSD FMLA CKD-EPI: 94 ML/MIN/1.73 M 2
GLOBULIN SER CALC-MCNC: 2.5 G/DL (ref 1.9–3.5)
GLUCOSE BLD STRIP.AUTO-MCNC: 160 MG/DL (ref 65–99)
GLUCOSE BLD STRIP.AUTO-MCNC: 195 MG/DL (ref 65–99)
GLUCOSE BLD STRIP.AUTO-MCNC: 218 MG/DL (ref 65–99)
GLUCOSE SERPL-MCNC: 156 MG/DL (ref 65–99)
GRAM STN SPEC: NORMAL
HCO3 BLDA-SCNC: 23.7 MMOL/L (ref 17–25)
HCT VFR BLD AUTO: 37.9 % (ref 42–52)
HGB BLD-MCNC: 13 G/DL (ref 14–18)
HOROWITZ INDEX BLDA+IHG-RTO: 160 MM[HG]
IMM GRANULOCYTES # BLD AUTO: 0.08 K/UL (ref 0–0.11)
IMM GRANULOCYTES NFR BLD AUTO: 0.9 % (ref 0–0.9)
LYMPHOCYTES # BLD AUTO: 1.41 K/UL (ref 1–4.8)
LYMPHOCYTES NFR BLD: 16 % (ref 22–41)
MAGNESIUM SERPL-MCNC: 1.7 MG/DL (ref 1.5–2.5)
MCH RBC QN AUTO: 31.2 PG (ref 27–33)
MCHC RBC AUTO-ENTMCNC: 34.3 G/DL (ref 32.3–36.5)
MCV RBC AUTO: 90.9 FL (ref 81.4–97.8)
MODE IMODE: ABNORMAL
MONOCYTES # BLD AUTO: 0.68 K/UL (ref 0–0.85)
MONOCYTES NFR BLD AUTO: 7.7 % (ref 0–13.4)
NEUTROPHILS # BLD AUTO: 6.51 K/UL (ref 1.82–7.42)
NEUTROPHILS NFR BLD: 73.6 % (ref 44–72)
NRBC # BLD AUTO: 0 K/UL
NRBC BLD-RTO: 0 /100 WBC (ref 0–0.2)
O2/TOTAL GAS SETTING VFR VENT: 40 %
PCO2 BLDA: 36 MMHG (ref 26–37)
PCO2 TEMP ADJ BLDA: 37.5 MMHG (ref 26–37)
PEEP END EXPIRATORY PRESSURE IPEEP: 8 CMH20
PH BLDA: 7.43 [PH] (ref 7.4–7.5)
PH TEMP ADJ BLDA: 7.41 [PH] (ref 7.4–7.5)
PHOSPHATE SERPL-MCNC: 1.6 MG/DL (ref 2.5–4.5)
PLATELET # BLD AUTO: 85 K/UL (ref 164–446)
PLATELETS.RETICULATED NFR BLD AUTO: 9.9 % (ref 0.6–13.1)
PMV BLD AUTO: 11.8 FL (ref 9–12.9)
PO2 BLDA: 64 MMHG (ref 64–87)
PO2 TEMP ADJ BLDA: 68 MMHG (ref 64–87)
POTASSIUM SERPL-SCNC: 3.7 MMOL/L (ref 3.6–5.5)
PROT SERPL-MCNC: 5.2 G/DL (ref 6–8.2)
RBC # BLD AUTO: 4.17 M/UL (ref 4.7–6.1)
SAO2 % BLDA: 93 % (ref 93–99)
SIGNIFICANT IND 70042: NORMAL
SITE SITE: NORMAL
SODIUM SERPL-SCNC: 138 MMOL/L (ref 135–145)
SOURCE SOURCE: NORMAL
SPECIMEN DRAWN FROM PATIENT: ABNORMAL
TIDAL VOLUME IVT: 470 ML
TRIGL SERPL-MCNC: 248 MG/DL (ref 0–149)
WBC # BLD AUTO: 8.8 K/UL (ref 4.8–10.8)

## 2023-09-27 PROCEDURE — 80053 COMPREHEN METABOLIC PANEL: CPT

## 2023-09-27 PROCEDURE — 94003 VENT MGMT INPAT SUBQ DAY: CPT

## 2023-09-27 PROCEDURE — 71045 X-RAY EXAM CHEST 1 VIEW: CPT

## 2023-09-27 PROCEDURE — 83735 ASSAY OF MAGNESIUM: CPT

## 2023-09-27 PROCEDURE — 82803 BLOOD GASES ANY COMBINATION: CPT

## 2023-09-27 PROCEDURE — 82962 GLUCOSE BLOOD TEST: CPT

## 2023-09-27 PROCEDURE — 99291 CRITICAL CARE FIRST HOUR: CPT | Performed by: INTERNAL MEDICINE

## 2023-09-27 PROCEDURE — 700111 HCHG RX REV CODE 636 W/ 250 OVERRIDE (IP): Mod: JZ

## 2023-09-27 PROCEDURE — 700111 HCHG RX REV CODE 636 W/ 250 OVERRIDE (IP): Mod: JZ | Performed by: INTERNAL MEDICINE

## 2023-09-27 PROCEDURE — 85055 RETICULATED PLATELET ASSAY: CPT

## 2023-09-27 PROCEDURE — 700111 HCHG RX REV CODE 636 W/ 250 OVERRIDE (IP): Performed by: INTERNAL MEDICINE

## 2023-09-27 PROCEDURE — 94799 UNLISTED PULMONARY SVC/PX: CPT

## 2023-09-27 PROCEDURE — A9270 NON-COVERED ITEM OR SERVICE: HCPCS

## 2023-09-27 PROCEDURE — 700105 HCHG RX REV CODE 258: Performed by: INTERNAL MEDICINE

## 2023-09-27 PROCEDURE — 36600 WITHDRAWAL OF ARTERIAL BLOOD: CPT

## 2023-09-27 PROCEDURE — 302132 K THERMIA MOTOR: Performed by: INTERNAL MEDICINE

## 2023-09-27 PROCEDURE — 84100 ASSAY OF PHOSPHORUS: CPT

## 2023-09-27 PROCEDURE — 84478 ASSAY OF TRIGLYCERIDES: CPT

## 2023-09-27 PROCEDURE — 700111 HCHG RX REV CODE 636 W/ 250 OVERRIDE (IP)

## 2023-09-27 PROCEDURE — 700102 HCHG RX REV CODE 250 W/ 637 OVERRIDE(OP)

## 2023-09-27 PROCEDURE — 770022 HCHG ROOM/CARE - ICU (200)

## 2023-09-27 PROCEDURE — 700101 HCHG RX REV CODE 250: Performed by: INTERNAL MEDICINE

## 2023-09-27 PROCEDURE — 85025 COMPLETE CBC W/AUTO DIFF WBC: CPT

## 2023-09-27 RX ORDER — MAGNESIUM SULFATE HEPTAHYDRATE 40 MG/ML
2 INJECTION, SOLUTION INTRAVENOUS ONCE
Status: COMPLETED | OUTPATIENT
Start: 2023-09-27 | End: 2023-09-27

## 2023-09-27 RX ORDER — DEXMEDETOMIDINE HYDROCHLORIDE 4 UG/ML
.1-1.5 INJECTION, SOLUTION INTRAVENOUS CONTINUOUS
Status: DISCONTINUED | OUTPATIENT
Start: 2023-09-27 | End: 2023-09-29

## 2023-09-27 RX ORDER — LABETALOL HYDROCHLORIDE 5 MG/ML
10 INJECTION, SOLUTION INTRAVENOUS EVERY 6 HOURS PRN
Status: DISCONTINUED | OUTPATIENT
Start: 2023-09-27 | End: 2023-09-30

## 2023-09-27 RX ORDER — FUROSEMIDE 10 MG/ML
40 INJECTION INTRAMUSCULAR; INTRAVENOUS ONCE
Status: COMPLETED | OUTPATIENT
Start: 2023-09-27 | End: 2023-09-27

## 2023-09-27 RX ORDER — HYDRALAZINE HYDROCHLORIDE 20 MG/ML
20 INJECTION INTRAMUSCULAR; INTRAVENOUS EVERY 6 HOURS PRN
Status: DISCONTINUED | OUTPATIENT
Start: 2023-09-27 | End: 2023-10-26

## 2023-09-27 RX ADMIN — SODIUM CHLORIDE, POTASSIUM CHLORIDE, SODIUM LACTATE AND CALCIUM CHLORIDE: 600; 310; 30; 20 INJECTION, SOLUTION INTRAVENOUS at 01:56

## 2023-09-27 RX ADMIN — POTASSIUM PHOSPHATE, MONOBASIC AND POTASSIUM PHOSPHATE, DIBASIC 30 MMOL: 224; 236 INJECTION, SOLUTION, CONCENTRATE INTRAVENOUS at 09:23

## 2023-09-27 RX ADMIN — PROPOFOL 25 MCG/KG/MIN: 10 INJECTION, EMULSION INTRAVENOUS at 14:31

## 2023-09-27 RX ADMIN — SODIUM CHLORIDE, POTASSIUM CHLORIDE, SODIUM LACTATE AND CALCIUM CHLORIDE: 600; 310; 30; 20 INJECTION, SOLUTION INTRAVENOUS at 21:04

## 2023-09-27 RX ADMIN — SENNOSIDES AND DOCUSATE SODIUM 2 TABLET: 50; 8.6 TABLET ORAL at 05:23

## 2023-09-27 RX ADMIN — INSULIN LISPRO 3 UNITS: 100 INJECTION, SOLUTION INTRAVENOUS; SUBCUTANEOUS at 17:11

## 2023-09-27 RX ADMIN — MAGNESIUM SULFATE HEPTAHYDRATE 2 G: 2 INJECTION, SOLUTION INTRAVENOUS at 07:55

## 2023-09-27 RX ADMIN — FENTANYL CITRATE 100 MCG/HR: 50 INJECTION, SOLUTION INTRAMUSCULAR; INTRAVENOUS at 23:50

## 2023-09-27 RX ADMIN — DEXMEDETOMIDINE 1.4 MCG/KG/HR: 100 INJECTION, SOLUTION INTRAVENOUS at 18:39

## 2023-09-27 RX ADMIN — THERA TABS 1 TABLET: TAB at 05:23

## 2023-09-27 RX ADMIN — CEFTRIAXONE SODIUM 2000 MG: 10 INJECTION, POWDER, FOR SOLUTION INTRAVENOUS at 17:10

## 2023-09-27 RX ADMIN — FENTANYL CITRATE 100 MCG: 50 INJECTION, SOLUTION INTRAMUSCULAR; INTRAVENOUS at 13:34

## 2023-09-27 RX ADMIN — PROPOFOL 50 MCG/KG/MIN: 10 INJECTION, EMULSION INTRAVENOUS at 01:53

## 2023-09-27 RX ADMIN — DEXMEDETOMIDINE 1.2 MCG/KG/HR: 100 INJECTION, SOLUTION INTRAVENOUS at 14:31

## 2023-09-27 RX ADMIN — FENTANYL CITRATE 100 MCG: 50 INJECTION, SOLUTION INTRAMUSCULAR; INTRAVENOUS at 17:54

## 2023-09-27 RX ADMIN — FENTANYL CITRATE 100 MCG: 50 INJECTION, SOLUTION INTRAMUSCULAR; INTRAVENOUS at 11:13

## 2023-09-27 RX ADMIN — FENTANYL CITRATE 100 MCG: 50 INJECTION, SOLUTION INTRAMUSCULAR; INTRAVENOUS at 17:05

## 2023-09-27 RX ADMIN — PROPOFOL 80 MCG/KG/MIN: 10 INJECTION, EMULSION INTRAVENOUS at 07:53

## 2023-09-27 RX ADMIN — ACETAMINOPHEN 650 MG: 325 TABLET, FILM COATED ORAL at 06:35

## 2023-09-27 RX ADMIN — HYDRALAZINE HYDROCHLORIDE 20 MG: 20 INJECTION, SOLUTION INTRAMUSCULAR; INTRAVENOUS at 05:59

## 2023-09-27 RX ADMIN — FENTANYL CITRATE 100 MCG: 50 INJECTION, SOLUTION INTRAMUSCULAR; INTRAVENOUS at 03:06

## 2023-09-27 RX ADMIN — LABETALOL HYDROCHLORIDE 10 MG: 5 INJECTION INTRAVENOUS at 05:43

## 2023-09-27 RX ADMIN — DEXMEDETOMIDINE 0.2 MCG/KG/HR: 100 INJECTION, SOLUTION INTRAVENOUS at 08:10

## 2023-09-27 RX ADMIN — FAMOTIDINE 20 MG: 20 TABLET ORAL at 17:10

## 2023-09-27 RX ADMIN — FENTANYL CITRATE 100 MCG: 50 INJECTION, SOLUTION INTRAMUSCULAR; INTRAVENOUS at 06:22

## 2023-09-27 RX ADMIN — METRONIDAZOLE 500 MG: 5 INJECTION, SOLUTION INTRAVENOUS at 23:46

## 2023-09-27 RX ADMIN — INSULIN LISPRO 2 UNITS: 100 INJECTION, SOLUTION INTRAVENOUS; SUBCUTANEOUS at 11:13

## 2023-09-27 RX ADMIN — ENOXAPARIN SODIUM 40 MG: 100 INJECTION SUBCUTANEOUS at 05:23

## 2023-09-27 RX ADMIN — FAMOTIDINE 20 MG: 20 TABLET ORAL at 05:23

## 2023-09-27 RX ADMIN — ENOXAPARIN SODIUM 40 MG: 100 INJECTION SUBCUTANEOUS at 17:10

## 2023-09-27 RX ADMIN — FENTANYL CITRATE 150 MCG/HR: 50 INJECTION, SOLUTION INTRAMUSCULAR; INTRAVENOUS at 04:16

## 2023-09-27 RX ADMIN — FOLIC ACID 1 MG: 1 TABLET ORAL at 05:23

## 2023-09-27 RX ADMIN — INSULIN LISPRO 2 UNITS: 100 INJECTION, SOLUTION INTRAVENOUS; SUBCUTANEOUS at 23:46

## 2023-09-27 RX ADMIN — FUROSEMIDE 40 MG: 10 INJECTION INTRAMUSCULAR; INTRAVENOUS at 09:22

## 2023-09-27 RX ADMIN — FENTANYL CITRATE 100 MCG: 50 INJECTION, SOLUTION INTRAMUSCULAR; INTRAVENOUS at 05:19

## 2023-09-27 RX ADMIN — METRONIDAZOLE 500 MG: 5 INJECTION, SOLUTION INTRAVENOUS at 11:05

## 2023-09-27 RX ADMIN — SENNOSIDES AND DOCUSATE SODIUM 2 TABLET: 50; 8.6 TABLET ORAL at 17:10

## 2023-09-27 RX ADMIN — FENTANYL CITRATE 100 MCG: 50 INJECTION, SOLUTION INTRAMUSCULAR; INTRAVENOUS at 01:53

## 2023-09-27 RX ADMIN — INSULIN LISPRO 2 UNITS: 100 INJECTION, SOLUTION INTRAVENOUS; SUBCUTANEOUS at 06:05

## 2023-09-27 ASSESSMENT — PAIN DESCRIPTION - PAIN TYPE
TYPE: ACUTE PAIN

## 2023-09-27 ASSESSMENT — FIBROSIS 4 INDEX: FIB4 SCORE: 6.67

## 2023-09-27 NOTE — CARE PLAN
The patient is Watcher - Medium risk of patient condition declining or worsening    Shift Goals  Clinical Goals: Wean pressors, wean seadation, maintain pt comfort  Patient Goals: DEENA  Family Goals: updates    Progress made toward(s) clinical / shift goals:    Problem: Hemodynamics  Goal: Patient's hemodynamics, fluid balance and neurologic status will be stable or improve  9/26/2023 1941 by Shaggy Montgomery R.N.  Outcome: Progressing  9/26/2023 1819 by Shaggy Montgomery R.N.  Outcome: Progressing     Problem: Pain - Standard  Goal: Alleviation of pain or a reduction in pain to the patient’s comfort goal  9/26/2023 1941 by Shaggy Montgomery R.N.  Outcome: Progressing  9/26/2023 1819 by Shaggy Montgomery R.N.  Outcome: Progressing     Problem: Safety - Medical Restraint  Goal: Remains free of injury from restraints (Restraint for Interference with Medical Device)  9/26/2023 1941 by Shaggy Montgomery R.N.  Outcome: Progressing  9/26/2023 1819 by Shaggy Montgomery R.N.  Outcome: Progressing       Patient is not progressing towards the following goals:

## 2023-09-27 NOTE — PROGRESS NOTES
[facility] Progress Note      Admit Date: 9/24/2023    Resident(s): Rhoda Argueta M.D.   Attending:  KOLBY GONSALES/ Dr. Greenwood    Patient ID:    Name:  Sharon Guardado   YOB: 1955  Age:  68 y.o.  male   MRN:  [id number]    Hospital Course (carried forward and updated):  Sharon Guardado is a 68 y.o. male with notable PMH of HTN, HLD, GERD, BPH, gout, morbid obesity (BMI 40), alcohol use disorder, nephrolithiasis, hx of SAH (3/2023), and T2DM who presented 9/24/23 with altered mental status (A&Ox1) with a mechanical fall. Per wife, patient was walking up the stairs on 9/24/23, stumbled, and hit the right side of his body on the wall.     In the ED, patient was febrile (103.4), WBC 16, lactate 4.6, U/a with WBC, LE, nitrite, RBCs. CT head with small frontal SAH. CT renal with hepatic biliary air, no nephrolithiasis.    9/25/23: Febrile overnight, WBC improving 16->10; APVCMV 20/470/40%/8  9/26/23: CTA head and neck with improvement of SAH, failed SBT overnight    Consultants:  Critical Care    Interval Events:  9/27/23:   - Neuro: RASS +2/+3; propofol 80mcq->50, fentanyl 150mcq->100   Titrate off of propofol today -> started precedex  - CV: Levophed 0.3->0.28; goal MAP>65  - Resp: APVCMV 22/470/40%/8  - failed SBT overnight; will repeat SBTs with initiation of precedex and try for extubation later today   - CXR with worsening pulm congestion -> stop IVF, add dose of lasix  - GI/Nut: NG tube in place, clamped, will add TF if unable to extubate today  - Renal/IVF/lytes: Renal fcn stable  - ID: Febrile overnight, Tmax 100.9; WBC normalized  - Continue CTX, flagyl (5 day course, end 9/29)  - Heme/onc: Lovenox dvt ppx added; Trend cbc  - Endo: Blood glucose goal 140-180      Vitals Range last 24h:  Temp:  [3.3 °C (37.9 °F)-38.3 °C (100.9 °F)] 37.6 °C (99.7 °F)  Pulse:  [] 70  Resp:  [15-32] 22  BP: ()/(50-84) 98/57  SpO2:  [92 %-99 %] 95 %    Review of Systems   Unable to  perform ROS: Intubated     PHYSICAL EXAM:  Vitals:    09/27/23 0900 09/27/23 0915 09/27/23 0930 09/27/23 0945   BP: 93/55 93/54 94/50 98/57   Pulse: 73 71 73 70   Resp: (!) 22 (!) 22 (!) 22 (!) 22   Temp:       TempSrc:       SpO2: 94% 94% 95% 95%   Weight:       Height:        Body mass index is 44.87 kg/m².    O2 therapy: Pulse Oximetry: 95 %, O2 Delivery Device: Ventilator  Date 09/27/23 0700 - 09/28/23 0659   Shift 5792-8965 7059-8198 2709-3502 24 Hour Total   INTAKE   I.V. 587.5   587.5     Fentanyl Volume 7.3   7.3     Magnesium Sulfate Volume 46.1   46.1     Precedex Volume 7.7   7.7     Propofol Volume 113.9   113.9     Volume (mL) (lactated ringers infusion) 412.5   412.5   IV Piggyback 34   34     Volume (mL) (potassium phosphate IVPB 30 mmol in 500 mL D5W (premix)) 34   34   Shift Total 621.5   621.5   OUTPUT   Shift Total       .5   621.5       Intake/Output Summary (Last 24 hours) at 9/27/2023 0955  Last data filed at 9/27/2023 0948  Gross per 24 hour   Intake 5589.93 ml   Output 2675 ml   Net 2914.93 ml      Net IO Since Admission: 3,942.51 mL [09/25/23 0718]       Physical Exam  Vitals and nursing note reviewed.   Constitutional:       Appearance: He is obese.   HENT:      Nose: Nose normal.   Cardiovascular:      Rate and Rhythm: Normal rate and regular rhythm.   Pulmonary:      Comments: vented  Abdominal:      General: There is distension.      Palpations: Abdomen is soft.   Musculoskeletal:         General: Swelling present.   Skin:     General: Skin is warm and dry.   Neurological:      Motor: No weakness.      Comments: sedated   Psychiatric:      Comments: Sedated, agitated when sedation decreased         Recent Labs     09/25/23  0324 09/26/23  0403 09/27/23  0317   ISTATAPH 7.330* 7.334* 7.426   ISTATAPCO2 41.0* 43.7* 36.0   ISTATAPO2 60* 59* 64   ISTATATCO2 23 25 25   TYZIONM2QVD 89* 88* 93   ISTATARTHCO3 21.6 23.2 23.7   ISTATARTBE -4 -3 0   ISTATTEMP 96.7 F 100.8 F 37.9 C    ISTATFIO2 50 40 40   ISTATSPEC Arterial Arterial Arterial   ISTATAPHTC 7.345* 7.317* 7.413   RVRLZCEW3MT 56* 65 68     Recent Labs     09/25/23 0319 09/25/23 1250 09/25/23  2100 09/26/23 0342 09/27/23  0300   SODIUM 138 137  --  137 138   POTASSIUM 3.4* 3.6  --  3.7 3.7   CHLORIDE 102 104  --  104 105   CO2 21 21  --  24 25   BUN 16 17  --  13 8   CREATININE 1.04 1.07  --  0.92 0.83   MAGNESIUM 1.1*  --   --  1.6 1.7   PHOSPHORUS 1.4*  --  3.0 1.9* 1.6*   CALCIUM 8.7 7.4*  --  8.2* 7.9*     Recent Labs     09/25/23 0319 09/25/23 1250 09/26/23 0342 09/27/23  0300   ALTSGPT 28  --  22 21   ASTSGOT 47*  --  38 33   ALKPHOSPHAT 124*  --  103* 96   TBILIRUBIN 1.4  --  0.9 0.7   LIPASE  --   --  12  --    GLUCOSE 230* 256* 234* 156*     Recent Labs     09/24/23  2240 09/25/23 0319 09/25/23 1250 09/26/23 0342 09/27/23  0300   RBC  --    < > 4.46* 4.52* 4.17*   HEMOGLOBIN  --    < > 13.6* 14.0 13.0*   HEMATOCRIT  --    < > 40.3* 41.2* 37.9*   PLATELETCT  --    < > 85* 84* 85*   PROTHROMBTM 14.5  --   --   --   --    INR 1.11  --   --   --   --     < > = values in this interval not displayed.     Recent Labs     09/25/23 0319 09/25/23 1250 09/26/23 0342 09/27/23  0300   WBC 10.5 19.8* 15.5* 8.8   NEUTSPOLYS 87.30* 78.60* 78.80* 73.60*   LYMPHOCYTES 8.70* 11.90* 13.70* 16.00*   MONOCYTES 2.60 7.50 5.90 7.70   EOSINOPHILS 0.10 0.80 0.50 1.60   BASOPHILS 0.30 0.20 0.30 0.20   ASTSGOT 47*  --  38 33   ALTSGPT 28  --  22 21   ALKPHOSPHAT 124*  --  103* 96   TBILIRUBIN 1.4  --  0.9 0.7       Meds:   labetalol  10 mg      hydrALAZINE  20 mg      dexmedetomidine (Precedex) infusion  0.1-1.5 mcg/kg/hr (Ideal) 0.4 mcg/kg/hr (09/27/23 0948)    potassium phosphate  30 mmol 83.3 mL/hr at 09/27/23 0948    insulin GLARGINE  15 Units      enoxaparin (LOVENOX) injection  40 mg      Respiratory Therapy Consult        MD Alert...Adult ICU Electrolyte Replacement per Pharmacy        lidocaine  2 mL      propofol  0-80 mcg/kg/min  (Ideal) 40 mcg/kg/min (09/27/23 0948)    fentaNYL   150 mcg/hr (09/27/23 0711)    fentaNYL  25 mcg      Or    fentaNYL  50 mcg      Or    fentaNYL  100 mcg      ipratropium-albuterol  3 mL      folic acid  1 mg      And    multivitamin  1 Tablet      famotidine  20 mg      senna-docusate  2 Tablet      And    polyethylene glycol/lytes  1 Packet      And    magnesium hydroxide  30 mL      And    bisacodyl  10 mg      NORepinephrine  0-1 mcg/kg/min (Ideal) Stopped (09/26/23 1748)    insulin lispro  2-9 Units      vasopressin (Vasostrict) 20 Units in  mL Infusion  0.03 Units/min Stopped (09/26/23 1517)    acetaminophen  650 mg      LR  1,000 mL      cefTRIAXone (ROCEPHIN) IV  2,000 mg      metroNIDAZOLE (FLAGYL) IV  500 mg Stopped (09/27/23 0014)    LR   Stopped (09/27/23 0920)    dextrose bolus  25 g          Procedures:  - Intubation (9/25/23)  - Bronch + BAL (9/25/23)    Imaging:  DX-CHEST-LIMITED (1 VIEW)   Final Result         1.  Pulmonary edema and/or infiltrates are identified, which are somewhat decreased since the prior exam.   2.  Cardiomegaly   3.  Atherosclerosis      DX-CHEST-PORTABLE (1 VIEW)   Final Result         1.  Pulmonary edema and/or infiltrates are identified, which appear somewhat increased since the prior exam.   2.  Cardiomegaly   3.  Atherosclerosis      DX-CHEST-PORTABLE (1 VIEW)   Final Result         1.  Interstitial edema and/or infiltrates, slightly decreased since prior study.   2.  Cardiomegaly   3.  Atherosclerosis      DX-CHEST-LIMITED (1 VIEW)   Final Result      Right IJ catheter tip projects in the SVC. No pneumothorax.      CT-CTA NECK WITH & W/O-POST PROCESSING   Final Result      1.  Estimated 50-75% stenosis of the BILATERAL carotid arteries   2.  Estimated greater than 50% stenosis at the vertebral artery ostium on each side      CT-CTA HEAD WITH & W/O-POST PROCESS   Final Result      1.  No large vessel occlusion or aneurysm.   2.  Hazy left frontal lobe subarachnoid  hemorrhage, decreased in conspicuity from prior. No new hemorrhage seen.   3.  Scattered opacifications of the ethmoid sinuses, likely related to support hardware.      DX-ABDOMEN FOR TUBE PLACEMENT   Final Result         1.  Nonspecific bowel gas pattern in the upper abdomen.   2.  Dobbhoff tube with tip terminating overlying the expected location of the first or second duodenal segment.      DX-CHEST-PORTABLE (1 VIEW)   Final Result         1.  Interstitial edema and/or infiltrates.   2.  Cardiomegaly   3.  Nasogastric tube terminates in the distal esophagus, recommend advancement   4.  Atherosclerosis      DX-ABDOMEN FOR TUBE PLACEMENT   Final Result         1.  Nonspecific bowel gas pattern in the upper abdomen.   2.  Nasogastric tube terminates just distal to the gastroesophageal junction, recommend advancement      CT-RENAL COLIC EVALUATION(A/P W/O)   Final Result         1.  Intrahepatic biliary air and nondependent air within the gallbladder, consider history of instrumentation or changes of cholangitis in the appropriate clinical setting.   2.  Large fat-containing bilateral inguinal hernias   3.  Enlarged prostate, consider causes of prostate enlargement with additional workup as clinically appropriate   4.  Diverticulosis   5.  Cholelithiasis   6.  Atherosclerosis      CT-HEAD W/O   Final Result         1.  Hazy left frontal subarachnoid hemorrhage.   2.  Nonspecific white matter changes, commonly associated with small vessel ischemic disease.  Associated mild cerebral atrophy is noted.      Based solely on CT findings, the brain injury guideline category is mBIG 1.      SDH < 4mm   IPH < 4mm   SAH < 3 sulci and < 1mm      The original BIG retrospective analysis found radiographic progression in 0% of BIG 1 patients and 2.6% BIG 2.      These findings were discussed with the patient's clinician, Madhu Zambrano, on 9/24/2023 10:10 PM.      DX-CHEST-PORTABLE (1 VIEW)   Final Result      No acute  cardiopulmonary abnormality identified.          ASSESSEMENT and PLAN:    * Severe sepsis (HCC)- (present on admission)  Assessment & Plan  This is Sepsis Present on admission  SIRS criteria identified on my evaluation include: Fever, with temperature greater than 100.9 deg F, Tachycardia, with heart rate greater than 90 BPM and Leukocytosis, with WBC greater than 12,000  Clinical indicators of end organ dysfunction include Toxic Metabolic Encephalopathy  Source is GI vs .   Sepsis protocol initiated  Crystalloid Fluid Administration: Fluid resuscitation ordered per standard protocol - 30 mL/kg per current or ideal body weight  IV antibiotics as appropriate for source of sepsis, CTX, Flagyl  Reassessment: I have reassessed the patient's hemodynamic status     9/24/23: CT abdomen shows signs of hepatobiliary air.  9/25/23: Leukocytosis (16->10) & tachycardia resolved  9/26/23: afebrile, WBC 10->19->15  9/27/23: Tmax 100.9, leukocytosis normalized      - Ceftriaxone, flagyl (end date 9/29/23)  - favor GI source of sepsis rather than  given negative urine cultures  - F/u final blood, urine cultures -> NGTD     Acute respiratory failure with hypoxia (HCC)  Assessment & Plan  Emergent intubation 9/25/23 due to unprotected airway and desaturation  9/25/23: 7.330/42/60  APVCMV 20/470/40%/8  9/26/23: 22/470/40%/8  9/17/23: failed SBT, very agitated when propofol was weaned     - ABCDEF protocol  - will try for extubation today  - adding precedex today; wean off propofol     Pneumobilia- (present on admission)  Assessment & Plan  As seen on CT renal (9/25/23).  9/26/23: No need for further interventions at this time per gen surg     SAH (subarachnoid hemorrhage) (HCC)  Assessment & Plan  Small traumatic subarachnoid hemorrhage s/p mechanical fall  Not c/w aneurysm  CTA head and neck with improvement of SAH  -Continue serial neurologic exams  - No NSAIDs  - Okay to start DVT prophylaxis given imaging documentation of SAH  improvement and increased risk of dvt while on vent     Lactic acidosis  Assessment & Plan  4.6 at presentation  Secondary to GI vs  infection  9/26/23: normalized     Thrombocytopenia (HCC)  Assessment & Plan  Baseline ~110-125  Possibly secondary to alcohol use disorder although normal H&H and LFTs  9/25/23: 86  9/26/23: 84  9/27/23: 85    - trend, monitor for s/s of active bleeding     Type 2 diabetes mellitus (HCC)- (present on admission)  Assessment & Plan  A1c 5.7% (6/2023)  Home meds: metformin, pioglitazone  9/26/23: 15u glargine     - hold home meds for now  - basal and SSI to glucose goal 140-180; titrate as clinically indicated      Essential hypertension- (present on admission)  Assessment & Plan  Hypertensive at presentation despite sepsis  Outpatient meds: amlodipine 5mg, losartan 40; hold for now     9/25/23: requiring pressors (levo 0.3), titrate down as able  9/26/23: levo 0.28     - Goal -140, MAP >65     Alcohol dependence (HCC)- (present on admission)  Assessment & Plan  Multivitamin, folate, thiamine  Monitor, additional therapies as clinically indicated     BMI 40.0-44.9, adult (HCC)  Assessment & Plan  Noted  High risk for decompensation      DISPO: CICU    CODE STATUS: FULL CODE    Quality Measures:  Feeding: npo  Analgesia: fentanyl  Sedation: propofol, (precedex)  Thromboprophylaxis: lovenox  Head of bed: >30 degrees  Ulcer prophylaxis: famotidine 20bid  Glycemic control: Correctional: 2-9u lispro / Basal: 15u glargine  Bowel care: bowel regimen ordered  Indwelling lines: PIV R am, PIV L hand, PIV R hand, indwelling cath, ETT 8.0, enteral tube (clamped)  Deescalation of antibiotics: CTX & flagyl (end 9/29/23)      Rhoda Argueta M.D.  [facility] Resident

## 2023-09-27 NOTE — CARE PLAN
The patient is Watcher - Medium risk of patient condition declining or worsening    Shift Goals  Clinical Goals: Stable hemodynamics, Wean vasopressors  Patient Goals: DEENA  Family Goals: Updates    Progress made toward(s) clinical / shift goals:      Pressors completely weaned, pt remains hemodynamically stable    Problem: Hemodynamics  Goal: Patient's hemodynamics, fluid balance and neurologic status will be stable or improve  Outcome: Progressing     Problem: Pain - Standard  Goal: Alleviation of pain or a reduction in pain to the patient’s comfort goal  Outcome: Progressing     Problem: Safety - Medical Restraint  Goal: Remains free of injury from restraints (Restraint for Interference with Medical Device)  Outcome: Progressing       Patient is not progressing towards the following goals:

## 2023-09-27 NOTE — PROGRESS NOTES
"Pulmonary/Critical Care Progress Note    Date of admission  9/24/2023    Chief Complaint  68 y.o. male admitted 9/24/2023 with septic shock and found a small frontal subarachnoid hemorrhage and pneumobilia.    Hospital Course  Per Dr Peña note from 9/24: \"68 y.o. male patient of LE, nephrolithiasis, hypertension who presented 9/24/2023 with altered mental status with a mechanical fall and was [facility].  During his evaluation be febrile tachycardic used.  CT head revealed small frontal subarachnoid hemorrhage, lactate 4.6,Leukocytosis with purulent urinalysis with many RBC's.  Given his history of nephrolithiasis a CT abdomen and pelvis was performed to exclude infected stone. Nephrolithiasis was not noted.  However there was hepatic biliary air.\"    9/25: surgery was consulted Dr Hirsch. Continue resuscitation with IVF, antibiotics, If worsens, consider jamila tube. Patient was intubated acutely overnight.    9/26: requiring high levels of sedatives. Had to start a second vasopressor. Failed SAT and SBT due to agitation. Vasopressors are weaning down. Will start dvt chemical prophylaxis tonight.    9/27: changed propofol to precedex, hopes to move towards extubation. Consider tube feeds if  not extubated      Interval Problem Update  Reviewed last 24 hour events: No events overnight   neuro: We will change to propofol for Precedex in order for preparation for extubation and to obtain a better neurologic evaluation.  Patient also has a history of alcoholism and unclear if he is in the last and of withdrawal?  Pulm: intubated on mechanical ventilation.  Continue with ABCDEF bundle according to protocol.  Continue with lung protective strategies, SAT and SBT as possible/transition to precedex today.  Cards: SR. No ectopy.  He is off vasopressors   Nephro: appropriate/normal GFR at 77, Co2 25-->21-->24. Continue thiamine and IVF. surgery following  ID: mild " leukocytosis at 15 has resolved with sepsis and pneumobilia, UA possible UTI, continues with flagyl and ceftriaxone to complete 5 days  Hem: H&H stable at 13/37, thrombocytopenia 157-->115-->86-->85 suspected inflammatory block given sepsis and pneumobilia. Stabilizing   GI: Pepcid prophylaxis, consider start tube feeds if not extubated today  : Appropriate urine output, continue monitoring  Endo: keep goal of glucose 140-180     Review of Systems  Review of Systems   Unable to perform ROS: Intubated        Vital Signs for last 24 hours   Temp:  [3.3 °C (37.9 °F)-38.3 °C (100.9 °F)] 37.6 °C (99.7 °F)  Pulse:  [] 61  Resp:  [15-32] 22  BP: ()/(47-82) 85/47  SpO2:  [92 %-99 %] 93 %    Hemodynamic parameters for last 24 hours       Respiratory Information for the last 24 hours  Vent Mode: APVCMV  Rate (breaths/min): 22  Vt Target (mL): 470  PEEP/CPAP: 8  MAP: 12  Control VTE (exp VT): 475    Physical Exam   Physical Exam  Vitals and nursing note reviewed.   Constitutional:       General: He is not in acute distress.     Appearance: He is ill-appearing. He is not toxic-appearing.   HENT:      Head: Normocephalic and atraumatic.      Nose: Nose normal.      Mouth/Throat:      Comments: ETT in place  Eyes:      Extraocular Movements: Extraocular movements intact.   Cardiovascular:      Rate and Rhythm: Normal rate and regular rhythm.      Pulses: Normal pulses.      Heart sounds: No murmur heard.     No friction rub. No gallop.   Pulmonary:      Effort: Pulmonary effort is normal.      Breath sounds: Normal breath sounds.   Abdominal:      General: Bowel sounds are normal.   Musculoskeletal:         General: Normal range of motion.      Cervical back: Normal range of motion. No rigidity.      Right lower leg: No edema.      Left lower leg: No edema.   Skin:     General: Skin is warm.      Capillary Refill: Capillary refill takes less than 2 seconds.      Findings: No rash.         Medications  Current  Facility-Administered Medications   Medication Dose Route Frequency Provider Last Rate Last Admin    labetalol (Normodyne/Trandate) injection 10 mg  10 mg Intravenous Q6HRS PRN Gilberto Levine M.D.   10 mg at 09/27/23 0543    hydrALAZINE (Apresoline) injection 20 mg  20 mg Intravenous Q6HRS PRN Gilberto Levine M.D.   20 mg at 09/27/23 0559    dexmedetomidine (PRECEDEX) 400 mcg/100mL NS premix infusion  0.1-1.5 mcg/kg/hr (Ideal) Intravenous Continuous Lizandro Greenwood M.D. 19.4 mL/hr at 09/27/23 1150 1 mcg/kg/hr at 09/27/23 1150    potassium phosphate IVPB 30 mmol in 500 mL D5W (premix)  30 mmol Intravenous Once Lizandro Greenwood M.D. 83.3 mL/hr at 09/27/23 1150 Rate Verify at 09/27/23 1150    insulin GLARGINE (Lantus,Semglee) injection  15 Units Subcutaneous Q EVENING Lizandro Greenwood M.D.   15 Units at 09/26/23 2112    enoxaparin (Lovenox) inj 40 mg  40 mg Subcutaneous Q12HRS Rhoda Argueta M.D.   40 mg at 09/27/23 0523    Respiratory Therapy Consult   Nebulization Continuous RT Florian Cole M.D.        MD Alert...ICU Electrolyte Replacement per Pharmacy   Other PHARMACY TO DOSE Florian Cole M.D.        lidocaine (Xylocaine) 1 % injection 2 mL  2 mL Tracheal Tube Q30 MIN PRN Florian Cole M.D.        propofol (DIPRIVAN) injection  0-80 mcg/kg/min (Ideal) Intravenous Continuous Florian Cole M.D. 11.6 mL/hr at 09/27/23 1150 25 mcg/kg/min at 09/27/23 1150    fentaNYL (SUBLIMAZE) 50 mcg/mL in 50mL   Intravenous Continuous Florian Cole M.D. 3 mL/hr at 09/27/23 0711 150 mcg/hr at 09/27/23 0711    fentaNYL (Sublimaze) injection 25 mcg  25 mcg Intravenous Q HOUR PRN Florian Cole M.D.        Or    fentaNYL (Sublimaze) injection 50 mcg  50 mcg Intravenous Q HOUR PRN Florian Cole M.D.   50 mcg at 09/25/23 0611    Or    fentaNYL (Sublimaze) injection 100 mcg  100 mcg Intravenous Q HOUR PRN Florian Cole M.D.   100 mcg at 09/27/23 1113    ipratropium-albuterol (DUONEB) nebulizer solution   3 mL Nebulization Q2HRS PRN (RT) Florian Cole M.D.        folic acid (Folvite) tablet 1 mg  1 mg Enteral Tube DAILY Gilberto Levine M.D.   1 mg at 09/27/23 0523    And    multivitamin tablet 1 Tablet  1 Tablet Enteral Tube DAILY Gilberto Levine M.D.   1 Tablet at 09/27/23 0523    famotidine (Pepcid) tablet 20 mg  20 mg Enteral Tube Q12HRS Gilberto Levine M.D.   20 mg at 09/27/23 0523    senna-docusate (Pericolace Or Senokot S) 8.6-50 MG per tablet 2 Tablet  2 Tablet Enteral Tube BID Gilberto Levine M.D.   2 Tablet at 09/27/23 0523    And    polyethylene glycol/lytes (Miralax) PACKET 1 Packet  1 Packet Enteral Tube QDAY PRN Gilberto Levine M.D.        And    magnesium hydroxide (Milk Of Magnesia) suspension 30 mL  30 mL Enteral Tube QDAY PRN Gilberto Levine M.D.        And    bisacodyl (Dulcolax) suppository 10 mg  10 mg Rectal QDAY PRN Gilberto Levine M.D.        norepinephrine (Levophed) 8 mg in 250 mL NS infusion (premix)  0-1 mcg/kg/min (Ideal) Intravenous Continuous Gilberto Levine M.D.   Stopped at 09/26/23 1748    insulin lispro (HumaLOG,AdmeLOG) injection  2-9 Units Subcutaneous Q6HRS Rhoda Argueta M.D.   2 Units at 09/27/23 1113    vasopressin (Vasostrict) 20 Units in  mL Infusion  0.03 Units/min Intravenous Continuous Lizandro Greenwood M.D.   Stopped at 09/26/23 1517    acetaminophen (Tylenol) tablet 650 mg  650 mg Oral Q4HRS PRN Gilberto Levine M.D.   650 mg at 09/27/23 0635    lactated ringers infusion (BOLUS)  1,000 mL Intravenous Once PRN Med Peña M.D.        cefTRIAXone (Rocephin) syringe 2,000 mg  2,000 mg Intravenous Q EVENING Med Peña M.D.   2,000 mg at 09/26/23 1742    metroNIDAZOLE (Flagyl) IVPB 500 mg  500 mg Intravenous Q12HR Med Peña M.D.   Stopped at 09/27/23 1205    lactated ringers infusion   Intravenous Continuous Lizandro Greenwood M.D.   Stopped at 09/27/23 0920    dextrose 50% (D50W) injection 25 g  25 g Intravenous Q15 MIN PRN Med ALVAREZ  ROYCE Peña.           Fluids    Intake/Output Summary (Last 24 hours) at 9/27/2023 1213  Last data filed at 9/27/2023 1200  Gross per 24 hour   Intake 5899.54 ml   Output 4300 ml   Net 1599.54 ml       Laboratory  Recent Labs     09/25/23  0324 09/26/23  0403 09/27/23 0317   ISTATAPH 7.330* 7.334* 7.426   ISTATAPCO2 41.0* 43.7* 36.0   ISTATAPO2 60* 59* 64   ISTATATCO2 23 25 25   [id number] 89* 88* 93   ISTATARTHCO3 21.6 23.2 23.7   ISTATARTBE -4 -3 0   ISTATTEMP 96.7 F 100.8 F 37.9 C   ISTATFIO2 50 40 40   ISTATSPEC Arterial Arterial Arterial   ISTATAPHTC 7.345* 7.317* 7.413   [id number] 56* 65 68         Recent Labs     09/25/23 0319 09/25/23  1250 09/25/23  2100 09/26/23  0342 09/27/23  0300   SODIUM 138 137  --  137 138   POTASSIUM 3.4* 3.6  --  3.7 3.7   CHLORIDE 102 104  --  104 105   CO2 21 21  --  24 25   BUN 16 17  --  13 8   CREATININE 1.04 1.07  --  0.92 0.83   MAGNESIUM 1.1*  --   --  1.6 1.7   PHOSPHORUS 1.4*  --  3.0 1.9* 1.6*   CALCIUM 8.7 7.4*  --  8.2* 7.9*     Recent Labs     09/25/23 0319 09/25/23  1250 09/26/23  0342 09/27/23  0300   ALTSGPT 28  --  22 21   ASTSGOT 47*  --  38 33   ALKPHOSPHAT 124*  --  103* 96   TBILIRUBIN 1.4  --  0.9 0.7   LIPASE  --   --  12  --    GLUCOSE 230* 256* 234* 156*     Recent Labs     09/25/23 0319 09/25/23  1250 09/26/23  0342 09/27/23  0300   WBC 10.5 19.8* 15.5* 8.8   NEUTSPOLYS 87.30* 78.60* 78.80* 73.60*   LYMPHOCYTES 8.70* 11.90* 13.70* 16.00*   MONOCYTES 2.60 7.50 5.90 7.70   EOSINOPHILS 0.10 0.80 0.50 1.60   BASOPHILS 0.30 0.20 0.30 0.20   ASTSGOT 47*  --  38 33   ALTSGPT 28  --  22 21   ALKPHOSPHAT 124*  --  103* 96   TBILIRUBIN 1.4  --  0.9 0.7     Recent Labs     09/24/23  2240 09/25/23  0319 09/25/23  1250 09/26/23  0342 09/27/23  0300   RBC  --    < > 4.46* 4.52* 4.17*   HEMOGLOBIN  --    < > 13.6* 14.0 13.0*   HEMATOCRIT  --    < > 40.3* 41.2* 37.9*   PLATELETCT  --    < > 85* 84* 85*   PROTHROMBTM 14.5  --   --   --   --    INR 1.11  --   --   --    --     < > = values in this interval not displayed.       Imaging  X-Ray:  I have personally reviewed the images and compared with prior images.    CT head and neck: decrease in size of SAH.    Assessment/Plan  * Severe sepsis (HCC)- (present on admission)  Assessment & Plan  This is Sepsis Present on admission  SIRS criteria identified on my evaluation include: Fever, with temperature greater than 100.9 deg F, Tachycardia, with heart rate greater than 90 BPM and Leukocytosis, with WBC greater than 12,000  Clinical indicators of end organ dysfunction include Toxic Metabolic Encephalopathy  Source is GI vs .   Sepsis protocol initiated  Crystalloid Fluid Administration: Fluid resuscitation ordered per standard protocol - 30 mL/kg per current or ideal body weight  IV antibiotics as appropriate for source of sepsis, C3, Flagyl  Reassessment: I have reassessed the patient's hemodynamic status    CT abdomen shows signs of hepatobiliary air.   General surgery consulted: Dr Hirsch signed off  Patient markedly improving, off pressors    Acute respiratory failure with hypoxia (HCC)  Assessment & Plan  Patient was intubated from the night of 9/24 towards 9/25 and started on mechanical ventilation.  ABCDEF bundle according to protocol.  Continue with lung protective strategies.  AST and SBT evaluation daily as appropriate.  Continue to wean FiO2 as tolerated, as well as vent settings.  propofol to precedex transition today and will work towards extubation    Pneumobilia- (present on admission)  Assessment & Plan  Consulted Surgery Dr Hirsch, already signed off  S/p resuscitation with IVF,  S/p vasopressor support  Continue with antibiotics: ceftriaxone and flagyl.    SAH (subarachnoid hemorrhage) (HCC)- (present on admission)  Assessment & Plan  Small traumatic subarachnoid hemorrhage.  Not c/w aneurysm  Serial neurologic exams  No NSAIDs  Neurology consulted, no immediate needs  CT head and neck showed decrease in the  SAH.  EEG spot was negative, consider repeat when off sedation    Lactic acidosis- (present on admission)  Assessment & Plan  Continue with resuscitation with IVF   Continue high dose thiamine  Surgery consulted.   Source is hypoperfusion possible type A and B lactic acidosis but also abdominal source with pneumobilia.  resolved    Thrombocytopenia (HCC)- (present on admission)  Assessment & Plan  Follow up platelets daily  Suspected inflammatory block.    Type 2 diabetes mellitus (HCC)- (present on admission)  Assessment & Plan  Moderate glycemic control with Insulin therapy.  Hold Metformin    Essential hypertension- (present on admission)  Assessment & Plan  Currently hypotensive on pressors  hold amlodipine and Hold Losartan for now.      Alcohol dependence (HCC)- (present on admission)  Assessment & Plan  Monitor and treat alcohol withdrawal as clinically indicated.  Could be a barrier for extubation         VTE:  Lovenox  Ulcer: H2 Antagonist  Lines: Central Line  Ongoing indication addressed and Joseph Catheter  Ongoing indication addressed    I have performed a physical exam and reviewed and updated ROS and Plan today (9/27/2023). In review of yesterday's note (9/26/2023), there are no changes except as documented above.     Discussed patient condition and risk of morbidity and/or mortality with Family, RN, RT, Therapies, Pharmacy, and Charge nurse / hot rounds  The patient remains critically ill.  Critical care time = 60 minutes in directly providing and coordinating critical care and extensive data review.  No time overlap and excludes procedures.    Lizandro Greenwood MD FACP FCCP  Pulmonary/Critical Care

## 2023-09-27 NOTE — CARE PLAN
The patient is Watcher - Medium risk of patient condition declining or worsening    Shift Goals  Clinical Goals: VSS, wean sedation/ vent; ensure safety, mobilize as able  Patient Goals: DEENA  Family Goals: get better    Progress made toward(s) clinical / shift goals:    VSS, with episodes of agitation in between weaning  Transitioned propofol gtt to Precedex gtt  Safety ensured  Unable to Mobiliz RASS -3 to +3          Problem: Hemodynamics  Goal: Patient's hemodynamics, fluid balance and neurologic status will be stable or improve  Outcome: Progressing     Problem: Respiratory  Goal: Patient will achieve/maintain optimum respiratory ventilation and gas exchange  Outcome: Progressing     Problem: Knowledge Deficit - Standard  Goal: Patient and family/care givers will demonstrate understanding of plan of care, disease process/condition, diagnostic tests and medications  Outcome: Progressing     Problem: Skin Integrity  Goal: Skin integrity is maintained or improved  Outcome: Progressing     Problem: Fall Risk  Goal: Patient will remain free from falls  Outcome: Progressing     Problem: Pain - Standard  Goal: Alleviation of pain or a reduction in pain to the patient’s comfort goal  Outcome: Progressing       Patient is not progressing towards the following goals:      Problem: Urinary - Renal Perfusion  Goal: Ability to achieve and maintain adequate renal perfusion and functioning will improve  Outcome: Not Progressing     Problem: Safety - Medical Restraint  Goal: Free from restraint(s) (Restraint for Interference with Medical Device)  Outcome: Not Progressing

## 2023-09-27 NOTE — CARE PLAN
The patient is Watcher - Medium risk of patient condition declining or worsening    Shift Goals  Clinical Goals: Maintain pt comfort, SAT  Patient Goals: DEENA  Family Goals: Updates    Progress made toward(s) clinical / shift goals:    Problem: Hemodynamics  Goal: Patient's hemodynamics, fluid balance and neurologic status will be stable or improve  Outcome: Progressing   Patient's vital signs stable. Patient off of vasopressors.    Problem: Skin Integrity  Goal: Skin integrity is maintained or improved  Outcome: Progressing   Patient turned Q2hr, sacral mepelix in place, heel mepelix in place, pillows in use for support and positioning.    Problem: Pain - Standard  Goal: Alleviation of pain or a reduction in pain to the patient’s comfort goal  Outcome: Progressing   Fentanyl pushes given PRN.    Problem: Safety - Medical Restraint  Goal: Remains free of injury from restraints (Restraint for Interference with Medical Device)  Outcome: Progressing  Goal: Free from restraint(s) (Restraint for Interference with Medical Device)  Outcome: Progressing       Patient is not progressing towards the following goals:

## 2023-09-27 NOTE — DIETARY
"Nutrition Support Assessment:  Day 3 of admit.  Sharon Guardado is a 68 y.o. male with admitting DX of Severe Sepsis      Current problem list:  Pneumobilia  Thrombocytopenia   Acute Respiratory Failure with Hypoxia  SAH  Type 2 DM  Alcohol dependence  Chronic Hypertension      Assessment:  Estimated Nutritional Needs based on:   Height: 182.9 cm (6' 0.01\")  Weight: (!) 150 kg (330 lb 14.6 oz) via bed scale   Body mass index is 44.87 kg/m²., BMI classification: Obese  IBW: 81 kg (178 lb)    Calculation/Equation: Critical Care Nutrition Guidelines for Obesity   PSU (VE 11.4, Tmax 38.3) = 2532 kcal x 65-70% = 1645 - 1772 kcal  REE via MSJ = 2302 kcal x 65-70% = 1500 - 1611 kcal    Total Calories / day: 1600  - 1865  (Kcal / kg IBW:  20 - 23)   Total Grams Protein / day: 162  - 200  (Grams Protein / kg IBW: 2.0  - 2.5)     Evaluation:   Indication for nutrition support: pt intubated / sedated and NPO x3 days   Enteral access: \"Nasogastric tube is seen, the tip overlies the right upper quadrant\" per diagnostics   MD interval update: \"will repeat SBTs with initiation of precedex and try for extubation later today; will add TF if unable to extubate today\"  Fluid accumulation: Dependent generalized edema of bilateral UE, +1-2 pitting edema of bilateral LE (per flowsheet)  LBM 9/24/23 per flowsheet   Labs: Phos 1.6, triglycerides 248, POC glucose 160-313 in past 48 hours, last HbA1c 5.7 on 6/7/23  Medications:   Scheduled: Rocephin, Pepcid, folic acid, Lantus, Humalog, Flagyl, MVI, potassium phosphate, Senokot, thiamine in D5%  Continuous: Precedex, fentanyl, LR infusion, Levophed (stopped), Propofol at 16.3 mL/hr (35 mcg/kg/min) providing 430 kcal per 24 hours, Vasopressin   PRN: bowel regimen, fentanyl, albuterol, labetalol   Wt hx: Records indicate wt has been trending up over the past 6 months   Wt Readings from Last 5 Encounters:   09/27/23 (!) 150 kg (330 lb 14.6 oz)   04/05/23 (!) 135 kg (298 lb 1 oz) "   03/28/23 (!) 138 kg (303 lb 12.7 oz)   03/19/23 (!) 139 kg (306 lb 10.6 oz)   03/15/23 (!) 136 kg (300 lb 11.3 oz)   9. Specialty formula (Vital HP) indicated due to low calorie needs relative to high protein needs, and low fiber formula due to Levophed administration.      Malnutrition Risk: No malnutrition risk identified at this time      Recommendations/Plan:  (On Propofol): Start Vital HP at 60 mL/hour continuous x24 hours, this provides (1440 kcal formula + 430 kcal propofol) 1870 kcal, 126 grams protein, 160 grams CHO, and 1204 mL free water per day   (Off Propofol): Start Vital HP at 75 mL/hour continuous x24 hours, this provides 1800 kcal, 157 grams protein, 200 grams CHO, 1505 mL free water  Fluids per MD  Monitor wt     RD following

## 2023-09-27 NOTE — DOCUMENTATION QUERY
[facility]                                                                       Query Response Note      PATIENT:               MEHRAN GARCIA  ACCT #:                  [id number]  MRN:                     [id number]  :                      1955  ADMIT DATE:       2023 5:22 PM  DISCH DATE:          RESPONDING  PROVIDER #:        [id number]           QUERY TEXT:    BMI 40.65 is documented in the Medical Record.  Can a diagnosis be provided to support this finding?    The patient's Clinical Indicators include:   Dietary PN - BMI 40.65...Class III obesity   PN (Timo) - obese...BMI 40.0-44.9    Treatment - Dietary consult; Weight loss counseling  not appropriate in acute care setting    Risk factors - BMI 40.65, obesity    Thank you,  Alicia Alves BSN  Clinical   Connect via [employer]  Options provided:   -- Morbidly obese, class III obesity   -- Other explanation, (please specify the other explanation)   -- Unable to determine      Query created by: Alicia Alves on 2023 1:59 PM    RESPONSE TEXT:    Morbidly obese, class III obesity          Electronically signed by:  WARD THAPA MD 2023 2:11 PM

## 2023-09-27 NOTE — CARE PLAN
Problem: Ventilation  Goal: Ability to achieve and maintain unassisted ventilation or tolerate decreased levels of ventilator support  Description: Target End Date:  4 days     Document on Vent flowsheet    1.  Support and monitor invasive and noninvasive mechanical ventilation  2.  Monitor ventilator weaning response  3.  Perform ventilator associated pneumonia prevention interventions  4.  Manage ventilation therapy by monitoring diagnostic test results  Outcome: Progressing     Ventilator Day Summary    Vent Day #3  Settings: 22/470/8/40%  Weaning trails: None  Respiratory procedures: None    Will continue current ventilator settings and wean mechanical ventilation as tolerated per physician orders.

## 2023-09-28 ENCOUNTER — APPOINTMENT (OUTPATIENT)
Dept: RADIOLOGY | Facility: MEDICAL CENTER | Age: 68
DRG: 870 | End: 2023-09-28
Payer: MEDICARE

## 2023-09-28 PROBLEM — E87.20 LACTIC ACIDOSIS: Status: RESOLVED | Noted: 2023-09-25 | Resolved: 2023-09-28

## 2023-09-28 LAB
ALBUMIN SERPL BCP-MCNC: 2.9 G/DL (ref 3.2–4.9)
ALBUMIN/GLOB SERPL: 1.1 G/DL
ALP SERPL-CCNC: 107 U/L (ref 30–99)
ALT SERPL-CCNC: 21 U/L (ref 2–50)
ANION GAP SERPL CALC-SCNC: 9 MMOL/L (ref 7–16)
AST SERPL-CCNC: 35 U/L (ref 12–45)
BASOPHILS # BLD AUTO: 0.4 % (ref 0–1.8)
BASOPHILS # BLD: 0.03 K/UL (ref 0–0.12)
BILIRUB SERPL-MCNC: 0.7 MG/DL (ref 0.1–1.5)
BUN SERPL-MCNC: 12 MG/DL (ref 8–22)
CALCIUM ALBUM COR SERPL-MCNC: 9.4 MG/DL (ref 8.5–10.5)
CALCIUM SERPL-MCNC: 8.5 MG/DL (ref 8.5–10.5)
CHLORIDE SERPL-SCNC: 103 MMOL/L (ref 96–112)
CO2 SERPL-SCNC: 26 MMOL/L (ref 20–33)
CREAT SERPL-MCNC: 0.89 MG/DL (ref 0.5–1.4)
EKG IMPRESSION: NORMAL
EOSINOPHIL # BLD AUTO: 0.17 K/UL (ref 0–0.51)
EOSINOPHIL NFR BLD: 2.3 % (ref 0–6.9)
ERYTHROCYTE [DISTWIDTH] IN BLOOD BY AUTOMATED COUNT: 46.3 FL (ref 35.9–50)
GFR SERPLBLD CREATININE-BSD FMLA CKD-EPI: 92 ML/MIN/1.73 M 2
GLOBULIN SER CALC-MCNC: 2.7 G/DL (ref 1.9–3.5)
GLUCOSE BLD STRIP.AUTO-MCNC: 153 MG/DL (ref 65–99)
GLUCOSE BLD STRIP.AUTO-MCNC: 181 MG/DL (ref 65–99)
GLUCOSE BLD STRIP.AUTO-MCNC: 185 MG/DL (ref 65–99)
GLUCOSE BLD STRIP.AUTO-MCNC: 200 MG/DL (ref 65–99)
GLUCOSE BLD STRIP.AUTO-MCNC: 260 MG/DL (ref 65–99)
GLUCOSE SERPL-MCNC: 233 MG/DL (ref 65–99)
HCT VFR BLD AUTO: 38.8 % (ref 42–52)
HGB BLD-MCNC: 13.3 G/DL (ref 14–18)
IMM GRANULOCYTES # BLD AUTO: 0.07 K/UL (ref 0–0.11)
IMM GRANULOCYTES NFR BLD AUTO: 1 % (ref 0–0.9)
LYMPHOCYTES # BLD AUTO: 1.42 K/UL (ref 1–4.8)
LYMPHOCYTES NFR BLD: 19.6 % (ref 22–41)
MAGNESIUM SERPL-MCNC: 1.8 MG/DL (ref 1.5–2.5)
MCH RBC QN AUTO: 31.1 PG (ref 27–33)
MCHC RBC AUTO-ENTMCNC: 34.3 G/DL (ref 32.3–36.5)
MCV RBC AUTO: 90.9 FL (ref 81.4–97.8)
MONOCYTES # BLD AUTO: 0.78 K/UL (ref 0–0.85)
MONOCYTES NFR BLD AUTO: 10.8 % (ref 0–13.4)
NEUTROPHILS # BLD AUTO: 4.77 K/UL (ref 1.82–7.42)
NEUTROPHILS NFR BLD: 65.9 % (ref 44–72)
NRBC # BLD AUTO: 0 K/UL
NRBC BLD-RTO: 0 /100 WBC (ref 0–0.2)
PHOSPHATE SERPL-MCNC: 2.1 MG/DL (ref 2.5–4.5)
PLATELET # BLD AUTO: 87 K/UL (ref 164–446)
PLATELETS.RETICULATED NFR BLD AUTO: 9.9 % (ref 0.6–13.1)
PMV BLD AUTO: 11.9 FL (ref 9–12.9)
POTASSIUM SERPL-SCNC: 3.5 MMOL/L (ref 3.6–5.5)
PROT SERPL-MCNC: 5.6 G/DL (ref 6–8.2)
RBC # BLD AUTO: 4.27 M/UL (ref 4.7–6.1)
SODIUM SERPL-SCNC: 138 MMOL/L (ref 135–145)
WBC # BLD AUTO: 7.2 K/UL (ref 4.8–10.8)

## 2023-09-28 PROCEDURE — 700102 HCHG RX REV CODE 250 W/ 637 OVERRIDE(OP): Performed by: INTERNAL MEDICINE

## 2023-09-28 PROCEDURE — 80053 COMPREHEN METABOLIC PANEL: CPT

## 2023-09-28 PROCEDURE — 700111 HCHG RX REV CODE 636 W/ 250 OVERRIDE (IP): Mod: JZ | Performed by: INTERNAL MEDICINE

## 2023-09-28 PROCEDURE — 93005 ELECTROCARDIOGRAM TRACING: CPT

## 2023-09-28 PROCEDURE — 71045 X-RAY EXAM CHEST 1 VIEW: CPT

## 2023-09-28 PROCEDURE — 700111 HCHG RX REV CODE 636 W/ 250 OVERRIDE (IP): Performed by: INTERNAL MEDICINE

## 2023-09-28 PROCEDURE — 94799 UNLISTED PULMONARY SVC/PX: CPT

## 2023-09-28 PROCEDURE — 700102 HCHG RX REV CODE 250 W/ 637 OVERRIDE(OP)

## 2023-09-28 PROCEDURE — 700111 HCHG RX REV CODE 636 W/ 250 OVERRIDE (IP): Mod: JZ

## 2023-09-28 PROCEDURE — 700101 HCHG RX REV CODE 250: Performed by: INTERNAL MEDICINE

## 2023-09-28 PROCEDURE — 94003 VENT MGMT INPAT SUBQ DAY: CPT

## 2023-09-28 PROCEDURE — A9270 NON-COVERED ITEM OR SERVICE: HCPCS

## 2023-09-28 PROCEDURE — 93010 ELECTROCARDIOGRAM REPORT: CPT | Performed by: INTERNAL MEDICINE

## 2023-09-28 PROCEDURE — 82962 GLUCOSE BLOOD TEST: CPT | Mod: 91

## 2023-09-28 PROCEDURE — A9270 NON-COVERED ITEM OR SERVICE: HCPCS | Performed by: INTERNAL MEDICINE

## 2023-09-28 PROCEDURE — 85025 COMPLETE CBC W/AUTO DIFF WBC: CPT

## 2023-09-28 PROCEDURE — 85055 RETICULATED PLATELET ASSAY: CPT

## 2023-09-28 PROCEDURE — 83735 ASSAY OF MAGNESIUM: CPT

## 2023-09-28 PROCEDURE — 94150 VITAL CAPACITY TEST: CPT

## 2023-09-28 PROCEDURE — 84100 ASSAY OF PHOSPHORUS: CPT

## 2023-09-28 PROCEDURE — 99291 CRITICAL CARE FIRST HOUR: CPT | Performed by: INTERNAL MEDICINE

## 2023-09-28 PROCEDURE — 700105 HCHG RX REV CODE 258: Performed by: INTERNAL MEDICINE

## 2023-09-28 PROCEDURE — 770022 HCHG ROOM/CARE - ICU (200)

## 2023-09-28 RX ORDER — ONDANSETRON 2 MG/ML
4 INJECTION INTRAMUSCULAR; INTRAVENOUS EVERY 4 HOURS PRN
Status: DISCONTINUED | OUTPATIENT
Start: 2023-09-28 | End: 2023-10-28

## 2023-09-28 RX ORDER — ONDANSETRON 4 MG/1
4 TABLET, ORALLY DISINTEGRATING ORAL EVERY 4 HOURS PRN
Status: DISCONTINUED | OUTPATIENT
Start: 2023-09-28 | End: 2023-09-29

## 2023-09-28 RX ORDER — FUROSEMIDE 10 MG/ML
40 INJECTION INTRAMUSCULAR; INTRAVENOUS ONCE
Status: COMPLETED | OUTPATIENT
Start: 2023-09-28 | End: 2023-09-28

## 2023-09-28 RX ORDER — MORPHINE SULFATE 4 MG/ML
4 INJECTION INTRAVENOUS ONCE
Status: COMPLETED | OUTPATIENT
Start: 2023-09-28 | End: 2023-09-28

## 2023-09-28 RX ORDER — MAGNESIUM SULFATE HEPTAHYDRATE 40 MG/ML
2 INJECTION, SOLUTION INTRAVENOUS ONCE
Status: COMPLETED | OUTPATIENT
Start: 2023-09-28 | End: 2023-09-28

## 2023-09-28 RX ADMIN — METRONIDAZOLE 500 MG: 5 INJECTION, SOLUTION INTRAVENOUS at 10:21

## 2023-09-28 RX ADMIN — MORPHINE SULFATE 4 MG: 4 INJECTION, SOLUTION INTRAMUSCULAR; INTRAVENOUS at 14:09

## 2023-09-28 RX ADMIN — ACETAMINOPHEN 650 MG: 325 TABLET, FILM COATED ORAL at 08:07

## 2023-09-28 RX ADMIN — FUROSEMIDE 40 MG: 10 INJECTION INTRAMUSCULAR; INTRAVENOUS at 08:30

## 2023-09-28 RX ADMIN — ONDANSETRON 4 MG: 2 INJECTION INTRAMUSCULAR; INTRAVENOUS at 15:40

## 2023-09-28 RX ADMIN — INSULIN LISPRO 2 UNITS: 100 INJECTION, SOLUTION INTRAVENOUS; SUBCUTANEOUS at 05:22

## 2023-09-28 RX ADMIN — ENOXAPARIN SODIUM 40 MG: 100 INJECTION SUBCUTANEOUS at 17:12

## 2023-09-28 RX ADMIN — METRONIDAZOLE 500 MG: 5 INJECTION, SOLUTION INTRAVENOUS at 23:42

## 2023-09-28 RX ADMIN — FOLIC ACID 1 MG: 1 TABLET ORAL at 05:15

## 2023-09-28 RX ADMIN — POTASSIUM PHOSPHATE, MONOBASIC AND POTASSIUM PHOSPHATE, DIBASIC 15 MMOL: 224; 236 INJECTION, SOLUTION, CONCENTRATE INTRAVENOUS at 08:58

## 2023-09-28 RX ADMIN — MAGNESIUM SULFATE HEPTAHYDRATE 2 G: 40 INJECTION, SOLUTION INTRAVENOUS at 08:08

## 2023-09-28 RX ADMIN — POTASSIUM BICARBONATE 50 MEQ: 978 TABLET, EFFERVESCENT ORAL at 10:02

## 2023-09-28 RX ADMIN — LABETALOL HYDROCHLORIDE 10 MG: 5 INJECTION INTRAVENOUS at 22:09

## 2023-09-28 RX ADMIN — INSULIN LISPRO 5 UNITS: 100 INJECTION, SOLUTION INTRAVENOUS; SUBCUTANEOUS at 12:56

## 2023-09-28 RX ADMIN — ENOXAPARIN SODIUM 40 MG: 100 INJECTION SUBCUTANEOUS at 05:16

## 2023-09-28 RX ADMIN — FENTANYL CITRATE 100 MCG: 50 INJECTION, SOLUTION INTRAMUSCULAR; INTRAVENOUS at 07:29

## 2023-09-28 RX ADMIN — INSULIN LISPRO 2 UNITS: 100 INJECTION, SOLUTION INTRAVENOUS; SUBCUTANEOUS at 23:43

## 2023-09-28 RX ADMIN — FAMOTIDINE 20 MG: 20 TABLET ORAL at 05:15

## 2023-09-28 RX ADMIN — FENTANYL CITRATE 100 MCG: 50 INJECTION, SOLUTION INTRAMUSCULAR; INTRAVENOUS at 02:20

## 2023-09-28 RX ADMIN — THERA TABS 1 TABLET: TAB at 05:15

## 2023-09-28 RX ADMIN — FENTANYL CITRATE 100 MCG: 50 INJECTION, SOLUTION INTRAMUSCULAR; INTRAVENOUS at 06:15

## 2023-09-28 RX ADMIN — POLYETHYLENE GLYCOL 3350 1 PACKET: 17 POWDER, FOR SOLUTION ORAL at 08:07

## 2023-09-28 RX ADMIN — DEXMEDETOMIDINE 1.2 MCG/KG/HR: 100 INJECTION, SOLUTION INTRAVENOUS at 03:23

## 2023-09-28 RX ADMIN — DEXMEDETOMIDINE 1.2 MCG/KG/HR: 100 INJECTION, SOLUTION INTRAVENOUS at 08:31

## 2023-09-28 RX ADMIN — ONDANSETRON 4 MG: 2 INJECTION INTRAMUSCULAR; INTRAVENOUS at 11:42

## 2023-09-28 RX ADMIN — SENNOSIDES AND DOCUSATE SODIUM 2 TABLET: 50; 8.6 TABLET ORAL at 05:16

## 2023-09-28 RX ADMIN — INSULIN LISPRO 2 UNITS: 100 INJECTION, SOLUTION INTRAVENOUS; SUBCUTANEOUS at 17:12

## 2023-09-28 RX ADMIN — PROPOFOL 35 MCG/KG/MIN: 10 INJECTION, EMULSION INTRAVENOUS at 00:59

## 2023-09-28 RX ADMIN — CEFTRIAXONE SODIUM 2000 MG: 10 INJECTION, POWDER, FOR SOLUTION INTRAVENOUS at 17:13

## 2023-09-28 ASSESSMENT — PAIN DESCRIPTION - PAIN TYPE
TYPE: ACUTE PAIN

## 2023-09-28 ASSESSMENT — COPD QUESTIONNAIRES
HAVE YOU SMOKED AT LEAST 100 CIGARETTES IN YOUR ENTIRE LIFE: YES
DURING THE PAST 4 WEEKS HOW MUCH DID YOU FEEL SHORT OF BREATH: SOME OF THE TIME
DO YOU EVER COUGH UP ANY MUCUS OR PHLEGM?: NO/ONLY WITH OCCASIONAL COLDS OR INFECTIONS
COPD SCREENING SCORE: 6

## 2023-09-28 ASSESSMENT — PULMONARY FUNCTION TESTS: FVC: 1.3

## 2023-09-28 NOTE — PROGRESS NOTES
Patient remains bradycardic dropping to a low of 46, with sedation medications turned down.  notified. 12 lead EKG ordered.

## 2023-09-28 NOTE — PROGRESS NOTES
Bedside report received from KOFI Deleon. Assumed patient care. No signs of distress at this time. Tele monitor on, rhythm and monitor summary verified. All lines IV traced, medications and rates verified. Safety precautions in place. Plan to keep patient RASS less than +2 over night and hemodynamic stability.

## 2023-09-28 NOTE — CARE PLAN
The patient is Watcher - Medium risk of patient condition declining or worsening    Shift Goals  Clinical Goals: ensure safety, wean off sedation/ vent and extubate; mobilize  Patient Goals: DEENA  Family Goals: get better    Progress made toward(s) clinical / shift goals:      Extubated around 11am, currently on NC 3 LPM  EOB tolerated      Problem: Hemodynamics  Goal: Patient's hemodynamics, fluid balance and neurologic status will be stable or improve  Outcome: Progressing     Problem: Fluid Volume  Goal: Fluid volume balance will be maintained  Outcome: Progressing     Problem: Urinary - Renal Perfusion  Goal: Ability to achieve and maintain adequate renal perfusion and functioning will improve  Outcome: Progressing     Problem: Respiratory  Goal: Patient will achieve/maintain optimum respiratory ventilation and gas exchange  Outcome: Progressing     Problem: Mechanical Ventilation  Goal: Safe management of artificial airway and ventilation  Outcome: Met  Goal: Successful weaning off mechanical ventilator, spontaneously maintains adequate gas exchange  Outcome: Met  Goal: Patient will be able to express needs and understand communication  Outcome: Met     Problem: Knowledge Deficit - Standard  Goal: Patient and family/care givers will demonstrate understanding of plan of care, disease process/condition, diagnostic tests and medications  Outcome: Progressing     Problem: Skin Integrity  Goal: Skin integrity is maintained or improved  Outcome: Progressing     Problem: Safety - Medical Restraint  Goal: Remains free of injury from restraints (Restraint for Interference with Medical Device)  Outcome: Met  Goal: Free from restraint(s) (Restraint for Interference with Medical Device)  Outcome: Met    Problem: Pain- Standard  Goal: Alleviation of paim or a reduction in pain to the patient's comfort goal  Outcome: Progressing

## 2023-09-28 NOTE — PROGRESS NOTES
"Pulmonary/Critical Care Progress Note    Date of admission  9/24/2023    Chief Complaint  68 y.o. male admitted 9/24/2023 with septic shock and found a small frontal subarachnoid hemorrhage and pneumobilia. Required intubation/mechanical ventilation.    Hospital Course  Per Dr Peña note from 9/24: \"68 y.o. male patient of LE, nephrolithiasis, hypertension who presented 9/24/2023 with altered mental status with a mechanical fall and was subsequently [facility] emergency department.  During his evaluation be febrile tachycardic used.  CT head revealed small frontal subarachnoid hemorrhage, lactate 4.6,Leukocytosis with purulent urinalysis with many RBC's.  Given his history of nephrolithiasis a CT abdomen and pelvis was performed to exclude infected stone. Nephrolithiasis was not noted.  However there was hepatic biliary air.\"    9/25: surgery was consulted Dr Hirsch. Continue resuscitation with IVF, antibiotics, If worsens, consider jamila tube. Patient was intubated acutely overnight.    9/26: requiring high levels of sedatives. Had to start a second vasopressor. Failed SAT and SBT due to agitation. Vasopressors are weaning down. Will start dvt chemical prophylaxis tonight.    9/27: changed propofol to precedex, hopes to move towards extubation. Consider tube feeds if  not extubated    9/28: he is now following commands, maximizing status for extubation.      Interval Problem Update  Reviewed last 24 hour events: No events overnight   neuro: changed to propofol for Precedex in order for preparation for extubation and to obtain a better neurologic evaluation.  Patient also has a history of alcoholism and unclear if he was in the last end of withdrawal but today he is following commands.  Pulm: intubated on mechanical ventilation.  Continue with ABCDEF bundle according to protocol.  Continue with lung protective strategies, SAT and SBT as possible. On precedex today. Will attempt extubation " today.  Cards: SR. No ectopy.  He is off vasopressors   Nephro: appropriate/normal GFR at 92. Continue thiamine and IVF.   ID: leukocytosis has resolved. S/p sepsis and pneumobilia, UA possible UTI, continues with flagyl and ceftriaxone to complete 5 days  Hem: H&H stable at 13/38, thrombocytopenia 157-->115-->86-->85-->87 suspected inflammatory block given sepsis and pneumobilia. Stabilizing   GI: Pepcid prophylaxis, started tube feeds on 9/27, held today for possible extubation.  : Appropriate urine output, continue monitoring  Endo: keep goal of glucose 140-180     Review of Systems  Review of Systems   Unable to perform ROS: Intubated        Vital Signs for last 24 hours   Temp:  [36.7 °C (98.1 °F)-37.3 °C (99.1 °F)] 37.3 °C (99.1 °F)  Pulse:  [] 89  Resp:  [18-50] 27  BP: ()/(47-98) 163/72  SpO2:  [91 %-98 %] 96 %    Hemodynamic parameters for last 24 hours       Respiratory Information for the last 24 hours  Vent Mode: Spont  Rate (breaths/min): 22  Vt Target (mL): 470  PEEP/CPAP: 8  P Support: 5  MAP: 11  Control VTE (exp VT): 582    Physical Exam   Physical Exam  Vitals and nursing note reviewed.   Constitutional:       General: He is not in acute distress.     Appearance: He is ill-appearing. He is not toxic-appearing.   HENT:      Head: Normocephalic and atraumatic.      Nose: Nose normal.      Mouth/Throat:      Comments: ETT in place  Eyes:      Extraocular Movements: Extraocular movements intact.   Cardiovascular:      Rate and Rhythm: Normal rate and regular rhythm.      Pulses: Normal pulses.      Heart sounds: No murmur heard.     No friction rub. No gallop.   Pulmonary:      Effort: Pulmonary effort is normal.      Breath sounds: Normal breath sounds.   Abdominal:      General: Bowel sounds are normal.   Musculoskeletal:         General: Normal range of motion.      Cervical back: Normal range of motion. No rigidity.      Right lower leg: No edema.      Left lower leg: No edema.    Skin:     General: Skin is warm.      Capillary Refill: Capillary refill takes less than 2 seconds.      Findings: No rash.   Neurological:      Comments: Following commands: thumbs up and wiggling toes. Tracking and briefly opened his eyes         Medications  Current Facility-Administered Medications   Medication Dose Route Frequency Provider Last Rate Last Admin    potassium phosphate 15 mmol in  mL ivpb  15 mmol Intravenous Once Lizandro Greenwodo M.D. 62.5 mL/hr at 09/28/23 0958 Rate Verify at 09/28/23 0958    insulin GLARGINE (Lantus,Semglee) injection  18 Units Subcutaneous Q EVENING Rhoda Argueta M.D.        Pharmacy Consult: Enteral tube insertion - review meds/change route/product selection   Other PHARMACY TO DOSE KIM Stephenson Jr..LIANNA        labetalol (Normodyne/Trandate) injection 10 mg  10 mg Intravenous Q6HRS PRN Gilberto Levine M.D.   10 mg at 09/27/23 0543    hydrALAZINE (Apresoline) injection 20 mg  20 mg Intravenous Q6HRS PRN Gilberto Levine M.D.   20 mg at 09/27/23 0559    dexmedetomidine (PRECEDEX) 400 mcg/100mL NS premix infusion  0.1-1.5 mcg/kg/hr (Ideal) Intravenous Continuous Lizandro Greenwood M.D. 15.5 mL/hr at 09/28/23 0958 0.8 mcg/kg/hr at 09/28/23 0958    enoxaparin (Lovenox) inj 40 mg  40 mg Subcutaneous Q12HRS Rhoda Argueta M.D.   40 mg at 09/28/23 0516    Respiratory Therapy Consult   Nebulization Continuous RT Florian Cole M.D.        MD Alert...ICU Electrolyte Replacement per Pharmacy   Other PHARMACY TO DOSE Florian Cole M.D.        lidocaine (Xylocaine) 1 % injection 2 mL  2 mL Tracheal Tube Q30 MIN PRN Florian Cole M.D.        fentaNYL (SUBLIMAZE) 50 mcg/mL in 50mL   Intravenous Continuous Florian Cole M.D.   Stopped at 09/28/23 0818    fentaNYL (Sublimaze) injection 25 mcg  25 mcg Intravenous Q HOUR PRN Florian W Kelsey, M.D.        Or    fentaNYL (Sublimaze) injection 50 mcg  50 mcg Intravenous Q HOUR PRN Florian Cole M.D.   50  mcg at 09/25/23 0611    Or    fentaNYL (Sublimaze) injection 100 mcg  100 mcg Intravenous Q HOUR PRN Florian Cole M.D.   100 mcg at 09/28/23 0729    ipratropium-albuterol (DUONEB) nebulizer solution  3 mL Nebulization Q2HRS PRN (RT) Florian Cole M.D.        famotidine (Pepcid) tablet 20 mg  20 mg Enteral Tube Q12HRS Gilberto Levine M.D.   20 mg at 09/28/23 0515    senna-docusate (Pericolace Or Senokot S) 8.6-50 MG per tablet 2 Tablet  2 Tablet Enteral Tube BID Gilberto Levine M.D.   2 Tablet at 09/28/23 0516    And    polyethylene glycol/lytes (Miralax) PACKET 1 Packet  1 Packet Enteral Tube QDAY PRN Gilberto Levine M.D.   1 Packet at 09/28/23 0807    And    magnesium hydroxide (Milk Of Magnesia) suspension 30 mL  30 mL Enteral Tube QDAY PRN Gilberto Levine M.D.        And    bisacodyl (Dulcolax) suppository 10 mg  10 mg Rectal QDAY PRN Gilberto Levine M.D.        insulin lispro (HumaLOG,AdmeLOG) injection  2-9 Units Subcutaneous Q6HRS Rhoda Argueta M.D.   2 Units at 09/28/23 0522    acetaminophen (Tylenol) tablet 650 mg  650 mg Oral Q4HRS PRN Gilberto Levine M.D.   650 mg at 09/28/23 0807    lactated ringers infusion (BOLUS)  1,000 mL Intravenous Once PRN Med Peña M.D.        cefTRIAXone (Rocephin) syringe 2,000 mg  2,000 mg Intravenous Q EVENING Med Peña M.D.   2,000 mg at 09/27/23 1710    metroNIDAZOLE (Flagyl) IVPB 500 mg  500 mg Intravenous Q12HR Med Peña M.D. 100 mL/hr at 09/28/23 1021 500 mg at 09/28/23 1021    dextrose 50% (D50W) injection 25 g  25 g Intravenous Q15 MIN PRN Med Peña M.D.           Fluids    Intake/Output Summary (Last 24 hours) at 9/28/2023 1025  Last data filed at 9/28/2023 0958  Gross per 24 hour   Intake 2099.58 ml   Output 2140 ml   Net -40.42 ml       Laboratory  Recent Labs     09/26/23  0403 09/27/23  0317   ISTATAPH 7.334* 7.426   ISTATAPCO2 43.7* 36.0   ISTATAPO2 59* 64   ISTATATCO2 25 25   [id number] 88* 93   ISTATARTHCO3 23.2 23.7    ISTATARTBE -3 0   ISTATTEMP 100.8 F 37.9 C   ISTATFIO2 40 40   ISTATSPEC Arterial Arterial   ISTATAPHTC 7.317* 7.413   [id number] 65 68         Recent Labs     09/26/23 0342 09/27/23 0300 09/28/23  0314   SODIUM 137 138 138   POTASSIUM 3.7 3.7 3.5*   CHLORIDE 104 105 103   CO2 24 25 26   BUN 13 8 12   CREATININE 0.92 0.83 0.89   MAGNESIUM 1.6 1.7 1.8   PHOSPHORUS 1.9* 1.6* 2.1*   CALCIUM 8.2* 7.9* 8.5     Recent Labs     09/26/23 0342 09/27/23  0300 09/28/23  0314   ALTSGPT 22 21 21   ASTSGOT 38 33 35   ALKPHOSPHAT 103* 96 107*   TBILIRUBIN 0.9 0.7 0.7   LIPASE 12  --   --    GLUCOSE 234* 156* 233*     Recent Labs     09/26/23 0342 09/27/23  0300 09/28/23  0314   WBC 15.5* 8.8 7.2   NEUTSPOLYS 78.80* 73.60* 65.90   LYMPHOCYTES 13.70* 16.00* 19.60*   MONOCYTES 5.90 7.70 10.80   EOSINOPHILS 0.50 1.60 2.30   BASOPHILS 0.30 0.20 0.40   ASTSGOT 38 33 35   ALTSGPT 22 21 21   ALKPHOSPHAT 103* 96 107*   TBILIRUBIN 0.9 0.7 0.7     Recent Labs     09/26/23 0342 09/27/23  0300 09/28/23  0314   RBC 4.52* 4.17* 4.27*   HEMOGLOBIN 14.0 13.0* 13.3*   HEMATOCRIT 41.2* 37.9* 38.8*   PLATELETCT 84* 85* 87*       Imaging  X-Ray:  I have personally reviewed the images and compared with prior images.    CT head and neck: decrease in size of SAH.    Assessment/Plan  * Severe sepsis (HCC)- (present on admission)  Assessment & Plan  This is Sepsis Present on admission  SIRS criteria identified on my evaluation include: Fever, with temperature greater than 100.9 deg F, Tachycardia, with heart rate greater than 90 BPM and Leukocytosis, with WBC greater than 12,000  Clinical indicators of end organ dysfunction include Toxic Metabolic Encephalopathy  Source is GI vs .   Sepsis protocol initiated  Crystalloid Fluid Administration: Fluid resuscitation ordered per standard protocol - 30 mL/kg per current or ideal body weight  IV antibiotics as appropriate for source of sepsis, C3, Flagyl  Reassessment: I have reassessed the patient's  hemodynamic status    CT abdomen shows signs of hepatobiliary air.   General surgery consulted: Dr Hirsch signed off  Patient markedly improving, off pressors    Acute respiratory failure with hypoxia (HCC)- (present on admission)  Assessment & Plan  Patient was intubated from the night of 9/24 towards 9/25 and started on mechanical ventilation.  ABCDEF bundle according to protocol.  Continue with lung protective strategies.  AST and SBT evaluation daily as appropriate.  Continue to wean FiO2 as tolerated, as well as vent settings.  On precedex, will work towards extubation    Pneumobilia- (present on admission)  Assessment & Plan  Consulted Surgery Dr Hirsch, already signed off  S/p resuscitation with IVF,  S/p vasopressor support  Continue with antibiotics: ceftriaxone and flagyl.    SAH (subarachnoid hemorrhage) (HCC)- (present on admission)  Assessment & Plan  Small traumatic subarachnoid hemorrhage.  Not c/w aneurysm  Serial neurologic exams  No NSAIDs  Neurology consulted, no immediate needs  CT head and neck showed decrease in the SAH.  EEG spot was negative, consider repeat when off sedation    Thrombocytopenia (HCC)- (present on admission)  Assessment & Plan  Follow up platelets daily  Suspected inflammatory block.    Type 2 diabetes mellitus (HCC)- (present on admission)  Assessment & Plan  Moderate glycemic control with Insulin therapy.  Hold Metformin    BMI 40.0-44.9, adult (HCC)- (present on admission)  Assessment & Plan  BMI 40.65  Class III obesity  Dietary consult; Weight loss counseling not appropriate in acute care setting  This place him on high risk for morbidity     Essential hypertension- (present on admission)  Assessment & Plan  Currently hypotensive on pressors  hold amlodipine and Hold Losartan for now.      Alcohol dependence (HCC)- (present on admission)  Assessment & Plan  Monitor and treat alcohol withdrawal as clinically indicated.  Could be a barrier for extubation         VTE:   Lovenox  Ulcer: H2 Antagonist  Lines: Central Line  Ongoing indication addressed and Joseph Catheter  Ongoing indication addressed    I have performed a physical exam and reviewed and updated ROS and Plan today (9/28/2023). In review of yesterday's note (9/27/2023), there are no changes except as documented above.     Discussed patient condition and risk of morbidity and/or mortality with Family, RN, RT, Therapies, Pharmacy, and Charge nurse / hot rounds  The patient remains critically ill.  Critical care time = 58 minutes in directly providing and coordinating critical care and extensive data review.  No time overlap and excludes procedures.    Lizandro Greenwood MD FACP FCCP  Pulmonary/Critical Care

## 2023-09-28 NOTE — PROGRESS NOTES
[facility] Progress Note      Admit Date: 9/24/2023    Resident(s): Rhoda Argueta M.D.   Attending:  KOLBY GONSALES/ Dr. Greenwood    Patient ID:    Name:  Sharon Guardado   YOB: 1955  Age:  68 y.o.  male   MRN:  [id number]    Hospital Course (carried forward and updated):  Sharon Guardado is a 68 y.o. male with notable PMH of HTN, HLD, GERD, BPH, gout, morbid obesity (BMI 40), alcohol use disorder, nephrolithiasis, hx of SAH (3/2023), and T2DM who presented 9/24/23 with altered mental status (A&Ox1) with a mechanical fall. Per wife, patient was walking up the stairs on 9/24/23, stumbled, and hit the right side of his body on the wall.     In the ED, patient was febrile (103.4), WBC 16, lactate 4.6, U/a with WBC, LE, nitrite, RBCs. CT head with small frontal SAH. CT renal with hepatic biliary air, no nephrolithiasis.    9/25/23: Febrile overnight, WBC improving 16->10; APVCMV 20/470/40%/8  9/26/23: CTA head and neck with improvement of SAH, failed SBT overnight  9/27/23: transitioned off of propofol, unable to obtain sufficient parameters during SBT trials    Consultants:  Critical Care    Interval Events:  9/28/23:   - Neuro: RASS -3/+3; fentanyl ; precedex 0.6-1.2; off of propofol  - CV: HD stable, off of pressors  - Resp: APVCMV 22/470/40%/8  - SAT and SBT for 10 minutes this morning because patient became hypertensive and agitated  - continue ABCDE bundle, try to obtain NIF today  - GI/Nut: NG tube in place, feed tubes running at 50, goal of 75 when off of propofol  - Renal/IVF/lytes: Renal fcn stable; d/c IVF; lasix dose this morning  - ID: afebrile, WBC WNL  - Continue CTX, flagyl (5 day course, end 9/29)  - Heme/onc: H&H stable  - Endo: Blood glucose goal 140-180; glargine 15->18      Vitals Range last 24h:  Temp:  [36.7 °C (98.1 °F)-37.3 °C (99.1 °F)] 37.3 °C (99.1 °F)  Pulse:  [] 96  Resp:  [19-50] 22  BP: ()/(47-98) 151/75  SpO2:  [91 %-98 %] 96 %    Review  of Systems   Unable to perform ROS: Intubated     PHYSICAL EXAM:  Vitals:    09/28/23 0815 09/28/23 0830 09/28/23 0845 09/28/23 0900   BP: (!) 162/79 (!) 158/78 (!) 144/74 (!) 151/75   Pulse: 92 98 95 96   Resp: (!) 22 (!) 28 (!) 24 (!) 22   Temp:       TempSrc:       SpO2: 93% 94% 95% 96%   Weight:       Height:        Body mass index is 44.87 kg/m².    O2 therapy: Pulse Oximetry: 96 %, O2 Delivery Device: Ventilator  Date 09/28/23 0700 - 09/29/23 0659   Shift 5525-8773 0077-0901 9820-6581 24 Hour Total   INTAKE   I.V. 74.4   74.4     Magnesium Sulfate Volume 25.1   25.1     Precedex Volume 49.3   49.3   IV Piggyback 10.6   10.6     Volume (mL) (potassium phosphate 15 mmol in  mL ivpb) 10.6   10.6   Enteral 30   30     Free Water / Tube Flush 30   30   Shift Total 115   115   OUTPUT   Shift Total          115         Intake/Output Summary (Last 24 hours) at 9/28/2023 0936  Last data filed at 9/28/2023 0909  Gross per 24 hour   Intake 2502.97 ml   Output 3090 ml   Net -587.03 ml      Net IO Since Admission: 3,942.51 mL [09/25/23 0718]       Physical Exam  Vitals and nursing note reviewed.   Constitutional:       Appearance: He is obese.   HENT:      Nose: Nose normal.   Cardiovascular:      Rate and Rhythm: Normal rate and regular rhythm.   Pulmonary:      Comments: vented  Abdominal:      General: There is distension.      Palpations: Abdomen is soft.   Musculoskeletal:         General: Swelling present.   Skin:     General: Skin is warm and dry.   Neurological:      Motor: No weakness.      Comments: sedated   Psychiatric:      Comments: Sedated, agitated when sedation decreased         Recent Labs     09/26/23  0403 09/27/23  0317   ISTATAPH 7.334* 7.426   ISTATAPCO2 43.7* 36.0   ISTATAPO2 59* 64   ISTATATCO2 25 25   ATCLIOH5KHD 88* 93   ISTATARTHCO3 23.2 23.7   ISTATARTBE -3 0   ISTATTEMP 100.8 F 37.9 C   ISTATFIO2 40 40   ISTATSPEC Arterial Arterial   ISTATAPHTC 7.317* 7.413   CXWFIMTQ5LT 65 68      Recent Labs     09/26/23 0342 09/27/23 0300 09/28/23 0314   SODIUM 137 138 138   POTASSIUM 3.7 3.7 3.5*   CHLORIDE 104 105 103   CO2 24 25 26   BUN 13 8 12   CREATININE 0.92 0.83 0.89   MAGNESIUM 1.6 1.7 1.8   PHOSPHORUS 1.9* 1.6* 2.1*   CALCIUM 8.2* 7.9* 8.5     Recent Labs     09/26/23 0342 09/27/23 0300 09/28/23 0314   ALTSGPT 22 21 21   ASTSGOT 38 33 35   ALKPHOSPHAT 103* 96 107*   TBILIRUBIN 0.9 0.7 0.7   LIPASE 12  --   --    GLUCOSE 234* 156* 233*     Recent Labs     09/26/23 0342 09/27/23 0300 09/28/23 0314   RBC 4.52* 4.17* 4.27*   HEMOGLOBIN 14.0 13.0* 13.3*   HEMATOCRIT 41.2* 37.9* 38.8*   PLATELETCT 84* 85* 87*     Recent Labs     09/26/23 0342 09/27/23 0300 09/28/23 0314   WBC 15.5* 8.8 7.2   NEUTSPOLYS 78.80* 73.60* 65.90   LYMPHOCYTES 13.70* 16.00* 19.60*   MONOCYTES 5.90 7.70 10.80   EOSINOPHILS 0.50 1.60 2.30   BASOPHILS 0.30 0.20 0.40   ASTSGOT 38 33 35   ALTSGPT 22 21 21   ALKPHOSPHAT 103* 96 107*   TBILIRUBIN 0.9 0.7 0.7       Meds:   magnesium sulfate  2 g 25 mL/hr at 09/28/23 0909    potassium phosphate 15 mmol in  mL ivpb  15 mmol 62.5 mL/hr at 09/28/23 0909    insulin GLARGINE  18 Units      Pharmacy        potassium bicarbonate  50 mEq      labetalol  10 mg      hydrALAZINE  20 mg      dexmedetomidine (Precedex) infusion  0.1-1.5 mcg/kg/hr (Ideal) 1 mcg/kg/hr (09/28/23 0909)    enoxaparin (LOVENOX) injection  40 mg      Respiratory Therapy Consult        MD Alert...Adult ICU Electrolyte Replacement per Pharmacy        lidocaine  2 mL      fentaNYL   50 mcg/hr (09/28/23 0652)    fentaNYL  25 mcg      Or    fentaNYL  50 mcg      Or    fentaNYL  100 mcg      ipratropium-albuterol  3 mL      famotidine  20 mg      senna-docusate  2 Tablet      And    polyethylene glycol/lytes  1 Packet      And    magnesium hydroxide  30 mL      And    bisacodyl  10 mg      insulin lispro  2-9 Units      acetaminophen  650 mg      LR  1,000 mL      cefTRIAXone (ROCEPHIN) IV  2,000 mg       metroNIDAZOLE (FLAGYL) IV  500 mg Stopped (09/28/23 0046)    dextrose bolus  25 g          Procedures:  - Intubation (9/25/23)  - Bronch + BAL (9/25/23)    Imaging:  DX-CHEST-PORTABLE (1 VIEW)   Final Result      1.  Increasing airspace disease at the left base.      DX-CHEST-LIMITED (1 VIEW)   Final Result         1.  Pulmonary edema and/or infiltrates are identified, which are somewhat decreased since the prior exam.   2.  Cardiomegaly   3.  Atherosclerosis      DX-CHEST-PORTABLE (1 VIEW)   Final Result         1.  Pulmonary edema and/or infiltrates are identified, which appear somewhat increased since the prior exam.   2.  Cardiomegaly   3.  Atherosclerosis      DX-CHEST-PORTABLE (1 VIEW)   Final Result         1.  Interstitial edema and/or infiltrates, slightly decreased since prior study.   2.  Cardiomegaly   3.  Atherosclerosis      DX-CHEST-LIMITED (1 VIEW)   Final Result      Right IJ catheter tip projects in the SVC. No pneumothorax.      CT-CTA NECK WITH & W/O-POST PROCESSING   Final Result      1.  Estimated 50-75% stenosis of the BILATERAL carotid arteries   2.  Estimated greater than 50% stenosis at the vertebral artery ostium on each side      CT-CTA HEAD WITH & W/O-POST PROCESS   Final Result      1.  No large vessel occlusion or aneurysm.   2.  Hazy left frontal lobe subarachnoid hemorrhage, decreased in conspicuity from prior. No new hemorrhage seen.   3.  Scattered opacifications of the ethmoid sinuses, likely related to support hardware.      DX-ABDOMEN FOR TUBE PLACEMENT   Final Result         1.  Nonspecific bowel gas pattern in the upper abdomen.   2.  Dobbhoff tube with tip terminating overlying the expected location of the first or second duodenal segment.      DX-CHEST-PORTABLE (1 VIEW)   Final Result         1.  Interstitial edema and/or infiltrates.   2.  Cardiomegaly   3.  Nasogastric tube terminates in the distal esophagus, recommend advancement   4.  Atherosclerosis      DX-ABDOMEN FOR  TUBE PLACEMENT   Final Result         1.  Nonspecific bowel gas pattern in the upper abdomen.   2.  Nasogastric tube terminates just distal to the gastroesophageal junction, recommend advancement      CT-RENAL COLIC EVALUATION(A/P W/O)   Final Result         1.  Intrahepatic biliary air and nondependent air within the gallbladder, consider history of instrumentation or changes of cholangitis in the appropriate clinical setting.   2.  Large fat-containing bilateral inguinal hernias   3.  Enlarged prostate, consider causes of prostate enlargement with additional workup as clinically appropriate   4.  Diverticulosis   5.  Cholelithiasis   6.  Atherosclerosis      CT-HEAD W/O   Final Result         1.  Hazy left frontal subarachnoid hemorrhage.   2.  Nonspecific white matter changes, commonly associated with small vessel ischemic disease.  Associated mild cerebral atrophy is noted.      Based solely on CT findings, the brain injury guideline category is mBIG 1.      SDH < 4mm   IPH < 4mm   SAH < 3 sulci and < 1mm      The original BIG retrospective analysis found radiographic progression in 0% of BIG 1 patients and 2.6% BIG 2.      These findings were discussed with the patient's clinician, Madhu Zambrano, on 9/24/2023 10:10 PM.      DX-CHEST-PORTABLE (1 VIEW)   Final Result      No acute cardiopulmonary abnormality identified.          ASSESSEMENT and PLAN:    * Severe sepsis (HCC)- (present on admission)  Assessment & Plan  This is Sepsis Present on admission  SIRS criteria identified on my evaluation include: Fever, with temperature greater than 100.9 deg F, Tachycardia, with heart rate greater than 90 BPM and Leukocytosis, with WBC greater than 12,000  Clinical indicators of end organ dysfunction include Toxic Metabolic Encephalopathy  Source is GI vs .   Sepsis protocol initiated  Crystalloid Fluid Administration: Fluid resuscitation ordered per standard protocol - 30 mL/kg per current or ideal body weight  IV  antibiotics as appropriate for source of sepsis, CTX, Flagyl  Reassessment: I have reassessed the patient's hemodynamic status     9/24/23: CT abdomen shows signs of hepatobiliary air.  9/25/23: Leukocytosis (16->10) & tachycardia resolved  9/26/23: afebrile, WBC 10->19->15  9/27/23: Tmax 100.9, leukocytosis normalized  9/28/23: afebrile     - Ceftriaxone, flagyl (end date 9/29/23)  - F/u final blood, urine cultures -> NGTD     Acute respiratory failure with hypoxia (HCC)  Assessment & Plan  Emergent intubation 9/25/23 due to unprotected airway and desaturation  9/25/23: 7.330/42/60  APVCMV 20/470/40%/8  9/26/23: 22/470/40%/8  9/27/23: failed SBT, very agitated when propofol was weaned  9/28/23: unable to tolerate SAT/SBT this morning     - continue ABCDEF protocol     Pneumobilia- (present on admission)  Assessment & Plan  As seen on CT renal (9/25/23).  9/26/23: No need for further interventions at this time per gen surg     SAH (subarachnoid hemorrhage) (HCC)  Assessment & Plan  Small traumatic subarachnoid hemorrhage s/p mechanical fall  Not c/w aneurysm  CTA head and neck with improvement of SAH  - Continue serial neurologic exams  - No NSAIDs  - Okay to start DVT prophylaxis given imaging documentation of SAH improvement and increased risk of dvt while on vent     Lactic acidosis  Assessment & Plan  4.6 at presentation  Secondary to GI vs  infection  9/26/23: normalized     Thrombocytopenia (HCC)  Assessment & Plan  Baseline ~110-125  Possibly secondary to alcohol use disorder although normal H&H and LFTs  9/25/23: 86  9/26/23: 84  9/27/23: 85    - stable, continue to trend and monitor for s/s of active bleeding     Type 2 diabetes mellitus (HCC)- (present on admission)  Assessment & Plan  A1c 5.7% (6/2023)  Home meds: metformin, pioglitazone  9/26/23: 15u glargine  9/28/23: 18 glargine     - hold home meds for now  - basal and SSI to glucose goal 140-180; titrate as clinically indicated      Essential  hypertension- (present on admission)  Assessment & Plan  Hypertensive at presentation despite sepsis  Outpatient meds: amlodipine 5mg, losartan 40; hold for now     9/25/23: requiring pressors (levo 0.3), titrate down as able  9/26/23: levo 0.28     - Goal -140, MAP >65     Alcohol dependence (HCC)- (present on admission)  Assessment & Plan  Multivitamin, folate, thiamine  Monitor, additional therapies as clinically indicated     Morbid obesity, BMI 40.0-44.9, adult (HCC)  Assessment & Plan  Noted  High risk for decompensation      DISPO: CICU    CODE STATUS: FULL CODE    Quality Measures:  Feeding: TF, goal 75  Analgesia: fentanyl  Sedation: propofol, (precedex)  Thromboprophylaxis: lovenox  Head of bed: >30 degrees  Ulcer prophylaxis: famotidine 20bid  Glycemic control: Correctional: 2-9u lispro / Basal: 18u glargine  Bowel care: bowel regimen ordered  Indwelling lines: PIV R am, PIV L hand, PIV R hand, indwelling cath, ETT 8.0, enteral tube  Deescalation of antibiotics: CTX & flagyl (end 9/29/23)      Rhoda Argueta M.D.  [facility] Resident

## 2023-09-28 NOTE — CARE PLAN
The patient is Watcher - Medium risk of patient condition declining or worsening    Shift Goals  Clinical Goals: Remain calm, safety, hemodynamic stability  Patient Goals: DEENA  Family Goals: DEENA    Progress made toward(s) clinical / shift goals:    Problem: Hemodynamics  Goal: Patient's hemodynamics, fluid balance and neurologic status will be stable or improve  Outcome: Progressing     Problem: Urinary - Renal Perfusion  Goal: Ability to achieve and maintain adequate renal perfusion and functioning will improve  Outcome: Progressing     Problem: Respiratory  Goal: Patient will achieve/maintain optimum respiratory ventilation and gas exchange  Outcome: Progressing     Problem: Mechanical Ventilation  Goal: Safe management of artificial airway and ventilation  Outcome: Progressing     Problem: Pain - Standard  Goal: Alleviation of pain or a reduction in pain to the patient’s comfort goal  Outcome: Progressing     Problem: Safety - Medical Restraint  Goal: Remains free of injury from restraints (Restraint for Interference with Medical Device)  Outcome: Progressing       Patient is not progressing towards the following goals:

## 2023-09-28 NOTE — PROGRESS NOTES
SB/SR/ST 40's - 110's with occasional PAC's     FL 0.185; QRS 0.065; Qtc 0.435

## 2023-09-29 PROBLEM — A41.9 SEVERE SEPSIS (HCC): Status: RESOLVED | Noted: 2023-09-24 | Resolved: 2023-09-29

## 2023-09-29 PROBLEM — J18.9 PNEUMONIA DUE TO INFECTIOUS ORGANISM: Status: ACTIVE | Noted: 2023-09-29

## 2023-09-29 PROBLEM — R65.20 SEVERE SEPSIS (HCC): Status: RESOLVED | Noted: 2023-09-24 | Resolved: 2023-09-29

## 2023-09-29 LAB
ALBUMIN SERPL BCP-MCNC: 3.3 G/DL (ref 3.2–4.9)
ALBUMIN/GLOB SERPL: 1.3 G/DL
ALP SERPL-CCNC: 123 U/L (ref 30–99)
ALT SERPL-CCNC: 22 U/L (ref 2–50)
ANION GAP SERPL CALC-SCNC: 10 MMOL/L (ref 7–16)
AST SERPL-CCNC: 46 U/L (ref 12–45)
BACTERIA BLD CULT: NORMAL
BACTERIA BLD CULT: NORMAL
BASOPHILS # BLD AUTO: 0.2 % (ref 0–1.8)
BASOPHILS # BLD: 0.02 K/UL (ref 0–0.12)
BILIRUB SERPL-MCNC: 0.7 MG/DL (ref 0.1–1.5)
BUN SERPL-MCNC: 11 MG/DL (ref 8–22)
CALCIUM ALBUM COR SERPL-MCNC: 9.2 MG/DL (ref 8.5–10.5)
CALCIUM SERPL-MCNC: 8.6 MG/DL (ref 8.5–10.5)
CHLORIDE SERPL-SCNC: 103 MMOL/L (ref 96–112)
CO2 SERPL-SCNC: 30 MMOL/L (ref 20–33)
CREAT SERPL-MCNC: 0.69 MG/DL (ref 0.5–1.4)
EOSINOPHIL # BLD AUTO: 0.03 K/UL (ref 0–0.51)
EOSINOPHIL NFR BLD: 0.4 % (ref 0–6.9)
ERYTHROCYTE [DISTWIDTH] IN BLOOD BY AUTOMATED COUNT: 45 FL (ref 35.9–50)
GFR SERPLBLD CREATININE-BSD FMLA CKD-EPI: 99 ML/MIN/1.73 M 2
GLOBULIN SER CALC-MCNC: 2.6 G/DL (ref 1.9–3.5)
GLUCOSE BLD STRIP.AUTO-MCNC: 154 MG/DL (ref 65–99)
GLUCOSE BLD STRIP.AUTO-MCNC: 156 MG/DL (ref 65–99)
GLUCOSE BLD STRIP.AUTO-MCNC: 171 MG/DL (ref 65–99)
GLUCOSE SERPL-MCNC: 147 MG/DL (ref 65–99)
HCT VFR BLD AUTO: 38.9 % (ref 42–52)
HGB BLD-MCNC: 13.6 G/DL (ref 14–18)
IMM GRANULOCYTES # BLD AUTO: 0.08 K/UL (ref 0–0.11)
IMM GRANULOCYTES NFR BLD AUTO: 1 % (ref 0–0.9)
LYMPHOCYTES # BLD AUTO: 1.37 K/UL (ref 1–4.8)
LYMPHOCYTES NFR BLD: 16.8 % (ref 22–41)
MAGNESIUM SERPL-MCNC: 1.8 MG/DL (ref 1.5–2.5)
MCH RBC QN AUTO: 31.3 PG (ref 27–33)
MCHC RBC AUTO-ENTMCNC: 35 G/DL (ref 32.3–36.5)
MCV RBC AUTO: 89.6 FL (ref 81.4–97.8)
MONOCYTES # BLD AUTO: 0.91 K/UL (ref 0–0.85)
MONOCYTES NFR BLD AUTO: 11.2 % (ref 0–13.4)
NEUTROPHILS # BLD AUTO: 5.74 K/UL (ref 1.82–7.42)
NEUTROPHILS NFR BLD: 70.4 % (ref 44–72)
NRBC # BLD AUTO: 0 K/UL
NRBC BLD-RTO: 0 /100 WBC (ref 0–0.2)
PHOSPHATE SERPL-MCNC: 2.3 MG/DL (ref 2.5–4.5)
PLATELET # BLD AUTO: 98 K/UL (ref 164–446)
PLATELETS.RETICULATED NFR BLD AUTO: 8.6 % (ref 0.6–13.1)
PMV BLD AUTO: 10.9 FL (ref 9–12.9)
POTASSIUM SERPL-SCNC: 3.5 MMOL/L (ref 3.6–5.5)
PROT SERPL-MCNC: 5.9 G/DL (ref 6–8.2)
RBC # BLD AUTO: 4.34 M/UL (ref 4.7–6.1)
SIGNIFICANT IND 70042: NORMAL
SIGNIFICANT IND 70042: NORMAL
SITE SITE: NORMAL
SITE SITE: NORMAL
SODIUM SERPL-SCNC: 143 MMOL/L (ref 135–145)
SOURCE SOURCE: NORMAL
SOURCE SOURCE: NORMAL
WBC # BLD AUTO: 8.2 K/UL (ref 4.8–10.8)

## 2023-09-29 PROCEDURE — 85025 COMPLETE CBC W/AUTO DIFF WBC: CPT

## 2023-09-29 PROCEDURE — 83735 ASSAY OF MAGNESIUM: CPT

## 2023-09-29 PROCEDURE — 700111 HCHG RX REV CODE 636 W/ 250 OVERRIDE (IP): Performed by: STUDENT IN AN ORGANIZED HEALTH CARE EDUCATION/TRAINING PROGRAM

## 2023-09-29 PROCEDURE — 700101 HCHG RX REV CODE 250

## 2023-09-29 PROCEDURE — 700105 HCHG RX REV CODE 258

## 2023-09-29 PROCEDURE — 99233 SBSQ HOSP IP/OBS HIGH 50: CPT | Performed by: INTERNAL MEDICINE

## 2023-09-29 PROCEDURE — 700111 HCHG RX REV CODE 636 W/ 250 OVERRIDE (IP): Performed by: INTERNAL MEDICINE

## 2023-09-29 PROCEDURE — A9270 NON-COVERED ITEM OR SERVICE: HCPCS | Mod: JZ | Performed by: STUDENT IN AN ORGANIZED HEALTH CARE EDUCATION/TRAINING PROGRAM

## 2023-09-29 PROCEDURE — 82962 GLUCOSE BLOOD TEST: CPT | Mod: 91

## 2023-09-29 PROCEDURE — 92610 EVALUATE SWALLOWING FUNCTION: CPT

## 2023-09-29 PROCEDURE — HZ2ZZZZ DETOXIFICATION SERVICES FOR SUBSTANCE ABUSE TREATMENT: ICD-10-PCS | Performed by: STUDENT IN AN ORGANIZED HEALTH CARE EDUCATION/TRAINING PROGRAM

## 2023-09-29 PROCEDURE — 700102 HCHG RX REV CODE 250 W/ 637 OVERRIDE(OP): Mod: JZ | Performed by: STUDENT IN AN ORGANIZED HEALTH CARE EDUCATION/TRAINING PROGRAM

## 2023-09-29 PROCEDURE — 700111 HCHG RX REV CODE 636 W/ 250 OVERRIDE (IP): Mod: JZ

## 2023-09-29 PROCEDURE — 85055 RETICULATED PLATELET ASSAY: CPT

## 2023-09-29 PROCEDURE — 770020 HCHG ROOM/CARE - TELE (206)

## 2023-09-29 PROCEDURE — 84100 ASSAY OF PHOSPHORUS: CPT

## 2023-09-29 PROCEDURE — 80053 COMPREHEN METABOLIC PANEL: CPT

## 2023-09-29 PROCEDURE — 99222 1ST HOSP IP/OBS MODERATE 55: CPT | Performed by: STUDENT IN AN ORGANIZED HEALTH CARE EDUCATION/TRAINING PROGRAM

## 2023-09-29 RX ORDER — CALCIUM CARBONATE 500 MG/1
500 TABLET, CHEWABLE ORAL 3 TIMES DAILY PRN
Status: DISCONTINUED | OUTPATIENT
Start: 2023-09-29 | End: 2023-09-30

## 2023-09-29 RX ORDER — LOSARTAN POTASSIUM 50 MG/1
50 TABLET ORAL DAILY
Status: DISCONTINUED | OUTPATIENT
Start: 2023-09-30 | End: 2023-09-30

## 2023-09-29 RX ORDER — MIDAZOLAM HYDROCHLORIDE 1 MG/ML
1 INJECTION INTRAMUSCULAR; INTRAVENOUS ONCE
Status: COMPLETED | OUTPATIENT
Start: 2023-09-29 | End: 2023-09-29

## 2023-09-29 RX ORDER — MEMANTINE HYDROCHLORIDE 5 MG/1
5 TABLET ORAL 2 TIMES DAILY
Status: DISCONTINUED | OUTPATIENT
Start: 2023-09-29 | End: 2023-09-30

## 2023-09-29 RX ORDER — LORAZEPAM 2 MG/ML
1.5 INJECTION INTRAMUSCULAR
Status: DISCONTINUED | OUTPATIENT
Start: 2023-09-29 | End: 2023-09-30

## 2023-09-29 RX ORDER — AMOXICILLIN 250 MG
2 CAPSULE ORAL 2 TIMES DAILY
Status: DISCONTINUED | OUTPATIENT
Start: 2023-09-29 | End: 2023-09-30

## 2023-09-29 RX ORDER — LORAZEPAM 0.5 MG/1
0.5 TABLET ORAL EVERY 4 HOURS PRN
Status: DISCONTINUED | OUTPATIENT
Start: 2023-09-29 | End: 2023-09-30

## 2023-09-29 RX ORDER — POTASSIUM CHLORIDE 20 MEQ/1
60 TABLET, EXTENDED RELEASE ORAL ONCE
Status: COMPLETED | OUTPATIENT
Start: 2023-09-29 | End: 2023-09-29

## 2023-09-29 RX ORDER — POLYETHYLENE GLYCOL 3350 17 G/17G
1 POWDER, FOR SOLUTION ORAL
Status: DISCONTINUED | OUTPATIENT
Start: 2023-09-29 | End: 2023-09-30

## 2023-09-29 RX ORDER — LORAZEPAM 2 MG/ML
1 INJECTION INTRAMUSCULAR
Status: DISCONTINUED | OUTPATIENT
Start: 2023-09-29 | End: 2023-09-30

## 2023-09-29 RX ORDER — LORAZEPAM 2 MG/1
2 TABLET ORAL
Status: DISCONTINUED | OUTPATIENT
Start: 2023-09-29 | End: 2023-09-30

## 2023-09-29 RX ORDER — LORAZEPAM 2 MG/ML
0.5 INJECTION INTRAMUSCULAR EVERY 4 HOURS PRN
Status: DISCONTINUED | OUTPATIENT
Start: 2023-09-29 | End: 2023-09-30

## 2023-09-29 RX ORDER — LORAZEPAM 1 MG/1
1 TABLET ORAL EVERY 4 HOURS PRN
Status: DISCONTINUED | OUTPATIENT
Start: 2023-09-29 | End: 2023-09-30

## 2023-09-29 RX ORDER — POTASSIUM CHLORIDE 29.8 MG/ML
40 INJECTION INTRAVENOUS ONCE
Status: DISPENSED | OUTPATIENT
Start: 2023-09-29 | End: 2023-09-30

## 2023-09-29 RX ORDER — AMLODIPINE BESYLATE 5 MG/1
5 TABLET ORAL DAILY
Status: DISCONTINUED | OUTPATIENT
Start: 2023-09-30 | End: 2023-09-30

## 2023-09-29 RX ORDER — ONDANSETRON 4 MG/1
4 TABLET, ORALLY DISINTEGRATING ORAL EVERY 4 HOURS PRN
Status: DISCONTINUED | OUTPATIENT
Start: 2023-09-29 | End: 2023-09-30

## 2023-09-29 RX ORDER — BISACODYL 10 MG
10 SUPPOSITORY, RECTAL RECTAL
Status: DISCONTINUED | OUTPATIENT
Start: 2023-09-29 | End: 2023-09-30

## 2023-09-29 RX ORDER — MAGNESIUM SULFATE HEPTAHYDRATE 40 MG/ML
2 INJECTION, SOLUTION INTRAVENOUS ONCE
Status: COMPLETED | OUTPATIENT
Start: 2023-09-29 | End: 2023-09-29

## 2023-09-29 RX ORDER — POTASSIUM CHLORIDE 20 MEQ/1
40 TABLET, EXTENDED RELEASE ORAL ONCE
Status: DISCONTINUED | OUTPATIENT
Start: 2023-09-29 | End: 2023-09-29

## 2023-09-29 RX ORDER — LORAZEPAM 2 MG/1
4 TABLET ORAL
Status: DISCONTINUED | OUTPATIENT
Start: 2023-09-29 | End: 2023-09-30

## 2023-09-29 RX ORDER — LORAZEPAM 2 MG/ML
2 INJECTION INTRAMUSCULAR
Status: DISCONTINUED | OUTPATIENT
Start: 2023-09-29 | End: 2023-09-30

## 2023-09-29 RX ADMIN — INSULIN LISPRO 2 UNITS: 100 INJECTION, SOLUTION INTRAVENOUS; SUBCUTANEOUS at 05:52

## 2023-09-29 RX ADMIN — ENOXAPARIN SODIUM 40 MG: 100 INJECTION SUBCUTANEOUS at 05:51

## 2023-09-29 RX ADMIN — MEMANTINE HYDROCHLORIDE 5 MG: 10 TABLET ORAL at 17:40

## 2023-09-29 RX ADMIN — LORAZEPAM 3 MG: 1 TABLET ORAL at 17:40

## 2023-09-29 RX ADMIN — INSULIN LISPRO 2 UNITS: 100 INJECTION, SOLUTION INTRAVENOUS; SUBCUTANEOUS at 13:01

## 2023-09-29 RX ADMIN — POTASSIUM CHLORIDE 60 MEQ: 1500 TABLET, EXTENDED RELEASE ORAL at 13:40

## 2023-09-29 RX ADMIN — ANTACID TABLETS 500 MG: 500 TABLET, CHEWABLE ORAL at 13:42

## 2023-09-29 RX ADMIN — ONDANSETRON 4 MG: 2 INJECTION INTRAMUSCULAR; INTRAVENOUS at 01:51

## 2023-09-29 RX ADMIN — METRONIDAZOLE 500 MG: 5 INJECTION, SOLUTION INTRAVENOUS at 12:45

## 2023-09-29 RX ADMIN — MIDAZOLAM HYDROCHLORIDE 0.5 MG: 1 INJECTION, SOLUTION INTRAMUSCULAR; INTRAVENOUS at 13:42

## 2023-09-29 RX ADMIN — POTASSIUM PHOSPHATE, MONOBASIC AND POTASSIUM PHOSPHATE, DIBASIC 15 MMOL: 224; 236 INJECTION, SOLUTION, CONCENTRATE INTRAVENOUS at 07:53

## 2023-09-29 RX ADMIN — LORAZEPAM 1 MG: 2 INJECTION INTRAMUSCULAR; INTRAVENOUS at 15:22

## 2023-09-29 RX ADMIN — LABETALOL HYDROCHLORIDE 10 MG: 5 INJECTION INTRAVENOUS at 09:55

## 2023-09-29 RX ADMIN — MAGNESIUM SULFATE HEPTAHYDRATE 2 G: 2 INJECTION, SOLUTION INTRAVENOUS at 07:56

## 2023-09-29 RX ADMIN — LORAZEPAM 1 MG: 1 TABLET ORAL at 20:04

## 2023-09-29 ASSESSMENT — LIFESTYLE VARIABLES
TOTAL SCORE: 16
AUDITORY DISTURBANCES: NOT PRESENT
PAROXYSMAL SWEATS: BARELY PERCEPTIBLE SWEATING. PALMS MOIST
ORIENTATION AND CLOUDING OF SENSORIUM: DISORIENTED FOR PLACE AND / OR PERSON
TREMOR: *
VISUAL DISTURBANCES: NOT PRESENT
PAROXYSMAL SWEATS: BARELY PERCEPTIBLE SWEATING. PALMS MOIST
NAUSEA AND VOMITING: MILD NAUSEA WITH NO VOMITING
VISUAL DISTURBANCES: MILD SENSITIVITY
AUDITORY DISTURBANCES: NOT PRESENT
PAROXYSMAL SWEATS: *
HEADACHE, FULLNESS IN HEAD: NOT PRESENT
HEADACHE, FULLNESS IN HEAD: MODERATE
NAUSEA AND VOMITING: NO NAUSEA AND NO VOMITING
TREMOR: *
NAUSEA AND VOMITING: *
HEADACHE, FULLNESS IN HEAD: MILD
VISUAL DISTURBANCES: NOT PRESENT
ANXIETY: MILDLY ANXIOUS
TREMOR: *
AGITATION: SOMEWHAT MORE THAN NORMAL ACTIVITY
AUDITORY DISTURBANCES: NOT PRESENT
TOTAL SCORE: 13
ORIENTATION AND CLOUDING OF SENSORIUM: DATE DISORIENTATION BY MORE THAN TWO CALENDAR DAYS
AGITATION: SOMEWHAT MORE THAN NORMAL ACTIVITY
TOTAL SCORE: 8
ANXIETY: MILDLY ANXIOUS
ANXIETY: MILDLY ANXIOUS
AGITATION: SOMEWHAT MORE THAN NORMAL ACTIVITY
ORIENTATION AND CLOUDING OF SENSORIUM: DISORIENTED FOR PLACE AND / OR PERSON

## 2023-09-29 ASSESSMENT — ENCOUNTER SYMPTOMS
MEMORY LOSS: 0
DIARRHEA: 0
DEPRESSION: 0
MYALGIAS: 0
WEIGHT LOSS: 0
VOMITING: 0
PALPITATIONS: 0
SPUTUM PRODUCTION: 0
EYE DISCHARGE: 0
EYE REDNESS: 0
ABDOMINAL PAIN: 0
NAUSEA: 1
SHORTNESS OF BREATH: 1
SHORTNESS OF BREATH: 0
FEVER: 0
HEADACHES: 0
HEMOPTYSIS: 0
DIZZINESS: 0
COUGH: 1
SORE THROAT: 0
BRUISES/BLEEDS EASILY: 0
NAUSEA: 0
COUGH: 0
CHILLS: 0
SORE THROAT: 1
WHEEZING: 0

## 2023-09-29 ASSESSMENT — FIBROSIS 4 INDEX: FIB4 SCORE: 7.01

## 2023-09-29 ASSESSMENT — PAIN DESCRIPTION - PAIN TYPE
TYPE: ACUTE PAIN

## 2023-09-29 NOTE — PROGRESS NOTES
[facility] Progress Note      Admit Date: 9/24/2023    Resident(s): Rhoda Argueta M.D.   Attending:  KOLBY GONSALES/ Dr. Greenwood    Patient ID:    Name:  Sharon Guardado   YOB: 1955  Age:  68 y.o.  male   MRN:  [id number]    Hospital Course (carried forward and updated):  Sharon Guardado is a 68 y.o. male with notable PMH of HTN, HLD, GERD, BPH, gout, morbid obesity (BMI 40), alcohol use disorder, nephrolithiasis, hx of SAH (3/2023), and T2DM who presented 9/24/23 with altered mental status (A&Ox1) with a mechanical fall. Per wife, patient was walking up the stairs on 9/24/23, stumbled, and hit the right side of his body on the wall.     In the ED, patient was febrile (103.4), WBC 16, lactate 4.6, U/a with WBC, LE, nitrite, RBCs. CT head with small frontal SAH. CT renal with hepatic biliary air, no nephrolithiasis.    9/25/23: Febrile overnight, WBC improving 16->10; APVCMV 20/470/40%/8  9/26/23: CTA head and neck with improvement of SAH, failed SBT overnight  9/27/23: transitioned off of propofol, unable to obtain sufficient parameters during SBT trials  9/28/23: successfully extubated, TF discontinued, CXR with improving opacity of LLL.     Consultants:  Critical Care    Interval Events:  9/28/23:   - Neuro: alert, oriented x4; patient reports feeling better this morning  - CV: Off of pressors, now HTNsive, reinitiate home anti-HTN  - Resp: Extubated yesterday, now breathing on 3L via NC  - GI/Nut: speech therapy consult to determine diet  - Renal/IVF/lytes: Renal fcn stable  - ID: afebrile, WBC WNL; Last doses of CTX and flagyl given this morning  - Heme/onc: H&H stable  - Endo: Blood glucose goal 140-180; at goal  - medically stable for downgrade out of the ICU today      Vitals Range last 24h:  Temp:  [36.3 °C (97.4 °F)-36.9 °C (98.4 °F)] 36.9 °C (98.4 °F)  Pulse:  [] 86  Resp:  [18-39] 20  BP: (95193)/() 171/77  SpO2:  [92 %-98 %] 94 %    Review of Systems    Constitutional:  Negative for chills and fever.   HENT:  Positive for sore throat. Negative for hearing loss.    Respiratory:  Positive for cough and shortness of breath. Negative for hemoptysis, sputum production and wheezing.    Cardiovascular:  Positive for leg swelling. Negative for chest pain and palpitations.   Gastrointestinal:  Negative for abdominal pain, diarrhea, nausea and vomiting.   Genitourinary:  Negative for dysuria.   Musculoskeletal:  Negative for myalgias.   Psychiatric/Behavioral:  Negative for depression.      PHYSICAL EXAM:  Vitals:    09/29/23 0300 09/29/23 0400 09/29/23 0500 09/29/23 0600   BP: (!) 168/79 (!) 171/85 (!) 155/72 (!) 171/77   Pulse: 80 81 82 86   Resp:       Temp:    36.9 °C (98.4 °F)   TempSrc:    Temporal   SpO2: 97% 98% 95% 94%   Weight:       Height:        Body mass index is 44.87 kg/m².    O2 therapy: Pulse Oximetry: 94 %, O2 (LPM): 1, O2 Delivery Device: Silicone Nasal Cannula          Intake/Output Summary (Last 24 hours) at 9/29/2023 0857  Last data filed at 9/29/2023 0600  Gross per 24 hour   Intake 476.87 ml   Output 6480 ml   Net -6003.13 ml      Net IO Since Admission: 3,942.51 mL [09/25/23 0718]       Physical Exam  Vitals and nursing note reviewed.   Constitutional:       General: He is not in acute distress.     Appearance: He is obese.      Comments: Pleasant, sitting up in bed   HENT:      Head: Normocephalic and atraumatic.      Nose: Nose normal.   Eyes:      General: No scleral icterus.     Conjunctiva/sclera: Conjunctivae normal.   Cardiovascular:      Rate and Rhythm: Normal rate and regular rhythm.      Pulses: Normal pulses.      Heart sounds: Normal heart sounds.   Pulmonary:      Effort: Pulmonary effort is normal. No respiratory distress.      Breath sounds: Normal breath sounds. No wheezing or rales.   Abdominal:      General: There is distension.      Palpations: Abdomen is soft.   Musculoskeletal:         General: Swelling present.   Skin:      General: Skin is warm and dry.   Neurological:      General: No focal deficit present.      Mental Status: He is alert and oriented to person, place, and time.      Motor: No weakness.   Psychiatric:         Mood and Affect: Mood normal.         Behavior: Behavior normal.         Recent Labs     09/27/23 0317   ISTATAPH 7.426   ISTATAPCO2 36.0   ISTATAPO2 64   ISTATATCO2 25   [id number] 93   ISTATARTHCO3 23.7   ISTATARTBE 0   ISTATTEMP 37.9 C   ISTATFIO2 40   ISTATSPEC Arterial   ISTATAPHTC 7.413   [id number] 68     Recent Labs     09/27/23 0300 09/28/23 0314 09/29/23 0310   SODIUM 138 138 143   POTASSIUM 3.7 3.5* 3.5*   CHLORIDE 105 103 103   CO2 25 26 30   BUN 8 12 11   CREATININE 0.83 0.89 0.69   MAGNESIUM 1.7 1.8 1.8   PHOSPHORUS 1.6* 2.1* 2.3*   CALCIUM 7.9* 8.5 8.6     Recent Labs     09/27/23 0300 09/28/23 0314 09/29/23 0310   ALTSGPT 21 21 22   ASTSGOT 33 35 46*   ALKPHOSPHAT 96 107* 123*   TBILIRUBIN 0.7 0.7 0.7   GLUCOSE 156* 233* 147*     Recent Labs     09/27/23 0300 09/28/23 0314 09/29/23 0310   RBC 4.17* 4.27* 4.34*   HEMOGLOBIN 13.0* 13.3* 13.6*   HEMATOCRIT 37.9* 38.8* 38.9*   PLATELETCT 85* 87* 98*     Recent Labs     09/27/23 0300 09/28/23 0314 09/29/23 0310   WBC 8.8 7.2 8.2   NEUTSPOLYS 73.60* 65.90 70.40   LYMPHOCYTES 16.00* 19.60* 16.80*   MONOCYTES 7.70 10.80 11.20   EOSINOPHILS 1.60 2.30 0.40   BASOPHILS 0.20 0.40 0.20   ASTSGOT 33 35 46*   ALTSGPT 21 21 22   ALKPHOSPHAT 96 107* 123*   TBILIRUBIN 0.7 0.7 0.7       Meds:   potassium phosphate 15 mmol in dextrose 5% 250 mL ivpb  15 mmol 15 mmol (09/29/23 0753)    magnesium sulfate  2 g 2 g (09/29/23 0756)    potassium chloride in water  40 mEq      insulin GLARGINE  18 Units      Pharmacy        ondansetron  4 mg      ondansetron  4 mg      labetalol  10 mg      hydrALAZINE  20 mg      enoxaparin (LOVENOX) injection  40 mg      Respiratory Therapy Consult        MD Alert...Adult ICU Electrolyte Replacement per Pharmacy         ipratropium-albuterol  3 mL      senna-docusate  2 Tablet      And    polyethylene glycol/lytes  1 Packet      And    magnesium hydroxide  30 mL      And    bisacodyl  10 mg      insulin lispro  2-9 Units      acetaminophen  650 mg      LR  1,000 mL      metroNIDAZOLE (FLAGYL) IV  500 mg Stopped (09/29/23 0042)    dextrose bolus  25 g          Procedures:  - Intubation (9/25/23)  - Bronch + BAL (9/25/23)    Imaging:  DX-CHEST-PORTABLE (1 VIEW)   Final Result      1.  Increasing airspace disease at the left base.      DX-CHEST-LIMITED (1 VIEW)   Final Result         1.  Pulmonary edema and/or infiltrates are identified, which are somewhat decreased since the prior exam.   2.  Cardiomegaly   3.  Atherosclerosis      DX-CHEST-PORTABLE (1 VIEW)   Final Result         1.  Pulmonary edema and/or infiltrates are identified, which appear somewhat increased since the prior exam.   2.  Cardiomegaly   3.  Atherosclerosis      DX-CHEST-PORTABLE (1 VIEW)   Final Result         1.  Interstitial edema and/or infiltrates, slightly decreased since prior study.   2.  Cardiomegaly   3.  Atherosclerosis      DX-CHEST-LIMITED (1 VIEW)   Final Result      Right IJ catheter tip projects in the SVC. No pneumothorax.      CT-CTA NECK WITH & W/O-POST PROCESSING   Final Result      1.  Estimated 50-75% stenosis of the BILATERAL carotid arteries   2.  Estimated greater than 50% stenosis at the vertebral artery ostium on each side      CT-CTA HEAD WITH & W/O-POST PROCESS   Final Result      1.  No large vessel occlusion or aneurysm.   2.  Hazy left frontal lobe subarachnoid hemorrhage, decreased in conspicuity from prior. No new hemorrhage seen.   3.  Scattered opacifications of the ethmoid sinuses, likely related to support hardware.      DX-ABDOMEN FOR TUBE PLACEMENT   Final Result         1.  Nonspecific bowel gas pattern in the upper abdomen.   2.  Dobbhoff tube with tip terminating overlying the expected location of the first or second  duodenal segment.      DX-CHEST-PORTABLE (1 VIEW)   Final Result         1.  Interstitial edema and/or infiltrates.   2.  Cardiomegaly   3.  Nasogastric tube terminates in the distal esophagus, recommend advancement   4.  Atherosclerosis      DX-ABDOMEN FOR TUBE PLACEMENT   Final Result         1.  Nonspecific bowel gas pattern in the upper abdomen.   2.  Nasogastric tube terminates just distal to the gastroesophageal junction, recommend advancement      CT-RENAL COLIC EVALUATION(A/P W/O)   Final Result         1.  Intrahepatic biliary air and nondependent air within the gallbladder, consider history of instrumentation or changes of cholangitis in the appropriate clinical setting.   2.  Large fat-containing bilateral inguinal hernias   3.  Enlarged prostate, consider causes of prostate enlargement with additional workup as clinically appropriate   4.  Diverticulosis   5.  Cholelithiasis   6.  Atherosclerosis      CT-HEAD W/O   Final Result         1.  Hazy left frontal subarachnoid hemorrhage.   2.  Nonspecific white matter changes, commonly associated with small vessel ischemic disease.  Associated mild cerebral atrophy is noted.      Based solely on CT findings, the brain injury guideline category is mBIG 1.      SDH < 4mm   IPH < 4mm   SAH < 3 sulci and < 1mm      The original BIG retrospective analysis found radiographic progression in 0% of BIG 1 patients and 2.6% BIG 2.      These findings were discussed with the patient's clinician, Madhu Zambrano, on 9/24/2023 10:10 PM.      DX-CHEST-PORTABLE (1 VIEW)   Final Result      No acute cardiopulmonary abnormality identified.          ASSESSEMENT and PLAN:    * Severe sepsis (HCC)- (present on admission)  Assessment & Plan  This is Sepsis Present on admission  SIRS criteria identified on my evaluation include: Fever, with temperature greater than 100.9 deg F, Tachycardia, with heart rate greater than 90 BPM and Leukocytosis, with WBC greater than 12,000  Clinical  indicators of end organ dysfunction include Toxic Metabolic Encephalopathy  Source is GI vs .   Sepsis protocol initiated  Crystalloid Fluid Administration: Fluid resuscitation ordered per standard protocol - 30 mL/kg per current or ideal body weight  IV antibiotics as appropriate for source of sepsis, CTX, Flagyl  Reassessment: I have reassessed the patient's hemodynamic status     9/24/23: CT abdomen shows signs of hepatobiliary air.  9/25/23: Leukocytosis (16->10) & tachycardia resolved  9/26/23: afebrile, WBC 10->19->15  9/27/23: Tmax 100.9, leukocytosis normalized  9/28/23: afebrile  9/29/23: extubated yesterday; afebrile, off of all pressors/sedatives     - Ceftriaxone, flagyl (end date 9/29/23)  - F/u final blood, urine cultures -> NGTD     Acute respiratory failure with hypoxia (HCC)  Assessment & Plan  Emergent intubation 9/25/23 due to unprotected airway and desaturation  9/25/23: 7.330/42/60  APVCMV 20/470/40%/8  9/26/23: 22/470/40%/8  9/27/23: failed SBT, very agitated when propofol was weaned  9/28/23: unable to tolerate SAT/SBT this morning --> extubated   CXR with improving opacity of LLL  9/29/23: 3L via NC, sore throat otherwise doing well     - continue supplemental o2 as needed to goal sat low 90s; wean as tolerated     Pneumobilia- (present on admission)  Assessment & Plan  As seen on CT renal (9/25/23).  9/26/23: No need for further interventions at this time per gen surg     SAH (subarachnoid hemorrhage) (HCC)  Assessment & Plan  Small traumatic subarachnoid hemorrhage s/p mechanical fall  Not c/w aneurysm  CTA head and neck with improvement of SAH  - No NSAIDs  - Okay for DVT prophylaxis given imaging documentation of SAH improvement     Lactic acidosis  Assessment & Plan  4.6 at presentation  Secondary to GI vs  infection  9/26/23: normalized     Thrombocytopenia (HCC)  Assessment & Plan  Baseline ~110-125  Possibly secondary to alcohol use disorder although normal H&H and LFTs  9/25/23:  86  9/26/23: 84  9/27/23: 85    - stable, continue to trend and monitor for s/s of active bleeding     Type 2 diabetes mellitus (HCC)- (present on admission)  Assessment & Plan  A1c 5.7% (6/2023)  Home meds: metformin, pioglitazone  9/26/23: 15u glargine  9/28/23: 18 glargine  - hold home meds for now  - basal and SSI to glucose goal 140-180; titrate as clinically indicated      Essential hypertension- (present on admission)  Assessment & Plan  Hypertensive at presentation despite sepsis  Outpatient meds: amlodipine 5mg, losartan 40; held in the setting of hypotension/septic shock. Hypotension now resolved, slowly resdtart home anti-HTN meds as tolerated  - Goal -140, MAP >65     Alcohol dependence (HCC)- (present on admission)  Assessment & Plan  S/p Multivitamin, folate, thiamine  9/29/23: now extubated. Does not appear to be in alcohol withdrawal or has now passed the withdrawal window.      Morbid obesity, BMI 40.0-44.9, adult ([facility])  Assessment & Plan  Noted  High risk for decompensation  Weight loss counseling not appropriate in the critical illness setting      DISPO: downgrade out of ICU    CODE STATUS: FULL CODE    Quality Measures:  Feeding: speech to determine diet  Analgesia: n/a  Sedation: n/a  Thromboprophylaxis: lovenox  Head of bed: >30 degrees  Ulcer prophylaxis: n/a  Glycemic control: Correctional: 2-9u lispro / Basal: 18u glargine  Bowel care: bowel regimen ordered  Indwelling lines: PIV x3, central line, indwelling cath  Deescalation of antibiotics: CTX & flagyl (end 9/29/23)      Rhoda Argueta M.D.  [facility] Resident

## 2023-09-29 NOTE — PROGRESS NOTES
Patient arrived to T821 from [facility].  Patient placed on tele box, 2L NC.  Patient is alert and oriented to self and place, disoriented to situation and time.  Pt is c/o nausea.  Wife is bedside.  Personal belongings and meds sent with patient from ICU.  Discussed with patient and wife will review POC and orders with MD as soon as he is available.

## 2023-09-29 NOTE — DIETARY
Nutrition Services: Update   Day 5 of admit.  Sharon Guardado is a 68 y.o. male with admitting DX of Severe sepsis (HCC) [A41.9, R65.20].    Pt briefly on TF 9/27-9/28. Pt was extubated 9/28. Enteral tube removed and TF orders D/c'd. SLP evaluation pending for PO diet.    RD will monitor.

## 2023-09-29 NOTE — THERAPY
Speech Language Pathology   Clinical Swallow Evaluation     Patient Name: Sharon Guardado  AGE:  68 y.o., SEX:  male  Medical Record #: [id number]  Date of Service: 2023      History of Present Illness  68 y.o. male admitted 2023 with septic shock and found a small frontal subarachnoid hemorrhage and pneumobilia. Intubated 23 - 2023.    PMHx LE, nephrolithiasis, HTN, DM type II    Last seen by service in 2018; rec RG/TN diet    CMHx Acute respiratory failure with hypoxia, Pneumobilia, SAH (sub acute ~3mos ago), PNA d/t infectious organism, alcohol dependence    CXR 23  Increasing airspace disease at the left base.      General Information:  Vitals  O2 (LPM): 2  O2 Delivery Device: Silicone Nasal Cannula  Level of Consciousness: Alert, Awake  Patient Behaviors: Confused, Flat Affect  Orientation: Self, , General place, Current year  Follows Directives: Yes      Prior Living Situation & Level of Function:  Housing / Facility: [address]  Lives with - Patient's Self Care Capacity: Spouse  Communication: WFL  Swallowing: Pt denies any h/o dysphagia       Oral Mechanism Evaluation:  Dentition: Good   Facial Symmetry: Equal  Facial Sensation: Equal     Labial Observations: WFL   Lingual Observations: Midline  Motor Speech: WFL         Laryngeal Function:  Secretion Management: Adequate  Voice Quality: WFL  Cough: Perceptually WNL       Subjective  Patient cleared by RN for evaluation. Pt received awake, pleasantly confused, tangential.      Assessment  Current Method of Nutrition: NPO until cleared by speech pathology  Positioning: [place]'s (60-90 degrees)  Bolus Administration: Patient  O2 (LPM): 2 O2 Delivery Device: Silicone Nasal Cannula  Factor(s) Affecting Performance: Impaired endurance  Tracheostomy : No          Swallowing Trials:  Swallowing Trials  Ice: WFL  Thin Liquid (TN0): WFL  Liquidised (LQ3): WFL  Soft & Bite Sized (SB6): WFL  Regular (RG7):  "WFL      Comments: No overt s/sx of aspiration across PO consistencies. No cough, no clear, no change in respiratory status, no reported globus sensation. Pt demonstrated impulsive behavior with self-administration of thin liquid trials. No s/sx of aspiration with large, sequential sips. Requires direct A to slow rate of intake. Functional oral clearance with liquid wash x1 following PO trials of regular solids x5.         Clinical Impressions  Pt demonstrated functional swallow. Recommend thin liquids and soft and bite-sized solids, per pt preference. Recommend close supervision to manage impulsivity and changes in mental status. If pt withdrawal s/sx worsen, mental status declines, or s/sx of aspiration occur, downgrade to NPO and contact SLP. RN & MD aware. Speech will follow to monitor for changes and ensure diet tolerance.         Recommendations  Diet Consistency: Soft/bite sized solids (SB6), thin liquids (TN0)  Instrumentation: Instrumental swallow study pending clinical progress (with any ongoing concerns)  Medication: As tolerated  Supervision: Assist with meal tray set up, Distant supervision - check on patient 2-3 times per meal  Positioning: Fully upright and midline during oral intake  Risk Management : Slow rate of intake, Small bites/sips, Physical mobility, as tolerated  Oral Care: BID         SLP Treatment Plan  Treatment Plan: Dysphagia Treatment, Patient/Family/Caregiver Training  SLP Frequency: 3x Per Week         Anticipated Discharge Needs  Discharge Recommendations: Other (TBD pending clinical progress)   Therapy Recommendations Upon DC: Dysphagia Training, Patient / Family / Caregiver Education        Patient / Family Goals  Patient / Family Goal #1: \"ham and eggs\"  Short Term Goals  Short Term Goal # 1: Patient will consume SB6/TN0 with no overt s/sx of aspiration      Luisa Moreno MS,CCC-SLP    "

## 2023-09-29 NOTE — PROGRESS NOTES
4 Eyes Skin Assessment Completed by KOFI Lynn and KOFI Mahmood.    Head WDL  Ears Redness and Blanching  Nose WDL  Mouth WDL  Neck Incision CVC site  Breast/Chest WDL  Shoulder Blades WDL  Spine WDL  (R) Arm/Elbow/Hand Abrasion  (L) Arm/Elbow/Hand Redness and Blanching  Abdomen WDL  Groin WDL  Scrotum/Coccyx/Buttocks WDL  (R) Leg Edema  (L) Leg Edema  (R) Heel/Foot/Toe WDL  (L) Heel/Foot/Toe WDL          Devices In Places Tele Box, Pulse Ox, and Nasal Cannula      Interventions In Place Gray Ear Foams and Pillows    Possible Skin Injury No    Pictures Uploaded Into Epic N/A  Wound Consult Placed N/A  RN Wound Prevention Protocol Ordered No

## 2023-09-29 NOTE — CARE PLAN
The patient is Watcher - Medium risk of patient condition declining or worsening    Shift Goals  Clinical Goals: Safety, oxygenation, hemodynamic stability  Patient Goals: get better  Family Goals: DEENA    Progress made toward(s) clinical / shift goals:    Problem: Hemodynamics  Goal: Patient's hemodynamics, fluid balance and neurologic status will be stable or improve  9/29/2023 0422 by Ean Alegria R.N.  Outcome: Progressing  9/29/2023 0421 by Ena Alegria R.N.  Outcome: Progressing     Problem: Respiratory  Goal: Patient will achieve/maintain optimum respiratory ventilation and gas exchange  9/29/2023 0422 by Ena Alegria R.N.  Outcome: Progressing  9/29/2023 0421 by Ena Alegria R.N.  Outcome: Progressing     Problem: Knowledge Deficit - Standard  Goal: Patient and family/care givers will demonstrate understanding of plan of care, disease process/condition, diagnostic tests and medications  9/29/2023 0422 by Ena Alegria R.N.  Outcome: Progressing  9/29/2023 0421 by Ena lAegria R.N.  Outcome: Progressing     Problem: Fall Risk  Goal: Patient will remain free from falls  Outcome: Progressing     Problem: Pain - Standard  Goal: Alleviation of pain or a reduction in pain to the patient’s comfort goal  9/29/2023 0422 by Ena Alegria R.N.  Outcome: Progressing  9/29/2023 0421 by Ena Alegria R.N.  Outcome: Progressing       Patient is not progressing towards the following goals:

## 2023-09-29 NOTE — PROGRESS NOTES
"Pulmonary/Critical Care Progress Note    Date of admission  9/24/2023    Chief Complaint  68 y.o. male admitted 9/24/2023 with septic shock and found a small frontal subarachnoid hemorrhage and pneumobilia. Required intubation/mechanical ventilation.    Hospital Course  Per Dr Peña note from 9/24: \"68 y.o. male patient of LE, nephrolithiasis, hypertension who presented 9/24/2023 with altered mental status with a mechanical fall and was subsequently [facility] emergency department.  During his evaluation be febrile tachycardic used.  CT head revealed small frontal subarachnoid hemorrhage, lactate 4.6,Leukocytosis with purulent urinalysis with many RBC's.  Given his history of nephrolithiasis a CT abdomen and pelvis was performed to exclude infected stone. Nephrolithiasis was not noted.  However there was hepatic biliary air.\"    9/25: surgery was consulted Dr Hirsch. Continue resuscitation with IVF, antibiotics, If worsens, consider jamila tube. Patient was intubated acutely overnight.    9/26: requiring high levels of sedatives. Had to start a second vasopressor. Failed SAT and SBT due to agitation. Vasopressors are weaning down. Will start dvt chemical prophylaxis tonight.    9/27: changed propofol to precedex, hopes to move towards extubation. Consider tube feeds if  not extubated    9/28: he is now following commands, maximizing status for extubation.        Interval Problem Update  Reviewed last 24 hour events: No events overnight   neuro: alert and oriented  Pulm: on 2 lt NC. He did had a LLL pna on CXR but cultures were negative, s/p antibiotics for 5 days.  Cards: SR. No ectopy.  He is off vasopressors   Nephro: appropriate/normal GFR at 92. Continue thiamine and IVF.   ID: leukocytosis has resolved. S/p sepsis and pneumobilia, UA possible UTI, continues with flagyl and ceftriaxone to complete 5 days  Hem: H&H stable at 13/38, thrombocytopenia 157-->115-->86-->85-->87 suspected inflammatory " block given sepsis and pneumobilia. Stabilizing   GI: Pepcid prophylaxis, speech therapy consult.  : Appropriate urine output, continue monitoring  Endo: keep goal of glucose 140-180     Review of Systems  Review of Systems   Constitutional:  Negative for chills, fever and weight loss.   HENT:  Negative for congestion, nosebleeds and sore throat.    Eyes:  Negative for discharge and redness.   Respiratory:  Negative for cough, shortness of breath and wheezing.    Cardiovascular:  Negative for chest pain, palpitations and leg swelling.   Gastrointestinal:  Positive for nausea. Negative for vomiting.   Genitourinary:  Negative for dysuria.   Musculoskeletal:  Negative for myalgias.   Skin:  Negative for rash.   Neurological:  Negative for dizziness and headaches.   Endo/Heme/Allergies:  Does not bruise/bleed easily.   Psychiatric/Behavioral:  Negative for memory loss.    All other systems reviewed and are negative.       Vital Signs for last 24 hours   Temp:  [36.3 °C (97.4 °F)-36.9 °C (98.4 °F)] 36.9 °C (98.4 °F)  Pulse:  [] 86  Resp:  [20-39] 20  BP: ()/() 171/77  SpO2:  [92 %-98 %] 94 %    Hemodynamic parameters for last 24 hours       Respiratory Information for the last 24 hours  Vent Mode: Spont  PEEP/CPAP: 5  P Support: 5    Physical Exam   Physical Exam  Vitals and nursing note reviewed.   Constitutional:       General: He is not in acute distress.     Appearance: He is not ill-appearing or toxic-appearing.   HENT:      Head: Normocephalic and atraumatic.      Nose: Nose normal.      Mouth/Throat:      Comments: ETT in place  Eyes:      Extraocular Movements: Extraocular movements intact.   Cardiovascular:      Rate and Rhythm: Normal rate and regular rhythm.      Pulses: Normal pulses.      Heart sounds: No murmur heard.     No friction rub. No gallop.   Pulmonary:      Effort: Pulmonary effort is normal. No respiratory distress.      Breath sounds: No wheezing.   Abdominal:      General:  There is no distension.      Tenderness: There is no abdominal tenderness. There is no guarding.   Musculoskeletal:         General: Normal range of motion.      Cervical back: Normal range of motion. No rigidity.      Right lower leg: No edema.      Left lower leg: No edema.   Skin:     General: Skin is warm.      Capillary Refill: Capillary refill takes less than 2 seconds.      Findings: No rash.   Neurological:      General: No focal deficit present.      Mental Status: He is oriented to person, place, and time.   Psychiatric:         Mood and Affect: Mood normal.         Medications  Current Facility-Administered Medications   Medication Dose Route Frequency Provider Last Rate Last Admin    potassium phosphate 15 mmol in dextrose 5% 250 mL ivpb  15 mmol Intravenous Once Rhoda Argueta M.D. 83.3 mL/hr at 09/29/23 0753 15 mmol at 09/29/23 0753    magnesium sulfate IVPB premix 2 g  2 g Intravenous Once Rhoda Argueta M.D. 25 mL/hr at 09/29/23 0756 2 g at 09/29/23 0756    potassium chloride in water (Kcl) ivpb (ICU ONLY) 40 mEq  40 mEq Intravenous Once Rhoda Argueta M.D.        insulin GLARGINE (Lantus,Semglee) injection  18 Units Subcutaneous Q EVENING Rhoda Argueta M.D.   18 Units at 09/28/23 1712    Pharmacy Consult: Enteral tube insertion - review meds/change route/product selection   Other PHARMACY TO DOSE Med Palma Jr., D.O.        ondansetron (Zofran ODT) dispertab 4 mg  4 mg Enteral Tube Q4HRS PRN Rhoda Argueta M.D.        ondansetron (Zofran) syringe/vial injection 4 mg  4 mg Intravenous Q4HRS PRN Rhoda Argueta M.D.   4 mg at 09/29/23 0151    labetalol (Normodyne/Trandate) injection 10 mg  10 mg Intravenous Q6HRS PRN Gilberto Levine M.D.   10 mg at 09/28/23 2209    hydrALAZINE (Apresoline) injection 20 mg  20 mg Intravenous Q6HRS PRN Gilberto Levine M.D.   20 mg at 09/27/23 0559    enoxaparin (Lovenox) inj 40 mg  40 mg Subcutaneous Q12HRS  Rhoda Argueta M.D.   40 mg at 09/29/23 0551    Respiratory Therapy Consult   Nebulization Continuous RT Florian Cole M.D.        MD Alert...ICU Electrolyte Replacement per Pharmacy   Other PHARMACY TO DOSE Florian Cole M.D.        ipratropium-albuterol (DUONEB) nebulizer solution  3 mL Nebulization Q2HRS PRN (RT) Florian Cole M.D.        senna-docusate (Pericolace Or Senokot S) 8.6-50 MG per tablet 2 Tablet  2 Tablet Enteral Tube BID Gilberto Levine M.D.   2 Tablet at 09/28/23 0516    And    polyethylene glycol/lytes (Miralax) PACKET 1 Packet  1 Packet Enteral Tube QDAY PRN Gilberto Levine M.D.   1 Packet at 09/28/23 0807    And    magnesium hydroxide (Milk Of Magnesia) suspension 30 mL  30 mL Enteral Tube QDAY PRN Gilberto Levine M.D.        And    bisacodyl (Dulcolax) suppository 10 mg  10 mg Rectal QDAY PRN Gilberto Levine M.D.        insulin lispro (HumaLOG,AdmeLOG) injection  2-9 Units Subcutaneous Q6HRS Rhoda Argueta M.D.   2 Units at 09/29/23 0552    acetaminophen (Tylenol) tablet 650 mg  650 mg Oral Q4HRS PRN Gilberto Levine M.D.   650 mg at 09/28/23 0807    lactated ringers infusion (BOLUS)  1,000 mL Intravenous Once PRN Med Peña M.D.        metroNIDAZOLE (Flagyl) IVPB 500 mg  500 mg Intravenous Q12HR Med Peña M.D.   Stopped at 09/29/23 0042    dextrose 50% (D50W) injection 25 g  25 g Intravenous Q15 MIN PRN Med Peña M.D.           Fluids    Intake/Output Summary (Last 24 hours) at 9/29/2023 0935  Last data filed at 9/29/2023 0600  Gross per 24 hour   Intake 394.87 ml   Output 6480 ml   Net -6085.13 ml       Laboratory  Recent Labs     09/27/23  0317   ISTATAPH 7.426   ISTATAPCO2 36.0   ISTATAPO2 64   ISTATATCO2 25   RUJPZMJ5ICX 93   ISTATARTHCO3 23.7   ISTATARTBE 0   ISTATTEMP 37.9 C   ISTATFIO2 40   ISTATSPEC Arterial   ISTATAPHTC 7.413   [id number] 68         Recent Labs     09/27/23  0300 09/28/23  0314 09/29/23  0310   SODIUM 138 138 143   POTASSIUM  3.7 3.5* 3.5*   CHLORIDE 105 103 103   CO2 25 26 30   BUN 8 12 11   CREATININE 0.83 0.89 0.69   MAGNESIUM 1.7 1.8 1.8   PHOSPHORUS 1.6* 2.1* 2.3*   CALCIUM 7.9* 8.5 8.6     Recent Labs     09/27/23  0300 09/28/23  0314 09/29/23  0310   ALTSGPT 21 21 22   ASTSGOT 33 35 46*   ALKPHOSPHAT 96 107* 123*   TBILIRUBIN 0.7 0.7 0.7   GLUCOSE 156* 233* 147*     Recent Labs     09/27/23  0300 09/28/23  0314 09/29/23 0310   WBC 8.8 7.2 8.2   NEUTSPOLYS 73.60* 65.90 70.40   LYMPHOCYTES 16.00* 19.60* 16.80*   MONOCYTES 7.70 10.80 11.20   EOSINOPHILS 1.60 2.30 0.40   BASOPHILS 0.20 0.40 0.20   ASTSGOT 33 35 46*   ALTSGPT 21 21 22   ALKPHOSPHAT 96 107* 123*   TBILIRUBIN 0.7 0.7 0.7     Recent Labs     09/27/23  0300 09/28/23 0314 09/29/23  0310   RBC 4.17* 4.27* 4.34*   HEMOGLOBIN 13.0* 13.3* 13.6*   HEMATOCRIT 37.9* 38.8* 38.9*   PLATELETCT 85* 87* 98*       Imaging  X-Ray:  I have personally reviewed the images and compared with prior images.    CT head and neck: decrease in size of SAH.    Assessment/Plan  Acute respiratory failure with hypoxia (HCC)- (present on admission)  Assessment & Plan  Patient was intubated from the night of 9/24 towards 9/25 and started on mechanical ventilation.  Extubation on 9/28  On NC 2 lt   Resolving     Pneumobilia- (present on admission)  Assessment & Plan  Consulted Surgery Dr Hirsch, already signed off  S/p resuscitation with IVF,  S/p vasopressor support  Conmpleted antibiotics: ceftriaxone and flagyl.    SAH (subarachnoid hemorrhage) (HCC)- (present on admission)  Assessment & Plan  Small traumatic subarachnoid hemorrhage.  Not c/w aneurysm  Serial neurologic exams  No NSAIDs  Neurology consulted, no immediate needs  CT head and neck showed decrease in the SAH.  EEG spot was negative, neuro intact no signs of seizures    Pneumonia due to infectious organism- (present on admission)  Assessment & Plan  Evidenced by CXR on LLL  Patient treated with ceftriaxone/flagyl for 5 days  S/p extubation  on 9/28  Cultures were negative    Thrombocytopenia (HCC)- (present on admission)  Assessment & Plan  Follow up platelets daily  Suspected inflammatory block.    Type 2 diabetes mellitus (HCC)- (present on admission)  Assessment & Plan  Moderate glycemic control with Insulin therapy.  Hold Metformin    BMI 40.0-44.9, adult (HCC)- (present on admission)  Assessment & Plan  BMI 40.65  Class III obesity  Dietary consult; Weight loss counseling not appropriate in acute care setting  This place him on high risk for morbidity     Essential hypertension- (present on admission)  Assessment & Plan  Monitor BP, if elevated will re start home meds  hold amlodipine and Hold Losartan for now.      Alcohol dependence (HCC)- (present on admission)  Assessment & Plan  Monitor and treat alcohol withdrawal as clinically indicated.         Dispo: will transfer to telemetry today.  VTE:  Lovenox  Ulcer: H2 Antagonist  Lines: Central Line  Ongoing indication addressed and Joseph Catheter  Ongoing indication addressed    I have performed a physical exam and reviewed and updated ROS and Plan today (9/29/2023). In review of yesterday's note (9/28/2023), there are no changes except as documented above.     Discussed patient condition and risk of morbidity and/or mortality with Family, RN, RT, Therapies, Pharmacy, and Charge nurse / hot rounds  The patient remains critically ill.  Critical care time = 35 minutes in directly providing and coordinating critical care and extensive data review, actively weaning oxygen and evaluating for need of vasopressors.  No time overlap and excludes procedures.    Lizandro Greenwood MD FACP FCCP  Pulmonary/Critical Care

## 2023-09-29 NOTE — PROGRESS NOTES
Bedside report received from KOFI Deleon. Assumed patient care. No signs of distress at this time. Tele monitor on, rhythm and monitor summary verified. Safety precautions in place. Call light and personal belongings within reach. Educated patient to use call light if assistance is needed.

## 2023-09-30 ENCOUNTER — APPOINTMENT (OUTPATIENT)
Dept: RADIOLOGY | Facility: MEDICAL CENTER | Age: 68
DRG: 870 | End: 2023-09-30
Attending: INTERNAL MEDICINE
Payer: MEDICARE

## 2023-09-30 PROBLEM — I63.9 STROKE (HCC): Status: ACTIVE | Noted: 2023-09-30

## 2023-09-30 PROBLEM — I61.9 INTRAPARENCHYMAL HEMORRHAGE OF BRAIN (HCC): Status: ACTIVE | Noted: 2023-09-30

## 2023-09-30 LAB
ALBUMIN SERPL BCP-MCNC: 3.1 G/DL (ref 3.2–4.9)
ALBUMIN SERPL BCP-MCNC: 3.1 G/DL (ref 3.2–4.9)
ALBUMIN/GLOB SERPL: 1 G/DL
ALBUMIN/GLOB SERPL: 1.1 G/DL
ALP SERPL-CCNC: 121 U/L (ref 30–99)
ALP SERPL-CCNC: 129 U/L (ref 30–99)
ALT SERPL-CCNC: 26 U/L (ref 2–50)
ALT SERPL-CCNC: 27 U/L (ref 2–50)
AMMONIA PLAS-SCNC: 44 UMOL/L (ref 11–45)
ANION GAP SERPL CALC-SCNC: 10 MMOL/L (ref 7–16)
ANION GAP SERPL CALC-SCNC: 11 MMOL/L (ref 7–16)
ANION GAP SERPL CALC-SCNC: 11 MMOL/L (ref 7–16)
ANION GAP SERPL CALC-SCNC: 9 MMOL/L (ref 7–16)
APPEARANCE UR: CLEAR
APTT PPP: 27.6 SEC (ref 24.7–36)
AST SERPL-CCNC: 54 U/L (ref 12–45)
AST SERPL-CCNC: 54 U/L (ref 12–45)
BACTERIA #/AREA URNS HPF: NEGATIVE /HPF
BASE EXCESS BLDA CALC-SCNC: 1 MMOL/L (ref -4–3)
BASOPHILS # BLD AUTO: 0.3 % (ref 0–1.8)
BASOPHILS # BLD: 0.02 K/UL (ref 0–0.12)
BILIRUB SERPL-MCNC: 0.8 MG/DL (ref 0.1–1.5)
BILIRUB SERPL-MCNC: 1.3 MG/DL (ref 0.1–1.5)
BILIRUB UR QL STRIP.AUTO: NEGATIVE
BODY TEMPERATURE: ABNORMAL DEGREES
BREATHS SETTING VENT: 18
BUN SERPL-MCNC: 15 MG/DL (ref 8–22)
BUN SERPL-MCNC: 18 MG/DL (ref 8–22)
BUN SERPL-MCNC: 19 MG/DL (ref 8–22)
BUN SERPL-MCNC: 22 MG/DL (ref 8–22)
CALCIUM ALBUM COR SERPL-MCNC: 9.4 MG/DL (ref 8.5–10.5)
CALCIUM ALBUM COR SERPL-MCNC: 9.9 MG/DL (ref 8.5–10.5)
CALCIUM SERPL-MCNC: 8.3 MG/DL (ref 8.5–10.5)
CALCIUM SERPL-MCNC: 8.7 MG/DL (ref 8.5–10.5)
CALCIUM SERPL-MCNC: 9.1 MG/DL (ref 8.5–10.5)
CALCIUM SERPL-MCNC: 9.2 MG/DL (ref 8.5–10.5)
CFT BLD TEG: 5.4 MIN (ref 4.6–9.1)
CFT P HPASE BLD TEG: 5.7 MIN (ref 4.3–8.3)
CHLORIDE SERPL-SCNC: 103 MMOL/L (ref 96–112)
CHLORIDE SERPL-SCNC: 105 MMOL/L (ref 96–112)
CHLORIDE SERPL-SCNC: 108 MMOL/L (ref 96–112)
CHLORIDE SERPL-SCNC: 109 MMOL/L (ref 96–112)
CHOLEST SERPL-MCNC: 118 MG/DL (ref 100–199)
CLOT ANGLE BLD TEG: 77 DEGREES (ref 63–78)
CLOT LYSIS 30M P MA LENFR BLD TEG: 0 % (ref 0–2.6)
CO2 BLDA-SCNC: 27 MMOL/L (ref 20–33)
CO2 SERPL-SCNC: 26 MMOL/L (ref 20–33)
CO2 SERPL-SCNC: 26 MMOL/L (ref 20–33)
CO2 SERPL-SCNC: 27 MMOL/L (ref 20–33)
CO2 SERPL-SCNC: 27 MMOL/L (ref 20–33)
COLOR UR: ABNORMAL
CREAT SERPL-MCNC: 0.67 MG/DL (ref 0.5–1.4)
CREAT SERPL-MCNC: 0.69 MG/DL (ref 0.5–1.4)
CREAT SERPL-MCNC: 0.76 MG/DL (ref 0.5–1.4)
CREAT SERPL-MCNC: 0.78 MG/DL (ref 0.5–1.4)
CT.EXTRINSIC BLD ROTEM: 0.9 MIN (ref 0.8–2.1)
DELSYS IDSYS: ABNORMAL
EOSINOPHIL # BLD AUTO: 0.15 K/UL (ref 0–0.51)
EOSINOPHIL NFR BLD: 1.9 % (ref 0–6.9)
EPI CELLS #/AREA URNS HPF: ABNORMAL /HPF
ERYTHROCYTE [DISTWIDTH] IN BLOOD BY AUTOMATED COUNT: 46 FL (ref 35.9–50)
EST. AVERAGE GLUCOSE BLD GHB EST-MCNC: 148 MG/DL
GFR SERPLBLD CREATININE-BSD FMLA CKD-EPI: 100 ML/MIN/1.73 M 2
GFR SERPLBLD CREATININE-BSD FMLA CKD-EPI: 96 ML/MIN/1.73 M 2
GFR SERPLBLD CREATININE-BSD FMLA CKD-EPI: 96 ML/MIN/1.73 M 2
GFR SERPLBLD CREATININE-BSD FMLA CKD-EPI: 99 ML/MIN/1.73 M 2
GLOBULIN SER CALC-MCNC: 2.7 G/DL (ref 1.9–3.5)
GLOBULIN SER CALC-MCNC: 3.2 G/DL (ref 1.9–3.5)
GLUCOSE BLD STRIP.AUTO-MCNC: 130 MG/DL (ref 65–99)
GLUCOSE BLD STRIP.AUTO-MCNC: 151 MG/DL (ref 65–99)
GLUCOSE BLD STRIP.AUTO-MCNC: 160 MG/DL (ref 65–99)
GLUCOSE BLD STRIP.AUTO-MCNC: 172 MG/DL (ref 65–99)
GLUCOSE SERPL-MCNC: 149 MG/DL (ref 65–99)
GLUCOSE SERPL-MCNC: 152 MG/DL (ref 65–99)
GLUCOSE SERPL-MCNC: 164 MG/DL (ref 65–99)
GLUCOSE SERPL-MCNC: 185 MG/DL (ref 65–99)
GLUCOSE UR STRIP.AUTO-MCNC: NEGATIVE MG/DL
HBA1C MFR BLD: 6.8 % (ref 4–5.6)
HCO3 BLDA-SCNC: 26 MMOL/L (ref 17–25)
HCT VFR BLD AUTO: 41.6 % (ref 42–52)
HDLC SERPL-MCNC: 12 MG/DL
HGB BLD-MCNC: 14.2 G/DL (ref 14–18)
HOROWITZ INDEX BLDA+IHG-RTO: 109 MM[HG]
HYALINE CASTS #/AREA URNS LPF: ABNORMAL /LPF
IMM GRANULOCYTES # BLD AUTO: 0.1 K/UL (ref 0–0.11)
IMM GRANULOCYTES NFR BLD AUTO: 1.3 % (ref 0–0.9)
INR PPP: 1.12 (ref 0.87–1.13)
KETONES UR STRIP.AUTO-MCNC: 15 MG/DL
LACTATE SERPL-SCNC: 1 MMOL/L (ref 0.5–2)
LACTATE SERPL-SCNC: 1.2 MMOL/L (ref 0.5–2)
LDLC SERPL CALC-MCNC: 57 MG/DL
LEUKOCYTE ESTERASE UR QL STRIP.AUTO: ABNORMAL
LYMPHOCYTES # BLD AUTO: 1.71 K/UL (ref 1–4.8)
LYMPHOCYTES NFR BLD: 21.9 % (ref 22–41)
MCF BLD TEG: 64.6 MM (ref 52–69)
MCF.PLATELET INHIB BLD ROTEM: 42.1 MM (ref 15–32)
MCH RBC QN AUTO: 31.1 PG (ref 27–33)
MCHC RBC AUTO-ENTMCNC: 34.1 G/DL (ref 32.3–36.5)
MCV RBC AUTO: 91 FL (ref 81.4–97.8)
MICRO URNS: ABNORMAL
MODE IMODE: ABNORMAL
MONOCYTES # BLD AUTO: 0.93 K/UL (ref 0–0.85)
MONOCYTES NFR BLD AUTO: 11.9 % (ref 0–13.4)
NEUTROPHILS # BLD AUTO: 4.89 K/UL (ref 1.82–7.42)
NEUTROPHILS NFR BLD: 62.7 % (ref 44–72)
NITRITE UR QL STRIP.AUTO: NEGATIVE
NRBC # BLD AUTO: 0 K/UL
NRBC BLD-RTO: 0 /100 WBC (ref 0–0.2)
O2/TOTAL GAS SETTING VFR VENT: 100 %
PA AA BLD-ACNC: ABNORMAL % (ref 0–11)
PA ADP BLD-ACNC: ABNORMAL % (ref 0–17)
PCO2 BLDA: 43.4 MMHG (ref 26–37)
PCO2 TEMP ADJ BLDA: 43.9 MMHG (ref 26–37)
PEEP END EXPIRATORY PRESSURE IPEEP: 8 CMH20
PH BLDA: 7.38 [PH] (ref 7.4–7.5)
PH TEMP ADJ BLDA: 7.38 [PH] (ref 7.4–7.5)
PH UR STRIP.AUTO: 6.5 [PH] (ref 5–8)
PLATELET # BLD AUTO: 116 K/UL (ref 164–446)
PMV BLD AUTO: 11.6 FL (ref 9–12.9)
PO2 BLDA: 109 MMHG (ref 64–87)
PO2 TEMP ADJ BLDA: 111 MMHG (ref 64–87)
POTASSIUM SERPL-SCNC: 4.2 MMOL/L (ref 3.6–5.5)
POTASSIUM SERPL-SCNC: 4.2 MMOL/L (ref 3.6–5.5)
POTASSIUM SERPL-SCNC: 4.3 MMOL/L (ref 3.6–5.5)
POTASSIUM SERPL-SCNC: 4.4 MMOL/L (ref 3.6–5.5)
PROT SERPL-MCNC: 5.8 G/DL (ref 6–8.2)
PROT SERPL-MCNC: 6.3 G/DL (ref 6–8.2)
PROT UR QL STRIP: NEGATIVE MG/DL
PROTHROMBIN TIME: 14.5 SEC (ref 12–14.6)
RBC # BLD AUTO: 4.57 M/UL (ref 4.7–6.1)
RBC # URNS HPF: ABNORMAL /HPF
RBC UR QL AUTO: ABNORMAL
SAO2 % BLDA: 98 % (ref 93–99)
SODIUM SERPL-SCNC: 141 MMOL/L (ref 135–145)
SODIUM SERPL-SCNC: 142 MMOL/L (ref 135–145)
SODIUM SERPL-SCNC: 144 MMOL/L (ref 135–145)
SODIUM SERPL-SCNC: 145 MMOL/L (ref 135–145)
SP GR UR STRIP.AUTO: 1.02
SPECIMEN DRAWN FROM PATIENT: ABNORMAL
TEG ALGORITHM TGALG: ABNORMAL
TIDAL VOLUME IVT: 47 ML
TRANS CELLS #/AREA URNS HPF: ABNORMAL /HPF
TRIGL SERPL-MCNC: 222 MG/DL (ref 0–149)
TRIGL SERPL-MCNC: 247 MG/DL (ref 0–149)
UROBILINOGEN UR STRIP.AUTO-MCNC: 4 MG/DL
WBC # BLD AUTO: 7.8 K/UL (ref 4.8–10.8)
WBC #/AREA URNS HPF: ABNORMAL /HPF

## 2023-09-30 PROCEDURE — 02HV33Z INSERTION OF INFUSION DEVICE INTO SUPERIOR VENA CAVA, PERCUTANEOUS APPROACH: ICD-10-PCS | Performed by: INTERNAL MEDICINE

## 2023-09-30 PROCEDURE — 83605 ASSAY OF LACTIC ACID: CPT

## 2023-09-30 PROCEDURE — 31500 INSERT EMERGENCY AIRWAY: CPT

## 2023-09-30 PROCEDURE — 71045 X-RAY EXAM CHEST 1 VIEW: CPT

## 2023-09-30 PROCEDURE — 85730 THROMBOPLASTIN TIME PARTIAL: CPT

## 2023-09-30 PROCEDURE — 99223 1ST HOSP IP/OBS HIGH 75: CPT | Performed by: PSYCHIATRY & NEUROLOGY

## 2023-09-30 PROCEDURE — 700101 HCHG RX REV CODE 250: Performed by: INTERNAL MEDICINE

## 2023-09-30 PROCEDURE — 85025 COMPLETE CBC W/AUTO DIFF WBC: CPT

## 2023-09-30 PROCEDURE — 700111 HCHG RX REV CODE 636 W/ 250 OVERRIDE (IP): Mod: JZ | Performed by: INTERNAL MEDICINE

## 2023-09-30 PROCEDURE — 700105 HCHG RX REV CODE 258: Performed by: INTERNAL MEDICINE

## 2023-09-30 PROCEDURE — 84478 ASSAY OF TRIGLYCERIDES: CPT

## 2023-09-30 PROCEDURE — 82140 ASSAY OF AMMONIA: CPT

## 2023-09-30 PROCEDURE — 83036 HEMOGLOBIN GLYCOSYLATED A1C: CPT

## 2023-09-30 PROCEDURE — 99291 CRITICAL CARE FIRST HOUR: CPT | Mod: 25 | Performed by: INTERNAL MEDICINE

## 2023-09-30 PROCEDURE — 36556 INSERT NON-TUNNEL CV CATH: CPT | Performed by: INTERNAL MEDICINE

## 2023-09-30 PROCEDURE — 82962 GLUCOSE BLOOD TEST: CPT

## 2023-09-30 PROCEDURE — 700111 HCHG RX REV CODE 636 W/ 250 OVERRIDE (IP): Performed by: INTERNAL MEDICINE

## 2023-09-30 PROCEDURE — 0BH17EZ INSERTION OF ENDOTRACHEAL AIRWAY INTO TRACHEA, VIA NATURAL OR ARTIFICIAL OPENING: ICD-10-PCS | Performed by: INTERNAL MEDICINE

## 2023-09-30 PROCEDURE — 5A1955Z RESPIRATORY VENTILATION, GREATER THAN 96 CONSECUTIVE HOURS: ICD-10-PCS | Performed by: INTERNAL MEDICINE

## 2023-09-30 PROCEDURE — 85610 PROTHROMBIN TIME: CPT

## 2023-09-30 PROCEDURE — 700111 HCHG RX REV CODE 636 W/ 250 OVERRIDE (IP): Performed by: STUDENT IN AN ORGANIZED HEALTH CARE EDUCATION/TRAINING PROGRAM

## 2023-09-30 PROCEDURE — 700111 HCHG RX REV CODE 636 W/ 250 OVERRIDE (IP)

## 2023-09-30 PROCEDURE — 36600 WITHDRAWAL OF ARTERIAL BLOOD: CPT

## 2023-09-30 PROCEDURE — 99291 CRITICAL CARE FIRST HOUR: CPT | Performed by: INTERNAL MEDICINE

## 2023-09-30 PROCEDURE — 99152 MOD SED SAME PHYS/QHP 5/>YRS: CPT

## 2023-09-30 PROCEDURE — A9270 NON-COVERED ITEM OR SERVICE: HCPCS | Performed by: INTERNAL MEDICINE

## 2023-09-30 PROCEDURE — 36556 INSERT NON-TUNNEL CV CATH: CPT

## 2023-09-30 PROCEDURE — 85576 BLOOD PLATELET AGGREGATION: CPT | Mod: 91

## 2023-09-30 PROCEDURE — 700111 HCHG RX REV CODE 636 W/ 250 OVERRIDE (IP): Performed by: NURSE PRACTITIONER

## 2023-09-30 PROCEDURE — 94003 VENT MGMT INPAT SUBQ DAY: CPT

## 2023-09-30 PROCEDURE — 770022 HCHG ROOM/CARE - ICU (200)

## 2023-09-30 PROCEDURE — 85347 COAGULATION TIME ACTIVATED: CPT

## 2023-09-30 PROCEDURE — 80053 COMPREHEN METABOLIC PANEL: CPT

## 2023-09-30 PROCEDURE — 99153 MOD SED SAME PHYS/QHP EA: CPT

## 2023-09-30 PROCEDURE — C1751 CATH, INF, PER/CENT/MIDLINE: HCPCS

## 2023-09-30 PROCEDURE — 700102 HCHG RX REV CODE 250 W/ 637 OVERRIDE(OP): Performed by: INTERNAL MEDICINE

## 2023-09-30 PROCEDURE — 94799 UNLISTED PULMONARY SVC/PX: CPT

## 2023-09-30 PROCEDURE — 81001 URINALYSIS AUTO W/SCOPE: CPT

## 2023-09-30 PROCEDURE — 94002 VENT MGMT INPAT INIT DAY: CPT

## 2023-09-30 PROCEDURE — 82803 BLOOD GASES ANY COMBINATION: CPT

## 2023-09-30 PROCEDURE — 85384 FIBRINOGEN ACTIVITY: CPT

## 2023-09-30 PROCEDURE — 80061 LIPID PANEL: CPT

## 2023-09-30 PROCEDURE — 700111 HCHG RX REV CODE 636 W/ 250 OVERRIDE (IP): Mod: JZ

## 2023-09-30 PROCEDURE — 80048 BASIC METABOLIC PNL TOTAL CA: CPT

## 2023-09-30 PROCEDURE — 31500 INSERT EMERGENCY AIRWAY: CPT | Performed by: INTERNAL MEDICINE

## 2023-09-30 PROCEDURE — 70450 CT HEAD/BRAIN W/O DYE: CPT

## 2023-09-30 RX ORDER — LABETALOL HYDROCHLORIDE 5 MG/ML
10 INJECTION, SOLUTION INTRAVENOUS EVERY 6 HOURS PRN
Status: DISCONTINUED | OUTPATIENT
Start: 2023-09-30 | End: 2023-10-01

## 2023-09-30 RX ORDER — ACETAMINOPHEN 325 MG/1
650 TABLET ORAL EVERY 4 HOURS PRN
Status: DISCONTINUED | OUTPATIENT
Start: 2023-09-30 | End: 2023-10-29

## 2023-09-30 RX ORDER — ONDANSETRON 4 MG/1
4 TABLET, ORALLY DISINTEGRATING ORAL EVERY 4 HOURS PRN
Status: DISCONTINUED | OUTPATIENT
Start: 2023-09-30 | End: 2023-10-28

## 2023-09-30 RX ORDER — LANSOPRAZOLE 30 MG/1
30 TABLET, ORALLY DISINTEGRATING, DELAYED RELEASE ORAL DAILY
Status: DISCONTINUED | OUTPATIENT
Start: 2023-10-01 | End: 2023-10-27

## 2023-09-30 RX ORDER — POLYETHYLENE GLYCOL 3350 17 G/17G
1 POWDER, FOR SOLUTION ORAL
Status: DISCONTINUED | OUTPATIENT
Start: 2023-09-30 | End: 2023-10-06

## 2023-09-30 RX ORDER — 3% SODIUM CHLORIDE 3 G/100ML
0-60 INJECTION, SOLUTION INTRAVENOUS CONTINUOUS
Status: DISCONTINUED | OUTPATIENT
Start: 2023-09-30 | End: 2023-09-30

## 2023-09-30 RX ORDER — LOSARTAN POTASSIUM 50 MG/1
50 TABLET ORAL DAILY
Status: DISCONTINUED | OUTPATIENT
Start: 2023-10-01 | End: 2023-10-04

## 2023-09-30 RX ORDER — PROTAMINE SULFATE 10 MG/ML
20 INJECTION, SOLUTION INTRAVENOUS ONCE
Status: COMPLETED | OUTPATIENT
Start: 2023-09-30 | End: 2023-09-30

## 2023-09-30 RX ORDER — ETOMIDATE 2 MG/ML
50 INJECTION INTRAVENOUS ONCE
Status: COMPLETED | OUTPATIENT
Start: 2023-09-30 | End: 2023-09-30

## 2023-09-30 RX ORDER — AMOXICILLIN 250 MG
2 CAPSULE ORAL 2 TIMES DAILY
Status: DISCONTINUED | OUTPATIENT
Start: 2023-09-30 | End: 2023-10-06

## 2023-09-30 RX ORDER — AMLODIPINE BESYLATE 5 MG/1
5 TABLET ORAL DAILY
Status: DISCONTINUED | OUTPATIENT
Start: 2023-10-01 | End: 2023-10-02

## 2023-09-30 RX ORDER — ATORVASTATIN CALCIUM 40 MG/1
40 TABLET, FILM COATED ORAL EVERY EVENING
Status: DISCONTINUED | OUTPATIENT
Start: 2023-09-30 | End: 2023-10-29

## 2023-09-30 RX ORDER — OMEPRAZOLE 20 MG/1
20 CAPSULE, DELAYED RELEASE ORAL DAILY
Status: DISCONTINUED | OUTPATIENT
Start: 2023-09-30 | End: 2023-09-30

## 2023-09-30 RX ORDER — ROCURONIUM BROMIDE 10 MG/ML
100 INJECTION, SOLUTION INTRAVENOUS ONCE
Status: COMPLETED | OUTPATIENT
Start: 2023-09-30 | End: 2023-09-30

## 2023-09-30 RX ORDER — ATORVASTATIN CALCIUM 40 MG/1
40 TABLET, FILM COATED ORAL EVERY EVENING
Status: DISCONTINUED | OUTPATIENT
Start: 2023-09-30 | End: 2023-09-30

## 2023-09-30 RX ORDER — PROTAMINE SULFATE 10 MG/ML
25 INJECTION, SOLUTION INTRAVENOUS ONCE
Status: DISCONTINUED | OUTPATIENT
Start: 2023-09-30 | End: 2023-09-30

## 2023-09-30 RX ORDER — MEMANTINE HYDROCHLORIDE 10 MG/1
5 TABLET ORAL 2 TIMES DAILY
Status: DISCONTINUED | OUTPATIENT
Start: 2023-09-30 | End: 2023-10-26

## 2023-09-30 RX ORDER — 3% SODIUM CHLORIDE 3 G/100ML
INJECTION, SOLUTION INTRAVENOUS CONTINUOUS
Status: DISCONTINUED | OUTPATIENT
Start: 2023-09-30 | End: 2023-09-30

## 2023-09-30 RX ORDER — BISACODYL 10 MG
10 SUPPOSITORY, RECTAL RECTAL
Status: DISCONTINUED | OUTPATIENT
Start: 2023-09-30 | End: 2023-10-06

## 2023-09-30 RX ORDER — FAMOTIDINE 20 MG/1
20 TABLET, FILM COATED ORAL EVERY 12 HOURS
Status: DISCONTINUED | OUTPATIENT
Start: 2023-09-30 | End: 2023-09-30

## 2023-09-30 RX ORDER — 3% SODIUM CHLORIDE 3 G/100ML
0-60 INJECTION, SOLUTION INTRAVENOUS CONTINUOUS
Status: DISCONTINUED | OUTPATIENT
Start: 2023-09-30 | End: 2023-10-09

## 2023-09-30 RX ORDER — ATORVASTATIN CALCIUM 40 MG/1
40 TABLET, FILM COATED ORAL EVERY EVENING
Status: CANCELLED | OUTPATIENT
Start: 2023-09-30

## 2023-09-30 RX ADMIN — MEMANTINE 5 MG: 5 TABLET ORAL at 17:59

## 2023-09-30 RX ADMIN — FENTANYL CITRATE 200 MCG: 50 INJECTION, SOLUTION INTRAMUSCULAR; INTRAVENOUS at 14:41

## 2023-09-30 RX ADMIN — LABETALOL HYDROCHLORIDE 10 MG: 5 INJECTION INTRAVENOUS at 08:27

## 2023-09-30 RX ADMIN — ETOMIDATE INJECTION 50 MG: 2 SOLUTION INTRAVENOUS at 13:15

## 2023-09-30 RX ADMIN — LABETALOL HYDROCHLORIDE 10 MG: 5 INJECTION INTRAVENOUS at 14:32

## 2023-09-30 RX ADMIN — HYDRALAZINE HYDROCHLORIDE 20 MG: 20 INJECTION, SOLUTION INTRAMUSCULAR; INTRAVENOUS at 13:26

## 2023-09-30 RX ADMIN — PROPOFOL 60 MCG/KG/MIN: 10 INJECTION, EMULSION INTRAVENOUS at 21:18

## 2023-09-30 RX ADMIN — SODIUM CHLORIDE 2.5 MG/HR: 9 INJECTION, SOLUTION INTRAVENOUS at 13:34

## 2023-09-30 RX ADMIN — SENNOSIDES AND DOCUSATE SODIUM 2 TABLET: 50; 8.6 TABLET ORAL at 17:59

## 2023-09-30 RX ADMIN — LORAZEPAM 0.5 MG: 2 INJECTION INTRAMUSCULAR; INTRAVENOUS at 00:18

## 2023-09-30 RX ADMIN — PROTAMINE SULFATE 20 MG: 10 INJECTION, SOLUTION INTRAVENOUS at 14:30

## 2023-09-30 RX ADMIN — PROPOFOL 20 MCG/KG/MIN: 10 INJECTION, EMULSION INTRAVENOUS at 13:28

## 2023-09-30 RX ADMIN — SODIUM CHLORIDE 30 ML/HR: 3 INJECTION, SOLUTION INTRAVENOUS at 17:55

## 2023-09-30 RX ADMIN — FENTANYL CITRATE 100 MCG: 50 INJECTION, SOLUTION INTRAMUSCULAR; INTRAVENOUS at 13:40

## 2023-09-30 RX ADMIN — ROCURONIUM BROMIDE 100 MG: 50 INJECTION, SOLUTION INTRAVENOUS at 13:17

## 2023-09-30 RX ADMIN — FENTANYL CITRATE 100 MCG: 50 INJECTION, SOLUTION INTRAMUSCULAR; INTRAVENOUS at 14:13

## 2023-09-30 RX ADMIN — ATORVASTATIN CALCIUM 40 MG: 40 TABLET, FILM COATED ORAL at 17:59

## 2023-09-30 RX ADMIN — PROPOFOL 60 MCG/KG/MIN: 10 INJECTION, EMULSION INTRAVENOUS at 17:44

## 2023-09-30 RX ADMIN — INSULIN LISPRO 2 UNITS: 100 INJECTION, SOLUTION INTRAVENOUS; SUBCUTANEOUS at 06:25

## 2023-09-30 RX ADMIN — FENTANYL CITRATE 100 MCG/HR: 50 INJECTION INTRAVENOUS at 15:00

## 2023-09-30 RX ADMIN — SODIUM CHLORIDE 10 MG/HR: 9 INJECTION, SOLUTION INTRAVENOUS at 16:54

## 2023-09-30 RX ADMIN — SODIUM CHLORIDE 200 MEQ: 4 INJECTION, SOLUTION, CONCENTRATE INTRAVENOUS at 14:48

## 2023-09-30 RX ADMIN — SODIUM CHLORIDE 10 MG/HR: 9 INJECTION, SOLUTION INTRAVENOUS at 19:43

## 2023-09-30 RX ADMIN — SODIUM CHLORIDE 10 MG/HR: 9 INJECTION, SOLUTION INTRAVENOUS at 22:28

## 2023-09-30 RX ADMIN — INSULIN LISPRO 2 UNITS: 100 INJECTION, SOLUTION INTRAVENOUS; SUBCUTANEOUS at 14:51

## 2023-09-30 RX ADMIN — ENOXAPARIN SODIUM 40 MG: 100 INJECTION SUBCUTANEOUS at 06:23

## 2023-09-30 ASSESSMENT — LIFESTYLE VARIABLES
VISUAL DISTURBANCES: NOT PRESENT
NAUSEA AND VOMITING: NO NAUSEA AND NO VOMITING
AGITATION: NORMAL ACTIVITY
ANXIETY: NO ANXIETY (AT EASE)
TOTAL SCORE: 8
ON A TYPICAL DAY WHEN YOU DRINK ALCOHOL HOW MANY DRINKS DO YOU HAVE: 0
HEADACHE, FULLNESS IN HEAD: NOT PRESENT
PAROXYSMAL SWEATS: BARELY PERCEPTIBLE SWEATING. PALMS MOIST
EVER FELT BAD OR GUILTY ABOUT YOUR DRINKING: NO
TOTAL SCORE: 0
NAUSEA AND VOMITING: NO NAUSEA AND NO VOMITING
AVERAGE NUMBER OF DAYS PER WEEK YOU HAVE A DRINK CONTAINING ALCOHOL: 0
TREMOR: TREMOR NOT VISIBLE BUT CAN BE FELT, FINGERTIP TO FINGERTIP
HEADACHE, FULLNESS IN HEAD: NOT PRESENT
HAVE YOU EVER FELT YOU SHOULD CUT DOWN ON YOUR DRINKING: NO
HAVE PEOPLE ANNOYED YOU BY CRITICIZING YOUR DRINKING: NO
AGITATION: SOMEWHAT MORE THAN NORMAL ACTIVITY
TREMOR: NO TREMOR
EVER HAD A DRINK FIRST THING IN THE MORNING TO STEADY YOUR NERVES TO GET RID OF A HANGOVER: NO
TOTAL SCORE: 5
ORIENTATION AND CLOUDING OF SENSORIUM: DISORIENTED FOR PLACE AND / OR PERSON
ANXIETY: NO ANXIETY (AT EASE)
VISUAL DISTURBANCES: NOT PRESENT
TOTAL SCORE: 0
PAROXYSMAL SWEATS: *
HOW MANY TIMES IN THE PAST YEAR HAVE YOU HAD 5 OR MORE DRINKS IN A DAY: 0
AUDITORY DISTURBANCES: NOT PRESENT
AUDITORY DISTURBANCES: NOT PRESENT
CONSUMPTION TOTAL: NEGATIVE
ALCOHOL_USE: YES
ORIENTATION AND CLOUDING OF SENSORIUM: DISORIENTED FOR PLACE AND / OR PERSON
TOTAL SCORE: 0

## 2023-09-30 ASSESSMENT — COGNITIVE AND FUNCTIONAL STATUS - GENERAL
DAILY ACTIVITIY SCORE: 6
TOILETING: TOTAL
CLIMB 3 TO 5 STEPS WITH RAILING: TOTAL
STANDING UP FROM CHAIR USING ARMS: TOTAL
DRESSING REGULAR LOWER BODY CLOTHING: TOTAL
TURNING FROM BACK TO SIDE WHILE IN FLAT BAD: UNABLE
MOBILITY SCORE: 6
HELP NEEDED FOR BATHING: TOTAL
PERSONAL GROOMING: TOTAL
WALKING IN HOSPITAL ROOM: TOTAL
EATING MEALS: TOTAL
MOVING FROM LYING ON BACK TO SITTING ON SIDE OF FLAT BED: UNABLE
DRESSING REGULAR UPPER BODY CLOTHING: TOTAL
SUGGESTED CMS G CODE MODIFIER DAILY ACTIVITY: CN
MOVING TO AND FROM BED TO CHAIR: UNABLE
SUGGESTED CMS G CODE MODIFIER MOBILITY: CN

## 2023-09-30 ASSESSMENT — PAIN DESCRIPTION - PAIN TYPE
TYPE: ACUTE PAIN

## 2023-09-30 ASSESSMENT — FIBROSIS 4 INDEX
FIB4 SCORE: 6.27

## 2023-09-30 NOTE — ASSESSMENT & PLAN NOTE
CT on 9/24 with hazy left frontal SAH  CT on 9/30 with new large IPH in the left frontal lobe  Strict blood pressure control with goal SBP less than 160  Goal sodium 145-150 - I am titrating hypertonic saline to achieve sodium goal    Will deescalate HTS today and slowly correct    Neuro checks every 2 hours

## 2023-09-30 NOTE — PROGRESS NOTES
4 Eyes Skin Assessment Completed by Belen CHAMBERS RN and Ashley ZIMMERMAN RN.    Head WDL  Ears WDL  Nose WDL  Mouth WDL  Neck WDL  Breast/Chest WDL  Shoulder Blades WDL  Spine WDL  (R) Arm/Elbow/Hand Bruising  (L) Arm/Elbow/Hand Bruising  Abdomen WDL  Groin Redness  Scrotum/Coccyx/Buttocks WDL  (R) Leg Scab  (L) Leg Scab  (R) Heel/Foot/Toe WDL  (L) Heel/Foot/Toe WDL          Devices In Places ECG, Blood Pressure Cuff, Pulse Ox, SCD's, and Nasal Cannula      Interventions In Place NC W/Ear Foams, Pillows, Q2 Turns, Low Air Loss Mattress, Dri-Miguel Ángel Pads, and Heels Loaded W/Pillows    Possible Skin Injury No    Pictures Uploaded Into Epic N/A  Wound Consult Placed N/A  RN Wound Prevention Protocol Ordered No

## 2023-09-30 NOTE — THERAPY
Physical Therapy Contact Note    Patient Name: Sharon Guardado  Age:  68 y.o., Sex:  male  Medical Record #: [id number]  Today's Date: 9/30/2023    Discussed missed therapy with RN.       09/30/23 1108   Initial Contact Note    Initial Contact Note Order Received and Verified, Physical Therapy Evaluation in Progress with Full Report to Follow.   Interdisciplinary Plan of Care Collaboration   IDT Collaboration with  Nursing   Collaboration Comments per RN, pt is not appropriate for PT today. on CIWA protocol, follow   Session Information   Date / Session Number  9/30( hold)
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
## 2023-09-30 NOTE — PROCEDURES
Intubation    Date/Time: 9/30/2023 1:56 PM    Performed by: Lizandro Greenwood M.D.  Authorized by: Lizandro Greenwood M.D.    Consent:     Consent obtained:  Emergent situation    Alternatives discussed:  No treatment  Pre-procedure details:     Patient status:  Altered mental status    Mallampati score:  III    Pretreatment meds: etomidate.    Paralytics:  Rocuronium  Procedure details:     Preoxygenation:  Nonrebreather mask    CPR in progress: no      Intubation method:  Oral    Oral intubation technique:  Video-assisted    Laryngoscope type:  GlideScope    Laryngoscope blade:  Mac 3    Cormack-Lehane Classification:  Grade 2b    Tube size (mm):  8.0    Tube type:  Cuffed    Number of attempts:  1    Ventilation between attempts: no      Cricoid pressure: no      Tube visualized through cords: yes    Placement assessment:     ETT to teeth:  26 cm    Tube secured with:  Adhesive tape and ETT wilson    Breath sounds:  Equal    Placement verification: chest rise, condensation, CXR verification, direct visualization, equal breath sounds, ETCO2 detector and tube exhalation      CXR findings:  ETT in proper place  Post-procedure details:     Patient tolerance of procedure:  Tolerated well, no immediate complications

## 2023-09-30 NOTE — PROGRESS NOTES
Hospital Medicine Daily Progress Note    Date of Service  9/30/2023    Chief Complaint  Sharon Edmonds Geo is a 68 y.o. male admitted 9/24/2023 with altered mental status and ground-level fall    Hospital Course  68-year-old male with a past medical history of alcohol abuse, hypertension, obesity who presented with altered mental status and a ground-level fall on 9/24/2023.  On admission he was noted to have a small frontal subarachnoid hemorrhage on CT head.  He was also noted to have severe sepsis secondary to UTI.  She was treated with IV ceftriaxone.  Patient developed symptoms of alcohol withdrawal and was intubated.  Patient was subsequently extubated and transferred to telemetry on 9/30.      Interval Problem Update  This morning patient appears lethargic.  He opens his eyes to verbal stimuli but does not answer my questions.  He is unable to move his right side.  I have ordered a stat CT head without contrast which reveals new large left intraparenchymal hemorrhage with some surrounding edema and minimal midline shift to the right.  Patient has also been hypertensive with a blood pressure up to systolic 180.  I have started him on IV Cardene, titrate to SBP less than 160.  I have discontinued his subcutaneous Lovenox for DVT prophylaxis.   I have discussed the case with critical care Dr. Greenwood who has evaluated the patient at bedside.  Patient will be transferred to ICU for higher level of care.  I have consulted and discussed the case with neurology Dr. Guardado who will evaluate the patient at bedside.      I have discussed this patient's plan of care and discharge plan at IDT rounds today with Case Management, Nursing, Nursing leadership, and other members of the IDT team.    Consultants/Specialty  critical care and neurology    Code Status  Full Code    Disposition  The patient is not medically cleared for discharge to home or a post-acute facility.  Anticipate discharge to: an inpatient  rehabilitation hospital    I have placed the appropriate orders for post-discharge needs.    Review of Systems  Review of Systems   Unable to perform ROS: Mental status change        Physical Exam  Temp:  [36.7 °C (98.1 °F)] 36.7 °C (98.1 °F)  Pulse:  [62-88] 88  Resp:  [16-22] 22  BP: (156-184)/() 184/113  SpO2:  [92 %-96 %] 95 %    Physical Exam  Vitals and nursing note reviewed.   Constitutional:       General: He is not in acute distress.     Appearance: He is obese. He is ill-appearing.   HENT:      Head: Normocephalic.      Mouth/Throat:      Mouth: Mucous membranes are moist.   Eyes:      Pupils: Pupils are equal, round, and reactive to light.   Cardiovascular:      Rate and Rhythm: Normal rate and regular rhythm.      Pulses: Normal pulses.      Heart sounds: Normal heart sounds.   Pulmonary:      Breath sounds: Normal breath sounds.   Abdominal:      General: There is no distension.      Palpations: Abdomen is soft.      Tenderness: There is no abdominal tenderness.   Musculoskeletal:         General: No swelling.      Cervical back: Neck supple.      Right lower leg: No edema.      Left lower leg: No edema.   Skin:     General: Skin is warm.      Coloration: Skin is not jaundiced.   Neurological:      Comments: Lethargic  Nonverbal  Able to follow simple commands  Unable to move right upper and lower extremities   Psychiatric:         Mood and Affect: Mood normal.         Behavior: Behavior normal.         Fluids    Intake/Output Summary (Last 24 hours) at 9/30/2023 0901  Last data filed at 9/29/2023 2000  Gross per 24 hour   Intake 287.45 ml   Output 700 ml   Net -412.55 ml       Laboratory  Recent Labs     09/28/23  0314 09/29/23  0310 09/30/23  0153   WBC 7.2 8.2 7.8   RBC 4.27* 4.34* 4.57*   HEMOGLOBIN 13.3* 13.6* 14.2   HEMATOCRIT 38.8* 38.9* 41.6*   MCV 90.9 89.6 91.0   MCH 31.1 31.3 31.1   MCHC 34.3 35.0 34.1   RDW 46.3 45.0 46.0   PLATELETCT 87* 98* 116*   MPV 11.9 10.9 11.6     Recent Labs      09/28/23  0314 09/29/23  0310 09/30/23  0153   SODIUM 138 143 142   POTASSIUM 3.5* 3.5* 4.3   CHLORIDE 103 103 105   CO2 26 30 27   GLUCOSE 233* 147* 149*   BUN 12 11 15   CREATININE 0.89 0.69 0.69   CALCIUM 8.5 8.6 9.2                   Imaging  CT-HEAD W/O   Final Result         1.  New large parenchymal hemorrhage involving the frontal lobe is identified with some surrounding edema.      2.  Subarachnoid hemorrhage is also noted in the area.      3.  Minimal midline shift to the right side.      Findings are consistent with atrophy.  Decreased attenuation in the periventricular white matter likely indicates microvascular ischemic disease.      Based solely on CT findings, the brain injury guideline category is mBIG 3.      EDH   IVH   Displaced skull fx   SDH > 8mm   IPH > 8mm or multiple   SAH bi-hemispheric or > 3mm      The original BIG retrospective analysis found radiographic progression in 0% of BIG 1 patients and 2.6% BIG 2.      These findings were discussed with GEETA MOLINA on 09/30/2023.      DX-CHEST-PORTABLE (1 VIEW)   Final Result      Stable multiple pulmonary vascular dilation/congestion. Otherwise, negative.      DX-CHEST-PORTABLE (1 VIEW)   Final Result      1.  Increasing airspace disease at the left base.      DX-CHEST-LIMITED (1 VIEW)   Final Result         1.  Pulmonary edema and/or infiltrates are identified, which are somewhat decreased since the prior exam.   2.  Cardiomegaly   3.  Atherosclerosis      DX-CHEST-PORTABLE (1 VIEW)   Final Result         1.  Pulmonary edema and/or infiltrates are identified, which appear somewhat increased since the prior exam.   2.  Cardiomegaly   3.  Atherosclerosis      DX-CHEST-PORTABLE (1 VIEW)   Final Result         1.  Interstitial edema and/or infiltrates, slightly decreased since prior study.   2.  Cardiomegaly   3.  Atherosclerosis      DX-CHEST-LIMITED (1 VIEW)   Final Result      Right IJ catheter tip projects in the SVC. No pneumothorax.       CT-CTA NECK WITH & W/O-POST PROCESSING   Final Result      1.  Estimated 50-75% stenosis of the BILATERAL carotid arteries   2.  Estimated greater than 50% stenosis at the vertebral artery ostium on each side      CT-CTA HEAD WITH & W/O-POST PROCESS   Final Result      1.  No large vessel occlusion or aneurysm.   2.  Hazy left frontal lobe subarachnoid hemorrhage, decreased in conspicuity from prior. No new hemorrhage seen.   3.  Scattered opacifications of the ethmoid sinuses, likely related to support hardware.      DX-ABDOMEN FOR TUBE PLACEMENT   Final Result         1.  Nonspecific bowel gas pattern in the upper abdomen.   2.  Dobbhoff tube with tip terminating overlying the expected location of the first or second duodenal segment.      DX-CHEST-PORTABLE (1 VIEW)   Final Result         1.  Interstitial edema and/or infiltrates.   2.  Cardiomegaly   3.  Nasogastric tube terminates in the distal esophagus, recommend advancement   4.  Atherosclerosis      DX-ABDOMEN FOR TUBE PLACEMENT   Final Result         1.  Nonspecific bowel gas pattern in the upper abdomen.   2.  Nasogastric tube terminates just distal to the gastroesophageal junction, recommend advancement      CT-RENAL COLIC EVALUATION(A/P W/O)   Final Result         1.  Intrahepatic biliary air and nondependent air within the gallbladder, consider history of instrumentation or changes of cholangitis in the appropriate clinical setting.   2.  Large fat-containing bilateral inguinal hernias   3.  Enlarged prostate, consider causes of prostate enlargement with additional workup as clinically appropriate   4.  Diverticulosis   5.  Cholelithiasis   6.  Atherosclerosis      CT-HEAD W/O   Final Result         1.  Hazy left frontal subarachnoid hemorrhage.   2.  Nonspecific white matter changes, commonly associated with small vessel ischemic disease.  Associated mild cerebral atrophy is noted.      Based solely on CT findings, the brain injury guideline category  is mBIG 1.      SDH < 4mm   IPH < 4mm   SAH < 3 sulci and < 1mm      The original BIG retrospective analysis found radiographic progression in 0% of BIG 1 patients and 2.6% BIG 2.      These findings were discussed with the patient's clinician, Madhu Zambrano, on 9/24/2023 10:10 PM.      DX-CHEST-PORTABLE (1 VIEW)   Final Result      No acute cardiopulmonary abnormality identified.           Assessment/Plan  * Intraparenchymal hemorrhage of brain (HCC)  Assessment & Plan  CT head 9/30: lateral left frontal lobe hemorrhage measuring 6.1 x 3.9 cm with surrounding edema and 2 mm left to right midline shift   In the setting of recent ground-level fall and traumatic frontal subarachnoid hemorrhage  CTA head and neck was negative for large vessel occlusion or aneurysm  Avoid anticoagulation at this time  Started on IV Cardene, titrate to SBP less than 160  Every hour neurochecks  Case discussed with neurology Dr Guardado  Case discussed with Critical care Dr Greenwood    Acute respiratory failure with hypoxia (HCC)- (present on admission)  Assessment & Plan  Emergent intubation 9/25/23 9/25/23: 7.330/42/60  APVCMV 20/470/40%/8  9/26/23: 22/470/40%/8    - ABCDE protocol  9/29/2023:  Patient status post extubation on simple nasal cannula maintaining saturation without issue.    Thrombocytopenia (HCC)- (present on admission)  Assessment & Plan  Baseline ~110-125  Possibly secondary to alcohol use disorder although normal H&H and LFTs    9/25/23: 86  9/26/23: 84    - trend    Pneumobilia- (present on admission)  Assessment & Plan  As seen on CT renal (9/25/23).    9/26/23: No need for further interventions at this time per gen surg    SAH (subarachnoid hemorrhage) (HCC)- (present on admission)  Assessment & Plan  Small traumatic subarachnoid hemorrhage s/p mechanical fall  Not c/w aneurysm  -Continue serial neurologic exams  - No NSAIDs or anticoagulation      Type 2 diabetes mellitus (HCC)- (present on  admission)  Assessment & Plan  A1c 5.7% (6/2023)  Home meds: metformin, pioglitazone    9/26/23: 15u glargine    - hold home meds for now  - basal and SSI to glucose goal 140-180; titrate as clinically indicated     Essential hypertension- (present on admission)  Assessment & Plan  Hypertensive at presentation despite sepsis  Outpatient meds: amlodipine 5mg, losartan 40; hold for now    9/25/23: requiring pressors (levo 0.3), titrate down as able  9/26/23: levo 0.28    - Goal -140, MAP >65  9/29/2023  Patient becoming increasingly hypertensive add back home medications    BMI 40.0-44.9, adult (HCC)- (present on admission)  Assessment & Plan  Noted  Increased risk for decompensation    Alcohol dependence (HCC)- (present on admission)  Assessment & Plan  Multivitamin, folate, thiamine  Monitor, additional therapies as clinically indicated  9/29/2023 patient was at least 2-3 hard liquor drinks daily although this is unclear per patient and his wife's history.  Patient certainly displaying signs symptoms suggestive of possible withdrawal this may have been masked while he was on propofol during intubation.    Patient has adverse effects to IV Ativan, recommend avoiding Ativan         VTE prophylaxis: scd    I have performed a physical exam and reviewed and updated ROS and Plan today (9/30/2023). In review of yesterday's note (9/29/2023), there are no changes except as documented above.    This patient is critically and will be admitted to the ICU with acute large left intraparenchymal hemorrhage of brain. I have started the patient on IV Cardene, titrate to SBP less than 160. I have discussed the case with intensivist and neurologist.  I have assessed and reassessed his respiratory status, blood pressure, hemodynamics, cardiovascular status.  He is at increased risk for worsening neurological, respiratory and cardiovascular system dysfunction.     High risk of deterioration and worsening vital organ dysfunction  and death without the above critical care interventions.     Critical Care Time: 45 minutes  [id number]  No time overlap  Time excludes procedures  Discussed with RN, consultants

## 2023-09-30 NOTE — CARE PLAN
The patient is Watcher - Medium risk of patient condition declining or worsening    Shift Goals  Clinical Goals: CIWA, safety, stable VS  Patient Goals: Sleep  Family Goals: DEENA    Progress made toward(s) clinical / shift goals:      Problem: Hemodynamics  Goal: Patient's hemodynamics, fluid balance and neurologic status will be stable or improve  Description: Target End Date:  Prior to discharge or change in level of care    Document on Assessment and I/O flowsheet templates    1.  Monitor vital signs, pulse oximetry and cardiac monitor per provider order and/or policy  2.  Maintain blood pressure per provider order  3.  Hemodynamic monitoring per provider order  4.  Manage IV fluids and IV infusions  5.  Monitor intake and output  6.  Daily weights per unit policy or provider order  7.  Assess peripheral pulses and capillary refill  8.  Assess color and body temperature  9.  Position patient for maximum circulation/cardiac output  10. Monitor for signs/symptoms of excessive bleeding  11. Assess mental status, restlessness and changes in level of consciousness  12. Monitor temperature and report fever or hypothermia to provider immediately. Consideration of targeted temperature management.  Outcome: Progressing     Problem: Optimal Care for Alcohol Withdrawal  Goal: Optimal Care for the alcohol withdrawal patient  Description: Target End Date:  1 to 3 days    1.  Alcohol history screening done on admission  2.  CIWA-AR score assessment (includes assessment of nausea/vomiting, tremor, sweats, anxiety, agitation, tactile, visual and auditory disturbances, headache and orientation/sensorium).  Document on CIWA flowsheet.  3.  Follow CIWA-AR score protocol  4.  Frequent reorientation  5.  Monitor for fluid and electrolyte imbalance.  6.  Assess for respiratory depression due to sedation (pulse ox)  7.  Consider thiamine, multivitamins, folic acid and magnesium sulfate per provider order  8.  Collaborate with Social  Workers/Case Management  9.  Collaborate with mental health  Outcome: Progressing

## 2023-09-30 NOTE — ASSESSMENT & PLAN NOTE
CT on 9/24 with hazy left frontal SAH  CT on 9/30 with new large IPH in the left frontal lobe which neurosurgery was consulted and it is nonoperable  With right sided hemiparesis and expressive aphasia  He may benefit from skilled nursing facility but certainly will require 24-hour care upon discharge.    10/24/23  Patient is awaiting acceptance to long-term care skilled nursing facility.    10/25/23  Skilled nursing facility expanded to [place].    10/27/23  Pending acceptance to skilled nursing facility  Case management also working on group home.  QuantiFeron [employer] ordered.    10/28/23  Pending acceptance to skilled nursing facility.  Versus group home.    10/29/23  Pending acceptance to skilled nursing facility versus group home.

## 2023-09-30 NOTE — CONSULTS
Neurology STROKE CODE H&P  Neurohospitalist Service, [facility], formerly [facility]    Referring Physician: Josiah Casiano M.D.    STROKE CODE:   Chief Complaint   Patient presents with    Head Injury     Pt fell and hit his head last night     ALOC     Pt arrives A&O1        To obtain the most accurate data regarding the time called, and time patient seen, refer to the stroke run-sheet and chart.  For time of CT, refer to the radiology report. See A&P below for TPA Decision and door to needle time if and when applicable.    HPI: History obtained from chart only.  Patient unable to write any information at this time.      68-year-old male admitted 5 6 days ago for urosepsis requiring intubation.  Also possibility of alcohol withdrawal.  Was extubated on the 28th.  Sepsis has not resolved.  Yesterday the  Patient was awake and alert following commands.  Sometime overnight patient became more confused nonresponsive.  CT head this morning showed large left frontal IPH.  When the patient first admitted radiology reported haziness possibly subarachnoid hemorrhage in left frontal lobe.  He also did similar finding back in March earlier this year after a fall.  Patient has low platelets in the 80s on arrival here few days ago.  Also mildly abnormal TEG study.  Patient now has right-sided hemiplegia and aphasic.    Please note despite what is documented in the ICU attendings note this week neurology was not consulted this week on this patient.  First we heard of the patient was this afternoon.    Review of systems: In addition to what is detailed in the HPI above, (and scanned into the chart if and when applicable), all other systems reviewed and are negative.    Past Medical History:    has a past medical history of LE (acute kidney injury), prerenal (CMS-HCC) (1/20/2018), Alcohol withdrawal (HCC) (1/22/2018), Bowel habit changes, CAD (coronary artery disease), Cancer (HCC), Cellulitis of back except buttock (8/3/2018),  HTN (hypertension), Hyperglycemia (1/19/2018), Hypertension, Indigestion, Kidney stones, Near-syncope, Nerve compression, and Tinnitus.    FHx:  family history is not on file.    SHx:   reports that he has never smoked. He has never used smokeless tobacco. He reports current alcohol use. He reports that he does not use drugs.    Allergies:  Allergies   Allergen Reactions    Ativan      Pt reports that he gets very agitated     Hydrocodone Rash     Confusion   Agitation     Lorazepam Unspecified     Agitation        Medications:    Current Facility-Administered Medications:     omeprazole (PriLOSEC) capsule 20 mg, 20 mg, Oral, DAILY, Josiah Casiano M.D.    niCARdipine (Cardene) 25 mg in  mL Standard Infusion, 0-15 mg/hr, Intravenous, Continuous, Josiah Casiano M.D., Last Rate: 25 mL/hr at 09/30/23 1334, 2.5 mg/hr at 09/30/23 1334    Respiratory Therapy Consult, , Nebulization, Continuous RT, Josiah Casiano M.D. MD Alert...ICU Electrolyte Replacement per Pharmacy, , Other, PHARMACY TO DOSE, Josiah Casiano M.D.    protamine injection 20 mg, 20 mg, Intravenous, Once, Josiah Casiano M.D.    atorvastatin (Lipitor) tablet 40 mg, 40 mg, Oral, Q EVENING, Lizandro Greenwood M.D.    sodium chloride 200 mEq in empty bag 50 mL infusion, 200 mEq, Intravenous, Once, Lizandro Greenwood M.D.    propofol (DIPRIVAN) injection, 0-80 mcg/kg/min (Ideal), Intravenous, Continuous, Last Rate: 9.3 mL/hr at 09/30/23 1328, 20 mcg/kg/min at 09/30/23 1328 **AND** Triglycerides Starting now and then Every 3 Days, , , Every 3 Days (0300), Lizandro Greenwood M.D.    memantine (Namenda) tablet 5 mg, 5 mg, Oral, BID, Rafa Butterfield M.D., 5 mg at 09/29/23 1740    asa/apap/caffeine (Excedrin) 250-250-65 MG per tablet 2 Tablet, 2 Tablet, Oral, Q8HRS PRN, Rafa Butterfield M.D.    calcium carbonate (Tums) chewable tab 500 mg, 500 mg, Oral, TID PRN, Rafa Butterfield M.D., 500 mg at 09/29/23 1342    ondansetron (Zofran ODT) dispertab 4 mg, 4  mg, Oral, Q4HRS PRN, Rafa Butterfield M.D.    senna-docusate (Pericolace Or Senokot S) 8.6-50 MG per tablet 2 Tablet, 2 Tablet, Oral, BID **AND** polyethylene glycol/lytes (Miralax) PACKET 1 Packet, 1 Packet, Oral, QDAY PRN **AND** magnesium hydroxide (Milk Of Magnesia) suspension 30 mL, 30 mL, Oral, QDAY PRN **AND** bisacodyl (Dulcolax) suppository 10 mg, 10 mg, Rectal, QDAY PRN, Rafa Butterfield M.D.    amLODIPine (Norvasc) tablet 5 mg, 5 mg, Oral, DAILY, Rafa Butterfield M.D.    losartan (Cozaar) tablet 50 mg, 50 mg, Oral, DAILY, Rafa Butterfield M.D.    insulin GLARGINE (Lantus,Semglee) injection, 18 Units, Subcutaneous, Q EVENING, Rhoda Argueta M.D., 18 Units at 09/28/23 1712    ondansetron (Zofran) syringe/vial injection 4 mg, 4 mg, Intravenous, Q4HRS PRN, Rhoda Argueta M.D., 4 mg at 09/29/23 0151    labetalol (Normodyne/Trandate) injection 10 mg, 10 mg, Intravenous, Q6HRS PRN, Gilberto Levine M.D., 10 mg at 09/30/23 0827    hydrALAZINE (Apresoline) injection 20 mg, 20 mg, Intravenous, Q6HRS PRN, Gilberto Levine M.D., 20 mg at 09/30/23 1326    Respiratory Therapy Consult, , Nebulization, Continuous RT, Florian Cole M.D.    ipratropium-albuterol (DUONEB) nebulizer solution, 3 mL, Nebulization, Q2HRS PRN (RT), Florian Cole M.D.    insulin lispro (HumaLOG,AdmeLOG) injection, 2-9 Units, Subcutaneous, Q6HRS, Rhoda Argueta M.D., 2 Units at 09/30/23 0625    acetaminophen (Tylenol) tablet 650 mg, 650 mg, Oral, Q4HRS PRN, Gilberto Levine M.D., 650 mg at 09/28/23 0807    lactated ringers infusion (BOLUS), 1,000 mL, Intravenous, Once PRN, Med Peña M.D.    [DISCONTINUED] insulin regular (HumuLIN R,NovoLIN R) injection, 2-9 Units, Subcutaneous, Q6HRS, 6 Units at 09/25/23 0515 **AND** POC blood glucose manual result, , , Q6H **AND** NOTIFY MD and PharmD, , , Once **AND** Administer 20 grams of glucose (approximately 8 ounces of fruit juice) every 15 minutes PRN FSBG  less than 70 mg/dL, , , PRN **AND** dextrose 50% (D50W) injection 25 g, 25 g, Intravenous, Q15 MIN PRN, Med Peña M.D.    Physical Examination:    Vitals:    09/30/23 1225 09/30/23 1300 09/30/23 1318 09/30/23 1321   BP: 105/76 (!) 191/80 (!) 165/79 (!) 202/94   Pulse:  73 75 83   Resp:  (!) 35 (!) 28 18   Temp:       TempSrc:       SpO2: 94% 90% 89% 99%   Weight:  (!) 136 kg (299 lb 9.7 oz)     Height:               NEUROLOGICAL EXAM:     Patient is awake but not responsive.  Not speaking.  Not follow any commands.  Global aphasic.  Cranials 2-12 pupils equal reactive.  Does have a left gaze preference.  Not able to give the overcome the midline.  Right facial droop.  Blinks to threat to both sides.  Antigravity and in left arm and leg.  No movement in the right arm and leg.  No response to noxious stimuli in the right arm and leg.  Withdraws on the left.  Upgoing toe on the right.  On the left.  No obvious signs ataxia.  Cannot assess gait due to clinical addition.  Regular heart rhythm.  Cannot visualize fundus.    NIH Stroke Scale:    1a. Level of Consciousness (Alert, drowsy, etc): 1= Drowsy    1b. LOC Questions (Month, age): 2= Incorrect    1c. LOC Commands (Open/close eyes make fist/let go): 2= Incorrect    2.   Best Gaze (Eyes open - patient follows examiner's finger on face): 2= Forced deviation    3.   Visual Fields (introduce visual stimulus/threat to patient's field quadrants): 0= No visual loss  4.   Facial Paresis (Show teeth, raise eyebrows and squeeze eyes shut): 2 = Partial     5a. Motor Arm - Left (Elevate arm to 90 degrees if patient is sitting, 45 degrees if  supine): 0= No drift    5b. Motor Arm - Right (Elevate arm to 90 degrees if patient is sitting, 45 degrees if supine): 4= No movement    6a. Motor Leg - Left (Elevate leg 30 degrees with patient supine): 0= No drift    6b. Motor Leg - Right  (Elevate leg 30 degrees with patient supine): 4= No movement    7.   Limb Ataxia (Finger-nose,  heel down shin): 0= No ataxia    8.   Sensory (Pin prick to face, arm, trunk and leg - compare side to side): 2- Severe loss    9.  Best Language (Name item, describe a picture and read sentences): 3= Mute    10. Dysarthria (Evaluate speech clarity by patient repeating listed words): 2= Near to unintelligible or worse    11. Extinction and Inattention (Use information from prior testing to identify neglect or  double simultaneous stimuli testing): 0= No neglect    Total NIH Score: 24    Modified [place] Scale (MRS): 0 = No symptoms      Objective Data:    Labs:  Lab Results   Component Value Date/Time    PROTHROMBTM 14.5 09/24/2023 10:40 PM    INR 1.11 09/24/2023 10:40 PM      Lab Results   Component Value Date/Time    WBC 7.8 09/30/2023 01:53 AM    RBC 4.57 (L) 09/30/2023 01:53 AM    HEMOGLOBIN 14.2 09/30/2023 01:53 AM    HEMATOCRIT 41.6 (L) 09/30/2023 01:53 AM    MCV 91.0 09/30/2023 01:53 AM    MCH 31.1 09/30/2023 01:53 AM    MCHC 34.1 09/30/2023 01:53 AM    MPV 11.6 09/30/2023 01:53 AM    NEUTSPOLYS 62.70 09/30/2023 01:53 AM    LYMPHOCYTES 21.90 (L) 09/30/2023 01:53 AM    MONOCYTES 11.90 09/30/2023 01:53 AM    EOSINOPHILS 1.90 09/30/2023 01:53 AM    BASOPHILS 0.30 09/30/2023 01:53 AM      Lab Results   Component Value Date/Time    SODIUM 142 09/30/2023 01:53 AM    POTASSIUM 4.3 09/30/2023 01:53 AM    CHLORIDE 105 09/30/2023 01:53 AM    CO2 27 09/30/2023 01:53 AM    GLUCOSE 149 (H) 09/30/2023 01:53 AM    BUN 15 09/30/2023 01:53 AM    CREATININE 0.69 09/30/2023 01:53 AM    CREATININE 1.0 05/13/2009 03:30 PM      Lab Results   Component Value Date/Time    CHOLSTRLTOT 107 06/07/2023 11:13 AM    LDL 46 06/07/2023 11:13 AM    HDL 36 (A) 06/07/2023 11:13 AM    TRIGLYCERIDE 248 (H) 09/27/2023 03:00 AM       Lab Results   Component Value Date/Time    ALKPHOSPHAT 129 (H) 09/30/2023 01:53 AM    ASTSGOT 54 (H) 09/30/2023 01:53 AM    ALTSGPT 27 09/30/2023 01:53 AM    TBILIRUBIN 0.8 09/30/2023 01:53 AM         Imaging/Testing:  CT-HEAD W/O   Final Result         1.  New large parenchymal hemorrhage involving the frontal lobe is identified with some surrounding edema.      2.  Subarachnoid hemorrhage is also noted in the area.      3.  Minimal midline shift to the right side.      Findings are consistent with atrophy.  Decreased attenuation in the periventricular white matter likely indicates microvascular ischemic disease.      Based solely on CT findings, the brain injury guideline category is mBIG 3.      EDH   IVH   Displaced skull fx   SDH > 8mm   IPH > 8mm or multiple   SAH bi-hemispheric or > 3mm      The original BIG retrospective analysis found radiographic progression in 0% of BIG 1 patients and 2.6% BIG 2.      These findings were discussed with GEETA MOLINA on 09/30/2023.      DX-CHEST-PORTABLE (1 VIEW)   Final Result      Stable multiple pulmonary vascular dilation/congestion. Otherwise, negative.      DX-CHEST-PORTABLE (1 VIEW)   Final Result      1.  Increasing airspace disease at the left base.      DX-CHEST-LIMITED (1 VIEW)   Final Result         1.  Pulmonary edema and/or infiltrates are identified, which are somewhat decreased since the prior exam.   2.  Cardiomegaly   3.  Atherosclerosis      DX-CHEST-PORTABLE (1 VIEW)   Final Result         1.  Pulmonary edema and/or infiltrates are identified, which appear somewhat increased since the prior exam.   2.  Cardiomegaly   3.  Atherosclerosis      DX-CHEST-PORTABLE (1 VIEW)   Final Result         1.  Interstitial edema and/or infiltrates, slightly decreased since prior study.   2.  Cardiomegaly   3.  Atherosclerosis      DX-CHEST-LIMITED (1 VIEW)   Final Result      Right IJ catheter tip projects in the SVC. No pneumothorax.      CT-CTA NECK WITH & W/O-POST PROCESSING   Final Result      1.  Estimated 50-75% stenosis of the BILATERAL carotid arteries   2.  Estimated greater than 50% stenosis at the vertebral artery ostium on each side      CT-CTA HEAD  WITH & W/O-POST PROCESS   Final Result      1.  No large vessel occlusion or aneurysm.   2.  Hazy left frontal lobe subarachnoid hemorrhage, decreased in conspicuity from prior. No new hemorrhage seen.   3.  Scattered opacifications of the ethmoid sinuses, likely related to support hardware.      DX-ABDOMEN FOR TUBE PLACEMENT   Final Result         1.  Nonspecific bowel gas pattern in the upper abdomen.   2.  Dobbhoff tube with tip terminating overlying the expected location of the first or second duodenal segment.      DX-CHEST-PORTABLE (1 VIEW)   Final Result         1.  Interstitial edema and/or infiltrates.   2.  Cardiomegaly   3.  Nasogastric tube terminates in the distal esophagus, recommend advancement   4.  Atherosclerosis      DX-ABDOMEN FOR TUBE PLACEMENT   Final Result         1.  Nonspecific bowel gas pattern in the upper abdomen.   2.  Nasogastric tube terminates just distal to the gastroesophageal junction, recommend advancement      CT-RENAL COLIC EVALUATION(A/P W/O)   Final Result         1.  Intrahepatic biliary air and nondependent air within the gallbladder, consider history of instrumentation or changes of cholangitis in the appropriate clinical setting.   2.  Large fat-containing bilateral inguinal hernias   3.  Enlarged prostate, consider causes of prostate enlargement with additional workup as clinically appropriate   4.  Diverticulosis   5.  Cholelithiasis   6.  Atherosclerosis      CT-HEAD W/O   Final Result         1.  Hazy left frontal subarachnoid hemorrhage.   2.  Nonspecific white matter changes, commonly associated with small vessel ischemic disease.  Associated mild cerebral atrophy is noted.      Based solely on CT findings, the brain injury guideline category is mBIG 1.      SDH < 4mm   IPH < 4mm   SAH < 3 sulci and < 1mm      The original BIG retrospective analysis found radiographic progression in 0% of BIG 1 patients and 2.6% BIG 2.      These findings were discussed with the  patient's clinician, Madhu Zambrano, on 9/24/2023 10:10 PM.      DX-CHEST-PORTABLE (1 VIEW)   Final Result      No acute cardiopulmonary abnormality identified.      MR-BRAIN-W/O    (Results Pending)   DX-CHEST-PORTABLE (1 VIEW)    (Results Pending)     ICH score:2  Vol 42cc      Assessment and Plan:    68-year-old male past ministry of alcohol abuse with coagulopathy issues with low platelets and abnormal TEG studies on arrival here.  Had urosepsis that has mostly resolved.  This morning patient was more responsive CT head shows a large left frontal IPH.  I believe the etiology is due to a combination of factors.  1 being the coagulopathy issue from aligning alcohol abuse.  Possibly amyloid.  Patient was also started on Lovenox for DVT prophylactics 5 days ago.  CTA head and neck 5 days ago.  Be unremarkable did not appreciate any aneurysm or signs of venous thrombosis.  At this time recommend the patient be transferred to ICU.  Keep the blood pressure below 140 systolic.  Patient's blood pressures been running in the 160s to 180s for the past day.  Also recommend reversing the Lovenox.  Correcting the coagulopathy per the ICU team.  Also recommend getting an MRI brain without contrast.  And started on hyper osmotic therapy for sodium goal between 100 1560 systolic.  Giving 3% 500 cc pushes until obtain goal.  Or using 23% 50 mL pushes..    Plan:  1.  ICU admission  2.  Correct coagulopathy-new TEG study  3.  Reverse Lovenox  4.  Keep the blood pressure between 100-140 systolic.  5.  Central line start Hypertonic  therapy with 3% for sodium goal between 150 and 160.  Okay to give 500 cc pushes until we obtain sodium goals.  Or use 23% 50 mill pushes instead.  6.  MRI brain with and without contrast.  7.  Agree with neurosurgery consultation.        The evaluation of the patient, and recommended management, was discussed with the resident staff.     This chart was partially generated using voice recognition  technology and sound alike word replacement may be present, best efforts were made to make the chart accurate.    Vinicius Guardado MD  Board Certified Neurology, ABPN  t) [phone]

## 2023-09-30 NOTE — PROGRESS NOTES
"0700 - Report received from Michele RN at patient's bedside. Patient resting in bed quietly with no complaints at this time. Telemetry monitor intact et functioning. Call light and belongings within reach, safety measures intact, white board updated.     0753 - CNA reported elevated BP to RN, 184/113. RN in to see patient, patient arousable, but returns to sleep, not safe to take PO medications at this time. Reported to Dr. Casiano. Also discussed Ativan treatment, that it is listed as an allergy and that patient was given it overnight. Requested medication be discontinued, Reported elevated BP to provider, discussed will give PPO medications if patient becomes appropriate for them.     0820 - Labetalol given for elevated BP as ordered.     0900 - Spoke with tele monitoring, patient's HR dipping into high 40's but not sustaining. Will notify RN if sustains less than 45. Reported to provider.    1030 - Wife at bedside, angry patient has received ativan. States that this is why patient is confused and that the nurses did this to him. Also states this has nothing to do with alcohol because sometimes patient drinks every day and sometimes he doesn't. States he is NOT an alcohol withdraw.     1045 - Patient off floor to CT    1130 - Patient returned to room. Wife at bedside, continues to be angry that patient. Loudly stating to RN that he is going to be in the hospital longer now because he had ativan. RN attempted to explain to wife that staff was looking into other avenues to ensure patient was alright to which wife replied, \"there's nothing else wrong with him, he just spent 5 days ain the ICU. It's the ativan.\" RN did not attempt to explain further.     1145 - Order in to transfer patient to ICU.     1200 - Room assigned, Called RICU charge RN to determine which RN getting patient.     1215 - Notified Tele charge RN that patient was on floor. Charge RN gave this RN contact information for receiving RN. Report called to " Belen KIRBY.     [facility]

## 2023-09-30 NOTE — DISCHARGE PLANNING
[facility] Transitional Care Coordination    Referral from:  Rashialberta  Insurance Provider on Facesheet:ALVARO  Potential Rehab Diagnosis: SAH, IPH    Chart review indicates patient may have on going medical management and may have therapy needs to possibly meet inpatient rehab facility criteria with the goal of returning to community.    D/C support: will need to verify d/c support     Physiatry consultation pended per protocol.  Will need therapy evaluations when medically appropriate.         Thank you for the referral.

## 2023-09-30 NOTE — CONSULTS
Hospital Medicine Consultation    Date of Service  9/29/2023    Referring Physician  Raudel SALAZAR M.D.    Consulting Physician  Rafa Butterfield M.D.    Reason for Consultation  Transfer of care    History of Presenting Illness  68 y.o. male who presented 9/24/2023 with severe sepsis/shock  68 y.o. male patient of LE, nephrolithiasis, hypertension who presented 9/24/2023 with altered mental status with a mechanical fall and was subsequently [facility] emergency department.  During his evaluation be febrile tachycardic used.  CT head revealed small frontal subarachnoid hemorrhage, lactate 4.6,Leukocytosis with purulent urinalysis with many RBC's.  Given his history of nephrolithiasis a CT abdomen and pelvis was performed to exclude infected stone. Nephrolithiasis was not noted.  However there was hepatic biliary air.  Patient responded favorably to medical therapy during hospitalization.  On 9/28/2023 patient was off vasopressor agents subsequently extubated and was maintaining saturations on room air throughout the day on 9/20/2023.  Patient was subsequently transferred to medicine service on 9/29/2023.  Upon bedside interview patient offers no acute complaints aside from mild headache for which she normally takes Excedrin for home this was provided for him.  Additionally there was concern the patient may be experiencing early withdrawal type symptoms from alcohol patient is a daily drinker.  He was given challenge dose of Versed which improved his symptomatology.  Patient was placed on CIWA protocol.  Patient likely suitable for discharge on 9/30/2023.    Review of Systems  Review of Systems   Constitutional:  Negative for chills and fever.   HENT:  Positive for sore throat. Negative for hearing loss.    Respiratory:  Positive for cough and shortness of breath. Negative for hemoptysis, sputum production and wheezing.    Cardiovascular:  Positive for leg swelling. Negative for chest pain and  palpitations.   Gastrointestinal:  Negative for abdominal pain, diarrhea, nausea and vomiting.   Genitourinary:  Negative for dysuria.   Musculoskeletal:  Negative for myalgias.   Psychiatric/Behavioral:  Negative for depression.        Past Medical History   has a past medical history of LE (acute kidney injury), prerenal (CMS-HCC) (1/20/2018), Alcohol withdrawal (HCC) (1/22/2018), Bowel habit changes, CAD (coronary artery disease), Cancer (HCC), Cellulitis of back except buttock (8/3/2018), HTN (hypertension), Hyperglycemia (1/19/2018), Hypertension, Indigestion, Kidney stones, Near-syncope, Nerve compression, and Tinnitus.    Surgical History   has a past surgical history that includes gastroscopy (3/29/2018); egd w/endoscopic ultrasound (3/29/2018); and egd with asp/bx (3/29/2018).    Family History  family history is not on file.    Social History   reports that he has never smoked. He has never used smokeless tobacco. He reports current alcohol use. He reports that he does not use drugs.    Medications  Prior to Admission Medications   Prescriptions Last Dose Informant Patient Reported? Taking?   Galcanezumab-gnlm (EMGALITY) 120 MG/ML Solution Auto-injector 9/21/2023 at 0930 Family Member Yes Yes   Sig: Inject 120 mg under the skin every 30 (thirty) days.   allopurinol (ZYLOPRIM) 300 MG Tab 9/24/2023 at 0800 Family Member Yes Yes   Sig: Take 300 mg by mouth every day.   amLODIPine (NORVASC) 5 MG Tab 9/24/2023 at 0800 Family Member No No   Sig: TAKE 1 TABLET BY MOUTH EVERY DAY   aspirin EC (ECOTRIN) 81 MG Tablet Delayed Response 9/24/2023 at 0800 Family Member Yes No   Sig: Take 81 mg by mouth every day.   diclofenac sodium (VOLTAREN) 1 % Gel 9/23/2023 at 2100 Family Member Yes Yes   Sig: Apply 4 g topically 4 times a day as needed. * apply to joints*   Indications: Joint Damage causing Pain and Loss of Function   furosemide (LASIX) 20 MG Tab 9/24/2023 at 0800 Family Member Yes Yes   Sig: Take 20 mg by mouth  every day.   losartan (COZAAR) 50 MG Tab 9/24/2023 at 0800 Family Member No No   Sig: Take 1 Tablet by mouth every day.   memantine (NAMENDA) 5 MG Tab 9/24/2023 at 0800 Family Member Yes Yes   Sig: Take 5 mg by mouth 2 times a day.   metFORMIN (GLUCOPHAGE) 500 MG Tab 9/24/2023 at 0800 Family Member Yes Yes   Sig: Take 500 mg by mouth 2 times a day.   naproxen (NAPROSYN) 500 MG Tab 9/24/2023 at 0800 Family Member Yes No   Sig: Take 500 mg by mouth 2 times a day as needed (pain).   omeprazole (PRILOSEC) 20 MG delayed-release capsule 9/24/2023 at 0800 Family Member Yes Yes   Sig: Take 20 mg by mouth every day.   pioglitazone (ACTOS) 15 MG Tab 9/24/2023 at 0800 Family Member Yes Yes   Sig: Take 15 mg by mouth every day.   potassium chloride SA (K-DUR) 10 MEQ Tab CR 9/24/2023 at 0800 Family Member Yes Yes   Sig: Take 10 mEq by mouth every day.   simvastatin (ZOCOR) 40 MG Tab 9/23/2023 at 2130 Family Member Yes Yes   Sig: Take 40 mg by mouth every evening.      Facility-Administered Medications: None       Allergies  Allergies   Allergen Reactions    Ativan      Pt reports that he gets very agitated     Hydrocodone Rash     Confusion   Agitation     Lorazepam Unspecified     Agitation        Physical Exam  Temp:  [36.3 °C (97.4 °F)-36.9 °C (98.4 °F)] 36.7 °C (98.1 °F)  Pulse:  [] 63  Resp:  [16-20] 20  BP: (155-193)/() 156/73  SpO2:  [94 %-98 %] 96 %    Physical Exam  Vitals and nursing note reviewed.   Constitutional:       General: He is not in acute distress.     Appearance: He is not ill-appearing or toxic-appearing.   HENT:      Head: Normocephalic and atraumatic.      Nose: Nose normal.   Eyes:      Extraocular Movements: Extraocular movements intact.   Cardiovascular:      Rate and Rhythm: Normal rate and regular rhythm.      Pulses: Normal pulses.      Heart sounds: No murmur heard.     No friction rub. No gallop.   Pulmonary:      Effort: Pulmonary effort is normal. No respiratory distress.       Breath sounds: No wheezing.   Abdominal:      General: There is no distension.      Tenderness: There is no abdominal tenderness. There is no guarding.   Musculoskeletal:         General: Normal range of motion.      Cervical back: Normal range of motion. No rigidity.      Right lower leg: No edema.      Left lower leg: No edema.   Skin:     General: Skin is warm.      Capillary Refill: Capillary refill takes less than 2 seconds.      Findings: No rash.   Neurological:      General: No focal deficit present.      Mental Status: He is oriented to person, place, and time.   Psychiatric:         Mood and Affect: Mood normal.         Fluids  Date 09/29/23 0700 - 09/30/23 0659   Shift 2639-6841 8762-7123 7481-3130 24 Hour Total   INTAKE   P.O. 0   0   I.V. 37.6   37.6   IV Piggyback 129.9   129.9   Shift Total 167.5   167.5   OUTPUT   Urine 400 100  500   Shift Total 400 100  500   Weight (kg) 133 133 133 133       Laboratory  Recent Labs     09/27/23  0300 09/28/23  0314 09/29/23  0310   WBC 8.8 7.2 8.2   RBC 4.17* 4.27* 4.34*   HEMOGLOBIN 13.0* 13.3* 13.6*   HEMATOCRIT 37.9* 38.8* 38.9*   MCV 90.9 90.9 89.6   MCH 31.2 31.1 31.3   MCHC 34.3 34.3 35.0   RDW 46.6 46.3 45.0   PLATELETCT 85* 87* 98*   MPV 11.8 11.9 10.9     Recent Labs     09/27/23  0300 09/28/23  0314 09/29/23  0310   SODIUM 138 138 143   POTASSIUM 3.7 3.5* 3.5*   CHLORIDE 105 103 103   CO2 25 26 30   GLUCOSE 156* 233* 147*   BUN 8 12 11   CREATININE 0.83 0.89 0.69   CALCIUM 7.9* 8.5 8.6              Recent Labs     09/27/23  0300   TRIGLYCERIDE 248*        Imaging  DX-CHEST-PORTABLE (1 VIEW)   Final Result      1.  Increasing airspace disease at the left base.      DX-CHEST-LIMITED (1 VIEW)   Final Result         1.  Pulmonary edema and/or infiltrates are identified, which are somewhat decreased since the prior exam.   2.  Cardiomegaly   3.  Atherosclerosis      DX-CHEST-PORTABLE (1 VIEW)   Final Result         1.  Pulmonary edema and/or infiltrates are  identified, which appear somewhat increased since the prior exam.   2.  Cardiomegaly   3.  Atherosclerosis      DX-CHEST-PORTABLE (1 VIEW)   Final Result         1.  Interstitial edema and/or infiltrates, slightly decreased since prior study.   2.  Cardiomegaly   3.  Atherosclerosis      DX-CHEST-LIMITED (1 VIEW)   Final Result      Right IJ catheter tip projects in the SVC. No pneumothorax.      CT-CTA NECK WITH & W/O-POST PROCESSING   Final Result      1.  Estimated 50-75% stenosis of the BILATERAL carotid arteries   2.  Estimated greater than 50% stenosis at the vertebral artery ostium on each side      CT-CTA HEAD WITH & W/O-POST PROCESS   Final Result      1.  No large vessel occlusion or aneurysm.   2.  Hazy left frontal lobe subarachnoid hemorrhage, decreased in conspicuity from prior. No new hemorrhage seen.   3.  Scattered opacifications of the ethmoid sinuses, likely related to support hardware.      DX-ABDOMEN FOR TUBE PLACEMENT   Final Result         1.  Nonspecific bowel gas pattern in the upper abdomen.   2.  Dobbhoff tube with tip terminating overlying the expected location of the first or second duodenal segment.      DX-CHEST-PORTABLE (1 VIEW)   Final Result         1.  Interstitial edema and/or infiltrates.   2.  Cardiomegaly   3.  Nasogastric tube terminates in the distal esophagus, recommend advancement   4.  Atherosclerosis      DX-ABDOMEN FOR TUBE PLACEMENT   Final Result         1.  Nonspecific bowel gas pattern in the upper abdomen.   2.  Nasogastric tube terminates just distal to the gastroesophageal junction, recommend advancement      CT-RENAL COLIC EVALUATION(A/P W/O)   Final Result         1.  Intrahepatic biliary air and nondependent air within the gallbladder, consider history of instrumentation or changes of cholangitis in the appropriate clinical setting.   2.  Large fat-containing bilateral inguinal hernias   3.  Enlarged prostate, consider causes of prostate enlargement with  additional workup as clinically appropriate   4.  Diverticulosis   5.  Cholelithiasis   6.  Atherosclerosis      CT-HEAD W/O   Final Result         1.  Hazy left frontal subarachnoid hemorrhage.   2.  Nonspecific white matter changes, commonly associated with small vessel ischemic disease.  Associated mild cerebral atrophy is noted.      Based solely on CT findings, the brain injury guideline category is mBIG 1.      SDH < 4mm   IPH < 4mm   SAH < 3 sulci and < 1mm      The original BIG retrospective analysis found radiographic progression in 0% of BIG 1 patients and 2.6% BIG 2.      These findings were discussed with the patient's clinician, Madhu Zambrano, on 9/24/2023 10:10 PM.      DX-CHEST-PORTABLE (1 VIEW)   Final Result      No acute cardiopulmonary abnormality identified.          Assessment/Plan  Acute respiratory failure with hypoxia (HCC)- (present on admission)  Assessment & Plan  Emergent intubation 9/25/23 9/25/23: 7.330/42/60  APVCMV 20/470/40%/8  9/26/23: 22/470/40%/8    - ABCDE protocol  9/29/2023:  Patient status post extubation on simple nasal cannula maintaining saturation without issue.    Thrombocytopenia (HCC)- (present on admission)  Assessment & Plan  Baseline ~110-125  Possibly secondary to alcohol use disorder although normal H&H and LFTs    9/25/23: 86  9/26/23: 84    - trend    Pneumobilia- (present on admission)  Assessment & Plan  As seen on CT renal (9/25/23).    9/26/23: No need for further interventions at this time per gen surg    SAH (subarachnoid hemorrhage) (HCC)- (present on admission)  Assessment & Plan  Small traumatic subarachnoid hemorrhage s/p mechanical fall  Not c/w aneurysm  CTA head and neck with improvement of SAH  -Continue serial neurologic exams  - No NSAIDs  - Okay to start DVT prophylaxis given imaging documentation of SAH improvement    Type 2 diabetes mellitus (HCC)- (present on admission)  Assessment & Plan  A1c 5.7% (6/2023)  Home meds: metformin,  pioglitazone    9/26/23: 15u glargine    - hold home meds for now  - basal and SSI to glucose goal 140-180; titrate as clinically indicated     Essential hypertension- (present on admission)  Assessment & Plan  Hypertensive at presentation despite sepsis  Outpatient meds: amlodipine 5mg, losartan 40; hold for now    9/25/23: requiring pressors (levo 0.3), titrate down as able  9/26/23: levo 0.28    - Goal -140, MAP >65  9/29/2023  Patient becoming increasingly hypertensive add back home medications    BMI 40.0-44.9, adult (HCC)- (present on admission)  Assessment & Plan  Noted  Increased risk for decompensation    Alcohol dependence (HCC)- (present on admission)  Assessment & Plan  Multivitamin, folate, thiamine  Monitor, additional therapies as clinically indicated  9/29/2023 patient was at least 2-3 hard liquor drinks daily although this is unclear per patient and his wife's history.  Patient certainly displaying signs symptoms suggestive of possible withdrawal this may have been masked while he was on propofol during intubation.  I have added CIWA protocol in the event he experiences withdrawal type symptoms.      Greater than 61 minutes spent prepping to see patient (e.g. review of tests) obtaining and/or reviewing separately obtained history. Performing a medically appropriate examination and/ evaluation.  Counseling and educating the patient/family/caregiver.  Ordering medications, tests, or procedures.  Referring and communicating with other health care professionals.  Documenting clinical information in EPIC.  Independently interpreting results and communicating results to patient/family/caregiver.  Care coordination.  Please note that this dictation was created using voice recognition software. I have made every reasonable attempt to correct obvious errors, but I expect that there are errors of grammar and possibly context that I did not discover before finalizing the note.

## 2023-09-30 NOTE — CARE PLAN
The patient is Unstable - High likelihood or risk of patient condition declining or worsening    Shift Goals  Clinical Goals: CIWA, safety, stable VS  Patient Goals: Sleep  Family Goals: DEENA    Progress made toward(s) clinical / shift goals:  TX to ICU      Problem: Hemodynamics  Goal: Patient's hemodynamics, fluid balance and neurologic status will be stable or improve  Outcome: Progressing     Problem: Fluid Volume  Goal: Fluid volume balance will be maintained  Outcome: Progressing     Problem: Respiratory  Goal: Patient will achieve/maintain optimum respiratory ventilation and gas exchange  Outcome: Progressing  Flowsheets (Taken 9/30/2023 1210)  O2 Delivery Device: Nasal Cannula     Problem: Skin Integrity  Goal: Skin integrity is maintained or improved  Outcome: Progressing     Problem: Fall Risk  Goal: Patient will remain free from falls       Patient is not progressing towards the following goals:      Problem: Knowledge Deficit - Standard  Goal: Patient and family/care givers will demonstrate understanding of plan of care, disease process/condition, diagnostic tests and medications  Outcome: Not Progressing

## 2023-09-30 NOTE — PROGRESS NOTES
"Pulmonary/Critical Care Progress Note    Date of admission  9/24/2023    Chief Complaint  68 y.o. male admitted 9/24/2023 with septic shock and found a small frontal subarachnoid hemorrhage and pneumobilia. Required intubation/mechanical ventilation.    Hospital Course  Per Dr Peña note from 9/24: \"68 y.o. male patient of LE, nephrolithiasis, hypertension who presented 9/24/2023 with altered mental status with a mechanical fall and was subsequently [facility] emergency department.  During his evaluation be febrile tachycardic used.  CT head revealed small frontal subarachnoid hemorrhage, lactate 4.6,Leukocytosis with purulent urinalysis with many RBC's.  Given his history of nephrolithiasis a CT abdomen and pelvis was performed to exclude infected stone. Nephrolithiasis was not noted.  However there was hepatic biliary air.\"    9/25: surgery was consulted Dr Hirsch. Continue resuscitation with IVF, antibiotics, If worsens, consider jamila tube. Patient was intubated acutely overnight.  9/26: requiring high levels of sedatives. Had to start a second vasopressor. Failed SAT and SBT due to agitation. Vasopressors are weaning down. Will start dvt chemical prophylaxis tonight.  9/27: changed propofol to precedex, hopes to move towards extubation. Consider tube feeds if  not extubated  9/28: he is now following commands, maximizing status for extubation.  9/29-9/30: Transferred to floor, under care with hospitalist.  9/30: patient was found with AMS, suspected ETOH withdrawal, s/p benzos. CT head showed parenchymal hemorrhage. Transferred to ICU. Intubated, CVC placed, vascular neurology and neurosurgery consulted      Interval Problem Update  Reviewed last 24 hour events:   I was called by hospitalist due to patient with AMS around noon. It seems that overnight was Initially suspected to be withdrawal of ETOHism and was treated with benzos. Early this am he did not improved, A follow up CT head showed " parenchymal bleed 6x4cm, for what neurology was consulted and patient was transferred to the neuro icu.  *neuro: initially he was alert and following commands, was noted that he couldn't move the right arm nor right leg.  He was non verbal, nodding yes/no appropriately but progressively decline to not to protect his airway for what he was intubated.   *Pulm: s/p LLL pna treatment. On mechanical ventilation.  *Cards: SR. No ectopy.  He will need a cardene ggt, goal SBP <140 per neurology.  *Nephro: appropriate/normal GFR at 92.  Na 141, will treat with hypertonic saline until Na is 150-160 goal. A central line was placed.  *ID: leukocytosis has resolved. S/p sepsis d/t LLL pneumonia, UTI, cholecystitis, completed 5 days of flagyl and ceftriaxone  *Hem: H&H stable at 14/41, thrombocytopenia is improving  157-->115-->86-->85-->87 --> 116, due to inflammatory block given sepsis and pneumobilia.  *GI: Pepcid prophylaxis  *: Appropriate urine output, continue monitoring  *Endo: keep goal of glucose 140-180   Will attempt to keep him with normothermia.    Review of Systems  Review of Systems   Unable to perform ROS: Acuity of condition        Vital Signs for last 24 hours   Temp:  [36.7 °C (98.1 °F)] 36.7 °C (98.1 °F)  Pulse:  [59-88] 67  Resp:  [17-49] 23  BP: (105-202)/() 145/72  SpO2:  [89 %-99 %] 94 %    Hemodynamic parameters for last 24 hours       Respiratory Information for the last 24 hours  Vent Mode: APVCMV  Rate (breaths/min): 18  Vt Target (mL): 470  PEEP/CPAP: 8  MAP: 15  Control VTE (exp VT): 467    Physical Exam   Physical Exam  Vitals and nursing note reviewed.   Constitutional:       General: He is not in acute distress.     Appearance: He is not toxic-appearing.   HENT:      Head: Normocephalic and atraumatic.      Nose: Nose normal.      Mouth/Throat:      Comments: ETT in place  Eyes:      General:         Right eye: No discharge.         Left eye: No discharge.   Cardiovascular:      Rate and  Rhythm: Normal rate and regular rhythm.      Pulses: Normal pulses.      Heart sounds: No murmur heard.     No friction rub. No gallop.   Pulmonary:      Effort: Pulmonary effort is normal. No respiratory distress.      Breath sounds: No stridor. No wheezing, rhonchi or rales.   Abdominal:      General: There is no distension.      Tenderness: There is no abdominal tenderness. There is no guarding.   Musculoskeletal:         General: Normal range of motion.      Right lower leg: No edema.      Left lower leg: No edema.   Lymphadenopathy:      Cervical: No cervical adenopathy.   Skin:     General: Skin is warm.      Capillary Refill: Capillary refill takes less than 2 seconds.      Findings: No rash.   Neurological:      Comments: Patient intubated on mechanical ventilation and under sedation         Medications  Current Facility-Administered Medications   Medication Dose Route Frequency Provider Last Rate Last Admin    niCARdipine (Cardene) 25 mg in  mL Standard Infusion  0-15 mg/hr Intravenous Continuous Josiah Casiano M.D. 75 mL/hr at 09/30/23 1458 7.5 mg/hr at 09/30/23 1458    Respiratory Therapy Consult   Nebulization Continuous RT Josiah Casiano M.D. MD Alert...ICU Electrolyte Replacement per Pharmacy   Other PHARMACY TO DOSE Josiah Casiano M.D.        lidocaine (Xylocaine) 1 % injection 2 mL  2 mL Tracheal Tube Q30 MIN PRN Lizandro Greenwood M.D.        fentaNYL (Sublimaze) injection 100 mcg  100 mcg Intravenous Q15 MIN PRN Lizandro Greenwood M.D.   100 mcg at 09/30/23 1413    And    fentaNYL (Sublimaze) injection 200 mcg  200 mcg Intravenous Q15 MIN PRN Lizandro Greenwood M.D.   200 mcg at 09/30/23 1441    And    fentaNYL (SUBLIMAZE) 50 mcg/mL in 50mL (Continuous Infusion)   Intravenous Continuous Lizandro Greenwood M.D. 2 mL/hr at 09/30/23 1500 100 mcg/hr at 09/30/23 1500    And    propofol (DIPRIVAN) injection  0-80 mcg/kg/min (Ideal) Intravenous Continuous Lizandro Greenwood M.D. 23.3 mL/hr at  09/30/23 1514 50 mcg/kg/min at 09/30/23 1514    acetaminophen (Tylenol) tablet 650 mg  650 mg Enteral Tube Q4HRS PRN Lizandro Greenwood M.D.        [START ON 10/1/2023] amLODIPine (Norvasc) tablet 5 mg  5 mg Enteral Tube DAILY Lizandro Greenwood M.D.        atorvastatin (Lipitor) tablet 40 mg  40 mg Enteral Tube Q EVENING Lizandro Greenwood M.D.        [START ON 10/1/2023] losartan (Cozaar) tablet 50 mg  50 mg Enteral Tube DAILY Lizandro Greenwood M.D.        memantine (Namenda) tablet 5 mg  5 mg Enteral Tube BID Lizandro Greenwood M.D.        [START ON 10/1/2023] lansoprazole (Prevacid) solutab 30 mg  30 mg Enteral Tube DAILY Lizandro Greenwood M.D.        ondansetron (Zofran ODT) dispertab 4 mg  4 mg Enteral Tube Q4HRS PRN Lizandro Greenwood M.D.        senna-docusate (Pericolace Or Senokot S) 8.6-50 MG per tablet 2 Tablet  2 Tablet Enteral Tube BID Lizandro Greenwood M.D.        And    polyethylene glycol/lytes (Miralax) PACKET 1 Packet  1 Packet Enteral Tube QDAY PRN Lizandro Greenwood M.D.        And    magnesium hydroxide (Milk Of Magnesia) suspension 30 mL  30 mL Enteral Tube QDAY PRN Lizandro Greenwood M.D.        And    bisacodyl (Dulcolax) suppository 10 mg  10 mg Rectal QDAY PRN Lizandro Greenwood M.D.        labetalol (Normodyne/Trandate) injection 10 mg  10 mg Intravenous Q6HRS PRN Lizandro Greenwood M.D.        ondansetron (Zofran) syringe/vial injection 4 mg  4 mg Intravenous Q4HRS PRN Rhoda Argueta M.D.   4 mg at 09/29/23 0151    hydrALAZINE (Apresoline) injection 20 mg  20 mg Intravenous Q6HRS PRN Lizandro Greenwood M.D.   20 mg at 09/30/23 1326    ipratropium-albuterol (DUONEB) nebulizer solution  3 mL Nebulization Q2HRS PRN (RT) Florian Cole M.D.        insulin lispro (HumaLOG,AdmeLOG) injection  2-9 Units Subcutaneous Q6HRS Rhoda Argueta M.D.   2 Units at 09/30/23 1451    lactated ringers infusion (BOLUS)  1,000 mL Intravenous Once PRN Med ePña M.D.         dextrose 50% (D50W) injection 25 g  25 g Intravenous Q15 MIN PRN Med Peña M.D.           Fluids    Intake/Output Summary (Last 24 hours) at 9/30/2023 1608  Last data filed at 9/30/2023 1600  Gross per 24 hour   Intake 264.83 ml   Output 570 ml   Net -305.17 ml       Laboratory  Recent Labs     09/30/23  1521   ISTATAPH 7.385*   ISTATAPCO2 43.4*   ISTATAPO2 109*   ISTATATCO2 27   UXVFTDS4CTX 98   ISTATARTHCO3 26.0*   ISTATARTBE 1   ISTATTEMP 99.0 F   ISTATFIO2 100   ISTATSPEC Arterial   ISTATAPHTC 7.382*   [id number] 111*           Recent Labs     09/28/23 0314 09/29/23 0310 09/30/23  0153 09/30/23  1350   SODIUM 138 143 142 141   POTASSIUM 3.5* 3.5* 4.3 4.4   CHLORIDE 103 103 105 103   CO2 26 30 27 27   BUN 12 11 15 18   CREATININE 0.89 0.69 0.69 0.67   MAGNESIUM 1.8 1.8  --   --    PHOSPHORUS 2.1* 2.3*  --   --    CALCIUM 8.5 8.6 9.2 9.1     Recent Labs     09/28/23 0314 09/29/23 0310 09/30/23  0153 09/30/23  1350   ALTSGPT 21 22 27  --    ASTSGOT 35 46* 54*  --    ALKPHOSPHAT 107* 123* 129*  --    TBILIRUBIN 0.7 0.7 0.8  --    GLUCOSE 233* 147* 149* 164*     Recent Labs     09/28/23 0314 09/29/23 0310 09/30/23  0153   WBC 7.2 8.2 7.8   NEUTSPOLYS 65.90 70.40 62.70   LYMPHOCYTES 19.60* 16.80* 21.90*   MONOCYTES 10.80 11.20 11.90   EOSINOPHILS 2.30 0.40 1.90   BASOPHILS 0.40 0.20 0.30   ASTSGOT 35 46* 54*   ALTSGPT 21 22 27   ALKPHOSPHAT 107* 123* 129*   TBILIRUBIN 0.7 0.7 0.8     Recent Labs     09/28/23 0314 09/29/23  0310 09/30/23  0153 09/30/23  1350   RBC 4.27* 4.34* 4.57*  --    HEMOGLOBIN 13.3* 13.6* 14.2  --    HEMATOCRIT 38.8* 38.9* 41.6*  --    PLATELETCT 87* 98* 116*  --    PROTHROMBTM  --   --   --  14.5   APTT  --   --   --  27.6   INR  --   --   --  1.12       Imaging  X-Ray:  I have personally reviewed the images and compared with prior images.    CT head reviewed     Assessment/Plan  * Intraparenchymal hemorrhage of brain (HCC)- (present on admission)  Assessment & Plan  -Goal SBP <140  per Neurology recommendations. On cardene ggt.  -Avoid hypotension below 100/50.  Use IV 0.9% normal saline bolus if necessary.   -Serial neurologic exams  -MRI brain w/o contrast   -Maintain euglycemia <180mg/dL  -Appreciate the help from neurology.  -will target a Na 150-160, treat with hypertonic saline until goal is reached  -reverse dvt prophylactic dose of lovenox with protamine  -follow up TEG, replace blood products accordingly     Stroke (HCC)  Assessment & Plan  -Goal SBP <140 per Neurology recommendations. On cardene ggt.  -Avoid hypotension below 100/50.  Use IV 0.9% normal saline bolus if necessary.   -Serial neurologic exams  -MRI brain w/o contrast   -Atorvastatin 80mg qhs, goal LDL <70  - wait until lipids are back, start for LDL <70  -Maintain euglycemia <180mg/dL  -Consult PT/OT/Speech/Nutrition  -Appreciate the help from neurology.  -will target a Na 150-160, treat with hypertonic saline until goal is reached  -reverse dvt prophylactic dose of lovenox with protamine  -follow up TEG, replace blood products accordingly     Acute respiratory failure with hypoxia (HCC)- (present on admission)  Assessment & Plan  Patient was intubated from the night of 9/24 towards 9/25 and started on mechanical ventilation. Extubation on 9/28  Patient was re intubated on 9/30 due to not protecting his airway due to new stroke event.  Continue with lung protective strategies.  ABCDEF bundle according to protocol.  Sedation with propofol and fentanyl.    Thrombocytopenia (HCC)- (present on admission)  Assessment & Plan  Follow up platelets daily  Suspected inflammatory block and recovering  Reverse DVT prophylactic dose of lovenox.  Will obtain a TEG and COD, replace blood products accordingly     Pneumobilia- (present on admission)  Assessment & Plan  At initial admission to ICU, Consulted Surgery Dr Hirsch, already signed off  S/p resuscitation with IVF,  S/p vasopressor support  Conmpleted antibiotics: ceftriaxone and  flagyl.    SAH (subarachnoid hemorrhage) (HCC)- (present on admission)  Assessment & Plan  -There was a small existing traumatic subarachnoid hemorrhage prior to this admission (5 months ago).   -Not c/w aneurysm,   -CTA neck/head done during this admission on 9/25 showed decrease in the SAH. (admitted for sepsis LLL pneumonia, UTI and cholecystitis, improved and downgraded from icu on 9/29)  -Will continue with serial neurologic exams  -No NSAIDs  -Neurology consulted on 9/30, discussed with Dr Guardado.  -reverse dvt prophylactic dose of lovenox with protamine  -follow up TEG, replace blood products accordingly     Pneumonia due to infectious organism- (present on admission)  Assessment & Plan  Evidenced by CXR on LLL, Patient treated with ceftriaxone/flagyl for 5 days  S/p extubation on 9/28 for this problem.     Type 2 diabetes mellitus (HCC)- (present on admission)  Assessment & Plan  Insulin therapy.  Hold Metformin    Essential hypertension- (present on admission)  Assessment & Plan  Started on cardene ggt with goal SBP <140 per neurology recommendations.      BMI 40.0-44.9, adult (HCC)- (present on admission)  Assessment & Plan  BMI 40.65  Class III obesity  Dietary consult; Weight loss counseling not appropriate in acute care setting  This place him on high risk for morbidity     Alcohol dependence (HCC)- (present on admission)  Assessment & Plan  Monitor and treat alcohol withdrawal as clinically indicated.           VTE:  Contraindicated  Ulcer: H2 Antagonist  Lines: Central Line  Ongoing indication addressed and Joseph Catheter  Ongoing indication addressed    I have performed a physical exam and reviewed and updated ROS and Plan today (9/30/2023). In review of yesterday's note (9/29/2023), there are no changes except as documented above.     Discussed patient condition and risk of morbidity and/or mortality with Family, RN, RT, Pharmacy, and Dr Guardado from neurology. Dr Granados from neurosurgery also is  aware.  The patient remains critically ill.  Critical care time = 60 minutes in directly providing and coordinating critical care and extensive data review, actively weaning oxygen and evaluating for need of vasopressors.  No time overlap and excludes procedures.    Lizandro Greenwood MD FACP FCCP  Pulmonary/Critical Care

## 2023-09-30 NOTE — PROGRESS NOTES
"Patient refusing all SQ meds (lovenox and insulin) at this time, stating he has been a \"pincushion\".  Education provided, pt continued to refuse.   "

## 2023-10-01 ENCOUNTER — APPOINTMENT (OUTPATIENT)
Dept: RADIOLOGY | Facility: MEDICAL CENTER | Age: 68
DRG: 870 | End: 2023-10-01
Attending: INTERNAL MEDICINE
Payer: MEDICARE

## 2023-10-01 ENCOUNTER — APPOINTMENT (OUTPATIENT)
Dept: RADIOLOGY | Facility: MEDICAL CENTER | Age: 68
DRG: 870 | End: 2023-10-01
Attending: STUDENT IN AN ORGANIZED HEALTH CARE EDUCATION/TRAINING PROGRAM
Payer: MEDICARE

## 2023-10-01 LAB
ALBUMIN SERPL BCP-MCNC: 2.9 G/DL (ref 3.2–4.9)
ALBUMIN/GLOB SERPL: 1.2 G/DL
ALP SERPL-CCNC: 106 U/L (ref 30–99)
ALT SERPL-CCNC: 24 U/L (ref 2–50)
ANION GAP SERPL CALC-SCNC: 8 MMOL/L (ref 7–16)
ANION GAP SERPL CALC-SCNC: 9 MMOL/L (ref 7–16)
AST SERPL-CCNC: 41 U/L (ref 12–45)
BASE EXCESS BLDA CALC-SCNC: 3 MMOL/L (ref -4–3)
BASOPHILS # BLD AUTO: 0.5 % (ref 0–1.8)
BASOPHILS # BLD: 0.04 K/UL (ref 0–0.12)
BILIRUB SERPL-MCNC: 0.9 MG/DL (ref 0.1–1.5)
BODY TEMPERATURE: ABNORMAL DEGREES
BREATHS SETTING VENT: 18
BUN SERPL-MCNC: 22 MG/DL (ref 8–22)
BUN SERPL-MCNC: 22 MG/DL (ref 8–22)
CALCIUM ALBUM COR SERPL-MCNC: 9.3 MG/DL (ref 8.5–10.5)
CALCIUM SERPL-MCNC: 8.4 MG/DL (ref 8.5–10.5)
CALCIUM SERPL-MCNC: 8.5 MG/DL (ref 8.5–10.5)
CFT BLD TEG: 5 MIN (ref 4.6–9.1)
CFT P HPASE BLD TEG: 4.9 MIN (ref 4.3–8.3)
CHLORIDE SERPL-SCNC: 110 MMOL/L (ref 96–112)
CHLORIDE SERPL-SCNC: 116 MMOL/L (ref 96–112)
CLOT ANGLE BLD TEG: 80 DEGREES (ref 63–78)
CLOT LYSIS 30M P MA LENFR BLD TEG: 0.1 % (ref 0–2.6)
CO2 BLDA-SCNC: 28 MMOL/L (ref 20–33)
CO2 SERPL-SCNC: 25 MMOL/L (ref 20–33)
CO2 SERPL-SCNC: 25 MMOL/L (ref 20–33)
CREAT SERPL-MCNC: 0.82 MG/DL (ref 0.5–1.4)
CREAT SERPL-MCNC: 0.86 MG/DL (ref 0.5–1.4)
CT.EXTRINSIC BLD ROTEM: 0.7 MIN (ref 0.8–2.1)
DELSYS IDSYS: ABNORMAL
EOSINOPHIL # BLD AUTO: 0.25 K/UL (ref 0–0.51)
EOSINOPHIL NFR BLD: 2.9 % (ref 0–6.9)
ERYTHROCYTE [DISTWIDTH] IN BLOOD BY AUTOMATED COUNT: 48.4 FL (ref 35.9–50)
GFR SERPLBLD CREATININE-BSD FMLA CKD-EPI: 93 ML/MIN/1.73 M 2
GFR SERPLBLD CREATININE-BSD FMLA CKD-EPI: 94 ML/MIN/1.73 M 2
GLOBULIN SER CALC-MCNC: 2.5 G/DL (ref 1.9–3.5)
GLUCOSE BLD STRIP.AUTO-MCNC: 141 MG/DL (ref 65–99)
GLUCOSE BLD STRIP.AUTO-MCNC: 142 MG/DL (ref 65–99)
GLUCOSE BLD STRIP.AUTO-MCNC: 149 MG/DL (ref 65–99)
GLUCOSE BLD STRIP.AUTO-MCNC: 154 MG/DL (ref 65–99)
GLUCOSE BLD STRIP.AUTO-MCNC: 154 MG/DL (ref 65–99)
GLUCOSE SERPL-MCNC: 151 MG/DL (ref 65–99)
GLUCOSE SERPL-MCNC: 154 MG/DL (ref 65–99)
HCO3 BLDA-SCNC: 26.8 MMOL/L (ref 17–25)
HCT VFR BLD AUTO: 38.7 % (ref 42–52)
HGB BLD-MCNC: 12.9 G/DL (ref 14–18)
HOROWITZ INDEX BLDA+IHG-RTO: 118 MM[HG]
IMM GRANULOCYTES # BLD AUTO: 0.13 K/UL (ref 0–0.11)
IMM GRANULOCYTES NFR BLD AUTO: 1.5 % (ref 0–0.9)
LACTATE BLD-SCNC: 0.9 MMOL/L (ref 0.5–2)
LYMPHOCYTES # BLD AUTO: 2.37 K/UL (ref 1–4.8)
LYMPHOCYTES NFR BLD: 27.3 % (ref 22–41)
MAGNESIUM SERPL-MCNC: 1.9 MG/DL (ref 1.5–2.5)
MCF BLD TEG: 67 MM (ref 52–69)
MCF.PLATELET INHIB BLD ROTEM: 41.6 MM (ref 15–32)
MCH RBC QN AUTO: 31.3 PG (ref 27–33)
MCHC RBC AUTO-ENTMCNC: 33.3 G/DL (ref 32.3–36.5)
MCV RBC AUTO: 93.9 FL (ref 81.4–97.8)
MODE IMODE: ABNORMAL
MONOCYTES # BLD AUTO: 1.13 K/UL (ref 0–0.85)
MONOCYTES NFR BLD AUTO: 13 % (ref 0–13.4)
NEUTROPHILS # BLD AUTO: 4.76 K/UL (ref 1.82–7.42)
NEUTROPHILS NFR BLD: 54.8 % (ref 44–72)
NRBC # BLD AUTO: 0 K/UL
NRBC BLD-RTO: 0 /100 WBC (ref 0–0.2)
O2/TOTAL GAS SETTING VFR VENT: 50 %
PA AA BLD-ACNC: ABNORMAL % (ref 0–11)
PA ADP BLD-ACNC: ABNORMAL % (ref 0–17)
PCO2 BLDA: 38.9 MMHG (ref 26–37)
PCO2 TEMP ADJ BLDA: 40.5 MMHG (ref 26–37)
PEEP END EXPIRATORY PRESSURE IPEEP: 8 CMH20
PH BLDA: 7.45 [PH] (ref 7.4–7.5)
PH TEMP ADJ BLDA: 7.43 [PH] (ref 7.4–7.5)
PHOSPHATE SERPL-MCNC: 1.9 MG/DL (ref 2.5–4.5)
PLATELET # BLD AUTO: 164 K/UL (ref 164–446)
PMV BLD AUTO: 10.7 FL (ref 9–12.9)
PO2 BLDA: 59 MMHG (ref 64–87)
PO2 TEMP ADJ BLDA: 63 MMHG (ref 64–87)
POTASSIUM SERPL-SCNC: 3.8 MMOL/L (ref 3.6–5.5)
POTASSIUM SERPL-SCNC: 3.8 MMOL/L (ref 3.6–5.5)
PROT SERPL-MCNC: 5.4 G/DL (ref 6–8.2)
RBC # BLD AUTO: 4.12 M/UL (ref 4.7–6.1)
SAO2 % BLDA: 91 % (ref 93–99)
SODIUM SERPL-SCNC: 143 MMOL/L (ref 135–145)
SODIUM SERPL-SCNC: 150 MMOL/L (ref 135–145)
SODIUM SERPL-SCNC: 152 MMOL/L (ref 135–145)
SPECIMEN DRAWN FROM PATIENT: ABNORMAL
TEG ALGORITHM TGALG: ABNORMAL
TIDAL VOLUME IVT: 470 ML
WBC # BLD AUTO: 8.7 K/UL (ref 4.8–10.8)

## 2023-10-01 PROCEDURE — 70553 MRI BRAIN STEM W/O & W/DYE: CPT

## 2023-10-01 PROCEDURE — 94150 VITAL CAPACITY TEST: CPT

## 2023-10-01 PROCEDURE — 87040 BLOOD CULTURE FOR BACTERIA: CPT | Mod: 91

## 2023-10-01 PROCEDURE — 85576 BLOOD PLATELET AGGREGATION: CPT

## 2023-10-01 PROCEDURE — 700101 HCHG RX REV CODE 250: Performed by: STUDENT IN AN ORGANIZED HEALTH CARE EDUCATION/TRAINING PROGRAM

## 2023-10-01 PROCEDURE — 700105 HCHG RX REV CODE 258: Performed by: INTERNAL MEDICINE

## 2023-10-01 PROCEDURE — 94799 UNLISTED PULMONARY SVC/PX: CPT

## 2023-10-01 PROCEDURE — 83735 ASSAY OF MAGNESIUM: CPT

## 2023-10-01 PROCEDURE — 700102 HCHG RX REV CODE 250 W/ 637 OVERRIDE(OP): Performed by: INTERNAL MEDICINE

## 2023-10-01 PROCEDURE — 700105 HCHG RX REV CODE 258: Performed by: STUDENT IN AN ORGANIZED HEALTH CARE EDUCATION/TRAINING PROGRAM

## 2023-10-01 PROCEDURE — 84100 ASSAY OF PHOSPHORUS: CPT

## 2023-10-01 PROCEDURE — 770022 HCHG ROOM/CARE - ICU (200)

## 2023-10-01 PROCEDURE — 99233 SBSQ HOSP IP/OBS HIGH 50: CPT | Performed by: PSYCHIATRY & NEUROLOGY

## 2023-10-01 PROCEDURE — 700111 HCHG RX REV CODE 636 W/ 250 OVERRIDE (IP): Performed by: NURSE PRACTITIONER

## 2023-10-01 PROCEDURE — 700111 HCHG RX REV CODE 636 W/ 250 OVERRIDE (IP): Mod: JZ | Performed by: STUDENT IN AN ORGANIZED HEALTH CARE EDUCATION/TRAINING PROGRAM

## 2023-10-01 PROCEDURE — 80053 COMPREHEN METABOLIC PANEL: CPT

## 2023-10-01 PROCEDURE — 83605 ASSAY OF LACTIC ACID: CPT

## 2023-10-01 PROCEDURE — 82803 BLOOD GASES ANY COMBINATION: CPT

## 2023-10-01 PROCEDURE — 85384 FIBRINOGEN ACTIVITY: CPT

## 2023-10-01 PROCEDURE — 85025 COMPLETE CBC W/AUTO DIFF WBC: CPT

## 2023-10-01 PROCEDURE — 700101 HCHG RX REV CODE 250: Performed by: INTERNAL MEDICINE

## 2023-10-01 PROCEDURE — 85347 COAGULATION TIME ACTIVATED: CPT

## 2023-10-01 PROCEDURE — 36600 WITHDRAWAL OF ARTERIAL BLOOD: CPT

## 2023-10-01 PROCEDURE — 700117 HCHG RX CONTRAST REV CODE 255: Performed by: STUDENT IN AN ORGANIZED HEALTH CARE EDUCATION/TRAINING PROGRAM

## 2023-10-01 PROCEDURE — 84295 ASSAY OF SERUM SODIUM: CPT

## 2023-10-01 PROCEDURE — 71045 X-RAY EXAM CHEST 1 VIEW: CPT

## 2023-10-01 PROCEDURE — 70450 CT HEAD/BRAIN W/O DYE: CPT

## 2023-10-01 PROCEDURE — 99291 CRITICAL CARE FIRST HOUR: CPT | Performed by: STUDENT IN AN ORGANIZED HEALTH CARE EDUCATION/TRAINING PROGRAM

## 2023-10-01 PROCEDURE — 70544 MR ANGIOGRAPHY HEAD W/O DYE: CPT

## 2023-10-01 PROCEDURE — A9270 NON-COVERED ITEM OR SERVICE: HCPCS | Performed by: INTERNAL MEDICINE

## 2023-10-01 PROCEDURE — A9579 GAD-BASE MR CONTRAST NOS,1ML: HCPCS | Performed by: STUDENT IN AN ORGANIZED HEALTH CARE EDUCATION/TRAINING PROGRAM

## 2023-10-01 PROCEDURE — 94003 VENT MGMT INPAT SUBQ DAY: CPT

## 2023-10-01 PROCEDURE — 82962 GLUCOSE BLOOD TEST: CPT | Mod: 91

## 2023-10-01 PROCEDURE — 80048 BASIC METABOLIC PNL TOTAL CA: CPT

## 2023-10-01 RX ORDER — LABETALOL HYDROCHLORIDE 5 MG/ML
10 INJECTION, SOLUTION INTRAVENOUS EVERY 6 HOURS PRN
Status: DISCONTINUED | OUTPATIENT
Start: 2023-10-01 | End: 2023-10-03

## 2023-10-01 RX ORDER — MAGNESIUM SULFATE HEPTAHYDRATE 40 MG/ML
2 INJECTION, SOLUTION INTRAVENOUS ONCE
Status: COMPLETED | OUTPATIENT
Start: 2023-10-01 | End: 2023-10-01

## 2023-10-01 RX ORDER — ENALAPRILAT 1.25 MG/ML
1.25 INJECTION INTRAVENOUS EVERY 6 HOURS PRN
Status: DISCONTINUED | OUTPATIENT
Start: 2023-10-01 | End: 2023-10-03

## 2023-10-01 RX ORDER — LEVETIRACETAM 500 MG/5ML
500 INJECTION, SOLUTION, CONCENTRATE INTRAVENOUS EVERY 12 HOURS
Status: DISCONTINUED | OUTPATIENT
Start: 2023-10-01 | End: 2023-10-04

## 2023-10-01 RX ADMIN — SODIUM CHLORIDE 10 MG/HR: 9 INJECTION, SOLUTION INTRAVENOUS at 01:15

## 2023-10-01 RX ADMIN — LOSARTAN POTASSIUM 50 MG: 50 TABLET, FILM COATED ORAL at 05:24

## 2023-10-01 RX ADMIN — GADOTERIDOL 20 ML: 279.3 INJECTION, SOLUTION INTRAVENOUS at 17:30

## 2023-10-01 RX ADMIN — ACETAMINOPHEN 650 MG: 325 TABLET, FILM COATED ORAL at 00:26

## 2023-10-01 RX ADMIN — SODIUM CHLORIDE 7.5 MG/HR: 9 INJECTION, SOLUTION INTRAVENOUS at 23:05

## 2023-10-01 RX ADMIN — AMPICILLIN AND SULBACTAM 3 G: 1; 2 INJECTION, POWDER, FOR SOLUTION INTRAMUSCULAR; INTRAVENOUS at 12:23

## 2023-10-01 RX ADMIN — AMPICILLIN AND SULBACTAM 3 G: 1; 2 INJECTION, POWDER, FOR SOLUTION INTRAMUSCULAR; INTRAVENOUS at 23:36

## 2023-10-01 RX ADMIN — SODIUM CHLORIDE 15 MG/HR: 9 INJECTION, SOLUTION INTRAVENOUS at 18:21

## 2023-10-01 RX ADMIN — POTASSIUM PHOSPHATE, MONOBASIC AND POTASSIUM PHOSPHATE, DIBASIC 30 MMOL: 224; 236 INJECTION, SOLUTION, CONCENTRATE INTRAVENOUS at 08:47

## 2023-10-01 RX ADMIN — PROPOFOL 50 MCG/KG/MIN: 10 INJECTION, EMULSION INTRAVENOUS at 18:33

## 2023-10-01 RX ADMIN — AMLODIPINE BESYLATE 5 MG: 5 TABLET ORAL at 05:24

## 2023-10-01 RX ADMIN — SODIUM CHLORIDE 200 MEQ: 4 INJECTION, SOLUTION, CONCENTRATE INTRAVENOUS at 09:41

## 2023-10-01 RX ADMIN — LANSOPRAZOLE 30 MG: 30 TABLET, ORALLY DISINTEGRATING, DELAYED RELEASE ORAL at 05:24

## 2023-10-01 RX ADMIN — SODIUM CHLORIDE 50 ML/HR: 3 INJECTION, SOLUTION INTRAVENOUS at 07:56

## 2023-10-01 RX ADMIN — MAGNESIUM SULFATE HEPTAHYDRATE 2 G: 2 INJECTION, SOLUTION INTRAVENOUS at 07:51

## 2023-10-01 RX ADMIN — SENNOSIDES AND DOCUSATE SODIUM 2 TABLET: 50; 8.6 TABLET ORAL at 18:12

## 2023-10-01 RX ADMIN — INSULIN LISPRO 2 UNITS: 100 INJECTION, SOLUTION INTRAVENOUS; SUBCUTANEOUS at 05:32

## 2023-10-01 RX ADMIN — SODIUM CHLORIDE 15 MG/HR: 9 INJECTION, SOLUTION INTRAVENOUS at 16:45

## 2023-10-01 RX ADMIN — SODIUM CHLORIDE 50 ML/HR: 3 INJECTION, SOLUTION INTRAVENOUS at 20:02

## 2023-10-01 RX ADMIN — AMPICILLIN AND SULBACTAM 3 G: 1; 2 INJECTION, POWDER, FOR SOLUTION INTRAMUSCULAR; INTRAVENOUS at 18:12

## 2023-10-01 RX ADMIN — PROPOFOL 40 MCG/KG/MIN: 10 INJECTION, EMULSION INTRAVENOUS at 23:35

## 2023-10-01 RX ADMIN — SODIUM CHLORIDE 15 MG/HR: 9 INJECTION, SOLUTION INTRAVENOUS at 14:06

## 2023-10-01 RX ADMIN — PROPOFOL 60 MCG/KG/MIN: 10 INJECTION, EMULSION INTRAVENOUS at 00:49

## 2023-10-01 RX ADMIN — LEVETIRACETAM 500 MG: 100 INJECTION, SOLUTION, CONCENTRATE INTRAVENOUS at 12:23

## 2023-10-01 RX ADMIN — LABETALOL HYDROCHLORIDE 10 MG: 5 INJECTION, SOLUTION INTRAVENOUS at 12:31

## 2023-10-01 RX ADMIN — FENTANYL CITRATE 100 MCG/HR: 50 INJECTION, SOLUTION INTRAMUSCULAR; INTRAVENOUS at 06:09

## 2023-10-01 RX ADMIN — SODIUM CHLORIDE 12.5 MG/HR: 9 INJECTION, SOLUTION INTRAVENOUS at 12:12

## 2023-10-01 RX ADMIN — SODIUM CHLORIDE 10 MG/HR: 9 INJECTION, SOLUTION INTRAVENOUS at 20:28

## 2023-10-01 RX ADMIN — MEMANTINE 5 MG: 5 TABLET ORAL at 18:12

## 2023-10-01 RX ADMIN — INSULIN LISPRO 2 UNITS: 100 INJECTION, SOLUTION INTRAVENOUS; SUBCUTANEOUS at 12:37

## 2023-10-01 RX ADMIN — ATORVASTATIN CALCIUM 40 MG: 40 TABLET, FILM COATED ORAL at 18:12

## 2023-10-01 RX ADMIN — SENNOSIDES AND DOCUSATE SODIUM 2 TABLET: 50; 8.6 TABLET ORAL at 05:24

## 2023-10-01 RX ADMIN — MEMANTINE 5 MG: 5 TABLET ORAL at 05:24

## 2023-10-01 RX ADMIN — PROPOFOL 50 MCG/KG/MIN: 10 INJECTION, EMULSION INTRAVENOUS at 04:26

## 2023-10-01 RX ADMIN — SODIUM CHLORIDE 10 MG/HR: 9 INJECTION, SOLUTION INTRAVENOUS at 04:05

## 2023-10-01 ASSESSMENT — FIBROSIS 4 INDEX: FIB4 SCORE: 3.57

## 2023-10-01 ASSESSMENT — PULMONARY FUNCTION TESTS: FVC: 0.9

## 2023-10-01 ASSESSMENT — PAIN DESCRIPTION - PAIN TYPE
TYPE: ACUTE PAIN

## 2023-10-01 NOTE — THERAPY
Speech Language Therapy Contact Note    Patient Name: Sharon Guardado  Age:  68 y.o., Sex:  male  Medical Record #: [id number]  Today's Date: 10/1/2023    Per EMR - pt is now intubated with neuro changes. SLP will monitor for extubation and overall pt readiness to participate.     10/01/23 0700   Treatment Variance   Reason For Missed Therapy Medical - Patient Is Not Medically Stable;Medical - Patient not Able To Participate   Vitals   O2 Delivery Device Ventilator   Interdisciplinary Plan of Care Collaboration   IDT Collaboration with  Other (See Comments)  (EMR)

## 2023-10-01 NOTE — PROGRESS NOTES
Critical Care Progress Note    Date of admission  9/24/2023    Chief Complaint  68 y.o. male admitted 9/24/2023 with urosepsis.  He has a history of DM type II, primary hypertension, alcohol dependence and obesity.    Hospital Course      9/25: surgery was consulted Dr Hirsch. Continue resuscitation with IVF, antibiotics, If worsens, consider jamila tube. Patient was intubated acutely overnight.  9/26: requiring high levels of sedatives. Had to start a second vasopressor. Failed SAT and SBT due to agitation. Vasopressors are weaning down. Will start dvt chemical prophylaxis tonight.  9/27: changed propofol to precedex, hopes to move towards extubation. Consider tube feeds if  not extubated  9/28: he is now following commands, maximizing status for extubation.    9/29-9/30: Transferred to floor, under care with hospitalist.    9/30: patient was found with AMS, suspected ETOH withdrawal, s/p benzos. CT head showed parenchymal hemorrhage. Transferred to ICU. Intubated, CVC placed, vascular neurology and neurosurgery consulted    10/1 - Will work on extubation.  23% saline bolus.  Palliative consutled.  Fevers - reculture and empiric unasyn    10/2 -    vent day 3.  Force diuresis.  SAT/SBT.  Increase amlodipine.      Interval Problem Update  Reviewed last 24 hour events:      SR  Prop  Fent  Nicardipine  3%  +1113 mL in the last 24      Review of Systems  Review of Systems   Unable to perform ROS: Acuity of condition        Vital Signs for last 24 hours   Temp:  [37.5 °C (99.5 °F)] 37.5 °C (99.5 °F)  Pulse:  [53-90] 71  Resp:  [7-39] 23  BP: ()/(52-74) 153/73  SpO2:  [91 %-96 %] 95 %    Hemodynamic parameters for last 24 hours       Respiratory Information for the last 24 hours  Vent Mode: Spont  Rate (breaths/min): 18  Vt Target (mL): 470  PEEP/CPAP: 10  P Support: 5  MAP: 13  Control VTE (exp VT): 529    Physical Exam   Physical Exam  Constitutional:       Appearance: He is obese.      Comments: On ventilator    Eyes:      Pupils: Pupils are equal, round, and reactive to light.   Cardiovascular:      Comments: Sinus rhythm  Pulmonary:      Breath sounds: Rales (Coarse crackles bilaterally) present. No wheezing.   Abdominal:      General: There is no distension.      Tenderness: There is no abdominal tenderness.   Musculoskeletal:      Right lower leg: Edema present.      Left lower leg: Edema present.   Skin:     General: Skin is warm.   Neurological:      Comments: Arouses, but does not follow.  Weak in the right arm and right leg.         Medications  Current Facility-Administered Medications   Medication Dose Route Frequency Provider Last Rate Last Admin    potassium phosphate 15 mmol in  mL ivpb  15 mmol Intravenous Once Pal Serna M.D.        dexmedetomidine (PRECEDEX) 400 mcg/100mL NS premix infusion  0.1-1.5 mcg/kg/hr (Ideal) Intravenous Continuous Pal Serna M.D.        HYDROmorphone (Dilaudid) injection 0.5-2 mg  0.5-2 mg Intravenous Q HOUR PRN Pal Serna M.D.        HYDROmorphone (DILAUDID) 0.2 mg/mL in 50mL NS (Continuous Infusion)   Intravenous Continuous Pal Serna M.D.        [START ON 10/3/2023] amLODIPine (Norvasc) tablet 10 mg  10 mg Enteral Tube DAILY Pal Serna M.D.        amLODIPine (Norvasc) tablet 5 mg  5 mg Enteral Tube Once Pal Serna M.D.        furosemide (Lasix) injection 40 mg  40 mg Intravenous Once Pal Serna M.D.        ampicillin/sulbactam (Unasyn) 3 g in  mL IVPB  3 g Intravenous Q6HRS Rafa Zhang M.D.   Stopped at 10/02/23 0636    labetalol (Normodyne/Trandate) injection 10 mg  10 mg Intravenous Q6HRS PRN Rafa Zhang M.D.   10 mg at 10/01/23 1231    enalaprilat (Vasotec) injection 1.25 mg 1 mL  1.25 mg Intravenous Q6HRS PRN Rafa Zhang M.D.        levETIRAcetam (Keppra) injection 500 mg  500 mg Intravenous Q12HRS Rafa Zhang M.D.   500 mg at 10/02/23 0552    niCARdipine (Cardene) 25  mg in  mL Standard Infusion  0-15 mg/hr Intravenous Continuous Josiah Casiano M.D.   Stopped at 10/02/23 0200    Respiratory Therapy Consult   Nebulization Continuous RT Josiah Casiano M.D.        MD Alert...ICU Electrolyte Replacement per Pharmacy   Other PHARMACY TO DOSE Josiah Casiano M.D.        lidocaine (Xylocaine) 1 % injection 2 mL  2 mL Tracheal Tube Q30 MIN PRN Lizandro Greenwood M.D.        acetaminophen (Tylenol) tablet 650 mg  650 mg Enteral Tube Q4HRS PRN Lizandro Greenwood M.D.   650 mg at 10/01/23 0026    atorvastatin (Lipitor) tablet 40 mg  40 mg Enteral Tube Q EVENING Lizandro Greenwood M.D.   40 mg at 10/01/23 1812    losartan (Cozaar) tablet 50 mg  50 mg Enteral Tube DAILY Lizandro Greenwood M.D.   50 mg at 10/02/23 0552    memantine (Namenda) tablet 5 mg  5 mg Enteral Tube BID Lizandro Greenwood M.D.   5 mg at 10/02/23 0552    lansoprazole (Prevacid) solutab 30 mg  30 mg Enteral Tube DAILY Lizandro Greenwood M.D.   30 mg at 10/02/23 0552    ondansetron (Zofran ODT) dispertab 4 mg  4 mg Enteral Tube Q4HRS PRN Lizandro Greenwood M.D.        senna-docusate (Pericolace Or Senokot S) 8.6-50 MG per tablet 2 Tablet  2 Tablet Enteral Tube BID Lizandro Greenwood M.D.   2 Tablet at 10/02/23 0552    And    polyethylene glycol/lytes (Miralax) PACKET 1 Packet  1 Packet Enteral Tube QDAY PRN Lizandro Greenwood M.D.        And    magnesium hydroxide (Milk Of Magnesia) suspension 30 mL  30 mL Enteral Tube QDAY PRN Lizandro Greenwood M.D.        And    bisacodyl (Dulcolax) suppository 10 mg  10 mg Rectal QDAY PRN Lizandro Greenwood M.D.        3% sodium chloride (Hypertonic Saline) 500mL infusion  0-60 mL/hr Intravenous Continuous Lizandro Greenwood M.D. 60 mL/hr at 10/02/23 0701 60 mL/hr at 10/02/23 0701    ondansetron (Zofran) syringe/vial injection 4 mg  4 mg Intravenous Q4HRS PRN Rhoda Argueta M.D.   4 mg at 09/29/23 0151    hydrALAZINE (Apresoline) injection 20 mg  20 mg Intravenous  Q6HRS PRN Rafa Zhang M.D.   20 mg at 09/30/23 1326    ipratropium-albuterol (DUONEB) nebulizer solution  3 mL Nebulization Q2HRS PRN (RT) Florian Cole M.D.        insulin lispro (HumaLOG,AdmeLOG) injection  2-9 Units Subcutaneous Q6HRS Rhoda Argueta M.D.   2 Units at 10/01/23 1237    dextrose 50% (D50W) injection 25 g  25 g Intravenous Q15 MIN PRN Med Peña M.D.           Fluids    Intake/Output Summary (Last 24 hours) at 10/2/2023 1139  Last data filed at 10/2/2023 1135  Gross per 24 hour   Intake 3148.84 ml   Output 2985 ml   Net 163.84 ml       Laboratory  Recent Labs     09/30/23  1521 10/01/23  0257 10/02/23  0253   ISTATAPH 7.385* 7.446 7.399*   ISTATAPCO2 43.4* 38.9* 38.3*   ISTATAPO2 109* 59* 73   ISTATATCO2 27 28 25   [id number] 98 91* 95   ISTATARTHCO3 26.0* 26.8* 23.7   ISTATARTBE 1 3 -1   ISTATTEMP 99.0 F 100.2 F 98.0 F   ISTATFIO2 100 50 50   ISTATSPEC Arterial Arterial Arterial   ISTATAPHTC 7.382* 7.433 7.404   [id number] 111* 63* 72         Recent Labs     10/01/23  0515 10/01/23  1026 10/01/23  1840 10/02/23  0010 10/02/23  0615   SODIUM 143 150* 152* 149* 154*   POTASSIUM 3.8 3.8  --   --  3.7   CHLORIDE 110 116*  --   --  122*   CO2 25 25  --   --  23   BUN 22 22  --   --  18   CREATININE 0.86 0.82  --   --  0.75   MAGNESIUM 1.9  --   --   --  2.1   PHOSPHORUS 1.9*  --   --   --  2.2*   CALCIUM 8.4* 8.5  --   --  7.8*     Recent Labs     09/30/23  1628 09/30/23  2320 10/01/23  0515 10/01/23  1026 10/02/23  0615   ALTSGPT 26  --  24  --  17   ASTSGOT 54*  --  41  --  27   ALKPHOSPHAT 121*  --  106*  --  97   TBILIRUBIN 1.3  --  0.9  --  0.8   GLUCOSE 185*   < > 154* 151* 143*    < > = values in this interval not displayed.     Recent Labs     09/30/23  0153 09/30/23 1628 10/01/23  0515 10/02/23  0615   WBC 7.8  --  8.7 8.3   NEUTSPOLYS 62.70  --  54.80 68.00   LYMPHOCYTES 21.90*  --  27.30 18.40*   MONOCYTES 11.90  --  13.00 8.40   EOSINOPHILS 1.90  --  2.90 3.50   BASOPHILS  0.30  --  0.50 0.40   ASTSGOT 54* 54* 41 27   ALTSGPT 27 26 24 17   ALKPHOSPHAT 129* 121* 106* 97   TBILIRUBIN 0.8 1.3 0.9 0.8     Recent Labs     09/30/23  0153 09/30/23  1350 10/01/23  0515 10/02/23  0615   RBC 4.57*  --  4.12* 3.92*   HEMOGLOBIN 14.2  --  12.9* 12.0*   HEMATOCRIT 41.6*  --  38.7* 37.8*   PLATELETCT 116*  --  164 170   PROTHROMBTM  --  14.5  --   --    APTT  --  27.6  --   --    INR  --  1.12  --   --        Imaging  X-Ray:  I have personally reviewed the images and compared with prior images. and My impression is: Decreased pulmonary vascular congestion    Assessment/Plan  * Intraparenchymal hemorrhage of brain (HCC)- (present on admission)  Assessment & Plan  CT on 9/24 with hazy left frontal SAH  CT on 9/30 with new large IPH in the left frontal lobe  Strict blood pressure control with goal SBP less than 140 - I am titrating nicardipine to achieve blood pressure goals  Goal sodium 150-155 - I am titrating 3% hypertonic saline to achieve sodium goals  Continue levetiracetam, 500 mg every 12 hours  Neuro checks every 2 hours    Acute respiratory failure with hypoxia (HCC)- (present on admission)  Assessment & Plan  Intubated 9/25-9/28  Reintubated 9/30  ABCDEF bundle  SAT/SBT as appropriate  Mobility level 1    Pneumonia due to infectious organism- (present on admission)  Assessment & Plan  Continue Unasyn    Pneumobilia- (present on admission)  Assessment & Plan  Dr. Hirsch consulted on admission - surgery has subsequently signed off  Completed 5 days of ceftriaxone and metronidazole on 9/28    Type 2 diabetes mellitus (HCC)- (present on admission)  Assessment & Plan  Glycohemoglobin 6.8  Sliding scale insulin    Primary hypertension- (present on admission)  Assessment & Plan  Goal SBP less than 140  Increase amlodipine, 10 mg daily  Continue losartan, 50 mg daily  I am titrating nicardipine to achieve blood pressure goals    Alcohol dependence (HCC)- (present on admission)  Assessment &  Plan  Cessation counseling when clinically appropriate    BMI 40.0-44.9, adult (HCC)- (present on admission)  Assessment & Plan            VTE:  Contraindicated  Ulcer: PPI  Lines: Central Line  Ongoing indication addressed and Joseph Catheter  Ongoing indication addressed    I have performed a physical exam and reviewed and updated ROS and Plan today (10/2/2023). In review of yesterday's note (10/1/2023), there are no changes except as documented above.     I have assessed and reassessed his respiratory status with ventilator adjustments and spontaneous breathing trials, airway mechanics, ventilator waveforms, blood pressure, hemodynamics, cardiovascular status with titration of nicardipine, serial determinations of serum sodium with titration of a hypertonic saline infusion and his neurologic status.  He is at increased risk for worsening respiratory, cardiovascular and CNS system dysfunction.    Discussed patient condition and risk of morbidity and/or mortality with RN, RT, Pharmacy, Charge nurse / hot rounds, and QA team    The patient remains critically ill.  Critical care time = 135 minutes in directly providing and coordinating critical care and extensive data review.  No time overlap and excludes procedures.    Pal Serna MD  Pulmonary and Critical Care Medicine

## 2023-10-01 NOTE — CARE PLAN
The patient is Unstable - High likelihood or risk of patient condition declining or worsening    Shift Goals  Clinical Goals: SBP<140, vent synchronicity, no fevers  Patient Goals: tin  Family Goals: updates    Progress made toward(s) clinical / shift goals:    Problem: Pain - Standard  Goal: Alleviation of pain or a reduction in pain to the patient’s comfort goal  Outcome: Progressing     Problem: Safety - Medical Restraint  Goal: Remains free of injury from restraints (Restraint for Interference with Medical Device)  Outcome: Progressing

## 2023-10-01 NOTE — PROGRESS NOTES
Neurology Progress Note  Neurohospitalist Service, [facility]    Referring Physician: Rafa Zhang M.D.    Chief Complaint   Patient presents with    Head Injury     Pt fell and hit his head last night     ALOC     Pt arrives A&O1        HPI: Refer to initial documented Neurology H&P, as detailed in the patient's chart.    Interval History 10/1: Patient is now intubated and sedated.    Review of systems: In addition to what is detailed in the HPI and/or updated in the interval history, all other systems reviewed and are negative.    Past Medical History:    has a past medical history of LE (acute kidney injury), prerenal (CMS-HCC) (1/20/2018), Alcohol withdrawal (HCC) (1/22/2018), Bowel habit changes, CAD (coronary artery disease), Cancer (HCC), Cellulitis of back except buttock (8/3/2018), HTN (hypertension), Hyperglycemia (1/19/2018), Hypertension, Indigestion, Kidney stones, Near-syncope, Nerve compression, and Tinnitus.    FHx:  family history is not on file.    SHx:   reports that he has never smoked. He has never used smokeless tobacco. He reports current alcohol use. He reports that he does not use drugs.    Medications:    Current Facility-Administered Medications:     magnesium sulfate IVPB premix 2 g, 2 g, Intravenous, Once, Rafa Zhang M.D., Last Rate: 25 mL/hr at 10/01/23 0751, 2 g at 10/01/23 0751    potassium phosphate 30 mmol in  mL ivpb, 30 mmol, Intravenous, Once, Rafa Zhang M.D., Last Rate: 83.3 mL/hr at 10/01/23 0847, 30 mmol at 10/01/23 0847    niCARdipine (Cardene) 25 mg in  mL Standard Infusion, 0-15 mg/hr, Intravenous, Continuous, Josiah Casiano M.D., Paused at 10/01/23 0710    Respiratory Therapy Consult, , Nebulization, Continuous RT, Josiah Casiano M.D. MD Alert...ICU Electrolyte Replacement per Pharmacy, , Other, PHARMACY TO DOSE, Josiah Casiano M.D.    lidocaine (Xylocaine) 1 % injection 2 mL, 2 mL, Tracheal Tube, Q30 MIN PRN, Lizandro Greenwood,  M.D.    acetaminophen (Tylenol) tablet 650 mg, 650 mg, Enteral Tube, Q4HRS PRN, Lizandro Greenwood M.D., 650 mg at 10/01/23 0026    amLODIPine (Norvasc) tablet 5 mg, 5 mg, Enteral Tube, DAILY, Lizandro Greenwood M.D., 5 mg at 10/01/23 0524    atorvastatin (Lipitor) tablet 40 mg, 40 mg, Enteral Tube, Q EVENING, Lizandro Greenwood M.D., 40 mg at 09/30/23 1759    losartan (Cozaar) tablet 50 mg, 50 mg, Enteral Tube, DAILY, Lizandro Greenwood M.D., 50 mg at 10/01/23 0524    memantine (Namenda) tablet 5 mg, 5 mg, Enteral Tube, BID, Lizandro Greenwood M.D., 5 mg at 10/01/23 0524    lansoprazole (Prevacid) solutab 30 mg, 30 mg, Enteral Tube, DAILY, Lizandro Greenwood M.D., 30 mg at 10/01/23 0524    ondansetron (Zofran ODT) dispertab 4 mg, 4 mg, Enteral Tube, Q4HRS PRN, Lizandro Greenwood M.D.    senna-docusate (Pericolace Or Senokot S) 8.6-50 MG per tablet 2 Tablet, 2 Tablet, Enteral Tube, BID, 2 Tablet at 10/01/23 0524 **AND** polyethylene glycol/lytes (Miralax) PACKET 1 Packet, 1 Packet, Enteral Tube, QDAY PRN **AND** magnesium hydroxide (Milk Of Magnesia) suspension 30 mL, 30 mL, Enteral Tube, QDAY PRN **AND** bisacodyl (Dulcolax) suppository 10 mg, 10 mg, Rectal, QDAY PRN, Lizandro Greenwood M.D.    labetalol (Normodyne/Trandate) injection 10 mg, 10 mg, Intravenous, Q6HRS PRN, Lizandro Greenwood M.D.    sodium chloride 200 mEq in empty bag 50 mL infusion, 200 mEq, Intravenous, Q6HRS PRN, Lizandro Greenwood M.D.    3% sodium chloride (Hypertonic Saline) 500mL infusion, 0-60 mL/hr, Intravenous, Continuous, Lizandro Greenwood M.D., Last Rate: 50 mL/hr at 10/01/23 0756, 50 mL/hr at 10/01/23 0756    fentaNYL (Sublimaze) injection 100 mcg, 100 mcg, Intravenous, Q15 MIN PRN **AND** fentaNYL (Sublimaze) injection 200 mcg, 200 mcg, Intravenous, Q15 MIN PRN **AND** fentaNYL (SUBLIMAZE) 50 mcg/mL in 50mL (Continuous Infusion), , Intravenous, Continuous, Last Rate: 2 mL/hr at 10/01/23 0609, 100 mcg/hr at 10/01/23 0609  **AND** propofol (DIPRIVAN) injection, 0-80 mcg/kg/min (Ideal), Intravenous, Continuous, Last Rate: 18.6 mL/hr at 10/01/23 0502, 40 mcg/kg/min at 10/01/23 0502 **AND** [START ON 10/3/2023] Triglyceride, , , Every 3 Days (0300), PATRICE Gill    ondansetron (Zofran) syringe/vial injection 4 mg, 4 mg, Intravenous, Q4HRS PRN, Rhoda Argueta M.D., 4 mg at 09/29/23 0151    hydrALAZINE (Apresoline) injection 20 mg, 20 mg, Intravenous, Q6HRS PRN, Lizandro Greenwood M.D., 20 mg at 09/30/23 1326    ipratropium-albuterol (DUONEB) nebulizer solution, 3 mL, Nebulization, Q2HRS PRN (RT), Florian Cole M.D.    insulin lispro (HumaLOG,AdmeLOG) injection, 2-9 Units, Subcutaneous, Q6HRS, Rhoda Argueta M.D., 2 Units at 10/01/23 0532    lactated ringers infusion (BOLUS), 1,000 mL, Intravenous, Once PRN, Med Peña M.D.    [DISCONTINUED] insulin regular (HumuLIN R,NovoLIN R) injection, 2-9 Units, Subcutaneous, Q6HRS, 6 Units at 09/25/23 0515 **AND** POC blood glucose manual result, , , Q6H **AND** NOTIFY MD and PharmD, , , Once **AND** Administer 20 grams of glucose (approximately 8 ounces of fruit juice) every 15 minutes PRN FSBG less than 70 mg/dL, , , PRN **AND** dextrose 50% (D50W) injection 25 g, 25 g, Intravenous, Q15 MIN PRN, Med Peña M.D.    Physical Examination:     Vitals:    10/01/23 0645 10/01/23 0700 10/01/23 0715 10/01/23 0730   BP: 107/54 103/54 95/51 117/58   Pulse: (!) 58 (!) 55 62 (!) 55   Resp: 18 18 (!) 33 18   Temp:       TempSrc:       SpO2: 96% 97% 97% 97%   Weight:       Height:               NEUROLOGICAL EXAM:     Patient is intubated and sedated with fentanyl and propofol.  Not interactive.  The pupils are small [place] reactive midline.  No gaze preference.  No abnormal movements.  Mr. Response intact.  Corneal reflex intact.  Positive gag.  There is withdrawal movement on the left arm and leg.  No movement on the right arm and leg.  No response to noxious stimuli on  the right arm and leg.  No obvious signs ataxia.  Upgoing toe on the right.    Objective Data:    Labs:  Lab Results   Component Value Date/Time    PROTHROMBTM 14.5 09/30/2023 01:50 PM    INR 1.12 09/30/2023 01:50 PM      Lab Results   Component Value Date/Time    WBC 8.7 10/01/2023 05:15 AM    RBC 4.12 (L) 10/01/2023 05:15 AM    HEMOGLOBIN 12.9 (L) 10/01/2023 05:15 AM    HEMATOCRIT 38.7 (L) 10/01/2023 05:15 AM    MCV 93.9 10/01/2023 05:15 AM    MCH 31.3 10/01/2023 05:15 AM    MCHC 33.3 10/01/2023 05:15 AM    MPV 10.7 10/01/2023 05:15 AM    NEUTSPOLYS 54.80 10/01/2023 05:15 AM    LYMPHOCYTES 27.30 10/01/2023 05:15 AM    MONOCYTES 13.00 10/01/2023 05:15 AM    EOSINOPHILS 2.90 10/01/2023 05:15 AM    BASOPHILS 0.50 10/01/2023 05:15 AM      Lab Results   Component Value Date/Time    SODIUM 143 10/01/2023 05:15 AM    POTASSIUM 3.8 10/01/2023 05:15 AM    CHLORIDE 110 10/01/2023 05:15 AM    CO2 25 10/01/2023 05:15 AM    GLUCOSE 154 (H) 10/01/2023 05:15 AM    BUN 22 10/01/2023 05:15 AM    CREATININE 0.86 10/01/2023 05:15 AM    CREATININE 1.0 05/13/2009 03:30 PM      Lab Results   Component Value Date/Time    CHOLSTRLTOT 118 09/30/2023 11:20 PM    LDL 57 09/30/2023 11:20 PM    HDL 12 (A) 09/30/2023 11:20 PM    TRIGLYCERIDE 247 (H) 09/30/2023 11:20 PM       Lab Results   Component Value Date/Time    ALKPHOSPHAT 106 (H) 10/01/2023 05:15 AM    ASTSGOT 41 10/01/2023 05:15 AM    ALTSGPT 24 10/01/2023 05:15 AM    TBILIRUBIN 0.9 10/01/2023 05:15 AM        Imaging/Testing:  CT-HEAD W/O   Final Result         1. Stable 6 cm left frontal intraparenchymal hemorrhage with mild, 2 mm left-to-right subfalcine herniation.   2. No new abnormality or change.         DX-CHEST-PORTABLE (1 VIEW)   Final Result         1.  Pulmonary edema and/or infiltrates are identified, which appear somewhat increased since the prior exam.   2.  Cardiomegaly   3.  Atherosclerosis      DX-ABDOMEN FOR TUBE PLACEMENT   Final Result      NG tube extends below the  diaphragm and appears to extend through the region of the stomach with tip at the level of the proximal duodenum.      DX-CHEST-PORTABLE (1 VIEW)   Final Result      1.  Grossly satisfactory appearance of tubes and lines.   2.  No evidence of pneumothorax.   3.  Marked hypoinflation with probable bibasilar atelectasis.   4.  Enlarged cardiac silhouette with vascular congestion.      CT-HEAD W/O   Final Result         1.  New large parenchymal hemorrhage involving the frontal lobe is identified with some surrounding edema.      2.  Subarachnoid hemorrhage is also noted in the area.      3.  Minimal midline shift to the right side.      Findings are consistent with atrophy.  Decreased attenuation in the periventricular white matter likely indicates microvascular ischemic disease.      Based solely on CT findings, the brain injury guideline category is mBIG 3.      EDH   IVH   Displaced skull fx   SDH > 8mm   IPH > 8mm or multiple   SAH bi-hemispheric or > 3mm      The original BIG retrospective analysis found radiographic progression in 0% of BIG 1 patients and 2.6% BIG 2.      These findings were discussed with GEETA MOLINA on 09/30/2023.      DX-CHEST-PORTABLE (1 VIEW)   Final Result      Stable multiple pulmonary vascular dilation/congestion. Otherwise, negative.      DX-CHEST-PORTABLE (1 VIEW)   Final Result      1.  Increasing airspace disease at the left base.      DX-CHEST-LIMITED (1 VIEW)   Final Result         1.  Pulmonary edema and/or infiltrates are identified, which are somewhat decreased since the prior exam.   2.  Cardiomegaly   3.  Atherosclerosis      DX-CHEST-PORTABLE (1 VIEW)   Final Result         1.  Pulmonary edema and/or infiltrates are identified, which appear somewhat increased since the prior exam.   2.  Cardiomegaly   3.  Atherosclerosis      DX-CHEST-PORTABLE (1 VIEW)   Final Result         1.  Interstitial edema and/or infiltrates, slightly decreased since prior study.   2.  Cardiomegaly    3.  Atherosclerosis      DX-CHEST-LIMITED (1 VIEW)   Final Result      Right IJ catheter tip projects in the SVC. No pneumothorax.      CT-CTA NECK WITH & W/O-POST PROCESSING   Final Result      1.  Estimated 50-75% stenosis of the BILATERAL carotid arteries   2.  Estimated greater than 50% stenosis at the vertebral artery ostium on each side      CT-CTA HEAD WITH & W/O-POST PROCESS   Final Result      1.  No large vessel occlusion or aneurysm.   2.  Hazy left frontal lobe subarachnoid hemorrhage, decreased in conspicuity from prior. No new hemorrhage seen.   3.  Scattered opacifications of the ethmoid sinuses, likely related to support hardware.      DX-ABDOMEN FOR TUBE PLACEMENT   Final Result         1.  Nonspecific bowel gas pattern in the upper abdomen.   2.  Dobbhoff tube with tip terminating overlying the expected location of the first or second duodenal segment.      DX-CHEST-PORTABLE (1 VIEW)   Final Result         1.  Interstitial edema and/or infiltrates.   2.  Cardiomegaly   3.  Nasogastric tube terminates in the distal esophagus, recommend advancement   4.  Atherosclerosis      DX-ABDOMEN FOR TUBE PLACEMENT   Final Result         1.  Nonspecific bowel gas pattern in the upper abdomen.   2.  Nasogastric tube terminates just distal to the gastroesophageal junction, recommend advancement      CT-RENAL COLIC EVALUATION(A/P W/O)   Final Result         1.  Intrahepatic biliary air and nondependent air within the gallbladder, consider history of instrumentation or changes of cholangitis in the appropriate clinical setting.   2.  Large fat-containing bilateral inguinal hernias   3.  Enlarged prostate, consider causes of prostate enlargement with additional workup as clinically appropriate   4.  Diverticulosis   5.  Cholelithiasis   6.  Atherosclerosis      CT-HEAD W/O   Final Result         1.  Hazy left frontal subarachnoid hemorrhage.   2.  Nonspecific white matter changes, commonly associated with small  vessel ischemic disease.  Associated mild cerebral atrophy is noted.      Based solely on CT findings, the brain injury guideline category is mBIG 1.      SDH < 4mm   IPH < 4mm   SAH < 3 sulci and < 1mm      The original BIG retrospective analysis found radiographic progression in 0% of BIG 1 patients and 2.6% BIG 2.      These findings were discussed with the patient's clinician, Madhu Zambrano, on 9/24/2023 10:10 PM.      DX-CHEST-PORTABLE (1 VIEW)   Final Result      No acute cardiopulmonary abnormality identified.      MR-BRAIN-W/O    (Results Pending)         Assessment and Plan:    68-year-old male was admitted earlier this week after a fall and confusion found to have urosepsis.  Alcohol abuse history.  On arrival here patient had hazy subarachnoid hemorrhage on the left frontal.  He had a similar appearance back in March of this year after a fall also.  Platelets platelets were in the 80,000 range when he is admitted.  Yesterday patient was severely altered therefore CT scan was done which found a large left frontal IPH.  Lovenox was reversed he was on this for DVT prophylaxis.  Started on hypertonic therapy.  Sodium not at goal yet.  Repeat CT head this morning shows stable appearance of the IPH.  Etiology of the IPH most most likely combination of factors including recent trauma, coagulopathy due to alcohol abuse and being on the DVT prophylaxis Lovenox with hypertension in the 180s systolic.  Bleed is cortical not in a typical location for  hypertension bleed.  CTA on the day admission did not reveal obvious aneurysm or venous thrombosis.    Plan:  1.  Keep the blood pressure between 100-140 systolic.  2.  MRI brain with without contrast.  3.  Neurosurgery consultation  4.  Continue with hypertonic therapy for sodium goal between 150-160.  Please give either 500 cc pushes of the 3% or 50 cc of the 23% to get the goal.  5.  No antiplatelets or anticoagulant.    Spent over 56 minutes reviewing labs,  examining, going over the care plan with the primary team.    This chart was partially generated using voice recognition technology and sound alike word replacement may be present, best efforts were made to make the chart accurate.    Vinicius Guardado MD  Board Certified Neurology, ABPN  t) [phone]

## 2023-10-01 NOTE — PROGRESS NOTES
Critical Care Progress Note    Date of admission  9/24/2023    Chief Complaint  68 y.o. male who was initially admitted to the hospital on 9/24 with sepsis and a small traumatic subarachnoid hemorrhage.  Sepsis secondary to intra-abdominal process with pneumobilia.  Surgery was following but improved with antibiotics alone and did not require surgical intervention.  He also has history of alcohol use and relative thrombocytopenia.    On 9/29 he was sent out of the ICU to the floor and unfortunately on 9/30 he developed large left intraparenchymal hemorrhage, was intubated and transferred to the neuro ICU.     Hospital Course  9/25: surgery was consulted Dr Hirsch. Continue resuscitation with IVF, antibiotics, If worsens, consider jamila tube. Patient was intubated acutely overnight.  9/26: requiring high levels of sedatives. Had to start a second vasopressor. Failed SAT and SBT due to agitation. Vasopressors are weaning down. Will start dvt chemical prophylaxis tonight.  9/27: changed propofol to precedex, hopes to move towards extubation. Consider tube feeds if  not extubated  9/28: he is now following commands, maximizing status for extubation.    9/29-9/30: Transferred to floor, under care with hospitalist.    9/30: patient was found with AMS, suspected ETOH withdrawal, s/p benzos. CT head showed parenchymal hemorrhage. Transferred to ICU. Intubated, CVC placed, vascular neurology and neurosurgery consulted    10/1 - Will work on extubation.  23% saline bolus.  Palliative consutled.  Fevers - reculture and empiric unasyn    Interval Problem Update  Reviewed last 24 hour events:  Tmax: 101.1 °F  Diet: NPO  Vasopressors: NA    Infusions:   Nicardipine     Antibx:   Unasyn 10/1 - present (fevers, possible aspiration, empiric)    Flagyl 9/24 - 9/29  Ceftriaxone 9/24 - 9/28    Intake / Output  Urine Output past 24 hours: 700 mL    Lab Trends  Platelets 116-164    Na 143 - 150 (goal 150-155)    Review of Systems  Review  of Systems   Unable to perform ROS: Intubated        Vital Signs for last 24 hours   Temp:  [36.7 °C (98.1 °F)] 36.7 °C (98.1 °F)  Pulse:  [52-94] 94  Resp:  [14-49] 15  BP: ()/(49-94) 169/61  SpO2:  [89 %-99 %] 97 %    Hemodynamic parameters for last 24 hours       Respiratory Information for the last 24 hours  Vent Mode: Spont  Rate (breaths/min): 18  Vt Target (mL): 470  PEEP/CPAP: 10  P Support: 5  MAP: 14  Length of Weaning Trial (Hours): 1  Control VTE (exp VT): 461    Physical Exam   Physical Exam  Vitals and nursing note reviewed. Exam conducted with a chaperone present.   Constitutional:       Appearance: He is obese. He is not ill-appearing.      Interventions: He is sedated and intubated.   HENT:      Head: Normocephalic.      Mouth/Throat:      Mouth: Mucous membranes are moist.   Eyes:      Conjunctiva/sclera: Conjunctivae normal.   Cardiovascular:      Rate and Rhythm: Regular rhythm. Tachycardia present.      Pulses: Normal pulses.   Pulmonary:      Effort: Pulmonary effort is normal. No respiratory distress. He is intubated.   Abdominal:      General: There is no distension.      Palpations: Abdomen is soft.      Tenderness: There is no abdominal tenderness. There is no guarding or rebound.   Musculoskeletal:         General: Normal range of motion.      Cervical back: Normal range of motion and neck supple.   Skin:     General: Skin is warm and dry.      Capillary Refill: Capillary refill takes less than 2 seconds.   Neurological:      Motor: Weakness (RUE/RLE) present.         Medications  Current Facility-Administered Medications   Medication Dose Route Frequency Provider Last Rate Last Admin    potassium phosphate 30 mmol in  mL ivpb  30 mmol Intravenous Once Rafa Zhang M.D. 83.3 mL/hr at 10/01/23 0847 30 mmol at 10/01/23 0847    sodium chloride 200 mEq in empty bag 50 mL infusion  200 mEq Intravenous Once Rafa Zhang M.D.   Stopped at 10/01/23 1007    ampicillin/sulbactam  (Unasyn) 3 g in  mL IVPB  3 g Intravenous Q6HRS Rafa Zhang M.D.        labetalol (Normodyne/Trandate) injection 10 mg  10 mg Intravenous Q6HRS PRN Rafa Zhang M.D.        enalaprilat (Vasotec) injection 1.25 mg 1 mL  1.25 mg Intravenous Q6HRS PRN Rafa Zhang M.D.        levETIRAcetam (Keppra) injection 500 mg  500 mg Intravenous Q12HRS Rafa Zhang M.D.        niCARdipine (Cardene) 25 mg in  mL Standard Infusion  0-15 mg/hr Intravenous Continuous Josiah Casiano M.D. 75 mL/hr at 10/01/23 1104 7.5 mg/hr at 10/01/23 1104    Respiratory Therapy Consult   Nebulization Continuous RT Josiah Casiano M.D. MD Alert...ICU Electrolyte Replacement per Pharmacy   Other PHARMACY TO DOSE Josiah Casiano M.D.        lidocaine (Xylocaine) 1 % injection 2 mL  2 mL Tracheal Tube Q30 MIN PRN Lizandro Greenwood M.D.        acetaminophen (Tylenol) tablet 650 mg  650 mg Enteral Tube Q4HRS PRN Lizandro Greenwood M.D.   650 mg at 10/01/23 0026    amLODIPine (Norvasc) tablet 5 mg  5 mg Enteral Tube DAILY Lizandro Greenwood M.D.   5 mg at 10/01/23 0524    atorvastatin (Lipitor) tablet 40 mg  40 mg Enteral Tube Q EVENING Lizandro Greenwood M.D.   40 mg at 09/30/23 1759    losartan (Cozaar) tablet 50 mg  50 mg Enteral Tube DAILY Lizandro Greenwood M.D.   50 mg at 10/01/23 0524    memantine (Namenda) tablet 5 mg  5 mg Enteral Tube BID Lizandro Greenwood M.D.   5 mg at 10/01/23 0524    lansoprazole (Prevacid) solutab 30 mg  30 mg Enteral Tube DAILY Lizandro Greenwood M.D.   30 mg at 10/01/23 0524    ondansetron (Zofran ODT) dispertab 4 mg  4 mg Enteral Tube Q4HRS PRN Lizandro Greenwood M.D.        senna-docusate (Pericolace Or Senokot S) 8.6-50 MG per tablet 2 Tablet  2 Tablet Enteral Tube BID Lizandro Greenwood M.D.   2 Tablet at 10/01/23 0524    And    polyethylene glycol/lytes (Miralax) PACKET 1 Packet  1 Packet Enteral Tube QDAY PRN Lizandro Greenwood M.D.        And    magnesium hydroxide (Milk Of Magnesia)  suspension 30 mL  30 mL Enteral Tube QDAY PRN Lizandro Greenwood M.D.        And    bisacodyl (Dulcolax) suppository 10 mg  10 mg Rectal QDAY PRN Lizandro Greenwood M.D.        3% sodium chloride (Hypertonic Saline) 500mL infusion  0-60 mL/hr Intravenous Continuous Lizandro Greenwood M.D. 50 mL/hr at 10/01/23 0756 50 mL/hr at 10/01/23 0756    fentaNYL (Sublimaze) injection 100 mcg  100 mcg Intravenous Q15 MIN PRN Cesilia De La Fuenteer, A.P.R.N.        And    fentaNYL (Sublimaze) injection 200 mcg  200 mcg Intravenous Q15 MIN PRN Cesilia ORTIZ Lieberman, A.P.R.N.        And    fentaNYL (SUBLIMAZE) 50 mcg/mL in 50mL (Continuous Infusion)   Intravenous Continuous Cesilia ORTIZ Lieberman, A.P.R.N.   Stopped at 10/01/23 0940    And    propofol (DIPRIVAN) injection  0-80 mcg/kg/min (Ideal) Intravenous Continuous Cesilia ORTIZ Lieberman, A.P.R.N.   Stopped at 10/01/23 0937    ondansetron (Zofran) syringe/vial injection 4 mg  4 mg Intravenous Q4HRS PRN Rhoda Argueta M.D.   4 mg at 09/29/23 0151    hydrALAZINE (Apresoline) injection 20 mg  20 mg Intravenous Q6HRS PRN Rafa Zhang M.D.   20 mg at 09/30/23 1326    ipratropium-albuterol (DUONEB) nebulizer solution  3 mL Nebulization Q2HRS PRN (RT) Florian Cole M.D.        insulin lispro (HumaLOG,AdmeLOG) injection  2-9 Units Subcutaneous Q6HRS Rhoda Argueta M.D.   2 Units at 10/01/23 0532    lactated ringers infusion (BOLUS)  1,000 mL Intravenous Once PRN Med Peña M.D.        dextrose 50% (D50W) injection 25 g  25 g Intravenous Q15 MIN PRN Med Peña M.D.           Fluids    Intake/Output Summary (Last 24 hours) at 10/1/2023 1141  Last data filed at 10/1/2023 1004  Gross per 24 hour   Intake 2262.84 ml   Output 1045 ml   Net 1217.84 ml       Laboratory  Recent Labs     09/30/23  1521 10/01/23  0257   ISTATAPH 7.385* 7.446   ISTATAPCO2 43.4* 38.9*   ISTATAPO2 109* 59*   ISTATATCO2 27 28   FGPERAK7WDB 98 91*   ISTATARTHCO3 26.0* 26.8*   ISTATARTBE 1 3   ISTATTEMP 99.0 F 100.2  F   ISTATFIO2 100 50   ISTATSPEC Arterial Arterial   ISTATAPHTC 7.382* 7.433   SFDZHWOE3TP 111* 63*         Recent Labs     09/29/23  0310 09/30/23  0153 09/30/23  2320 10/01/23  0515 10/01/23  1026   SODIUM 143   < > 144 143 150*   POTASSIUM 3.5*   < > 4.2 3.8 3.8   CHLORIDE 103   < > 109 110 116*   CO2 30   < > 26 25 25   BUN 11   < > 22 22 22   CREATININE 0.69   < > 0.76 0.86 0.82   MAGNESIUM 1.8  --   --  1.9  --    PHOSPHORUS 2.3*  --   --  1.9*  --    CALCIUM 8.6   < > 8.3* 8.4* 8.5    < > = values in this interval not displayed.     Recent Labs     09/30/23  0153 09/30/23  1350 09/30/23  1628 09/30/23  2320 10/01/23  0515 10/01/23  1026   ALTSGPT 27  --  26  --  24  --    ASTSGOT 54*  --  54*  --  41  --    ALKPHOSPHAT 129*  --  121*  --  106*  --    TBILIRUBIN 0.8  --  1.3  --  0.9  --    GLUCOSE 149*   < > 185* 152* 154* 151*    < > = values in this interval not displayed.     Recent Labs     09/29/23  0310 09/30/23  0153 09/30/23  1628 10/01/23  0515   WBC 8.2 7.8  --  8.7   NEUTSPOLYS 70.40 62.70  --  54.80   LYMPHOCYTES 16.80* 21.90*  --  27.30   MONOCYTES 11.20 11.90  --  13.00   EOSINOPHILS 0.40 1.90  --  2.90   BASOPHILS 0.20 0.30  --  0.50   ASTSGOT 46* 54* 54* 41   ALTSGPT 22 27 26 24   ALKPHOSPHAT 123* 129* 121* 106*   TBILIRUBIN 0.7 0.8 1.3 0.9     Recent Labs     09/29/23  0310 09/30/23  0153 09/30/23  1350 10/01/23  0515   RBC 4.34* 4.57*  --  4.12*   HEMOGLOBIN 13.6* 14.2  --  12.9*   HEMATOCRIT 38.9* 41.6*  --  38.7*   PLATELETCT 98* 116*  --  164   PROTHROMBTM  --   --  14.5  --    APTT  --   --  27.6  --    INR  --   --  1.12  --        Imaging  X-Ray:  I have personally reviewed the images and compared with prior images. and My impression is: Significant increase in pulmonary infiltrates especially on right side    Assessment/Plan  * Intraparenchymal hemorrhage of brain (HCC)- (present on admission)  Assessment & Plan  Admit ICU  q2 hour neuro checks    SBP <140 per neurology    PRN:  -  Labetalol  - Hydralazine  - enalaprit    Nicardipine gtt    No VTE ppx/antiplatelet     Goal Na 150-155 if acute, otherwise normal Na goal  q6 Na     Repeat CT head w/out contrast for clinical change  MRI brain w/wo contrast   MRA brain without contrast    Given recent traumatic SAH it's reasonable for keppra 500 BID x7 days    Platelet >100k, INR <1.5  Echocardiogram    HOB > 30 degrees  PT/OT/SLP  Avoid lying flat when possible     Pneumonia due to infectious organism- (present on admission)  Assessment & Plan  Worsening opacities  Fevers  Start empiric unasyn    Acute respiratory failure with hypoxia (HCC)- (present on admission)  Assessment & Plan  They are intubated and on mechanical ventilation  Intubated date: 9/30  GI prophylaxis: H2 blocker  Monitor ventilator waveforms & blood gases, titrate flow/peep and volumes according.   Daily SAT/SBT  All ventilator bundles are in place     Thrombocytopenia (HCC)- (present on admission)  Assessment & Plan  Combination sepsis and alcohol abuse  Improving  Trend    Alcohol dependence (HCC)- (present on admission)  Assessment & Plan  RASS based monitoring  Wife reports significant agitation with ativan    Pneumobilia- (present on admission)  Assessment & Plan  At initial admission to ICU, Consulted Surgery Dr Hirsch, signed off  S/p resuscitation with IVF,  S/p vasopressor support  Conmpleted antibiotics: ceftriaxone and flagyl.    SAH (subarachnoid hemorrhage) (HCC)- (present on admission)  Assessment & Plan  Likely traumatic  Now day 7  Keppra BID x7 days starting 10/1 for new bleed  - can dc if ok with neuro/nsgy    Type 2 diabetes mellitus (HCC)- (present on admission)  Assessment & Plan  SSI  Determine need for glargine    Essential hypertension- (present on admission)  Assessment & Plan  Current SBP goal <140 per neuro  Continue losartan, amlodipine    BMI 40.0-44.9, adult (HCC)- (present on admission)  Assessment & Plan  BMI 40.65  Class III obesity  Dietary  consult; Weight loss counseling not appropriate in acute care setting  This place him on high risk for morbidity          VTE:  Contraindicated  Ulcer: H2 Antagonist  Lines: Central Line  Ongoing indication addressed and Joseph Catheter  Ongoing indication addressed    I have performed a physical exam and reviewed and updated ROS and Plan today (10/1/2023). In review of yesterday's note (9/30/2023), there are no changes except as documented above.     Discussed patient condition and risk of morbidity and/or mortality with Family, RN, RT, Pharmacy, Charge nurse / hot rounds, and neurology and neurosurgery    The patient remains critically ill.  Critical care time = 57 minutes in directly providing and coordinating critical care and extensive data review.  No time overlap and excludes procedures.

## 2023-10-01 NOTE — CARE PLAN
Problem: Hemodynamics  Goal: Patient's hemodynamics, fluid balance and neurologic status will be stable or improve  Outcome: Progressing     Problem: Fluid Volume  Goal: Fluid volume balance will be maintained  Outcome: Progressing     Problem: Urinary - Renal Perfusion  Goal: Ability to achieve and maintain adequate renal perfusion and functioning will improve  Outcome: Progressing     Problem: Respiratory  Goal: Patient will achieve/maintain optimum respiratory ventilation and gas exchange  Outcome: Progressing     Problem: Physical Regulation  Goal: Diagnostic test results will improve  Outcome: Progressing  Goal: Signs and symptoms of infection will decrease  Outcome: Progressing     Problem: Knowledge Deficit - Standard  Goal: Patient and family/care givers will demonstrate understanding of plan of care, disease process/condition, diagnostic tests and medications  Outcome: Progressing     Problem: Skin Integrity  Goal: Skin integrity is maintained or improved  Outcome: Progressing     Problem: Fall Risk  Goal: Patient will remain free from falls  Outcome: Progressing     Problem: Pain - Standard  Goal: Alleviation of pain or a reduction in pain to the patient’s comfort goal  Outcome: Progressing     Problem: Seizure Precautions  Goal: Implementation of seizure precautions  Outcome: Progressing     Problem: Lifestyle Changes  Goal: Patient's ability to identify lifestyle changes and available resources to help reduce recurrence of condition will improve  Outcome: Progressing     Problem: Psychosocial  Goal: Patient's level of anxiety will decrease  Outcome: Progressing  Goal: Spiritual and cultural needs incorporated into hospitalization  Outcome: Progressing     Problem: Risk for Aspiration  Goal: Patient's risk for aspiration will be absent or decrease  Outcome: Progressing     Problem: Optimal Care of the Stroke Patient  Goal: Optimal emergency care for the stroke patient  Outcome: Progressing  Goal:  Optimal acute care for the stroke patient  Outcome: Progressing     Problem: Knowledge Deficit - Stroke Education  Goal: Patient's knowledge of stroke and risk factors will improve  Outcome: Progressing     Problem: Psychosocial - Patient Condition  Goal: Patient's ability to verbalize feelings about condition will improve  Outcome: Progressing  Goal: Patient's ability to re-evaluate and adapt role responsibilities will improve  Outcome: Progressing     Problem: Discharge Planning - Stroke  Goal: Ensure Stroke Core Measures are met prior to discharge  Outcome: Progressing  Goal: Patient’s continuum of care needs will be met  Outcome: Progressing     Problem: Neuro Status  Goal: Neuro status will remain stable or improve  Outcome: Progressing     Problem: Hemodynamic Monitoring  Goal: Patient's hemodynamics, fluid balance and neurologic status will be stable or improve  Outcome: Progressing     Problem: Respiratory - Stroke Patient  Goal: Patient will achieve/maintain optimum respiratory rate/effort  Outcome: Progressing     Problem: Dysphagia  Goal: Dysphagia will improve  Outcome: Progressing     Problem: Urinary Elimination  Goal: Establish and maintain regular urinary output  Outcome: Progressing     Problem: Bowel Elimination  Goal: Establish and maintain regular bowel function  Outcome: Progressing     Problem: Mobility - Stroke  Goal: Patient's capacity to carry out activities will improve  Outcome: Progressing  Goal: Spasticity will be prevented or improved  Outcome: Progressing  Goal: Subluxation will be prevented or improved  Outcome: Progressing     Problem: Self Care  Goal: Patient will have the ability to perform ADLs independently or with assistance (bathe, groom, dress, toilet and feed)  Outcome: Progressing   The patient is Watcher - Medium risk of patient condition declining or worsening    Shift Goals  Clinical Goals: intubate for airway protection, SBP<140, MRI,  Patient Goals: DEENA  Family Goals:  updates    Progress made toward(s) clinical / shift goals:  manage pain, stable vs, no fevers, prog mob, wean vent    Patient is not progressing towards the following goals: n/a

## 2023-10-01 NOTE — CARE PLAN
Ventilator Daily Summary    Vent Day # 2     Airway: 8.0 @ 25     Ventilator settings: 18/470/+10/50%     Weaning trials: sbt x2 today - does not follow for parameters     Respiratory Procedures: none at this time      Plan: Continue current ventilator settings and wean mechanical ventilation as tolerated per physician orders.        Problem: Ventilation  Goal: Ability to achieve and maintain unassisted ventilation or tolerate decreased levels of ventilator support  Description: Target End Date:  4 days     Document on Vent flowsheet    1.  Support and monitor invasive and noninvasive mechanical ventilation  2.  Monitor ventilator weaning response  3.  Perform ventilator associated pneumonia prevention interventions  4.  Manage ventilation therapy by monitoring diagnostic test results  Outcome: Progressing

## 2023-10-01 NOTE — CONSULTS
DATE OF SERVICE:  10/01/2023     NEUROSURGICAL CONSULTATION     HISTORY OF PRESENT ILLNESS:  The patient is a 68-year-old male with a past   medical history significant for alcohol abuse, coagulopathy, low platelets,   abnormal TEG.  He had previously had a head injury in March, which was by   description of his wife very mild and he was seeing Dr. Nemwan from Neurology   for recalcitrant headaches.  He came to the hospital on the 24th after having   fallen and being diagnosed with a big one head injury.  CT scan showed trace   left-sided frontal subarachnoid hemorrhage.  He had a normal coagulation   profile including a normal TEG at that time and he was admitted to the   hospital.  He was started on prophylactic Lovenox and was relatively stable   until yesterday where he had acute onset of change in mental status and was   admitted to the intensive care unit and intubated for airway protection.     PHYSICAL EXAMINATION:  GENERAL:  Intubated and sedated off of propofol and fentanyl for 5 minutes.    Opens his eyes to voice.  HEENT:  Pupils equal, round, reactive to light.  Slightly disconjugate gaze.    He is moving his left upper and lower extremity spontaneously, but not   following commands.  Right side, he has trace withdrawal.     IMAGING:  CT scan of the head noncontrast and on repeat this morning shows a   stable left-sided frontal intracerebral hemorrhage measuring 6 mm.  There is   some surrounding vasogenic edema.     LABORATORY VALUES:  CBC significant for hemoglobin 12.9, platelets 164.  Basic   metabolic panel within normal limits except for glucose of 154.  INR upon   presentation on 09/24 was 1.1 with a normal TEG.  INR and PTT yesterday were   normal at 1.1 and 27.6.  TEG was normal except for AA and ADP inhibition,   which were not able to be calculated secondary to high MA.     PAST MEDICAL HISTORY:  Acute kidney injury, alcohol withdrawal, coronary   artery disease, cancer, cellulitis,  hypertension, hyperglycemia, hypertension,   kidney stones, tinnitus.     PAST SURGICAL HISTORY:  Gastroscopy, EGD.     FAMILY HISTORY:  Noncontributory.     SOCIAL HISTORY:  No smoking.  He does have current ETOH use.  No illicits.     MEDICATIONS AT HOME:  Zyloprim, Norvasc, Ecotrin, Lasix, Emgality, Cozaar,   Namenda, Glucophage, Naprosyn, Prilosec, Actos, K-Dur, Zocor.     PLAN:  The patient's exam is improving, off of sedation.  I would recommend   weaning to extubate as tolerated.  I do not believe that this is a surgical   lesion given the patient's exam and location.  Agree with Neurology getting an   MRI to make sure there is no underlying lesion, but this is location of the   traumatic subarachnoid hemorrhage.  I think it is highly unlikely MRI will   show us anything.  We will follow at a distance.  Discussed with critical   care.        ______________________________  MD MARIAA Blandon/TRENA/AUNDREA    DD:  10/01/2023 09:55  DT:  10/01/2023 11:31    Job#:  [id number]

## 2023-10-02 ENCOUNTER — APPOINTMENT (OUTPATIENT)
Dept: RADIOLOGY | Facility: MEDICAL CENTER | Age: 68
DRG: 870 | End: 2023-10-02
Attending: INTERNAL MEDICINE
Payer: MEDICARE

## 2023-10-02 PROBLEM — I10 PRIMARY HYPERTENSION: Status: ACTIVE | Noted: 2018-01-19

## 2023-10-02 LAB
ALBUMIN SERPL BCP-MCNC: 2.7 G/DL (ref 3.2–4.9)
ALBUMIN/GLOB SERPL: 1 G/DL
ALP SERPL-CCNC: 97 U/L (ref 30–99)
ALT SERPL-CCNC: 17 U/L (ref 2–50)
ANION GAP SERPL CALC-SCNC: 9 MMOL/L (ref 7–16)
AST SERPL-CCNC: 27 U/L (ref 12–45)
BASE EXCESS BLDA CALC-SCNC: -1 MMOL/L (ref -4–3)
BASOPHILS # BLD AUTO: 0.4 % (ref 0–1.8)
BASOPHILS # BLD: 0.03 K/UL (ref 0–0.12)
BILIRUB SERPL-MCNC: 0.8 MG/DL (ref 0.1–1.5)
BODY TEMPERATURE: ABNORMAL DEGREES
BREATHS SETTING VENT: 18
BUN SERPL-MCNC: 18 MG/DL (ref 8–22)
CALCIUM ALBUM COR SERPL-MCNC: 8.8 MG/DL (ref 8.5–10.5)
CALCIUM SERPL-MCNC: 7.8 MG/DL (ref 8.5–10.5)
CHLORIDE SERPL-SCNC: 122 MMOL/L (ref 96–112)
CO2 BLDA-SCNC: 25 MMOL/L (ref 20–33)
CO2 SERPL-SCNC: 23 MMOL/L (ref 20–33)
CREAT SERPL-MCNC: 0.75 MG/DL (ref 0.5–1.4)
CRP SERPL HS-MCNC: 9.3 MG/DL (ref 0–0.75)
DELSYS IDSYS: ABNORMAL
EOSINOPHIL # BLD AUTO: 0.29 K/UL (ref 0–0.51)
EOSINOPHIL NFR BLD: 3.5 % (ref 0–6.9)
ERYTHROCYTE [DISTWIDTH] IN BLOOD BY AUTOMATED COUNT: 50.4 FL (ref 35.9–50)
GFR SERPLBLD CREATININE-BSD FMLA CKD-EPI: 97 ML/MIN/1.73 M 2
GLOBULIN SER CALC-MCNC: 2.7 G/DL (ref 1.9–3.5)
GLUCOSE BLD STRIP.AUTO-MCNC: 137 MG/DL (ref 65–99)
GLUCOSE BLD STRIP.AUTO-MCNC: 163 MG/DL (ref 65–99)
GLUCOSE SERPL-MCNC: 143 MG/DL (ref 65–99)
HCO3 BLDA-SCNC: 23.7 MMOL/L (ref 17–25)
HCT VFR BLD AUTO: 37.8 % (ref 42–52)
HGB BLD-MCNC: 12 G/DL (ref 14–18)
HOROWITZ INDEX BLDA+IHG-RTO: 146 MM[HG]
IMM GRANULOCYTES # BLD AUTO: 0.11 K/UL (ref 0–0.11)
IMM GRANULOCYTES NFR BLD AUTO: 1.3 % (ref 0–0.9)
LACTATE BLD-SCNC: 0.6 MMOL/L (ref 0.5–2)
LYMPHOCYTES # BLD AUTO: 1.52 K/UL (ref 1–4.8)
LYMPHOCYTES NFR BLD: 18.4 % (ref 22–41)
MAGNESIUM SERPL-MCNC: 2.1 MG/DL (ref 1.5–2.5)
MCH RBC QN AUTO: 30.6 PG (ref 27–33)
MCHC RBC AUTO-ENTMCNC: 31.7 G/DL (ref 32.3–36.5)
MCV RBC AUTO: 96.4 FL (ref 81.4–97.8)
MODE IMODE: ABNORMAL
MONOCYTES # BLD AUTO: 0.69 K/UL (ref 0–0.85)
MONOCYTES NFR BLD AUTO: 8.4 % (ref 0–13.4)
NEUTROPHILS # BLD AUTO: 5.61 K/UL (ref 1.82–7.42)
NEUTROPHILS NFR BLD: 68 % (ref 44–72)
NRBC # BLD AUTO: 0 K/UL
NRBC BLD-RTO: 0 /100 WBC (ref 0–0.2)
O2/TOTAL GAS SETTING VFR VENT: 50 %
PCO2 BLDA: 38.3 MMHG (ref 26–37)
PCO2 TEMP ADJ BLDA: 37.8 MMHG (ref 26–37)
PEEP END EXPIRATORY PRESSURE IPEEP: 10 CMH20
PH BLDA: 7.4 [PH] (ref 7.4–7.5)
PH TEMP ADJ BLDA: 7.4 [PH] (ref 7.4–7.5)
PHOSPHATE SERPL-MCNC: 2.2 MG/DL (ref 2.5–4.5)
PLATELET # BLD AUTO: 170 K/UL (ref 164–446)
PMV BLD AUTO: 10.4 FL (ref 9–12.9)
PO2 BLDA: 73 MMHG (ref 64–87)
PO2 TEMP ADJ BLDA: 72 MMHG (ref 64–87)
POTASSIUM SERPL-SCNC: 3.7 MMOL/L (ref 3.6–5.5)
PREALB SERPL-MCNC: 13.8 MG/DL (ref 18–38)
PROT SERPL-MCNC: 5.4 G/DL (ref 6–8.2)
RBC # BLD AUTO: 3.92 M/UL (ref 4.7–6.1)
SAO2 % BLDA: 95 % (ref 93–99)
SODIUM SERPL-SCNC: 149 MMOL/L (ref 135–145)
SODIUM SERPL-SCNC: 154 MMOL/L (ref 135–145)
SODIUM SERPL-SCNC: 155 MMOL/L (ref 135–145)
SODIUM SERPL-SCNC: 155 MMOL/L (ref 135–145)
SPECIMEN DRAWN FROM PATIENT: ABNORMAL
TIDAL VOLUME IVT: 470 ML
WBC # BLD AUTO: 8.3 K/UL (ref 4.8–10.8)

## 2023-10-02 PROCEDURE — 82803 BLOOD GASES ANY COMBINATION: CPT

## 2023-10-02 PROCEDURE — 700111 HCHG RX REV CODE 636 W/ 250 OVERRIDE (IP): Mod: JZ | Performed by: INTERNAL MEDICINE

## 2023-10-02 PROCEDURE — 94799 UNLISTED PULMONARY SVC/PX: CPT

## 2023-10-02 PROCEDURE — 700101 HCHG RX REV CODE 250: Performed by: INTERNAL MEDICINE

## 2023-10-02 PROCEDURE — 700105 HCHG RX REV CODE 258: Performed by: INTERNAL MEDICINE

## 2023-10-02 PROCEDURE — A9270 NON-COVERED ITEM OR SERVICE: HCPCS | Performed by: INTERNAL MEDICINE

## 2023-10-02 PROCEDURE — 99291 CRITICAL CARE FIRST HOUR: CPT | Performed by: INTERNAL MEDICINE

## 2023-10-02 PROCEDURE — 770022 HCHG ROOM/CARE - ICU (200)

## 2023-10-02 PROCEDURE — 700102 HCHG RX REV CODE 250 W/ 637 OVERRIDE(OP): Performed by: INTERNAL MEDICINE

## 2023-10-02 PROCEDURE — 71045 X-RAY EXAM CHEST 1 VIEW: CPT

## 2023-10-02 PROCEDURE — 99233 SBSQ HOSP IP/OBS HIGH 50: CPT | Performed by: PSYCHIATRY & NEUROLOGY

## 2023-10-02 PROCEDURE — 700111 HCHG RX REV CODE 636 W/ 250 OVERRIDE (IP): Performed by: NURSE PRACTITIONER

## 2023-10-02 PROCEDURE — 80053 COMPREHEN METABOLIC PANEL: CPT

## 2023-10-02 PROCEDURE — 84295 ASSAY OF SERUM SODIUM: CPT

## 2023-10-02 PROCEDURE — 700111 HCHG RX REV CODE 636 W/ 250 OVERRIDE (IP): Mod: JZ | Performed by: STUDENT IN AN ORGANIZED HEALTH CARE EDUCATION/TRAINING PROGRAM

## 2023-10-02 PROCEDURE — 83605 ASSAY OF LACTIC ACID: CPT

## 2023-10-02 PROCEDURE — 86140 C-REACTIVE PROTEIN: CPT

## 2023-10-02 PROCEDURE — 84100 ASSAY OF PHOSPHORUS: CPT

## 2023-10-02 PROCEDURE — 83735 ASSAY OF MAGNESIUM: CPT

## 2023-10-02 PROCEDURE — 36600 WITHDRAWAL OF ARTERIAL BLOOD: CPT

## 2023-10-02 PROCEDURE — 85025 COMPLETE CBC W/AUTO DIFF WBC: CPT

## 2023-10-02 PROCEDURE — 700105 HCHG RX REV CODE 258: Performed by: STUDENT IN AN ORGANIZED HEALTH CARE EDUCATION/TRAINING PROGRAM

## 2023-10-02 PROCEDURE — 84134 ASSAY OF PREALBUMIN: CPT

## 2023-10-02 PROCEDURE — 82962 GLUCOSE BLOOD TEST: CPT | Mod: 91

## 2023-10-02 PROCEDURE — 99292 CRITICAL CARE ADDL 30 MIN: CPT | Performed by: INTERNAL MEDICINE

## 2023-10-02 PROCEDURE — 94003 VENT MGMT INPAT SUBQ DAY: CPT

## 2023-10-02 RX ORDER — AMLODIPINE BESYLATE 5 MG/1
5 TABLET ORAL ONCE
Status: COMPLETED | OUTPATIENT
Start: 2023-10-02 | End: 2023-10-02

## 2023-10-02 RX ORDER — DEXMEDETOMIDINE HYDROCHLORIDE 4 UG/ML
.1-1.5 INJECTION, SOLUTION INTRAVENOUS CONTINUOUS
Status: DISCONTINUED | OUTPATIENT
Start: 2023-10-02 | End: 2023-10-08

## 2023-10-02 RX ORDER — FUROSEMIDE 10 MG/ML
40 INJECTION INTRAMUSCULAR; INTRAVENOUS ONCE
Status: COMPLETED | OUTPATIENT
Start: 2023-10-02 | End: 2023-10-02

## 2023-10-02 RX ORDER — HYDROMORPHONE HYDROCHLORIDE 1 MG/ML
.5-2 INJECTION, SOLUTION INTRAMUSCULAR; INTRAVENOUS; SUBCUTANEOUS
Status: DISCONTINUED | OUTPATIENT
Start: 2023-10-02 | End: 2023-10-08

## 2023-10-02 RX ORDER — AMLODIPINE BESYLATE 5 MG/1
10 TABLET ORAL DAILY
Status: DISCONTINUED | OUTPATIENT
Start: 2023-10-03 | End: 2023-10-21

## 2023-10-02 RX ADMIN — AMLODIPINE BESYLATE 5 MG: 5 TABLET ORAL at 12:18

## 2023-10-02 RX ADMIN — SODIUM CHLORIDE 5 MG/HR: 9 INJECTION, SOLUTION INTRAVENOUS at 21:08

## 2023-10-02 RX ADMIN — INSULIN LISPRO 2 UNITS: 100 INJECTION, SOLUTION INTRAVENOUS; SUBCUTANEOUS at 17:57

## 2023-10-02 RX ADMIN — SODIUM CHLORIDE 60 ML/HR: 3 INJECTION, SOLUTION INTRAVENOUS at 05:19

## 2023-10-02 RX ADMIN — LANSOPRAZOLE 30 MG: 30 TABLET, ORALLY DISINTEGRATING, DELAYED RELEASE ORAL at 05:52

## 2023-10-02 RX ADMIN — POTASSIUM PHOSPHATE, MONOBASIC AND POTASSIUM PHOSPHATE, DIBASIC 15 MMOL: 224; 236 INJECTION, SOLUTION, CONCENTRATE INTRAVENOUS at 12:22

## 2023-10-02 RX ADMIN — FUROSEMIDE 40 MG: 10 INJECTION INTRAMUSCULAR; INTRAVENOUS at 12:18

## 2023-10-02 RX ADMIN — SENNOSIDES AND DOCUSATE SODIUM 2 TABLET: 50; 8.6 TABLET ORAL at 05:52

## 2023-10-02 RX ADMIN — AMLODIPINE BESYLATE 5 MG: 5 TABLET ORAL at 05:52

## 2023-10-02 RX ADMIN — PROPOFOL 40 MCG/KG/MIN: 10 INJECTION, EMULSION INTRAVENOUS at 03:59

## 2023-10-02 RX ADMIN — LOSARTAN POTASSIUM 50 MG: 50 TABLET, FILM COATED ORAL at 05:52

## 2023-10-02 RX ADMIN — AMPICILLIN AND SULBACTAM 3 G: 1; 2 INJECTION, POWDER, FOR SOLUTION INTRAMUSCULAR; INTRAVENOUS at 06:06

## 2023-10-02 RX ADMIN — MEMANTINE 5 MG: 5 TABLET ORAL at 05:52

## 2023-10-02 RX ADMIN — MEMANTINE 5 MG: 5 TABLET ORAL at 17:12

## 2023-10-02 RX ADMIN — POLYETHYLENE GLYCOL 3350 1 PACKET: 17 POWDER, FOR SOLUTION ORAL at 18:00

## 2023-10-02 RX ADMIN — SENNOSIDES AND DOCUSATE SODIUM 2 TABLET: 50; 8.6 TABLET ORAL at 17:12

## 2023-10-02 RX ADMIN — Medication 0.5 MG/HR: at 14:46

## 2023-10-02 RX ADMIN — ACETAMINOPHEN 650 MG: 325 TABLET, FILM COATED ORAL at 17:12

## 2023-10-02 RX ADMIN — ATORVASTATIN CALCIUM 40 MG: 40 TABLET, FILM COATED ORAL at 17:12

## 2023-10-02 RX ADMIN — LEVETIRACETAM 500 MG: 100 INJECTION, SOLUTION, CONCENTRATE INTRAVENOUS at 17:12

## 2023-10-02 RX ADMIN — AMPICILLIN AND SULBACTAM 3 G: 1; 2 INJECTION, POWDER, FOR SOLUTION INTRAMUSCULAR; INTRAVENOUS at 17:13

## 2023-10-02 RX ADMIN — SODIUM CHLORIDE 5 MG/HR: 9 INJECTION, SOLUTION INTRAVENOUS at 16:00

## 2023-10-02 RX ADMIN — AMPICILLIN AND SULBACTAM 3 G: 1; 2 INJECTION, POWDER, FOR SOLUTION INTRAMUSCULAR; INTRAVENOUS at 12:20

## 2023-10-02 RX ADMIN — DEXMEDETOMIDINE 0.2 MCG/KG/HR: 100 INJECTION, SOLUTION INTRAVENOUS at 14:39

## 2023-10-02 RX ADMIN — LEVETIRACETAM 500 MG: 100 INJECTION, SOLUTION, CONCENTRATE INTRAVENOUS at 05:52

## 2023-10-02 ASSESSMENT — PAIN DESCRIPTION - PAIN TYPE
TYPE: ACUTE PAIN

## 2023-10-02 ASSESSMENT — FIBROSIS 4 INDEX: FIB4 SCORE: 2.7

## 2023-10-02 NOTE — PROGRESS NOTES
Pt transported on monitor with ACLS RN, RT, and CCT to MRI. Off spont on vent and sedation restarted for MRI.

## 2023-10-02 NOTE — CARE PLAN
Problem: Hemodynamics  Goal: Patient's hemodynamics, fluid balance and neurologic status will be stable or improve  Outcome: Progressing     Problem: Fluid Volume  Goal: Fluid volume balance will be maintained  Outcome: Progressing     Problem: Urinary - Renal Perfusion  Goal: Ability to achieve and maintain adequate renal perfusion and functioning will improve  Outcome: Progressing     Problem: Respiratory  Goal: Patient will achieve/maintain optimum respiratory ventilation and gas exchange  Outcome: Progressing     Problem: Physical Regulation  Goal: Diagnostic test results will improve  Outcome: Progressing  Goal: Signs and symptoms of infection will decrease  Outcome: Progressing     Problem: Knowledge Deficit - Standard  Goal: Patient and family/care givers will demonstrate understanding of plan of care, disease process/condition, diagnostic tests and medications  Outcome: Progressing     Problem: Skin Integrity  Goal: Skin integrity is maintained or improved  Outcome: Progressing     Problem: Fall Risk  Goal: Patient will remain free from falls  Outcome: Progressing     Problem: Pain - Standard  Goal: Alleviation of pain or a reduction in pain to the patient’s comfort goal  Outcome: Progressing     Problem: Seizure Precautions  Goal: Implementation of seizure precautions  Outcome: Progressing     Problem: Lifestyle Changes  Goal: Patient's ability to identify lifestyle changes and available resources to help reduce recurrence of condition will improve  Outcome: Progressing     Problem: Psychosocial  Goal: Patient's level of anxiety will decrease  Outcome: Progressing  Goal: Spiritual and cultural needs incorporated into hospitalization  Outcome: Progressing     Problem: Risk for Aspiration  Goal: Patient's risk for aspiration will be absent or decrease  Outcome: Progressing     Problem: Optimal Care of the Stroke Patient  Goal: Optimal emergency care for the stroke patient  Outcome: Progressing  Goal:  Optimal acute care for the stroke patient  Outcome: Progressing     Problem: Knowledge Deficit - Stroke Education  Goal: Patient's knowledge of stroke and risk factors will improve  Outcome: Progressing     Problem: Psychosocial - Patient Condition  Goal: Patient's ability to verbalize feelings about condition will improve  Outcome: Progressing  Goal: Patient's ability to re-evaluate and adapt role responsibilities will improve  Outcome: Progressing     Problem: Discharge Planning - Stroke  Goal: Ensure Stroke Core Measures are met prior to discharge  Outcome: Progressing  Goal: Patient’s continuum of care needs will be met  Outcome: Progressing     Problem: Neuro Status  Goal: Neuro status will remain stable or improve  Outcome: Progressing     Problem: Hemodynamic Monitoring  Goal: Patient's hemodynamics, fluid balance and neurologic status will be stable or improve  Outcome: Progressing     Problem: Respiratory - Stroke Patient  Goal: Patient will achieve/maintain optimum respiratory rate/effort  Outcome: Progressing     Problem: Dysphagia  Goal: Dysphagia will improve  Outcome: Progressing     Problem: Urinary Elimination  Goal: Establish and maintain regular urinary output  Outcome: Progressing     Problem: Bowel Elimination  Goal: Establish and maintain regular bowel function  Outcome: Progressing     Problem: Mobility - Stroke  Goal: Patient's capacity to carry out activities will improve  Outcome: Progressing  Goal: Spasticity will be prevented or improved  Outcome: Progressing  Goal: Subluxation will be prevented or improved  Outcome: Progressing     Problem: Self Care  Goal: Patient will have the ability to perform ADLs independently or with assistance (bathe, groom, dress, toilet and feed)  Outcome: Progressing     Problem: Safety - Medical Restraint  Goal: Remains free of injury from restraints (Restraint for Interference with Medical Device)  Outcome: Progressing  Goal: Free from restraint(s) (Restraint  for Interference with Medical Device)  Outcome: Progressing   The patient is Watcher - Medium risk of patient condition declining or worsening    Shift Goals  Clinical Goals: SBP<140, CT and MRI, stable neuro's Q1  Patient Goals: DEENA  Family Goals: updates, a listening ear    Progress made toward(s) clinical / shift goals:  manage pain, stable vs, no fevers, prog mob, wean vent, improve neuro    Patient is not progressing towards the following goals: n/a

## 2023-10-02 NOTE — CARE PLAN
Ventilator Daily Summary    Vent Day # 3     Airway: 8.0 @ 25     Ventilator settings: 18/470/+8/40%    Weaning trials: sbt trial today, successful. Wasn't following for parameters but continuing to wake up more this afternoon.     Respiratory Procedures: not at this time     Plan: Continue current ventilator settings and wean mechanical ventilation as tolerated per physician orders.

## 2023-10-02 NOTE — CARE PLAN
The patient is Watcher - Medium risk of patient condition declining or worsening    Shift Goals  Clinical Goals: SBP<140, CT and MRI, stable neuro's Q1  Patient Goals: DEENA  Family Goals: updates, a listening ear    Progress made toward(s) clinical / shift goals:    Problem: Pain - Standard  Goal: Alleviation of pain or a reduction in pain to the patient’s comfort goal  10/1/2023 1728 by Belen Rain RIftikharN.  Outcome: Progressing    Problem: Safety - Medical Restraint  Goal: Remains free of injury from restraints (Restraint for Interference with Medical Device)  10/1/2023 1728 by Belen Rain R.N.  Outcome: Progressing

## 2023-10-02 NOTE — PROGRESS NOTES
Neurosurgery Progress Note    Subjective:  No events    Exam:  Off prop and fentanyl several hours  Open eyes to voice  Perrl, conjugate  [place] spont, not f/c      BP  Min: 97/53  Max: 169/61  Pulse  Av.1  Min: 52  Max: 94  Resp  Av.7  Min: 7  Max: 39  Temp  Av.5 °C (99.5 °F)  Min: 37.5 °C (99.5 °F)  Max: 37.5 °C (99.5 °F)  Monitored Temp 2  Av.4 °C (99.3 °F)  Min: 37 °C (98.6 °F)  Max: 37.6 °C (99.7 °F)  SpO2  Av.4 %  Min: 91 %  Max: 99 %    No data recorded    Recent Labs     23  0153 10/01/23  0515 10/02/23  0615   WBC 7.8 8.7 8.3   RBC 4.57* 4.12* 3.92*   HEMOGLOBIN 14.2 12.9* 12.0*   HEMATOCRIT 41.6* 38.7* 37.8*   MCV 91.0 93.9 96.4   MCH 31.1 31.3 30.6   MCHC 34.1 33.3 31.7*   RDW 46.0 48.4 50.4*   PLATELETCT 116* 164 170   MPV 11.6 10.7 10.4     Recent Labs     10/01/23  0515 10/01/23  1026 10/01/23  1840 10/02/23  0010 10/02/23  0615   SODIUM 143 150* 152* 149* 154*   POTASSIUM 3.8 3.8  --   --  3.7   CHLORIDE 110 116*  --   --  122*   CO2 25 25  --   --  23   GLUCOSE 154* 151*  --   --  143*   BUN 22 22  --   --  18   CREATININE 0.86 0.82  --   --  0.75   CALCIUM 8.4* 8.5  --   --  7.8*     Recent Labs     23  1350   APTT 27.6   INR 1.12     Recent Labs     23  1305 10/01/23  1026   REACTMIN 5.4 5.0   CLOTKINET 0.9 0.7*   CLOTANGL 77.0 80.0*   MAXCLOTS 64.6 67.0   [id number] 0.0 0.1   PRCINADP See Comment see comment   PRCINAA See Comment see comment       Intake/Output                         10/01/23 0700 - 10/02/23 0659 10/02/23 0700 - 10/03/23 0659     [id number] 3811-1712 Total  Total                 Intake    I.V.  1511.2  885.5 2396.7  --  -- --    Fentanyl Volume 7.4 11.7 19.1 -- -- --    Magnesium Sulfate Volume 48.9 -- 48.9 -- -- --    Cardene Volume 875 -- 875 -- -- --    Propofol Volume 70.1 229.6 299.7 -- -- --    Volume (mL) (3% sodium chloride (Hypertonic Saline) 500mL infusion) 509.8 644.2 1154 -- -- --    Other  200  -- 200  --  -- --     Medications (PO/Enteral Liquids) 200 -- 200 -- -- --    NG/GT  0  -- 0  --  -- --    Intake (mL) (Enteral Tube 09/30/23 Nasogastric Right nare) 0 -- 0 -- -- --    IV Piggyback  738.9  97.7 836.5  --  -- --    Volume (mL) (potassium phosphate 30 mmol in  mL ivpb) 493.8 -- 493.8 -- -- --    Volume (mL) (sodium chloride 200 mEq in empty bag 50 mL infusion) 50 -- 50 -- -- --    Volume (mL) (ampicillin/sulbactam (Unasyn) 3 g in  mL IVPB) 195 97.7 292.7 -- -- --    Enteral  90  -- 90  --  -- --    Free Water / Tube Flush 90 -- 90 -- -- --    Total Intake 2540.1 983.2 3523.3 -- -- --       Output    Urine  1635  775 2410  --  -- --    Output (mL) (Urethral Catheter) 5973 821 0048 -- -- --    Stool  --  -- --  --  -- --    Number of Times Stooled 0 x -- 0 x -- -- --    Emesis/NG output  0  -- 0  --  -- --    Output (mL) (Enteral Tube 09/30/23 Nasogastric Right nare) 0 -- 0 -- -- --    Total Output 8413 112 7141 -- -- --       Net I/O     905.1 208.2 1113.3 -- -- --              Intake/Output Summary (Last 24 hours) at 10/2/2023 0901  Last data filed at 10/2/2023 0600  Gross per 24 hour   Intake 3327.97 ml   Output 2260 ml   Net 1067.97 ml             sodium chloride 200 mEq in empty bag 50 mL infusion  200 mEq Once    ampicillin-sulbactam (UNASYN) IV  3 g Q6HRS    labetalol  10 mg Q6HRS PRN    enalaprilat  1.25 mg Q6HRS PRN    levETIRAcetam (Keppra) IV  500 mg Q12HRS    niCARdipine (Cardene) 25 mg in  mL Standard Infusion  0-15 mg/hr Continuous    Respiratory Therapy Consult   Continuous RT    MD Alert...Adult ICU Electrolyte Replacement per Pharmacy   PHARMACY TO DOSE    lidocaine  2 mL Q30 MIN PRN    acetaminophen  650 mg Q4HRS PRN    amLODIPine  5 mg DAILY    atorvastatin  40 mg Q EVENING    losartan  50 mg DAILY    memantine  5 mg BID    lansoprazole  30 mg DAILY    ondansetron  4 mg Q4HRS PRN    senna-docusate  2 Tablet BID    And    polyethylene glycol/lytes  1 Packet QDAY PRN    And    magnesium  hydroxide  30 mL QDAY PRN    And    bisacodyl  10 mg QDAY PRN    3% sodium chloride  0-60 mL/hr Continuous    fentaNYL  100 mcg Q15 MIN PRN    And    fentaNYL  200 mcg Q15 MIN PRN    And    fentaNYL   Continuous    And    propofol  0-80 mcg/kg/min (Ideal) Continuous    ondansetron  4 mg Q4HRS PRN    hydrALAZINE  20 mg Q6HRS PRN    ipratropium-albuterol  3 mL Q2HRS PRN (RT)    insulin lispro  2-9 Units Q6HRS    dextrose bolus  25 g Q15 MIN PRN       Assessment and Plan:  Hospital day # 8 left frontal ICH  POD# n/a  Chemical prophylactic DVT therapy: No  Start date/time: tbd  Mri no underlying tumor  Sbp<160  Na 150-155  Non-surgical  Will follow

## 2023-10-02 NOTE — DIETARY
"Nutrition Support Assessment:  Day 8 of admit.  Sharon Guardado is a 68 y.o. male with admitting DX of Severe sepsis.      Current problem list:  Intraparenchymal hemorrhage of brain  PNA d/t infectious organism  Pneumobilia     Assessment:  Estimated Nutritional Needs based on:   Height: 182.9 cm (6' 0.01\")  Weight: (!) 136 kg (299 lb 13.2 oz)  Weight to Use in Calculations: 136 kg (299 lb 13.2 oz)  Body mass index is 40.65 kg/m²., BMI classification: Obesity class III  IBW: 178 lbs (80.9 kg)    Calculation/Equation: REE per MSJ x 1.0 = 2161 kcals (65-70% = 3202-0080 kcals/day)  RMR per [facility] (VE: 13.7 L/min and Tmax: 37.5 C) = 2511 kcals (65-70%= 4321-8989 kcals/day)  Total Calories / day: 1500 - 1904  (Calories / k - 14)  Total Grams Protein / day: 121 - 162  (Grams Protein / k.5 - 2.0 of IBW)     Evaluation:   Consult received for TF.   IRIS feeding tube is in place on  and per KUB is in proximal duodenum.   Skin: 2+ moderate generalized edema to all extremities.   Labs: Na 155, glucose 143, Phos 2.2, A1C 6.8, , HDL 12  Meds: lipitor, SSI, prevacid, keppra, KPhos, senna, bowel protocol, cardene IV  Last BM:   ASPEN critical care obesity guidelines used to estimate needs.  Low CHO high protein formula is indicated to meet estimated needs.      Malnutrition Risk: Pt appears adequately nourished.      Recommendations/Plan:  Start TF Vital HP at 25 ml/hr and advance per protocol to goal rate of 70 ml/hr to provide 1680 kcals, 147 gm protein, and 1404 ml free water per day.   Fluids per MD.     RD following.             "

## 2023-10-02 NOTE — PROGRESS NOTES
Critical Care Progress Note    Date of admission  9/24/2023    Chief Complaint  68 y.o. male admitted 9/24/2023 with urosepsis.  He has a history of DM type II, primary hypertension, alcohol dependence and obesity.    Hospital Course      9/25: surgery was consulted Dr Hirsch. Continue resuscitation with IVF, antibiotics, If worsens, consider jamila tube. Patient was intubated acutely overnight.  9/26: requiring high levels of sedatives. Had to start a second vasopressor. Failed SAT and SBT due to agitation. Vasopressors are weaning down. Will start dvt chemical prophylaxis tonight.  9/27: changed propofol to precedex, hopes to move towards extubation. Consider tube feeds if  not extubated  9/28: he is now following commands, maximizing status for extubation.    9/29-9/30: Transferred to floor, under care with hospitalist.    9/30: patient was found with AMS, suspected ETOH withdrawal, s/p benzos. CT head showed parenchymal hemorrhage. Transferred to ICU. Intubated, CVC placed, vascular neurology and neurosurgery consulted    10/1 - Will work on extubation.  23% saline bolus.  Palliative consutled.  Fevers - reculture and empiric unasyn    10/2 -    vent day 3.  Force diuresis.  SAT/SBT.  Increase amlodipine.  10/3 -    vent day 4.  Force diuresis.  SAT/SBT.      Interval Problem Update  Reviewed last 24 hour events:      SR  100.0  -321 mL in the last 24  +6146 mL since admit      Review of Systems  Review of Systems   Unable to perform ROS: Acuity of condition        Vital Signs for last 24 hours   Pulse:  [50-90] 68  Resp:  [14-40] 14  BP: (106-158)/(54-74) 121/60  SpO2:  [93 %-99 %] 94 %    Hemodynamic parameters for last 24 hours       Respiratory Information for the last 24 hours  Vent Mode: APVCMV  Rate (breaths/min): 18  Vt Target (mL): 470  PEEP/CPAP: 8  P Support: 5  MAP: 12  Length of Weaning Trial (Hours): 1  Control VTE (exp VT): 477    Physical Exam   Physical Exam  Constitutional:       Appearance: He  is obese.      Comments: On ventilator   Eyes:      Pupils: Pupils are equal, round, and reactive to light.   Cardiovascular:      Comments: Sinus rhythm  Pulmonary:      Breath sounds: Rales (Scattered crackles) present. No wheezing.   Abdominal:      General: There is no distension.      Tenderness: There is no abdominal tenderness.      Comments: Tolerating enteral tube feedings   Musculoskeletal:      Right lower leg: Edema present.      Left lower leg: Edema present.   Skin:     General: Skin is warm.   Neurological:      Comments: Weak in the right arm and right leg.  Intermittently follows.         Medications  Current Facility-Administered Medications   Medication Dose Route Frequency Provider Last Rate Last Admin    potassium phosphate 15 mmol in  mL ivpb  15 mmol Intravenous Once Pal Serna M.D.        furosemide (Lasix) injection 40 mg  40 mg Intravenous Once Pal Serna M.D.        potassium bicarbonate (Klyte) effervescent tablet 50 mEq  50 mEq Enteral Tube Once Pal Serna M.D.        dexmedetomidine (PRECEDEX) 400 mcg/100mL NS premix infusion  0.1-1.5 mcg/kg/hr (Ideal) Intravenous Continuous Pal Serna M.D. 3.9 mL/hr at 10/02/23 1622 0.2 mcg/kg/hr at 10/02/23 1622    HYDROmorphone (Dilaudid) injection 0.5-2 mg  0.5-2 mg Intravenous Q HOUR PRN Pal Serna M.D.   0.5 mg at 10/03/23 0803    HYDROmorphone (DILAUDID) 0.2 mg/mL in 50mL NS (Continuous Infusion)   Intravenous Continuous Pal Serna M.D.   Stopped at 10/03/23 0553    amLODIPine (Norvasc) tablet 10 mg  10 mg Enteral Tube DAILY Pal Serna M.D.   10 mg at 10/03/23 0613    Pharmacy Consult: Enteral tube insertion - review meds/change route/product selection  1 Each Other PHARMACY TO DOSE Pal Serna M.D.        ampicillin/sulbactam (Unasyn) 3 g in  mL IVPB  3 g Intravenous Q6HRS Rafa Zhang M.D.   Stopped at 10/03/23 0630    labetalol  (Normodyne/Trandate) injection 10 mg  10 mg Intravenous Q6HRS PRN Rafa Zhang M.D.   10 mg at 10/01/23 1231    enalaprilat (Vasotec) injection 1.25 mg 1 mL  1.25 mg Intravenous Q6HRS PRN Rafa Zhang M.D.        levETIRAcetam (Keppra) injection 500 mg  500 mg Intravenous Q12HRS Rafa Zhang M.D.   500 mg at 10/03/23 0613    niCARdipine (Cardene) 25 mg in  mL Standard Infusion  0-15 mg/hr Intravenous Continuous Josiah Casiano M.D.   Stopped at 10/02/23 2303    Respiratory Therapy Consult   Nebulization Continuous RT Josiah Casiano M.D. MD Alert...ICU Electrolyte Replacement per Pharmacy   Other PHARMACY TO DOSE Josiah Casiano M.D.        lidocaine (Xylocaine) 1 % injection 2 mL  2 mL Tracheal Tube Q30 MIN PRN Lizandro Greenwood M.D.        acetaminophen (Tylenol) tablet 650 mg  650 mg Enteral Tube Q4HRS PRN Lizandro Greenwood M.D.   650 mg at 10/02/23 1712    atorvastatin (Lipitor) tablet 40 mg  40 mg Enteral Tube Q EVENING Lizandro Greenwood M.D.   40 mg at 10/02/23 1712    losartan (Cozaar) tablet 50 mg  50 mg Enteral Tube DAILY Lizandro Greenwood M.D.   50 mg at 10/03/23 0614    memantine (Namenda) tablet 5 mg  5 mg Enteral Tube BID Lizandro Greenwood M.D.   5 mg at 10/03/23 0613    lansoprazole (Prevacid) solutab 30 mg  30 mg Enteral Tube DAILY Lizandro Greenwood M.D.   30 mg at 10/03/23 0613    ondansetron (Zofran ODT) dispertab 4 mg  4 mg Enteral Tube Q4HRS PRN Lizandro Greenwood M.D.        senna-docusate (Pericolace Or Senokot S) 8.6-50 MG per tablet 2 Tablet  2 Tablet Enteral Tube BID Lizandro Greenwood M.D.   2 Tablet at 10/03/23 0614    And    polyethylene glycol/lytes (Miralax) PACKET 1 Packet  1 Packet Enteral Tube QDAY PRN Lizandro Greenwood M.D.   1 Packet at 10/02/23 1800    And    magnesium hydroxide (Milk Of Magnesia) suspension 30 mL  30 mL Enteral Tube QDAY PRN Lizandro Greenwood M.D.   30 mL at 10/03/23 0822    And    bisacodyl (Dulcolax) suppository 10 mg  10 mg Rectal  QDAY PRN Lizandro Greenwood M.D.        3% sodium chloride (Hypertonic Saline) 500mL infusion  0-60 mL/hr Intravenous Continuous Lizandro Greenwood M.D.   Held at 10/03/23 0000    ondansetron (Zofran) syringe/vial injection 4 mg  4 mg Intravenous Q4HRS PRN Rhoda Argueta M.D.   4 mg at 09/29/23 0151    hydrALAZINE (Apresoline) injection 20 mg  20 mg Intravenous Q6HRS PRN Rafa Zhang M.D.   20 mg at 09/30/23 1326    ipratropium-albuterol (DUONEB) nebulizer solution  3 mL Nebulization Q2HRS PRN (RT) Florian Cole M.D.        insulin lispro (HumaLOG,AdmeLOG) injection  2-9 Units Subcutaneous Q6HRS Rhoda Argueta M.D.   3 Units at 10/03/23 0606    dextrose 50% (D50W) injection 25 g  25 g Intravenous Q15 MIN PRN Med Peña M.D.           Fluids    Intake/Output Summary (Last 24 hours) at 10/3/2023 0836  Last data filed at 10/3/2023 0600  Gross per 24 hour   Intake 2628.72 ml   Output 2600 ml   Net 28.72 ml       Laboratory  Recent Labs     10/01/23  0257 10/02/23  0253 10/03/23  0346   ISTATAPH 7.446 7.399* 7.430   ISTATAPCO2 38.9* 38.3* 38.0*   ISTATAPO2 59* 73 75   ISTATATCO2 28 25 26   NUWWQKS4AUD 91* 95 95   ISTATARTHCO3 26.8* 23.7 25.2*   ISTATARTBE 3 -1 1   ISTATTEMP 100.2 F 98.0 F 99.7 F   ISTATFIO2 50 50 50   ISTATSPEC Arterial Arterial Arterial   ISTATAPHTC 7.433 7.404 7.421   [id number] 63* 72 78         Recent Labs     10/01/23  0515 10/01/23  1026 10/01/23  1840 10/02/23  0615 10/02/23  1130 10/02/23  1720 10/03/23  0000 10/03/23  0555   SODIUM 143 150*   < > 154*   < > 155* 155* 154*   POTASSIUM 3.8 3.8  --  3.7  --   --  3.6 3.4*   CHLORIDE 110 116*  --  122*  --   --   --  119*   CO2 25 25  --  23  --   --   --  28   BUN 22 22  --  18  --   --   --  19   CREATININE 0.86 0.82  --  0.75  --   --   --  0.75   MAGNESIUM 1.9  --   --  2.1  --   --   --  2.0   PHOSPHORUS 1.9*  --   --  2.2*  --   --   --  2.2*   CALCIUM 8.4* 8.5  --  7.8*  --   --   --  8.6    < > = values in this  interval not displayed.     Recent Labs     09/30/23  1628 09/30/23  2320 10/01/23  0515 10/01/23  1026 10/02/23  0615 10/02/23  1720 10/03/23  0555   ALTSGPT 26  --  24  --  17  --   --    ASTSGOT 54*  --  41  --  27  --   --    ALKPHOSPHAT 121*  --  106*  --  97  --   --    TBILIRUBIN 1.3  --  0.9  --  0.8  --   --    PREALBUMIN  --   --   --   --   --  13.8* 12.8*   GLUCOSE 185*   < > 154* 151* 143*  --  203*    < > = values in this interval not displayed.     Recent Labs     09/30/23  1628 10/01/23  0515 10/02/23  0615 10/03/23  0555   WBC  --  8.7 8.3 9.6   NEUTSPOLYS  --  54.80 68.00 76.40*   LYMPHOCYTES  --  27.30 18.40* 14.60*   MONOCYTES  --  13.00 8.40 5.80   EOSINOPHILS  --  2.90 3.50 2.40   BASOPHILS  --  0.50 0.40 0.20   ASTSGOT 54* 41 27  --    ALTSGPT 26 24 17  --    ALKPHOSPHAT 121* 106* 97  --    TBILIRUBIN 1.3 0.9 0.8  --      Recent Labs     09/30/23  1350 10/01/23  0515 10/02/23  0615 10/03/23  0555   RBC  --  4.12* 3.92* 4.04*   HEMOGLOBIN  --  12.9* 12.0* 12.3*   HEMATOCRIT  --  38.7* 37.8* 37.9*   PLATELETCT  --  164 170 193   PROTHROMBTM 14.5  --   --   --    APTT 27.6  --   --   --    INR 1.12  --   --   --        Imaging  X-Ray:  I have personally reviewed the images and compared with prior images. and My impression is: Unchanged bilateral opacities    Assessment/Plan  * Intraparenchymal hemorrhage of brain (HCC)- (present on admission)  Assessment & Plan  CT on 9/24 with hazy left frontal SAH  CT on 9/30 with new large IPH in the left frontal lobe  Strict blood pressure control with goal SBP less than 140 - I am titrating nicardipine to achieve blood pressure goals  Goal sodium 150-155 - I am titrating hypertonic saline to achieve sodium goals  Continue levetiracetam, 500 mg every 12 hours  Neuro checks every 2 hours    Acute respiratory failure with hypoxia (HCC)- (present on admission)  Assessment & Plan  Intubated 9/25-9/28  Reintubated 9/30  ABCDEF bundle  SAT/SBT as  appropriate  Mobility level 1    Pneumonia due to infectious organism- (present on admission)  Assessment & Plan  Continue Unasyn    Pneumobilia- (present on admission)  Assessment & Plan  Dr. Hirsch consulted on admission - surgery has subsequently signed off  Completed 5 days of ceftriaxone and metronidazole on 9/28    Type 2 diabetes mellitus (HCC)- (present on admission)  Assessment & Plan  Glycohemoglobin 6.8  Sliding scale insulin    Primary hypertension- (present on admission)  Assessment & Plan  Goal SBP less than 140  Continue amlodipine, 10 mg daily  Continue losartan, 50 mg daily  I am titrating nicardipine to achieve blood pressure goals    Alcohol dependence (HCC)- (present on admission)  Assessment & Plan  Cessation counseling when clinically appropriate    BMI 40.0-44.9, adult (HCC)- (present on admission)  Assessment & Plan            VTE:  Contraindicated  Ulcer: PPI  Lines: Central Line  Ongoing indication addressed and Joseph Catheter  Ongoing indication addressed    I have performed a physical exam and reviewed and updated ROS and Plan today (10/3/2023). In review of yesterday's note (10/2/2023), there are no changes except as documented above.     I have assessed and reassessed his respiratory status with ventilator adjustments and spontaneous breathing trials, ventilator waveforms, airway mechanics, blood pressure, hemodynamics, cardiovascular status with titration of nicardipine, serial determinations of serum sodium with titration of hypertonic saline and his neurologic status.  He is at increased risk for worsening respiratory, cardiovascular and CNS system dysfunction.    Discussed patient condition and risk of morbidity and/or mortality with RN, RT, Pharmacy, Charge nurse / hot rounds, and QA team    The patient remains critically ill.  Critical care time = 105 minutes in directly providing and coordinating critical care and extensive data review.  No time overlap and excludes  procedures.    Pal Serna MD  Pulmonary and Critical Care Medicine

## 2023-10-02 NOTE — PROGRESS NOTES
Neurology Progress Note  Neurohospitalist Service, [facility]    Referring Physician: REAL Cage*    Chief Complaint   Patient presents with    Head Injury     Pt fell and hit his head last night     ALOC     Pt arrives A&O1        HPI: Refer to initial documented Neurology H&P, as detailed in the patient's chart.    Interval History: No acute events overnight.  No new complaints, no significant changes.  Remains intubated and sedated.  Wife is at bedside.    Review of systems: In addition to what is detailed in the HPI and/or updated in the interval history, all other systems reviewed and are negative.    Past Medical History:    has a past medical history of LE (acute kidney injury), prerenal (CMS-HCC) (1/20/2018), Alcohol withdrawal (HCC) (1/22/2018), Bowel habit changes, CAD (coronary artery disease), Cancer (HCC), Cellulitis of back except buttock (8/3/2018), HTN (hypertension), Hyperglycemia (1/19/2018), Hypertension, Indigestion, Kidney stones, Near-syncope, Nerve compression, and Tinnitus.    FHx:  family history is not on file.    SHx:   reports that he has never smoked. He has never used smokeless tobacco. He reports current alcohol use. He reports that he does not use drugs.    Medications:    Current Facility-Administered Medications:     potassium phosphate 15 mmol in  mL ivpb, 15 mmol, Intravenous, Once, Pal Serna M.D., Last Rate: 62.5 mL/hr at 10/02/23 1222, 15 mmol at 10/02/23 1222    dexmedetomidine (PRECEDEX) 400 mcg/100mL NS premix infusion, 0.1-1.5 mcg/kg/hr (Ideal), Intravenous, Continuous, Pal Serna M.D.    HYDROmorphone (Dilaudid) injection 0.5-2 mg, 0.5-2 mg, Intravenous, Q HOUR PRN, Pal Serna M.D.    HYDROmorphone (DILAUDID) 0.2 mg/mL in 50mL NS (Continuous Infusion), , Intravenous, Continuous, Pal Serna M.D.    [START ON 10/3/2023] amLODIPine (Norvasc) tablet 10 mg, 10 mg, Enteral Tube, DAILY,  Pal Serna M.D.    ampicillin/sulbactam (Unasyn) 3 g in  mL IVPB, 3 g, Intravenous, Q6HRS, Rafa Zhang M.D., Stopped at 10/02/23 1250    labetalol (Normodyne/Trandate) injection 10 mg, 10 mg, Intravenous, Q6HRS PRN, Rafa Zhang M.D., 10 mg at 10/01/23 1231    enalaprilat (Vasotec) injection 1.25 mg 1 mL, 1.25 mg, Intravenous, Q6HRS PRN, Rafa Zhang M.D.    levETIRAcetam (Keppra) injection 500 mg, 500 mg, Intravenous, Q12HRS, Rafa Zhang M.D., 500 mg at 10/02/23 0552    niCARdipine (Cardene) 25 mg in  mL Standard Infusion, 0-15 mg/hr, Intravenous, Continuous, Josiah Casiano M.D., Last Rate: 150 mL/hr at 10/02/23 1120, 15 mg/hr at 10/02/23 1120    Respiratory Therapy Consult, , Nebulization, Continuous RT, Josiah Casiano M.D. MD Alert...ICU Electrolyte Replacement per Pharmacy, , Other, PHARMACY TO DOSE, Josiah Casiano M.D.    lidocaine (Xylocaine) 1 % injection 2 mL, 2 mL, Tracheal Tube, Q30 MIN PRN, Lizandro Greenwood M.D.    acetaminophen (Tylenol) tablet 650 mg, 650 mg, Enteral Tube, Q4HRS PRN, Lizandro Greenwood M.D., 650 mg at 10/01/23 0026    atorvastatin (Lipitor) tablet 40 mg, 40 mg, Enteral Tube, Q EVENING, Lizandro Greenwood M.D., 40 mg at 10/01/23 1812    losartan (Cozaar) tablet 50 mg, 50 mg, Enteral Tube, DAILY, Lizandro Greenwood M.D., 50 mg at 10/02/23 0552    memantine (Namenda) tablet 5 mg, 5 mg, Enteral Tube, BID, Lizandro Greenwood M.D., 5 mg at 10/02/23 0552    lansoprazole (Prevacid) solutab 30 mg, 30 mg, Enteral Tube, DAILY, Lizandro Greenwood M.D., 30 mg at 10/02/23 0552    ondansetron (Zofran ODT) dispertab 4 mg, 4 mg, Enteral Tube, Q4HRS PRN, Lizandro Greenwood M.D.    senna-docusate (Pericolace Or Senokot S) 8.6-50 MG per tablet 2 Tablet, 2 Tablet, Enteral Tube, BID, 2 Tablet at 10/02/23 0552 **AND** polyethylene glycol/lytes (Miralax) PACKET 1 Packet, 1 Packet, Enteral Tube, QDAY PRN **AND** magnesium hydroxide (Milk Of Magnesia) suspension 30 mL, 30  mL, Enteral Tube, QDAY PRN **AND** bisacodyl (Dulcolax) suppository 10 mg, 10 mg, Rectal, QDAY PRN, Lizandro Greenwood M.D.    3% sodium chloride (Hypertonic Saline) 500mL infusion, 0-60 mL/hr, Intravenous, Continuous, Lizandro Greenwood M.D., Stopped at 10/02/23 1253    ondansetron (Zofran) syringe/vial injection 4 mg, 4 mg, Intravenous, Q4HRS PRN, Rhoda Argueta M.D., 4 mg at 09/29/23 0151    hydrALAZINE (Apresoline) injection 20 mg, 20 mg, Intravenous, Q6HRS PRN, Rafa Zhang M.D., 20 mg at 09/30/23 1326    ipratropium-albuterol (DUONEB) nebulizer solution, 3 mL, Nebulization, Q2HRS PRN (RT), Florian Cole M.D.    insulin lispro (HumaLOG,AdmeLOG) injection, 2-9 Units, Subcutaneous, Q6HRS, Rhoda Argueta M.D., 2 Units at 10/01/23 1237    [DISCONTINUED] insulin regular (HumuLIN R,NovoLIN R) injection, 2-9 Units, Subcutaneous, Q6HRS, 6 Units at 09/25/23 0515 **AND** POC blood glucose manual result, , , Q6H **AND** NOTIFY MD and PharmD, , , Once **AND** Administer 20 grams of glucose (approximately 8 ounces of fruit juice) every 15 minutes PRN FSBG less than 70 mg/dL, , , PRN **AND** dextrose 50% (D50W) injection 25 g, 25 g, Intravenous, Q15 MIN PRN, Med Peña M.D.    Physical Examination:    Vitals:    10/02/23 1115 10/02/23 1124 10/02/23 1130 10/02/23 1145   BP: (!) 146/68  (!) 144/61 (!) 141/64   Pulse: 81 79 73 74   Resp: (!) 23 (!) 22 (!) 23 (!) 21   Temp:       TempSrc:       SpO2: 96% 94% 95% 95%   Weight:       Height:           General:   Patient is intubated and sedated.  Eyes: Midline, Pupils reactive to light.  CV: RRR  Lungs: No respiratory distress, on vent.  Extremities: No cyanosis, warm, no significant edema.    NEUROLOGICAL EXAM:   Mental status: The patient is unresponsive, intubated on vent.  Does not follow commands.  Speech and language: Unable to assess, intubated.  Cranial nerve exam: Pupils are equal, round and reactive to light bilaterally but sluggish. Visual  fields cannot be tested.  Face appears symmetric.  Facial sensation cannot be tested.  Motor exam: The patient withdraws minimally to physical stimulation in all 4 extremity.  Has intermittent minimal movement of left upper and lower extremity.  Sensory exam: React minimally to physical and tactile stimulation.  Coordination: no gross ataxia noted on exam  Plantar reflexes: Equivocal  Gait: deferred      Objective Data:    Labs:  Lab Results   Component Value Date/Time    PROTHROMBTM 14.5 09/30/2023 01:50 PM    INR 1.12 09/30/2023 01:50 PM      Lab Results   Component Value Date/Time    WBC 8.3 10/02/2023 06:15 AM    RBC 3.92 (L) 10/02/2023 06:15 AM    HEMOGLOBIN 12.0 (L) 10/02/2023 06:15 AM    HEMATOCRIT 37.8 (L) 10/02/2023 06:15 AM    MCV 96.4 10/02/2023 06:15 AM    MCH 30.6 10/02/2023 06:15 AM    MCHC 31.7 (L) 10/02/2023 06:15 AM    MPV 10.4 10/02/2023 06:15 AM    NEUTSPOLYS 68.00 10/02/2023 06:15 AM    LYMPHOCYTES 18.40 (L) 10/02/2023 06:15 AM    MONOCYTES 8.40 10/02/2023 06:15 AM    EOSINOPHILS 3.50 10/02/2023 06:15 AM    BASOPHILS 0.40 10/02/2023 06:15 AM      Lab Results   Component Value Date/Time    SODIUM 155 (H) 10/02/2023 11:30 AM    POTASSIUM 3.7 10/02/2023 06:15 AM    CHLORIDE 122 (H) 10/02/2023 06:15 AM    CO2 23 10/02/2023 06:15 AM    GLUCOSE 143 (H) 10/02/2023 06:15 AM    BUN 18 10/02/2023 06:15 AM    CREATININE 0.75 10/02/2023 06:15 AM    CREATININE 1.0 05/13/2009 03:30 PM      Lab Results   Component Value Date/Time    CHOLSTRLTOT 118 09/30/2023 11:20 PM    LDL 57 09/30/2023 11:20 PM    HDL 12 (A) 09/30/2023 11:20 PM    TRIGLYCERIDE 247 (H) 09/30/2023 11:20 PM       Lab Results   Component Value Date/Time    ALKPHOSPHAT 97 10/02/2023 06:15 AM    ASTSGOT 27 10/02/2023 06:15 AM    ALTSGPT 17 10/02/2023 06:15 AM    TBILIRUBIN 0.8 10/02/2023 06:15 AM        Imaging/Testing:    I interpreted and/or reviewed the patient's neuroimaging    DX-CHEST-PORTABLE (1 VIEW)   Final Result         1.  Pulmonary  edema and/or infiltrates are identified, which are somewhat decreased since the prior exam.   2.  Cardiomegaly   3.  Atherosclerosis      MR-MRA HEAD-W/O   Final Result         Normal intracranial MRA.      MR-BRAIN-WITH & W/O   Final Result         Left frontal lobe hemorrhage with surrounding vasogenic edema and mild mass effect upon the frontal horn of the left lateral ventricle. There is approximately 2 mm of midline shift to the right.      Age-related volume loss and chronic microvascular ischemic changes.      No abnormal intracranial enhancement is identified.      CT-HEAD W/O   Final Result         1. Stable 6 cm left frontal intraparenchymal hemorrhage with mild, 2 mm left-to-right subfalcine herniation.   2. No new abnormality or change.         DX-CHEST-PORTABLE (1 VIEW)   Final Result         1.  Pulmonary edema and/or infiltrates are identified, which appear somewhat increased since the prior exam.   2.  Cardiomegaly   3.  Atherosclerosis      DX-ABDOMEN FOR TUBE PLACEMENT   Final Result      NG tube extends below the diaphragm and appears to extend through the region of the stomach with tip at the level of the proximal duodenum.      DX-CHEST-PORTABLE (1 VIEW)   Final Result      1.  Grossly satisfactory appearance of tubes and lines.   2.  No evidence of pneumothorax.   3.  Marked hypoinflation with probable bibasilar atelectasis.   4.  Enlarged cardiac silhouette with vascular congestion.      CT-HEAD W/O   Final Result         1.  New large parenchymal hemorrhage involving the frontal lobe is identified with some surrounding edema.      2.  Subarachnoid hemorrhage is also noted in the area.      3.  Minimal midline shift to the right side.      Findings are consistent with atrophy.  Decreased attenuation in the periventricular white matter likely indicates microvascular ischemic disease.      Based solely on CT findings, the brain injury guideline category is mBIG 3.      EDH   IVH   Displaced  skull fx   SDH > 8mm   IPH > 8mm or multiple   SAH bi-hemispheric or > 3mm      The original BIG retrospective analysis found radiographic progression in 0% of BIG 1 patients and 2.6% BIG 2.      These findings were discussed with GEETA MOLINA on 09/30/2023.      DX-CHEST-PORTABLE (1 VIEW)   Final Result      Stable multiple pulmonary vascular dilation/congestion. Otherwise, negative.      DX-CHEST-PORTABLE (1 VIEW)   Final Result      1.  Increasing airspace disease at the left base.      DX-CHEST-LIMITED (1 VIEW)   Final Result         1.  Pulmonary edema and/or infiltrates are identified, which are somewhat decreased since the prior exam.   2.  Cardiomegaly   3.  Atherosclerosis      DX-CHEST-PORTABLE (1 VIEW)   Final Result         1.  Pulmonary edema and/or infiltrates are identified, which appear somewhat increased since the prior exam.   2.  Cardiomegaly   3.  Atherosclerosis      DX-CHEST-PORTABLE (1 VIEW)   Final Result         1.  Interstitial edema and/or infiltrates, slightly decreased since prior study.   2.  Cardiomegaly   3.  Atherosclerosis      DX-CHEST-LIMITED (1 VIEW)   Final Result      Right IJ catheter tip projects in the SVC. No pneumothorax.      CT-CTA NECK WITH & W/O-POST PROCESSING   Final Result      1.  Estimated 50-75% stenosis of the BILATERAL carotid arteries   2.  Estimated greater than 50% stenosis at the vertebral artery ostium on each side      CT-CTA HEAD WITH & W/O-POST PROCESS   Final Result      1.  No large vessel occlusion or aneurysm.   2.  Hazy left frontal lobe subarachnoid hemorrhage, decreased in conspicuity from prior. No new hemorrhage seen.   3.  Scattered opacifications of the ethmoid sinuses, likely related to support hardware.      DX-ABDOMEN FOR TUBE PLACEMENT   Final Result         1.  Nonspecific bowel gas pattern in the upper abdomen.   2.  Dobbhoff tube with tip terminating overlying the expected location of the first or second duodenal segment.       DX-CHEST-PORTABLE (1 VIEW)   Final Result         1.  Interstitial edema and/or infiltrates.   2.  Cardiomegaly   3.  Nasogastric tube terminates in the distal esophagus, recommend advancement   4.  Atherosclerosis      DX-ABDOMEN FOR TUBE PLACEMENT   Final Result         1.  Nonspecific bowel gas pattern in the upper abdomen.   2.  Nasogastric tube terminates just distal to the gastroesophageal junction, recommend advancement      CT-RENAL COLIC EVALUATION(A/P W/O)   Final Result         1.  Intrahepatic biliary air and nondependent air within the gallbladder, consider history of instrumentation or changes of cholangitis in the appropriate clinical setting.   2.  Large fat-containing bilateral inguinal hernias   3.  Enlarged prostate, consider causes of prostate enlargement with additional workup as clinically appropriate   4.  Diverticulosis   5.  Cholelithiasis   6.  Atherosclerosis      CT-HEAD W/O   Final Result         1.  Hazy left frontal subarachnoid hemorrhage.   2.  Nonspecific white matter changes, commonly associated with small vessel ischemic disease.  Associated mild cerebral atrophy is noted.      Based solely on CT findings, the brain injury guideline category is mBIG 1.      SDH < 4mm   IPH < 4mm   SAH < 3 sulci and < 1mm      The original BIG retrospective analysis found radiographic progression in 0% of BIG 1 patients and 2.6% BIG 2.      These findings were discussed with the patient's clinician, Madhu Zambrano, on 9/24/2023 10:10 PM.      DX-CHEST-PORTABLE (1 VIEW)   Final Result      No acute cardiopulmonary abnormality identified.          Assessment and Plan:  Sharon Guardado is a 68 y.o. with history of alcohol abuse, coagulopathy and thrombocytopenia who was initially admitted on 9/24/2023 due to urosepsis and possible alcohol withdrawal.  He had a brain CT at that time which revealed hazy left frontal subarachnoid hemorrhage which was felt to be traumatic.  He was initially  intubated and subsequently was extubated on 9/28/23.  While in the hospital he became less responsive and brain CT on 9/30/2023 revealed large left frontal intraparenchymal hemorrhage.  He was on Lovenox for DVT prevention which was reversed.  His CTA of the head and neck at the time of admission did not reveal evidence of vascular malformation.  Brain MRI on 10/1/2023 with no evidence of enhancing lesion.  MRA of the head without contrast on 10/1/2023 with no vascular lesion.      Plan:  - Continue close neuro monitoring and neuro check every 2 hours in ICU.  - Maintain systolic blood pressure 100-160.  -Continue hyperosmolar therapy with goal of sodium 150-160, current sodium 155.  - No antiplatelet or anticoagulant.  - Correct coagulopathy and maintain INR less than 1.5 and platelet count above 100 K.  - Maintain bowel regimen to avoid Valsalva and increased intracranial pressure.  - Neurosurgery on board, no surgical intervention deemed necessary at this point.  - When able to proceed with SBT and work towards extubation.  - Not a candidate for PT, OT or SLP at this time.  - DVT prevention with SCDs.      The evaluation of the patient, and recommended management, was discussed with Pal Serna MD    I personally spent a total of 52 minutes which includes face-to-face time and non-face-to-face time spent on preparing to see the patient, reviewing hospital notes and tests, reviewing available neuroimaging, obtaining history from the patient and/or family, performing a medically appropriate exam, counseling and educating the patient, ordering medications/tests/procedures/referrals as clinically indicated, and documenting information in the electronic medical record.     Please note that this dictation was created using voice recognition software. I have made every reasonable attempt to correct obvious errors, but I expect that there are errors of grammar and possibly content that I did not discover  before finalizing the note.      Manasa Garcia MD  [facility]

## 2023-10-03 ENCOUNTER — APPOINTMENT (OUTPATIENT)
Dept: RADIOLOGY | Facility: MEDICAL CENTER | Age: 68
DRG: 870 | End: 2023-10-03
Attending: INTERNAL MEDICINE
Payer: MEDICARE

## 2023-10-03 LAB
ANION GAP SERPL CALC-SCNC: 7 MMOL/L (ref 7–16)
BASE EXCESS BLDA CALC-SCNC: 1 MMOL/L (ref -4–3)
BASOPHILS # BLD AUTO: 0.2 % (ref 0–1.8)
BASOPHILS # BLD: 0.02 K/UL (ref 0–0.12)
BODY TEMPERATURE: ABNORMAL DEGREES
BREATHS SETTING VENT: 10
BUN SERPL-MCNC: 19 MG/DL (ref 8–22)
CALCIUM SERPL-MCNC: 8.6 MG/DL (ref 8.5–10.5)
CHLORIDE SERPL-SCNC: 119 MMOL/L (ref 96–112)
CO2 BLDA-SCNC: 26 MMOL/L (ref 20–33)
CO2 SERPL-SCNC: 28 MMOL/L (ref 20–33)
CREAT SERPL-MCNC: 0.75 MG/DL (ref 0.5–1.4)
CRP SERPL HS-MCNC: 9.47 MG/DL (ref 0–0.75)
DELSYS IDSYS: ABNORMAL
END TIDAL CARBON DIOXIDE IECO2: 30 MMHG
EOSINOPHIL # BLD AUTO: 0.23 K/UL (ref 0–0.51)
EOSINOPHIL NFR BLD: 2.4 % (ref 0–6.9)
ERYTHROCYTE [DISTWIDTH] IN BLOOD BY AUTOMATED COUNT: 49.5 FL (ref 35.9–50)
GFR SERPLBLD CREATININE-BSD FMLA CKD-EPI: 97 ML/MIN/1.73 M 2
GLUCOSE BLD STRIP.AUTO-MCNC: 150 MG/DL (ref 65–99)
GLUCOSE BLD STRIP.AUTO-MCNC: 188 MG/DL (ref 65–99)
GLUCOSE BLD STRIP.AUTO-MCNC: 208 MG/DL (ref 65–99)
GLUCOSE BLD STRIP.AUTO-MCNC: 212 MG/DL (ref 65–99)
GLUCOSE SERPL-MCNC: 203 MG/DL (ref 65–99)
HCO3 BLDA-SCNC: 25.2 MMOL/L (ref 17–25)
HCT VFR BLD AUTO: 37.9 % (ref 42–52)
HGB BLD-MCNC: 12.3 G/DL (ref 14–18)
HOROWITZ INDEX BLDA+IHG-RTO: 150 MM[HG]
IMM GRANULOCYTES # BLD AUTO: 0.06 K/UL (ref 0–0.11)
IMM GRANULOCYTES NFR BLD AUTO: 0.6 % (ref 0–0.9)
LYMPHOCYTES # BLD AUTO: 1.4 K/UL (ref 1–4.8)
LYMPHOCYTES NFR BLD: 14.6 % (ref 22–41)
MAGNESIUM SERPL-MCNC: 2 MG/DL (ref 1.5–2.5)
MCH RBC QN AUTO: 30.4 PG (ref 27–33)
MCHC RBC AUTO-ENTMCNC: 32.5 G/DL (ref 32.3–36.5)
MCV RBC AUTO: 93.8 FL (ref 81.4–97.8)
MODE IMODE: ABNORMAL
MONOCYTES # BLD AUTO: 0.55 K/UL (ref 0–0.85)
MONOCYTES NFR BLD AUTO: 5.8 % (ref 0–13.4)
NEUTROPHILS # BLD AUTO: 7.3 K/UL (ref 1.82–7.42)
NEUTROPHILS NFR BLD: 76.4 % (ref 44–72)
NRBC # BLD AUTO: 0 K/UL
NRBC BLD-RTO: 0 /100 WBC (ref 0–0.2)
O2/TOTAL GAS SETTING VFR VENT: 50 %
PCO2 BLDA: 38 MMHG (ref 26–37)
PCO2 TEMP ADJ BLDA: 39 MMHG (ref 26–37)
PEEP END EXPIRATORY PRESSURE IPEEP: 8 CMH20
PH BLDA: 7.43 [PH] (ref 7.4–7.5)
PH TEMP ADJ BLDA: 7.42 [PH] (ref 7.4–7.5)
PHOSPHATE SERPL-MCNC: 2.2 MG/DL (ref 2.5–4.5)
PLATELET # BLD AUTO: 193 K/UL (ref 164–446)
PMV BLD AUTO: 10.8 FL (ref 9–12.9)
PO2 BLDA: 75 MMHG (ref 64–87)
PO2 TEMP ADJ BLDA: 78 MMHG (ref 64–87)
POTASSIUM SERPL-SCNC: 3.4 MMOL/L (ref 3.6–5.5)
POTASSIUM SERPL-SCNC: 3.6 MMOL/L (ref 3.6–5.5)
PREALB SERPL-MCNC: 12.8 MG/DL (ref 18–38)
RBC # BLD AUTO: 4.04 M/UL (ref 4.7–6.1)
SAO2 % BLDA: 95 % (ref 93–99)
SODIUM SERPL-SCNC: 152 MMOL/L (ref 135–145)
SODIUM SERPL-SCNC: 154 MMOL/L (ref 135–145)
SODIUM SERPL-SCNC: 155 MMOL/L (ref 135–145)
SODIUM SERPL-SCNC: 155 MMOL/L (ref 135–145)
SPECIMEN DRAWN FROM PATIENT: ABNORMAL
TIDAL VOLUME IVT: 470 ML
WBC # BLD AUTO: 9.6 K/UL (ref 4.8–10.8)

## 2023-10-03 PROCEDURE — 700111 HCHG RX REV CODE 636 W/ 250 OVERRIDE (IP): Performed by: STUDENT IN AN ORGANIZED HEALTH CARE EDUCATION/TRAINING PROGRAM

## 2023-10-03 PROCEDURE — 84100 ASSAY OF PHOSPHORUS: CPT

## 2023-10-03 PROCEDURE — 99233 SBSQ HOSP IP/OBS HIGH 50: CPT | Performed by: PSYCHIATRY & NEUROLOGY

## 2023-10-03 PROCEDURE — 700105 HCHG RX REV CODE 258: Performed by: INTERNAL MEDICINE

## 2023-10-03 PROCEDURE — 94003 VENT MGMT INPAT SUBQ DAY: CPT

## 2023-10-03 PROCEDURE — 99291 CRITICAL CARE FIRST HOUR: CPT | Performed by: INTERNAL MEDICINE

## 2023-10-03 PROCEDURE — 97167 OT EVAL HIGH COMPLEX 60 MIN: CPT

## 2023-10-03 PROCEDURE — 99292 CRITICAL CARE ADDL 30 MIN: CPT | Performed by: INTERNAL MEDICINE

## 2023-10-03 PROCEDURE — 700102 HCHG RX REV CODE 250 W/ 637 OVERRIDE(OP): Performed by: INTERNAL MEDICINE

## 2023-10-03 PROCEDURE — 700101 HCHG RX REV CODE 250: Performed by: INTERNAL MEDICINE

## 2023-10-03 PROCEDURE — 97163 PT EVAL HIGH COMPLEX 45 MIN: CPT

## 2023-10-03 PROCEDURE — 71045 X-RAY EXAM CHEST 1 VIEW: CPT

## 2023-10-03 PROCEDURE — 84295 ASSAY OF SERUM SODIUM: CPT | Mod: 91

## 2023-10-03 PROCEDURE — 94799 UNLISTED PULMONARY SVC/PX: CPT

## 2023-10-03 PROCEDURE — A9270 NON-COVERED ITEM OR SERVICE: HCPCS | Performed by: INTERNAL MEDICINE

## 2023-10-03 PROCEDURE — 770022 HCHG ROOM/CARE - ICU (200)

## 2023-10-03 PROCEDURE — 700111 HCHG RX REV CODE 636 W/ 250 OVERRIDE (IP): Mod: JZ | Performed by: STUDENT IN AN ORGANIZED HEALTH CARE EDUCATION/TRAINING PROGRAM

## 2023-10-03 PROCEDURE — 80048 BASIC METABOLIC PNL TOTAL CA: CPT

## 2023-10-03 PROCEDURE — 82962 GLUCOSE BLOOD TEST: CPT

## 2023-10-03 PROCEDURE — 700111 HCHG RX REV CODE 636 W/ 250 OVERRIDE (IP): Performed by: INTERNAL MEDICINE

## 2023-10-03 PROCEDURE — 85025 COMPLETE CBC W/AUTO DIFF WBC: CPT

## 2023-10-03 PROCEDURE — 83735 ASSAY OF MAGNESIUM: CPT

## 2023-10-03 PROCEDURE — 700105 HCHG RX REV CODE 258: Performed by: STUDENT IN AN ORGANIZED HEALTH CARE EDUCATION/TRAINING PROGRAM

## 2023-10-03 PROCEDURE — 84132 ASSAY OF SERUM POTASSIUM: CPT

## 2023-10-03 PROCEDURE — 36600 WITHDRAWAL OF ARTERIAL BLOOD: CPT

## 2023-10-03 PROCEDURE — 82803 BLOOD GASES ANY COMBINATION: CPT

## 2023-10-03 PROCEDURE — 84134 ASSAY OF PREALBUMIN: CPT

## 2023-10-03 PROCEDURE — 94150 VITAL CAPACITY TEST: CPT

## 2023-10-03 PROCEDURE — 86140 C-REACTIVE PROTEIN: CPT

## 2023-10-03 RX ORDER — SODIUM CHLORIDE FOR INHALATION 3 %
3 VIAL, NEBULIZER (ML) INHALATION
Status: DISPENSED | OUTPATIENT
Start: 2023-10-03 | End: 2023-10-05

## 2023-10-03 RX ORDER — FUROSEMIDE 10 MG/ML
40 INJECTION INTRAMUSCULAR; INTRAVENOUS ONCE
Status: COMPLETED | OUTPATIENT
Start: 2023-10-03 | End: 2023-10-03

## 2023-10-03 RX ORDER — SODIUM CHLORIDE FOR INHALATION 3 %
3 VIAL, NEBULIZER (ML) INHALATION
Status: DISCONTINUED | OUTPATIENT
Start: 2023-10-03 | End: 2023-10-03

## 2023-10-03 RX ORDER — ENALAPRILAT 1.25 MG/ML
1.25 INJECTION INTRAVENOUS EVERY 6 HOURS PRN
Status: DISCONTINUED | OUTPATIENT
Start: 2023-10-03 | End: 2023-10-26

## 2023-10-03 RX ORDER — IPRATROPIUM BROMIDE AND ALBUTEROL SULFATE 2.5; .5 MG/3ML; MG/3ML
3 SOLUTION RESPIRATORY (INHALATION)
Status: DISCONTINUED | OUTPATIENT
Start: 2023-10-03 | End: 2023-10-07 | Stop reason: ALTCHOICE

## 2023-10-03 RX ORDER — LABETALOL HYDROCHLORIDE 5 MG/ML
10 INJECTION, SOLUTION INTRAVENOUS EVERY 6 HOURS PRN
Status: DISCONTINUED | OUTPATIENT
Start: 2023-10-03 | End: 2023-10-26

## 2023-10-03 RX ADMIN — DEXMEDETOMIDINE 0.2 MCG/KG/HR: 100 INJECTION, SOLUTION INTRAVENOUS at 23:09

## 2023-10-03 RX ADMIN — AMPICILLIN AND SULBACTAM 3 G: 1; 2 INJECTION, POWDER, FOR SOLUTION INTRAMUSCULAR; INTRAVENOUS at 06:00

## 2023-10-03 RX ADMIN — FUROSEMIDE 40 MG: 10 INJECTION INTRAMUSCULAR; INTRAVENOUS at 10:15

## 2023-10-03 RX ADMIN — LOSARTAN POTASSIUM 50 MG: 50 TABLET, FILM COATED ORAL at 06:14

## 2023-10-03 RX ADMIN — AMPICILLIN AND SULBACTAM 3 G: 1; 2 INJECTION, POWDER, FOR SOLUTION INTRAMUSCULAR; INTRAVENOUS at 00:18

## 2023-10-03 RX ADMIN — HYDRALAZINE HYDROCHLORIDE 20 MG: 20 INJECTION, SOLUTION INTRAMUSCULAR; INTRAVENOUS at 17:39

## 2023-10-03 RX ADMIN — AMPICILLIN AND SULBACTAM 3 G: 1; 2 INJECTION, POWDER, FOR SOLUTION INTRAMUSCULAR; INTRAVENOUS at 23:15

## 2023-10-03 RX ADMIN — SODIUM CHLORIDE 10 MG/HR: 9 INJECTION, SOLUTION INTRAVENOUS at 21:58

## 2023-10-03 RX ADMIN — LANSOPRAZOLE 30 MG: 30 TABLET, ORALLY DISINTEGRATING, DELAYED RELEASE ORAL at 06:13

## 2023-10-03 RX ADMIN — AMPICILLIN AND SULBACTAM 3 G: 1; 2 INJECTION, POWDER, FOR SOLUTION INTRAMUSCULAR; INTRAVENOUS at 11:32

## 2023-10-03 RX ADMIN — LEVETIRACETAM 500 MG: 100 INJECTION, SOLUTION, CONCENTRATE INTRAVENOUS at 17:52

## 2023-10-03 RX ADMIN — INSULIN LISPRO 2 UNITS: 100 INJECTION, SOLUTION INTRAVENOUS; SUBCUTANEOUS at 17:46

## 2023-10-03 RX ADMIN — ACETAMINOPHEN 650 MG: 325 TABLET, FILM COATED ORAL at 15:43

## 2023-10-03 RX ADMIN — AMPICILLIN AND SULBACTAM 3 G: 1; 2 INJECTION, POWDER, FOR SOLUTION INTRAMUSCULAR; INTRAVENOUS at 17:53

## 2023-10-03 RX ADMIN — MAGNESIUM HYDROXIDE 30 ML: 1200 LIQUID ORAL at 08:22

## 2023-10-03 RX ADMIN — Medication 0.5 MG/HR: at 00:08

## 2023-10-03 RX ADMIN — INSULIN LISPRO 3 UNITS: 100 INJECTION, SOLUTION INTRAVENOUS; SUBCUTANEOUS at 11:27

## 2023-10-03 RX ADMIN — INSULIN LISPRO 3 UNITS: 100 INJECTION, SOLUTION INTRAVENOUS; SUBCUTANEOUS at 06:06

## 2023-10-03 RX ADMIN — MEMANTINE 5 MG: 5 TABLET ORAL at 17:38

## 2023-10-03 RX ADMIN — ATORVASTATIN CALCIUM 40 MG: 40 TABLET, FILM COATED ORAL at 17:39

## 2023-10-03 RX ADMIN — AMLODIPINE BESYLATE 10 MG: 10 TABLET ORAL at 06:13

## 2023-10-03 RX ADMIN — LEVETIRACETAM 500 MG: 100 INJECTION, SOLUTION, CONCENTRATE INTRAVENOUS at 06:13

## 2023-10-03 RX ADMIN — SODIUM CHLORIDE 30 ML/HR: 3 INJECTION, SOLUTION INTRAVENOUS at 15:22

## 2023-10-03 RX ADMIN — MEMANTINE 5 MG: 5 TABLET ORAL at 06:13

## 2023-10-03 RX ADMIN — LABETALOL HYDROCHLORIDE 10 MG: 5 INJECTION, SOLUTION INTRAVENOUS at 15:43

## 2023-10-03 RX ADMIN — SENNOSIDES AND DOCUSATE SODIUM 2 TABLET: 50; 8.6 TABLET ORAL at 06:14

## 2023-10-03 RX ADMIN — ACETAMINOPHEN 650 MG: 325 TABLET, FILM COATED ORAL at 23:11

## 2023-10-03 RX ADMIN — HYDROMORPHONE HYDROCHLORIDE 0.5 MG: 1 INJECTION, SOLUTION INTRAMUSCULAR; INTRAVENOUS; SUBCUTANEOUS at 08:03

## 2023-10-03 RX ADMIN — POTASSIUM PHOSPHATE, MONOBASIC AND POTASSIUM PHOSPHATE, DIBASIC 15 MMOL: 224; 236 INJECTION, SOLUTION, CONCENTRATE INTRAVENOUS at 08:58

## 2023-10-03 RX ADMIN — POTASSIUM BICARBONATE 50 MEQ: 978 TABLET, EFFERVESCENT ORAL at 10:15

## 2023-10-03 ASSESSMENT — ACTIVITIES OF DAILY LIVING (ADL): TOILETING: INDEPENDENT

## 2023-10-03 ASSESSMENT — COGNITIVE AND FUNCTIONAL STATUS - GENERAL
DRESSING REGULAR UPPER BODY CLOTHING: TOTAL
DRESSING REGULAR LOWER BODY CLOTHING: TOTAL
WALKING IN HOSPITAL ROOM: TOTAL
TOILETING: TOTAL
MOBILITY SCORE: 6
CLIMB 3 TO 5 STEPS WITH RAILING: TOTAL
PERSONAL GROOMING: TOTAL
SUGGESTED CMS G CODE MODIFIER DAILY ACTIVITY: CN
MOVING TO AND FROM BED TO CHAIR: UNABLE
HELP NEEDED FOR BATHING: TOTAL
STANDING UP FROM CHAIR USING ARMS: TOTAL
DAILY ACTIVITIY SCORE: 6
TURNING FROM BACK TO SIDE WHILE IN FLAT BAD: UNABLE
EATING MEALS: TOTAL
MOVING FROM LYING ON BACK TO SITTING ON SIDE OF FLAT BED: UNABLE
SUGGESTED CMS G CODE MODIFIER MOBILITY: CN

## 2023-10-03 ASSESSMENT — PAIN DESCRIPTION - PAIN TYPE
TYPE: ACUTE PAIN

## 2023-10-03 ASSESSMENT — GAIT ASSESSMENTS: GAIT LEVEL OF ASSIST: UNABLE TO PARTICIPATE

## 2023-10-03 ASSESSMENT — FIBROSIS 4 INDEX: FIB4 SCORE: 2.7

## 2023-10-03 ASSESSMENT — PULMONARY FUNCTION TESTS: FVC: 1.1

## 2023-10-03 NOTE — DISCHARGE PLANNING
Vented day 4.  TCC will no longer follow.  Please reach out to myself once liberated from the vent & TX evals are available or with any questions.

## 2023-10-03 NOTE — DISCHARGE PLANNING
Case Management Discharge Planning    Admission Date: 9/24/2023  GMLOS: 5  ALOS: 9    6-Clicks ADL Score: 6  6-Clicks Mobility Score: 6  PT and/or OT Eval ordered: Yes  PT/OT/SLP evaluations pending  Post-acute Referrals Ordered: Yes - PMR  Post-acute Choice Obtained: No  Has referral(s) been sent to post-acute provider:  No    Anticipated Discharge Dispo: Discharge Disposition: Still a Patient (30)    Per chart review pt resides in [place], [place]    Family support:  Alessandra Guardado Spouse [phone]     Current Insurance on file: Medicare A&B / AARP    Last charted orientation status: DEENA (intubated)    DME Needed: pending hospital course    Action(s) Taken: chart reviewed    Escalations Completed: None    Medically Clear: No    Next Steps: f/u with pt and medical team to discuss dc needs and barriers.  Assessment to be completed to assess for HCM needs.    Barriers to Discharge: Medical clearance /Specialty Clearance    Is the patient up for discharge tomorrow: No    Care Transition Team Assessment    Information Source  Orientation Level: Unable to assess  Informant's Name: Alessandra    Readmission Evaluation  Is this a readmission?: No    Elopement Risk  Legal Hold: No  Ambulatory or Self Mobile in Wheelchair: No-Not an Elopement Risk  Elopement Risk: Not at Risk for Elopement    Interdisciplinary Discharge Planning  Lives with - Patient's Self Care Capacity: Spouse  Patient or legal guardian wants to designate a caregiver: No  Housing / Facility: [address] [place] [address]    Discharge Preparedness  What is your plan after discharge?: Uncertain - pending medical team collaboration  What are your discharge supports?: Spouse  Prior Functional Level: Independent with Medication Management, Independent with Activities of Daily Living    Functional Assesment  Prior Functional Level: Independent with Medication Management, Independent with Activities of Daily Living    Vision / Hearing Impairment  Vision Impairment :  Yes  Right Eye Vision:  (tin)  Left Eye Vision:  (tin)    Advance Directive  Advance Directive?: None    Domestic Abuse  Have you ever been the victim of abuse or violence?: No  Physical Abuse or Sexual Abuse: No  Verbal Abuse or Emotional Abuse: No  Possible Abuse/Neglect Reported to:: Not Applicable    Discharge Risks or Barriers  Discharge risks or barriers?: Complex medical needs  Patient risk factors: Complex medical needs    Anticipated Discharge Information  Discharge Disposition: Still a Patient (30)

## 2023-10-03 NOTE — CARE PLAN
Problem: Ventilation  Goal: Ability to achieve and maintain unassisted ventilation or tolerate decreased levels of ventilator support  Description: Target End Date:  4 days     Document on Vent flowsheet    1.  Support and monitor invasive and noninvasive mechanical ventilation  2.  Monitor ventilator weaning response  3.  Perform ventilator associated pneumonia prevention interventions  4.  Manage ventilation therapy by monitoring diagnostic test results  Outcome: Not Met   Vd 4 18/470/8/40

## 2023-10-03 NOTE — PROGRESS NOTES
Neurosurgery Progress Note    Subjective:  No events  Vent- unable to be extubated yest    Exam:    Open eyes to voice  Perrl, conjugate  Follows BLE, with draws BUE       BP  Min: 106/55  Max: 158/74  Pulse  Av.9  Min: 50  Max: 89  Resp  Av.4  Min: 14  Max: 42  Monitored Temp 2  Av.5 °C (99.5 °F)  Min: 37 °C (98.6 °F)  Max: 37.8 °C (100 °F)  SpO2  Av.7 %  Min: 93 %  Max: 99 %    No data recorded    Recent Labs     10/01/23  0515 10/02/23  0615 10/03/23  0555   WBC 8.7 8.3 9.6   RBC 4.12* 3.92* 4.04*   HEMOGLOBIN 12.9* 12.0* 12.3*   HEMATOCRIT 38.7* 37.8* 37.9*   MCV 93.9 96.4 93.8   MCH 31.3 30.6 30.4   MCHC 33.3 31.7* 32.5   RDW 48.4 50.4* 49.5   PLATELETCT 164 170 193   MPV 10.7 10.4 10.8       Recent Labs     10/01/23  1026 10/01/23  1840 10/02/23  0615 10/02/23  1130 10/02/23  1720 10/03/23  0000 10/03/23  0555   SODIUM 150*   < > 154*   < > 155* 155* 154*   POTASSIUM 3.8  --  3.7  --   --  3.6 3.4*   CHLORIDE 116*  --  122*  --   --   --  119*   CO2   --   --   --  28   GLUCOSE 151*  --  143*  --   --   --  203*   BUN 22  --  18  --   --   --  19   CREATININE 0.82  --  0.75  --   --   --  0.75   CALCIUM 8.5  --  7.8*  --   --   --  8.6    < > = values in this interval not displayed.       Recent Labs     23  1350   APTT 27.6   INR 1.12       Recent Labs     23  1305 10/01/23  1026   REACTMIN 5.4 5.0   CLOTKINET 0.9 0.7*   CLOTANGL 77.0 80.0*   MAXCLOTS 64.6 67.0   [id number] 0.0 0.1   PRCINADP See Comment see comment   PRCINAA See Comment see comment         Intake/Output                         10/02/23 0700 - 10/03/23 0659 10/03/23 0700 - 10/04/23 0659      Total  Total                 Intake    I.V.  921.2  390 1311.1  6.4  -- 6.4    Precedex Volume 6.5 52.4 58.9 6.4 -- 6.4    Cardene Volume 879.2 300 1179.1 -- -- --    Propofol Volume 35.5 -- 35.5 -- -- --    Hydromorphone Volume -- 37.6 37.6 0 -- 0    Volume (mL) (3% sodium  chloride (Hypertonic Saline) 500mL infusion) -- 0 0 0 -- 0    Other  --  60 60  90  -- 90    Medications (PO/Enteral Liquids) -- 60 60 90 -- 90    NG/GT  25  450 475  100  -- 100    Intake (mL) (Enteral Tube 09/30/23 Nasogastric Right nare) 25 450 475 100 -- 100    IV Piggyback  463.7  198.8 662.6  97.3  -- 97.3    Volume (mL) (ampicillin/sulbactam (Unasyn) 3 g in  mL IVPB) 214 198.8 412.8 97.3 -- 97.3    Volume (mL) (potassium phosphate 15 mmol in  mL ivpb) 249.8 -- 249.8 -- -- --    Enteral  60  60 120  --  -- --    Free Water / Tube Flush 60 60 120 -- -- --    Total Intake 1469.9 1158.8 2628.7 293.7 -- 293.7       Output    Urine  2500  450 2950  80  -- 80    Output (mL) (Urethral Catheter) 2500 450 2950 80 -- 80    Emesis/NG output  --  0 0  --  -- --    Output (mL) (Enteral Tube 09/30/23 Nasogastric Right nare) -- 0 0 -- -- --    Total Output 2500 450 2950 80 -- 80       Net I/O     -1030.1 708.8 -321.3 213.7 -- 213.7              Intake/Output Summary (Last 24 hours) at 10/3/2023 0951  Last data filed at 10/3/2023 0942  Gross per 24 hour   Intake 2922.42 ml   Output 2680 ml   Net 242.42 ml               potassium phosphate 15 mmol in  mL ivpb  15 mmol Once    furosemide  40 mg Once    potassium bicarbonate  50 mEq Once    dexmedetomidine (Precedex) infusion  0.1-1.5 mcg/kg/hr (Ideal) Continuous    HYDROmorphone  0.5-2 mg Q HOUR PRN    HYDROmorphone   Continuous    amLODIPine  10 mg DAILY    Pharmacy  1 Each PHARMACY TO DOSE    ampicillin-sulbactam (UNASYN) IV  3 g Q6HRS    labetalol  10 mg Q6HRS PRN    enalaprilat  1.25 mg Q6HRS PRN    levETIRAcetam (Keppra) IV  500 mg Q12HRS    niCARdipine (Cardene) 25 mg in  mL Standard Infusion  0-15 mg/hr Continuous    Respiratory Therapy Consult   Continuous RT    MD Alert...Adult ICU Electrolyte Replacement per Pharmacy   PHARMACY TO DOSE    lidocaine  2 mL Q30 MIN PRN    acetaminophen  650 mg Q4HRS PRN    atorvastatin  40 mg Q EVENING    losartan   50 mg DAILY    memantine  5 mg BID    lansoprazole  30 mg DAILY    ondansetron  4 mg Q4HRS PRN    senna-docusate  2 Tablet BID    And    polyethylene glycol/lytes  1 Packet QDAY PRN    And    magnesium hydroxide  30 mL QDAY PRN    And    bisacodyl  10 mg QDAY PRN    3% sodium chloride  0-60 mL/hr Continuous    ondansetron  4 mg Q4HRS PRN    hydrALAZINE  20 mg Q6HRS PRN    ipratropium-albuterol  3 mL Q2HRS PRN (RT)    insulin lispro  2-9 Units Q6HRS    dextrose bolus  25 g Q15 MIN PRN       Assessment and Plan:  Hospital day # 9 left frontal ICH  POD# n/a  Chemical prophylactic DVT therapy: No  Start date/time: tbd  Mri no underlying tumor  Sbp<160  Na 150-155  Non-surgical  Will follow

## 2023-10-03 NOTE — CARE PLAN
Problem: Ventilation  Goal: Ability to achieve and maintain unassisted ventilation or tolerate decreased levels of ventilator support  Description: Target End Date:  4 days     Document on Vent flowsheet    1.  Support and monitor invasive and noninvasive mechanical ventilation  2.  Monitor ventilator weaning response  3.  Perform ventilator associated pneumonia prevention interventions  4.  Manage ventilation therapy by monitoring diagnostic test results  Outcome: Progressing  Ventilator Daily Summary    Vent Day # 4    Airway: 8@25    Ventilator settings: 18 470 8 40%    Weaning trials: spont in am for 1hr, sponting in pm for 4        Plan: Continue current ventilator settings and wean mechanical ventilation as tolerated per physician orders.

## 2023-10-03 NOTE — CARE PLAN
The patient is Watcher - Medium risk of patient condition declining or worsening    Shift Goals  Clinical Goals: neuro checks q2h, stable neuro exam, sbp 100-140  Patient Goals: DEENA  Family Goals: DEENA    Progress made toward(s) clinical / shift goals:    Problem: Hemodynamics  Goal: Patient's hemodynamics, fluid balance and neurologic status will be stable or improve  Outcome: Progressing     Problem: Fluid Volume  Goal: Fluid volume balance will be maintained  Outcome: Progressing     Problem: Urinary - Renal Perfusion  Goal: Ability to achieve and maintain adequate renal perfusion and functioning will improve  Outcome: Progressing     Problem: Respiratory  Goal: Patient will achieve/maintain optimum respiratory ventilation and gas exchange  Outcome: Progressing     Problem: Skin Integrity  Goal: Skin integrity is maintained or improved  Outcome: Progressing     Problem: Fall Risk  Goal: Patient will remain free from falls  Outcome: Progressing     Problem: Pain - Standard  Goal: Alleviation of pain or a reduction in pain to the patient’s comfort goal  Outcome: Progressing     Problem: Seizure Precautions  Goal: Implementation of seizure precautions  Outcome: Progressing     Problem: Optimal Care of the Stroke Patient  Goal: Optimal emergency care for the stroke patient  Outcome: Progressing  Goal: Optimal acute care for the stroke patient  Outcome: Progressing     Problem: Neuro Status  Goal: Neuro status will remain stable or improve  Outcome: Progressing     Problem: Hemodynamic Monitoring  Goal: Patient's hemodynamics, fluid balance and neurologic status will be stable or improve  Outcome: Progressing     Problem: Respiratory - Stroke Patient  Goal: Patient will achieve/maintain optimum respiratory rate/effort  Outcome: Progressing     Problem: Urinary Elimination  Goal: Establish and maintain regular urinary output  Outcome: Progressing     Problem: Safety - Medical Restraint  Goal: Remains free of injury from  restraints (Restraint for Interference with Medical Device)  Outcome: Progressing       Patient is not progressing towards the following goals:      Problem: Safety - Medical Restraint  Goal: Free from restraint(s) (Restraint for Interference with Medical Device)  Outcome: Not Progressing
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
## 2023-10-03 NOTE — PROGRESS NOTES
Critical Care Progress Note    Date of admission  9/24/2023    Chief Complaint  68 y.o. male admitted 9/24/2023 with urosepsis.  He has a history of DM type II, primary hypertension, alcohol dependence and obesity.    Hospital Course      9/25: surgery was consulted Dr Hirsch. Continue resuscitation with IVF, antibiotics, If worsens, consider jamila tube. Patient was intubated acutely overnight.  9/26: requiring high levels of sedatives. Had to start a second vasopressor. Failed SAT and SBT due to agitation. Vasopressors are weaning down. Will start dvt chemical prophylaxis tonight.  9/27: changed propofol to precedex, hopes to move towards extubation. Consider tube feeds if  not extubated  9/28: he is now following commands, maximizing status for extubation.    9/29-9/30: Transferred to floor, under care with hospitalist.    9/30: patient was found with AMS, suspected ETOH withdrawal, s/p benzos. CT head showed parenchymal hemorrhage. Transferred to ICU. Intubated, CVC placed, vascular neurology and neurosurgery consulted    10/1 - Will work on extubation.  23% saline bolus.  Palliative consutled.  Fevers - reculture and empiric unasyn    10/2 -    vent day 3.  Force diuresis.  SAT/SBT.  Increase amlodipine.  10/3 -    vent day 4.  Force diuresis.  SAT/SBT.  10/4 -    vent day 5.  Force diuresis.  SAT/SBT.  Continue amlodipine, 10 mg daily.  Increase losartan, 75 mg daily.  Begin NPH, 10 units twice daily.      Interval Problem Update  Reviewed last 24 hour events:      SR  100.8  +746 mL in the last 24  +6892 mL since admit      Review of Systems  Review of Systems   Unable to perform ROS: Acuity of condition        Vital Signs for last 24 hours   Pulse:  [56-77] (P) 59  Resp:  [18-25] (P) 20  BP: (109-164)/(58-86) (P) 156/70  SpO2:  [93 %-99 %] (P) 97 %    Hemodynamic parameters for last 24 hours       Respiratory Information for the last 24 hours  Vent Mode: Spont  Rate (breaths/min): 18  Vt Target (mL):  470  PEEP/CPAP: 8  P Support: 5  MAP: 10  Control VTE (exp VT): 463    Physical Exam   Physical Exam  Constitutional:       Appearance: He is obese.      Comments: On ventilator   Eyes:      Pupils: Pupils are equal, round, and reactive to light.   Cardiovascular:      Comments: Sinus rhythm  Pulmonary:      Breath sounds: Rales (Coarse crackles bilaterally) present. No wheezing.   Abdominal:      General: There is no distension.      Tenderness: There is no abdominal tenderness.      Comments: Tolerating enteral tube feedings   Musculoskeletal:      Right lower leg: Edema present.      Left lower leg: Edema present.   Skin:     General: Skin is warm.   Neurological:      Comments: Weak in the right arm and right leg.  Intermittently follows.         Medications  Current Facility-Administered Medications   Medication Dose Route Frequency Provider Last Rate Last Admin    insulin NPH (HumuLIN N,NovoLIN N) injection  10 Units Subcutaneous BID INSULIN Pal Serna M.D.        [START ON 10/5/2023] losartan (Cozaar) tablet 75 mg  75 mg Enteral Tube DAILY Pal Serna M.D.        losartan (Cozaar) tablet 25 mg  25 mg Enteral Tube Once Pal Serna M.D.        furosemide (Lasix) injection 40 mg  40 mg Intravenous Once Pal Serna M.D.        potassium bicarbonate (Klyte) effervescent tablet 50 mEq  50 mEq Enteral Tube Once Pal Serna M.D.        ipratropium-albuterol (DUONEB) nebulizer solution  3 mL Nebulization Q6HRS (RT) Jacey TAVAREZ. Latona   3 mL at 10/04/23 0640    sodium chloride 3% nebulizer solution 3 mL  3 mL Nebulization Q6HRS (RT) Jacey BEYER Latona   3 mL at 10/04/23 0250    enalaprilat (Vasotec) injection 1.25 mg 1 mL  1.25 mg Intravenous Q6HRS PRN Jacey Villanueva        labetalol (Normodyne/Trandate) injection 10 mg  10 mg Intravenous Q6HRS PRN Jacey Villanueva        dexmedetomidine (PRECEDEX) 400 mcg/100mL NS premix infusion  0.1-1.5 mcg/kg/hr (Ideal) Intravenous  Continuous Pal Serna M.D. 5.8 mL/hr at 10/04/23 0602 0.3 mcg/kg/hr at 10/04/23 0602    HYDROmorphone (Dilaudid) injection 0.5-2 mg  0.5-2 mg Intravenous Q HOUR PRN Pal Serna M.D.   0.5 mg at 10/03/23 0803    HYDROmorphone (DILAUDID) 0.2 mg/mL in 50mL NS (Continuous Infusion)   Intravenous Continuous Pal Serna M.D.   Stopped at 10/03/23 0553    amLODIPine (Norvasc) tablet 10 mg  10 mg Enteral Tube DAILY Pal Serna M.D.   10 mg at 10/04/23 0551    Pharmacy Consult: Enteral tube insertion - review meds/change route/product selection  1 Each Other PHARMACY TO DOSE Pal Serna M.D.        ampicillin/sulbactam (Unasyn) 3 g in  mL IVPB  3 g Intravenous Q6HRS Rafa Zhang M.D. 200 mL/hr at 10/04/23 1159 3 g at 10/04/23 1159    niCARdipine (Cardene) 25 mg in  mL Standard Infusion  0-15 mg/hr Intravenous Continuous Jacey L. Latona   Stopped at 10/04/23 0123    Respiratory Therapy Consult   Nebulization Continuous RT Josiah Casiano M.D. MD Alert...ICU Electrolyte Replacement per Pharmacy   Other PHARMACY TO DOSE Josiah Casiano M.D.        lidocaine (Xylocaine) 1 % injection 2 mL  2 mL Tracheal Tube Q30 MIN PRN Lizandro Greenwood M.D.        acetaminophen (Tylenol) tablet 650 mg  650 mg Enteral Tube Q4HRS PRN Lizandro Greenwood M.D.   650 mg at 10/03/23 2311    atorvastatin (Lipitor) tablet 40 mg  40 mg Enteral Tube Q EVENING Lizandro Greenwood M.D.   40 mg at 10/03/23 1739    memantine (Namenda) tablet 5 mg  5 mg Enteral Tube BID Lizandro Greenwood M.D.   5 mg at 10/04/23 0553    lansoprazole (Prevacid) solutab 30 mg  30 mg Enteral Tube DAILY Lizandro Greenwood M.D.   30 mg at 10/04/23 0552    ondansetron (Zofran ODT) dispertab 4 mg  4 mg Enteral Tube Q4HRS PRN Lizandro Greenwood M.D.        senna-docusate (Pericolace Or Senokot S) 8.6-50 MG per tablet 2 Tablet  2 Tablet Enteral Tube BID Lizandro Greenwood M.D.   2 Tablet at 10/03/23 0614     And    polyethylene glycol/lytes (Miralax) PACKET 1 Packet  1 Packet Enteral Tube QDAY PRN Lizandro Greenwood M.D.   1 Packet at 10/02/23 1800    And    magnesium hydroxide (Milk Of Magnesia) suspension 30 mL  30 mL Enteral Tube QDAY PRN Lizandro Greenwood M.D.   30 mL at 10/03/23 0822    And    bisacodyl (Dulcolax) suppository 10 mg  10 mg Rectal QDAY PRN Lizandro Greenwood M.D.        3% sodium chloride (Hypertonic Saline) 500mL infusion  0-60 mL/hr Intravenous Continuous Lizandro Greenwood M.D.   Stopped at 10/03/23 1918    ondansetron (Zofran) syringe/vial injection 4 mg  4 mg Intravenous Q4HRS PRN Rhoda Argueta M.D.   4 mg at 09/29/23 0151    hydrALAZINE (Apresoline) injection 20 mg  20 mg Intravenous Q6HRS PRN Jacey BEYER Latona   20 mg at 10/03/23 1739    insulin lispro (HumaLOG,AdmeLOG) injection  2-9 Units Subcutaneous Q6HRS Rhoda Argueta M.D.   5 Units at 10/04/23 1201    dextrose 50% (D50W) injection 25 g  25 g Intravenous Q15 MIN PRN Med Peña M.D.           Fluids    Intake/Output Summary (Last 24 hours) at 10/4/2023 1424  Last data filed at 10/4/2023 1200  Gross per 24 hour   Intake 2823.3 ml   Output 2300 ml   Net 523.3 ml       Laboratory  Recent Labs     10/02/23  0253 10/03/23  0346 10/04/23  0344   ISTATAPH 7.399* 7.430 7.493   ISTATAPCO2 38.3* 38.0* 39.9*   ISTATAPO2 73 75 87   ISTATATCO2 25 26 32   [id number] 95 95 97   ISTATARTHCO3 23.7 25.2* 30.7*   ISTATARTBE -1 1 7*   ISTATTEMP 98.0 F 99.7 F 99.0 F   ISTATFIO2 50 50 50   ISTATSPEC Arterial Arterial Arterial   ISTATAPHTC 7.404 7.421 7.490   [id number] 72 78 88*         Recent Labs     10/02/23  0615 10/02/23  1130 10/03/23  0000 10/03/23  0555 10/03/23  1120 10/04/23  0010 10/04/23  0540 10/04/23  1155   SODIUM 154*   < > 155* 154*   < > 152* 153* 152*   POTASSIUM 3.7  --  3.6 3.4*  --   --  3.6  --    CHLORIDE 122*  --   --  119*  --   --  117*  --    CO2 23  --   --  28  --   --  29  --    BUN 18  --   --  19  --    --  23*  --    CREATININE 0.75  --   --  0.75  --   --  0.76  --    MAGNESIUM 2.1  --   --  2.0  --   --  1.9  --    PHOSPHORUS 2.2*  --   --  2.2*  --   --  2.8  --    CALCIUM 7.8*  --   --  8.6  --   --  9.0  --     < > = values in this interval not displayed.     Recent Labs     10/02/23  0615 10/02/23  1720 10/03/23  0555 10/04/23  0540   ALTSGPT 17  --   --   --    ASTSGOT 27  --   --   --    ALKPHOSPHAT 97  --   --   --    TBILIRUBIN 0.8  --   --   --    PREALBUMIN  --  13.8* 12.8*  --    GLUCOSE 143*  --  203* 261*     Recent Labs     10/02/23  0615 10/03/23  0555 10/04/23  0540   WBC 8.3 9.6 12.1*   NEUTSPOLYS 68.00 76.40* 81.00*   LYMPHOCYTES 18.40* 14.60* 11.90*   MONOCYTES 8.40 5.80 5.00   EOSINOPHILS 3.50 2.40 1.20   BASOPHILS 0.40 0.20 0.20   ASTSGOT 27  --   --    ALTSGPT 17  --   --    ALKPHOSPHAT 97  --   --    TBILIRUBIN 0.8  --   --      Recent Labs     10/02/23  0615 10/03/23  0555 10/04/23  0540   RBC 3.92* 4.04* 4.09*   HEMOGLOBIN 12.0* 12.3* 12.4*   HEMATOCRIT 37.8* 37.9* 38.2*   PLATELETCT 170 193 229       Imaging  X-Ray:  I have personally reviewed the images and compared with prior images. and My impression is: Improving left greater than right pleural effusions    Assessment/Plan  * Intraparenchymal hemorrhage of brain (HCC)- (present on admission)  Assessment & Plan  CT on 9/24 with hazy left frontal SAH  CT on 9/30 with new large IPH in the left frontal lobe  Strict blood pressure control with goal SBP less than 140  Goal sodium 150-155  Stop levetiracetam  Neuro checks every 2 hours    Acute respiratory failure with hypoxia (HCC)- (present on admission)  Assessment & Plan  Intubated 9/25-9/28  Reintubated 9/30  ABCDEF bundle  SAT/SBT as appropriate  Mobility level 1-2    Pneumonia due to infectious organism- (present on admission)  Assessment & Plan  Continue Unasyn    Pneumobilia- (present on admission)  Assessment & Plan  Dr. Hirsch consulted on admission - surgery has subsequently  signed off  Completed 5 days of ceftriaxone and metronidazole on 9/28    Type 2 diabetes mellitus (HCC)- (present on admission)  Assessment & Plan  Glycohemoglobin 6.8  Start NPH, 10 units twice daily  Sliding scale insulin    Primary hypertension- (present on admission)  Assessment & Plan  Goal SBP less than 140  Continue amlodipine, 10 mg daily  Increase losartan, 75 mg daily    Alcohol dependence (HCC)- (present on admission)  Assessment & Plan  Cessation counseling when clinically appropriate    BMI 40.0-44.9, adult (HCC)- (present on admission)  Assessment & Plan            VTE:  Contraindicated  Ulcer: PPI  Lines: Central Line  Ongoing indication addressed and Joseph Catheter  Ongoing indication addressed    I have performed a physical exam and reviewed and updated ROS and Plan today (10/4/2023). In review of yesterday's note (10/3/2023), there are no changes except as documented above.     I have assessed and reassessed his respiratory status with spontaneous breathing trials and ventilator adjustments, airway mechanics, ventilator waveforms, blood pressure, hemodynamics, cardiovascular status, serial determinations of serum sodium and his neurologic status.  He is at increased risk for worsening respiratory, cardiovascular and CNS system dysfunction.    Discussed patient condition and risk of morbidity and/or mortality with RN, RT, Pharmacy, Charge nurse / hot rounds, and QA team    The patient remains critically ill.  Critical care time = 110 minutes in directly providing and coordinating critical care and extensive data review.  No time overlap and excludes procedures.    Pal Serna MD  Pulmonary and Critical Care Medicine

## 2023-10-03 NOTE — PROGRESS NOTES
Neurology Progress Note  Neurohospitalist Service, [facility]    Referring Physician: REAL Cage*    Chief Complaint   Patient presents with    Head Injury     Pt fell and hit his head last night     ALOC     Pt arrives A&O1        HPI: Refer to initial documented Neurology H&P, as detailed in the patient's chart.    Interval History: No acute events overnight.  No new complaints, no significant changes, although appears more alert with eyes open.  Intermittently tracking and able to follow simple commands..    Review of systems: In addition to what is detailed in the HPI and/or updated in the interval history, all other systems reviewed and are negative.    Past Medical History:    has a past medical history of LE (acute kidney injury), prerenal (CMS-HCC) (1/20/2018), Alcohol withdrawal (HCC) (1/22/2018), Bowel habit changes, CAD (coronary artery disease), Cancer (HCC), Cellulitis of back except buttock (8/3/2018), HTN (hypertension), Hyperglycemia (1/19/2018), Hypertension, Indigestion, Kidney stones, Near-syncope, Nerve compression, and Tinnitus.    FHx:  family history is not on file.    SHx:   reports that he has never smoked. He has never used smokeless tobacco. He reports current alcohol use. He reports that he does not use drugs.    Medications:    Current Facility-Administered Medications:     dexmedetomidine (PRECEDEX) 400 mcg/100mL NS premix infusion, 0.1-1.5 mcg/kg/hr (Ideal), Intravenous, Continuous, Pal Serna M.D., Stopped at 10/03/23 1020    HYDROmorphone (Dilaudid) injection 0.5-2 mg, 0.5-2 mg, Intravenous, Q HOUR PRN, Pal Serna M.D., 0.5 mg at 10/03/23 0803    HYDROmorphone (DILAUDID) 0.2 mg/mL in 50mL NS (Continuous Infusion), , Intravenous, Continuous, Pal Serna M.D., Stopped at 10/03/23 0553    amLODIPine (Norvasc) tablet 10 mg, 10 mg, Enteral Tube, DAILY, Pal Serna M.D., 10 mg at 10/03/23 0613    Pharmacy  Consult: Enteral tube insertion - review meds/change route/product selection, 1 Each, Other, PHARMACY TO DOSE, Pal Serna M.D.    ampicillin/sulbactam (Unasyn) 3 g in  mL IVPB, 3 g, Intravenous, Q6HRS, Rafa Zhang M.D., Stopped at 10/03/23 1202    labetalol (Normodyne/Trandate) injection 10 mg, 10 mg, Intravenous, Q6HRS PRN, Rafa Zhang M.D., 10 mg at 10/01/23 1231    enalaprilat (Vasotec) injection 1.25 mg 1 mL, 1.25 mg, Intravenous, Q6HRS PRN, Rafa Zhang M.D.    levETIRAcetam (Keppra) injection 500 mg, 500 mg, Intravenous, Q12HRS, Rafa Zhang M.D., 500 mg at 10/03/23 0613    niCARdipine (Cardene) 25 mg in  mL Standard Infusion, 0-15 mg/hr, Intravenous, Continuous, Josiah Casiano M.D., Stopped at 10/02/23 2303    Respiratory Therapy Consult, , Nebulization, Continuous RT, Josiah Casiano M.D. MD Alert...ICU Electrolyte Replacement per Pharmacy, , Other, PHARMACY TO DOSE, Josiah Casiano M.D.    lidocaine (Xylocaine) 1 % injection 2 mL, 2 mL, Tracheal Tube, Q30 MIN PRN, Lizandro Greenwood M.D.    acetaminophen (Tylenol) tablet 650 mg, 650 mg, Enteral Tube, Q4HRS PRN, Lizandro Greenwood M.D., 650 mg at 10/02/23 1712    atorvastatin (Lipitor) tablet 40 mg, 40 mg, Enteral Tube, Q EVENING, Lizandro Greenwood M.D., 40 mg at 10/02/23 1712    losartan (Cozaar) tablet 50 mg, 50 mg, Enteral Tube, DAILY, Lizandro Greenwood M.D., 50 mg at 10/03/23 0614    memantine (Namenda) tablet 5 mg, 5 mg, Enteral Tube, BID, Lizandro Greenwood M.D., 5 mg at 10/03/23 0613    lansoprazole (Prevacid) solutab 30 mg, 30 mg, Enteral Tube, DAILY, Lizandro Greenwood M.D., 30 mg at 10/03/23 0613    ondansetron (Zofran ODT) dispertab 4 mg, 4 mg, Enteral Tube, Q4HRS PRN, Lizandro Greenwood M.D.    senna-docusate (Pericolace Or Senokot S) 8.6-50 MG per tablet 2 Tablet, 2 Tablet, Enteral Tube, BID, 2 Tablet at 10/03/23 0614 **AND** polyethylene glycol/lytes (Miralax) PACKET 1 Packet, 1 Packet, Enteral Tube, QDAY  PRN, 1 Packet at 10/02/23 1800 **AND** magnesium hydroxide (Milk Of Magnesia) suspension 30 mL, 30 mL, Enteral Tube, QDAY PRN, 30 mL at 10/03/23 0822 **AND** bisacodyl (Dulcolax) suppository 10 mg, 10 mg, Rectal, QDAY PRN, Lizandro Greenwood M.D.    3% sodium chloride (Hypertonic Saline) 500mL infusion, 0-60 mL/hr, Intravenous, Continuous, Lizandro Greenwood M.D., Last Rate: 30 mL/hr at 10/03/23 1522, 30 mL/hr at 10/03/23 1522    ondansetron (Zofran) syringe/vial injection 4 mg, 4 mg, Intravenous, Q4HRS PRN, Rhoda Argueta M.D., 4 mg at 09/29/23 0151    hydrALAZINE (Apresoline) injection 20 mg, 20 mg, Intravenous, Q6HRS PRN, Rafa Zhang M.D., 20 mg at 09/30/23 1326    ipratropium-albuterol (DUONEB) nebulizer solution, 3 mL, Nebulization, Q2HRS PRN (RT), Florian Cole M.D.    insulin lispro (HumaLOG,AdmeLOG) injection, 2-9 Units, Subcutaneous, Q6HRS, Rhoda Argueta M.D., 3 Units at 10/03/23 1127    [DISCONTINUED] insulin regular (HumuLIN R,NovoLIN R) injection, 2-9 Units, Subcutaneous, Q6HRS, 6 Units at 09/25/23 0515 **AND** POC blood glucose manual result, , , Q6H **AND** NOTIFY MD and PharmD, , , Once **AND** Administer 20 grams of glucose (approximately 8 ounces of fruit juice) every 15 minutes PRN FSBG less than 70 mg/dL, , , PRN **AND** dextrose 50% (D50W) injection 25 g, 25 g, Intravenous, Q15 MIN PRN, Med Peña M.D.    Physical Examination:    Vitals:    10/03/23 1300 10/03/23 1400 10/03/23 1417 10/03/23 1500   BP: 128/60 (!) 147/68  (!) 141/66   Pulse: (!) 54 71 68 62   Resp: 18 (!) 22 (!) 23 (!) 21   Temp:       TempSrc:       SpO2: 96% 95% 95% 96%   Weight:       Height:           General:   Patient is intubated and sedated.  Eyes: Midline, Pupils reactive to light.  CV: RRR  Lungs: No respiratory distress, on vent.  Extremities: No cyanosis, warm, no significant edema.    NEUROLOGICAL EXAM:   Mental status: Remains intubated and nonverbal, however more alert with eyes open and  able to follow simple commands.  Speech and language: Unable to assess, intubated.  Cranial nerve exam: Pupils are equal, round and reactive to light bilaterally but sluggish. Visual fields cannot be tested.  Face appears symmetric.  Facial sensation cannot be tested.  Motor exam: The patient withdraws minimally to physical stimulation in all 4 extremity.  Has intermittent minimal movement of left upper and lower extremity.  Sensory exam: React minimally to physical and tactile stimulation.  Coordination: no gross ataxia noted on exam  Plantar reflexes: Equivocal  Gait: deferred      Objective Data:    Labs:  Lab Results   Component Value Date/Time    PROTHROMBTM 14.5 09/30/2023 01:50 PM    INR 1.12 09/30/2023 01:50 PM      Lab Results   Component Value Date/Time    WBC 9.6 10/03/2023 05:55 AM    RBC 4.04 (L) 10/03/2023 05:55 AM    HEMOGLOBIN 12.3 (L) 10/03/2023 05:55 AM    HEMATOCRIT 37.9 (L) 10/03/2023 05:55 AM    MCV 93.8 10/03/2023 05:55 AM    MCH 30.4 10/03/2023 05:55 AM    MCHC 32.5 10/03/2023 05:55 AM    MPV 10.8 10/03/2023 05:55 AM    NEUTSPOLYS 76.40 (H) 10/03/2023 05:55 AM    LYMPHOCYTES 14.60 (L) 10/03/2023 05:55 AM    MONOCYTES 5.80 10/03/2023 05:55 AM    EOSINOPHILS 2.40 10/03/2023 05:55 AM    BASOPHILS 0.20 10/03/2023 05:55 AM      Lab Results   Component Value Date/Time    SODIUM 152 (H) 10/03/2023 11:20 AM    POTASSIUM 3.4 (L) 10/03/2023 05:55 AM    CHLORIDE 119 (H) 10/03/2023 05:55 AM    CO2 28 10/03/2023 05:55 AM    GLUCOSE 203 (H) 10/03/2023 05:55 AM    BUN 19 10/03/2023 05:55 AM    CREATININE 0.75 10/03/2023 05:55 AM    CREATININE 1.0 05/13/2009 03:30 PM      Lab Results   Component Value Date/Time    CHOLSTRLTOT 118 09/30/2023 11:20 PM    LDL 57 09/30/2023 11:20 PM    HDL 12 (A) 09/30/2023 11:20 PM    TRIGLYCERIDE 247 (H) 09/30/2023 11:20 PM       Lab Results   Component Value Date/Time    ALKPHOSPHAT 97 10/02/2023 06:15 AM    ASTSGOT 27 10/02/2023 06:15 AM    ALTSGPT 17 10/02/2023 06:15 AM     TBILIRUBIN 0.8 10/02/2023 06:15 AM        Imaging/Testing:    I interpreted and/or reviewed the patient's neuroimaging    DX-CHEST-PORTABLE (1 VIEW)   Final Result         1.  Pulmonary edema and/or infiltrates are identified, which are stable since the prior exam.   2.  Cardiomegaly   3.  Atherosclerosis      DX-CHEST-PORTABLE (1 VIEW)   Final Result         1.  Pulmonary edema and/or infiltrates are identified, which are somewhat decreased since the prior exam.   2.  Cardiomegaly   3.  Atherosclerosis      MR-MRA HEAD-W/O   Final Result         Normal intracranial MRA.      MR-BRAIN-WITH & W/O   Final Result         Left frontal lobe hemorrhage with surrounding vasogenic edema and mild mass effect upon the frontal horn of the left lateral ventricle. There is approximately 2 mm of midline shift to the right.      Age-related volume loss and chronic microvascular ischemic changes.      No abnormal intracranial enhancement is identified.      CT-HEAD W/O   Final Result         1. Stable 6 cm left frontal intraparenchymal hemorrhage with mild, 2 mm left-to-right subfalcine herniation.   2. No new abnormality or change.         DX-CHEST-PORTABLE (1 VIEW)   Final Result         1.  Pulmonary edema and/or infiltrates are identified, which appear somewhat increased since the prior exam.   2.  Cardiomegaly   3.  Atherosclerosis      DX-ABDOMEN FOR TUBE PLACEMENT   Final Result      NG tube extends below the diaphragm and appears to extend through the region of the stomach with tip at the level of the proximal duodenum.      DX-CHEST-PORTABLE (1 VIEW)   Final Result      1.  Grossly satisfactory appearance of tubes and lines.   2.  No evidence of pneumothorax.   3.  Marked hypoinflation with probable bibasilar atelectasis.   4.  Enlarged cardiac silhouette with vascular congestion.      CT-HEAD W/O   Final Result         1.  New large parenchymal hemorrhage involving the frontal lobe is identified with some surrounding  edema.      2.  Subarachnoid hemorrhage is also noted in the area.      3.  Minimal midline shift to the right side.      Findings are consistent with atrophy.  Decreased attenuation in the periventricular white matter likely indicates microvascular ischemic disease.      Based solely on CT findings, the brain injury guideline category is mBIG 3.      EDH   IVH   Displaced skull fx   SDH > 8mm   IPH > 8mm or multiple   SAH bi-hemispheric or > 3mm      The original BIG retrospective analysis found radiographic progression in 0% of BIG 1 patients and 2.6% BIG 2.      These findings were discussed with GEETA MOLINA on 09/30/2023.      DX-CHEST-PORTABLE (1 VIEW)   Final Result      Stable multiple pulmonary vascular dilation/congestion. Otherwise, negative.      DX-CHEST-PORTABLE (1 VIEW)   Final Result      1.  Increasing airspace disease at the left base.      DX-CHEST-LIMITED (1 VIEW)   Final Result         1.  Pulmonary edema and/or infiltrates are identified, which are somewhat decreased since the prior exam.   2.  Cardiomegaly   3.  Atherosclerosis      DX-CHEST-PORTABLE (1 VIEW)   Final Result         1.  Pulmonary edema and/or infiltrates are identified, which appear somewhat increased since the prior exam.   2.  Cardiomegaly   3.  Atherosclerosis      DX-CHEST-PORTABLE (1 VIEW)   Final Result         1.  Interstitial edema and/or infiltrates, slightly decreased since prior study.   2.  Cardiomegaly   3.  Atherosclerosis      DX-CHEST-LIMITED (1 VIEW)   Final Result      Right IJ catheter tip projects in the SVC. No pneumothorax.      CT-CTA NECK WITH & W/O-POST PROCESSING   Final Result      1.  Estimated 50-75% stenosis of the BILATERAL carotid arteries   2.  Estimated greater than 50% stenosis at the vertebral artery ostium on each side      CT-CTA HEAD WITH & W/O-POST PROCESS   Final Result      1.  No large vessel occlusion or aneurysm.   2.  Hazy left frontal lobe subarachnoid hemorrhage, decreased in  conspicuity from prior. No new hemorrhage seen.   3.  Scattered opacifications of the ethmoid sinuses, likely related to support hardware.      DX-ABDOMEN FOR TUBE PLACEMENT   Final Result         1.  Nonspecific bowel gas pattern in the upper abdomen.   2.  Dobbhoff tube with tip terminating overlying the expected location of the first or second duodenal segment.      DX-CHEST-PORTABLE (1 VIEW)   Final Result         1.  Interstitial edema and/or infiltrates.   2.  Cardiomegaly   3.  Nasogastric tube terminates in the distal esophagus, recommend advancement   4.  Atherosclerosis      DX-ABDOMEN FOR TUBE PLACEMENT   Final Result         1.  Nonspecific bowel gas pattern in the upper abdomen.   2.  Nasogastric tube terminates just distal to the gastroesophageal junction, recommend advancement      CT-RENAL COLIC EVALUATION(A/P W/O)   Final Result         1.  Intrahepatic biliary air and nondependent air within the gallbladder, consider history of instrumentation or changes of cholangitis in the appropriate clinical setting.   2.  Large fat-containing bilateral inguinal hernias   3.  Enlarged prostate, consider causes of prostate enlargement with additional workup as clinically appropriate   4.  Diverticulosis   5.  Cholelithiasis   6.  Atherosclerosis      CT-HEAD W/O   Final Result         1.  Hazy left frontal subarachnoid hemorrhage.   2.  Nonspecific white matter changes, commonly associated with small vessel ischemic disease.  Associated mild cerebral atrophy is noted.      Based solely on CT findings, the brain injury guideline category is mBIG 1.      SDH < 4mm   IPH < 4mm   SAH < 3 sulci and < 1mm      The original BIG retrospective analysis found radiographic progression in 0% of BIG 1 patients and 2.6% BIG 2.      These findings were discussed with the patient's clinician, Madhu Zambrano, on 9/24/2023 10:10 PM.      DX-CHEST-PORTABLE (1 VIEW)   Final Result      No acute cardiopulmonary abnormality  identified.          Assessment and Plan:  Sharon Guardado is a 68 y.o. with history of alcohol abuse, coagulopathy and thrombocytopenia who was initially admitted on 9/24/2023 due to urosepsis and possible alcohol withdrawal.  He had a brain CT at that time which revealed hazy left frontal subarachnoid hemorrhage which was felt to be traumatic.  He was initially intubated and subsequently was extubated on 9/28/23.  While in the hospital he became less responsive and brain CT on 9/30/2023 revealed large left frontal intraparenchymal hemorrhage.  He was on Lovenox for DVT prevention which was reversed.  His CTA of the head and neck at the time of admission did not reveal evidence of vascular malformation.  Brain MRI on 10/1/2023 with no evidence of enhancing lesion.  MRA of the head without contrast on 10/1/2023 with no vascular lesion.      Plan:  - Continue close neuro monitoring and neuro check every 2 hours in ICU.  - Maintain systolic blood pressure 100-160.  -Continue hyperosmolar therapy with goal of sodium 150-160, current sodium 152.  - Maintain bowel regimen to avoid Valsalva and increased intracranial pressure.  - Neurosurgery on board, no surgical intervention deemed necessary at this point.  - When able, proceed with SBT and work towards extubation.  - Not a candidate for PT, OT or SLP at this time.  - DVT prevention with SCDs.      The evaluation of the patient, and recommended management, was discussed with Pal Serna MD    I personally spent a total of 50 minutes which includes face-to-face time and non-face-to-face time spent on preparing to see the patient, reviewing hospital notes and tests, reviewing available neuroimaging, obtaining history from the patient and/or family, performing a medically appropriate exam, and documenting information in the electronic medical record.     Please note that this dictation was created using voice recognition software. I have made every  reasonable attempt to correct obvious errors, but I expect that there are errors of grammar and possibly content that I did not discover before finalizing the note.      Manasa Garcia MD  [facility]ute [facility] Neurology Services

## 2023-10-03 NOTE — THERAPY
"Physical Therapy   Initial Evaluation     Patient Name: Sharon Guardado  Age:  68 y.o., Sex:  male  Medical Record #: [id number]  Today's Date: 10/3/2023     Precautions  Precautions: Fall Risk  Comments: Sbp<160    Assessment  Patient is 68 y.o. male admitted with urosepsis and found to have L frontal ICH and respiratory failure with hypoxia requiring intubation. PMH includes DM2, HTN, etoh dependence, obesity. Pts spouse at bedside during evaluation and provided prior level of function and home set up 2/2 pt being intubated. Total assist required for bed mobility. Pt attempting to reach for ET tube with L UE throughout mobility. Pt able to perform basic L LE ROM while EOB, significantly delayed responses on the R LE with single step commands such as \"kick.\" PT will cont while pt is in acute care setting to address deficits.     Plan    Physical Therapy Initial Treatment Plan   Treatment Plan : Bed Mobility, Gait Training, Neuro Re-Education / Balance, Self Care / Home Evaluation, Therapeutic Activities, Therapeutic Exercise  Treatment Frequency: 3 Times per Week  Duration: Until Therapy Goals Met    DC Equipment Recommendations: Unable to determine at this time  Discharge Recommendations: Recommend post-acute placement for additional physical therapy services prior to discharge home          10/03/23 1413   Vitals   O2 Delivery Device Ventilator   Non Verbal Descriptors   Non Verbal Scale  Restlessness   Prior Living Situation   Prior Services Home-Independent   Housing / Facility 2 Story House   Steps Into Home 0   Steps In Home 14   Equipment Owned Front-Wheel Walker;Single Point Cane   Lives with - Patient's Self Care Capacity Spouse   Comments wife at bedside provided information. WIfe is planning on moving him downstairs so he does not have to negotiate stairs. He was independent, riding a motorcycle, and watching his grandchildren only a few days before admission   Prior Level of Functional Mobility "   Bed Mobility Independent   Transfer Status Independent   Ambulation Independent   Ambulation Distance community   Assistive Devices Used None   Stairs Independent   Cognition    Cognition / Consciousness X   Speech/ Communication Intubated / Trached;Delayed Responses   Level of Consciousness Responds to voice   Ability To Follow Commands 1 Step  (~50% of the time)   Safety Awareness Impulsive;Impaired   New Learning Impaired   Comments reaching for lines throughout, very delayed on R side (~30 sec)   Strength Upper Body   Upper Body Strength  X   Comments LUE WDL, reaching for ET tube, no purposeful movement noted in R UE   Sensation Upper Body   Upper Extremity Sensation  Not Tested   Passive ROM Lower Body   Passive ROM Lower Body WDL   Active ROM Lower Body    Active ROM Lower Body  X   Comments R LE limited by weaknes, LLE WDL   Strength Lower Body   Lower Body Strength  X   Comments no purposeful movement noted in R LE   Sensation Lower Body   Lower Extremity Sensation   Not Tested   Other Treatments   Other Treatments Provided wife present during evaluation   Balance Assessment   Sitting Balance (Static) Dependent   Sitting Balance (Dynamic) Dependent   Weight Shift Sitting Absent   Bed Mobility    Supine to Sit Total Assist   Sit to Supine Total Assist   Scooting Total Assist   Gait Analysis   Gait Level Of Assist Unable to Participate   Functional Mobility   Sit to Stand Unable to Participate   Bed, Chair, Wheelchair Transfer Unable to Participate   Mobility EOB only\   Patient / Family Goals    Patient / Family Goal #1 none stated   Short Term Goals    Short Term Goal # 1 pt will be able to complete supine<>Sitting with min assist in 6tx in order to improve mobility   Short Term Goal # 2 pt will be able to complete functional transfers with mod assist in 6tx   Short Term Goal # 3 pt will be able to ambulate 15ft with FWW and min assist in 6tx   Education Group   Education Provided Role of Physical  Therapist   Role of Physical Therapist Patient Response Patient;Acceptance;Demonstration;Action Demonstration;Family   Anticipated Discharge Equipment and Recommendations   DC Equipment Recommendations Unable to determine at this time   Discharge Recommendations Recommend post-acute placement for additional physical therapy services prior to discharge home

## 2023-10-04 ENCOUNTER — APPOINTMENT (OUTPATIENT)
Dept: RADIOLOGY | Facility: MEDICAL CENTER | Age: 68
DRG: 870 | End: 2023-10-04
Attending: INTERNAL MEDICINE
Payer: MEDICARE

## 2023-10-04 ENCOUNTER — APPOINTMENT (OUTPATIENT)
Dept: RADIOLOGY | Facility: MEDICAL CENTER | Age: 68
DRG: 870 | End: 2023-10-04
Attending: NURSE PRACTITIONER
Payer: MEDICARE

## 2023-10-04 LAB
ANION GAP SERPL CALC-SCNC: 7 MMOL/L (ref 7–16)
BASE EXCESS BLDA CALC-SCNC: 7 MMOL/L (ref -4–3)
BASOPHILS # BLD AUTO: 0.2 % (ref 0–1.8)
BASOPHILS # BLD: 0.02 K/UL (ref 0–0.12)
BODY TEMPERATURE: ABNORMAL DEGREES
BREATHS SETTING VENT: 18
BUN SERPL-MCNC: 23 MG/DL (ref 8–22)
CALCIUM SERPL-MCNC: 9 MG/DL (ref 8.5–10.5)
CHLORIDE SERPL-SCNC: 117 MMOL/L (ref 96–112)
CO2 BLDA-SCNC: 32 MMOL/L (ref 20–33)
CO2 SERPL-SCNC: 29 MMOL/L (ref 20–33)
CREAT SERPL-MCNC: 0.76 MG/DL (ref 0.5–1.4)
DELSYS IDSYS: ABNORMAL
EOSINOPHIL # BLD AUTO: 0.14 K/UL (ref 0–0.51)
EOSINOPHIL NFR BLD: 1.2 % (ref 0–6.9)
ERYTHROCYTE [DISTWIDTH] IN BLOOD BY AUTOMATED COUNT: 49.5 FL (ref 35.9–50)
GFR SERPLBLD CREATININE-BSD FMLA CKD-EPI: 96 ML/MIN/1.73 M 2
GLUCOSE BLD STRIP.AUTO-MCNC: 237 MG/DL (ref 65–99)
GLUCOSE BLD STRIP.AUTO-MCNC: 237 MG/DL (ref 65–99)
GLUCOSE BLD STRIP.AUTO-MCNC: 243 MG/DL (ref 65–99)
GLUCOSE BLD STRIP.AUTO-MCNC: 253 MG/DL (ref 65–99)
GLUCOSE SERPL-MCNC: 261 MG/DL (ref 65–99)
HCO3 BLDA-SCNC: 30.7 MMOL/L (ref 17–25)
HCT VFR BLD AUTO: 38.2 % (ref 42–52)
HGB BLD-MCNC: 12.4 G/DL (ref 14–18)
HOROWITZ INDEX BLDA+IHG-RTO: 174 MM[HG]
IMM GRANULOCYTES # BLD AUTO: 0.08 K/UL (ref 0–0.11)
IMM GRANULOCYTES NFR BLD AUTO: 0.7 % (ref 0–0.9)
LYMPHOCYTES # BLD AUTO: 1.44 K/UL (ref 1–4.8)
LYMPHOCYTES NFR BLD: 11.9 % (ref 22–41)
MAGNESIUM SERPL-MCNC: 1.9 MG/DL (ref 1.5–2.5)
MCH RBC QN AUTO: 30.3 PG (ref 27–33)
MCHC RBC AUTO-ENTMCNC: 32.5 G/DL (ref 32.3–36.5)
MCV RBC AUTO: 93.4 FL (ref 81.4–97.8)
MODE IMODE: ABNORMAL
MONOCYTES # BLD AUTO: 0.6 K/UL (ref 0–0.85)
MONOCYTES NFR BLD AUTO: 5 % (ref 0–13.4)
NEUTROPHILS # BLD AUTO: 9.79 K/UL (ref 1.82–7.42)
NEUTROPHILS NFR BLD: 81 % (ref 44–72)
NRBC # BLD AUTO: 0 K/UL
NRBC BLD-RTO: 0 /100 WBC (ref 0–0.2)
O2/TOTAL GAS SETTING VFR VENT: 50 %
PCO2 BLDA: 39.9 MMHG (ref 26–37)
PCO2 TEMP ADJ BLDA: 40.3 MMHG (ref 26–37)
PEEP END EXPIRATORY PRESSURE IPEEP: 8 CMH20
PH BLDA: 7.49 [PH] (ref 7.4–7.5)
PH TEMP ADJ BLDA: 7.49 [PH] (ref 7.4–7.5)
PHOSPHATE SERPL-MCNC: 2.8 MG/DL (ref 2.5–4.5)
PLATELET # BLD AUTO: 229 K/UL (ref 164–446)
PMV BLD AUTO: 11.2 FL (ref 9–12.9)
PO2 BLDA: 87 MMHG (ref 64–87)
PO2 TEMP ADJ BLDA: 88 MMHG (ref 64–87)
POTASSIUM SERPL-SCNC: 3.6 MMOL/L (ref 3.6–5.5)
RBC # BLD AUTO: 4.09 M/UL (ref 4.7–6.1)
SAO2 % BLDA: 97 % (ref 93–99)
SODIUM SERPL-SCNC: 151 MMOL/L (ref 135–145)
SODIUM SERPL-SCNC: 152 MMOL/L (ref 135–145)
SODIUM SERPL-SCNC: 152 MMOL/L (ref 135–145)
SODIUM SERPL-SCNC: 153 MMOL/L (ref 135–145)
SPECIMEN DRAWN FROM PATIENT: ABNORMAL
TIDAL VOLUME IVT: 470 ML
WBC # BLD AUTO: 12.1 K/UL (ref 4.8–10.8)

## 2023-10-04 PROCEDURE — 99292 CRITICAL CARE ADDL 30 MIN: CPT | Performed by: INTERNAL MEDICINE

## 2023-10-04 PROCEDURE — 99291 CRITICAL CARE FIRST HOUR: CPT | Performed by: INTERNAL MEDICINE

## 2023-10-04 PROCEDURE — 94003 VENT MGMT INPAT SUBQ DAY: CPT

## 2023-10-04 PROCEDURE — 71045 X-RAY EXAM CHEST 1 VIEW: CPT

## 2023-10-04 PROCEDURE — A9270 NON-COVERED ITEM OR SERVICE: HCPCS | Performed by: INTERNAL MEDICINE

## 2023-10-04 PROCEDURE — 700102 HCHG RX REV CODE 250 W/ 637 OVERRIDE(OP): Performed by: INTERNAL MEDICINE

## 2023-10-04 PROCEDURE — 99233 SBSQ HOSP IP/OBS HIGH 50: CPT | Performed by: PSYCHIATRY & NEUROLOGY

## 2023-10-04 PROCEDURE — 700105 HCHG RX REV CODE 258: Performed by: STUDENT IN AN ORGANIZED HEALTH CARE EDUCATION/TRAINING PROGRAM

## 2023-10-04 PROCEDURE — 85025 COMPLETE CBC W/AUTO DIFF WBC: CPT

## 2023-10-04 PROCEDURE — 83735 ASSAY OF MAGNESIUM: CPT

## 2023-10-04 PROCEDURE — 94799 UNLISTED PULMONARY SVC/PX: CPT

## 2023-10-04 PROCEDURE — 84100 ASSAY OF PHOSPHORUS: CPT

## 2023-10-04 PROCEDURE — 94150 VITAL CAPACITY TEST: CPT

## 2023-10-04 PROCEDURE — 84295 ASSAY OF SERUM SODIUM: CPT

## 2023-10-04 PROCEDURE — 700111 HCHG RX REV CODE 636 W/ 250 OVERRIDE (IP): Mod: JZ | Performed by: INTERNAL MEDICINE

## 2023-10-04 PROCEDURE — 770022 HCHG ROOM/CARE - ICU (200)

## 2023-10-04 PROCEDURE — 93971 EXTREMITY STUDY: CPT | Mod: RT

## 2023-10-04 PROCEDURE — 82962 GLUCOSE BLOOD TEST: CPT | Mod: 91

## 2023-10-04 PROCEDURE — 80048 BASIC METABOLIC PNL TOTAL CA: CPT

## 2023-10-04 PROCEDURE — 36600 WITHDRAWAL OF ARTERIAL BLOOD: CPT

## 2023-10-04 PROCEDURE — 700111 HCHG RX REV CODE 636 W/ 250 OVERRIDE (IP): Mod: JZ | Performed by: STUDENT IN AN ORGANIZED HEALTH CARE EDUCATION/TRAINING PROGRAM

## 2023-10-04 PROCEDURE — 700101 HCHG RX REV CODE 250: Performed by: NURSE PRACTITIONER

## 2023-10-04 PROCEDURE — 94640 AIRWAY INHALATION TREATMENT: CPT

## 2023-10-04 PROCEDURE — 82803 BLOOD GASES ANY COMBINATION: CPT

## 2023-10-04 RX ORDER — MAGNESIUM SULFATE HEPTAHYDRATE 40 MG/ML
2 INJECTION, SOLUTION INTRAVENOUS ONCE
Status: COMPLETED | OUTPATIENT
Start: 2023-10-04 | End: 2023-10-04

## 2023-10-04 RX ORDER — LOSARTAN POTASSIUM 50 MG/1
25 TABLET ORAL ONCE
Status: COMPLETED | OUTPATIENT
Start: 2023-10-04 | End: 2023-10-04

## 2023-10-04 RX ORDER — LOSARTAN POTASSIUM 50 MG/1
75 TABLET ORAL DAILY
Status: DISCONTINUED | OUTPATIENT
Start: 2023-10-05 | End: 2023-10-05

## 2023-10-04 RX ORDER — FUROSEMIDE 10 MG/ML
40 INJECTION INTRAMUSCULAR; INTRAVENOUS ONCE
Status: COMPLETED | OUTPATIENT
Start: 2023-10-04 | End: 2023-10-04

## 2023-10-04 RX ADMIN — FUROSEMIDE 40 MG: 10 INJECTION INTRAMUSCULAR; INTRAVENOUS at 15:31

## 2023-10-04 RX ADMIN — POTASSIUM BICARBONATE 50 MEQ: 978 TABLET, EFFERVESCENT ORAL at 08:29

## 2023-10-04 RX ADMIN — POTASSIUM BICARBONATE 50 MEQ: 978 TABLET, EFFERVESCENT ORAL at 15:31

## 2023-10-04 RX ADMIN — INSULIN LISPRO 3 UNITS: 100 INJECTION, SOLUTION INTRAVENOUS; SUBCUTANEOUS at 05:47

## 2023-10-04 RX ADMIN — MEMANTINE 5 MG: 5 TABLET ORAL at 05:53

## 2023-10-04 RX ADMIN — ATORVASTATIN CALCIUM 40 MG: 40 TABLET, FILM COATED ORAL at 17:48

## 2023-10-04 RX ADMIN — LOSARTAN POTASSIUM 25 MG: 50 TABLET, FILM COATED ORAL at 15:31

## 2023-10-04 RX ADMIN — Medication 3 ML: at 14:30

## 2023-10-04 RX ADMIN — LOSARTAN POTASSIUM 50 MG: 50 TABLET, FILM COATED ORAL at 05:50

## 2023-10-04 RX ADMIN — LANSOPRAZOLE 30 MG: 30 TABLET, ORALLY DISINTEGRATING, DELAYED RELEASE ORAL at 05:52

## 2023-10-04 RX ADMIN — INSULIN HUMAN 10 UNITS: 100 INJECTION, SUSPENSION SUBCUTANEOUS at 17:51

## 2023-10-04 RX ADMIN — IPRATROPIUM BROMIDE AND ALBUTEROL SULFATE 3 ML: 2.5; .5 SOLUTION RESPIRATORY (INHALATION) at 14:30

## 2023-10-04 RX ADMIN — AMPICILLIN AND SULBACTAM 3 G: 1; 2 INJECTION, POWDER, FOR SOLUTION INTRAMUSCULAR; INTRAVENOUS at 18:08

## 2023-10-04 RX ADMIN — AMPICILLIN AND SULBACTAM 3 G: 1; 2 INJECTION, POWDER, FOR SOLUTION INTRAMUSCULAR; INTRAVENOUS at 11:59

## 2023-10-04 RX ADMIN — IPRATROPIUM BROMIDE AND ALBUTEROL SULFATE 3 ML: 2.5; .5 SOLUTION RESPIRATORY (INHALATION) at 02:49

## 2023-10-04 RX ADMIN — INSULIN LISPRO 3 UNITS: 100 INJECTION, SOLUTION INTRAVENOUS; SUBCUTANEOUS at 17:52

## 2023-10-04 RX ADMIN — MEMANTINE 5 MG: 5 TABLET ORAL at 17:48

## 2023-10-04 RX ADMIN — Medication 3 ML: at 19:10

## 2023-10-04 RX ADMIN — IPRATROPIUM BROMIDE AND ALBUTEROL SULFATE 3 ML: 2.5; .5 SOLUTION RESPIRATORY (INHALATION) at 19:10

## 2023-10-04 RX ADMIN — INSULIN LISPRO 3 UNITS: 100 INJECTION, SOLUTION INTRAVENOUS; SUBCUTANEOUS at 00:12

## 2023-10-04 RX ADMIN — IPRATROPIUM BROMIDE AND ALBUTEROL SULFATE 3 ML: 2.5; .5 SOLUTION RESPIRATORY (INHALATION) at 06:40

## 2023-10-04 RX ADMIN — Medication 3 ML: at 02:50

## 2023-10-04 RX ADMIN — SENNOSIDES AND DOCUSATE SODIUM 2 TABLET: 50; 8.6 TABLET ORAL at 17:48

## 2023-10-04 RX ADMIN — AMLODIPINE BESYLATE 10 MG: 10 TABLET ORAL at 05:51

## 2023-10-04 RX ADMIN — HYDROMORPHONE HYDROCHLORIDE 1 MG: 1 INJECTION, SOLUTION INTRAMUSCULAR; INTRAVENOUS; SUBCUTANEOUS at 22:07

## 2023-10-04 RX ADMIN — AMPICILLIN AND SULBACTAM 3 G: 1; 2 INJECTION, POWDER, FOR SOLUTION INTRAMUSCULAR; INTRAVENOUS at 05:54

## 2023-10-04 RX ADMIN — LEVETIRACETAM 500 MG: 100 INJECTION, SOLUTION, CONCENTRATE INTRAVENOUS at 05:35

## 2023-10-04 RX ADMIN — INSULIN LISPRO 5 UNITS: 100 INJECTION, SOLUTION INTRAVENOUS; SUBCUTANEOUS at 12:01

## 2023-10-04 RX ADMIN — MAGNESIUM SULFATE HEPTAHYDRATE 2 G: 2 INJECTION, SOLUTION INTRAVENOUS at 08:31

## 2023-10-04 ASSESSMENT — PAIN DESCRIPTION - PAIN TYPE
TYPE: ACUTE PAIN

## 2023-10-04 ASSESSMENT — PULMONARY FUNCTION TESTS: FVC: 0.7

## 2023-10-04 NOTE — PROGRESS NOTES
Neurology Progress Note  Neurohospitalist Service, [facility]    Referring Physician: REAL Cage*    Chief Complaint   Patient presents with    Head Injury     Pt fell and hit his head last night     ALOC     Pt arrives A&O1        HPI: Refer to initial documented Neurology H&P, as detailed in the patient's chart.    Interval History:   No acute event overnight, appears more alert today.  He has been off sedation since this morning at 7 AM, currently on SBT.  Patient is able to track and intermittently follows commands.    Review of systems: In addition to what is detailed in the HPI and/or updated in the interval history, all other systems reviewed and are negative.    Past Medical History:    has a past medical history of LE (acute kidney injury), prerenal (CMS-HCC) (1/20/2018), Alcohol withdrawal (HCC) (1/22/2018), Bowel habit changes, CAD (coronary artery disease), Cancer (HCC), Cellulitis of back except buttock (8/3/2018), HTN (hypertension), Hyperglycemia (1/19/2018), Hypertension, Indigestion, Kidney stones, Near-syncope, Nerve compression, and Tinnitus.    FHx:  family history is not on file.    SHx:   reports that he has never smoked. He has never used smokeless tobacco. He reports current alcohol use. He reports that he does not use drugs.    Medications:    Current Facility-Administered Medications:     insulin NPH (HumuLIN N,NovoLIN N) injection, 10 Units, Subcutaneous, BID INSULIN, Pal Serna M.D.    [START ON 10/5/2023] losartan (Cozaar) tablet 75 mg, 75 mg, Enteral Tube, DAILY, Pal Serna M.D.    losartan (Cozaar) tablet 25 mg, 25 mg, Enteral Tube, Once, Pal Serna M.D.    furosemide (Lasix) injection 40 mg, 40 mg, Intravenous, Once, Pal Serna M.D.    potassium bicarbonate (Klyte) effervescent tablet 50 mEq, 50 mEq, Enteral Tube, Once, Pal Serna M.D.    ipratropium-albuterol (DUONEB) nebulizer  solution, 3 mL, Nebulization, Q6HRS (RT), Jacey L. Latona, 3 mL at 10/04/23 1430    sodium chloride 3% nebulizer solution 3 mL, 3 mL, Nebulization, Q6HRS (RT), Jacey L. Latona, 3 mL at 10/04/23 1430    enalaprilat (Vasotec) injection 1.25 mg 1 mL, 1.25 mg, Intravenous, Q6HRS PRN, Jacey BEYER Latnica    labetalol (Normodyne/Trandate) injection 10 mg, 10 mg, Intravenous, Q6HRS PRN, Jacey Villanueva    dexmedetomidine (PRECEDEX) 400 mcg/100mL NS premix infusion, 0.1-1.5 mcg/kg/hr (Ideal), Intravenous, Continuous, Pal Serna M.D., Last Rate: 5.8 mL/hr at 10/04/23 0602, 0.3 mcg/kg/hr at 10/04/23 0602    HYDROmorphone (Dilaudid) injection 0.5-2 mg, 0.5-2 mg, Intravenous, Q HOUR PRN, Pal Serna M.D., 0.5 mg at 10/03/23 0803    HYDROmorphone (DILAUDID) 0.2 mg/mL in 50mL NS (Continuous Infusion), , Intravenous, Continuous, Pal Serna M.D., Stopped at 10/03/23 0553    amLODIPine (Norvasc) tablet 10 mg, 10 mg, Enteral Tube, DAILY, Pal Serna M.D., 10 mg at 10/04/23 0551    Pharmacy Consult: Enteral tube insertion - review meds/change route/product selection, 1 Each, Other, PHARMACY TO DOSE, Pal Serna M.D.    ampicillin/sulbactam (Unasyn) 3 g in  mL IVPB, 3 g, Intravenous, Q6HRS, Rafa Zhang M.D., Stopped at 10/04/23 1229    niCARdipine (Cardene) 25 mg in  mL Standard Infusion, 0-15 mg/hr, Intravenous, Continuous, Jacey Villanueva, Stopped at 10/04/23 0123    Respiratory Therapy Consult, , Nebulization, Continuous RT, Josiah Casiano M.D.    MD Alert...ICU Electrolyte Replacement per Pharmacy, , Other, PHARMACY TO DOSE, Josiah Casiano M.D.    lidocaine (Xylocaine) 1 % injection 2 mL, 2 mL, Tracheal Tube, Q30 MIN PRN, Lizandro Greenwood M.D.    acetaminophen (Tylenol) tablet 650 mg, 650 mg, Enteral Tube, Q4HRS PRN, Lizandro Greenwood M.D., 650 mg at 10/03/23 2311    atorvastatin (Lipitor) tablet 40 mg, 40 mg, Enteral Tube, Q EVENING, Lizandro Greenwood M.D., 40  mg at 10/03/23 1739    memantine (Namenda) tablet 5 mg, 5 mg, Enteral Tube, BID, Lizandro Greenwood M.D., 5 mg at 10/04/23 0553    lansoprazole (Prevacid) solutab 30 mg, 30 mg, Enteral Tube, DAILY, Lizandro Greenwood M.D., 30 mg at 10/04/23 0552    ondansetron (Zofran ODT) dispertab 4 mg, 4 mg, Enteral Tube, Q4HRS PRN, Lizandro Greenwood M.D.    senna-docusate (Pericolace Or Senokot S) 8.6-50 MG per tablet 2 Tablet, 2 Tablet, Enteral Tube, BID, 2 Tablet at 10/03/23 0614 **AND** polyethylene glycol/lytes (Miralax) PACKET 1 Packet, 1 Packet, Enteral Tube, QDAY PRN, 1 Packet at 10/02/23 1800 **AND** magnesium hydroxide (Milk Of Magnesia) suspension 30 mL, 30 mL, Enteral Tube, QDAY PRN, 30 mL at 10/03/23 0822 **AND** bisacodyl (Dulcolax) suppository 10 mg, 10 mg, Rectal, QDAY PRN, Lizandro Greenwood M.D.    3% sodium chloride (Hypertonic Saline) 500mL infusion, 0-60 mL/hr, Intravenous, Continuous, Lizandro Greenwood M.D., Stopped at 10/03/23 1918    ondansetron (Zofran) syringe/vial injection 4 mg, 4 mg, Intravenous, Q4HRS PRN, Rhoda Argueta M.D., 4 mg at 09/29/23 0151    hydrALAZINE (Apresoline) injection 20 mg, 20 mg, Intravenous, Q6HRS PRN, Jacey BEYER Latona, 20 mg at 10/03/23 1739    insulin lispro (HumaLOG,AdmeLOG) injection, 2-9 Units, Subcutaneous, Q6HRS, Rhoda Argueta M.D., 5 Units at 10/04/23 1201    [DISCONTINUED] insulin regular (HumuLIN R,NovoLIN R) injection, 2-9 Units, Subcutaneous, Q6HRS, 6 Units at 09/25/23 0515 **AND** POC blood glucose manual result, , , Q6H **AND** NOTIFY MD and PharmD, , , Once **AND** Administer 20 grams of glucose (approximately 8 ounces of fruit juice) every 15 minutes PRN FSBG less than 70 mg/dL, , , PRN **AND** dextrose 50% (D50W) injection 25 g, 25 g, Intravenous, Q15 MIN PRN, Med Peña M.D.    Physical Examination:    Vitals:    10/04/23 1200 10/04/23 1300 10/04/23 1400 10/04/23 1430   BP: (!) 148/67 (!) 156/70 (!) 149/67 (!) 159/72   Pulse: 62 (!)  59 74 62   Resp: (!) 21 20 20 (!) 23   Temp:       TempSrc: Bladder  Bladder    SpO2: 95% 97% 95% 95%   Weight:       Height:           General:   Patient is intubated and sedated.  Eyes: Midline, Pupils reactive to light.  CV: RRR  Lungs: No respiratory distress, on vent.  Extremities: No cyanosis, warm, no significant edema.    NEUROLOGICAL EXAM:   No significant changes compared to yesterday exam except he appears more alert.  Mental status: Remains intubated and nonverbal, however more alert with eyes open and able to follow simple commands.  Speech and language: Unable to assess, intubated.  Cranial nerve exam: Pupils are equal, round and reactive to light bilaterally but sluggish. Visual fields cannot be tested.  Face appears symmetric.  Facial sensation cannot be tested.  Motor exam: The patient withdraws minimally to physical stimulation in all 4 extremity.  Has intermittent minimal movement of left upper and lower extremity.  Sensory exam: React minimally to physical and tactile stimulation.  Coordination: no gross ataxia noted on exam  Plantar reflexes: Equivocal  Gait: deferred      Objective Data:    Labs:  Lab Results   Component Value Date/Time    PROTHROMBTM 14.5 09/30/2023 01:50 PM    INR 1.12 09/30/2023 01:50 PM      Lab Results   Component Value Date/Time    WBC 12.1 (H) 10/04/2023 05:40 AM    RBC 4.09 (L) 10/04/2023 05:40 AM    HEMOGLOBIN 12.4 (L) 10/04/2023 05:40 AM    HEMATOCRIT 38.2 (L) 10/04/2023 05:40 AM    MCV 93.4 10/04/2023 05:40 AM    MCH 30.3 10/04/2023 05:40 AM    MCHC 32.5 10/04/2023 05:40 AM    MPV 11.2 10/04/2023 05:40 AM    NEUTSPOLYS 81.00 (H) 10/04/2023 05:40 AM    LYMPHOCYTES 11.90 (L) 10/04/2023 05:40 AM    MONOCYTES 5.00 10/04/2023 05:40 AM    EOSINOPHILS 1.20 10/04/2023 05:40 AM    BASOPHILS 0.20 10/04/2023 05:40 AM      Lab Results   Component Value Date/Time    SODIUM 152 (H) 10/04/2023 11:55 AM    POTASSIUM 3.6 10/04/2023 05:40 AM    CHLORIDE 117 (H) 10/04/2023 05:40 AM     CO2 29 10/04/2023 05:40 AM    GLUCOSE 261 (H) 10/04/2023 05:40 AM    BUN 23 (H) 10/04/2023 05:40 AM    CREATININE 0.76 10/04/2023 05:40 AM    CREATININE 1.0 05/13/2009 03:30 PM      Lab Results   Component Value Date/Time    CHOLSTRLTOT 118 09/30/2023 11:20 PM    LDL 57 09/30/2023 11:20 PM    HDL 12 (A) 09/30/2023 11:20 PM    TRIGLYCERIDE 247 (H) 09/30/2023 11:20 PM       Lab Results   Component Value Date/Time    ALKPHOSPHAT 97 10/02/2023 06:15 AM    ASTSGOT 27 10/02/2023 06:15 AM    ALTSGPT 17 10/02/2023 06:15 AM    TBILIRUBIN 0.8 10/02/2023 06:15 AM        Imaging/Testing:    I interpreted and/or reviewed the patient's neuroimaging    DX-CHEST-PORTABLE (1 VIEW)   Final Result         1.  Pulmonary edema and/or infiltrates are identified, which are stable since the prior exam.   2.  Cardiomegaly   3.  Atherosclerosis      DX-CHEST-PORTABLE (1 VIEW)   Final Result         1.  Pulmonary edema and/or infiltrates are identified, which are stable since the prior exam.   2.  Cardiomegaly   3.  Atherosclerosis      DX-CHEST-PORTABLE (1 VIEW)   Final Result         1.  Pulmonary edema and/or infiltrates are identified, which are somewhat decreased since the prior exam.   2.  Cardiomegaly   3.  Atherosclerosis      MR-MRA HEAD-W/O   Final Result         Normal intracranial MRA.      MR-BRAIN-WITH & W/O   Final Result         Left frontal lobe hemorrhage with surrounding vasogenic edema and mild mass effect upon the frontal horn of the left lateral ventricle. There is approximately 2 mm of midline shift to the right.      Age-related volume loss and chronic microvascular ischemic changes.      No abnormal intracranial enhancement is identified.      CT-HEAD W/O   Final Result         1. Stable 6 cm left frontal intraparenchymal hemorrhage with mild, 2 mm left-to-right subfalcine herniation.   2. No new abnormality or change.         DX-CHEST-PORTABLE (1 VIEW)   Final Result         1.  Pulmonary edema and/or infiltrates are  identified, which appear somewhat increased since the prior exam.   2.  Cardiomegaly   3.  Atherosclerosis      DX-ABDOMEN FOR TUBE PLACEMENT   Final Result      NG tube extends below the diaphragm and appears to extend through the region of the stomach with tip at the level of the proximal duodenum.      DX-CHEST-PORTABLE (1 VIEW)   Final Result      1.  Grossly satisfactory appearance of tubes and lines.   2.  No evidence of pneumothorax.   3.  Marked hypoinflation with probable bibasilar atelectasis.   4.  Enlarged cardiac silhouette with vascular congestion.      CT-HEAD W/O   Final Result         1.  New large parenchymal hemorrhage involving the frontal lobe is identified with some surrounding edema.      2.  Subarachnoid hemorrhage is also noted in the area.      3.  Minimal midline shift to the right side.      Findings are consistent with atrophy.  Decreased attenuation in the periventricular white matter likely indicates microvascular ischemic disease.      Based solely on CT findings, the brain injury guideline category is mBIG 3.      EDH   IVH   Displaced skull fx   SDH > 8mm   IPH > 8mm or multiple   SAH bi-hemispheric or > 3mm      The original BIG retrospective analysis found radiographic progression in 0% of BIG 1 patients and 2.6% BIG 2.      These findings were discussed with GEETA MOLINA on 09/30/2023.      DX-CHEST-PORTABLE (1 VIEW)   Final Result      Stable multiple pulmonary vascular dilation/congestion. Otherwise, negative.      DX-CHEST-PORTABLE (1 VIEW)   Final Result      1.  Increasing airspace disease at the left base.      DX-CHEST-LIMITED (1 VIEW)   Final Result         1.  Pulmonary edema and/or infiltrates are identified, which are somewhat decreased since the prior exam.   2.  Cardiomegaly   3.  Atherosclerosis      DX-CHEST-PORTABLE (1 VIEW)   Final Result         1.  Pulmonary edema and/or infiltrates are identified, which appear somewhat increased since the prior exam.   2.   Cardiomegaly   3.  Atherosclerosis      DX-CHEST-PORTABLE (1 VIEW)   Final Result         1.  Interstitial edema and/or infiltrates, slightly decreased since prior study.   2.  Cardiomegaly   3.  Atherosclerosis      DX-CHEST-LIMITED (1 VIEW)   Final Result      Right IJ catheter tip projects in the SVC. No pneumothorax.      CT-CTA NECK WITH & W/O-POST PROCESSING   Final Result      1.  Estimated 50-75% stenosis of the BILATERAL carotid arteries   2.  Estimated greater than 50% stenosis at the vertebral artery ostium on each side      CT-CTA HEAD WITH & W/O-POST PROCESS   Final Result      1.  No large vessel occlusion or aneurysm.   2.  Hazy left frontal lobe subarachnoid hemorrhage, decreased in conspicuity from prior. No new hemorrhage seen.   3.  Scattered opacifications of the ethmoid sinuses, likely related to support hardware.      DX-ABDOMEN FOR TUBE PLACEMENT   Final Result         1.  Nonspecific bowel gas pattern in the upper abdomen.   2.  Dobbhoff tube with tip terminating overlying the expected location of the first or second duodenal segment.      DX-CHEST-PORTABLE (1 VIEW)   Final Result         1.  Interstitial edema and/or infiltrates.   2.  Cardiomegaly   3.  Nasogastric tube terminates in the distal esophagus, recommend advancement   4.  Atherosclerosis      DX-ABDOMEN FOR TUBE PLACEMENT   Final Result         1.  Nonspecific bowel gas pattern in the upper abdomen.   2.  Nasogastric tube terminates just distal to the gastroesophageal junction, recommend advancement      CT-RENAL COLIC EVALUATION(A/P W/O)   Final Result         1.  Intrahepatic biliary air and nondependent air within the gallbladder, consider history of instrumentation or changes of cholangitis in the appropriate clinical setting.   2.  Large fat-containing bilateral inguinal hernias   3.  Enlarged prostate, consider causes of prostate enlargement with additional workup as clinically appropriate   4.  Diverticulosis   5.   Cholelithiasis   6.  Atherosclerosis      CT-HEAD W/O   Final Result         1.  Hazy left frontal subarachnoid hemorrhage.   2.  Nonspecific white matter changes, commonly associated with small vessel ischemic disease.  Associated mild cerebral atrophy is noted.      Based solely on CT findings, the brain injury guideline category is mBIG 1.      SDH < 4mm   IPH < 4mm   SAH < 3 sulci and < 1mm      The original BIG retrospective analysis found radiographic progression in 0% of BIG 1 patients and 2.6% BIG 2.      These findings were discussed with the patient's clinician, Madhu Zambrano, on 9/24/2023 10:10 PM.      DX-CHEST-PORTABLE (1 VIEW)   Final Result      No acute cardiopulmonary abnormality identified.      US-EXTREMITY VENOUS UPPER UNILAT RIGHT    (Results Pending)       Assessment and Plan:  Sharon Guardado is a 68 y.o. with history of alcohol abuse, coagulopathy and thrombocytopenia who was initially admitted on 9/24/2023 due to urosepsis and possible alcohol withdrawal.  He had a brain CT at that time which revealed hazy left frontal subarachnoid hemorrhage which was felt to be traumatic.  He was initially intubated and subsequently was extubated on 9/28/23.  While in the hospital he became less responsive and brain CT on 9/30/2023 revealed large left frontal intraparenchymal hemorrhage.  He was on Lovenox for DVT prevention which was reversed.  His CTA of the head and neck at the time of admission did not reveal evidence of vascular malformation.  Brain MRI on 10/1/2023 with no evidence of enhancing lesion.  MRA of the head without contrast on 10/1/2023 with no vascular lesion.      Plan:  - Continue close neuro monitoring and neuro check every 2 hours in ICU.  - Maintain systolic blood pressure 100-160.  -Continue hyperosmolar therapy with goal of sodium 150-160, current sodium 152.  - Maintain bowel regimen to avoid Valsalva and increased intracranial pressure.  - Neurosurgery on board, no  surgical intervention deemed necessary at this point.  - Off sedation since 7 AM this morning and on SBT in preparation for extubation.    - Not a candidate for PT, OT or SLP at this time.  - DVT prevention with SCDs.      The evaluation of the patient, and recommended management, was discussed with Pal Serna MD    I personally spent a total of 52 minutes which include face-to-face and non-face-to-face time spent in preparing to see the patient, reviewing lab works and neuroimaging, performing medically appropriate exam and documenting information in electronic medical record.        Please note that this dictation was created using voice recognition software. I have made every reasonable attempt to correct obvious errors, but I expect that there are errors of grammar and possibly content that I did not discover before finalizing the note.      Manasa Garcia MD  [facility]

## 2023-10-04 NOTE — CARE PLAN
Problem: Ventilation  Goal: Ability to achieve and maintain unassisted ventilation or tolerate decreased levels of ventilator support  Description: Target End Date:  4 days     Document on Vent flowsheet    1.  Support and monitor invasive and noninvasive mechanical ventilation  2.  Monitor ventilator weaning response  3.  Perform ventilator associated pneumonia prevention interventions  4.  Manage ventilation therapy by monitoring diagnostic test results  Outcome: Progressing     Ventilator Daily Summary    Vent Day # 5    Airway: 8@26    Ventilator settings: 18/470/+8/40%    Weaning trials: SBT x 1 (11 hours and ongoing), weak parameters.    Respiratory Procedures: Q6 Duo/3%    Plan: Continue current ventilator settings and wean mechanical ventilation as tolerated per physician orders.

## 2023-10-04 NOTE — PROGRESS NOTES
Neurosurgery Progress Note    Subjective:  Vent- spont currently  No acute events    Exam:    EO, awake   Perrl, conjugate  Follows BUE and BLE      BP  Min: 109/77  Max: 164/72  Pulse  Av.1  Min: 54  Max: 89  Resp  Av.4  Min: 18  Max: 42  Monitored Temp 2  Av.8 °C (100.1 °F)  Min: 37.5 °C (99.5 °F)  Max: 38.2 °C (100.8 °F)  SpO2  Av %  Min: 93 %  Max: 99 %    No data recorded    Recent Labs     10/02/23  0615 10/03/23  0555 10/04/23  0540   WBC 8.3 9.6 12.1*   RBC 3.92* 4.04* 4.09*   HEMOGLOBIN 12.0* 12.3* 12.4*   HEMATOCRIT 37.8* 37.9* 38.2*   MCV 96.4 93.8 93.4   MCH 30.6 30.4 30.3   MCHC 31.7* 32.5 32.5   RDW 50.4* 49.5 49.5   PLATELETCT 170 193 229   MPV 10.4 10.8 11.2       Recent Labs     10/02/23  0615 10/02/23  1130 10/03/23  0000 10/03/23  0555 10/03/23  1120 10/03/23  1744 10/04/23  0010 10/04/23  0540   SODIUM 154*   < > 155* 154*   < > 155* 152* 153*   POTASSIUM 3.7  --  3.6 3.4*  --   --   --  3.6   CHLORIDE 122*  --   --  119*  --   --   --  117*   CO2 23  --   --  28  --   --   --  29   GLUCOSE 143*  --   --  203*  --   --   --  261*   BUN 18  --   --  19  --   --   --  23*   CREATININE 0.75  --   --  0.75  --   --   --  0.76   CALCIUM 7.8*  --   --  8.6  --   --   --  9.0    < > = values in this interval not displayed.             Recent Labs     10/01/23  1026   REACTMIN 5.0   CLOTKINET 0.7*   CLOTANGL 80.0*   MAXCLOTS 67.0   [id number] 0.1   PRCINADP see comment   PRCINAA see comment         Intake/Output                         10/03/23 0700 - 10/04/23 0659 10/04/23 0700 - 10/05/23 0659     [id number] 5520-1571 Total  Total                 Intake    I.V.  152.1  319.3 471.4  --  -- --    Precedex Volume 8.5 45.3 53.8 -- -- --    Cardene Volume -- 235 235 -- -- --    Hydromorphone Volume 0 0 0 -- -- --    Volume (mL) (3% sodium chloride (Hypertonic Saline) 500mL infusion) 143.6 39 182.6 -- -- --    Other  420  90 510  --  -- --    Medications (PO/Enteral  Liquids) 420 90 510 -- -- --    NG/GT  800  840 1640  --  -- --    Intake (mL) (Enteral Tube 09/30/23 Nasogastric Right nare)  -- -- --    IV Piggyback  496.3  193.3 689.6  --  -- --    Volume (mL) (ampicillin/sulbactam (Unasyn) 3 g in  mL IVPB) 217.1 193.3 410.3 -- -- --    Volume (mL) (potassium phosphate 15 mmol in  mL ivpb) 279.2 -- 279.2 -- -- --    Enteral  --  90 90  --  -- --    Free Water / Tube Flush -- 90 90 -- -- --    Total Intake 1868.4 1532.6 3401 -- -- --       Output    Urine  1700  955 2655  --  -- --    Output (mL) (Urethral Catheter) 9711 917 8873 -- -- --    Stool  --  -- --  --  -- --    Number of Times Stooled 2 x -- 2 x -- -- --    Total Output 0704 695 7308 -- -- --       Net I/O     168.4 577.6 746 -- -- --              Intake/Output Summary (Last 24 hours) at 10/4/2023 0744  Last data filed at 10/4/2023 0641  Gross per 24 hour   Intake 3400.98 ml   Output 2655 ml   Net 745.98 ml               ipratropium-albuterol  3 mL Q6HRS (RT)    sodium chloride 3%  3 mL Q6HRS (RT)    enalaprilat  1.25 mg Q6HRS PRN    labetalol  10 mg Q6HRS PRN    dexmedetomidine (Precedex) infusion  0.1-1.5 mcg/kg/hr (Ideal) Continuous    HYDROmorphone  0.5-2 mg Q HOUR PRN    HYDROmorphone   Continuous    amLODIPine  10 mg DAILY    Pharmacy  1 Each PHARMACY TO DOSE    ampicillin-sulbactam (UNASYN) IV  3 g Q6HRS    levETIRAcetam (Keppra) IV  500 mg Q12HRS    niCARdipine (Cardene) 25 mg in  mL Standard Infusion  0-15 mg/hr Continuous    Respiratory Therapy Consult   Continuous RT    MD Alert...Adult ICU Electrolyte Replacement per Pharmacy   PHARMACY TO DOSE    lidocaine  2 mL Q30 MIN PRN    acetaminophen  650 mg Q4HRS PRN    atorvastatin  40 mg Q EVENING    losartan  50 mg DAILY    memantine  5 mg BID    lansoprazole  30 mg DAILY    ondansetron  4 mg Q4HRS PRN    senna-docusate  2 Tablet BID    And    polyethylene glycol/lytes  1 Packet QDAY PRN    And    magnesium hydroxide  30 mL QDAY  PRN    And    bisacodyl  10 mg QDAY PRN    3% sodium chloride  0-60 mL/hr Continuous    ondansetron  4 mg Q4HRS PRN    hydrALAZINE  20 mg Q6HRS PRN    insulin lispro  2-9 Units Q6HRS    dextrose bolus  25 g Q15 MIN PRN       Assessment and Plan:  Hospital day # 10 left frontal ICH  POD# n/a  Chemical prophylactic DVT therapy: No  Start date/time: tbd    Neuro improved   Mri no underlying tumor  Sbp<160  Na 150-155  Non-surgical    ATTENDING ADDENDUM:  Patient seen independently and agree with above note  Will sign off  Please call with questions

## 2023-10-04 NOTE — DIETARY
Nutrition Services Brief Update:    Problem: Nutritional:  Goal: Nutrition support tolerated and meeting greater than 85% of estimated needs  Outcome: MET    Pt is receiving TF Vital HP at 70 ml/hr goal rate, pt is intubated. Gluc 261 with SSI on MAR. 3% NaCl and precedex IV meds.     RD following.

## 2023-10-04 NOTE — PROGRESS NOTES
Critical Care Progress Note    Date of admission  9/24/2023    Chief Complaint  68 y.o. male admitted 9/24/2023 with urosepsis.  He has a history of DM type II, primary hypertension, alcohol dependence and obesity.    Hospital Course      9/25: surgery was consulted Dr Hirsch. Continue resuscitation with IVF, antibiotics, If worsens, consider jamila tube. Patient was intubated acutely overnight.  9/26: requiring high levels of sedatives. Had to start a second vasopressor. Failed SAT and SBT due to agitation. Vasopressors are weaning down. Will start dvt chemical prophylaxis tonight.  9/27: changed propofol to precedex, hopes to move towards extubation. Consider tube feeds if  not extubated  9/28: he is now following commands, maximizing status for extubation.    9/29-9/30: Transferred to floor, under care with hospitalist.    9/30: patient was found with AMS, suspected ETOH withdrawal, s/p benzos. CT head showed parenchymal hemorrhage. Transferred to ICU. Intubated, CVC placed, vascular neurology and neurosurgery consulted    10/1 - Will work on extubation.  23% saline bolus.  Palliative consutled.  Fevers - reculture and empiric unasyn    10/2 -    vent day 3.  Force diuresis.  SAT/SBT.  Increase amlodipine.  10/3 -    vent day 4.  Force diuresis.  SAT/SBT.  10/4 -    vent day 5.  Force diuresis.  SAT/SBT.  Continue amlodipine, 10 mg daily.  Increase losartan, 75 mg daily.  Begin NPH, 10 units twice daily.  10/5 -    vent day 6.  Forced diuresis.  SAT/SBT.  Continue amlodipine.  Increase losartan, 100 mg daily.  Stop NPH and sliding scale insulin.  Begin insulin drip.  Culture blood, sputum and check UA.  Stop Unasyn.  Begin empiric Zosyn and vancomycin.  Check RUQ ultrasound.      Interval Problem Update  Reviewed last 24 hour events:      SR  100.4  -2530 mL in the last 24      Review of Systems  Review of Systems   Unable to perform ROS: Acuity of condition        Vital Signs for last 24 hours   Pulse:  [53-95]  70  Resp:  [15-29] 20  BP: (138-181)/(62-78) 139/63  SpO2:  [91 %-98 %] 95 %    Hemodynamic parameters for last 24 hours       Respiratory Information for the last 24 hours  Vent Mode: Spont  Rate (breaths/min): 18  Vt Target (mL): 470  PEEP/CPAP: 8  P Support: 10  MAP: 11  Control VTE (exp VT): 411    Physical Exam   Physical Exam  Constitutional:       Appearance: He is obese.      Comments: On ventilator   Eyes:      Pupils: Pupils are equal, round, and reactive to light.   Cardiovascular:      Comments: Sinus rhythm  Pulmonary:      Breath sounds: Rales (Scattered crackles) present. No wheezing.   Abdominal:      General: There is no distension.      Tenderness: There is no abdominal tenderness.      Comments: Tolerating enteral tube feedings   Musculoskeletal:      Right lower leg: Edema present.      Left lower leg: Edema present.   Skin:     General: Skin is warm.   Neurological:      Comments: Weak in the right arm and right leg.  Intermittently follows.         Medications  Current Facility-Administered Medications   Medication Dose Route Frequency Provider Last Rate Last Admin    insulin regular (HumuLIN R,NovoLIN R) injection  0-14 Units Intravenous Once Pal Serna M.D.        insulin regular (Humulin R) 100 Units in  mL Infusion  0-29 Units/hr Intravenous Continuous Pal Serna M.D. 2 mL/hr at 10/05/23 1409 2 Units/hr at 10/05/23 1409    dextrose 50% (D50W) injection 12.5-25 g  12.5-25 g Intravenous PRN Pal Serna M.D.        [START ON 10/6/2023] losartan (Cozaar) tablet 100 mg  100 mg Enteral Tube DAILY Pal Serna M.D.        losartan (Cozaar) tablet 25 mg  25 mg Enteral Tube Once Pal Serna M.D.        piperacillin-tazobactam (Zosyn) 4.5 g in  mL IVPB  4.5 g Intravenous Once Pal Serna M.D.        And    piperacillin-tazobactam (Zosyn) 4.5 g in  mL IVPB  4.5 g Intravenous Q8HRS Pal Serna M.D. MD  Alert...Vancomycin per Pharmacy   Other PHARMACY TO DOSE Pal Serna M.D.        ipratropium-albuterol (DUONEB) nebulizer solution  3 mL Nebulization Q6HRS (RT) Jacey L. Latona   3 mL at 10/05/23 1427    sodium chloride 3% nebulizer solution 3 mL  3 mL Nebulization Q6HRS (RT) Jacey L. Latona   3 mL at 10/05/23 1427    enalaprilat (Vasotec) injection 1.25 mg 1 mL  1.25 mg Intravenous Q6HRS PRN Jacey L. Latona        labetalol (Normodyne/Trandate) injection 10 mg  10 mg Intravenous Q6HRS PRN Jacey L. Latona   10 mg at 10/05/23 0832    dexmedetomidine (PRECEDEX) 400 mcg/100mL NS premix infusion  0.1-1.5 mcg/kg/hr (Ideal) Intravenous Continuous Pal Serna M.D.   Stopped at 10/05/23 0356    HYDROmorphone (Dilaudid) injection 0.5-2 mg  0.5-2 mg Intravenous Q HOUR PRN Pal Serna M.D.   1 mg at 10/05/23 0803    HYDROmorphone (DILAUDID) 0.2 mg/mL in 50mL NS (Continuous Infusion)   Intravenous Continuous Pal Serna M.D.   Stopped at 10/03/23 0553    amLODIPine (Norvasc) tablet 10 mg  10 mg Enteral Tube DAILY Pal Serna M.D.   10 mg at 10/05/23 0520    Pharmacy Consult: Enteral tube insertion - review meds/change route/product selection  1 Each Other PHARMACY TO DOSE Pal Serna M.D.        niCARdipine (Cardene) 25 mg in  mL Standard Infusion  0-15 mg/hr Intravenous Continuous Jacey L. Latona 50 mL/hr at 10/05/23 1334 5 mg/hr at 10/05/23 1334    Respiratory Therapy Consult   Nebulization Continuous RT Josiah Casiano M.D. MD Alert...ICU Electrolyte Replacement per Pharmacy   Other PHARMACY TO DOSE Josiah Casiano M.D.        lidocaine (Xylocaine) 1 % injection 2 mL  2 mL Tracheal Tube Q30 MIN PRN Lizandro Greenwood M.D.        acetaminophen (Tylenol) tablet 650 mg  650 mg Enteral Tube Q4HRS PRN Lizandro Greenwood M.D.   650 mg at 10/03/23 2311    atorvastatin (Lipitor) tablet 40 mg  40 mg Enteral Tube Q EVENING Lizandro Greenwood M.D.   40 mg at  10/04/23 1748    memantine (Namenda) tablet 5 mg  5 mg Enteral Tube BID Lizandro Greenwood M.D.   5 mg at 10/05/23 0521    lansoprazole (Prevacid) solutab 30 mg  30 mg Enteral Tube DAILY Lizandro Greenwood M.D.   30 mg at 10/05/23 0520    ondansetron (Zofran ODT) dispertab 4 mg  4 mg Enteral Tube Q4HRS PRN Lizandro Greenwood M.D.        senna-docusate (Pericolace Or Senokot S) 8.6-50 MG per tablet 2 Tablet  2 Tablet Enteral Tube BID Lizandro Greenwood M.D.   2 Tablet at 10/05/23 0521    And    polyethylene glycol/lytes (Miralax) PACKET 1 Packet  1 Packet Enteral Tube QDAY PRN Lizandro Greenwood M.D.   1 Packet at 10/02/23 1800    And    magnesium hydroxide (Milk Of Magnesia) suspension 30 mL  30 mL Enteral Tube QDAY PRN Lizandro Greenwood M.D.   30 mL at 10/03/23 0822    And    bisacodyl (Dulcolax) suppository 10 mg  10 mg Rectal QDAY PRN Lizandro Greenwood M.D.        3% sodium chloride (Hypertonic Saline) 500mL infusion  0-60 mL/hr Intravenous Continuous Jacey BEYER Latona 30 mL/hr at 10/05/23 1309 30 mL/hr at 10/05/23 1309    ondansetron (Zofran) syringe/vial injection 4 mg  4 mg Intravenous Q4HRS PRN Rhoda Argueta M.D.   4 mg at 09/29/23 0151    hydrALAZINE (Apresoline) injection 20 mg  20 mg Intravenous Q6HRS PRN Jacey BEYER Latona   20 mg at 10/05/23 1007       Fluids    Intake/Output Summary (Last 24 hours) at 10/5/2023 1445  Last data filed at 10/5/2023 1400  Gross per 24 hour   Intake 2625.06 ml   Output 5250 ml   Net -2624.94 ml       Laboratory  Recent Labs     10/03/23  0346 10/04/23  0344 10/05/23  0315   ISTATAPH 7.430 7.493 7.449   ISTATAPCO2 38.0* 39.9* 45.9*   ISTATAPO2 75 87 83   ISTATATCO2 26 32 33   DANGWYI3OZM 95 97 97   ISTATARTHCO3 25.2* 30.7* 31.9*   ISTATARTBE 1 7* 7*   ISTATTEMP 99.7 F 99.0 F 99.3 F   ISTATFIO2 50 50 40   ISTATSPEC Arterial Arterial Arterial   ISTATAPHTC 7.421 7.490 7.443   [id number] 78 88* 86         Recent Labs     10/03/23  0555 10/03/23  1120 10/04/23  0540  10/04/23  1155 10/05/23  0007 10/05/23  0535 10/05/23  1220   SODIUM 154*   < > 153*   < > 149* 149* 150*   POTASSIUM 3.4*  --  3.6  --   --  3.9  --    CHLORIDE 119*  --  117*  --   --  110  --    CO2 28  --  29  --   --  29  --    BUN 19  --  23*  --   --  22  --    CREATININE 0.75  --  0.76  --   --  0.76  --    MAGNESIUM 2.0  --  1.9  --   --  2.0  --    PHOSPHORUS 2.2*  --  2.8  --   --  3.0  --    CALCIUM 8.6  --  9.0  --   --  8.9  --     < > = values in this interval not displayed.     Recent Labs     10/02/23  1720 10/03/23  0555 10/04/23  0540 10/05/23  0535   PREALBUMIN 13.8* 12.8*  --   --    GLUCOSE  --  203* 261* 281*     Recent Labs     10/03/23  0555 10/04/23  0540 10/05/23  0535   WBC 9.6 12.1* 13.7*   NEUTSPOLYS 76.40* 81.00* 81.50*   LYMPHOCYTES 14.60* 11.90* 11.40*   MONOCYTES 5.80 5.00 4.20   EOSINOPHILS 2.40 1.20 2.10   BASOPHILS 0.20 0.20 0.30     Recent Labs     10/03/23  0555 10/04/23  0540 10/05/23  0535   RBC 4.04* 4.09* 4.06*   HEMOGLOBIN 12.3* 12.4* 12.7*   HEMATOCRIT 37.9* 38.2* 38.3*   PLATELETCT 193 229 232       Imaging  X-Ray:  I have personally reviewed the images and compared with prior images. and My impression is: Improved right lower lobe opacification.  Unchanged left lower lobe opacification.    Assessment/Plan  * Intraparenchymal hemorrhage of brain (HCC)- (present on admission)  Assessment & Plan  CT on 9/24 with hazy left frontal SAH  CT on 9/30 with new large IPH in the left frontal lobe  Strict blood pressure control with goal SBP less than 160  Goal sodium 150-155  Neuro checks every 2 hours    Acute respiratory failure with hypoxia (HCC)- (present on admission)  Assessment & Plan  Intubated 9/25-9/28  Reintubated 9/30  ABCDEF bundle  SAT/SBT as appropriate  Mobility level 1-2    Leukocytosis  Assessment & Plan  Increasing leukocytosis on Unasyn  Concern for possible intra-abdominal source with pneumobilia seen on prior CT imaging  RUQ ultrasound  Culture blood and  check UA  Stop Unasyn  Begin empiric Zosyn and vancomycin    Pneumonia due to infectious organism- (present on admission)  Assessment & Plan  Escalate antimicrobial chemotherapy from Unasyn to Zosyn and vancomycin    Pneumobilia- (present on admission)  Assessment & Plan  Dr. Hirsch consulted on admission - surgery has subsequently signed off  Completed 5 days of ceftriaxone and metronidazole on 9/28  RUQ ultrasound    Type 2 diabetes mellitus (HCC)- (present on admission)  Assessment & Plan  Glycohemoglobin 6.8  Stop NPH and sliding scale insulin  I am titrating an insulin drip to achieve glycemic control    Primary hypertension- (present on admission)  Assessment & Plan  Goal SBP less than 160  Continue amlodipine, 10 mg daily  Increase losartan, 100 mg daily  I am titrating nicardipine to achieve blood pressure goals    Alcohol dependence (HCC)- (present on admission)  Assessment & Plan  Cessation counseling when clinically appropriate    BMI 40.0-44.9, adult (HCC)- (present on admission)  Assessment & Plan            VTE:  Contraindicated  Ulcer: PPI  Lines: Central Line  Ongoing indication addressed and Joseph Catheter  Ongoing indication addressed    I have performed a physical exam and reviewed and updated ROS and Plan today (10/5/2023). In review of yesterday's note (10/4/2023), there are no changes except as documented above.     I have assessed and reassessed his respiratory status with ventilator adjustments and spontaneous breathing trials, airway mechanics, ventilator waveforms, blood pressure, hemodynamics, cardiovascular status, serial determinations of serum sodium with titration of hypertonic saline and his neurologic status.  He is at increased risk for worsening respiratory, cardiovascular and CNS system dysfunction.    Discussed patient condition and risk of morbidity and/or mortality with RN, RT, Pharmacy, Charge nurse / hot rounds, and QA team    The patient remains critically ill.  Critical care  time = 135 minutes in directly providing and coordinating critical care and extensive data review.  No time overlap and excludes procedures.    Pal Serna MD  Pulmonary and Critical Care Medicine

## 2023-10-05 ENCOUNTER — APPOINTMENT (OUTPATIENT)
Dept: RADIOLOGY | Facility: MEDICAL CENTER | Age: 68
DRG: 870 | End: 2023-10-05
Attending: INTERNAL MEDICINE
Payer: MEDICARE

## 2023-10-05 PROBLEM — D72.829 LEUKOCYTOSIS: Status: ACTIVE | Noted: 2023-10-05

## 2023-10-05 PROBLEM — J98.11 ATELECTASIS: Status: ACTIVE | Noted: 2023-10-05

## 2023-10-05 LAB
ANION GAP SERPL CALC-SCNC: 10 MMOL/L (ref 7–16)
APPEARANCE UR: CLEAR
BACTERIA #/AREA URNS HPF: NEGATIVE /HPF
BASE EXCESS BLDA CALC-SCNC: 7 MMOL/L (ref -4–3)
BASOPHILS # BLD AUTO: 0.3 % (ref 0–1.8)
BASOPHILS # BLD: 0.04 K/UL (ref 0–0.12)
BILIRUB UR QL STRIP.AUTO: NEGATIVE
BODY TEMPERATURE: ABNORMAL DEGREES
BREATHS SETTING VENT: 18
BUN SERPL-MCNC: 22 MG/DL (ref 8–22)
CALCIUM SERPL-MCNC: 8.9 MG/DL (ref 8.5–10.5)
CHLORIDE SERPL-SCNC: 110 MMOL/L (ref 96–112)
CO2 BLDA-SCNC: 33 MMOL/L (ref 20–33)
CO2 SERPL-SCNC: 29 MMOL/L (ref 20–33)
COLOR UR: YELLOW
CREAT SERPL-MCNC: 0.76 MG/DL (ref 0.5–1.4)
DELSYS IDSYS: ABNORMAL
EKG IMPRESSION: NORMAL
END TIDAL CARBON DIOXIDE IECO2: 44 MMHG
EOSINOPHIL # BLD AUTO: 0.29 K/UL (ref 0–0.51)
EOSINOPHIL NFR BLD: 2.1 % (ref 0–6.9)
EPI CELLS #/AREA URNS HPF: NEGATIVE /HPF
ERYTHROCYTE [DISTWIDTH] IN BLOOD BY AUTOMATED COUNT: 48.3 FL (ref 35.9–50)
GFR SERPLBLD CREATININE-BSD FMLA CKD-EPI: 96 ML/MIN/1.73 M 2
GLUCOSE BLD STRIP.AUTO-MCNC: 207 MG/DL (ref 65–99)
GLUCOSE BLD STRIP.AUTO-MCNC: 212 MG/DL (ref 65–99)
GLUCOSE BLD STRIP.AUTO-MCNC: 213 MG/DL (ref 65–99)
GLUCOSE BLD STRIP.AUTO-MCNC: 214 MG/DL (ref 65–99)
GLUCOSE BLD STRIP.AUTO-MCNC: 215 MG/DL (ref 65–99)
GLUCOSE BLD STRIP.AUTO-MCNC: 217 MG/DL (ref 65–99)
GLUCOSE BLD STRIP.AUTO-MCNC: 220 MG/DL (ref 65–99)
GLUCOSE BLD STRIP.AUTO-MCNC: 224 MG/DL (ref 65–99)
GLUCOSE BLD STRIP.AUTO-MCNC: 238 MG/DL (ref 65–99)
GLUCOSE BLD STRIP.AUTO-MCNC: 247 MG/DL (ref 65–99)
GLUCOSE BLD STRIP.AUTO-MCNC: 267 MG/DL (ref 65–99)
GLUCOSE SERPL-MCNC: 281 MG/DL (ref 65–99)
GLUCOSE UR STRIP.AUTO-MCNC: NEGATIVE MG/DL
GRAM STN SPEC: NORMAL
HCO3 BLDA-SCNC: 31.9 MMOL/L (ref 17–25)
HCT VFR BLD AUTO: 38.3 % (ref 42–52)
HGB BLD-MCNC: 12.7 G/DL (ref 14–18)
HOROWITZ INDEX BLDA+IHG-RTO: 208 MM[HG]
HYALINE CASTS #/AREA URNS LPF: ABNORMAL /LPF
IMM GRANULOCYTES # BLD AUTO: 0.07 K/UL (ref 0–0.11)
IMM GRANULOCYTES NFR BLD AUTO: 0.5 % (ref 0–0.9)
KETONES UR STRIP.AUTO-MCNC: NEGATIVE MG/DL
LEUKOCYTE ESTERASE UR QL STRIP.AUTO: NEGATIVE
LYMPHOCYTES # BLD AUTO: 1.55 K/UL (ref 1–4.8)
LYMPHOCYTES NFR BLD: 11.4 % (ref 22–41)
MAGNESIUM SERPL-MCNC: 2 MG/DL (ref 1.5–2.5)
MCH RBC QN AUTO: 31.3 PG (ref 27–33)
MCHC RBC AUTO-ENTMCNC: 33.2 G/DL (ref 32.3–36.5)
MCV RBC AUTO: 94.3 FL (ref 81.4–97.8)
MICRO URNS: ABNORMAL
MODE IMODE: ABNORMAL
MONOCYTES # BLD AUTO: 0.58 K/UL (ref 0–0.85)
MONOCYTES NFR BLD AUTO: 4.2 % (ref 0–13.4)
NEUTROPHILS # BLD AUTO: 11.12 K/UL (ref 1.82–7.42)
NEUTROPHILS NFR BLD: 81.5 % (ref 44–72)
NITRITE UR QL STRIP.AUTO: NEGATIVE
NRBC # BLD AUTO: 0 K/UL
NRBC BLD-RTO: 0 /100 WBC (ref 0–0.2)
O2/TOTAL GAS SETTING VFR VENT: 40 %
PCO2 BLDA: 45.9 MMHG (ref 26–37)
PCO2 TEMP ADJ BLDA: 46.7 MMHG (ref 26–37)
PEEP END EXPIRATORY PRESSURE IPEEP: 8 CMH20
PH BLDA: 7.45 [PH] (ref 7.4–7.5)
PH TEMP ADJ BLDA: 7.44 [PH] (ref 7.4–7.5)
PH UR STRIP.AUTO: 7 [PH] (ref 5–8)
PHOSPHATE SERPL-MCNC: 3 MG/DL (ref 2.5–4.5)
PLATELET # BLD AUTO: 232 K/UL (ref 164–446)
PMV BLD AUTO: 11 FL (ref 9–12.9)
PO2 BLDA: 83 MMHG (ref 64–87)
PO2 TEMP ADJ BLDA: 86 MMHG (ref 64–87)
POTASSIUM SERPL-SCNC: 3.9 MMOL/L (ref 3.6–5.5)
PROT UR QL STRIP: NEGATIVE MG/DL
RBC # BLD AUTO: 4.06 M/UL (ref 4.7–6.1)
RBC # URNS HPF: ABNORMAL /HPF
RBC UR QL AUTO: ABNORMAL
SAO2 % BLDA: 97 % (ref 93–99)
SCCMEC + MECA PNL NOSE NAA+PROBE: NEGATIVE
SIGNIFICANT IND 70042: NORMAL
SITE SITE: NORMAL
SODIUM SERPL-SCNC: 149 MMOL/L (ref 135–145)
SODIUM SERPL-SCNC: 149 MMOL/L (ref 135–145)
SODIUM SERPL-SCNC: 150 MMOL/L (ref 135–145)
SODIUM SERPL-SCNC: 150 MMOL/L (ref 135–145)
SOURCE SOURCE: NORMAL
SP GR UR STRIP.AUTO: 1.01
SPECIMEN DRAWN FROM PATIENT: ABNORMAL
TIDAL VOLUME IVT: 470 ML
UROBILINOGEN UR STRIP.AUTO-MCNC: 1 MG/DL
WBC # BLD AUTO: 13.7 K/UL (ref 4.8–10.8)
WBC #/AREA URNS HPF: ABNORMAL /HPF

## 2023-10-05 PROCEDURE — 700111 HCHG RX REV CODE 636 W/ 250 OVERRIDE (IP): Performed by: NURSE PRACTITIONER

## 2023-10-05 PROCEDURE — 84100 ASSAY OF PHOSPHORUS: CPT

## 2023-10-05 PROCEDURE — 700111 HCHG RX REV CODE 636 W/ 250 OVERRIDE (IP): Performed by: INTERNAL MEDICINE

## 2023-10-05 PROCEDURE — 700102 HCHG RX REV CODE 250 W/ 637 OVERRIDE(OP): Performed by: INTERNAL MEDICINE

## 2023-10-05 PROCEDURE — 700101 HCHG RX REV CODE 250: Performed by: NURSE PRACTITIONER

## 2023-10-05 PROCEDURE — 87205 SMEAR GRAM STAIN: CPT

## 2023-10-05 PROCEDURE — 700105 HCHG RX REV CODE 258: Performed by: INTERNAL MEDICINE

## 2023-10-05 PROCEDURE — 81001 URINALYSIS AUTO W/SCOPE: CPT

## 2023-10-05 PROCEDURE — 87641 MR-STAPH DNA AMP PROBE: CPT

## 2023-10-05 PROCEDURE — 99292 CRITICAL CARE ADDL 30 MIN: CPT | Mod: 25 | Performed by: INTERNAL MEDICINE

## 2023-10-05 PROCEDURE — 71045 X-RAY EXAM CHEST 1 VIEW: CPT

## 2023-10-05 PROCEDURE — 85025 COMPLETE CBC W/AUTO DIFF WBC: CPT

## 2023-10-05 PROCEDURE — 82962 GLUCOSE BLOOD TEST: CPT | Mod: 91

## 2023-10-05 PROCEDURE — 99291 CRITICAL CARE FIRST HOUR: CPT | Mod: 25 | Performed by: INTERNAL MEDICINE

## 2023-10-05 PROCEDURE — 31624 DX BRONCHOSCOPE/LAVAGE: CPT | Performed by: INTERNAL MEDICINE

## 2023-10-05 PROCEDURE — 87040 BLOOD CULTURE FOR BACTERIA: CPT | Mod: 91

## 2023-10-05 PROCEDURE — 76705 ECHO EXAM OF ABDOMEN: CPT

## 2023-10-05 PROCEDURE — A9270 NON-COVERED ITEM OR SERVICE: HCPCS | Performed by: INTERNAL MEDICINE

## 2023-10-05 PROCEDURE — 94003 VENT MGMT INPAT SUBQ DAY: CPT

## 2023-10-05 PROCEDURE — 94640 AIRWAY INHALATION TREATMENT: CPT

## 2023-10-05 PROCEDURE — 99231 SBSQ HOSP IP/OBS SF/LOW 25: CPT | Performed by: STUDENT IN AN ORGANIZED HEALTH CARE EDUCATION/TRAINING PROGRAM

## 2023-10-05 PROCEDURE — 82803 BLOOD GASES ANY COMBINATION: CPT

## 2023-10-05 PROCEDURE — 700111 HCHG RX REV CODE 636 W/ 250 OVERRIDE (IP): Mod: JZ | Performed by: STUDENT IN AN ORGANIZED HEALTH CARE EDUCATION/TRAINING PROGRAM

## 2023-10-05 PROCEDURE — 87070 CULTURE OTHR SPECIMN AEROBIC: CPT

## 2023-10-05 PROCEDURE — 94799 UNLISTED PULMONARY SVC/PX: CPT

## 2023-10-05 PROCEDURE — 31645 BRNCHSC W/THER ASPIR 1ST: CPT | Performed by: INTERNAL MEDICINE

## 2023-10-05 PROCEDURE — 83735 ASSAY OF MAGNESIUM: CPT

## 2023-10-05 PROCEDURE — 93005 ELECTROCARDIOGRAM TRACING: CPT | Performed by: INTERNAL MEDICINE

## 2023-10-05 PROCEDURE — 700105 HCHG RX REV CODE 258: Performed by: NURSE PRACTITIONER

## 2023-10-05 PROCEDURE — 770022 HCHG ROOM/CARE - ICU (200)

## 2023-10-05 PROCEDURE — 80048 BASIC METABOLIC PNL TOTAL CA: CPT

## 2023-10-05 PROCEDURE — 93010 ELECTROCARDIOGRAM REPORT: CPT | Performed by: INTERNAL MEDICINE

## 2023-10-05 PROCEDURE — 84295 ASSAY OF SERUM SODIUM: CPT

## 2023-10-05 PROCEDURE — 36600 WITHDRAWAL OF ARTERIAL BLOOD: CPT

## 2023-10-05 PROCEDURE — 302978 HCHG BRONCHOSCOPY-DIAGNOSTIC

## 2023-10-05 PROCEDURE — 700105 HCHG RX REV CODE 258: Performed by: STUDENT IN AN ORGANIZED HEALTH CARE EDUCATION/TRAINING PROGRAM

## 2023-10-05 PROCEDURE — 0B9J8ZX DRAINAGE OF LEFT LOWER LUNG LOBE, VIA NATURAL OR ARTIFICIAL OPENING ENDOSCOPIC, DIAGNOSTIC: ICD-10-PCS | Performed by: INTERNAL MEDICINE

## 2023-10-05 RX ORDER — LOSARTAN POTASSIUM 50 MG/1
100 TABLET ORAL DAILY
Status: DISCONTINUED | OUTPATIENT
Start: 2023-10-06 | End: 2023-10-21

## 2023-10-05 RX ORDER — DEXTROSE MONOHYDRATE 25 G/50ML
12.5-25 INJECTION, SOLUTION INTRAVENOUS PRN
Status: DISCONTINUED | OUTPATIENT
Start: 2023-10-05 | End: 2023-10-09

## 2023-10-05 RX ORDER — PROPOFOL 10 MG/ML
50 INJECTION, EMULSION INTRAVENOUS ONCE
Status: DISCONTINUED | OUTPATIENT
Start: 2023-10-05 | End: 2023-10-05

## 2023-10-05 RX ORDER — LOSARTAN POTASSIUM 50 MG/1
25 TABLET ORAL ONCE
Status: COMPLETED | OUTPATIENT
Start: 2023-10-05 | End: 2023-10-05

## 2023-10-05 RX ORDER — FUROSEMIDE 10 MG/ML
40 INJECTION INTRAMUSCULAR; INTRAVENOUS ONCE
Status: COMPLETED | OUTPATIENT
Start: 2023-10-05 | End: 2023-10-05

## 2023-10-05 RX ORDER — PROPOFOL 10 MG/ML
50-100 INJECTION, EMULSION INTRAVENOUS ONCE
Status: COMPLETED | OUTPATIENT
Start: 2023-10-05 | End: 2023-10-05

## 2023-10-05 RX ADMIN — HYDROMORPHONE HYDROCHLORIDE 2 MG: 1 INJECTION, SOLUTION INTRAMUSCULAR; INTRAVENOUS; SUBCUTANEOUS at 15:47

## 2023-10-05 RX ADMIN — LANSOPRAZOLE 30 MG: 30 TABLET, ORALLY DISINTEGRATING, DELAYED RELEASE ORAL at 05:20

## 2023-10-05 RX ADMIN — LOSARTAN POTASSIUM 25 MG: 50 TABLET, FILM COATED ORAL at 15:45

## 2023-10-05 RX ADMIN — IPRATROPIUM BROMIDE AND ALBUTEROL SULFATE 3 ML: 2.5; .5 SOLUTION RESPIRATORY (INHALATION) at 06:58

## 2023-10-05 RX ADMIN — Medication 3 ML: at 02:01

## 2023-10-05 RX ADMIN — FUROSEMIDE 40 MG: 10 INJECTION INTRAMUSCULAR; INTRAVENOUS at 15:45

## 2023-10-05 RX ADMIN — IPRATROPIUM BROMIDE AND ALBUTEROL SULFATE 3 ML: 2.5; .5 SOLUTION RESPIRATORY (INHALATION) at 02:01

## 2023-10-05 RX ADMIN — INSULIN LISPRO 3 UNITS: 100 INJECTION, SOLUTION INTRAVENOUS; SUBCUTANEOUS at 00:10

## 2023-10-05 RX ADMIN — INSULIN LISPRO 3 UNITS: 100 INJECTION, SOLUTION INTRAVENOUS; SUBCUTANEOUS at 05:45

## 2023-10-05 RX ADMIN — Medication 3 ML: at 14:27

## 2023-10-05 RX ADMIN — IPRATROPIUM BROMIDE AND ALBUTEROL SULFATE 3 ML: 2.5; .5 SOLUTION RESPIRATORY (INHALATION) at 19:02

## 2023-10-05 RX ADMIN — AMPICILLIN AND SULBACTAM 3 G: 1; 2 INJECTION, POWDER, FOR SOLUTION INTRAMUSCULAR; INTRAVENOUS at 05:24

## 2023-10-05 RX ADMIN — SODIUM CHLORIDE 2.5 MG/HR: 9 INJECTION, SOLUTION INTRAVENOUS at 12:53

## 2023-10-05 RX ADMIN — HYDRALAZINE HYDROCHLORIDE 20 MG: 20 INJECTION, SOLUTION INTRAMUSCULAR; INTRAVENOUS at 10:07

## 2023-10-05 RX ADMIN — SODIUM CHLORIDE 2.5 UNITS/HR: 9 INJECTION, SOLUTION INTRAVENOUS at 11:27

## 2023-10-05 RX ADMIN — PIPERACILLIN AND TAZOBACTAM 4.5 G: 4; .5 INJECTION, POWDER, FOR SOLUTION INTRAVENOUS at 22:03

## 2023-10-05 RX ADMIN — VANCOMYCIN HYDROCHLORIDE 3 G: 5 INJECTION, POWDER, LYOPHILIZED, FOR SOLUTION INTRAVENOUS at 17:48

## 2023-10-05 RX ADMIN — Medication 3 ML: at 06:58

## 2023-10-05 RX ADMIN — AMPICILLIN AND SULBACTAM 3 G: 1; 2 INJECTION, POWDER, FOR SOLUTION INTRAMUSCULAR; INTRAVENOUS at 12:17

## 2023-10-05 RX ADMIN — AMLODIPINE BESYLATE 10 MG: 10 TABLET ORAL at 05:20

## 2023-10-05 RX ADMIN — PROPOFOL 100 MG: 10 INJECTION, EMULSION INTRAVENOUS at 15:29

## 2023-10-05 RX ADMIN — SENNOSIDES AND DOCUSATE SODIUM 2 TABLET: 50; 8.6 TABLET ORAL at 17:54

## 2023-10-05 RX ADMIN — SODIUM CHLORIDE 30 ML/HR: 3 INJECTION, SOLUTION INTRAVENOUS at 17:14

## 2023-10-05 RX ADMIN — LOSARTAN POTASSIUM 75 MG: 50 TABLET, FILM COATED ORAL at 05:20

## 2023-10-05 RX ADMIN — POTASSIUM BICARBONATE 25 MEQ: 978 TABLET, EFFERVESCENT ORAL at 09:50

## 2023-10-05 RX ADMIN — MEMANTINE 5 MG: 5 TABLET ORAL at 17:54

## 2023-10-05 RX ADMIN — PIPERACILLIN AND TAZOBACTAM 4.5 G: 4; .5 INJECTION, POWDER, FOR SOLUTION INTRAVENOUS at 17:13

## 2023-10-05 RX ADMIN — HYDROMORPHONE HYDROCHLORIDE 1 MG: 1 INJECTION, SOLUTION INTRAMUSCULAR; INTRAVENOUS; SUBCUTANEOUS at 08:03

## 2023-10-05 RX ADMIN — SODIUM CHLORIDE 5 MG/HR: 9 INJECTION, SOLUTION INTRAVENOUS at 18:46

## 2023-10-05 RX ADMIN — POTASSIUM BICARBONATE 50 MEQ: 978 TABLET, EFFERVESCENT ORAL at 15:45

## 2023-10-05 RX ADMIN — IPRATROPIUM BROMIDE AND ALBUTEROL SULFATE 3 ML: 2.5; .5 SOLUTION RESPIRATORY (INHALATION) at 14:27

## 2023-10-05 RX ADMIN — ATORVASTATIN CALCIUM 40 MG: 40 TABLET, FILM COATED ORAL at 17:54

## 2023-10-05 RX ADMIN — SODIUM CHLORIDE 20 ML/HR: 3 INJECTION, SOLUTION INTRAVENOUS at 01:30

## 2023-10-05 RX ADMIN — INSULIN HUMAN 10 UNITS: 100 INJECTION, SUSPENSION SUBCUTANEOUS at 08:07

## 2023-10-05 RX ADMIN — AMPICILLIN AND SULBACTAM 3 G: 1; 2 INJECTION, POWDER, FOR SOLUTION INTRAMUSCULAR; INTRAVENOUS at 00:18

## 2023-10-05 RX ADMIN — MEMANTINE 5 MG: 5 TABLET ORAL at 05:21

## 2023-10-05 RX ADMIN — LABETALOL HYDROCHLORIDE 10 MG: 5 INJECTION, SOLUTION INTRAVENOUS at 08:32

## 2023-10-05 RX ADMIN — SENNOSIDES AND DOCUSATE SODIUM 2 TABLET: 50; 8.6 TABLET ORAL at 05:21

## 2023-10-05 ASSESSMENT — FIBROSIS 4 INDEX: FIB4 SCORE: 2

## 2023-10-05 ASSESSMENT — PAIN DESCRIPTION - PAIN TYPE
TYPE: ACUTE PAIN
TYPE: ACUTE PAIN

## 2023-10-05 NOTE — PROGRESS NOTES
Neurology Progress Note  Neurohospitalist Service, [facility]    Referring Physician: REAL Cage*      Interval History: No acute events overnight. Patient is awake but does not follow commands. Does have spontaneous movement in arms and legs but legs more than arms. Would not tract examiners finger but did have conjugate spontaneous eye movement past midline.     Review of systems: In addition to what is detailed in the HPI and/or updated in the interval history, all other systems reviewed and are negative.    Past Medical History, Past Surgical History and Social History reviewed and unchanged from prior    Medications:    Current Facility-Administered Medications:     insulin NPH (HumuLIN N,NovoLIN N) injection, 10 Units, Subcutaneous, BID INSULIN, Pal Serna M.D., 10 Units at 10/04/23 1751    losartan (Cozaar) tablet 75 mg, 75 mg, Enteral Tube, DAILY, Pal eSrna M.D., 75 mg at 10/05/23 0520    ipratropium-albuterol (DUONEB) nebulizer solution, 3 mL, Nebulization, Q6HRS (RT), Jacey BEYER Latona, 3 mL at 10/05/23 0658    sodium chloride 3% nebulizer solution 3 mL, 3 mL, Nebulization, Q6HRS (RT), Jacey BEYER Latnica, 3 mL at 10/05/23 0658    enalaprilat (Vasotec) injection 1.25 mg 1 mL, 1.25 mg, Intravenous, Q6HRS PRN, Jacey Villanueva    labetalol (Normodyne/Trandate) injection 10 mg, 10 mg, Intravenous, Q6HRS PRN, Jacey Villanueva    dexmedetomidine (PRECEDEX) 400 mcg/100mL NS premix infusion, 0.1-1.5 mcg/kg/hr (Ideal), Intravenous, Continuous, Pal Serna M.D., Stopped at 10/05/23 0356    HYDROmorphone (Dilaudid) injection 0.5-2 mg, 0.5-2 mg, Intravenous, Q HOUR PRN, Pal Serna M.D., 1 mg at 10/04/23 2207    HYDROmorphone (DILAUDID) 0.2 mg/mL in 50mL NS (Continuous Infusion), , Intravenous, Continuous, Pal Serna M.D., Stopped at 10/03/23 0553    amLODIPine (Norvasc) tablet 10 mg, 10 mg, Enteral Tube, DAILY, Pal Gallagher  KYA Medina, 10 mg at 10/05/23 0520    Pharmacy Consult: Enteral tube insertion - review meds/change route/product selection, 1 Each, Other, PHARMACY TO DOSE, Pal Serna M.D.    ampicillin/sulbactam (Unasyn) 3 g in  mL IVPB, 3 g, Intravenous, Q6HRS, Rafa Zhang M.D., Stopped at 10/05/23 0554    niCARdipine (Cardene) 25 mg in  mL Standard Infusion, 0-15 mg/hr, Intravenous, Continuous, Jacey Villanueva, Stopped at 10/04/23 0123    Respiratory Therapy Consult, , Nebulization, Continuous RT, Josiah Casiano M.D. MD Alert...ICU Electrolyte Replacement per Pharmacy, , Other, PHARMACY TO DOSE, Josiah Casiano M.D.    lidocaine (Xylocaine) 1 % injection 2 mL, 2 mL, Tracheal Tube, Q30 MIN PRN, Lizandro Greenwood M.D.    acetaminophen (Tylenol) tablet 650 mg, 650 mg, Enteral Tube, Q4HRS PRN, Lizandro Greenwood M.D., 650 mg at 10/03/23 2311    atorvastatin (Lipitor) tablet 40 mg, 40 mg, Enteral Tube, Q EVENING, Lizandro Greenwood M.D., 40 mg at 10/04/23 1748    memantine (Namenda) tablet 5 mg, 5 mg, Enteral Tube, BID, Lizandro Greenwood M.D., 5 mg at 10/05/23 0521    lansoprazole (Prevacid) solutab 30 mg, 30 mg, Enteral Tube, DAILY, Lizandro Greenwood M.D., 30 mg at 10/05/23 0520    ondansetron (Zofran ODT) dispertab 4 mg, 4 mg, Enteral Tube, Q4HRS PRN, Liznadro Greenwood M.D.    senna-docusate (Pericolace Or Senokot S) 8.6-50 MG per tablet 2 Tablet, 2 Tablet, Enteral Tube, BID, 2 Tablet at 10/05/23 0521 **AND** polyethylene glycol/lytes (Miralax) PACKET 1 Packet, 1 Packet, Enteral Tube, QDAY PRN, 1 Packet at 10/02/23 1800 **AND** magnesium hydroxide (Milk Of Magnesia) suspension 30 mL, 30 mL, Enteral Tube, QDAY PRN, 30 mL at 10/03/23 0822 **AND** bisacodyl (Dulcolax) suppository 10 mg, 10 mg, Rectal, QDAY PRN, Lizandro Greenwood M.D.    3% sodium chloride (Hypertonic Saline) 500mL infusion, 0-60 mL/hr, Intravenous, Continuous, Jacey Villanueva, Last Rate: 30 mL/hr at 10/05/23 0749, 30 mL/hr at  "10/05/23 0749    ondansetron (Zofran) syringe/vial injection 4 mg, 4 mg, Intravenous, Q4HRS PRN, Rhoda Argueta M.D., 4 mg at 09/29/23 0151    hydrALAZINE (Apresoline) injection 20 mg, 20 mg, Intravenous, Q6HRS PRN, Jacey BEYER Latona, 20 mg at 10/03/23 1739    insulin lispro (HumaLOG,AdmeLOG) injection, 2-9 Units, Subcutaneous, Q6HRS, Rhoda Argueta M.D., 3 Units at 10/05/23 0545    [DISCONTINUED] insulin regular (HumuLIN R,NovoLIN R) injection, 2-9 Units, Subcutaneous, Q6HRS, 6 Units at 09/25/23 0515 **AND** POC blood glucose manual result, , , Q6H **AND** NOTIFY MD and PharmD, , , Once **AND** Administer 20 grams of glucose (approximately 8 ounces of fruit juice) every 15 minutes PRN FSBG less than 70 mg/dL, , , PRN **AND** dextrose 50% (D50W) injection 25 g, 25 g, Intravenous, Q15 MIN PRN, Med Peña M.D.    Physical Examination:   BP (!) 160/70   Pulse 64   Temp 37.5 °C (99.5 °F) (Bladder)   Resp 19   Ht 1.829 m (6' 0.01\")   Wt (!) 134 kg (295 lb 10.2 oz)   SpO2 93%   BMI 40.09 kg/m²       General: Patient is awake and in no acute distress  Neck: There is normal range of motion  CV: Regular rate   Extremities:  Warm, dry, and intact, without peripheral lower extremity edema    NEUROLOGICAL EXAM:     Mental status: Remains intubated and nonverbal, however more alert with eyes open did not follow any commands on testing today but did have demonstrable spontaneous movements  Speech and language: Does not follow commands, intubated.  Cranial nerve exam: Pupils difficult to assess as patient had forced eye closure when attempting to test w/ light, briefly they appear equal and symmetric but overall poor ability to assess. Visual fields cannot be tested.  Face appears symmetric.  Facial sensation cannot be tested.  Motor exam: The patient withdraws minimally to physical stimulation in all 4 extremity.  Has intermittent minimal movement of left upper and lower extremity.  Sensory exam: React " minimally to physical and tactile stimulation.  Coordination: no gross ataxia noted on exam  Plantar reflexes: Equivocal  Gait: deferred        Objective Data:    Labs:  Lab Results   Component Value Date/Time    PROTHROMBTM 14.5 09/30/2023 01:50 PM    INR 1.12 09/30/2023 01:50 PM      Lab Results   Component Value Date/Time    WBC 13.7 (H) 10/05/2023 05:35 AM    RBC 4.06 (L) 10/05/2023 05:35 AM    HEMOGLOBIN 12.7 (L) 10/05/2023 05:35 AM    HEMATOCRIT 38.3 (L) 10/05/2023 05:35 AM    MCV 94.3 10/05/2023 05:35 AM    MCH 31.3 10/05/2023 05:35 AM    MCHC 33.2 10/05/2023 05:35 AM    MPV 11.0 10/05/2023 05:35 AM    NEUTSPOLYS 81.50 (H) 10/05/2023 05:35 AM    LYMPHOCYTES 11.40 (L) 10/05/2023 05:35 AM    MONOCYTES 4.20 10/05/2023 05:35 AM    EOSINOPHILS 2.10 10/05/2023 05:35 AM    BASOPHILS 0.30 10/05/2023 05:35 AM      Lab Results   Component Value Date/Time    SODIUM 149 (H) 10/05/2023 05:35 AM    POTASSIUM 3.9 10/05/2023 05:35 AM    CHLORIDE 110 10/05/2023 05:35 AM    CO2 29 10/05/2023 05:35 AM    GLUCOSE 281 (H) 10/05/2023 05:35 AM    BUN 22 10/05/2023 05:35 AM    CREATININE 0.76 10/05/2023 05:35 AM    CREATININE 1.0 05/13/2009 03:30 PM      Lab Results   Component Value Date/Time    CHOLSTRLTOT 118 09/30/2023 11:20 PM    LDL 57 09/30/2023 11:20 PM    HDL 12 (A) 09/30/2023 11:20 PM    TRIGLYCERIDE 247 (H) 09/30/2023 11:20 PM       Lab Results   Component Value Date/Time    ALKPHOSPHAT 97 10/02/2023 06:15 AM    ASTSGOT 27 10/02/2023 06:15 AM    ALTSGPT 17 10/02/2023 06:15 AM    TBILIRUBIN 0.8 10/02/2023 06:15 AM        Imaging/Testing:    I interpreted and/or reviewed the patient's neuroimaging    DX-CHEST-PORTABLE (1 VIEW)   Final Result         1.  Pulmonary edema and/or infiltrates are identified, which are stable since the prior exam.   2.  Cardiomegaly   3.  Atherosclerosis      US-EXTREMITY VENOUS UPPER UNILAT RIGHT   Final Result      DX-CHEST-PORTABLE (1 VIEW)   Final Result         1.  Pulmonary edema and/or  infiltrates are identified, which are stable since the prior exam.   2.  Cardiomegaly   3.  Atherosclerosis      DX-CHEST-PORTABLE (1 VIEW)   Final Result         1.  Pulmonary edema and/or infiltrates are identified, which are stable since the prior exam.   2.  Cardiomegaly   3.  Atherosclerosis      DX-CHEST-PORTABLE (1 VIEW)   Final Result         1.  Pulmonary edema and/or infiltrates are identified, which are somewhat decreased since the prior exam.   2.  Cardiomegaly   3.  Atherosclerosis      MR-MRA HEAD-W/O   Final Result         Normal intracranial MRA.      MR-BRAIN-WITH & W/O   Final Result         Left frontal lobe hemorrhage with surrounding vasogenic edema and mild mass effect upon the frontal horn of the left lateral ventricle. There is approximately 2 mm of midline shift to the right.      Age-related volume loss and chronic microvascular ischemic changes.      No abnormal intracranial enhancement is identified.      CT-HEAD W/O   Final Result         1. Stable 6 cm left frontal intraparenchymal hemorrhage with mild, 2 mm left-to-right subfalcine herniation.   2. No new abnormality or change.         DX-CHEST-PORTABLE (1 VIEW)   Final Result         1.  Pulmonary edema and/or infiltrates are identified, which appear somewhat increased since the prior exam.   2.  Cardiomegaly   3.  Atherosclerosis      DX-ABDOMEN FOR TUBE PLACEMENT   Final Result      NG tube extends below the diaphragm and appears to extend through the region of the stomach with tip at the level of the proximal duodenum.      DX-CHEST-PORTABLE (1 VIEW)   Final Result      1.  Grossly satisfactory appearance of tubes and lines.   2.  No evidence of pneumothorax.   3.  Marked hypoinflation with probable bibasilar atelectasis.   4.  Enlarged cardiac silhouette with vascular congestion.      CT-HEAD W/O   Final Result         1.  New large parenchymal hemorrhage involving the frontal lobe is identified with some surrounding edema.       2.  Subarachnoid hemorrhage is also noted in the area.      3.  Minimal midline shift to the right side.      Findings are consistent with atrophy.  Decreased attenuation in the periventricular white matter likely indicates microvascular ischemic disease.      Based solely on CT findings, the brain injury guideline category is mBIG 3.      EDH   IVH   Displaced skull fx   SDH > 8mm   IPH > 8mm or multiple   SAH bi-hemispheric or > 3mm      The original BIG retrospective analysis found radiographic progression in 0% of BIG 1 patients and 2.6% BIG 2.      These findings were discussed with GEETA MOLINA on 09/30/2023.      DX-CHEST-PORTABLE (1 VIEW)   Final Result      Stable multiple pulmonary vascular dilation/congestion. Otherwise, negative.      DX-CHEST-PORTABLE (1 VIEW)   Final Result      1.  Increasing airspace disease at the left base.      DX-CHEST-LIMITED (1 VIEW)   Final Result         1.  Pulmonary edema and/or infiltrates are identified, which are somewhat decreased since the prior exam.   2.  Cardiomegaly   3.  Atherosclerosis      DX-CHEST-PORTABLE (1 VIEW)   Final Result         1.  Pulmonary edema and/or infiltrates are identified, which appear somewhat increased since the prior exam.   2.  Cardiomegaly   3.  Atherosclerosis      DX-CHEST-PORTABLE (1 VIEW)   Final Result         1.  Interstitial edema and/or infiltrates, slightly decreased since prior study.   2.  Cardiomegaly   3.  Atherosclerosis      DX-CHEST-LIMITED (1 VIEW)   Final Result      Right IJ catheter tip projects in the SVC. No pneumothorax.      CT-CTA NECK WITH & W/O-POST PROCESSING   Final Result      1.  Estimated 50-75% stenosis of the BILATERAL carotid arteries   2.  Estimated greater than 50% stenosis at the vertebral artery ostium on each side      CT-CTA HEAD WITH & W/O-POST PROCESS   Final Result      1.  No large vessel occlusion or aneurysm.   2.  Hazy left frontal lobe subarachnoid hemorrhage, decreased in conspicuity  from prior. No new hemorrhage seen.   3.  Scattered opacifications of the ethmoid sinuses, likely related to support hardware.      DX-ABDOMEN FOR TUBE PLACEMENT   Final Result         1.  Nonspecific bowel gas pattern in the upper abdomen.   2.  Dobbhoff tube with tip terminating overlying the expected location of the first or second duodenal segment.      DX-CHEST-PORTABLE (1 VIEW)   Final Result         1.  Interstitial edema and/or infiltrates.   2.  Cardiomegaly   3.  Nasogastric tube terminates in the distal esophagus, recommend advancement   4.  Atherosclerosis      DX-ABDOMEN FOR TUBE PLACEMENT   Final Result         1.  Nonspecific bowel gas pattern in the upper abdomen.   2.  Nasogastric tube terminates just distal to the gastroesophageal junction, recommend advancement      CT-RENAL COLIC EVALUATION(A/P W/O)   Final Result         1.  Intrahepatic biliary air and nondependent air within the gallbladder, consider history of instrumentation or changes of cholangitis in the appropriate clinical setting.   2.  Large fat-containing bilateral inguinal hernias   3.  Enlarged prostate, consider causes of prostate enlargement with additional workup as clinically appropriate   4.  Diverticulosis   5.  Cholelithiasis   6.  Atherosclerosis      CT-HEAD W/O   Final Result         1.  Hazy left frontal subarachnoid hemorrhage.   2.  Nonspecific white matter changes, commonly associated with small vessel ischemic disease.  Associated mild cerebral atrophy is noted.      Based solely on CT findings, the brain injury guideline category is mBIG 1.      SDH < 4mm   IPH < 4mm   SAH < 3 sulci and < 1mm      The original BIG retrospective analysis found radiographic progression in 0% of BIG 1 patients and 2.6% BIG 2.      These findings were discussed with the patient's clinician, Madhu Zambrano, on 9/24/2023 10:10 PM.      DX-CHEST-PORTABLE (1 VIEW)   Final Result      No acute cardiopulmonary abnormality identified.           Assessment and Plan:  Sharon Guardado is a 68 y.o. w/ hx of alcohol abuse, coagulopathy and thrombocytopenia who was initially admitted on 9/24/2023 due to urosepsis and possible alcohol withdrawal.  He had a brain CT at that time which revealed hazy left frontal subarachnoid hemorrhage which was felt to be traumatic.  He was initially intubated and subsequently was extubated on 9/28/23.  While in the hospital he became less responsive and brain CT on 9/30/2023 revealed large left frontal intraparenchymal hemorrhage.  He was on Lovenox for DVT prevention which was reversed. CTA of the head and neck at the time of admission did not reveal evidence of vascular malformation. Brain MRI on 10/1/2023 with no evidence of enhancing lesion.  MRA of the head without contrast on 10/1/2023 with no vascular lesion.        Plan:  - Continue close neuro monitoring and neuro check every 2 hours in ICU.  - Maintain systolic blood pressure 100-160.  - Continue hyperosmolar therapy with goal of sodium 150-160, current Na 149  - Maintain bowel regimen to avoid Valsalva and increased intracranial pressure.  - Neurosurgery on board, no surgical intervention deemed necessary at this point.  - Will continue to work w/ ICU for SBT in preparation for extubation; failed today's assessments in the morning  - Not a candidate for PT, OT or SLP at this time.  - DVT prevention with SCDs.    I have performed a physical exam and reviewed and updated ROS and Plan today (10/5/2023).     Harvinder Mondragon III, MD  Vascular Neurology, [facility]

## 2023-10-05 NOTE — PROGRESS NOTES
Critical Care Progress Note    Date of admission  9/24/2023    Chief Complaint  68 y.o. male admitted 9/24/2023 with urosepsis.  He has a history of DM type II, primary hypertension, alcohol dependence and obesity.    Hospital Course      9/25: surgery was consulted Dr Hirsch. Continue resuscitation with IVF, antibiotics, If worsens, consider jamila tube. Patient was intubated acutely overnight.  9/26: requiring high levels of sedatives. Had to start a second vasopressor. Failed SAT and SBT due to agitation. Vasopressors are weaning down. Will start dvt chemical prophylaxis tonight.  9/27: changed propofol to precedex, hopes to move towards extubation. Consider tube feeds if  not extubated  9/28: he is now following commands, maximizing status for extubation.    9/29-9/30: Transferred to floor, under care with hospitalist.    9/30: patient was found with AMS, suspected ETOH withdrawal, s/p benzos. CT head showed parenchymal hemorrhage. Transferred to ICU. Intubated, CVC placed, vascular neurology and neurosurgery consulted    10/1 - Will work on extubation.  23% saline bolus.  Palliative consutled.  Fevers - reculture and empiric unasyn    10/2 -    vent day 3.  Force diuresis.  SAT/SBT.  Increase amlodipine.  10/3 -    vent day 4.  Force diuresis.  SAT/SBT.  10/4 -    vent day 5.  Force diuresis.  SAT/SBT.  Continue amlodipine, 10 mg daily.  Increase losartan, 75 mg daily.  Begin NPH, 10 units twice daily.  10/5 -    vent day 6.  Force diuresis.  SAT/SBT.  Continue amlodipine.  Increase losartan, 100 mg daily.  Stop NPH and sliding scale insulin.  Begin insulin drip.  Culture blood, sputum and check UA.  Stop Unasyn.  Begin empiric Zosyn and vancomycin.  Check RUQ ultrasound.  10/6 -    vent day 7.  Forced diuresis.  SAT/SBT.  Continue amlodipine and losartan.  Continue insulin drip.  Begin salt tabs, 1 g every 8 hours.  Surgery opinion regarding cholecystostomy tube.      Interval Problem Update  Reviewed last 24  hour events:      SR  100.2  -975 mL in the last 24      Review of Systems  Review of Systems   Unable to perform ROS: Acuity of condition        Vital Signs for last 24 hours   Pulse:  [54-91] 59  Resp:  [15-30] 18  BP: (119-179)/(57-72) 150/66  SpO2:  [92 %-99 %] 95 %    Hemodynamic parameters for last 24 hours       Respiratory Information for the last 24 hours  Vent Mode: Spont  Rate (breaths/min): 18  Vt Target (mL): 470  PEEP/CPAP: 8  P Support: 5  MAP: 10  Control VTE (exp VT): 558    Physical Exam   Physical Exam  Constitutional:       Appearance: He is obese.      Comments: On ventilator   Eyes:      Pupils: Pupils are equal, round, and reactive to light.   Cardiovascular:      Comments: Sinus rhythm  Pulmonary:      Breath sounds: Rales (Improved crackles) present. No wheezing.   Abdominal:      General: There is no distension.      Tenderness: There is no abdominal tenderness.      Comments: Tolerating enteral tube feedings   Musculoskeletal:      Right lower leg: Edema present.      Left lower leg: Edema present.   Skin:     General: Skin is warm.   Neurological:      Comments: Weak in the right arm and right leg.  Intermittently follows.         Medications  Current Facility-Administered Medications   Medication Dose Route Frequency Provider Last Rate Last Admin    senna-docusate (Pericolace Or Senokot S) 8.6-50 MG per tablet 2 Tablet  2 Tablet Enteral Tube BID Jacey L. Latona   2 Tablet at 10/06/23 0528    And    polyethylene glycol/lytes (Miralax) PACKET 1 Packet  1 Packet Enteral Tube DAILY Jacey L. Latona   1 Packet at 10/06/23 0530    And    magnesium hydroxide (Milk Of Magnesia) suspension 30 mL  30 mL Enteral Tube QDAY PRN Jacey L. Latona        And    bisacodyl (Dulcolax) suppository 10 mg  10 mg Rectal QDAY PRN Jacey L. Latona        sodium chloride (Salt) tablet 1 g  1 g Enteral Tube Q8HRS Pal Serna M.D.        furosemide (Lasix) injection 40 mg  40 mg Intravenous Once Pal Ortiz  Imtiaz Medina M.D.        potassium bicarbonate (Klyte) effervescent tablet 50 mEq  50 mEq Enteral Tube Once Pal Serna M.D.        insulin regular (Humulin R) 100 Units in  mL Infusion  0-29 Units/hr Intravenous Continuous Pal Serna M.D. 5 mL/hr at 10/06/23 1254 5 Units/hr at 10/06/23 1254    dextrose 50% (D50W) injection 12.5-25 g  12.5-25 g Intravenous PRN Pal Serna M.D.        losartan (Cozaar) tablet 100 mg  100 mg Enteral Tube DAILY Pal Serna M.D.   100 mg at 10/06/23 0528    piperacillin-tazobactam (Zosyn) 4.5 g in  mL IVPB  4.5 g Intravenous Q8HRS Pal Serna M.D. 25 mL/hr at 10/06/23 1304 4.5 g at 10/06/23 1304    MD Alert...Vancomycin per Pharmacy   Other PHARMACY TO DOSE Pal Serna M.D.        vancomycin (Vancocin) 2,000 mg in  mL IVPB  2,000 mg Intravenous Q12HR Pal Serna M.D.   Stopped at 10/06/23 0940    ipratropium-albuterol (DUONEB) nebulizer solution  3 mL Nebulization Q6HRS (RT) Jacey L. Latona   3 mL at 10/06/23 0718    enalaprilat (Vasotec) injection 1.25 mg 1 mL  1.25 mg Intravenous Q6HRS PRN Jacey L. Latona        labetalol (Normodyne/Trandate) injection 10 mg  10 mg Intravenous Q6HRS PRN Jacey L. Latona   10 mg at 10/05/23 0832    dexmedetomidine (PRECEDEX) 400 mcg/100mL NS premix infusion  0.1-1.5 mcg/kg/hr (Ideal) Intravenous Continuous Pal Serna M.D.   Stopped at 10/06/23 0358    HYDROmorphone (Dilaudid) injection 0.5-2 mg  0.5-2 mg Intravenous Q HOUR PRN Pal Serna M.D.   2 mg at 10/05/23 1547    HYDROmorphone (DILAUDID) 0.2 mg/mL in 50mL NS (Continuous Infusion)   Intravenous Continuous Pal Serna M.D.   Stopped at 10/03/23 0553    amLODIPine (Norvasc) tablet 10 mg  10 mg Enteral Tube DAILY Pal Serna M.D.   10 mg at 10/06/23 0529    Pharmacy Consult: Enteral tube insertion - review meds/change route/product selection  1 Each Other  PHARMACY TO DOSE Pal Serna M.D.        niCARdipine (Cardene) 25 mg in  mL Standard Infusion  0-15 mg/hr Intravenous Continuous Jacey L. Latona   Stopped at 10/06/23 0343    Respiratory Therapy Consult   Nebulization Continuous RT Josiah Casiano M.D.        MD Alert...ICU Electrolyte Replacement per Pharmacy   Other PHARMACY TO DOSE Josiah Casiano M.D.        lidocaine (Xylocaine) 1 % injection 2 mL  2 mL Tracheal Tube Q30 MIN PRN Lizandro Greenwood M.D.        acetaminophen (Tylenol) tablet 650 mg  650 mg Enteral Tube Q4HRS PRN Lizandro Greenwood M.D.   650 mg at 10/03/23 2311    atorvastatin (Lipitor) tablet 40 mg  40 mg Enteral Tube Q EVENING Lizandro Greenwood M.D.   40 mg at 10/05/23 1754    memantine (Namenda) tablet 5 mg  5 mg Enteral Tube BID Lizandro Greenwood M.D.   5 mg at 10/06/23 0530    lansoprazole (Prevacid) solutab 30 mg  30 mg Enteral Tube DAILY Lizandro Greenwood M.D.   30 mg at 10/06/23 0529    ondansetron (Zofran ODT) dispertab 4 mg  4 mg Enteral Tube Q4HRS PRN Lizandro Greenwood M.D.        3% sodium chloride (Hypertonic Saline) 500mL infusion  0-60 mL/hr Intravenous Continuous Jacey L. Latona 50 mL/hr at 10/06/23 0811 50 mL/hr at 10/06/23 0811    ondansetron (Zofran) syringe/vial injection 4 mg  4 mg Intravenous Q4HRS PRN Rhoda Argueta M.D.   4 mg at 09/29/23 0151    hydrALAZINE (Apresoline) injection 20 mg  20 mg Intravenous Q6HRS PRN Jacey L. Latona   20 mg at 10/05/23 1007       Fluids    Intake/Output Summary (Last 24 hours) at 10/6/2023 1318  Last data filed at 10/6/2023 1200  Gross per 24 hour   Intake 5469.39 ml   Output 5580 ml   Net -110.61 ml       Laboratory  Recent Labs     10/04/23  0344 10/05/23  0315 10/06/23  0345   ISTATAPH 7.493 7.449 7.453   ISTATAPCO2 39.9* 45.9* 40.0*   ISTATAPO2 87 83 74   ISTATATCO2 32 33 29   [id number] 97 97 95   ISTATARTHCO3 30.7* 31.9* 28.0*   ISTATARTBE 7* 7* 4*   ISTATTEMP 99.0 F 99.3 F 100.0 F   ISTATFIO2 50 40 50    ISTATSPEC Arterial Arterial Arterial   ISTATAPHTC 7.490 7.443 7.442   [id number] 88* 86 78         Recent Labs     10/04/23  0540 10/04/23  1155 10/05/23  0535 10/05/23  1220 10/05/23  1800 10/06/23  0016 10/06/23  0545   SODIUM 153*   < > 149*   < > 150* 148* 147*  147*   POTASSIUM 3.6  --  3.9  --   --   --  4.1  4.1   CHLORIDE 117*  --  110  --   --   --  110  110   CO2 29  --  29  --   --   --  30  30   BUN 23*  --  22  --   --   --  25*  25*   CREATININE 0.76  --  0.76  --   --   --  0.77  0.77   MAGNESIUM 1.9  --  2.0  --   --   --  2.0   PHOSPHORUS 2.8  --  3.0  --   --   --  2.7   CALCIUM 9.0  --  8.9  --   --   --  9.0  9.0    < > = values in this interval not displayed.     Recent Labs     10/04/23  0540 10/05/23  0535 10/06/23  0545   ALTSGPT  --   --  34   ASTSGOT  --   --  43   ALKPHOSPHAT  --   --  153*   TBILIRUBIN  --   --  0.9   GLUCOSE 261* 281* 249*  249*     Recent Labs     10/04/23  0540 10/05/23  0535 10/06/23  0545   WBC 12.1* 13.7* 12.8*   NEUTSPOLYS 81.00* 81.50* 82.00*   LYMPHOCYTES 11.90* 11.40* 10.10*   MONOCYTES 5.00 4.20 4.80   EOSINOPHILS 1.20 2.10 2.30   BASOPHILS 0.20 0.30 0.40   ASTSGOT  --   --  43   ALTSGPT  --   --  34   ALKPHOSPHAT  --   --  153*   TBILIRUBIN  --   --  0.9     Recent Labs     10/04/23  0540 10/05/23  0535 10/06/23  0545   RBC 4.09* 4.06* 4.09*   HEMOGLOBIN 12.4* 12.7* 12.7*   HEMATOCRIT 38.2* 38.3* 38.6*   PLATELETCT 229 232 238       Imaging  X-Ray:  I have personally reviewed the images and compared with prior images. and My impression is: Improved left lower lobe opacification    Assessment/Plan  * Intraparenchymal hemorrhage of brain (HCC)- (present on admission)  Assessment & Plan  CT on 9/24 with hazy left frontal SAH  CT on 9/30 with new large IPH in the left frontal lobe  Strict blood pressure control with goal SBP less than 160  Goal sodium 150-155  Neuro checks every 2 hours    Acute respiratory failure with hypoxia (HCC)- (present on  admission)  Assessment & Plan  Intubated 9/25-9/28  Reintubated 9/30  ABCDEF bundle  SAT/SBT as appropriate  Mobility level 2    Leukocytosis  Assessment & Plan  Blood cultures negative so far from 10/5  Negative MRSA PCR  Continue empiric Zosyn and vancomycin    Pneumonia due to infectious organism- (present on admission)  Assessment & Plan  Continue Zosyn and vancomycin    Pneumobilia- (present on admission)  Assessment & Plan  Dr. Hirsch consulted on admission - surgery has subsequently signed off  Completed 5 days of ceftriaxone and metronidazole on 9/28  RUQ ultrasound with minimal gallbladder wall thickening and possible sludge and minimal pericholecystic fluid  We will get surgery opinion regarding cholecystostomy tube  Continue empiric Zosyn and vancomycin    Type 2 diabetes mellitus (HCC)- (present on admission)  Assessment & Plan  Glycohemoglobin 6.8  I am titrating an insulin drip to achieve glycemic control    Primary hypertension- (present on admission)  Assessment & Plan  Goal SBP less than 160  Continue amlodipine, 10 mg daily  Continue losartan, 100 mg daily  I am titrating nicardipine to achieve blood pressure goals    Alcohol dependence (HCC)- (present on admission)  Assessment & Plan  Cessation counseling when clinically appropriate    BMI 40.0-44.9, adult (HCC)- (present on admission)  Assessment & Plan            VTE:  Contraindicated  Ulcer: PPI  Lines: Central Line  Ongoing indication addressed and Joseph Catheter  Ongoing indication addressed    I have performed a physical exam and reviewed and updated ROS and Plan today (10/6/2023). In review of yesterday's note (10/5/2023), there are no changes except as documented above.     I have assessed and reassessed his respiratory status with spontaneous breathing trials and ventilator adjustments, airway mechanics, ventilator waveforms, blood pressure, hemodynamics, cardiovascular status, serial determinations of serum sodium with titration of  hypertonic saline, serial determinations of blood glucose with titration of insulin drip and his neurologic status.  He is at increased risk for worsening respiratory, cardiovascular, endocrine/metabolic and CNS system dysfunction.    Discussed patient condition and risk of morbidity and/or mortality with RN, RT, Pharmacy, Charge nurse / hot rounds, and QA team    The patient remains critically ill.  Critical care time = 105 minutes in directly providing and coordinating critical care and extensive data review.  No time overlap and excludes procedures.    Pal Serna MD  Pulmonary and Critical Care Medicine

## 2023-10-05 NOTE — CARE PLAN
Problem: Ventilation  Goal: Ability to achieve and maintain unassisted ventilation or tolerate decreased levels of ventilator support  Description: Target End Date:  4 days     Document on Vent flowsheet    1.  Support and monitor invasive and noninvasive mechanical ventilation  2.  Monitor ventilator weaning response  3.  Perform ventilator associated pneumonia prevention interventions  4.  Manage ventilation therapy by monitoring diagnostic test results  10/5/2023 0017 by Shaggy Taylor RRT  Outcome: Progressing     Problem: Bronchoconstriction  Goal: Improve in air movement and diminished wheezing  Description: Target End Date:  2 to 3 days    1.  Implement inhaled treatments  2.  Evaluate and manage medication effects  Outcome: Progressing     Problem: Bronchopulmonary Hygiene  Goal: Increase mobilization of retained secretions  Description: Target End Date:  2 to 3 days    1.  Perform bronchopulmonary therapy as indicated by assessment  2.  Perform airway suctioning  3.  Perform actions to maintain patient airway  Outcome: Progressing     Ventilator Daily Summary    Vent Day # 6    Airway: 8@26    Ventilator settings: 18/470+8@40%    Weaning trials: tolerated +12hours SPONT during day    Respiratory Procedures: none    Plan: Continue current ventilator settings and wean mechanical ventilation as tolerated per physician orders.      Respiratory Update    Treatment modality: Duo & 3%  Frequency: Q6    Pt tolerating current treatments well with no adverse reactions.

## 2023-10-05 NOTE — PROGRESS NOTES
0100  - notified RAMO Mari of patients sodium level, per APRN restart 3% gtt at 20 mL/hr    0654 - notified Dr. Serna of patients sodium level this AM, no new orders received at this time

## 2023-10-05 NOTE — CARE PLAN
Problem: Fall Risk  Goal: Patient will remain free from falls  Outcome: Progressing     Problem: Pain - Standard  Goal: Alleviation of pain or a reduction in pain to the patient’s comfort goal  Outcome: Progressing     Problem: Safety - Medical Restraint  Goal: Remains free of injury from restraints (Restraint for Interference with Medical Device)  Outcome: Progressing  Goal: Free from restraint(s) (Restraint for Interference with Medical Device)  Outcome: Progressing     The patient is Watcher - Medium risk of patient condition declining or worsening    Shift Goals  Clinical Goals: Q2 neuros, -160, stable neuros  Patient Goals: DEENA  Family Goals: Updates    Progress made toward(s) clinical / shift goals:  stable neuro exams at baseline    Patient is not progressing towards the following goals: n/a

## 2023-10-05 NOTE — ASSESSMENT & PLAN NOTE
Blood cultures negative so far from 10/5  Negative MRSA PCR - stop vancomycin  Continue empiric Zosyn

## 2023-10-05 NOTE — PROCEDURES
Date of Procedure:  10/5/2023    Title of Procedure:  Diagnostic and therapeutic flexible fiberoptic bronchoscopy with bronchoalveolar lavage    Indication for Procedure:   Atelectasis    Post-procedure Diagnoses:    1.  Normal endobronchial anatomy  2.  No endobronchial tumor identified  3.  Moderate amount of juicy white secretions in the distal trachea, left mainstem bronchus, right mainstem bronchus, left lower lobe bronchi    Narrative:    A time out was performed identifying the correct patient, correct procedure and correct location prior to this procedure.    The patient was sedated, intubated and ventilated at the time of this procedure.  The flexible fiberoptic bronchoscope was inserted through the lumen of the endotracheal tube and advanced into the distal trachea without difficulty.  The airways were examined to the subsegmental bronchus level bilaterally.    The endobronchial anatomy was normal.  No tumor was identified.    There was a moderate amount of juicy white secretions seen in the distal trachea, left mainstem bronchus, right mainstem bronchus and left lower lobe bronchi.  I therapeutically suctioned these secretions.    Bronchoalveolar lavage was carried out in the basilar segments of the left lower lobe bronchi using the standard technique with good fluid return.    Bronchoalveolar lavage fluid from the left lower lobe is submitted to the laboratory for gram stain, culture and sensitivity.    The patient tolerated the procedure quite nicely.  No complications were apparent.  The heart rate and rhythm, blood pressure and oxygenation saturation were continuously monitored.    This was an EMERGENT procedure.  It was medically necessary to perform this procedure emergently to prevent life threatening deterioration.      Pal Serna MD  Pulmonary and Critical Care Medicine

## 2023-10-05 NOTE — PROGRESS NOTES
Pharmacy Vancomycin Kinetics Note for 10/5/2023     68 y.o. male on Vancomycin day # 1     Vancomycin Indication (AUC Dosing): S. aureus pneumonia    Provider specified end date: 10/08/23    Active Antibiotics (From admission, onward)      Ordered     Ordering Provider       Thu Oct 5, 2023  3:37 PM    10/05/23 1537  vancomycin (Vancocin) 3 g in  mL IVPB  (vancomycin (VANCOCIN) IV (LD + Maintenance))  ONCE         Pal Serna M.D.       Thu Oct 5, 2023  2:42 PM    10/05/23 1442  MD Alert...Vancomycin per Pharmacy  PHARMACY TO DOSE        Question:  Indication(s) for vancomycin?  Answer:  Unknown source of infection    Pal Serna M.D.    10/05/23 1442  piperacillin-tazobactam (Zosyn) 4.5 g in  mL IVPB  (piperacillin-tazobactam (ZOSYN) IV (Extended-infusion) PANEL )  ONCE        See Hyperspace for full Linked Orders Report.    Pal Serna M.D.    10/05/23 1442  piperacillin-tazobactam (Zosyn) 4.5 g in  mL IVPB  (piperacillin-tazobactam (ZOSYN) IV (Extended-infusion) PANEL )  EVERY 8 HOURS        See Hyperspace for full Linked Orders Report.    Pal Serna M.D.            Dosing Weight: 134 kg (295 lb 6.7 oz)      Admission History: Admitted on 9/24/2023 for Severe sepsis (HCC) [A41.9, R65.20]  Pertinent history: Patient admittted with ICH requiring mechanical ventilation. Concern for pneumonia.    Allergies:     Ativan, Hydrocodone, and Lorazepam     Pertinent cultures to date:     Results       Procedure Component Value Units Date/Time    QUANT BRONCHIAL WASHING CULTURE [[id number]] Collected: 10/05/23 1535    Order Status: No result Specimen: Respirate Updated: 10/05/23 1643     Significant Indicator NEG     Source RESP     Site BAL-LLL     Culture Result -     Gram Stain Result -    Narrative:      Collected By: [id number] JOSE MOYA  Release to patient->Immediate  Which Lobe (Bronch Only):->LLL  Collected By: [id number] JOSE MOYA    MRSA By PCR  "(Amp) [[id number]]     Order Status: No result Specimen: Respirate from Nares     CULTURE RESPIRATORY W/ GRM STN [[id number]] Collected: 10/05/23 1535    Order Status: Canceled Specimen: Other from Bronchoalveolar Lavage     BLOOD CULTURE [[id number]]     Order Status: Sent Specimen: Blood from Peripheral     URINALYSIS [[id number]]     Order Status: No result Specimen: Urine, Joseph Cath     BLOOD CULTURE [[id number]]     Order Status: Sent Specimen: Blood from Peripheral     BLOOD CULTURE [[id number]] Collected: 10/01/23 1225    Order Status: Completed Specimen: Blood from Peripheral Updated: 10/02/23 0646     Significant Indicator NEG     Source BLD     Site PERIPHERAL     Culture Result No Growth  Note: Blood cultures are incubated for 5 days and  are monitored continuously.Positive blood cultures  are called to the RN and reported as soon as  they are identified.      Narrative:      Per Hospital Policy: Only change Specimen Src: to \"Line\" if  specified by physician order.  Release to patient->Immediate  Left Forearm/Arm    BLOOD CULTURE [[id number]] Collected: 10/01/23 1216    Order Status: Completed Specimen: Blood from Peripheral Updated: 10/02/23 0646     Significant Indicator NEG     Source BLD     Site PERIPHERAL     Culture Result No Growth  Note: Blood cultures are incubated for 5 days and  are monitored continuously.Positive blood cultures  are called to the RN and reported as soon as  they are identified.      Narrative:      Per Hospital Policy: Only change Specimen Src: to \"Line\" if  specified by physician order.  Release to patient->Immediate  Right Forearm/Arm    URINALYSIS [[id number]]  (Abnormal) Collected: 09/30/23 1340    Order Status: Completed Specimen: Urine, Joseph Cath Updated: 09/30/23 1427     Color DK Yellow     Character Clear     Specific Gravity 1.021     Ph 6.5     Glucose Negative mg/dL      Ketones 15 mg/dL      Protein Negative mg/dL      Bilirubin Negative     Urobilinogen, Urine 4.0     " "Nitrite Negative     Leukocyte Esterase Trace     Occult Blood Small     Micro Urine Req Microscopic    Narrative:      Collected By: [email or web address] COLLETTE NOBLE  Release to patient->Immediate    Blood Culture [[id number]] Collected: 09/24/23 1834    Order Status: Completed Specimen: Blood from Peripheral Updated: 09/29/23 2100     Significant Indicator NEG     Source BLD     Site PERIPHERAL     Culture Result No growth after 5 days of incubation.    Narrative:      From different peripheral sites, if not done within the last  24 hours (Per Hospital Policy: Only change specimen source to  \"Line\" if specified by physician order)  Release to patient->Immediate  Right AC    Blood Culture [[id number]] Collected: 09/24/23 1837    Order Status: Completed Specimen: Blood from Peripheral Updated: 09/29/23 2100     Significant Indicator NEG     Source BLD     Site PERIPHERAL     Culture Result No growth after 5 days of incubation.    Narrative:      From different peripheral sites, if not done within the last  24 hours (Per Hospital Policy: Only change specimen source to  \"Line\" if specified by physician order)  Release to patient->Immediate  Left Hand    Culture Respiratory W/ GRM STN [[id number]] Collected: 09/25/23 0350    Order Status: Completed Specimen: Respirate from Sputum Updated: 09/27/23 0848     Significant Indicator NEG     Source RESP     Site BAL RLL     Culture Result Rare growth usual upper respiratory yamini  No clinically significant Staphylococcus aureus, Methicillin  Resistant Staphylococcus aureus, or Pseudomonas species  isolated.       Gram Stain Result Rare WBCs.  No organisms seen.      Narrative:      Sputum cultures, induced if needed. If not done within the  last 24 hours  Sputum cultures, induced if needed. If not done within the    Urine Culture [[id number]] Collected: 09/24/23 2240    Order Status: Completed Specimen: Urine Updated: 09/26/23 0715     Significant Indicator NEG     Source UR     Site " -     Culture Result No growth at 48 hours.    Narrative:      If not done within the last 24 hours  Release to patient->Immediate  Indication for culture:->Evaluation for sepsis without a  clear source of infection  Indication for culture:->Evaluation for sepsis without a    GRAM STAIN [[id number]] Collected: 09/25/23 0350    Order Status: Completed Specimen: Respirate Updated: 09/25/23 1557     Significant Indicator .     Source RESP     Site BAL RLL     Gram Stain Result Rare WBCs.  No organisms seen.      Narrative:      Sputum cultures, induced if needed. If not done within the  last 24 hours  Sputum cultures, induced if needed. If not done within the    Urinalysis [[id number]]  (Abnormal) Collected: 09/24/23 2240    Order Status: Completed Specimen: Blood Updated: 09/24/23 2316     Color Yellow     Character Clear     Specific Gravity 1.022     Ph 5.5     Glucose Negative mg/dL      Ketones Trace mg/dL      Protein 30 mg/dL      Bilirubin Negative     Urobilinogen, Urine 1.0     Nitrite Positive     Leukocyte Esterase Moderate     Occult Blood Moderate     Micro Urine Req Microscopic    Narrative:      If not done within the last 24 hours  Release to patient->Immediate  Indication for culture:->Evaluation for sepsis without a  clear source of infection    CoV-2, Flu A/B, And RSV by PCR ([employer]) [[id number]] Collected: 09/24/23 1906    Order Status: Completed Specimen: Respirate Updated: 09/24/23 2001     Influenza virus A RNA Negative     Influenza virus B, PCR Negative     RSV, PCR Negative     SARS-CoV-2 by PCR NotDetected     Comment: [facility] providers: PLEASE REFER TO DE-ESCALATION AND RETESTING PROTOCOL  on [email or web address]    **The [employer] GeneXpert Xpress SARS-CoV-2 RT-PCR Test has been made  available for use under the Emergency Use Authorization (EUA) only.          SARS-CoV-2 Source NP Swab    Narrative:      Release to patient->Immediate    Urinalysis [[id number]]  (Abnormal) Collected: 09/24/23 1906     "Order Status: Completed Specimen: Urine Updated: 09/24/23 1930     Color Yellow     Character Clear     Specific Gravity 1.021     Ph 5.5     Glucose Negative mg/dL      Ketones 15 mg/dL      Protein 30 mg/dL      Bilirubin Negative     Urobilinogen, Urine 1.0     Nitrite Positive     Leukocyte Esterase Moderate     Occult Blood Large     Micro Urine Req Microscopic    Narrative:      Release to patient->Immediate            Labs:     Estimated Creatinine Clearance: 127.9 mL/min (by C-G formula based on SCr of 0.76 mg/dL).  Recent Labs     10/03/23  0555 10/04/23  0540 10/05/23  0535   WBC 9.6 12.1* 13.7*   NEUTSPOLYS 76.40* 81.00* 81.50*     Recent Labs     10/03/23  0555 10/04/23  0540 10/05/23  0535   BUN 19 23* 22   CREATININE 0.75 0.76 0.76       Intake/Output Summary (Last 24 hours) at 10/5/2023 1647  Last data filed at 10/5/2023 1500  Gross per 24 hour   Intake 2365.06 ml   Output 5350 ml   Net -2984.94 ml      BP (!) 155/67   Pulse 90   Temp 37.5 °C (99.5 °F) (Bladder)   Resp (!) 29   Ht 1.829 m (6' 0.01\")   Wt (!) 134 kg (295 lb 10.2 oz)   SpO2 99%  No data recorded.      List concerns for Vancomycin clearance:     Obesity    Pharmacokinetics:     AUC kinetics:   Ke (hr ^-1): 0.1106 hr^-1  Half life: 6.27 hr  Clearance: 9.633  Estimated TDD: 4816.5  Estimated Dose: 1666  Estimated interval: 8.3    A/P:     -  Vancomycin dose: 2,000mg IV q12h    -  Next vancomycin level(s): steady state    -  Predicted vancomycin AUC from initial AUC test calculator: 415 mg·hr/L    -  Comments: Empiric twice daily dosing with obesity. Minimal concern for toxicity. MRSA nares pending      Juanito Mcgregor, PharmD    "

## 2023-10-05 NOTE — PROGRESS NOTES
Attempted to draw Blood Cultures x2 with no success. Awaiting further assistance.  Holding abx until cultures successfully collected

## 2023-10-05 NOTE — PROGRESS NOTES
Pt will need a repeat brain MRI with and without contrast in 3 months. He can follow up with neurology at that time.

## 2023-10-06 ENCOUNTER — APPOINTMENT (OUTPATIENT)
Dept: RADIOLOGY | Facility: MEDICAL CENTER | Age: 68
DRG: 870 | End: 2023-10-06
Attending: INTERNAL MEDICINE
Payer: MEDICARE

## 2023-10-06 LAB
ALBUMIN SERPL BCP-MCNC: 3.2 G/DL (ref 3.2–4.9)
ALBUMIN/GLOB SERPL: 1 G/DL
ALP SERPL-CCNC: 153 U/L (ref 30–99)
ALT SERPL-CCNC: 34 U/L (ref 2–50)
ANION GAP SERPL CALC-SCNC: 7 MMOL/L (ref 7–16)
ANION GAP SERPL CALC-SCNC: 7 MMOL/L (ref 7–16)
AST SERPL-CCNC: 43 U/L (ref 12–45)
BACTERIA BLD CULT: NORMAL
BACTERIA BLD CULT: NORMAL
BASE EXCESS BLDA CALC-SCNC: 4 MMOL/L (ref -4–3)
BASOPHILS # BLD AUTO: 0.4 % (ref 0–1.8)
BASOPHILS # BLD: 0.05 K/UL (ref 0–0.12)
BILIRUB SERPL-MCNC: 0.9 MG/DL (ref 0.1–1.5)
BODY TEMPERATURE: ABNORMAL DEGREES
BREATHS SETTING VENT: 18
BUN SERPL-MCNC: 25 MG/DL (ref 8–22)
BUN SERPL-MCNC: 25 MG/DL (ref 8–22)
CALCIUM ALBUM COR SERPL-MCNC: 9.6 MG/DL (ref 8.5–10.5)
CALCIUM SERPL-MCNC: 9 MG/DL (ref 8.5–10.5)
CALCIUM SERPL-MCNC: 9 MG/DL (ref 8.5–10.5)
CHLORIDE SERPL-SCNC: 110 MMOL/L (ref 96–112)
CHLORIDE SERPL-SCNC: 110 MMOL/L (ref 96–112)
CO2 BLDA-SCNC: 29 MMOL/L (ref 20–33)
CO2 SERPL-SCNC: 30 MMOL/L (ref 20–33)
CO2 SERPL-SCNC: 30 MMOL/L (ref 20–33)
CREAT SERPL-MCNC: 0.77 MG/DL (ref 0.5–1.4)
CREAT SERPL-MCNC: 0.77 MG/DL (ref 0.5–1.4)
DELSYS IDSYS: ABNORMAL
END TIDAL CARBON DIOXIDE IECO2: 38 MMHG
EOSINOPHIL # BLD AUTO: 0.3 K/UL (ref 0–0.51)
EOSINOPHIL NFR BLD: 2.3 % (ref 0–6.9)
ERYTHROCYTE [DISTWIDTH] IN BLOOD BY AUTOMATED COUNT: 47.2 FL (ref 35.9–50)
GFR SERPLBLD CREATININE-BSD FMLA CKD-EPI: 96 ML/MIN/1.73 M 2
GLOBULIN SER CALC-MCNC: 3.2 G/DL (ref 1.9–3.5)
GLUCOSE BLD STRIP.AUTO-MCNC: 150 MG/DL (ref 65–99)
GLUCOSE BLD STRIP.AUTO-MCNC: 158 MG/DL (ref 65–99)
GLUCOSE BLD STRIP.AUTO-MCNC: 159 MG/DL (ref 65–99)
GLUCOSE BLD STRIP.AUTO-MCNC: 162 MG/DL (ref 65–99)
GLUCOSE BLD STRIP.AUTO-MCNC: 166 MG/DL (ref 65–99)
GLUCOSE BLD STRIP.AUTO-MCNC: 167 MG/DL (ref 65–99)
GLUCOSE BLD STRIP.AUTO-MCNC: 172 MG/DL (ref 65–99)
GLUCOSE BLD STRIP.AUTO-MCNC: 174 MG/DL (ref 65–99)
GLUCOSE BLD STRIP.AUTO-MCNC: 175 MG/DL (ref 65–99)
GLUCOSE BLD STRIP.AUTO-MCNC: 176 MG/DL (ref 65–99)
GLUCOSE BLD STRIP.AUTO-MCNC: 180 MG/DL (ref 65–99)
GLUCOSE BLD STRIP.AUTO-MCNC: 182 MG/DL (ref 65–99)
GLUCOSE BLD STRIP.AUTO-MCNC: 201 MG/DL (ref 65–99)
GLUCOSE BLD STRIP.AUTO-MCNC: 202 MG/DL (ref 65–99)
GLUCOSE BLD STRIP.AUTO-MCNC: 208 MG/DL (ref 65–99)
GLUCOSE BLD STRIP.AUTO-MCNC: 212 MG/DL (ref 65–99)
GLUCOSE BLD STRIP.AUTO-MCNC: 213 MG/DL (ref 65–99)
GLUCOSE BLD STRIP.AUTO-MCNC: 213 MG/DL (ref 65–99)
GLUCOSE BLD STRIP.AUTO-MCNC: 215 MG/DL (ref 65–99)
GLUCOSE BLD STRIP.AUTO-MCNC: 223 MG/DL (ref 65–99)
GLUCOSE BLD STRIP.AUTO-MCNC: 228 MG/DL (ref 65–99)
GLUCOSE BLD STRIP.AUTO-MCNC: 243 MG/DL (ref 65–99)
GLUCOSE SERPL-MCNC: 249 MG/DL (ref 65–99)
GLUCOSE SERPL-MCNC: 249 MG/DL (ref 65–99)
HCO3 BLDA-SCNC: 28 MMOL/L (ref 17–25)
HCT VFR BLD AUTO: 38.6 % (ref 42–52)
HGB BLD-MCNC: 12.7 G/DL (ref 14–18)
HOROWITZ INDEX BLDA+IHG-RTO: 148 MM[HG]
IMM GRANULOCYTES # BLD AUTO: 0.05 K/UL (ref 0–0.11)
IMM GRANULOCYTES NFR BLD AUTO: 0.4 % (ref 0–0.9)
LYMPHOCYTES # BLD AUTO: 1.29 K/UL (ref 1–4.8)
LYMPHOCYTES NFR BLD: 10.1 % (ref 22–41)
MAGNESIUM SERPL-MCNC: 2 MG/DL (ref 1.5–2.5)
MCH RBC QN AUTO: 31.1 PG (ref 27–33)
MCHC RBC AUTO-ENTMCNC: 32.9 G/DL (ref 32.3–36.5)
MCV RBC AUTO: 94.4 FL (ref 81.4–97.8)
MODE IMODE: ABNORMAL
MONOCYTES # BLD AUTO: 0.61 K/UL (ref 0–0.85)
MONOCYTES NFR BLD AUTO: 4.8 % (ref 0–13.4)
NEUTROPHILS # BLD AUTO: 10.53 K/UL (ref 1.82–7.42)
NEUTROPHILS NFR BLD: 82 % (ref 44–72)
NRBC # BLD AUTO: 0 K/UL
NRBC BLD-RTO: 0 /100 WBC (ref 0–0.2)
O2/TOTAL GAS SETTING VFR VENT: 50 %
PCO2 BLDA: 40 MMHG (ref 26–37)
PCO2 TEMP ADJ BLDA: 41.4 MMHG (ref 26–37)
PEEP END EXPIRATORY PRESSURE IPEEP: 8 CMH20
PH BLDA: 7.45 [PH] (ref 7.4–7.5)
PH TEMP ADJ BLDA: 7.44 [PH] (ref 7.4–7.5)
PHOSPHATE SERPL-MCNC: 2.7 MG/DL (ref 2.5–4.5)
PLATELET # BLD AUTO: 238 K/UL (ref 164–446)
PMV BLD AUTO: 10.8 FL (ref 9–12.9)
PO2 BLDA: 74 MMHG (ref 64–87)
PO2 TEMP ADJ BLDA: 78 MMHG (ref 64–87)
POTASSIUM SERPL-SCNC: 4.1 MMOL/L (ref 3.6–5.5)
POTASSIUM SERPL-SCNC: 4.1 MMOL/L (ref 3.6–5.5)
PROT SERPL-MCNC: 6.4 G/DL (ref 6–8.2)
RBC # BLD AUTO: 4.09 M/UL (ref 4.7–6.1)
SAO2 % BLDA: 95 % (ref 93–99)
SIGNIFICANT IND 70042: NORMAL
SIGNIFICANT IND 70042: NORMAL
SITE SITE: NORMAL
SITE SITE: NORMAL
SODIUM SERPL-SCNC: 147 MMOL/L (ref 135–145)
SODIUM SERPL-SCNC: 147 MMOL/L (ref 135–145)
SODIUM SERPL-SCNC: 148 MMOL/L (ref 135–145)
SODIUM SERPL-SCNC: 149 MMOL/L (ref 135–145)
SODIUM SERPL-SCNC: 150 MMOL/L (ref 135–145)
SOURCE SOURCE: NORMAL
SOURCE SOURCE: NORMAL
SPECIMEN DRAWN FROM PATIENT: ABNORMAL
TIDAL VOLUME IVT: 470 ML
WBC # BLD AUTO: 12.8 K/UL (ref 4.8–10.8)

## 2023-10-06 PROCEDURE — 700105 HCHG RX REV CODE 258: Performed by: NURSE PRACTITIONER

## 2023-10-06 PROCEDURE — 94003 VENT MGMT INPAT SUBQ DAY: CPT

## 2023-10-06 PROCEDURE — 82962 GLUCOSE BLOOD TEST: CPT | Mod: 91

## 2023-10-06 PROCEDURE — 82803 BLOOD GASES ANY COMBINATION: CPT

## 2023-10-06 PROCEDURE — 99222 1ST HOSP IP/OBS MODERATE 55: CPT | Performed by: SURGERY

## 2023-10-06 PROCEDURE — 84100 ASSAY OF PHOSPHORUS: CPT

## 2023-10-06 PROCEDURE — 700102 HCHG RX REV CODE 250 W/ 637 OVERRIDE(OP): Performed by: NURSE PRACTITIONER

## 2023-10-06 PROCEDURE — 700102 HCHG RX REV CODE 250 W/ 637 OVERRIDE(OP): Performed by: INTERNAL MEDICINE

## 2023-10-06 PROCEDURE — 99291 CRITICAL CARE FIRST HOUR: CPT | Performed by: INTERNAL MEDICINE

## 2023-10-06 PROCEDURE — A9270 NON-COVERED ITEM OR SERVICE: HCPCS | Performed by: INTERNAL MEDICINE

## 2023-10-06 PROCEDURE — 71045 X-RAY EXAM CHEST 1 VIEW: CPT

## 2023-10-06 PROCEDURE — 700101 HCHG RX REV CODE 250: Performed by: NURSE PRACTITIONER

## 2023-10-06 PROCEDURE — 97530 THERAPEUTIC ACTIVITIES: CPT

## 2023-10-06 PROCEDURE — 84295 ASSAY OF SERUM SODIUM: CPT

## 2023-10-06 PROCEDURE — 80053 COMPREHEN METABOLIC PANEL: CPT

## 2023-10-06 PROCEDURE — 770022 HCHG ROOM/CARE - ICU (200)

## 2023-10-06 PROCEDURE — 99292 CRITICAL CARE ADDL 30 MIN: CPT | Performed by: INTERNAL MEDICINE

## 2023-10-06 PROCEDURE — 83735 ASSAY OF MAGNESIUM: CPT

## 2023-10-06 PROCEDURE — 700111 HCHG RX REV CODE 636 W/ 250 OVERRIDE (IP): Mod: JZ | Performed by: INTERNAL MEDICINE

## 2023-10-06 PROCEDURE — 99231 SBSQ HOSP IP/OBS SF/LOW 25: CPT | Performed by: STUDENT IN AN ORGANIZED HEALTH CARE EDUCATION/TRAINING PROGRAM

## 2023-10-06 PROCEDURE — A9270 NON-COVERED ITEM OR SERVICE: HCPCS | Performed by: NURSE PRACTITIONER

## 2023-10-06 PROCEDURE — 94799 UNLISTED PULMONARY SVC/PX: CPT

## 2023-10-06 PROCEDURE — 700105 HCHG RX REV CODE 258: Performed by: INTERNAL MEDICINE

## 2023-10-06 PROCEDURE — 97112 NEUROMUSCULAR REEDUCATION: CPT

## 2023-10-06 PROCEDURE — 36600 WITHDRAWAL OF ARTERIAL BLOOD: CPT

## 2023-10-06 PROCEDURE — 94640 AIRWAY INHALATION TREATMENT: CPT

## 2023-10-06 PROCEDURE — 85025 COMPLETE CBC W/AUTO DIFF WBC: CPT

## 2023-10-06 RX ORDER — POLYETHYLENE GLYCOL 3350 17 G/17G
1 POWDER, FOR SOLUTION ORAL DAILY
Status: DISCONTINUED | OUTPATIENT
Start: 2023-10-06 | End: 2023-10-29

## 2023-10-06 RX ORDER — SODIUM CHLORIDE 1 G/1
1 TABLET ORAL EVERY 8 HOURS
Status: DISCONTINUED | OUTPATIENT
Start: 2023-10-06 | End: 2023-10-07

## 2023-10-06 RX ORDER — AMOXICILLIN 250 MG
2 CAPSULE ORAL 2 TIMES DAILY
Status: DISCONTINUED | OUTPATIENT
Start: 2023-10-06 | End: 2023-10-29

## 2023-10-06 RX ORDER — SODIUM CHLORIDE 1 G/1
1 TABLET ORAL EVERY 8 HOURS
Status: DISCONTINUED | OUTPATIENT
Start: 2023-10-06 | End: 2023-10-06

## 2023-10-06 RX ORDER — BISACODYL 10 MG
10 SUPPOSITORY, RECTAL RECTAL
Status: DISCONTINUED | OUTPATIENT
Start: 2023-10-06 | End: 2023-10-29

## 2023-10-06 RX ORDER — FUROSEMIDE 10 MG/ML
40 INJECTION INTRAMUSCULAR; INTRAVENOUS ONCE
Status: COMPLETED | OUTPATIENT
Start: 2023-10-06 | End: 2023-10-06

## 2023-10-06 RX ADMIN — MEMANTINE 5 MG: 5 TABLET ORAL at 05:30

## 2023-10-06 RX ADMIN — AMLODIPINE BESYLATE 10 MG: 10 TABLET ORAL at 05:29

## 2023-10-06 RX ADMIN — PIPERACILLIN AND TAZOBACTAM 4.5 G: 4; .5 INJECTION, POWDER, FOR SOLUTION INTRAVENOUS at 21:41

## 2023-10-06 RX ADMIN — DOCUSATE SODIUM 50 MG AND SENNOSIDES 8.6 MG 2 TABLET: 8.6; 5 TABLET, FILM COATED ORAL at 05:28

## 2023-10-06 RX ADMIN — PIPERACILLIN AND TAZOBACTAM 4.5 G: 4; .5 INJECTION, POWDER, FOR SOLUTION INTRAVENOUS at 05:32

## 2023-10-06 RX ADMIN — IPRATROPIUM BROMIDE AND ALBUTEROL SULFATE 3 ML: 2.5; .5 SOLUTION RESPIRATORY (INHALATION) at 02:07

## 2023-10-06 RX ADMIN — SODIUM CHLORIDE 1 G: 1 TABLET ORAL at 21:42

## 2023-10-06 RX ADMIN — PIPERACILLIN AND TAZOBACTAM 4.5 G: 4; .5 INJECTION, POWDER, FOR SOLUTION INTRAVENOUS at 13:04

## 2023-10-06 RX ADMIN — SODIUM CHLORIDE 50 ML/HR: 3 INJECTION, SOLUTION INTRAVENOUS at 06:40

## 2023-10-06 RX ADMIN — SODIUM CHLORIDE 1 G: 1 TABLET ORAL at 14:13

## 2023-10-06 RX ADMIN — SODIUM CHLORIDE 5 MG/HR: 9 INJECTION, SOLUTION INTRAVENOUS at 00:22

## 2023-10-06 RX ADMIN — LOSARTAN POTASSIUM 100 MG: 50 TABLET, FILM COATED ORAL at 05:28

## 2023-10-06 RX ADMIN — FUROSEMIDE 40 MG: 10 INJECTION INTRAMUSCULAR; INTRAVENOUS at 14:12

## 2023-10-06 RX ADMIN — VANCOMYCIN HYDROCHLORIDE 2000 MG: 5 INJECTION, POWDER, LYOPHILIZED, FOR SOLUTION INTRAVENOUS at 07:40

## 2023-10-06 RX ADMIN — SODIUM CHLORIDE 5 UNITS/HR: 9 INJECTION, SOLUTION INTRAVENOUS at 12:16

## 2023-10-06 RX ADMIN — SODIUM CHLORIDE 60 ML/HR: 3 INJECTION, SOLUTION INTRAVENOUS at 18:00

## 2023-10-06 RX ADMIN — IPRATROPIUM BROMIDE AND ALBUTEROL SULFATE 3 ML: 2.5; .5 SOLUTION RESPIRATORY (INHALATION) at 14:39

## 2023-10-06 RX ADMIN — VANCOMYCIN HYDROCHLORIDE 2000 MG: 5 INJECTION, POWDER, LYOPHILIZED, FOR SOLUTION INTRAVENOUS at 18:33

## 2023-10-06 RX ADMIN — IPRATROPIUM BROMIDE AND ALBUTEROL SULFATE 3 ML: 2.5; .5 SOLUTION RESPIRATORY (INHALATION) at 07:18

## 2023-10-06 RX ADMIN — LANSOPRAZOLE 30 MG: 30 TABLET, ORALLY DISINTEGRATING, DELAYED RELEASE ORAL at 05:29

## 2023-10-06 RX ADMIN — SODIUM CHLORIDE 50 ML/HR: 3 INJECTION, SOLUTION INTRAVENOUS at 08:11

## 2023-10-06 RX ADMIN — IPRATROPIUM BROMIDE AND ALBUTEROL SULFATE 3 ML: 2.5; .5 SOLUTION RESPIRATORY (INHALATION) at 18:25

## 2023-10-06 RX ADMIN — POLYETHYLENE GLYCOL 3350 1 PACKET: 17 POWDER, FOR SOLUTION ORAL at 05:30

## 2023-10-06 RX ADMIN — POTASSIUM BICARBONATE 50 MEQ: 978 TABLET, EFFERVESCENT ORAL at 14:12

## 2023-10-06 RX ADMIN — MEMANTINE 5 MG: 5 TABLET ORAL at 17:11

## 2023-10-06 RX ADMIN — ATORVASTATIN CALCIUM 40 MG: 40 TABLET, FILM COATED ORAL at 17:11

## 2023-10-06 RX ADMIN — SODIUM CHLORIDE 6 UNITS/HR: 9 INJECTION, SOLUTION INTRAVENOUS at 06:03

## 2023-10-06 ASSESSMENT — COGNITIVE AND FUNCTIONAL STATUS - GENERAL
SUGGESTED CMS G CODE MODIFIER DAILY ACTIVITY: CN
DAILY ACTIVITIY SCORE: 6
STANDING UP FROM CHAIR USING ARMS: TOTAL
PERSONAL GROOMING: TOTAL
MOVING TO AND FROM BED TO CHAIR: UNABLE
SUGGESTED CMS G CODE MODIFIER MOBILITY: CN
TURNING FROM BACK TO SIDE WHILE IN FLAT BAD: UNABLE
CLIMB 3 TO 5 STEPS WITH RAILING: TOTAL
EATING MEALS: TOTAL
MOBILITY SCORE: 6
HELP NEEDED FOR BATHING: TOTAL
TOILETING: TOTAL
MOVING FROM LYING ON BACK TO SITTING ON SIDE OF FLAT BED: UNABLE
DRESSING REGULAR LOWER BODY CLOTHING: TOTAL
WALKING IN HOSPITAL ROOM: TOTAL
DRESSING REGULAR UPPER BODY CLOTHING: TOTAL

## 2023-10-06 ASSESSMENT — FIBROSIS 4 INDEX: FIB4 SCORE: 1.93

## 2023-10-06 ASSESSMENT — GAIT ASSESSMENTS: GAIT LEVEL OF ASSIST: UNABLE TO PARTICIPATE

## 2023-10-06 NOTE — CARE PLAN
The patient is Watcher - Medium risk of patient condition declining or worsening    Shift Goals  Clinical Goals: Q2 neuros, maintain hemodynamic stabilty  Patient Goals: DEENA  Family Goals: DEENA      Problem: Fluid Volume  Goal: Fluid volume balance will be maintained  Outcome: Progressing     Problem: Urinary - Renal Perfusion  Goal: Ability to achieve and maintain adequate renal perfusion and functioning will improve  Outcome: Progressing     Problem: Skin Integrity  Goal: Skin integrity is maintained or improved  Outcome: Progressing

## 2023-10-06 NOTE — PROGRESS NOTES
Neurology Progress Note  Neurohospitalist Service, [facility]    Referring Physician: REAL Cage*      Interval History: No acute events overnight. Patient was more tired today on exam but had been brought to edge of bed and had PT earlier in the morning. Patient was still arousable and had no discernable change in examination today. No other reported concerns or complaints by nursing or family at bedside.    Review of systems: In addition to what is detailed in the HPI and/or updated in the interval history, all other systems reviewed and are negative.    Past Medical History, Past Surgical History and Social History reviewed and unchanged from prior    Medications:    Current Facility-Administered Medications:     senna-docusate (Pericolace Or Senokot S) 8.6-50 MG per tablet 2 Tablet, 2 Tablet, Enteral Tube, BID, 2 Tablet at 10/06/23 0528 **AND** polyethylene glycol/lytes (Miralax) PACKET 1 Packet, 1 Packet, Enteral Tube, DAILY, 1 Packet at 10/06/23 0530 **AND** magnesium hydroxide (Milk Of Magnesia) suspension 30 mL, 30 mL, Enteral Tube, QDAY PRN **AND** bisacodyl (Dulcolax) suppository 10 mg, 10 mg, Rectal, QDAY PRN, Jacey Villanueva    insulin regular (Humulin R) 100 Units in  mL Infusion, 0-29 Units/hr, Intravenous, Continuous, Pal Serna M.D., Last Rate: 4 mL/hr at 10/06/23 0942, 4 Units/hr at 10/06/23 0942    dextrose 50% (D50W) injection 12.5-25 g, 12.5-25 g, Intravenous, PRN, Pal Serna M.D.    losartan (Cozaar) tablet 100 mg, 100 mg, Enteral Tube, DAILY, Pal Serna M.D., 100 mg at 10/06/23 0528    [COMPLETED] piperacillin-tazobactam (Zosyn) 4.5 g in  mL IVPB, 4.5 g, Intravenous, Once, Stopped at 10/05/23 1743 **AND** piperacillin-tazobactam (Zosyn) 4.5 g in  mL IVPB, 4.5 g, Intravenous, Q8HRS, Pal Serna M.D., Stopped at 10/06/23 0934    MD Alert...Vancomycin per Pharmacy, , Other, PHARMACY TO DOSE,  Pal Serna M.D.    vancomycin (Vancocin) 2,000 mg in  mL IVPB, 2,000 mg, Intravenous, Q12HR, Pal Serna M.D., Last Rate: 250 mL/hr at 10/06/23 0800, Rate Verify at 10/06/23 0800    ipratropium-albuterol (DUONEB) nebulizer solution, 3 mL, Nebulization, Q6HRS (RT), Jacey L. Latona, 3 mL at 10/06/23 0718    enalaprilat (Vasotec) injection 1.25 mg 1 mL, 1.25 mg, Intravenous, Q6HRS PRN, Jacey L. Latona    labetalol (Normodyne/Trandate) injection 10 mg, 10 mg, Intravenous, Q6HRS PRN, Jacey L. Latona, 10 mg at 10/05/23 0832    dexmedetomidine (PRECEDEX) 400 mcg/100mL NS premix infusion, 0.1-1.5 mcg/kg/hr (Ideal), Intravenous, Continuous, Pal Serna M.D., Stopped at 10/06/23 0358    HYDROmorphone (Dilaudid) injection 0.5-2 mg, 0.5-2 mg, Intravenous, Q HOUR PRN, Pal Serna M.D., 2 mg at 10/05/23 1547    HYDROmorphone (DILAUDID) 0.2 mg/mL in 50mL NS (Continuous Infusion), , Intravenous, Continuous, Pal Serna M.D., Stopped at 10/03/23 0553    amLODIPine (Norvasc) tablet 10 mg, 10 mg, Enteral Tube, DAILY, Pal Serna M.D., 10 mg at 10/06/23 0529    Pharmacy Consult: Enteral tube insertion - review meds/change route/product selection, 1 Each, Other, PHARMACY TO DOSE, Pal Serna M.D.    niCARdipine (Cardene) 25 mg in  mL Standard Infusion, 0-15 mg/hr, Intravenous, Continuous, Jacey L. Latona, Stopped at 10/06/23 0343    Respiratory Therapy Consult, , Nebulization, Continuous RT, Josiah Casiano M.D.    MD Alert...ICU Electrolyte Replacement per Pharmacy, , Other, PHARMACY TO DOSE, Josiah Casiano M.D.    lidocaine (Xylocaine) 1 % injection 2 mL, 2 mL, Tracheal Tube, Q30 MIN PRN, Lizandro Greenwood M.D.    acetaminophen (Tylenol) tablet 650 mg, 650 mg, Enteral Tube, Q4HRS PRN, Lizandro Greenwood M.D., 650 mg at 10/03/23 2311    atorvastatin (Lipitor) tablet 40 mg, 40 mg, Enteral Tube, Q EVENING, Lizandro Greenwood M.D., 40 mg at 10/05/23  "1754    memantine (Namenda) tablet 5 mg, 5 mg, Enteral Tube, BID, Lizandro Greenwood M.D., 5 mg at 10/06/23 0530    lansoprazole (Prevacid) solutab 30 mg, 30 mg, Enteral Tube, DAILY, Lizandro Greenwood M.D., 30 mg at 10/06/23 0529    ondansetron (Zofran ODT) dispertab 4 mg, 4 mg, Enteral Tube, Q4HRS PRN, Lizandro Greenwood M.D.    3% sodium chloride (Hypertonic Saline) 500mL infusion, 0-60 mL/hr, Intravenous, Continuous, Jacey Villanueva, Last Rate: 50 mL/hr at 10/06/23 0811, 50 mL/hr at 10/06/23 0811    ondansetron (Zofran) syringe/vial injection 4 mg, 4 mg, Intravenous, Q4HRS PRN, Rhoda Argueta M.D., 4 mg at 09/29/23 0151    hydrALAZINE (Apresoline) injection 20 mg, 20 mg, Intravenous, Q6HRS PRN, Jacey Beckmanona, 20 mg at 10/05/23 1007    Physical Examination:   /64   Pulse 68   Temp 37.5 °C (99.5 °F) (Bladder)   Resp 18   Ht 1.829 m (6' 0.01\")   Wt (!) 134 kg (295 lb 6.7 oz)   SpO2 94%   BMI 40.06 kg/m²         NEUROLOGICAL EXAM:      Mental status: Remains intubated and nonverbal, can open eyes to command and move RUE to command, has spontaneous bilateral lower extremity movement  Speech and language: Can sluggishly open eyes to command and move right arm when instructed. Otherwise does not follow other commands and remains intubated.   Cranial nerve exam: Pupils difficult to assess as patient had forced eye closure when attempting to test w/ light, briefly they appear equal and symmetric but overall poor ability to assess. Visual fields cannot be tested. Face appears  grossly symmetric.  Facial sensation cannot be tested.  Motor exam: Patient can poorly follow commands to move extremities but RUE he can squeeze w/ 5/5 strength, arm is restrained to bed but can move without discernable limitation. LUE w/o movement to command or pain. Bilateral lower extremities with spontaneous movement.   Sensory exam: React minimally to physical and tactile stimulation.  Coordination: Not " tested  Plantar reflexes: Equivocal  Gait: deferred       Objective Data:    Labs:  Lab Results   Component Value Date/Time    PROTHROMBTM 14.5 09/30/2023 01:50 PM    INR 1.12 09/30/2023 01:50 PM      Lab Results   Component Value Date/Time    WBC 12.8 (H) 10/06/2023 05:45 AM    RBC 4.09 (L) 10/06/2023 05:45 AM    HEMOGLOBIN 12.7 (L) 10/06/2023 05:45 AM    HEMATOCRIT 38.6 (L) 10/06/2023 05:45 AM    MCV 94.4 10/06/2023 05:45 AM    MCH 31.1 10/06/2023 05:45 AM    MCHC 32.9 10/06/2023 05:45 AM    MPV 10.8 10/06/2023 05:45 AM    NEUTSPOLYS 82.00 (H) 10/06/2023 05:45 AM    LYMPHOCYTES 10.10 (L) 10/06/2023 05:45 AM    MONOCYTES 4.80 10/06/2023 05:45 AM    EOSINOPHILS 2.30 10/06/2023 05:45 AM    BASOPHILS 0.40 10/06/2023 05:45 AM      Lab Results   Component Value Date/Time    SODIUM 147 (H) 10/06/2023 05:45 AM    POTASSIUM 4.1 10/06/2023 05:45 AM    CHLORIDE 110 10/06/2023 05:45 AM    CO2 30 10/06/2023 05:45 AM    GLUCOSE 249 (H) 10/06/2023 05:45 AM    BUN 25 (H) 10/06/2023 05:45 AM    CREATININE 0.77 10/06/2023 05:45 AM    CREATININE 1.0 05/13/2009 03:30 PM      Lab Results   Component Value Date/Time    CHOLSTRLTOT 118 09/30/2023 11:20 PM    LDL 57 09/30/2023 11:20 PM    HDL 12 (A) 09/30/2023 11:20 PM    TRIGLYCERIDE 247 (H) 09/30/2023 11:20 PM       Lab Results   Component Value Date/Time    ALKPHOSPHAT 97 10/02/2023 06:15 AM    ASTSGOT 27 10/02/2023 06:15 AM    ALTSGPT 17 10/02/2023 06:15 AM    TBILIRUBIN 0.8 10/02/2023 06:15 AM        Imaging/Testing:    I interpreted and/or reviewed the patient's neuroimaging    DX-CHEST-PORTABLE (1 VIEW)   Final Result         1.  No acute cardiopulmonary disease.   2.  Cardiomegaly   3.  Atherosclerosis      US-RUQ   Final Result      1.  Minimal gallbladder wall thickening and possible sludge.  Cholecystitis is not excluded.   2.  Minimal pericholecystic fluid may be due to gallbladder inflammation or liver disease.   3.  Enlarged echogenic liver suggesting fatty infiltration.    4.  Limited evaluation of the pancreas abdominal aorta.      DX-CHEST-PORTABLE (1 VIEW)   Final Result         1.  Pulmonary edema and/or infiltrates are identified, which are stable since the prior exam.   2.  Cardiomegaly   3.  Atherosclerosis      US-EXTREMITY VENOUS UPPER UNILAT RIGHT   Final Result      DX-CHEST-PORTABLE (1 VIEW)   Final Result         1.  Pulmonary edema and/or infiltrates are identified, which are stable since the prior exam.   2.  Cardiomegaly   3.  Atherosclerosis      DX-CHEST-PORTABLE (1 VIEW)   Final Result         1.  Pulmonary edema and/or infiltrates are identified, which are stable since the prior exam.   2.  Cardiomegaly   3.  Atherosclerosis      DX-CHEST-PORTABLE (1 VIEW)   Final Result         1.  Pulmonary edema and/or infiltrates are identified, which are somewhat decreased since the prior exam.   2.  Cardiomegaly   3.  Atherosclerosis      MR-MRA HEAD-W/O   Final Result         Normal intracranial MRA.      MR-BRAIN-WITH & W/O   Final Result         Left frontal lobe hemorrhage with surrounding vasogenic edema and mild mass effect upon the frontal horn of the left lateral ventricle. There is approximately 2 mm of midline shift to the right.      Age-related volume loss and chronic microvascular ischemic changes.      No abnormal intracranial enhancement is identified.      CT-HEAD W/O   Final Result         1. Stable 6 cm left frontal intraparenchymal hemorrhage with mild, 2 mm left-to-right subfalcine herniation.   2. No new abnormality or change.         DX-CHEST-PORTABLE (1 VIEW)   Final Result         1.  Pulmonary edema and/or infiltrates are identified, which appear somewhat increased since the prior exam.   2.  Cardiomegaly   3.  Atherosclerosis      DX-ABDOMEN FOR TUBE PLACEMENT   Final Result      NG tube extends below the diaphragm and appears to extend through the region of the stomach with tip at the level of the proximal duodenum.      DX-CHEST-PORTABLE (1 VIEW)    Final Result      1.  Grossly satisfactory appearance of tubes and lines.   2.  No evidence of pneumothorax.   3.  Marked hypoinflation with probable bibasilar atelectasis.   4.  Enlarged cardiac silhouette with vascular congestion.      CT-HEAD W/O   Final Result         1.  New large parenchymal hemorrhage involving the frontal lobe is identified with some surrounding edema.      2.  Subarachnoid hemorrhage is also noted in the area.      3.  Minimal midline shift to the right side.      Findings are consistent with atrophy.  Decreased attenuation in the periventricular white matter likely indicates microvascular ischemic disease.      Based solely on CT findings, the brain injury guideline category is mBIG 3.      EDH   IVH   Displaced skull fx   SDH > 8mm   IPH > 8mm or multiple   SAH bi-hemispheric or > 3mm      The original BIG retrospective analysis found radiographic progression in 0% of BIG 1 patients and 2.6% BIG 2.      These findings were discussed with GEETA MOLINA on 09/30/2023.      DX-CHEST-PORTABLE (1 VIEW)   Final Result      Stable multiple pulmonary vascular dilation/congestion. Otherwise, negative.      DX-CHEST-PORTABLE (1 VIEW)   Final Result      1.  Increasing airspace disease at the left base.      DX-CHEST-LIMITED (1 VIEW)   Final Result         1.  Pulmonary edema and/or infiltrates are identified, which are somewhat decreased since the prior exam.   2.  Cardiomegaly   3.  Atherosclerosis      DX-CHEST-PORTABLE (1 VIEW)   Final Result         1.  Pulmonary edema and/or infiltrates are identified, which appear somewhat increased since the prior exam.   2.  Cardiomegaly   3.  Atherosclerosis      DX-CHEST-PORTABLE (1 VIEW)   Final Result         1.  Interstitial edema and/or infiltrates, slightly decreased since prior study.   2.  Cardiomegaly   3.  Atherosclerosis      DX-CHEST-LIMITED (1 VIEW)   Final Result      Right IJ catheter tip projects in the SVC. No pneumothorax.      CT-CTA  NECK WITH & W/O-POST PROCESSING   Final Result      1.  Estimated 50-75% stenosis of the BILATERAL carotid arteries   2.  Estimated greater than 50% stenosis at the vertebral artery ostium on each side      CT-CTA HEAD WITH & W/O-POST PROCESS   Final Result      1.  No large vessel occlusion or aneurysm.   2.  Hazy left frontal lobe subarachnoid hemorrhage, decreased in conspicuity from prior. No new hemorrhage seen.   3.  Scattered opacifications of the ethmoid sinuses, likely related to support hardware.      DX-ABDOMEN FOR TUBE PLACEMENT   Final Result         1.  Nonspecific bowel gas pattern in the upper abdomen.   2.  Dobbhoff tube with tip terminating overlying the expected location of the first or second duodenal segment.      DX-CHEST-PORTABLE (1 VIEW)   Final Result         1.  Interstitial edema and/or infiltrates.   2.  Cardiomegaly   3.  Nasogastric tube terminates in the distal esophagus, recommend advancement   4.  Atherosclerosis      DX-ABDOMEN FOR TUBE PLACEMENT   Final Result         1.  Nonspecific bowel gas pattern in the upper abdomen.   2.  Nasogastric tube terminates just distal to the gastroesophageal junction, recommend advancement      CT-RENAL COLIC EVALUATION(A/P W/O)   Final Result         1.  Intrahepatic biliary air and nondependent air within the gallbladder, consider history of instrumentation or changes of cholangitis in the appropriate clinical setting.   2.  Large fat-containing bilateral inguinal hernias   3.  Enlarged prostate, consider causes of prostate enlargement with additional workup as clinically appropriate   4.  Diverticulosis   5.  Cholelithiasis   6.  Atherosclerosis      CT-HEAD W/O   Final Result         1.  Hazy left frontal subarachnoid hemorrhage.   2.  Nonspecific white matter changes, commonly associated with small vessel ischemic disease.  Associated mild cerebral atrophy is noted.      Based solely on CT findings, the brain injury guideline category is mBIG  1.      SDH < 4mm   IPH < 4mm   SAH < 3 sulci and < 1mm      The original BIG retrospective analysis found radiographic progression in 0% of BIG 1 patients and 2.6% BIG 2.      These findings were discussed with the patient's clinician, Madhu Zambrano, on 9/24/2023 10:10 PM.      DX-CHEST-PORTABLE (1 VIEW)   Final Result      No acute cardiopulmonary abnormality identified.          Assessment and Plan:  Sharon Guardado is a 68 y.o. w/ hx of alcohol abuse, coagulopathy and thrombocytopenia who was initially admitted on 9/24/2023 due to urosepsis and possible alcohol withdrawal.  He had a brain CT at that time which revealed hazy left frontal subarachnoid hemorrhage which was felt to be traumatic.  He was initially intubated and subsequently was extubated on 9/28/23.  While in the hospital he became less responsive and brain CT on 9/30/2023 revealed large left frontal intraparenchymal hemorrhage. PBD # 7. He was on Lovenox for DVT prevention which was reversed. CTA of the head and neck at the time of admission did not reveal evidence of vascular malformation. Brain MRI on 10/1/2023 with no evidence of enhancing lesion.  MRA of the head without contrast on 10/1/2023 with no vascular lesion. Current efforts continue to be on extubating patient, otherwise her remains stable neurologically.         Plan:  - Continue close neuro monitoring and neuro check every 2 hours in ICU.  - Maintain systolic blood pressure 100-160.  - Continue hyperosmolar therapy with goal of sodium 150-160, current Na 147  - Maintain bowel regimen to avoid Valsalva and increased intracranial pressure.  - Neurosurgery have signed off with recommendation to repeat MRI in three months and follow up with neurology  - Will continue to work w/ ICU for SBT in preparation for extubation  - Not a candidate for PT, OT or SLP at this time.  - DVT prevention with SCDs.    I have performed a physical exam and reviewed and updated ROS and Plan today  (10/6/2023).     Harvinder Mondragon III, MD  Vascular Neurology, [facility]

## 2023-10-06 NOTE — THERAPY
Occupational Therapy  Daily Treatment     Patient Name: Sharon Guardado  Age:  68 y.o., Sex:  male  Medical Record #: [id number]  Today's Date: 10/6/2023     Precautions  Precautions: Fall Risk  Comments: SBP <160    Assessment    Pt seen for OT tx session, pt required total A for supine-> sit once sitting EOB able to maintain sitting balance for brief periods of time. Pt w/ no purposeful movements of B UE's today, however would spontaneously use them for righting reactions. Pt lifted head 1x following a command. Pt continues to demo impaired balance, cognition, functional mobility, and activity tolerance impacting functional independence. Will continue to follow while in house.     Plan    Treatment Plan Status: (P) Continue Current Treatment Plan  Type of Treatment: Self Care / Activities of Daily Living, Manual Therapy Techniques, Neuro Re-Education / Balance, Therapeutic Exercises, Therapeutic Activity, Adaptive Equipment  Treatment Frequency: 3 Times per Week  Treatment Duration: Until Therapy Goals Met    DC Equipment Recommendations: (P) Unable to determine at this time  Discharge Recommendations: (P) Recommend post-acute placement for additional occupational therapy services prior to discharge home         Objective       10/06/23 0835   Treatment Charges   Charges Yes   OT Neuromuscular Re-education / Balance (Units) 2   Total Time Spent   OT Self Care / ADL (Minutes) 25   Precautions   Precautions Fall Risk   Vitals   O2 Delivery Device Ventilator   Pain 0 - 10 Group   Therapist Pain Assessment Post Activity Pain Same as Prior to Activity;Nurse Notified  (no indications of pain)   Cognition    Cognition / Consciousness X   Speech/ Communication Intubated / Trached   Level of Consciousness Responds to voice   Ability To Follow Commands 1 Step   Safety Awareness Impulsive;Impaired   New Learning Impaired   Comments lifted eyes up one time to look at spouse, delayed reactions with B feet, no direct  reactions w/ UE's appeared to use them for balance.   Balance   Sitting Balance (Static) Poor   Sitting Balance (Dynamic) Poor -   Weight Shift Sitting Poor   Skilled Intervention Verbal Cuing;Tactile Cuing;Facilitation   Bed Mobility    Supine to Sit Total Assist   Sit to Supine Total Assist   Scooting Total Assist   Skilled Intervention Verbal Cuing   Activities of Daily Living   Grooming Total Assist;Seated  (w/ HHA)   Upper Body Dressing Total Assist   Lower Body Dressing Total Assist   Skilled Intervention Verbal Cuing;Tactile Cuing;Facilitation   How much help from another person does the patient currently need...   Putting on and taking off regular lower body clothing? 1   Bathing (including washing, rinsing, and drying)? 1   Toileting, which includes using a toilet, bedpan, or urinal? 1   Putting on and taking off regular upper body clothing? 1   Taking care of personal grooming such as brushing teeth? 1   Eating meals? 1   6 Clicks Daily Activity Score 6   Functional Mobility   Sit to Stand Unable to Participate   Bed, Chair, Wheelchair Transfer Unable to Participate   Mobility sat EOB   Skilled Intervention Verbal Cuing;Tactile Cuing;Facilitation   Activity Tolerance   Sitting Edge of Bed 10 min   Patient / Family Goals   Patient / Family Goal #1 Spouse wishes for pt to get off the vent   Goal #1 Outcome Progressing as expected   Short Term Goals   Short Term Goal # 1 Pt will sit EOB and perform seated grooming w/ min A   Goal Outcome # 1 Progressing as expected   Short Term Goal # 2 Pt will demo 2/5  strength in R UE   Goal Outcome # 2 Progressing as expected   Short Term Goal # 3 Pt will demo UB dressing w/ mod A   Goal Outcome # 3 Progressing as expected   Short Term Goal # 4 Pt will demo ADL txf w/ mod A   Goal Outcome # 4 Progressing as expected   Education Group   Role of Occupational Therapist Patient Response Patient;Acceptance;Explanation;Demonstration;Verbal Demonstration;Action Demonstration    Occupational Therapy Treatment Plan    O.T. Treatment Plan Continue Current Treatment Plan   Anticipated Discharge Equipment and Recommendations   DC Equipment Recommendations Unable to determine at this time   Discharge Recommendations Recommend post-acute placement for additional occupational therapy services prior to discharge home   Interdisciplinary Plan of Care Collaboration   IDT Collaboration with  Nursing   Patient Position at End of Therapy In Bed;Wrist Restraints Applied;Call Light within Reach;Tray Table within Reach;Phone within Reach   Collaboration Comments RN present throughout session   Session Information   Date / Session Number  10/6, 2 (2/3, 10/9)

## 2023-10-06 NOTE — CARE PLAN
The patient is Watcher - Medium risk of patient condition declining or worsening    Shift Goals  Clinical Goals: q2 neuros, maintain hemodynamic stability, SAT/SBT  Patient Goals: DEENA  Family Goals: DEENA    Progress made toward(s) clinical / shift goals:    Problem: Hemodynamics  Goal: Patient's hemodynamics, fluid balance and neurologic status will be stable or improve  Outcome: Progressing     Problem: Fluid Volume  Goal: Fluid volume balance will be maintained  Outcome: Progressing     Problem: Respiratory  Goal: Patient will achieve/maintain optimum respiratory ventilation and gas exchange  Outcome: Progressing       Patient is not progressing towards the following goals:

## 2023-10-06 NOTE — CARE PLAN
Problem: Ventilation  Goal: Ability to achieve and maintain unassisted ventilation or tolerate decreased levels of ventilator support  Description: Target End Date:  4 days     Document on Vent flowsheet    1.  Support and monitor invasive and noninvasive mechanical ventilation  2.  Monitor ventilator weaning response  3.  Perform ventilator associated pneumonia prevention interventions  4.  Manage ventilation therapy by monitoring diagnostic test results  Outcome: Not Met     Ventilator Daily Summary    Vent Day #6    Airway: 8@29 (tube advanced during bronch)    Ventilator settings: 18/470/+8/40%    Weaning trials: SBT x 2     Respiratory Procedures: Bronch x 1     Plan: Continue current ventilator settings and wean mechanical ventilation as tolerated per physician orders.

## 2023-10-06 NOTE — PROGRESS NOTES
Critical Care Progress Note    Date of admission  9/24/2023    Chief Complaint  68 y.o. male admitted 9/24/2023 with urosepsis.  He has a history of DM type II, primary hypertension, alcohol dependence and obesity.    Hospital Course      9/25: surgery was consulted Dr Hirsch. Continue resuscitation with IVF, antibiotics, If worsens, consider jamila tube. Patient was intubated acutely overnight.  9/26: requiring high levels of sedatives. Had to start a second vasopressor. Failed SAT and SBT due to agitation. Vasopressors are weaning down. Will start dvt chemical prophylaxis tonight.  9/27: changed propofol to precedex, hopes to move towards extubation. Consider tube feeds if  not extubated  9/28: he is now following commands, maximizing status for extubation.    9/29-9/30: Transferred to floor, under care with hospitalist.    9/30: patient was found with AMS, suspected ETOH withdrawal, s/p benzos. CT head showed parenchymal hemorrhage. Transferred to ICU. Intubated, CVC placed, vascular neurology and neurosurgery consulted    10/1 - Will work on extubation.  23% saline bolus.  Palliative consutled.  Fevers - reculture and empiric unasyn    10/2 -    vent day 3.  Force diuresis.  SAT/SBT.  Increase amlodipine.  10/3 -    vent day 4.  Force diuresis.  SAT/SBT.  10/4 -    vent day 5.  Force diuresis.  SAT/SBT.  Continue amlodipine, 10 mg daily.  Increase losartan, 75 mg daily.  Begin NPH, 10 units twice daily.  10/5 -    vent day 6.  Force diuresis.  SAT/SBT.  Continue amlodipine.  Increase losartan, 100 mg daily.  Stop NPH and sliding scale insulin.  Begin insulin drip.  Culture blood, sputum and check UA.  Stop Unasyn.  Begin empiric Zosyn and vancomycin.  Check RUQ ultrasound.  10/6 -    vent day 7.  Force diuresis.  SAT/SBT.  Continue amlodipine and losartan.  Continue insulin drip.  Begin salt tabs, 1 g every 8 hours.  Surgery opinion regarding cholecystostomy tube.  10/7 -    vent day 8.  Force diuresis.  SAT/SBT.   Continue amlodipine and losartan.  Continue insulin drip.  Stop salt tabs.  HIDA scan normal.  Stop vancomycin.  Continue Zosyn.      Interval Problem Update  Reviewed last 24 hour events:      SR  100.2  -1125 mL in the last 24  +2261 mL since admit      Review of Systems  Review of Systems   Unable to perform ROS: Acuity of condition        Vital Signs for last 24 hours   Pulse:  [52-84] 52  Resp:  [13-36] 20  BP: (127-160)/() 127/59  SpO2:  [94 %-99 %] 94 %    Hemodynamic parameters for last 24 hours       Respiratory Information for the last 24 hours  Vent Mode: APVCMV  Rate (breaths/min): 18  Vt Target (mL): 470  PEEP/CPAP: 8  MAP: 11  Control VTE (exp VT): 554    Physical Exam   Physical Exam  Constitutional:       Appearance: He is obese.      Comments: On ventilator   Eyes:      Pupils: Pupils are equal, round, and reactive to light.   Cardiovascular:      Comments: Sinus rhythm  Pulmonary:      Breath sounds: Rales (Few coarse crackles) present. No wheezing.   Abdominal:      General: There is no distension.      Tenderness: There is no abdominal tenderness.      Comments: Tolerating enteral tube feedings   Musculoskeletal:      Right lower leg: No edema.      Left lower leg: No edema.   Skin:     General: Skin is warm.   Neurological:      Comments: Weak in the right arm and right leg.  Intermittently follows.         Medications  Current Facility-Administered Medications   Medication Dose Route Frequency Provider Last Rate Last Admin    magnesium sulfate IVPB premix 2 g  2 g Intravenous Once Pal Serna M.D. 25 mL/hr at 10/07/23 1232 2 g at 10/07/23 1232    morphine 4 mg/mL injection             senna-docusate (Pericolace Or Senokot S) 8.6-50 MG per tablet 2 Tablet  2 Tablet Enteral Tube BID Jacey L. Latona   2 Tablet at 10/07/23 0518    And    polyethylene glycol/lytes (Miralax) PACKET 1 Packet  1 Packet Enteral Tube DAILY Jacey L. Latona   1 Packet at 10/07/23 0600    And    magnesium  hydroxide (Milk Of Magnesia) suspension 30 mL  30 mL Enteral Tube QDAY PRN Jacey Villanueva        And    bisacodyl (Dulcolax) suppository 10 mg  10 mg Rectal QDAY PRN Jacey Villanueva        insulin regular (Humulin R) 100 Units in  mL Infusion  0-29 Units/hr Intravenous Continuous Pal Serna M.D. 1 mL/hr at 10/07/23 1219 1 Units/hr at 10/07/23 1219    dextrose 50% (D50W) injection 12.5-25 g  12.5-25 g Intravenous PRN Pal Serna M.D.        losartan (Cozaar) tablet 100 mg  100 mg Enteral Tube DAILY Pal Serna M.D.   100 mg at 10/07/23 0518    piperacillin-tazobactam (Zosyn) 4.5 g in  mL IVPB  4.5 g Intravenous Q8HRS Pal Serna M.D.   Stopped at 10/07/23 0920    ipratropium-albuterol (DUONEB) nebulizer solution  3 mL Nebulization Q6HRS (RT) Jaceysamreen Villanueva   3 mL at 10/07/23 0216    enalaprilat (Vasotec) injection 1.25 mg 1 mL  1.25 mg Intravenous Q6HRS PRN Jacey Villanueva        labetalol (Normodyne/Trandate) injection 10 mg  10 mg Intravenous Q6HRS PRN Jacey Villanueva   10 mg at 10/05/23 0832    dexmedetomidine (PRECEDEX) 400 mcg/100mL NS premix infusion  0.1-1.5 mcg/kg/hr (Ideal) Intravenous Continuous Pal Serna M.D. 3.9 mL/hr at 10/07/23 1214 0.2 mcg/kg/hr at 10/07/23 1214    HYDROmorphone (Dilaudid) injection 0.5-2 mg  0.5-2 mg Intravenous Q HOUR PRN Pal Serna M.D.   2 mg at 10/05/23 1547    amLODIPine (Norvasc) tablet 10 mg  10 mg Enteral Tube DAILY Pal Serna M.D.   10 mg at 10/07/23 0518    Pharmacy Consult: Enteral tube insertion - review meds/change route/product selection  1 Each Other PHARMACY TO DOSE Pal Serna M.D.        niCARdipine (Cardene) 25 mg in  mL Standard Infusion  0-15 mg/hr Intravenous Continuous Jacey LIftikhar Latona   Stopped at 10/06/23 0343    Respiratory Therapy Consult   Nebulization Continuous RT Josiah Casiano M.D.        MD Alert...ICU Electrolyte Replacement per Pharmacy    Other PHARMACY TO DOSE Josiah Casiano M.D.        lidocaine (Xylocaine) 1 % injection 2 mL  2 mL Tracheal Tube Q30 MIN PRN Lizandro Greenwood M.D.        acetaminophen (Tylenol) tablet 650 mg  650 mg Enteral Tube Q4HRS PRN Lizandro Greenwood M.D.   650 mg at 10/03/23 2311    atorvastatin (Lipitor) tablet 40 mg  40 mg Enteral Tube Q EVENING Lizandro Greenwood M.D.   40 mg at 10/06/23 1711    memantine (Namenda) tablet 5 mg  5 mg Enteral Tube BID Lizandro Greenwood M.D.   5 mg at 10/07/23 0518    lansoprazole (Prevacid) solutab 30 mg  30 mg Enteral Tube DAILY Lizandro Greenwood M.D.   30 mg at 10/07/23 0518    ondansetron (Zofran ODT) dispertab 4 mg  4 mg Enteral Tube Q4HRS PRN Lizandro Greenwood M.D.        3% sodium chloride (Hypertonic Saline) 500mL infusion  0-60 mL/hr Intravenous Continuous Pal Serna M.D. 50 mL/hr at 10/07/23 1250 50 mL/hr at 10/07/23 1250    ondansetron (Zofran) syringe/vial injection 4 mg  4 mg Intravenous Q4HRS PRN Rhoda Argueta M.D.   4 mg at 09/29/23 0151    hydrALAZINE (Apresoline) injection 20 mg  20 mg Intravenous Q6HRS PRN Jacey BEYER Latona   20 mg at 10/05/23 1007       Fluids    Intake/Output Summary (Last 24 hours) at 10/7/2023 1303  Last data filed at 10/7/2023 1200  Gross per 24 hour   Intake 4238.72 ml   Output 6195 ml   Net -1956.28 ml       Laboratory  Recent Labs     10/05/23  0315 10/06/23  0345 10/07/23  0234   ISTATAPH 7.449 7.453 7.453   ISTATAPCO2 45.9* 40.0* 40.6*   ISTATAPO2 83 74 80   ISTATATCO2 33 29 30   HRTTMPJ1QUU 97 95 96   ISTATARTHCO3 31.9* 28.0* 28.4*   ISTATARTBE 7* 4* 4*   ISTATTEMP 99.3 F 100.0 F 99.1 F   ISTATFIO2 40 50 40   ISTATSPEC Arterial Arterial Arterial   ISTATAPHTC 7.443 7.442 7.449   FANSQLLS2UC 86 78 82         Recent Labs     10/05/23  0535 10/05/23  1220 10/06/23  0545 10/06/23  1252 10/06/23  2145 10/07/23  0350 10/07/23  0950   SODIUM 149*   < > 147*  147*   < > 150* 151* 149*   POTASSIUM 3.9  --  4.1  4.1  --   --   4.0  --    CHLORIDE 110  --  110  110  --   --  115*  --    CO2 29  --  30  30  --   --  26  --    BUN 22  --  25*  25*  --   --  24*  --    CREATININE 0.76  --  0.77  0.77  --   --  0.77  --    MAGNESIUM 2.0  --  2.0  --   --  1.9  --    PHOSPHORUS 3.0  --  2.7  --   --  2.6  --    CALCIUM 8.9  --  9.0  9.0  --   --  9.0  --     < > = values in this interval not displayed.     Recent Labs     10/05/23  0535 10/06/23  0545 10/07/23  0350   ALTSGPT  --  34 37   ASTSGOT  --  43 47*   ALKPHOSPHAT  --  153* 161*   TBILIRUBIN  --  0.9 1.0   GLUCOSE 281* 249*  249* 165*     Recent Labs     10/05/23  0535 10/06/23  0545 10/07/23  0350   WBC 13.7* 12.8* 12.3*   NEUTSPOLYS 81.50* 82.00* 76.00*   LYMPHOCYTES 11.40* 10.10* 14.60*   MONOCYTES 4.20 4.80 5.70   EOSINOPHILS 2.10 2.30 2.70   BASOPHILS 0.30 0.40 0.40   ASTSGOT  --  43 47*   ALTSGPT  --  34 37   ALKPHOSPHAT  --  153* 161*   TBILIRUBIN  --  0.9 1.0     Recent Labs     10/05/23  0535 10/06/23  0545 10/07/23  0350   RBC 4.06* 4.09* 4.03*   HEMOGLOBIN 12.7* 12.7* 12.6*   HEMATOCRIT 38.3* 38.6* 38.8*   PLATELETCT 232 238 248       Imaging  X-Ray:  I have personally reviewed the images and compared with prior images. and My impression is: Unchanged left lower lobe opacities    Assessment/Plan  * Intraparenchymal hemorrhage of brain (HCC)- (present on admission)  Assessment & Plan  CT on 9/24 with hazy left frontal SAH  CT on 9/30 with new large IPH in the left frontal lobe  Strict blood pressure control with goal SBP less than 160  Goal sodium 145-150 - I am titrating hypertonic saline to achieve sodium goal  Neuro checks every 2 hours    Acute respiratory failure with hypoxia (HCC)- (present on admission)  Assessment & Plan  Intubated 9/25-9/28  Reintubated 9/30  ABCDEF bundle  SAT/SBT as appropriate  Mobility level 2    Leukocytosis  Assessment & Plan  Blood cultures negative so far from 10/5  Negative MRSA PCR - stop vancomycin  Continue empiric Zosyn    Pneumonia  due to infectious organism- (present on admission)  Assessment & Plan  Continue Zosyn  Stop vancomycin    Pneumobilia- (present on admission)  Assessment & Plan  Dr. Hirsch consulted on admission - Dr. Gotti now following  Completed 5 days of ceftriaxone and metronidazole on 9/28  RUQ ultrasound with minimal gallbladder wall thickening and possible sludge and minimal pericholecystic fluid  HIDA scan normal on 10/7  Continue empiric Zosyn  Stop vancomycin    Type 2 diabetes mellitus (HCC)- (present on admission)  Assessment & Plan  Glycohemoglobin 6.8  I am titrating an insulin drip to achieve glycemic control    Primary hypertension- (present on admission)  Assessment & Plan  Goal SBP less than 160  Continue amlodipine, 10 mg daily  Continue losartan, 100 mg daily    Alcohol dependence (HCC)- (present on admission)  Assessment & Plan  Cessation counseling when clinically appropriate    BMI 40.0-44.9, adult (HCC)- (present on admission)  Assessment & Plan            VTE:  Contraindicated  Ulcer: PPI  Lines: Central Line  Ongoing indication addressed and Joseph Catheter  Ongoing indication addressed    I have performed a physical exam and reviewed and updated ROS and Plan today (10/7/2023). In review of yesterday's note (10/6/2023), there are no changes except as documented above.     I have assessed and reassessed his respiratory status with ventilator adjustments and spontaneous breathing trials, airway mechanics, ventilator waveforms, blood pressure, hemodynamics, cardiovascular status, serial determinations of serum sodium with titration of hypertonic saline, serial determinations of blood glucose with titration of an insulin drip as well as his neurologic status.  He is at increased risk for worsening respiratory, cardiovascular, endocrine/metabolic and CNS system dysfunction.    Discussed patient condition and risk of morbidity and/or mortality with RN, RT, Pharmacy, Charge nurse / hot rounds, and QA team    The  patient remains critically ill.  Critical care time = 110 minutes in directly providing and coordinating critical care and extensive data review.  No time overlap and excludes procedures.    Pal Serna MD  Pulmonary and Critical Care Medicine

## 2023-10-06 NOTE — THERAPY
Physical Therapy   Daily Treatment     Patient Name: Sharon Guardado  Age:  68 y.o., Sex:  male  Medical Record #: [id number]  Today's Date: 10/6/2023     Precautions  Precautions: Fall Risk  Comments: SBP <160    Assessment  Pt seen for PT tx session with mobility detailed down below. Pt is still at Total A for bed mobility, however he is now able hold himself up at EOB for 30-60 seconds at a time. Pt is still not sagar to track or follow command a majority of the time, however he was moving BLE and LUE intermittently during his time sitting at EOB. Pt still not safe for standing mobility due to poor following and LE weakness. Continue to recommend placement. Will follow.     Plan    Treatment Plan Status: Continue Current Treatment Plan  Type of Treatment: Bed Mobility, Gait Training, Neuro Re-Education / Balance, Self Care / Home Evaluation, Therapeutic Activities, Therapeutic Exercise  Treatment Frequency: 3 Times per Week  Treatment Duration: Until Therapy Goals Met    DC Equipment Recommendations: Unable to determine at this time  Discharge Recommendations: Recommend post-acute placement for additional physical therapy services prior to discharge home        Vitals   O2 Delivery Device Ventilator   Cognition    Cognition / Consciousness X   Speech/ Communication Intubated / Trached;Unable to Communicate   Level of Consciousness Responds to voice   Ability To Follow Commands   (potentially follows ~10% of the time. very minimally)   Comments little to no signs of understanding during session. appears to move feet easier on command than hands.   Balance   Sitting Balance (Static) Poor +   Sitting Balance (Dynamic) Poor -   Weight Shift Sitting Poor   Bed Mobility    Supine to Sit Total Assist   Sit to Supine Total Assist   Scooting Total Assist   Rolling Total Assist to Rt.;Total Assist to Lt.   Comments pt unable to assist   Gait Analysis   Gait Level Of Assist Unable to Participate   Functional Mobility    Sit to Stand Unable to Participate   Bed, Chair, Wheelchair Transfer Unable to Participate   Mobility EOB only   ICU Target Mobility Level   ICU Mobility - Targeted Level Level 2   How much difficulty does the patient currently have...   Turning over in bed (including adjusting bedclothes, sheets and blankets)? 1   Sitting down on and standing up from a chair with arms (e.g., wheelchair, bedside commode, etc.) 1   Moving from lying on back to sitting on the side of the bed? 1   How much help from another person does the patient currently need...   Moving to and from a bed to a chair (including a wheelchair)? 1   Need to walk in a hospital room? 1   Climbing 3-5 steps with a railing? 1   6 clicks Mobility Score 6   Activity Tolerance   Sitting Edge of Bed 15 min   Short Term Goals    Short Term Goal # 1 pt will be able to complete supine<>Sitting with min assist in 6tx in order to improve mobility   Goal Outcome # 1 goal not met   Short Term Goal # 2 pt will be able to complete functional transfers with mod assist in 6tx   Goal Outcome # 2 Goal not met   Short Term Goal # 3 pt will be able to ambulate 15ft with FWW and min assist in 6tx   Goal Outcome # 3 Goal not met   Physical Therapy Treatment Plan   Physical Therapy Treatment Plan Continue Current Treatment Plan   Anticipated Discharge Equipment and Recommendations   DC Equipment Recommendations Unable to determine at this time   Discharge Recommendations Recommend post-acute placement for additional physical therapy services prior to discharge home   Interdisciplinary Plan of Care Collaboration   IDT Collaboration with  Nursing;Occupational Therapist   Patient Position at End of Therapy In Bed;Wrist Restraints Applied  (RN at bedside)   Collaboration Comments RN present for tx   Session Information   Date / Session Number  10/6- 2 (2/3, 10/9)

## 2023-10-06 NOTE — CARE PLAN
Problem: Hemodynamics  Goal: Patient's hemodynamics, fluid balance and neurologic status will be stable or improve  Outcome: Progressing     Problem: Urinary - Renal Perfusion  Goal: Ability to achieve and maintain adequate renal perfusion and functioning will improve  Outcome: Progressing     Problem: Skin Integrity  Goal: Skin integrity is maintained or improved  Outcome: Progressing     The patient is Watcher - Medium risk of patient condition declining or worsening    Shift Goals  Clinical Goals: Q2 neuros, maintain hemodynamic stabilty  Patient Goals: DEENA  Family Goals: DEENA    Progress made toward(s) clinical / shift goals:  stable baseline neuro exams    Patient is not progressing towards the following goals: n/a

## 2023-10-07 ENCOUNTER — APPOINTMENT (OUTPATIENT)
Dept: RADIOLOGY | Facility: MEDICAL CENTER | Age: 68
DRG: 870 | End: 2023-10-07
Attending: INTERNAL MEDICINE
Payer: MEDICARE

## 2023-10-07 LAB
ALBUMIN SERPL BCP-MCNC: 3 G/DL (ref 3.2–4.9)
ALBUMIN/GLOB SERPL: 0.9 G/DL
ALP SERPL-CCNC: 161 U/L (ref 30–99)
ALT SERPL-CCNC: 37 U/L (ref 2–50)
ANION GAP SERPL CALC-SCNC: 10 MMOL/L (ref 7–16)
AST SERPL-CCNC: 47 U/L (ref 12–45)
BACTERIA BRONCH AEROBE CULT: NORMAL
BASE EXCESS BLDA CALC-SCNC: 4 MMOL/L (ref -4–3)
BASOPHILS # BLD AUTO: 0.4 % (ref 0–1.8)
BASOPHILS # BLD: 0.05 K/UL (ref 0–0.12)
BILIRUB SERPL-MCNC: 1 MG/DL (ref 0.1–1.5)
BODY TEMPERATURE: ABNORMAL DEGREES
BREATHS SETTING VENT: 18
BUN SERPL-MCNC: 24 MG/DL (ref 8–22)
CALCIUM ALBUM COR SERPL-MCNC: 9.8 MG/DL (ref 8.5–10.5)
CALCIUM SERPL-MCNC: 9 MG/DL (ref 8.5–10.5)
CHLORIDE SERPL-SCNC: 115 MMOL/L (ref 96–112)
CO2 BLDA-SCNC: 30 MMOL/L (ref 20–33)
CO2 SERPL-SCNC: 26 MMOL/L (ref 20–33)
CREAT SERPL-MCNC: 0.77 MG/DL (ref 0.5–1.4)
DELSYS IDSYS: ABNORMAL
END TIDAL CARBON DIOXIDE IECO2: 42 MMHG
EOSINOPHIL # BLD AUTO: 0.33 K/UL (ref 0–0.51)
EOSINOPHIL NFR BLD: 2.7 % (ref 0–6.9)
ERYTHROCYTE [DISTWIDTH] IN BLOOD BY AUTOMATED COUNT: 48.2 FL (ref 35.9–50)
GFR SERPLBLD CREATININE-BSD FMLA CKD-EPI: 96 ML/MIN/1.73 M 2
GLOBULIN SER CALC-MCNC: 3.3 G/DL (ref 1.9–3.5)
GLUCOSE BLD STRIP.AUTO-MCNC: 132 MG/DL (ref 65–99)
GLUCOSE BLD STRIP.AUTO-MCNC: 136 MG/DL (ref 65–99)
GLUCOSE BLD STRIP.AUTO-MCNC: 136 MG/DL (ref 65–99)
GLUCOSE BLD STRIP.AUTO-MCNC: 137 MG/DL (ref 65–99)
GLUCOSE BLD STRIP.AUTO-MCNC: 138 MG/DL (ref 65–99)
GLUCOSE BLD STRIP.AUTO-MCNC: 139 MG/DL (ref 65–99)
GLUCOSE BLD STRIP.AUTO-MCNC: 140 MG/DL (ref 65–99)
GLUCOSE BLD STRIP.AUTO-MCNC: 144 MG/DL (ref 65–99)
GLUCOSE BLD STRIP.AUTO-MCNC: 145 MG/DL (ref 65–99)
GLUCOSE BLD STRIP.AUTO-MCNC: 149 MG/DL (ref 65–99)
GLUCOSE BLD STRIP.AUTO-MCNC: 149 MG/DL (ref 65–99)
GLUCOSE BLD STRIP.AUTO-MCNC: 150 MG/DL (ref 65–99)
GLUCOSE BLD STRIP.AUTO-MCNC: 151 MG/DL (ref 65–99)
GLUCOSE BLD STRIP.AUTO-MCNC: 152 MG/DL (ref 65–99)
GLUCOSE BLD STRIP.AUTO-MCNC: 152 MG/DL (ref 65–99)
GLUCOSE BLD STRIP.AUTO-MCNC: 159 MG/DL (ref 65–99)
GLUCOSE BLD STRIP.AUTO-MCNC: 160 MG/DL (ref 65–99)
GLUCOSE BLD STRIP.AUTO-MCNC: 165 MG/DL (ref 65–99)
GLUCOSE BLD STRIP.AUTO-MCNC: 178 MG/DL (ref 65–99)
GLUCOSE BLD STRIP.AUTO-MCNC: 182 MG/DL (ref 65–99)
GLUCOSE BLD STRIP.AUTO-MCNC: 203 MG/DL (ref 65–99)
GLUCOSE BLD STRIP.AUTO-MCNC: 206 MG/DL (ref 65–99)
GLUCOSE BLD STRIP.AUTO-MCNC: 215 MG/DL (ref 65–99)
GLUCOSE SERPL-MCNC: 165 MG/DL (ref 65–99)
GRAM STN SPEC: NORMAL
HCO3 BLDA-SCNC: 28.4 MMOL/L (ref 17–25)
HCT VFR BLD AUTO: 38.8 % (ref 42–52)
HGB BLD-MCNC: 12.6 G/DL (ref 14–18)
HOROWITZ INDEX BLDA+IHG-RTO: 200 MM[HG]
IMM GRANULOCYTES # BLD AUTO: 0.07 K/UL (ref 0–0.11)
IMM GRANULOCYTES NFR BLD AUTO: 0.6 % (ref 0–0.9)
LACTATE BLD-SCNC: 0.6 MMOL/L (ref 0.5–2)
LYMPHOCYTES # BLD AUTO: 1.79 K/UL (ref 1–4.8)
LYMPHOCYTES NFR BLD: 14.6 % (ref 22–41)
MAGNESIUM SERPL-MCNC: 1.9 MG/DL (ref 1.5–2.5)
MCH RBC QN AUTO: 31.3 PG (ref 27–33)
MCHC RBC AUTO-ENTMCNC: 32.5 G/DL (ref 32.3–36.5)
MCV RBC AUTO: 96.3 FL (ref 81.4–97.8)
MODE IMODE: ABNORMAL
MONOCYTES # BLD AUTO: 0.7 K/UL (ref 0–0.85)
MONOCYTES NFR BLD AUTO: 5.7 % (ref 0–13.4)
NEUTROPHILS # BLD AUTO: 9.31 K/UL (ref 1.82–7.42)
NEUTROPHILS NFR BLD: 76 % (ref 44–72)
NRBC # BLD AUTO: 0 K/UL
NRBC BLD-RTO: 0 /100 WBC (ref 0–0.2)
O2/TOTAL GAS SETTING VFR VENT: 40 %
PCO2 BLDA: 40.6 MMHG (ref 26–37)
PCO2 TEMP ADJ BLDA: 41.1 MMHG (ref 26–37)
PEEP END EXPIRATORY PRESSURE IPEEP: 8 CMH20
PH BLDA: 7.45 [PH] (ref 7.4–7.5)
PH TEMP ADJ BLDA: 7.45 [PH] (ref 7.4–7.5)
PHOSPHATE SERPL-MCNC: 2.6 MG/DL (ref 2.5–4.5)
PLATELET # BLD AUTO: 248 K/UL (ref 164–446)
PMV BLD AUTO: 11.2 FL (ref 9–12.9)
PO2 BLDA: 80 MMHG (ref 64–87)
PO2 TEMP ADJ BLDA: 82 MMHG (ref 64–87)
POTASSIUM SERPL-SCNC: 4 MMOL/L (ref 3.6–5.5)
PROT SERPL-MCNC: 6.3 G/DL (ref 6–8.2)
RBC # BLD AUTO: 4.03 M/UL (ref 4.7–6.1)
SAO2 % BLDA: 96 % (ref 93–99)
SIGNIFICANT IND 70042: NORMAL
SITE SITE: NORMAL
SODIUM SERPL-SCNC: 149 MMOL/L (ref 135–145)
SODIUM SERPL-SCNC: 151 MMOL/L (ref 135–145)
SODIUM SERPL-SCNC: 152 MMOL/L (ref 135–145)
SOURCE SOURCE: NORMAL
SPECIMEN DRAWN FROM PATIENT: ABNORMAL
TIDAL VOLUME IVT: 470 ML
WBC # BLD AUTO: 12.3 K/UL (ref 4.8–10.8)

## 2023-10-07 PROCEDURE — 83735 ASSAY OF MAGNESIUM: CPT

## 2023-10-07 PROCEDURE — 700105 HCHG RX REV CODE 258: Performed by: INTERNAL MEDICINE

## 2023-10-07 PROCEDURE — A9270 NON-COVERED ITEM OR SERVICE: HCPCS | Performed by: INTERNAL MEDICINE

## 2023-10-07 PROCEDURE — 700102 HCHG RX REV CODE 250 W/ 637 OVERRIDE(OP): Performed by: INTERNAL MEDICINE

## 2023-10-07 PROCEDURE — 94640 AIRWAY INHALATION TREATMENT: CPT

## 2023-10-07 PROCEDURE — 94799 UNLISTED PULMONARY SVC/PX: CPT

## 2023-10-07 PROCEDURE — 99291 CRITICAL CARE FIRST HOUR: CPT | Performed by: INTERNAL MEDICINE

## 2023-10-07 PROCEDURE — 700102 HCHG RX REV CODE 250 W/ 637 OVERRIDE(OP): Performed by: NURSE PRACTITIONER

## 2023-10-07 PROCEDURE — 84100 ASSAY OF PHOSPHORUS: CPT

## 2023-10-07 PROCEDURE — 84295 ASSAY OF SERUM SODIUM: CPT

## 2023-10-07 PROCEDURE — 80053 COMPREHEN METABOLIC PANEL: CPT

## 2023-10-07 PROCEDURE — 36600 WITHDRAWAL OF ARTERIAL BLOOD: CPT

## 2023-10-07 PROCEDURE — 700111 HCHG RX REV CODE 636 W/ 250 OVERRIDE (IP): Performed by: RADIOLOGY

## 2023-10-07 PROCEDURE — 700101 HCHG RX REV CODE 250: Performed by: INTERNAL MEDICINE

## 2023-10-07 PROCEDURE — 99292 CRITICAL CARE ADDL 30 MIN: CPT | Performed by: INTERNAL MEDICINE

## 2023-10-07 PROCEDURE — 71045 X-RAY EXAM CHEST 1 VIEW: CPT

## 2023-10-07 PROCEDURE — A9537 TC99M MEBROFENIN: HCPCS

## 2023-10-07 PROCEDURE — 99233 SBSQ HOSP IP/OBS HIGH 50: CPT | Performed by: PSYCHIATRY & NEUROLOGY

## 2023-10-07 PROCEDURE — 83605 ASSAY OF LACTIC ACID: CPT

## 2023-10-07 PROCEDURE — 82803 BLOOD GASES ANY COMBINATION: CPT

## 2023-10-07 PROCEDURE — 770022 HCHG ROOM/CARE - ICU (200)

## 2023-10-07 PROCEDURE — 700101 HCHG RX REV CODE 250: Performed by: NURSE PRACTITIONER

## 2023-10-07 PROCEDURE — A9270 NON-COVERED ITEM OR SERVICE: HCPCS | Performed by: NURSE PRACTITIONER

## 2023-10-07 PROCEDURE — 99231 SBSQ HOSP IP/OBS SF/LOW 25: CPT | Performed by: SURGERY

## 2023-10-07 PROCEDURE — 700111 HCHG RX REV CODE 636 W/ 250 OVERRIDE (IP): Mod: JZ | Performed by: INTERNAL MEDICINE

## 2023-10-07 PROCEDURE — 85025 COMPLETE CBC W/AUTO DIFF WBC: CPT

## 2023-10-07 PROCEDURE — 700105 HCHG RX REV CODE 258: Performed by: NURSE PRACTITIONER

## 2023-10-07 PROCEDURE — 82962 GLUCOSE BLOOD TEST: CPT | Mod: 91

## 2023-10-07 PROCEDURE — 94003 VENT MGMT INPAT SUBQ DAY: CPT

## 2023-10-07 RX ORDER — MORPHINE SULFATE 4 MG/ML
INJECTION INTRAVENOUS
Status: DISPENSED
Start: 2023-10-07 | End: 2023-10-07

## 2023-10-07 RX ORDER — MAGNESIUM SULFATE HEPTAHYDRATE 40 MG/ML
2 INJECTION, SOLUTION INTRAVENOUS ONCE
Status: COMPLETED | OUTPATIENT
Start: 2023-10-07 | End: 2023-10-07

## 2023-10-07 RX ORDER — FUROSEMIDE 10 MG/ML
40 INJECTION INTRAMUSCULAR; INTRAVENOUS ONCE
Status: COMPLETED | OUTPATIENT
Start: 2023-10-07 | End: 2023-10-07

## 2023-10-07 RX ORDER — MORPHINE SULFATE 4 MG/ML
3 INJECTION INTRAVENOUS ONCE
Status: COMPLETED | OUTPATIENT
Start: 2023-10-07 | End: 2023-10-07

## 2023-10-07 RX ADMIN — SODIUM CHLORIDE 40 ML/HR: 3 INJECTION, SOLUTION INTRAVENOUS at 19:57

## 2023-10-07 RX ADMIN — PIPERACILLIN AND TAZOBACTAM 4.5 G: 4; .5 INJECTION, POWDER, FOR SOLUTION INTRAVENOUS at 20:11

## 2023-10-07 RX ADMIN — SODIUM CHLORIDE 1.5 UNITS/HR: 9 INJECTION, SOLUTION INTRAVENOUS at 21:35

## 2023-10-07 RX ADMIN — FUROSEMIDE 40 MG: 10 INJECTION INTRAMUSCULAR; INTRAVENOUS at 14:59

## 2023-10-07 RX ADMIN — MORPHINE SULFATE 3 MG: 4 INJECTION INTRAVENOUS at 11:27

## 2023-10-07 RX ADMIN — DOCUSATE SODIUM 50 MG AND SENNOSIDES 8.6 MG 2 TABLET: 8.6; 5 TABLET, FILM COATED ORAL at 05:18

## 2023-10-07 RX ADMIN — MEMANTINE 5 MG: 5 TABLET ORAL at 17:13

## 2023-10-07 RX ADMIN — SODIUM CHLORIDE 1 G: 1 TABLET ORAL at 05:18

## 2023-10-07 RX ADMIN — VANCOMYCIN HYDROCHLORIDE 2000 MG: 5 INJECTION, POWDER, LYOPHILIZED, FOR SOLUTION INTRAVENOUS at 08:00

## 2023-10-07 RX ADMIN — POTASSIUM BICARBONATE 50 MEQ: 978 TABLET, EFFERVESCENT ORAL at 15:04

## 2023-10-07 RX ADMIN — MEMANTINE 5 MG: 5 TABLET ORAL at 05:18

## 2023-10-07 RX ADMIN — MAGNESIUM SULFATE HEPTAHYDRATE 2 G: 2 INJECTION, SOLUTION INTRAVENOUS at 12:32

## 2023-10-07 RX ADMIN — DEXMEDETOMIDINE 0.2 MCG/KG/HR: 100 INJECTION, SOLUTION INTRAVENOUS at 08:09

## 2023-10-07 RX ADMIN — LOSARTAN POTASSIUM 100 MG: 50 TABLET, FILM COATED ORAL at 05:18

## 2023-10-07 RX ADMIN — AMLODIPINE BESYLATE 10 MG: 10 TABLET ORAL at 05:18

## 2023-10-07 RX ADMIN — IPRATROPIUM BROMIDE AND ALBUTEROL SULFATE 3 ML: 2.5; .5 SOLUTION RESPIRATORY (INHALATION) at 02:16

## 2023-10-07 RX ADMIN — SODIUM CHLORIDE 60 ML/HR: 3 INJECTION, SOLUTION INTRAVENOUS at 02:22

## 2023-10-07 RX ADMIN — PIPERACILLIN AND TAZOBACTAM 4.5 G: 4; .5 INJECTION, POWDER, FOR SOLUTION INTRAVENOUS at 15:06

## 2023-10-07 RX ADMIN — ATORVASTATIN CALCIUM 40 MG: 40 TABLET, FILM COATED ORAL at 17:13

## 2023-10-07 RX ADMIN — LANSOPRAZOLE 30 MG: 30 TABLET, ORALLY DISINTEGRATING, DELAYED RELEASE ORAL at 05:18

## 2023-10-07 RX ADMIN — POLYETHYLENE GLYCOL 3350 1 PACKET: 17 POWDER, FOR SOLUTION ORAL at 06:00

## 2023-10-07 RX ADMIN — PIPERACILLIN AND TAZOBACTAM 4.5 G: 4; .5 INJECTION, POWDER, FOR SOLUTION INTRAVENOUS at 05:20

## 2023-10-07 RX ADMIN — SODIUM CHLORIDE 60 ML/HR: 3 INJECTION, SOLUTION INTRAVENOUS at 09:07

## 2023-10-07 ASSESSMENT — PAIN DESCRIPTION - PAIN TYPE: TYPE: ACUTE PAIN

## 2023-10-07 NOTE — RESPIRATORY CARE
Extubation    Cuff leak noted yes  Stridor present no    O2 (LPM): 6 (10/07/23 1456)     Patient toleration well  RCP Complete? yes  Events/Summary/Plan: Extubated patient (10/07/23 1456)

## 2023-10-07 NOTE — PROGRESS NOTES
Neurology Progress Note  Neurohospitalist Service, [facility]    Referring Physician: REAL Cage*    Chief Complaint   Patient presents with    Head Injury     Pt fell and hit his head last night     ALOC     Pt arrives A&O1        HPI: Refer to initial documented Neurology H&P, as detailed in the patient's chart.    Interval History [unfilled]:     Review of systems: In addition to what is detailed in the HPI and/or updated in the interval history, all other systems reviewed and are negative.    Past Medical History:    has a past medical history of LE (acute kidney injury), prerenal (CMS-HCC) (1/20/2018), Alcohol withdrawal (HCC) (1/22/2018), Bowel habit changes, CAD (coronary artery disease), Cancer (HCC), Cellulitis of back except buttock (8/3/2018), HTN (hypertension), Hyperglycemia (1/19/2018), Hypertension, Indigestion, Kidney stones, Near-syncope, Nerve compression, and Tinnitus.    FHx:  family history is not on file.    SHx:   reports that he has never smoked. He has never used smokeless tobacco. He reports current alcohol use. He reports that he does not use drugs.    Medications:    Current Facility-Administered Medications:     magnesium sulfate IVPB premix 2 g, 2 g, Intravenous, Once, Pal Serna M.D.    senna-docusate (Pericolace Or Senokot S) 8.6-50 MG per tablet 2 Tablet, 2 Tablet, Enteral Tube, BID, 2 Tablet at 10/07/23 0518 **AND** polyethylene glycol/lytes (Miralax) PACKET 1 Packet, 1 Packet, Enteral Tube, DAILY, 1 Packet at 10/07/23 0600 **AND** magnesium hydroxide (Milk Of Magnesia) suspension 30 mL, 30 mL, Enteral Tube, QDAY PRN **AND** bisacodyl (Dulcolax) suppository 10 mg, 10 mg, Rectal, QDAY PRN, Jacey Villanueva    sodium chloride (Salt) tablet 1 g, 1 g, Enteral Tube, Q8HRS, Pal Serna M.D., 1 g at 10/07/23 0518    insulin regular (Humulin R) 100 Units in  mL Infusion, 0-29 Units/hr, Intravenous, Continuous, Pal Gallagher  KYA Medina, Last Rate: 1.5 mL/hr at 10/07/23 0816, 1.5 Units/hr at 10/07/23 0816    dextrose 50% (D50W) injection 12.5-25 g, 12.5-25 g, Intravenous, PRN, Pal Serna M.D.    losartan (Cozaar) tablet 100 mg, 100 mg, Enteral Tube, DAILY, Pal Serna M.D., 100 mg at 10/07/23 0518    [COMPLETED] piperacillin-tazobactam (Zosyn) 4.5 g in  mL IVPB, 4.5 g, Intravenous, Once, Stopped at 10/05/23 1743 **AND** piperacillin-tazobactam (Zosyn) 4.5 g in  mL IVPB, 4.5 g, Intravenous, Q8HRS, Pal Serna M.D., Last Rate: 25 mL/hr at 10/07/23 0520, 4.5 g at 10/07/23 0520    MD Alert...Vancomycin per Pharmacy, , Other, PHARMACY TO DOSE, Pal Serna M.D.    vancomycin (Vancocin) 2,000 mg in  mL IVPB, 2,000 mg, Intravenous, Q12HR, Pal Serna M.D., Last Rate: 250 mL/hr at 10/07/23 0800, 2,000 mg at 10/07/23 0800    ipratropium-albuterol (DUONEB) nebulizer solution, 3 mL, Nebulization, Q6HRS (RT), Jacey TAVAREZ. Latona, 3 mL at 10/07/23 0216    enalaprilat (Vasotec) injection 1.25 mg 1 mL, 1.25 mg, Intravenous, Q6HRS PRN, Jacey LIftikhar Latnica    labetalol (Normodyne/Trandate) injection 10 mg, 10 mg, Intravenous, Q6HRS PRN, Jacey L. Latona, 10 mg at 10/05/23 0832    dexmedetomidine (PRECEDEX) 400 mcg/100mL NS premix infusion, 0.1-1.5 mcg/kg/hr (Ideal), Intravenous, Continuous, Pal Serna M.D., Last Rate: 3.9 mL/hr at 10/07/23 0809, 0.2 mcg/kg/hr at 10/07/23 0809    HYDROmorphone (Dilaudid) injection 0.5-2 mg, 0.5-2 mg, Intravenous, Q HOUR PRN, Pal Serna M.D., 2 mg at 10/05/23 1547    amLODIPine (Norvasc) tablet 10 mg, 10 mg, Enteral Tube, DAILY, aPl Serna M.D., 10 mg at 10/07/23 0518    Pharmacy Consult: Enteral tube insertion - review meds/change route/product selection, 1 Each, Other, PHARMACY TO DOSE, Pal Serna M.D.    niCARdipine (Cardene) 25 mg in  mL Standard Infusion, 0-15 mg/hr, Intravenous, Continuous, Jacey BEYER  Kay, Stopped at 10/06/23 0343    Respiratory Therapy Consult, , Nebulization, Continuous RT, Josiah Casiano M.D. MD Alert...ICU Electrolyte Replacement per Pharmacy, , Other, PHARMACY TO DOSE, Josiah Casiano M.D.    lidocaine (Xylocaine) 1 % injection 2 mL, 2 mL, Tracheal Tube, Q30 MIN PRN, Lizandro Greenwood M.D.    acetaminophen (Tylenol) tablet 650 mg, 650 mg, Enteral Tube, Q4HRS PRN, Lizandro Greenwood M.D., 650 mg at 10/03/23 2311    atorvastatin (Lipitor) tablet 40 mg, 40 mg, Enteral Tube, Q EVENING, Lizandro Greenwood M.D., 40 mg at 10/06/23 1711    memantine (Namenda) tablet 5 mg, 5 mg, Enteral Tube, BID, Lizandro Greenwood M.D., 5 mg at 10/07/23 0518    lansoprazole (Prevacid) solutab 30 mg, 30 mg, Enteral Tube, DAILY, Lizandro Greenwood M.D., 30 mg at 10/07/23 0518    ondansetron (Zofran ODT) dispertab 4 mg, 4 mg, Enteral Tube, Q4HRS PRN, Lizandro Greenwood M.D.    3% sodium chloride (Hypertonic Saline) 500mL infusion, 0-60 mL/hr, Intravenous, Continuous, Jacey Villanueva, Last Rate: 60 mL/hr at 10/07/23 0703, 60 mL/hr at 10/07/23 0703    ondansetron (Zofran) syringe/vial injection 4 mg, 4 mg, Intravenous, Q4HRS PRN, Rhoda Argueta M.D., 4 mg at 09/29/23 0151    hydrALAZINE (Apresoline) injection 20 mg, 20 mg, Intravenous, Q6HRS PRN, Jacey BEYER Latona, 20 mg at 10/05/23 1007    Physical Examination:     Vitals:    10/07/23 0400 10/07/23 0500 10/07/23 0600 10/07/23 0800   BP: (!) 152/66 (!) 160/66 (!) 156/67 (!) 155/68   Pulse: 77 64 63 77   Resp: 13 20 18 20   Temp:       TempSrc:       SpO2: 96% 95% 96% 98%   Weight:       Height:               NEUROLOGICAL EXAM:     Patient is intubated and on light sedation.  He is awake and following commands.  But not all commands.  Cranial nerves pupils are equal reactive.  No gaze preference.  Blinks to threat to both sides.  Face looks grossly symmetric.  He has spontaneous antigravity strength in the right arm and leg.  In left lower extremity.  Left  upper extremity no movement to noxious stimuli.  Reacts to touch on the right and left lower extremity.    Objective Data:    Labs:  Lab Results   Component Value Date/Time    PROTHROMBTM 14.5 09/30/2023 01:50 PM    INR 1.12 09/30/2023 01:50 PM      Lab Results   Component Value Date/Time    WBC 12.3 (H) 10/07/2023 03:50 AM    RBC 4.03 (L) 10/07/2023 03:50 AM    HEMOGLOBIN 12.6 (L) 10/07/2023 03:50 AM    HEMATOCRIT 38.8 (L) 10/07/2023 03:50 AM    MCV 96.3 10/07/2023 03:50 AM    MCH 31.3 10/07/2023 03:50 AM    MCHC 32.5 10/07/2023 03:50 AM    MPV 11.2 10/07/2023 03:50 AM    NEUTSPOLYS 76.00 (H) 10/07/2023 03:50 AM    LYMPHOCYTES 14.60 (L) 10/07/2023 03:50 AM    MONOCYTES 5.70 10/07/2023 03:50 AM    EOSINOPHILS 2.70 10/07/2023 03:50 AM    BASOPHILS 0.40 10/07/2023 03:50 AM      Lab Results   Component Value Date/Time    SODIUM 151 (H) 10/07/2023 03:50 AM    POTASSIUM 4.0 10/07/2023 03:50 AM    CHLORIDE 115 (H) 10/07/2023 03:50 AM    CO2 26 10/07/2023 03:50 AM    GLUCOSE 165 (H) 10/07/2023 03:50 AM    BUN 24 (H) 10/07/2023 03:50 AM    CREATININE 0.77 10/07/2023 03:50 AM    CREATININE 1.0 05/13/2009 03:30 PM      Lab Results   Component Value Date/Time    CHOLSTRLTOT 118 09/30/2023 11:20 PM    LDL 57 09/30/2023 11:20 PM    HDL 12 (A) 09/30/2023 11:20 PM    TRIGLYCERIDE 247 (H) 09/30/2023 11:20 PM       Lab Results   Component Value Date/Time    ALKPHOSPHAT 161 (H) 10/07/2023 03:50 AM    ASTSGOT 47 (H) 10/07/2023 03:50 AM    ALTSGPT 37 10/07/2023 03:50 AM    TBILIRUBIN 1.0 10/07/2023 03:50 AM        Imaging/Testing:  DX-CHEST-PORTABLE (1 VIEW)   Final Result         1.  Mild pulmonary edema and/or infiltrates.   2.  Cardiomegaly   3.  Atherosclerosis      DX-CHEST-PORTABLE (1 VIEW)   Final Result         1.  No acute cardiopulmonary disease.   2.  Cardiomegaly   3.  Atherosclerosis      US-RUQ   Final Result      1.  Minimal gallbladder wall thickening and possible sludge.  Cholecystitis is not excluded.   2.  Minimal  pericholecystic fluid may be due to gallbladder inflammation or liver disease.   3.  Enlarged echogenic liver suggesting fatty infiltration.   4.  Limited evaluation of the pancreas abdominal aorta.      DX-CHEST-PORTABLE (1 VIEW)   Final Result         1.  Pulmonary edema and/or infiltrates are identified, which are stable since the prior exam.   2.  Cardiomegaly   3.  Atherosclerosis      US-EXTREMITY VENOUS UPPER UNILAT RIGHT   Final Result      DX-CHEST-PORTABLE (1 VIEW)   Final Result         1.  Pulmonary edema and/or infiltrates are identified, which are stable since the prior exam.   2.  Cardiomegaly   3.  Atherosclerosis      DX-CHEST-PORTABLE (1 VIEW)   Final Result         1.  Pulmonary edema and/or infiltrates are identified, which are stable since the prior exam.   2.  Cardiomegaly   3.  Atherosclerosis      DX-CHEST-PORTABLE (1 VIEW)   Final Result         1.  Pulmonary edema and/or infiltrates are identified, which are somewhat decreased since the prior exam.   2.  Cardiomegaly   3.  Atherosclerosis      MR-MRA HEAD-W/O   Final Result         Normal intracranial MRA.      MR-BRAIN-WITH & W/O   Final Result         Left frontal lobe hemorrhage with surrounding vasogenic edema and mild mass effect upon the frontal horn of the left lateral ventricle. There is approximately 2 mm of midline shift to the right.      Age-related volume loss and chronic microvascular ischemic changes.      No abnormal intracranial enhancement is identified.      CT-HEAD W/O   Final Result         1. Stable 6 cm left frontal intraparenchymal hemorrhage with mild, 2 mm left-to-right subfalcine herniation.   2. No new abnormality or change.         DX-CHEST-PORTABLE (1 VIEW)   Final Result         1.  Pulmonary edema and/or infiltrates are identified, which appear somewhat increased since the prior exam.   2.  Cardiomegaly   3.  Atherosclerosis      DX-ABDOMEN FOR TUBE PLACEMENT   Final Result      NG tube extends below the  diaphragm and appears to extend through the region of the stomach with tip at the level of the proximal duodenum.      DX-CHEST-PORTABLE (1 VIEW)   Final Result      1.  Grossly satisfactory appearance of tubes and lines.   2.  No evidence of pneumothorax.   3.  Marked hypoinflation with probable bibasilar atelectasis.   4.  Enlarged cardiac silhouette with vascular congestion.      CT-HEAD W/O   Final Result         1.  New large parenchymal hemorrhage involving the frontal lobe is identified with some surrounding edema.      2.  Subarachnoid hemorrhage is also noted in the area.      3.  Minimal midline shift to the right side.      Findings are consistent with atrophy.  Decreased attenuation in the periventricular white matter likely indicates microvascular ischemic disease.      Based solely on CT findings, the brain injury guideline category is mBIG 3.      EDH   IVH   Displaced skull fx   SDH > 8mm   IPH > 8mm or multiple   SAH bi-hemispheric or > 3mm      The original BIG retrospective analysis found radiographic progression in 0% of BIG 1 patients and 2.6% BIG 2.      These findings were discussed with GEETA MOLINA on 09/30/2023.      DX-CHEST-PORTABLE (1 VIEW)   Final Result      Stable multiple pulmonary vascular dilation/congestion. Otherwise, negative.      DX-CHEST-PORTABLE (1 VIEW)   Final Result      1.  Increasing airspace disease at the left base.      DX-CHEST-LIMITED (1 VIEW)   Final Result         1.  Pulmonary edema and/or infiltrates are identified, which are somewhat decreased since the prior exam.   2.  Cardiomegaly   3.  Atherosclerosis      DX-CHEST-PORTABLE (1 VIEW)   Final Result         1.  Pulmonary edema and/or infiltrates are identified, which appear somewhat increased since the prior exam.   2.  Cardiomegaly   3.  Atherosclerosis      DX-CHEST-PORTABLE (1 VIEW)   Final Result         1.  Interstitial edema and/or infiltrates, slightly decreased since prior study.   2.  Cardiomegaly    3.  Atherosclerosis      DX-CHEST-LIMITED (1 VIEW)   Final Result      Right IJ catheter tip projects in the SVC. No pneumothorax.      CT-CTA NECK WITH & W/O-POST PROCESSING   Final Result      1.  Estimated 50-75% stenosis of the BILATERAL carotid arteries   2.  Estimated greater than 50% stenosis at the vertebral artery ostium on each side      CT-CTA HEAD WITH & W/O-POST PROCESS   Final Result      1.  No large vessel occlusion or aneurysm.   2.  Hazy left frontal lobe subarachnoid hemorrhage, decreased in conspicuity from prior. No new hemorrhage seen.   3.  Scattered opacifications of the ethmoid sinuses, likely related to support hardware.      DX-ABDOMEN FOR TUBE PLACEMENT   Final Result         1.  Nonspecific bowel gas pattern in the upper abdomen.   2.  Dobbhoff tube with tip terminating overlying the expected location of the first or second duodenal segment.      DX-CHEST-PORTABLE (1 VIEW)   Final Result         1.  Interstitial edema and/or infiltrates.   2.  Cardiomegaly   3.  Nasogastric tube terminates in the distal esophagus, recommend advancement   4.  Atherosclerosis      DX-ABDOMEN FOR TUBE PLACEMENT   Final Result         1.  Nonspecific bowel gas pattern in the upper abdomen.   2.  Nasogastric tube terminates just distal to the gastroesophageal junction, recommend advancement      CT-RENAL COLIC EVALUATION(A/P W/O)   Final Result         1.  Intrahepatic biliary air and nondependent air within the gallbladder, consider history of instrumentation or changes of cholangitis in the appropriate clinical setting.   2.  Large fat-containing bilateral inguinal hernias   3.  Enlarged prostate, consider causes of prostate enlargement with additional workup as clinically appropriate   4.  Diverticulosis   5.  Cholelithiasis   6.  Atherosclerosis      CT-HEAD W/O   Final Result         1.  Hazy left frontal subarachnoid hemorrhage.   2.  Nonspecific white matter changes, commonly associated with small  vessel ischemic disease.  Associated mild cerebral atrophy is noted.      Based solely on CT findings, the brain injury guideline category is mBIG 1.      SDH < 4mm   IPH < 4mm   SAH < 3 sulci and < 1mm      The original BIG retrospective analysis found radiographic progression in 0% of BIG 1 patients and 2.6% BIG 2.      These findings were discussed with the patient's clinician, Madhu Zambrano, on 9/24/2023 10:10 PM.      DX-CHEST-PORTABLE (1 VIEW)   Final Result      No acute cardiopulmonary abnormality identified.      NM-BILIARY (HIDA) SCAN WITH CCK    (Results Pending)         Assessment and Plan:    68-year-old male history of alcohol abuse coagulopathy and thrombocytopenia resume minute on 9/24 due to urosepsis.  Had a change in mental status on 9/28 found to have a large left frontal IPH.  Patient currently intubated.  On hypertonic therapy.  Etiology of the bleed is mostly multifactorial given he was on Lovenox for DVT prophylaxis along with the thrombocytopenia with coagulopathy.  Plan today is to wean off sedation and try to extubate.  Also wean off hypertonic therapy and slowly correct sodium levels.    Plan:  1.  Okay to start weaning off hypertonic therapy.  Slowly correct sodium levels.  Please avoid over correcting in for a drop of 3-4 points per day.  2.  Blood pressure parameters keep between 100-160 systolic.  Aim for normotensive.  3.  Ringing in extubate per the primary team.  4.  SCDs for DVT prophylaxis only.  Would avoid chemical prophylaxis given the recent history.  5.  Former neurology standpoint okay to extend neurochecks to 4 hours.    Spent over 57 minutes reviewing this chart including over notes, labs, CT scans.  And going over the care plan with the primary team.      This chart was partially generated using voice recognition technology and sound alike word replacement may be present, best efforts were made to make the chart accurate.    Vinicius Guardado MD  Board Certified  Neurology, ABPN  (t) [phone]

## 2023-10-07 NOTE — PROGRESS NOTES
Monitor summary    Heart rate 53-67  Sinus bradycardia to sinus rhythm    Pr 1.9/qrs 0.5/ qt 0.41

## 2023-10-07 NOTE — PROGRESS NOTES
Spoke with Dr Bowling.  Holding TF at this time awaiting results of Hyda Scan and working toward extubation this afternoon.

## 2023-10-07 NOTE — PROGRESS NOTES
"  DATE: 10/7/2023    Hospital Day 14  sepsis complicated by hemorrhagic stroke .    INTERVAL EVENTS:  Remains afebrile and hemodynamically stable, no vasopressor requirement. Labs with mild persistent leukocytosis at 12, LFTs without significant elevation. HIDA scan today with filling of the gallbladder not consistent with acute cholecystitis.    REVIEW OF SYSTEMS:  Comprehensive review of systems is not able to be elicited from the patient secondary to the acuity of the clinical situation.    PHYSICAL EXAMINATION:  Vital Signs: /63   Pulse (!) 54   Temp 37.5 °C (99.5 °F) (Bladder)   Resp 13   Ht 1.829 m (6' 0.01\")   Wt (!) 134 kg (295 lb 6.7 oz)   SpO2 96%   Physical Exam  Vitals and nursing note reviewed.   Constitutional:       General: He is not in acute distress.     Appearance: He is ill-appearing. He is not toxic-appearing.      Interventions: He is intubated.   HENT:      Head: Atraumatic.      Right Ear: External ear normal.      Left Ear: External ear normal.      Nose:      Comments: Nasogastric tube in place.     Mouth/Throat:      Comments: Endotracheal tube in place.  Eyes:      General: No scleral icterus.     Pupils: Pupils are equal, round, and reactive to light.   Neck:      Comments: Left internal jugular central venous catheter in place.  Cardiovascular:      Rate and Rhythm: Regular rhythm.   Pulmonary:      Effort: He is intubated.      Breath sounds: Examination of the right-upper field reveals decreased breath sounds. Examination of the left-upper field reveals decreased breath sounds. Examination of the right-middle field reveals decreased breath sounds. Examination of the left-middle field reveals decreased breath sounds. Examination of the right-lower field reveals decreased breath sounds. Examination of the left-lower field reveals decreased breath sounds. Decreased breath sounds present.   Abdominal:      General: Abdomen is protuberant.      Palpations: Abdomen is soft.      " Tenderness: There is no abdominal tenderness. There is no guarding or rebound.   Genitourinary:     Comments: Joseph catheter in place.  Musculoskeletal:         General: No tenderness.      Right lower leg: Edema present.      Left lower leg: Edema present.   Skin:     General: Skin is warm and dry.      Capillary Refill: Capillary refill takes less than 2 seconds.      Coloration: Skin is not jaundiced.   Neurological:      GCS: GCS eye subscore is 4. GCS verbal subscore is 1. GCS motor subscore is 6.   Psychiatric:      Comments: Unable to evaluate due to clinical condition.         LABORATORY VALUES:  Recent Labs     10/05/23  0535 10/06/23  0545 10/07/23  0350   WBC 13.7* 12.8* 12.3*   RBC 4.06* 4.09* 4.03*   HEMOGLOBIN 12.7* 12.7* 12.6*   HEMATOCRIT 38.3* 38.6* 38.8*   MCV 94.3 94.4 96.3   MCH 31.3 31.1 31.3   MCHC 33.2 32.9 32.5   RDW 48.3 47.2 48.2   PLATELETCT 232 238 248   MPV 11.0 10.8 11.2     Recent Labs     10/05/23  0535 10/05/23  1220 10/06/23  0545 10/06/23  1252 10/06/23  2145 10/07/23  0350 10/07/23  0950   SODIUM 149*   < > 147*  147*   < > 150* 151* 149*   POTASSIUM 3.9  --  4.1  4.1  --   --  4.0  --    CHLORIDE 110  --  110  110  --   --  115*  --    CO2 29  --  30  30  --   --  26  --    GLUCOSE 281*  --  249*  249*  --   --  165*  --    BUN 22  --  25*  25*  --   --  24*  --    CREATININE 0.76  --  0.77  0.77  --   --  0.77  --    CALCIUM 8.9  --  9.0  9.0  --   --  9.0  --     < > = values in this interval not displayed.     Recent Labs     10/06/23  0545 10/07/23  0350   ASTSGOT 43 47*   ALTSGPT 34 37   TBILIRUBIN 0.9 1.0   ALKPHOSPHAT 153* 161*   GLOBULIN 3.2 3.3           IMAGING:  NM-HEPATOBILIARY SCAN   Final Result      There is visualization of the gallbladder after morphine administration, indicating the cystic duct is still patent. No scintigraphy evidence of acute cholecystitis.      DX-CHEST-PORTABLE (1 VIEW)   Final Result         1.  Mild pulmonary edema and/or  infiltrates.   2.  Cardiomegaly   3.  Atherosclerosis      DX-CHEST-PORTABLE (1 VIEW)   Final Result         1.  No acute cardiopulmonary disease.   2.  Cardiomegaly   3.  Atherosclerosis      US-RUQ   Final Result      1.  Minimal gallbladder wall thickening and possible sludge.  Cholecystitis is not excluded.   2.  Minimal pericholecystic fluid may be due to gallbladder inflammation or liver disease.   3.  Enlarged echogenic liver suggesting fatty infiltration.   4.  Limited evaluation of the pancreas abdominal aorta.      DX-CHEST-PORTABLE (1 VIEW)   Final Result         1.  Pulmonary edema and/or infiltrates are identified, which are stable since the prior exam.   2.  Cardiomegaly   3.  Atherosclerosis      US-EXTREMITY VENOUS UPPER UNILAT RIGHT   Final Result      DX-CHEST-PORTABLE (1 VIEW)   Final Result         1.  Pulmonary edema and/or infiltrates are identified, which are stable since the prior exam.   2.  Cardiomegaly   3.  Atherosclerosis      DX-CHEST-PORTABLE (1 VIEW)   Final Result         1.  Pulmonary edema and/or infiltrates are identified, which are stable since the prior exam.   2.  Cardiomegaly   3.  Atherosclerosis      DX-CHEST-PORTABLE (1 VIEW)   Final Result         1.  Pulmonary edema and/or infiltrates are identified, which are somewhat decreased since the prior exam.   2.  Cardiomegaly   3.  Atherosclerosis      MR-MRA HEAD-W/O   Final Result         Normal intracranial MRA.      MR-BRAIN-WITH & W/O   Final Result         Left frontal lobe hemorrhage with surrounding vasogenic edema and mild mass effect upon the frontal horn of the left lateral ventricle. There is approximately 2 mm of midline shift to the right.      Age-related volume loss and chronic microvascular ischemic changes.      No abnormal intracranial enhancement is identified.      CT-HEAD W/O   Final Result         1. Stable 6 cm left frontal intraparenchymal hemorrhage with mild, 2 mm left-to-right subfalcine herniation.    2. No new abnormality or change.         DX-CHEST-PORTABLE (1 VIEW)   Final Result         1.  Pulmonary edema and/or infiltrates are identified, which appear somewhat increased since the prior exam.   2.  Cardiomegaly   3.  Atherosclerosis      DX-ABDOMEN FOR TUBE PLACEMENT   Final Result      NG tube extends below the diaphragm and appears to extend through the region of the stomach with tip at the level of the proximal duodenum.      DX-CHEST-PORTABLE (1 VIEW)   Final Result      1.  Grossly satisfactory appearance of tubes and lines.   2.  No evidence of pneumothorax.   3.  Marked hypoinflation with probable bibasilar atelectasis.   4.  Enlarged cardiac silhouette with vascular congestion.      CT-HEAD W/O   Final Result         1.  New large parenchymal hemorrhage involving the frontal lobe is identified with some surrounding edema.      2.  Subarachnoid hemorrhage is also noted in the area.      3.  Minimal midline shift to the right side.      Findings are consistent with atrophy.  Decreased attenuation in the periventricular white matter likely indicates microvascular ischemic disease.      Based solely on CT findings, the brain injury guideline category is mBIG 3.      EDH   IVH   Displaced skull fx   SDH > 8mm   IPH > 8mm or multiple   SAH bi-hemispheric or > 3mm      The original BIG retrospective analysis found radiographic progression in 0% of BIG 1 patients and 2.6% BIG 2.      These findings were discussed with GEETA MOLINA on 09/30/2023.      DX-CHEST-PORTABLE (1 VIEW)   Final Result      Stable multiple pulmonary vascular dilation/congestion. Otherwise, negative.      DX-CHEST-PORTABLE (1 VIEW)   Final Result      1.  Increasing airspace disease at the left base.      DX-CHEST-LIMITED (1 VIEW)   Final Result         1.  Pulmonary edema and/or infiltrates are identified, which are somewhat decreased since the prior exam.   2.  Cardiomegaly   3.  Atherosclerosis      DX-CHEST-PORTABLE (1 VIEW)    Final Result         1.  Pulmonary edema and/or infiltrates are identified, which appear somewhat increased since the prior exam.   2.  Cardiomegaly   3.  Atherosclerosis      DX-CHEST-PORTABLE (1 VIEW)   Final Result         1.  Interstitial edema and/or infiltrates, slightly decreased since prior study.   2.  Cardiomegaly   3.  Atherosclerosis      DX-CHEST-LIMITED (1 VIEW)   Final Result      Right IJ catheter tip projects in the SVC. No pneumothorax.      CT-CTA NECK WITH & W/O-POST PROCESSING   Final Result      1.  Estimated 50-75% stenosis of the BILATERAL carotid arteries   2.  Estimated greater than 50% stenosis at the vertebral artery ostium on each side      CT-CTA HEAD WITH & W/O-POST PROCESS   Final Result      1.  No large vessel occlusion or aneurysm.   2.  Hazy left frontal lobe subarachnoid hemorrhage, decreased in conspicuity from prior. No new hemorrhage seen.   3.  Scattered opacifications of the ethmoid sinuses, likely related to support hardware.      DX-ABDOMEN FOR TUBE PLACEMENT   Final Result         1.  Nonspecific bowel gas pattern in the upper abdomen.   2.  Dobbhoff tube with tip terminating overlying the expected location of the first or second duodenal segment.      DX-CHEST-PORTABLE (1 VIEW)   Final Result         1.  Interstitial edema and/or infiltrates.   2.  Cardiomegaly   3.  Nasogastric tube terminates in the distal esophagus, recommend advancement   4.  Atherosclerosis      DX-ABDOMEN FOR TUBE PLACEMENT   Final Result         1.  Nonspecific bowel gas pattern in the upper abdomen.   2.  Nasogastric tube terminates just distal to the gastroesophageal junction, recommend advancement      CT-RENAL COLIC EVALUATION(A/P W/O)   Final Result         1.  Intrahepatic biliary air and nondependent air within the gallbladder, consider history of instrumentation or changes of cholangitis in the appropriate clinical setting.   2.  Large fat-containing bilateral inguinal hernias   3.   Enlarged prostate, consider causes of prostate enlargement with additional workup as clinically appropriate   4.  Diverticulosis   5.  Cholelithiasis   6.  Atherosclerosis      CT-HEAD W/O   Final Result         1.  Hazy left frontal subarachnoid hemorrhage.   2.  Nonspecific white matter changes, commonly associated with small vessel ischemic disease.  Associated mild cerebral atrophy is noted.      Based solely on CT findings, the brain injury guideline category is mBIG 1.      SDH < 4mm   IPH < 4mm   SAH < 3 sulci and < 1mm      The original BIG retrospective analysis found radiographic progression in 0% of BIG 1 patients and 2.6% BIG 2.      These findings were discussed with the patient's clinician, Madhu Zambrano, on 9/24/2023 10:10 PM.      DX-CHEST-PORTABLE (1 VIEW)   Final Result      No acute cardiopulmonary abnormality identified.          ASSESSMENT AND PLAN:    Pneumobilia- (present on admission)  Assessment & Plan  Admitted with sepsis, now off vasopressor however increasing blood sugars over past day.  Mild tenderness in right-upper quadrant on exam.  Mild leukocytosis, no acidosis, no significant elevation of LFTs.  Unclear if he has a history of sphincterotomy, he has had an EGD with EUS and biopsy for evaluation of pancreatitis.  Ultrasound with mild wall thickening not definitive for cholecystitis, pericholecystitic fluid also could be result of resuscitation.  HIDA with filling of the gallbladder not consistent with cholecystitis, no indication for cholecystostomy tube at this time.      HIDA not consistent with cholecystitis, it is reassuring that Mr Guardado is tolerating tube feeds at goal and has no significant tenderness on exam. Agree with continued Zosyn given that he is clinically improving, unclear what the source of air in the biliary system was on initial CT scan may be related to biliary instrumentation related to reported history of pancreatitis. No indications for acute surgical  interventions at this time, please contact with questions or concerns.         ____________________________________     Sergey Gotti M.D.    DD: 10/7/2023  12:26 PM

## 2023-10-07 NOTE — CARE PLAN
Problem: Hemodynamics  Goal: Patient's hemodynamics, fluid balance and neurologic status will be stable or improve  Outcome: Progressing     Problem: Fluid Volume  Goal: Fluid volume balance will be maintained  Outcome: Progressing     Problem: Urinary - Renal Perfusion  Goal: Ability to achieve and maintain adequate renal perfusion and functioning will improve  Outcome: Progressing     Problem: Respiratory  Goal: Patient will achieve/maintain optimum respiratory ventilation and gas exchange  Outcome: Progressing     Problem: Physical Regulation  Goal: Diagnostic test results will improve  Outcome: Progressing  Goal: Signs and symptoms of infection will decrease  Outcome: Progressing     Problem: Knowledge Deficit - Standard  Goal: Patient and family/care givers will demonstrate understanding of plan of care, disease process/condition, diagnostic tests and medications  Outcome: Progressing     Problem: Skin Integrity  Goal: Skin integrity is maintained or improved  Outcome: Progressing     Problem: Fall Risk  Goal: Patient will remain free from falls  Outcome: Progressing     Problem: Pain - Standard  Goal: Alleviation of pain or a reduction in pain to the patient’s comfort goal  Outcome: Progressing     Problem: Seizure Precautions  Goal: Implementation of seizure precautions  Outcome: Progressing     Problem: Lifestyle Changes  Goal: Patient's ability to identify lifestyle changes and available resources to help reduce recurrence of condition will improve  Outcome: Progressing     Problem: Psychosocial  Goal: Patient's level of anxiety will decrease  Outcome: Progressing  Goal: Spiritual and cultural needs incorporated into hospitalization  Outcome: Progressing     Problem: Risk for Aspiration  Goal: Patient's risk for aspiration will be absent or decrease  Outcome: Progressing     Problem: Optimal Care of the Stroke Patient  Goal: Optimal emergency care for the stroke patient  Outcome: Progressing  Goal:  Optimal acute care for the stroke patient  Outcome: Progressing     Problem: Knowledge Deficit - Stroke Education  Goal: Patient's knowledge of stroke and risk factors will improve  Outcome: Progressing     Problem: Psychosocial - Patient Condition  Goal: Patient's ability to verbalize feelings about condition will improve  Outcome: Progressing  Goal: Patient's ability to re-evaluate and adapt role responsibilities will improve  Outcome: Progressing     Problem: Discharge Planning - Stroke  Goal: Ensure Stroke Core Measures are met prior to discharge  Outcome: Progressing  Goal: Patient’s continuum of care needs will be met  Outcome: Progressing     Problem: Neuro Status  Goal: Neuro status will remain stable or improve  Outcome: Progressing     Problem: Hemodynamic Monitoring  Goal: Patient's hemodynamics, fluid balance and neurologic status will be stable or improve  Outcome: Progressing     Problem: Respiratory - Stroke Patient  Goal: Patient will achieve/maintain optimum respiratory rate/effort  Outcome: Progressing     Problem: Dysphagia  Goal: Dysphagia will improve  Outcome: Progressing     Problem: Urinary Elimination  Goal: Establish and maintain regular urinary output  Outcome: Progressing     Problem: Bowel Elimination  Goal: Establish and maintain regular bowel function  Outcome: Progressing     Problem: Mobility - Stroke  Goal: Patient's capacity to carry out activities will improve  Outcome: Progressing  Goal: Spasticity will be prevented or improved  Outcome: Progressing  Goal: Subluxation will be prevented or improved  Outcome: Progressing     Problem: Self Care  Goal: Patient will have the ability to perform ADLs independently or with assistance (bathe, groom, dress, toilet and feed)  Outcome: Progressing     Problem: Safety - Medical Restraint  Goal: Remains free of injury from restraints (Restraint for Interference with Medical Device)  Outcome: Progressing  Goal: Free from restraint(s) (Restraint  for Interference with Medical Device)  Outcome: Progressing   The patient is Watcher - Medium risk of patient condition declining or worsening    Shift Goals  Clinical Goals: q2 neuros, maintain hemodynamic stability, SAT/SBT  Patient Goals: DEENA  Family Goals: DEENA    Progress made toward(s) clinical / shift goals:  improve neuro, manage pain, stable vs, no fevers, prog mob    Patient is not progressing towards the following goals: n/a

## 2023-10-07 NOTE — CARE PLAN
The patient is Watcher - Medium risk of patient condition declining or worsening    Shift Goals  Clinical Goals: neuro q2. Work towards extubation    Problem: Hemodynamics  Goal: Patient's hemodynamics, fluid balance and neurologic status will be stable or improve  Outcome: Progressing     Problem: Fluid Volume  Goal: Fluid volume balance will be maintained  Outcome: Progressing     Problem: Urinary - Renal Perfusion  Goal: Ability to achieve and maintain adequate renal perfusion and functioning will improve  Outcome: Progressing     Problem: Respiratory  Goal: Patient will achieve/maintain optimum respiratory ventilation and gas exchange  Outcome: Progressing     Problem: Skin Integrity  Goal: Skin integrity is maintained or improved  Outcome: Progressing     Problem: Pain - Standard  Goal: Alleviation of pain or a reduction in pain to the patient’s comfort goal  Outcome: Progressing     Problem: Seizure Precautions  Goal: Implementation of seizure precautions  Outcome: Progressing     Problem: Risk for Aspiration  Goal: Patient's risk for aspiration will be absent or decrease  Outcome: Progressing     Patient Goals: tin  Family Goals: tin

## 2023-10-07 NOTE — PROGRESS NOTES
Critical Care Progress Note    Date of admission  9/24/2023    Chief Complaint  68 y.o. male admitted 9/24/2023 with urosepsis.  He has a history of DM type II, primary hypertension, alcohol dependence and obesity.    Hospital Course      9/25: surgery was consulted Dr Hirsch. Continue resuscitation with IVF, antibiotics, If worsens, consider jamila tube. Patient was intubated acutely overnight.  9/26: requiring high levels of sedatives. Had to start a second vasopressor. Failed SAT and SBT due to agitation. Vasopressors are weaning down. Will start dvt chemical prophylaxis tonight.  9/27: changed propofol to precedex, hopes to move towards extubation. Consider tube feeds if  not extubated  9/28: he is now following commands, maximizing status for extubation.    9/29-9/30: Transferred to floor, under care with hospitalist.    9/30: patient was found with AMS, suspected ETOH withdrawal, s/p benzos. CT head showed parenchymal hemorrhage. Transferred to ICU. Intubated, CVC placed, vascular neurology and neurosurgery consulted    10/1 - Will work on extubation.  23% saline bolus.  Palliative consutled.  Fevers - reculture and empiric unasyn    10/2 -    vent day 3.  Force diuresis.  SAT/SBT.  Increase amlodipine.  10/3 -    vent day 4.  Force diuresis.  SAT/SBT.  10/4 -    vent day 5.  Force diuresis.  SAT/SBT.  Continue amlodipine, 10 mg daily.  Increase losartan, 75 mg daily.  Begin NPH, 10 units twice daily.  10/5 -    vent day 6.  Force diuresis.  SAT/SBT.  Continue amlodipine.  Increase losartan, 100 mg daily.  Stop NPH and sliding scale insulin.  Begin insulin drip.  Culture blood, sputum and check UA.  Stop Unasyn.  Begin empiric Zosyn and vancomycin.  Check RUQ ultrasound.  10/6 -    vent day 7.  Force diuresis.  SAT/SBT.  Continue amlodipine and losartan.  Continue insulin drip.  Begin salt tabs, 1 g every 8 hours.  Surgery opinion regarding cholecystostomy tube.  10/7 -    vent day 8.  Force diuresis.  SAT/SBT.   Continue amlodipine and losartan.  Continue insulin drip.  Stop salt tabs.  HIDA scan normal.  Stop vancomycin.  Continue Zosyn.  10/8 -    liberated from the ventilator yesterday.  Continue amlodipine and losartan.  Titrating insulin drip.  Continue Zosyn.      Interval Problem Update  Reviewed last 24 hour events:      SB  99.3  -1917 mL in the last 24      Review of Systems  Review of Systems   Unable to perform ROS: Acuity of condition        Vital Signs for last 24 hours   Pulse:  [49-79] 64  Resp:  [13-41] 41  BP: (116-174)/(51-77) 145/63  SpO2:  [92 %-99 %] 97 %    Hemodynamic parameters for last 24 hours       Respiratory Information for the last 24 hours  Vent Mode: Spont  PEEP/CPAP: 8  P Support: 5  Length of Weaning Trial (Hours): 2    Physical Exam   Physical Exam  Constitutional:       Appearance: He is obese.   Eyes:      Pupils: Pupils are equal, round, and reactive to light.   Cardiovascular:      Comments: Sinus bradycardia  Pulmonary:      Breath sounds: Rales (Crackles are about the same) present. No wheezing.   Abdominal:      General: There is no distension.      Tenderness: There is no abdominal tenderness.      Comments: Tolerating enteral tube feedings   Musculoskeletal:      Right lower leg: No edema.      Left lower leg: No edema.   Skin:     General: Skin is warm.   Neurological:      Comments: He will follow commands, but will not speak.  He tracks intermittently.         Medications  Current Facility-Administered Medications   Medication Dose Route Frequency Provider Last Rate Last Admin    phosphorus (K-Phos-Neutral) per tablet 500 mg  500 mg Enteral Tube Q6HRS Pal Serna M.D.        HYDROmorphone (Dilaudid) injection 0.5-1 mg  0.5-1 mg Intravenous Q4HRS PRN Pal Serna M.D.        senna-docusate (Pericolace Or Senokot S) 8.6-50 MG per tablet 2 Tablet  2 Tablet Enteral Tube BID Jacey BEYER Latona   2 Tablet at 10/08/23 0535    And    polyethylene glycol/lytes  (Miralax) PACKET 1 Packet  1 Packet Enteral Tube DAILY Jacey Villanueva   1 Packet at 10/08/23 0536    And    magnesium hydroxide (Milk Of Magnesia) suspension 30 mL  30 mL Enteral Tube QDAY PRN Jacey Villanueva        And    bisacodyl (Dulcolax) suppository 10 mg  10 mg Rectal QDAY PRN Jacey Villanueva        insulin regular (Humulin R) 100 Units in  mL Infusion  0-29 Units/hr Intravenous Continuous Pal Serna M.D. 4 mL/hr at 10/08/23 1113 4 Units/hr at 10/08/23 1113    dextrose 50% (D50W) injection 12.5-25 g  12.5-25 g Intravenous PRN Pal Serna M.D.        losartan (Cozaar) tablet 100 mg  100 mg Enteral Tube DAILY Pal Serna M.D.   100 mg at 10/08/23 0534    piperacillin-tazobactam (Zosyn) 4.5 g in  mL IVPB  4.5 g Intravenous Q8HRS Pal Serna M.D.   Stopped at 10/08/23 0937    enalaprilat (Vasotec) injection 1.25 mg 1 mL  1.25 mg Intravenous Q6HRS PRN Jacey Villanueva        labetalol (Normodyne/Trandate) injection 10 mg  10 mg Intravenous Q6HRS PRN Jacey Villanueva   10 mg at 10/05/23 0832    amLODIPine (Norvasc) tablet 10 mg  10 mg Enteral Tube DAILY Pal Serna M.D.   10 mg at 10/08/23 0535    Pharmacy Consult: Enteral tube insertion - review meds/change route/product selection  1 Each Other PHARMACY TO DOSE Pal Serna M.D.        Respiratory Therapy Consult   Nebulization Continuous RT Josiah Casiano M.D.        MD Alert...ICU Electrolyte Replacement per Pharmacy   Other PHARMACY TO DOSE Josiah Casiano M.D.        acetaminophen (Tylenol) tablet 650 mg  650 mg Enteral Tube Q4HRS PRN Lizandro Greenwood M.D.   650 mg at 10/03/23 2311    atorvastatin (Lipitor) tablet 40 mg  40 mg Enteral Tube Q EVENING Lizandro Greenwood M.D.   40 mg at 10/07/23 1713    memantine (Namenda) tablet 5 mg  5 mg Enteral Tube BID Lizandro Greenwood M.D.   5 mg at 10/08/23 0535    lansoprazole (Prevacid) solutab 30 mg  30 mg Enteral Tube DAILY Lizandro LANDRY  KYA Greenwood   30 mg at 10/08/23 0534    ondansetron (Zofran ODT) dispertab 4 mg  4 mg Enteral Tube Q4HRS PRN Lizandro Greenwood M.D.        3% sodium chloride (Hypertonic Saline) 500mL infusion  0-60 mL/hr Intravenous Continuous Pal Serna M.D.   Stopped at 10/08/23 0729    ondansetron (Zofran) syringe/vial injection 4 mg  4 mg Intravenous Q4HRS PRN Rhoda Argueta M.D.   4 mg at 09/29/23 0151    hydrALAZINE (Apresoline) injection 20 mg  20 mg Intravenous Q6HRS PRN Jacey BEYER Latona   20 mg at 10/05/23 1007       Fluids    Intake/Output Summary (Last 24 hours) at 10/8/2023 1248  Last data filed at 10/8/2023 1200  Gross per 24 hour   Intake 2372.66 ml   Output 4620 ml   Net -2247.34 ml       Laboratory  Recent Labs     10/06/23  0345 10/07/23  0234   ISTATAPH 7.453 7.453   ISTATAPCO2 40.0* 40.6*   ISTATAPO2 74 80   ISTATATCO2 29 30   [id number] 95 96   ISTATARTHCO3 28.0* 28.4*   ISTATARTBE 4* 4*   ISTATTEMP 100.0 F 99.1 F   ISTATFIO2 50 40   ISTATSPEC Arterial Arterial   ISTATAPHTC 7.442 7.449   [id number] 78 82         Recent Labs     10/06/23  0545 10/06/23  1252 10/07/23  0350 10/07/23  0950 10/07/23  1700 10/08/23  0035 10/08/23  0615   SODIUM 147*  147*   < > 151*   < > 152* 150* 150*   POTASSIUM 4.1  4.1  --  4.0  --   --   --  4.2   CHLORIDE 110  110  --  115*  --   --   --  113*   CO2 30  30  --  26  --   --   --  29   BUN 25*  25*  --  24*  --   --   --  27*   CREATININE 0.77  0.77  --  0.77  --   --   --  0.73   MAGNESIUM 2.0  --  1.9  --   --   --  2.1   PHOSPHORUS 2.7  --  2.6  --   --   --  2.3*   CALCIUM 9.0  9.0  --  9.0  --   --   --  9.1    < > = values in this interval not displayed.     Recent Labs     10/06/23  0545 10/07/23  0350 10/08/23  0615   ALTSGPT 34 37 33   ASTSGOT 43 47* 48*   ALKPHOSPHAT 153* 161* 161*   TBILIRUBIN 0.9 1.0 0.9   GLUCOSE 249*  249* 165* 219*     Recent Labs     10/06/23  0545 10/07/23  0350 10/08/23  0615   WBC 12.8* 12.3* 10.4   NEUTSPOLYS  82.00* 76.00* 75.00*   LYMPHOCYTES 10.10* 14.60* 14.70*   MONOCYTES 4.80 5.70 6.20   EOSINOPHILS 2.30 2.70 3.40   BASOPHILS 0.40 0.40 0.40   ASTSGOT 43 47* 48*   ALTSGPT 34 37 33   ALKPHOSPHAT 153* 161* 161*   TBILIRUBIN 0.9 1.0 0.9     Recent Labs     10/06/23  0545 10/07/23  0350 10/08/23  0615   RBC 4.09* 4.03* 4.44*   HEMOGLOBIN 12.7* 12.6* 13.4*   HEMATOCRIT 38.6* 38.8* 41.7*   PLATELETCT 238 248 251       Imaging  X-Ray:  I have personally reviewed the images and compared with prior images. and My impression is: Increased left lower lobe opacities    Assessment/Plan  * Intraparenchymal hemorrhage of brain (HCC)- (present on admission)  Assessment & Plan  CT on 9/24 with hazy left frontal SAH  CT on 9/30 with new large IPH in the left frontal lobe  Strict blood pressure control with goal SBP less than 160  Goal sodium 145-150 - I am titrating hypertonic saline to achieve sodium goal  Neuro checks every 2 hours    Acute respiratory failure with hypoxia (HCC)- (present on admission)  Assessment & Plan  Intubated 9/25-9/28  Reintubated 9/30-10/7  Continue oxygen    Pneumonia due to infectious organism- (present on admission)  Assessment & Plan  Continue Zosyn    Pneumobilia- (present on admission)  Assessment & Plan  Dr. Hirsch consulted on admission  Completed 5 days of ceftriaxone and metronidazole on 9/28  RUQ ultrasound with minimal gallbladder wall thickening and possible sludge and minimal pericholecystic fluid  HIDA scan normal on 10/7  Continue empiric Zosyn and plan 7 days of therapy for now    Type 2 diabetes mellitus (HCC)- (present on admission)  Assessment & Plan  Glycohemoglobin 6.8  I am titrating an insulin drip to achieve glycemic control    Primary hypertension- (present on admission)  Assessment & Plan  Goal SBP less than 160  Continue amlodipine, 10 mg daily  Continue losartan, 100 mg daily    Alcohol dependence (HCC)- (present on admission)  Assessment & Plan  Cessation counseling when  clinically appropriate    BMI 40.0-44.9, adult (HCC)- (present on admission)  Assessment & Plan            VTE:  Contraindicated  Ulcer: PPI  Lines: Central Line  Ongoing indication addressed and Joseph Catheter  Ongoing indication addressed    I have performed a physical exam and reviewed and updated ROS and Plan today (10/8/2023). In review of yesterday's note (10/7/2023), there are no changes except as documented above.     I have assessed and reassessed his respiratory status, blood pressure, hemodynamics, cardiovascular status, serial determinations of blood glucose with titration of an insulin drip and his neurologic status.  He is at increased risk for worsening respiratory, endocrine/metabolic and CNS system dysfunction.    Discussed patient condition and risk of morbidity and/or mortality with RN, RT, Pharmacy, Charge nurse / hot rounds, and QA team    The patient remains critically ill.  Critical care time = 35 minutes in directly providing and coordinating critical care and extensive data review.  No time overlap and excludes procedures.    Pal Serna MD  Pulmonary and Critical Care Medicine

## 2023-10-08 ENCOUNTER — APPOINTMENT (OUTPATIENT)
Dept: RADIOLOGY | Facility: MEDICAL CENTER | Age: 68
DRG: 870 | End: 2023-10-08
Attending: INTERNAL MEDICINE
Payer: MEDICARE

## 2023-10-08 LAB
ALBUMIN SERPL BCP-MCNC: 3.2 G/DL (ref 3.2–4.9)
ALBUMIN/GLOB SERPL: 0.9 G/DL
ALP SERPL-CCNC: 161 U/L (ref 30–99)
ALT SERPL-CCNC: 33 U/L (ref 2–50)
ANION GAP SERPL CALC-SCNC: 8 MMOL/L (ref 7–16)
AST SERPL-CCNC: 48 U/L (ref 12–45)
BASOPHILS # BLD AUTO: 0.4 % (ref 0–1.8)
BASOPHILS # BLD: 0.04 K/UL (ref 0–0.12)
BILIRUB SERPL-MCNC: 0.9 MG/DL (ref 0.1–1.5)
BUN SERPL-MCNC: 27 MG/DL (ref 8–22)
CALCIUM ALBUM COR SERPL-MCNC: 9.7 MG/DL (ref 8.5–10.5)
CALCIUM SERPL-MCNC: 9.1 MG/DL (ref 8.5–10.5)
CHLORIDE SERPL-SCNC: 113 MMOL/L (ref 96–112)
CO2 SERPL-SCNC: 29 MMOL/L (ref 20–33)
CREAT SERPL-MCNC: 0.73 MG/DL (ref 0.5–1.4)
EOSINOPHIL # BLD AUTO: 0.35 K/UL (ref 0–0.51)
EOSINOPHIL NFR BLD: 3.4 % (ref 0–6.9)
ERYTHROCYTE [DISTWIDTH] IN BLOOD BY AUTOMATED COUNT: 45.4 FL (ref 35.9–50)
GFR SERPLBLD CREATININE-BSD FMLA CKD-EPI: 98 ML/MIN/1.73 M 2
GLOBULIN SER CALC-MCNC: 3.5 G/DL (ref 1.9–3.5)
GLUCOSE BLD STRIP.AUTO-MCNC: 138 MG/DL (ref 65–99)
GLUCOSE BLD STRIP.AUTO-MCNC: 155 MG/DL (ref 65–99)
GLUCOSE BLD STRIP.AUTO-MCNC: 160 MG/DL (ref 65–99)
GLUCOSE BLD STRIP.AUTO-MCNC: 162 MG/DL (ref 65–99)
GLUCOSE BLD STRIP.AUTO-MCNC: 163 MG/DL (ref 65–99)
GLUCOSE BLD STRIP.AUTO-MCNC: 167 MG/DL (ref 65–99)
GLUCOSE BLD STRIP.AUTO-MCNC: 168 MG/DL (ref 65–99)
GLUCOSE BLD STRIP.AUTO-MCNC: 170 MG/DL (ref 65–99)
GLUCOSE BLD STRIP.AUTO-MCNC: 177 MG/DL (ref 65–99)
GLUCOSE BLD STRIP.AUTO-MCNC: 188 MG/DL (ref 65–99)
GLUCOSE BLD STRIP.AUTO-MCNC: 188 MG/DL (ref 65–99)
GLUCOSE BLD STRIP.AUTO-MCNC: 197 MG/DL (ref 65–99)
GLUCOSE BLD STRIP.AUTO-MCNC: 198 MG/DL (ref 65–99)
GLUCOSE BLD STRIP.AUTO-MCNC: 202 MG/DL (ref 65–99)
GLUCOSE BLD STRIP.AUTO-MCNC: 207 MG/DL (ref 65–99)
GLUCOSE BLD STRIP.AUTO-MCNC: 216 MG/DL (ref 65–99)
GLUCOSE BLD STRIP.AUTO-MCNC: 219 MG/DL (ref 65–99)
GLUCOSE BLD STRIP.AUTO-MCNC: 221 MG/DL (ref 65–99)
GLUCOSE BLD STRIP.AUTO-MCNC: 223 MG/DL (ref 65–99)
GLUCOSE BLD STRIP.AUTO-MCNC: 223 MG/DL (ref 65–99)
GLUCOSE BLD STRIP.AUTO-MCNC: 229 MG/DL (ref 65–99)
GLUCOSE SERPL-MCNC: 219 MG/DL (ref 65–99)
HCT VFR BLD AUTO: 41.7 % (ref 42–52)
HGB BLD-MCNC: 13.4 G/DL (ref 14–18)
IMM GRANULOCYTES # BLD AUTO: 0.03 K/UL (ref 0–0.11)
IMM GRANULOCYTES NFR BLD AUTO: 0.3 % (ref 0–0.9)
LYMPHOCYTES # BLD AUTO: 1.52 K/UL (ref 1–4.8)
LYMPHOCYTES NFR BLD: 14.7 % (ref 22–41)
MAGNESIUM SERPL-MCNC: 2.1 MG/DL (ref 1.5–2.5)
MCH RBC QN AUTO: 30.2 PG (ref 27–33)
MCHC RBC AUTO-ENTMCNC: 32.1 G/DL (ref 32.3–36.5)
MCV RBC AUTO: 93.9 FL (ref 81.4–97.8)
MONOCYTES # BLD AUTO: 0.64 K/UL (ref 0–0.85)
MONOCYTES NFR BLD AUTO: 6.2 % (ref 0–13.4)
NEUTROPHILS # BLD AUTO: 7.79 K/UL (ref 1.82–7.42)
NEUTROPHILS NFR BLD: 75 % (ref 44–72)
NRBC # BLD AUTO: 0 K/UL
NRBC BLD-RTO: 0 /100 WBC (ref 0–0.2)
PHOSPHATE SERPL-MCNC: 2.3 MG/DL (ref 2.5–4.5)
PLATELET # BLD AUTO: 251 K/UL (ref 164–446)
PMV BLD AUTO: 11.2 FL (ref 9–12.9)
POTASSIUM SERPL-SCNC: 4.2 MMOL/L (ref 3.6–5.5)
PROT SERPL-MCNC: 6.7 G/DL (ref 6–8.2)
RBC # BLD AUTO: 4.44 M/UL (ref 4.7–6.1)
SODIUM SERPL-SCNC: 149 MMOL/L (ref 135–145)
SODIUM SERPL-SCNC: 150 MMOL/L (ref 135–145)
WBC # BLD AUTO: 10.4 K/UL (ref 4.8–10.8)

## 2023-10-08 PROCEDURE — 85025 COMPLETE CBC W/AUTO DIFF WBC: CPT

## 2023-10-08 PROCEDURE — A9270 NON-COVERED ITEM OR SERVICE: HCPCS | Performed by: INTERNAL MEDICINE

## 2023-10-08 PROCEDURE — 80053 COMPREHEN METABOLIC PANEL: CPT

## 2023-10-08 PROCEDURE — 700102 HCHG RX REV CODE 250 W/ 637 OVERRIDE(OP): Performed by: NURSE PRACTITIONER

## 2023-10-08 PROCEDURE — 82962 GLUCOSE BLOOD TEST: CPT | Mod: 91

## 2023-10-08 PROCEDURE — 770022 HCHG ROOM/CARE - ICU (200)

## 2023-10-08 PROCEDURE — 700111 HCHG RX REV CODE 636 W/ 250 OVERRIDE (IP): Mod: JZ | Performed by: INTERNAL MEDICINE

## 2023-10-08 PROCEDURE — 83735 ASSAY OF MAGNESIUM: CPT

## 2023-10-08 PROCEDURE — A9270 NON-COVERED ITEM OR SERVICE: HCPCS | Performed by: NURSE PRACTITIONER

## 2023-10-08 PROCEDURE — 700105 HCHG RX REV CODE 258: Performed by: INTERNAL MEDICINE

## 2023-10-08 PROCEDURE — 84100 ASSAY OF PHOSPHORUS: CPT

## 2023-10-08 PROCEDURE — 99291 CRITICAL CARE FIRST HOUR: CPT | Performed by: INTERNAL MEDICINE

## 2023-10-08 PROCEDURE — 99233 SBSQ HOSP IP/OBS HIGH 50: CPT | Performed by: PSYCHIATRY & NEUROLOGY

## 2023-10-08 PROCEDURE — 84295 ASSAY OF SERUM SODIUM: CPT

## 2023-10-08 PROCEDURE — 700102 HCHG RX REV CODE 250 W/ 637 OVERRIDE(OP): Performed by: INTERNAL MEDICINE

## 2023-10-08 PROCEDURE — 71045 X-RAY EXAM CHEST 1 VIEW: CPT

## 2023-10-08 RX ORDER — HYDROMORPHONE HYDROCHLORIDE 1 MG/ML
.5-1 INJECTION, SOLUTION INTRAMUSCULAR; INTRAVENOUS; SUBCUTANEOUS EVERY 4 HOURS PRN
Status: DISCONTINUED | OUTPATIENT
Start: 2023-10-08 | End: 2023-10-10

## 2023-10-08 RX ADMIN — PIPERACILLIN AND TAZOBACTAM 4.5 G: 4; .5 INJECTION, POWDER, FOR SOLUTION INTRAVENOUS at 12:57

## 2023-10-08 RX ADMIN — MEMANTINE 5 MG: 5 TABLET ORAL at 05:35

## 2023-10-08 RX ADMIN — MEMANTINE 5 MG: 5 TABLET ORAL at 17:11

## 2023-10-08 RX ADMIN — DOCUSATE SODIUM 50 MG AND SENNOSIDES 8.6 MG 2 TABLET: 8.6; 5 TABLET, FILM COATED ORAL at 05:35

## 2023-10-08 RX ADMIN — AMLODIPINE BESYLATE 10 MG: 10 TABLET ORAL at 05:35

## 2023-10-08 RX ADMIN — POLYETHYLENE GLYCOL 3350 1 PACKET: 17 POWDER, FOR SOLUTION ORAL at 05:36

## 2023-10-08 RX ADMIN — DIBASIC SODIUM PHOSPHATE, MONOBASIC POTASSIUM PHOSPHATE AND MONOBASIC SODIUM PHOSPHATE 500 MG: 852; 155; 130 TABLET ORAL at 17:11

## 2023-10-08 RX ADMIN — SODIUM CHLORIDE 5 UNITS/HR: 9 INJECTION, SOLUTION INTRAVENOUS at 23:03

## 2023-10-08 RX ADMIN — DIBASIC SODIUM PHOSPHATE, MONOBASIC POTASSIUM PHOSPHATE AND MONOBASIC SODIUM PHOSPHATE 500 MG: 852; 155; 130 TABLET ORAL at 12:57

## 2023-10-08 RX ADMIN — ATORVASTATIN CALCIUM 40 MG: 40 TABLET, FILM COATED ORAL at 17:11

## 2023-10-08 RX ADMIN — PIPERACILLIN AND TAZOBACTAM 4.5 G: 4; .5 INJECTION, POWDER, FOR SOLUTION INTRAVENOUS at 05:37

## 2023-10-08 RX ADMIN — LOSARTAN POTASSIUM 100 MG: 50 TABLET, FILM COATED ORAL at 05:34

## 2023-10-08 RX ADMIN — LANSOPRAZOLE 30 MG: 30 TABLET, ORALLY DISINTEGRATING, DELAYED RELEASE ORAL at 05:34

## 2023-10-08 RX ADMIN — PIPERACILLIN AND TAZOBACTAM 4.5 G: 4; .5 INJECTION, POWDER, FOR SOLUTION INTRAVENOUS at 20:49

## 2023-10-08 ASSESSMENT — PAIN DESCRIPTION - PAIN TYPE
TYPE: ACUTE PAIN

## 2023-10-08 NOTE — PROGRESS NOTES
Neurology Progress Note  Neurohospitalist Service, [facility]    Referring Physician: REAL Cage*    Chief Complaint   Patient presents with    Head Injury     Pt fell and hit his head last night     ALOC     Pt arrives A&O1        HPI: Refer to initial documented Neurology H&P, as detailed in the patient's chart.    Interval History 10/8: Patient now extubated    Review of systems: In addition to what is detailed in the HPI and/or updated in the interval history, all other systems reviewed and are negative.    Past Medical History:    has a past medical history of LE (acute kidney injury), prerenal (CMS-HCC) (1/20/2018), Alcohol withdrawal (HCC) (1/22/2018), Bowel habit changes, CAD (coronary artery disease), Cancer (HCC), Cellulitis of back except buttock (8/3/2018), HTN (hypertension), Hyperglycemia (1/19/2018), Hypertension, Indigestion, Kidney stones, Near-syncope, Nerve compression, and Tinnitus.    FHx:  family history is not on file.    SHx:   reports that he has never smoked. He has never used smokeless tobacco. He reports current alcohol use. He reports that he does not use drugs.    Medications:    Current Facility-Administered Medications:     senna-docusate (Pericolace Or Senokot S) 8.6-50 MG per tablet 2 Tablet, 2 Tablet, Enteral Tube, BID, 2 Tablet at 10/08/23 0535 **AND** polyethylene glycol/lytes (Miralax) PACKET 1 Packet, 1 Packet, Enteral Tube, DAILY, 1 Packet at 10/08/23 0536 **AND** magnesium hydroxide (Milk Of Magnesia) suspension 30 mL, 30 mL, Enteral Tube, QDAY PRN **AND** bisacodyl (Dulcolax) suppository 10 mg, 10 mg, Rectal, QDAY PRN, Jacey Villanueva    insulin regular (Humulin R) 100 Units in  mL Infusion, 0-29 Units/hr, Intravenous, Continuous, Pal Serna M.D., Last Rate: 4 mL/hr at 10/08/23 0734, 4 Units/hr at 10/08/23 0734    dextrose 50% (D50W) injection 12.5-25 g, 12.5-25 g, Intravenous, PRN, Pal Serna M.D.     losartan (Cozaar) tablet 100 mg, 100 mg, Enteral Tube, DAILY, Pal Serna M.D., 100 mg at 10/08/23 0534    [COMPLETED] piperacillin-tazobactam (Zosyn) 4.5 g in  mL IVPB, 4.5 g, Intravenous, Once, Stopped at 10/05/23 1743 **AND** piperacillin-tazobactam (Zosyn) 4.5 g in  mL IVPB, 4.5 g, Intravenous, Q8HRS, Pal Serna M.D., Last Rate: 25 mL/hr at 10/08/23 0537, 4.5 g at 10/08/23 0537    enalaprilat (Vasotec) injection 1.25 mg 1 mL, 1.25 mg, Intravenous, Q6HRS PRN, Jacey LIftikhar Latnica    labetalol (Normodyne/Trandate) injection 10 mg, 10 mg, Intravenous, Q6HRS PRN, Jacey LIftikhar Latona, 10 mg at 10/05/23 0832    dexmedetomidine (PRECEDEX) 400 mcg/100mL NS premix infusion, 0.1-1.5 mcg/kg/hr (Ideal), Intravenous, Continuous, Pal Serna M.D., Stopped at 10/07/23 1648    HYDROmorphone (Dilaudid) injection 0.5-2 mg, 0.5-2 mg, Intravenous, Q HOUR PRN, Pal Serna M.D., 2 mg at 10/05/23 1547    amLODIPine (Norvasc) tablet 10 mg, 10 mg, Enteral Tube, DAILY, Pal Serna M.D., 10 mg at 10/08/23 0535    Pharmacy Consult: Enteral tube insertion - review meds/change route/product selection, 1 Each, Other, PHARMACY TO DOSE, Pal Serna M.D.    Respiratory Therapy Consult, , Nebulization, Continuous RT, Josiah Casiano M.D. MD Alert...ICU Electrolyte Replacement per Pharmacy, , Other, PHARMACY TO DOSE, Josiah Casiano M.D.    lidocaine (Xylocaine) 1 % injection 2 mL, 2 mL, Tracheal Tube, Q30 MIN PRN, Lizandro Greenwood M.D.    acetaminophen (Tylenol) tablet 650 mg, 650 mg, Enteral Tube, Q4HRS PRN, Lizandro Greenwood M.D., 650 mg at 10/03/23 2311    atorvastatin (Lipitor) tablet 40 mg, 40 mg, Enteral Tube, Q EVENING, Lizandro Greenwood M.D., 40 mg at 10/07/23 1713    memantine (Namenda) tablet 5 mg, 5 mg, Enteral Tube, BID, Lizandro Greenwood M.D., 5 mg at 10/08/23 0535    lansoprazole (Prevacid) solutab 30 mg, 30 mg, Enteral Tube, DAILY, Lizandro Greenwood M.D.,  30 mg at 10/08/23 0534    ondansetron (Zofran ODT) dispertab 4 mg, 4 mg, Enteral Tube, Q4HRS PRN, Lizandro Greenwood M.D.    3% sodium chloride (Hypertonic Saline) 500mL infusion, 0-60 mL/hr, Intravenous, Continuous, Pal Serna M.D., Stopped at 10/08/23 0729    ondansetron (Zofran) syringe/vial injection 4 mg, 4 mg, Intravenous, Q4HRS PRN, Rhoda Argueta M.D., 4 mg at 09/29/23 0151    hydrALAZINE (Apresoline) injection 20 mg, 20 mg, Intravenous, Q6HRS PRN, Jacey Villanueva, 20 mg at 10/05/23 1007    Physical Examination:     Vitals:    10/08/23 0300 10/08/23 0400 10/08/23 0500 10/08/23 0600   BP: (!) 140/65 (!) 154/70 (!) 160/66 138/66   Pulse: 69 (!) 53 68 (!) 49   Resp: (!) 27 16 (!) 32 (!) 21   Temp:       TempSrc:       SpO2: 94% 99% 98% 98%   Weight:       Height:               NEUROLOGICAL EXAM:     Patient is now extubated.  Lethargic but arouses with light sternal rub.  He does follow some slight commands.  Cannot get him to speak though.    Cranial nerves pupils are equal reactive.  No gaze preference.  Blinks to threat to both sides.  Face looks grossly symmetric.  He has spontaneous antigravity strength in the right arm and leg.  In left lower extremity.  Left upper extremity no movement to noxious stimuli.  Reacts to touch on the right and left lower extremity.    Objective Data:    Labs:  Lab Results   Component Value Date/Time    PROTHROMBTM 14.5 09/30/2023 01:50 PM    INR 1.12 09/30/2023 01:50 PM      Lab Results   Component Value Date/Time    WBC 10.4 10/08/2023 06:15 AM    RBC 4.44 (L) 10/08/2023 06:15 AM    HEMOGLOBIN 13.4 (L) 10/08/2023 06:15 AM    HEMATOCRIT 41.7 (L) 10/08/2023 06:15 AM    MCV 93.9 10/08/2023 06:15 AM    MCH 30.2 10/08/2023 06:15 AM    MCHC 32.1 (L) 10/08/2023 06:15 AM    MPV 11.2 10/08/2023 06:15 AM    NEUTSPOLYS 75.00 (H) 10/08/2023 06:15 AM    LYMPHOCYTES 14.70 (L) 10/08/2023 06:15 AM    MONOCYTES 6.20 10/08/2023 06:15 AM    EOSINOPHILS 3.40 10/08/2023  06:15 AM    BASOPHILS 0.40 10/08/2023 06:15 AM      Lab Results   Component Value Date/Time    SODIUM 150 (H) 10/08/2023 06:15 AM    POTASSIUM 4.2 10/08/2023 06:15 AM    CHLORIDE 113 (H) 10/08/2023 06:15 AM    CO2 29 10/08/2023 06:15 AM    GLUCOSE 219 (H) 10/08/2023 06:15 AM    BUN 27 (H) 10/08/2023 06:15 AM    CREATININE 0.73 10/08/2023 06:15 AM    CREATININE 1.0 05/13/2009 03:30 PM      Lab Results   Component Value Date/Time    CHOLSTRLTOT 118 09/30/2023 11:20 PM    LDL 57 09/30/2023 11:20 PM    HDL 12 (A) 09/30/2023 11:20 PM    TRIGLYCERIDE 247 (H) 09/30/2023 11:20 PM       Lab Results   Component Value Date/Time    ALKPHOSPHAT 161 (H) 10/08/2023 06:15 AM    ASTSGOT 48 (H) 10/08/2023 06:15 AM    ALTSGPT 33 10/08/2023 06:15 AM    TBILIRUBIN 0.9 10/08/2023 06:15 AM        Imaging/Testing:  DX-CHEST-PORTABLE (1 VIEW)   Final Result         Findings on chest radiograph appear stable since the prior radiograph.  No new abnormalities are identified.      NM-HEPATOBILIARY SCAN   Final Result      There is visualization of the gallbladder after morphine administration, indicating the cystic duct is still patent. No scintigraphy evidence of acute cholecystitis.      DX-CHEST-PORTABLE (1 VIEW)   Final Result         1.  Mild pulmonary edema and/or infiltrates.   2.  Cardiomegaly   3.  Atherosclerosis      DX-CHEST-PORTABLE (1 VIEW)   Final Result         1.  No acute cardiopulmonary disease.   2.  Cardiomegaly   3.  Atherosclerosis      US-RUQ   Final Result      1.  Minimal gallbladder wall thickening and possible sludge.  Cholecystitis is not excluded.   2.  Minimal pericholecystic fluid may be due to gallbladder inflammation or liver disease.   3.  Enlarged echogenic liver suggesting fatty infiltration.   4.  Limited evaluation of the pancreas abdominal aorta.      DX-CHEST-PORTABLE (1 VIEW)   Final Result         1.  Pulmonary edema and/or infiltrates are identified, which are stable since the prior exam.   2.   Cardiomegaly   3.  Atherosclerosis      US-EXTREMITY VENOUS UPPER UNILAT RIGHT   Final Result      DX-CHEST-PORTABLE (1 VIEW)   Final Result         1.  Pulmonary edema and/or infiltrates are identified, which are stable since the prior exam.   2.  Cardiomegaly   3.  Atherosclerosis      DX-CHEST-PORTABLE (1 VIEW)   Final Result         1.  Pulmonary edema and/or infiltrates are identified, which are stable since the prior exam.   2.  Cardiomegaly   3.  Atherosclerosis      DX-CHEST-PORTABLE (1 VIEW)   Final Result         1.  Pulmonary edema and/or infiltrates are identified, which are somewhat decreased since the prior exam.   2.  Cardiomegaly   3.  Atherosclerosis      MR-MRA HEAD-W/O   Final Result         Normal intracranial MRA.      MR-BRAIN-WITH & W/O   Final Result         Left frontal lobe hemorrhage with surrounding vasogenic edema and mild mass effect upon the frontal horn of the left lateral ventricle. There is approximately 2 mm of midline shift to the right.      Age-related volume loss and chronic microvascular ischemic changes.      No abnormal intracranial enhancement is identified.      CT-HEAD W/O   Final Result         1. Stable 6 cm left frontal intraparenchymal hemorrhage with mild, 2 mm left-to-right subfalcine herniation.   2. No new abnormality or change.         DX-CHEST-PORTABLE (1 VIEW)   Final Result         1.  Pulmonary edema and/or infiltrates are identified, which appear somewhat increased since the prior exam.   2.  Cardiomegaly   3.  Atherosclerosis      DX-ABDOMEN FOR TUBE PLACEMENT   Final Result      NG tube extends below the diaphragm and appears to extend through the region of the stomach with tip at the level of the proximal duodenum.      DX-CHEST-PORTABLE (1 VIEW)   Final Result      1.  Grossly satisfactory appearance of tubes and lines.   2.  No evidence of pneumothorax.   3.  Marked hypoinflation with probable bibasilar atelectasis.   4.  Enlarged cardiac silhouette  with vascular congestion.      CT-HEAD W/O   Final Result         1.  New large parenchymal hemorrhage involving the frontal lobe is identified with some surrounding edema.      2.  Subarachnoid hemorrhage is also noted in the area.      3.  Minimal midline shift to the right side.      Findings are consistent with atrophy.  Decreased attenuation in the periventricular white matter likely indicates microvascular ischemic disease.      Based solely on CT findings, the brain injury guideline category is mBIG 3.      EDH   IVH   Displaced skull fx   SDH > 8mm   IPH > 8mm or multiple   SAH bi-hemispheric or > 3mm      The original BIG retrospective analysis found radiographic progression in 0% of BIG 1 patients and 2.6% BIG 2.      These findings were discussed with GEETA MOLINA on 09/30/2023.      DX-CHEST-PORTABLE (1 VIEW)   Final Result      Stable multiple pulmonary vascular dilation/congestion. Otherwise, negative.      DX-CHEST-PORTABLE (1 VIEW)   Final Result      1.  Increasing airspace disease at the left base.      DX-CHEST-LIMITED (1 VIEW)   Final Result         1.  Pulmonary edema and/or infiltrates are identified, which are somewhat decreased since the prior exam.   2.  Cardiomegaly   3.  Atherosclerosis      DX-CHEST-PORTABLE (1 VIEW)   Final Result         1.  Pulmonary edema and/or infiltrates are identified, which appear somewhat increased since the prior exam.   2.  Cardiomegaly   3.  Atherosclerosis      DX-CHEST-PORTABLE (1 VIEW)   Final Result         1.  Interstitial edema and/or infiltrates, slightly decreased since prior study.   2.  Cardiomegaly   3.  Atherosclerosis      DX-CHEST-LIMITED (1 VIEW)   Final Result      Right IJ catheter tip projects in the SVC. No pneumothorax.      CT-CTA NECK WITH & W/O-POST PROCESSING   Final Result      1.  Estimated 50-75% stenosis of the BILATERAL carotid arteries   2.  Estimated greater than 50% stenosis at the vertebral artery ostium on each side       CT-CTA HEAD WITH & W/O-POST PROCESS   Final Result      1.  No large vessel occlusion or aneurysm.   2.  Hazy left frontal lobe subarachnoid hemorrhage, decreased in conspicuity from prior. No new hemorrhage seen.   3.  Scattered opacifications of the ethmoid sinuses, likely related to support hardware.      DX-ABDOMEN FOR TUBE PLACEMENT   Final Result         1.  Nonspecific bowel gas pattern in the upper abdomen.   2.  Dobbhoff tube with tip terminating overlying the expected location of the first or second duodenal segment.      DX-CHEST-PORTABLE (1 VIEW)   Final Result         1.  Interstitial edema and/or infiltrates.   2.  Cardiomegaly   3.  Nasogastric tube terminates in the distal esophagus, recommend advancement   4.  Atherosclerosis      DX-ABDOMEN FOR TUBE PLACEMENT   Final Result         1.  Nonspecific bowel gas pattern in the upper abdomen.   2.  Nasogastric tube terminates just distal to the gastroesophageal junction, recommend advancement      CT-RENAL COLIC EVALUATION(A/P W/O)   Final Result         1.  Intrahepatic biliary air and nondependent air within the gallbladder, consider history of instrumentation or changes of cholangitis in the appropriate clinical setting.   2.  Large fat-containing bilateral inguinal hernias   3.  Enlarged prostate, consider causes of prostate enlargement with additional workup as clinically appropriate   4.  Diverticulosis   5.  Cholelithiasis   6.  Atherosclerosis      CT-HEAD W/O   Final Result         1.  Hazy left frontal subarachnoid hemorrhage.   2.  Nonspecific white matter changes, commonly associated with small vessel ischemic disease.  Associated mild cerebral atrophy is noted.      Based solely on CT findings, the brain injury guideline category is mBIG 1.      SDH < 4mm   IPH < 4mm   SAH < 3 sulci and < 1mm      The original BIG retrospective analysis found radiographic progression in 0% of BIG 1 patients and 2.6% BIG 2.      These findings were  discussed with the patient's clinician, Madhu Zambrano, on 9/24/2023 10:10 PM.      DX-CHEST-PORTABLE (1 VIEW)   Final Result      No acute cardiopulmonary abnormality identified.            Assessment and Plan:    68-year-old male history of alcohol abuse coagulopathy and thrombocytopenia presented on 9/24 due to urosepsis.  Had a change in mental status on 9/28 found to have a large left frontal IPH.  Patient currently intubated.  On hypertonic therapy.  Etiology of the bleed is mostly multifactorial given he was on Lovenox for DVT prophylaxis along with the thrombocytopenia with coagulopathy.  Plan today is to wean off sedation and try to extubate.  Also wean off hypertonic therapy and slowly correct sodium levels.    Update 7/8  Post bleed day #10.  Patient is now extubated.  Off hypertonic therapy.  From a neurological standpoint located to have every 4 neurochecks.  In terms of the bleed.  Continue with blood pressure monitoring to maintain normotensive.  Did not recommend chemical DVT prophylactics for this patient given the underlying coagulopathy most likely due to alcohol abuse.  Continue with SCDs.  No antiplatelets or anticoagulation.  Slowly correct the sodium for goal normal levels.  No other recommendations from neurology.    Plan:  1.  Slowly correct sodium levels.  Please avoid over correcting in for a drop of 3-4 points per day.  Currently 150  2.  Blood pressure parameters keep between 100-160 systolic.  Aim for normotensive.  3.  Continue supportive therapy, PT OT and speech  4.  SCDs for DVT prophylaxis only.  Would avoid chemical prophylaxis given the recent history.    Neurology will sign off please call back if any additional questions.    Spent over 52 minutes examining the patient and going the care plan with the primary team.    This chart was partially generated using voice recognition technology and sound alike word replacement may be present, best efforts were made to make the chart  accurate.    Vinicius Guardado MD  Board Certified Neurology, ABPN  (t) [phone]

## 2023-10-08 NOTE — CARE PLAN
Problem: Hemodynamics  Goal: Patient's hemodynamics, fluid balance and neurologic status will be stable or improve  10/7/2023 2238 by Kannan Avila R.N.  Outcome: Progressing  10/7/2023 2159 by Kannan Avila R.N.  Outcome: Progressing     Problem: Fluid Volume  Goal: Fluid volume balance will be maintained  10/7/2023 2238 by Kannan Avila R.N.  Outcome: Progressing  10/7/2023 2159 by Kannan Avila R.N.  Outcome: Progressing     Problem: Urinary - Renal Perfusion  Goal: Ability to achieve and maintain adequate renal perfusion and functioning will improve  10/7/2023 2238 by Kannan Avila R.N.  Outcome: Progressing  10/7/2023 2159 by Kannan Avila R.N.  Outcome: Progressing     Problem: Respiratory  Goal: Patient will achieve/maintain optimum respiratory ventilation and gas exchange  10/7/2023 2238 by Kannan Avila R.N.  Outcome: Progressing  10/7/2023 2159 by Kannan Avila R.N.  Outcome: Progressing     Problem: Physical Regulation  Goal: Diagnostic test results will improve  10/7/2023 2238 by Kannan Avila R.N.  Outcome: Progressing  10/7/2023 2159 by Kannan Avila R.N.  Outcome: Progressing  Goal: Signs and symptoms of infection will decrease  10/7/2023 2238 by Kannan Avila R.N.  Outcome: Progressing  10/7/2023 2159 by Kannan Avila R.N.  Outcome: Progressing     Problem: Knowledge Deficit - Standard  Goal: Patient and family/care givers will demonstrate understanding of plan of care, disease process/condition, diagnostic tests and medications  10/7/2023 2238 by Kannan Avila R.N.  Outcome: Progressing  10/7/2023 2159 by Kannan Avila R.N.  Outcome: Progressing     Problem: Skin Integrity  Goal: Skin integrity is maintained or improved  10/7/2023 2238 by Kannan Avila R.N.  Outcome: Progressing  10/7/2023 2159 by Kannan Avila,  VASILIY  Outcome: Progressing     Problem: Fall Risk  Goal: Patient will remain free from falls  10/7/2023 2238 by Kannan Avila R.N.  Outcome: Progressing  10/7/2023 2159 by Kannan Avila R.N.  Outcome: Progressing     Problem: Pain - Standard  Goal: Alleviation of pain or a reduction in pain to the patient’s comfort goal  10/7/2023 2238 by Kannan Avila R.N.  Outcome: Progressing  10/7/2023 2159 by Kannan Avila R.N.  Outcome: Progressing     Problem: Seizure Precautions  Goal: Implementation of seizure precautions  10/7/2023 2238 by Kannan Avila R.N.  Outcome: Progressing  10/7/2023 2159 by Kannan Avila R.N.  Outcome: Progressing     Problem: Lifestyle Changes  Goal: Patient's ability to identify lifestyle changes and available resources to help reduce recurrence of condition will improve  10/7/2023 2238 by Kannan Avila R.N.  Outcome: Progressing  10/7/2023 2159 by Kannan Avila R.N.  Outcome: Progressing     Problem: Psychosocial  Goal: Patient's level of anxiety will decrease  10/7/2023 2238 by Kannan Avila R.N.  Outcome: Progressing  10/7/2023 2159 by Kannan Avila R.N.  Outcome: Progressing  Goal: Spiritual and cultural needs incorporated into hospitalization  10/7/2023 2238 by Kannan Avila R.N.  Outcome: Progressing  10/7/2023 2159 by Kannan Avila R.N.  Outcome: Progressing     Problem: Risk for Aspiration  Goal: Patient's risk for aspiration will be absent or decrease  10/7/2023 2238 by Kannan Avila R.N.  Outcome: Progressing  10/7/2023 2159 by Kannan Avila R.N.  Outcome: Progressing     Problem: Optimal Care of the Stroke Patient  Goal: Optimal emergency care for the stroke patient  10/7/2023 2238 by Kannan Avila R.N.  Outcome: Progressing  10/7/2023 2159 by Kannan Avila R.N.  Outcome: Progressing  Goal: Optimal acute care for  the stroke patient  10/7/2023 2238 by Kannan Avila R.N.  Outcome: Progressing  10/7/2023 2159 by Kannan Avila R.N.  Outcome: Progressing     Problem: Knowledge Deficit - Stroke Education  Goal: Patient's knowledge of stroke and risk factors will improve  10/7/2023 2238 by Kannan Avila R.N.  Outcome: Progressing  10/7/2023 2159 by Kannan Avila R.N.  Outcome: Progressing     Problem: Psychosocial - Patient Condition  Goal: Patient's ability to verbalize feelings about condition will improve  10/7/2023 2238 by Kannan Avila R.N.  Outcome: Progressing  10/7/2023 2159 by Kannan Avila R.N.  Outcome: Progressing  Goal: Patient's ability to re-evaluate and adapt role responsibilities will improve  10/7/2023 2238 by Kannan Avila R.N.  Outcome: Progressing  10/7/2023 2159 by Kannan Avila R.N.  Outcome: Progressing     Problem: Discharge Planning - Stroke  Goal: Ensure Stroke Core Measures are met prior to discharge  10/7/2023 2238 by Kannan Avila R.N.  Outcome: Progressing  10/7/2023 2159 by Kannan Avila R.N.  Outcome: Progressing  Goal: Patient’s continuum of care needs will be met  10/7/2023 2238 by Kannan Avila R.N.  Outcome: Progressing  10/7/2023 2159 by Kannan Avila R.N.  Outcome: Progressing     Problem: Neuro Status  Goal: Neuro status will remain stable or improve  10/7/2023 2238 by Kannan Avila R.N.  Outcome: Progressing  10/7/2023 2159 by Kannan Avila R.N.  Outcome: Progressing     Problem: Hemodynamic Monitoring  Goal: Patient's hemodynamics, fluid balance and neurologic status will be stable or improve  10/7/2023 2238 by Kannan Avila R.N.  Outcome: Progressing  10/7/2023 2159 by Kannan Avila R.N.  Outcome: Progressing     Problem: Respiratory - Stroke Patient  Goal: Patient will achieve/maintain optimum respiratory  rate/effort  10/7/2023 2238 by Kannan Avila R.N.  Outcome: Progressing  10/7/2023 2159 by Kannan Avila R.N.  Outcome: Progressing     Problem: Dysphagia  Goal: Dysphagia will improve  10/7/2023 2238 by Kannan Avila R.N.  Outcome: Progressing  10/7/2023 2159 by Kannan Avila R.N.  Outcome: Progressing     Problem: Urinary Elimination  Goal: Establish and maintain regular urinary output  10/7/2023 2238 by Kannan Avila R.N.  Outcome: Progressing  10/7/2023 2159 by Kannan Avila R.N.  Outcome: Progressing     Problem: Bowel Elimination  Goal: Establish and maintain regular bowel function  10/7/2023 2238 by Kannan Avila R.N.  Outcome: Progressing  10/7/2023 2159 by Kannan Avila R.N.  Outcome: Progressing     Problem: Mobility - Stroke  Goal: Patient's capacity to carry out activities will improve  10/7/2023 2238 by Kannan Avila R.N.  Outcome: Progressing  10/7/2023 2159 by Kannan Avila R.N.  Outcome: Progressing  Goal: Spasticity will be prevented or improved  10/7/2023 2238 by Kannan Avila R.N.  Outcome: Progressing  10/7/2023 2159 by Kannan Avila R.N.  Outcome: Progressing  Goal: Subluxation will be prevented or improved  10/7/2023 2238 by Kannan Avila R.N.  Outcome: Progressing  10/7/2023 2159 by Kannan Avila R.N.  Outcome: Progressing     Problem: Self Care  Goal: Patient will have the ability to perform ADLs independently or with assistance (bathe, groom, dress, toilet and feed)  10/7/2023 2238 by Kannan Avila R.N.  Outcome: Progressing  10/7/2023 2159 by Kannan [place], R.N.  Outcome: Progressing     Problem: Safety - Medical Restraint  Goal: Remains free of injury from restraints (Restraint for Interference with Medical Device)  10/7/2023 2238 by Kannan Avila R.N.  Outcome: Progressing  10/7/2023 2159 by Kannan Serrano  VASILIY Avila  Outcome: Progressing  Goal: Free from restraint(s) (Restraint for Interference with Medical Device)  10/7/2023 2238 by Kannan Avila R.N.  Outcome: Progressing  10/7/2023 2159 by Kannan Avila R.N.  Outcome: Progressing   The patient is Watcher - Medium risk of patient condition declining or worsening    Shift Goals  Clinical Goals: improve neuro, manage pain, stable vs, no fevers, prog mob  Patient Goals: tin  Family Goals: tin    Progress made toward(s) clinical / shift goals:  improve neuro, manage pain, stable vs, no fevers, prog mob,     Patient is not progressing towards the following goals: n/a

## 2023-10-09 ENCOUNTER — APPOINTMENT (OUTPATIENT)
Dept: RADIOLOGY | Facility: MEDICAL CENTER | Age: 68
DRG: 870 | End: 2023-10-09
Attending: INTERNAL MEDICINE
Payer: MEDICARE

## 2023-10-09 PROBLEM — I60.9 SAH (SUBARACHNOID HEMORRHAGE) (HCC): Status: RESOLVED | Noted: 2023-09-24 | Resolved: 2023-10-09

## 2023-10-09 LAB
ALBUMIN SERPL BCP-MCNC: 3.5 G/DL (ref 3.2–4.9)
ALBUMIN/GLOB SERPL: 1.1 G/DL
ALP SERPL-CCNC: 158 U/L (ref 30–99)
ALT SERPL-CCNC: 29 U/L (ref 2–50)
ANION GAP SERPL CALC-SCNC: 8 MMOL/L (ref 7–16)
AST SERPL-CCNC: 42 U/L (ref 12–45)
BASOPHILS # BLD AUTO: 0.5 % (ref 0–1.8)
BASOPHILS # BLD: 0.05 K/UL (ref 0–0.12)
BILIRUB SERPL-MCNC: 0.8 MG/DL (ref 0.1–1.5)
BUN SERPL-MCNC: 31 MG/DL (ref 8–22)
CALCIUM ALBUM COR SERPL-MCNC: 9.8 MG/DL (ref 8.5–10.5)
CALCIUM SERPL-MCNC: 9.4 MG/DL (ref 8.5–10.5)
CHLORIDE SERPL-SCNC: 109 MMOL/L (ref 96–112)
CO2 SERPL-SCNC: 30 MMOL/L (ref 20–33)
CREAT SERPL-MCNC: 0.76 MG/DL (ref 0.5–1.4)
CRP SERPL HS-MCNC: 2.96 MG/DL (ref 0–0.75)
CRP SERPL HS-MCNC: 3.01 MG/DL (ref 0–0.75)
EOSINOPHIL # BLD AUTO: 0.34 K/UL (ref 0–0.51)
EOSINOPHIL NFR BLD: 3.3 % (ref 0–6.9)
ERYTHROCYTE [DISTWIDTH] IN BLOOD BY AUTOMATED COUNT: 44.9 FL (ref 35.9–50)
GFR SERPLBLD CREATININE-BSD FMLA CKD-EPI: 96 ML/MIN/1.73 M 2
GLOBULIN SER CALC-MCNC: 3.3 G/DL (ref 1.9–3.5)
GLUCOSE BLD STRIP.AUTO-MCNC: 146 MG/DL (ref 65–99)
GLUCOSE BLD STRIP.AUTO-MCNC: 146 MG/DL (ref 65–99)
GLUCOSE BLD STRIP.AUTO-MCNC: 147 MG/DL (ref 65–99)
GLUCOSE BLD STRIP.AUTO-MCNC: 148 MG/DL (ref 65–99)
GLUCOSE BLD STRIP.AUTO-MCNC: 157 MG/DL (ref 65–99)
GLUCOSE BLD STRIP.AUTO-MCNC: 160 MG/DL (ref 65–99)
GLUCOSE BLD STRIP.AUTO-MCNC: 184 MG/DL (ref 65–99)
GLUCOSE BLD STRIP.AUTO-MCNC: 188 MG/DL (ref 65–99)
GLUCOSE BLD STRIP.AUTO-MCNC: 189 MG/DL (ref 65–99)
GLUCOSE BLD STRIP.AUTO-MCNC: 195 MG/DL (ref 65–99)
GLUCOSE BLD STRIP.AUTO-MCNC: 198 MG/DL (ref 65–99)
GLUCOSE SERPL-MCNC: 194 MG/DL (ref 65–99)
HCT VFR BLD AUTO: 40.5 % (ref 42–52)
HGB BLD-MCNC: 13.3 G/DL (ref 14–18)
IMM GRANULOCYTES # BLD AUTO: 0.14 K/UL (ref 0–0.11)
IMM GRANULOCYTES NFR BLD AUTO: 1.4 % (ref 0–0.9)
LYMPHOCYTES # BLD AUTO: 1.74 K/UL (ref 1–4.8)
LYMPHOCYTES NFR BLD: 16.9 % (ref 22–41)
MAGNESIUM SERPL-MCNC: 1.9 MG/DL (ref 1.5–2.5)
MCH RBC QN AUTO: 30.5 PG (ref 27–33)
MCHC RBC AUTO-ENTMCNC: 32.8 G/DL (ref 32.3–36.5)
MCV RBC AUTO: 92.9 FL (ref 81.4–97.8)
MONOCYTES # BLD AUTO: 0.72 K/UL (ref 0–0.85)
MONOCYTES NFR BLD AUTO: 7 % (ref 0–13.4)
NEUTROPHILS # BLD AUTO: 7.31 K/UL (ref 1.82–7.42)
NEUTROPHILS NFR BLD: 70.9 % (ref 44–72)
NRBC # BLD AUTO: 0 K/UL
NRBC BLD-RTO: 0 /100 WBC (ref 0–0.2)
PHOSPHATE SERPL-MCNC: 2.9 MG/DL (ref 2.5–4.5)
PLATELET # BLD AUTO: 270 K/UL (ref 164–446)
PMV BLD AUTO: 12.1 FL (ref 9–12.9)
POTASSIUM SERPL-SCNC: 3.9 MMOL/L (ref 3.6–5.5)
PROT SERPL-MCNC: 6.8 G/DL (ref 6–8.2)
RBC # BLD AUTO: 4.36 M/UL (ref 4.7–6.1)
SODIUM SERPL-SCNC: 147 MMOL/L (ref 135–145)
SODIUM SERPL-SCNC: 148 MMOL/L (ref 135–145)
SODIUM SERPL-SCNC: 148 MMOL/L (ref 135–145)
SODIUM SERPL-SCNC: 150 MMOL/L (ref 135–145)
WBC # BLD AUTO: 10.3 K/UL (ref 4.8–10.8)

## 2023-10-09 PROCEDURE — 700102 HCHG RX REV CODE 250 W/ 637 OVERRIDE(OP): Performed by: EMERGENCY MEDICINE

## 2023-10-09 PROCEDURE — A9270 NON-COVERED ITEM OR SERVICE: HCPCS | Performed by: INTERNAL MEDICINE

## 2023-10-09 PROCEDURE — 700105 HCHG RX REV CODE 258: Performed by: INTERNAL MEDICINE

## 2023-10-09 PROCEDURE — 85025 COMPLETE CBC W/AUTO DIFF WBC: CPT

## 2023-10-09 PROCEDURE — 71045 X-RAY EXAM CHEST 1 VIEW: CPT

## 2023-10-09 PROCEDURE — 770022 HCHG ROOM/CARE - ICU (200)

## 2023-10-09 PROCEDURE — 84295 ASSAY OF SERUM SODIUM: CPT | Mod: 91

## 2023-10-09 PROCEDURE — A9270 NON-COVERED ITEM OR SERVICE: HCPCS | Performed by: NURSE PRACTITIONER

## 2023-10-09 PROCEDURE — 97530 THERAPEUTIC ACTIVITIES: CPT

## 2023-10-09 PROCEDURE — 84100 ASSAY OF PHOSPHORUS: CPT

## 2023-10-09 PROCEDURE — 97535 SELF CARE MNGMENT TRAINING: CPT

## 2023-10-09 PROCEDURE — 700111 HCHG RX REV CODE 636 W/ 250 OVERRIDE (IP): Mod: JZ | Performed by: INTERNAL MEDICINE

## 2023-10-09 PROCEDURE — 80053 COMPREHEN METABOLIC PANEL: CPT

## 2023-10-09 PROCEDURE — 700102 HCHG RX REV CODE 250 W/ 637 OVERRIDE(OP): Performed by: INTERNAL MEDICINE

## 2023-10-09 PROCEDURE — 86140 C-REACTIVE PROTEIN: CPT

## 2023-10-09 PROCEDURE — 700102 HCHG RX REV CODE 250 W/ 637 OVERRIDE(OP): Performed by: NURSE PRACTITIONER

## 2023-10-09 PROCEDURE — 82962 GLUCOSE BLOOD TEST: CPT | Mod: 91

## 2023-10-09 PROCEDURE — 83735 ASSAY OF MAGNESIUM: CPT

## 2023-10-09 PROCEDURE — 99291 CRITICAL CARE FIRST HOUR: CPT | Performed by: EMERGENCY MEDICINE

## 2023-10-09 RX ORDER — DEXTROSE MONOHYDRATE 25 G/50ML
25 INJECTION, SOLUTION INTRAVENOUS
Status: DISCONTINUED | OUTPATIENT
Start: 2023-10-09 | End: 2023-10-19

## 2023-10-09 RX ADMIN — PIPERACILLIN AND TAZOBACTAM 4.5 G: 4; .5 INJECTION, POWDER, FOR SOLUTION INTRAVENOUS at 13:26

## 2023-10-09 RX ADMIN — LANSOPRAZOLE 30 MG: 30 TABLET, ORALLY DISINTEGRATING, DELAYED RELEASE ORAL at 05:13

## 2023-10-09 RX ADMIN — POLYETHYLENE GLYCOL 3350 1 PACKET: 17 POWDER, FOR SOLUTION ORAL at 05:14

## 2023-10-09 RX ADMIN — DOCUSATE SODIUM 50 MG AND SENNOSIDES 8.6 MG 2 TABLET: 8.6; 5 TABLET, FILM COATED ORAL at 05:14

## 2023-10-09 RX ADMIN — LOSARTAN POTASSIUM 100 MG: 50 TABLET, FILM COATED ORAL at 05:13

## 2023-10-09 RX ADMIN — MEMANTINE 5 MG: 5 TABLET ORAL at 18:03

## 2023-10-09 RX ADMIN — PIPERACILLIN AND TAZOBACTAM 4.5 G: 4; .5 INJECTION, POWDER, FOR SOLUTION INTRAVENOUS at 20:46

## 2023-10-09 RX ADMIN — INSULIN GLARGINE-YFGN 39 UNITS: 100 INJECTION, SOLUTION SUBCUTANEOUS at 15:07

## 2023-10-09 RX ADMIN — PIPERACILLIN AND TAZOBACTAM 4.5 G: 4; .5 INJECTION, POWDER, FOR SOLUTION INTRAVENOUS at 05:10

## 2023-10-09 RX ADMIN — ATORVASTATIN CALCIUM 40 MG: 40 TABLET, FILM COATED ORAL at 18:03

## 2023-10-09 RX ADMIN — SODIUM CHLORIDE 6 UNITS/HR: 9 INJECTION, SOLUTION INTRAVENOUS at 11:53

## 2023-10-09 RX ADMIN — AMLODIPINE BESYLATE 10 MG: 10 TABLET ORAL at 05:13

## 2023-10-09 RX ADMIN — DOCUSATE SODIUM 50 MG AND SENNOSIDES 8.6 MG 2 TABLET: 8.6; 5 TABLET, FILM COATED ORAL at 18:03

## 2023-10-09 RX ADMIN — MEMANTINE 5 MG: 5 TABLET ORAL at 05:14

## 2023-10-09 RX ADMIN — SODIUM CHLORIDE 7 UNITS/HR: 9 INJECTION, SOLUTION INTRAVENOUS at 08:34

## 2023-10-09 ASSESSMENT — COGNITIVE AND FUNCTIONAL STATUS - GENERAL
STANDING UP FROM CHAIR USING ARMS: TOTAL
WALKING IN HOSPITAL ROOM: TOTAL
PERSONAL GROOMING: TOTAL
MOBILITY SCORE: 6
CLIMB 3 TO 5 STEPS WITH RAILING: TOTAL
HELP NEEDED FOR BATHING: TOTAL
TURNING FROM BACK TO SIDE WHILE IN FLAT BAD: UNABLE
DRESSING REGULAR UPPER BODY CLOTHING: TOTAL
SUGGESTED CMS G CODE MODIFIER MOBILITY: CN
MOVING FROM LYING ON BACK TO SITTING ON SIDE OF FLAT BED: UNABLE
SUGGESTED CMS G CODE MODIFIER DAILY ACTIVITY: CN
DRESSING REGULAR LOWER BODY CLOTHING: TOTAL
DAILY ACTIVITIY SCORE: 6
MOVING TO AND FROM BED TO CHAIR: UNABLE
TOILETING: TOTAL
EATING MEALS: TOTAL

## 2023-10-09 ASSESSMENT — PAIN DESCRIPTION - PAIN TYPE
TYPE: ACUTE PAIN

## 2023-10-09 ASSESSMENT — GAIT ASSESSMENTS: GAIT LEVEL OF ASSIST: UNABLE TO PARTICIPATE

## 2023-10-09 ASSESSMENT — FIBROSIS 4 INDEX: FIB4 SCORE: 2.33

## 2023-10-09 NOTE — PROGRESS NOTES
12 hour chart check completed    Agree with measured intervals: SB/SR 50-80s, occasional PACs/PVCs ectopy noted

## 2023-10-09 NOTE — THERAPY
Physical Therapy   Daily Treatment     Patient Name: Sharon Guardado  Age:  68 y.o., Sex:  male  Medical Record #: [id number]  Today's Date: 10/9/2023     Precautions  Precautions: Fall Risk  Comments: SBP <160    Assessment    Pt seen for follow up PT tx. Pt continues to require total assist for bed mobility.  Once EOB, pt with strong posterior lean but able to improve balance with cues and 2 STS attempts. R inattention noted throughout treatment. PT will cont working with pt 3x/wk.    Plan    Treatment Plan Status: Continue Current Treatment Plan  Type of Treatment: Bed Mobility, Gait Training, Neuro Re-Education / Balance, Self Care / Home Evaluation, Therapeutic Activities, Therapeutic Exercise  Treatment Frequency: 3 Times per Week  Treatment Duration: Until Therapy Goals Met    DC Equipment Recommendations: Unable to determine at this time  Discharge Recommendations: Recommend post-acute placement for additional physical therapy services prior to discharge home         10/09/23 1412   Vitals   O2 (LPM) 3   O2 Delivery Device Nasal Cannula   Pain 0 - 10 Group   Therapist Pain Assessment During Activity;Nurse Notified  (no indications of pain)   Non Verbal Descriptors   Non Verbal Scale  Restlessness   Cognition    Cognition / Consciousness X   Speech/ Communication Unable to Communicate   Level of Consciousness Responds to voice   Ability To Follow Commands Unable to Follow 1 Step Commands  (limited command following)   Safety Awareness Impaired;Impulsive   New Learning Impaired   Comments very little command following,   Neuro-Muscular Treatments   Neuro-Muscular Treatments Weight Shift Left;Weight Shift Right;Verbal Cuing;Tactile Cuing;Anterior weight shift;Compensatory Strategies   Comments strong posterior lean sitting EOB, after STS attempt, pt with improved sitting balance   Neurological Concerns   Neurological Concerns Yes   Sitting Posture During ADL's Posterior Lean   Balance   Sitting Balance  (Static) Poor -   Sitting Balance (Dynamic) Trace +   Standing Balance (Static) Dependent   Standing Balance (Dynamic) Dependent   Weight Shift Sitting Poor   Weight Shift Standing Absent   Skilled Intervention Verbal Cuing;Compensatory Strategies   Comments STS with 2 assist   Bed Mobility    Supine to Sit Total Assist   Sit to Supine Total Assist   Scooting Total Assist   Rolling Total Assist to Rt.;Total Assist to Lt.   Skilled Intervention Verbal Cuing   Comments limited participation   Gait Analysis   Gait Level Of Assist Unable to Participate   Functional Mobility   Sit to Stand Total Assist   Bed, Chair, Wheelchair Transfer Unable to Participate   Mobility EOB, STS 2x   Skilled Intervention Compensatory Strategies;Verbal Cuing   Patient / Family Goals    Patient / Family Goal #1 none stated   Short Term Goals    Short Term Goal # 1 pt will be able to complete supine<>Sitting with min assist in 6tx in order to improve mobility   Goal Outcome # 1 goal not met   Short Term Goal # 2 pt will be able to complete functional transfers with mod assist in 6tx   Goal Outcome # 2 Goal not met   Short Term Goal # 3 pt will be able to ambulate 15ft with FWW and min assist in 6tx   Goal Outcome # 3 Goal not met   Physical Therapy Treatment Plan   Physical Therapy Treatment Plan Continue Current Treatment Plan   Anticipated Discharge Equipment and Recommendations   DC Equipment Recommendations Unable to determine at this time   Discharge Recommendations Recommend post-acute placement for additional physical therapy services prior to discharge home

## 2023-10-09 NOTE — CARE PLAN
The patient is Stable - Low risk of patient condition declining or worsening    Shift Goals  Clinical Goals: hemodynamic stability, monitor for changes in neuro status, maintain adeqaute airway/respiratory status, pain/comfort  Patient Goals: DEENA  Family Goals: DENEA    Progress made toward(s) clinical / shift goals:    Problem: Hemodynamics  Goal: Patient's hemodynamics, fluid balance and neurologic status will be stable or improve  Outcome: Progressing     Problem: Respiratory  Goal: Patient will achieve/maintain optimum respiratory ventilation and gas exchange  Outcome: Progressing     Problem: Skin Integrity  Goal: Skin integrity is maintained or improved  Outcome: Progressing     Problem: Fall Risk  Goal: Patient will remain free from falls  Outcome: Progressing     Problem: Pain - Standard  Goal: Alleviation of pain or a reduction in pain to the patient’s comfort goal  Outcome: Progressing     Problem: Neuro Status  Goal: Neuro status will remain stable or improve  Outcome: Progressing     Problem: Hemodynamic Monitoring  Goal: Patient's hemodynamics, fluid balance and neurologic status will be stable or improve  Outcome: Progressing     Problem: Respiratory - Stroke Patient  Goal: Patient will achieve/maintain optimum respiratory rate/effort  Outcome: Progressing     Problem: Urinary Elimination  Goal: Establish and maintain regular urinary output  Outcome: Progressing     Problem: Safety - Medical Restraint  Goal: Remains free of injury from restraints (Restraint for Interference with Medical Device)  Outcome: Progressing  Goal: Free from restraint(s) (Restraint for Interference with Medical Device)  Outcome: Progressing       Patient is not progressing towards the following goals:

## 2023-10-09 NOTE — DIETARY
Nutrition support weekly update:  Day 15 of admit.  Sharon Guardado is a 68 y.o. male with admitting DX of Intraparenchymal hemorrhage of brain.    Tube feeding initiated on 9/27. Current TF via NGT is Vital HP @ 70 mL/hr providing 1680 kcals, 147 grams protein and 1404 mL free water.     Assessment:  Weight 130.5 kg via bed scale. Needs estimated based on bed scale wt 136 kg.   Re-estimate of nutritional needs is indicated as pt is now extubated and off sedation.      Calculation/Equation:   REE per MSJ = 2105 kcals/day (x1.2=4661 kcals/kg )     Total Calories / day: 1958  - 2610  (Kcal / kg ABW:  15 - 20)   Total Grams Protein / day: 104-130  (Grams Protein / kg ABW: 0.8-1.0)   Total Grams Protein / day: 121- 162  (Grams Protein / kg IBW: 1.5 - 2.0 )    Evaluation:   Pt currently receiving and tolerating tube feeding at goal   Current clinical picture and MD progress notes reviewed. Pt extubated 10/7. +insulin drip-started 10/5. SLP attempted to see pt today 10/9, eval deferred d/t lethargy. Nutrition support remains indicated to meet estimated needs.   Labs (10/9) Na 147 (H), Glu 194 (H),   Meds: Humulin @ 6 units/hr, senna, miralax, milk of mag, Kphos,   Skin: 1+ bilateral upper and lower extremity edema noted   +BM 10/9  Current feeding is no longer indicated as pt is extubated. Per discussion in rounds Glucerna 1.2 is an appropriate specialized formula given pts hx of T2DM to help with glycemic control while in acute care.   Recommend starting fiber containing formula at 25 mL/hr and advancing per protocol to goal rate to avoid GI distress.       Malnutrition risk: no new risk identified     Recommendations/Plan:  Change tube feeding to Glucerna 1.2 @ 80 mL/hr providing 2304 kcals, 115 grams protein and 1546 mL free water.   Fluids per MD   Monitor weights  Diet advancements as medically feasible per MD and SLP    RD following

## 2023-10-09 NOTE — PROGRESS NOTES
Critical Care Progress Note    Date of admission  9/24/2023    Chief Complaint  68 y.o. male admitted 9/24/2023 with urosepsis.  He has a history of DM type II, primary hypertension, alcohol dependence and obesity.       Hospital Course  9/25: surgery was consulted Dr Hirsch. Continue resuscitation with IVF, antibiotics, If worsens, consider jamila tube. Patient was intubated acutely overnight.  9/26: requiring high levels of sedatives. Had to start a second vasopressor. Failed SAT and SBT due to agitation. Vasopressors are weaning down. Will start dvt chemical prophylaxis tonight.  9/27: changed propofol to precedex, hopes to move towards extubation. Consider tube feeds if  not extubated  9/28: he is now following commands, maximizing status for extubation.    9/29-9/30: Transferred to floor, under care with hospitalist.    9/30: patient was found with AMS, suspected ETOH withdrawal, s/p benzos. CT head showed parenchymal hemorrhage. Transferred to ICU. Intubated, CVC placed, vascular neurology and neurosurgery consulted    10/1 - Will work on extubation.  23% saline bolus.  Palliative consutled.  Fevers - reculture and empiric unasyn    10/2 -    vent day 3.  Force diuresis.  SAT/SBT.  Increase amlodipine.  10/3 -    vent day 4.  Force diuresis.  SAT/SBT.  10/4 -    vent day 5.  Force diuresis.  SAT/SBT.  Continue amlodipine, 10 mg daily.  Increase losartan, 75 mg daily.  Begin NPH, 10 units twice daily.  10/5 -    vent day 6.  Force diuresis.  SAT/SBT.  Continue amlodipine.  Increase losartan, 100 mg daily.  Stop NPH and sliding scale insulin.  Begin insulin drip.  Culture blood, sputum and check UA.  Stop Unasyn.  Begin empiric Zosyn and vancomycin.  Check RUQ ultrasound.  10/6 -    vent day 7.  Force diuresis.  SAT/SBT.  Continue amlodipine and losartan.  Continue insulin drip.  Begin salt tabs, 1 g every 8 hours.  Surgery opinion regarding cholecystostomy tube.  10/7 -    vent day 8.  Force diuresis.  SAT/SBT.   Continue amlodipine and losartan.  Continue insulin drip.  Stop salt tabs.  HIDA scan normal.  Stop vancomycin.  Continue Zosyn.  10/8 -    liberated from the ventilator yesterday.  Continue amlodipine and losartan.  Titrating insulin drip.  Continue Zosyn.    Interval Problem Update  Reviewed last 24 hour events:  NAOE  Extubated yesterday  Off pressors  Continues on insulin gtt        Review of Systems  Review of Systems   Unable to perform ROS: Critical illness   All other systems reviewed and are negative.       Vital Signs for last 24 hours   Pulse:  [46-75] 53  Resp:  [15-58] 18  BP: (118-157)/(58-72) 130/59  SpO2:  [94 %-98 %] 98 %    Hemodynamic parameters for last 24 hours       Respiratory Information for the last 24 hours       Physical Exam   Physical Exam  Constitutional:       General: He is not in acute distress.     Appearance: He is ill-appearing.   HENT:      Mouth/Throat:      Mouth: Mucous membranes are dry.   Eyes:      Extraocular Movements: Extraocular movements intact.      Pupils: Pupils are equal, round, and reactive to light.   Cardiovascular:      Rate and Rhythm: Normal rate and regular rhythm.      Heart sounds: No murmur heard.  Pulmonary:      Effort: Pulmonary effort is normal.   Abdominal:      General: Abdomen is flat. There is no distension.   Musculoskeletal:      Right lower leg: No edema.      Left lower leg: No edema.   Skin:     General: Skin is warm.      Capillary Refill: Capillary refill takes less than 2 seconds.   Neurological:      Comments: Arousable but non verbal  Just completed PT and was able to get out of bed  Follows occasioanlly  BTT present bilateral  LUE flaccid  RUE and RLE with good strength but doesn't follow consistently               Medications  Current Facility-Administered Medications   Medication Dose Route Frequency Provider Last Rate Last Admin    insulin GLARGINE (Lantus,Semglee) injection  0.3 Units/kg/day Subcutaneous Q EVENING Mitch Nunn  M.D.   39 Units at 10/09/23 1507    insulin regular (HumuLIN R,NovoLIN R) injection  3-14 Units Subcutaneous Q6HRS Mitch Nunn M.D.        And    dextrose 50% (D50W) injection 25 g  25 g Intravenous Q15 MIN PRN Mitch Nunn M.D.        HYDROmorphone (Dilaudid) injection 0.5-1 mg  0.5-1 mg Intravenous Q4HRS PRN Pal Serna M.D.        senna-docusate (Pericolace Or Senokot S) 8.6-50 MG per tablet 2 Tablet  2 Tablet Enteral Tube BID Jacey L. Latona   2 Tablet at 10/09/23 0514    And    polyethylene glycol/lytes (Miralax) PACKET 1 Packet  1 Packet Enteral Tube DAILY Jacey LIftikhar Latona   1 Packet at 10/09/23 0514    And    magnesium hydroxide (Milk Of Magnesia) suspension 30 mL  30 mL Enteral Tube QDAY PRN Jacey Villanueva        And    bisacodyl (Dulcolax) suppository 10 mg  10 mg Rectal QDAY PRN Jacey Villanueva        losartan (Cozaar) tablet 100 mg  100 mg Enteral Tube DAILY Pal Serna M.D.   100 mg at 10/09/23 0513    piperacillin-tazobactam (Zosyn) 4.5 g in  mL IVPB  4.5 g Intravenous Q8HRS Pal Serna M.D. 25 mL/hr at 10/09/23 1326 4.5 g at 10/09/23 1326    enalaprilat (Vasotec) injection 1.25 mg 1 mL  1.25 mg Intravenous Q6HRS PRN Jacey Villanueva        labetalol (Normodyne/Trandate) injection 10 mg  10 mg Intravenous Q6HRS PRN Jacey Villanueva   10 mg at 10/05/23 0832    amLODIPine (Norvasc) tablet 10 mg  10 mg Enteral Tube DAILY Pal Serna M.D.   10 mg at 10/09/23 0513    Pharmacy Consult: Enteral tube insertion - review meds/change route/product selection  1 Each Other PHARMACY TO DOSE Pal P Serna, M.D.        Respiratory Therapy Consult   Nebulization Continuous RT Josiah Casiano M.D.        MD Alert...ICU Electrolyte Replacement per Pharmacy   Other PHARMACY TO DOSE Josiah Casiano M.D.        acetaminophen (Tylenol) tablet 650 mg  650 mg Enteral Tube Q4HRS PRN Lizandro Greenwood M.D.   650 mg at 10/03/23 2311    atorvastatin (Lipitor) tablet  40 mg  40 mg Enteral Tube Q EVENING Lizandro Greenwood M.D.   40 mg at 10/08/23 1711    memantine (Namenda) tablet 5 mg  5 mg Enteral Tube BID Lizandro Greenwood M.D.   5 mg at 10/09/23 0514    lansoprazole (Prevacid) solutab 30 mg  30 mg Enteral Tube DAILY Lizandro Greenwood M.D.   30 mg at 10/09/23 0513    ondansetron (Zofran ODT) dispertab 4 mg  4 mg Enteral Tube Q4HRS PRN Lizandro Greenwood M.D.        ondansetron (Zofran) syringe/vial injection 4 mg  4 mg Intravenous Q4HRS PRN Rhoda Argueta M.D.   4 mg at 09/29/23 0151    hydrALAZINE (Apresoline) injection 20 mg  20 mg Intravenous Q6HRS PRN Jacey BEYER Latona   20 mg at 10/05/23 1007       Fluids    Intake/Output Summary (Last 24 hours) at 10/9/2023 1759  Last data filed at 10/9/2023 1600  Gross per 24 hour   Intake 2194.92 ml   Output 1885 ml   Net 309.92 ml       Laboratory  Recent Labs     10/07/23  0234   ISTATAPH 7.453   ISTATAPCO2 40.6*   ISTATAPO2 80   ISTATATCO2 30   EBGHAJB7KGR 96   ISTATARTHCO3 28.4*   ISTATARTBE 4*   ISTATTEMP 99.1 F   ISTATFIO2 40   ISTATSPEC Arterial   ISTATAPHTC 7.449   TKBQUDGB8NX 82         Recent Labs     10/07/23  0350 10/07/23  0950 10/08/23  0615 10/08/23  1300 10/09/23  0012 10/09/23  0600 10/09/23  1300   SODIUM 151*   < > 150*   < > 150* 147* 148*   POTASSIUM 4.0  --  4.2  --   --  3.9  --    CHLORIDE 115*  --  113*  --   --  109  --    CO2 26  --  29  --   --  30  --    BUN 24*  --  27*  --   --  31*  --    CREATININE 0.77  --  0.73  --   --  0.76  --    MAGNESIUM 1.9  --  2.1  --   --  1.9  --    PHOSPHORUS 2.6  --  2.3*  --   --  2.9  --    CALCIUM 9.0  --  9.1  --   --  9.4  --     < > = values in this interval not displayed.     Recent Labs     10/07/23  0350 10/08/23  0615 10/09/23  0600   ALTSGPT 37 33 29   ASTSGOT 47* 48* 42   ALKPHOSPHAT 161* 161* 158*   TBILIRUBIN 1.0 0.9 0.8   GLUCOSE 165* 219* 194*     Recent Labs     10/07/23  0350 10/08/23  0615 10/09/23  0600   WBC 12.3* 10.4 10.3   NEUTSPOLYS  76.00* 75.00* 70.90   LYMPHOCYTES 14.60* 14.70* 16.90*   MONOCYTES 5.70 6.20 7.00   EOSINOPHILS 2.70 3.40 3.30   BASOPHILS 0.40 0.40 0.50   ASTSGOT 47* 48* 42   ALTSGPT 37 33 29   ALKPHOSPHAT 161* 161* 158*   TBILIRUBIN 1.0 0.9 0.8     Recent Labs     10/07/23  0350 10/08/23  0615 10/09/23  0600   RBC 4.03* 4.44* 4.36*   HEMOGLOBIN 12.6* 13.4* 13.3*   HEMATOCRIT 38.8* 41.7* 40.5*   PLATELETCT 248 251 270       Imaging  X-Ray:  I have personally reviewed the images and compared with prior images.    Assessment/Plan  * Intraparenchymal hemorrhage of brain (HCC)- (present on admission)  Assessment & Plan  CT on 9/24 with hazy left frontal SAH  CT on 9/30 with new large IPH in the left frontal lobe  Strict blood pressure control with goal SBP less than 160  Goal sodium 145-150 - I am titrating hypertonic saline to achieve sodium goal    Will deescalate HTS today and slowly correct    Neuro checks every 2 hours    Pneumonia due to infectious organism- (present on admission)  Assessment & Plan  Continue Zosyn    Acute respiratory failure with hypoxia (HCC)- (present on admission)  Assessment & Plan  Intubated 9/25-9/28  Reintubated 9/30-10/7  Continue oxygen    Pneumobilia- (present on admission)  Assessment & Plan  ACGS consulted, no indication for intervention  Completed 5 days of ceftriaxone and metronidazole on 9/28  RUQ ultrasound with minimal gallbladder wall thickening and possible sludge and minimal pericholecystic fluid  HIDA scan normal on 10/7  Continue empiric Zosyn and plan 7 days of therapy for now    Type 2 diabetes mellitus (HCC)- (present on admission)  Assessment & Plan  Glycohemoglobin 6.8  I am titrating an insulin drip to achieve glycemic control    Will attempt discontinuation of insulin infusion today    Primary hypertension- (present on admission)  Assessment & Plan  Goal SBP less than 160  Continue amlodipine, 10 mg daily  Continue losartan, 100 mg daily    BMI 40.0-44.9, adult (HCC)- (present on  admission)  Assessment & Plan       Alcohol dependence (HCC)- (present on admission)  Assessment & Plan  Cessation counseling when clinically appropriate         VTE:  Contraindicated  Ulcer: Not Indicated  Lines: Joseph Catheter  Ongoing indication addressed    I have performed a physical exam and reviewed and updated ROS and Plan today (10/9/2023). In review of yesterday's note (10/8/2023), there are no changes except as documented above.     Discussed patient condition and risk of morbidity and/or mortality with Family, RN, RT, Therapies, Pharmacy, Patient, and neurology  The patient remains critically ill.  Critical care time = 40 minutes in directly providing and coordinating critical care and extensive data review.  No time overlap and excludes procedures.

## 2023-10-09 NOTE — THERAPY
Occupational Therapy  Daily Treatment     Patient Name: Sharon Guardado  Age:  68 y.o., Sex:  male  Medical Record #: [id number]  Today's Date: 10/9/2023     Precautions  Precautions: Fall Risk  Comments: SBP <160    Assessment    Pt seen for follow up OT tx session, pt making steady progress with functional mobility and ADLs able to better participate in functional tasks but still no verbal communication or RUE involvement. Will continue to see for skilled therapy while admitted as well as recommend post-acute placement.    Plan    Treatment Plan Status: (P) Continue Current Treatment Plan  Type of Treatment: Self Care / Activities of Daily Living, Manual Therapy Techniques, Neuro Re-Education / Balance, Therapeutic Exercises, Therapeutic Activity, Adaptive Equipment  Treatment Frequency: 3 Times per Week  Treatment Duration: Until Therapy Goals Met    DC Equipment Recommendations: (P) Unable to determine at this time  Discharge Recommendations: (P) Recommend post-acute placement for additional occupational therapy services prior to discharge home    Objective       10/09/23 1310   Precautions   Precautions Fall Risk   Comments SBP <160   Pain 0 - 10 Group   Therapist Pain Assessment Post Activity Pain Same as Prior to Activity;Nurse Notified   Balance   Sitting Balance (Static) Poor +   Sitting Balance (Dynamic) Poor -   Standing Balance (Static) Trace   Weight Shift Sitting Poor   Skilled Intervention Verbal Cuing;Facilitation   Comments w/ HHA x2   Bed Mobility    Supine to Sit Total Assist   Sit to Supine Total Assist   Scooting Total Assist   Skilled Intervention Verbal Cuing   Activities of Daily Living   Grooming Total Assist;Seated   Upper Body Dressing Total Assist   Lower Body Dressing Total Assist   Toileting Total Assist   Skilled Intervention Verbal Cuing;Facilitation   How much help from another person does the patient currently need...   Putting on and taking off regular lower body clothing? 1    Bathing (including washing, rinsing, and drying)? 1   Toileting, which includes using a toilet, bedpan, or urinal? 1   Putting on and taking off regular upper body clothing? 1   Taking care of personal grooming such as brushing teeth? 1   Eating meals? 1   6 Clicks Daily Activity Score 6   Functional Mobility   Sit to Stand Total Assist   Mobility bed mobility, STS x2, returned to supine   Skilled Intervention Verbal Cuing;Facilitation   Activity Tolerance   Sitting Edge of Bed 20 min   Standing 1 min total   Patient / Family Goals   Patient / Family Goal #1 Spouse wishes for pt to get off the vent   Goal #1 Outcome Goal met   Short Term Goals   Short Term Goal # 1 Pt will sit EOB and perform seated grooming w/ min A   Goal Outcome # 1 Progressing as expected   Short Term Goal # 2 Pt will demo 2/5  strength in R UE   Goal Outcome # 2 Progressing slower than expected   Short Term Goal # 3 Pt will demo UB dressing w/ mod A   Goal Outcome # 3 Progressing slower than expected   Short Term Goal # 4 Pt will demo ADL txf w/ mod A   Goal Outcome # 4 Progressing as expected   Education Group   Education Provided Role of Occupational Therapist   Role of Occupational Therapist Patient Response Patient;Acceptance;Explanation;Demonstration;Verbal Demonstration;Action Demonstration   Occupational Therapy Treatment Plan    O.T. Treatment Plan Continue Current Treatment Plan   Anticipated Discharge Equipment and Recommendations   DC Equipment Recommendations Unable to determine at this time   Discharge Recommendations Recommend post-acute placement for additional occupational therapy services prior to discharge home   Interdisciplinary Plan of Care Collaboration   IDT Collaboration with  Nursing   Patient Position at End of Therapy In Bed;Wrist Restraints Applied;Call Light within Reach;Tray Table within Reach;Phone within Reach   Collaboration Comments RN updated

## 2023-10-09 NOTE — THERAPY
Speech Language Therapy Contact Note    Patient Name: Sharon Guardado  Age:  68 y.o., Sex:  male  Medical Record #: [id number]  Today's Date: 10/9/2023    Attempted to see pt for a bedside swallow eval; deferred d/t lethargy. Discussed missed therapy with RN.

## 2023-10-09 NOTE — DISCHARGE PLANNING
Care Transition Team Discharge Planning    Anticipated Discharge Information  Discharge Disposition: Disch to IP rehab facility or distinct part unit (62)    Pt discussed during ICU rounds    Extubated 10/7/23    3%/insulin drip

## 2023-10-10 ENCOUNTER — APPOINTMENT (OUTPATIENT)
Dept: RADIOLOGY | Facility: MEDICAL CENTER | Age: 68
DRG: 870 | End: 2023-10-10
Attending: INTERNAL MEDICINE
Payer: MEDICARE

## 2023-10-10 LAB
ALBUMIN SERPL BCP-MCNC: 3.2 G/DL (ref 3.2–4.9)
ALBUMIN/GLOB SERPL: 0.9 G/DL
ALP SERPL-CCNC: 176 U/L (ref 30–99)
ALT SERPL-CCNC: 33 U/L (ref 2–50)
ANION GAP SERPL CALC-SCNC: 11 MMOL/L (ref 7–16)
AST SERPL-CCNC: 56 U/L (ref 12–45)
BACTERIA BLD CULT: NORMAL
BACTERIA BLD CULT: NORMAL
BASOPHILS # BLD AUTO: 0.7 % (ref 0–1.8)
BASOPHILS # BLD: 0.07 K/UL (ref 0–0.12)
BILIRUB SERPL-MCNC: 0.8 MG/DL (ref 0.1–1.5)
BUN SERPL-MCNC: 28 MG/DL (ref 8–22)
CALCIUM ALBUM COR SERPL-MCNC: 9.8 MG/DL (ref 8.5–10.5)
CALCIUM SERPL-MCNC: 9.2 MG/DL (ref 8.5–10.5)
CHLORIDE SERPL-SCNC: 107 MMOL/L (ref 96–112)
CO2 SERPL-SCNC: 26 MMOL/L (ref 20–33)
CREAT SERPL-MCNC: 0.79 MG/DL (ref 0.5–1.4)
EOSINOPHIL # BLD AUTO: 0.37 K/UL (ref 0–0.51)
EOSINOPHIL NFR BLD: 3.9 % (ref 0–6.9)
ERYTHROCYTE [DISTWIDTH] IN BLOOD BY AUTOMATED COUNT: 44.4 FL (ref 35.9–50)
GFR SERPLBLD CREATININE-BSD FMLA CKD-EPI: 95 ML/MIN/1.73 M 2
GLOBULIN SER CALC-MCNC: 3.7 G/DL (ref 1.9–3.5)
GLUCOSE BLD STRIP.AUTO-MCNC: 175 MG/DL (ref 65–99)
GLUCOSE BLD STRIP.AUTO-MCNC: 216 MG/DL (ref 65–99)
GLUCOSE BLD STRIP.AUTO-MCNC: 255 MG/DL (ref 65–99)
GLUCOSE BLD STRIP.AUTO-MCNC: 257 MG/DL (ref 65–99)
GLUCOSE SERPL-MCNC: 233 MG/DL (ref 65–99)
HCT VFR BLD AUTO: 41 % (ref 42–52)
HGB BLD-MCNC: 13.3 G/DL (ref 14–18)
IMM GRANULOCYTES # BLD AUTO: 0.02 K/UL (ref 0–0.11)
IMM GRANULOCYTES NFR BLD AUTO: 0.2 % (ref 0–0.9)
LYMPHOCYTES # BLD AUTO: 1.58 K/UL (ref 1–4.8)
LYMPHOCYTES NFR BLD: 16.8 % (ref 22–41)
MAGNESIUM SERPL-MCNC: 1.9 MG/DL (ref 1.5–2.5)
MCH RBC QN AUTO: 30.2 PG (ref 27–33)
MCHC RBC AUTO-ENTMCNC: 32.4 G/DL (ref 32.3–36.5)
MCV RBC AUTO: 93.2 FL (ref 81.4–97.8)
MONOCYTES # BLD AUTO: 0.64 K/UL (ref 0–0.85)
MONOCYTES NFR BLD AUTO: 6.8 % (ref 0–13.4)
NEUTROPHILS # BLD AUTO: 6.74 K/UL (ref 1.82–7.42)
NEUTROPHILS NFR BLD: 71.6 % (ref 44–72)
NRBC # BLD AUTO: 0 K/UL
NRBC BLD-RTO: 0 /100 WBC (ref 0–0.2)
PHOSPHATE SERPL-MCNC: 2.8 MG/DL (ref 2.5–4.5)
PLATELET # BLD AUTO: 255 K/UL (ref 164–446)
PMV BLD AUTO: 11.2 FL (ref 9–12.9)
POTASSIUM SERPL-SCNC: 4.1 MMOL/L (ref 3.6–5.5)
PROT SERPL-MCNC: 6.9 G/DL (ref 6–8.2)
RBC # BLD AUTO: 4.4 M/UL (ref 4.7–6.1)
SIGNIFICANT IND 70042: NORMAL
SIGNIFICANT IND 70042: NORMAL
SITE SITE: NORMAL
SITE SITE: NORMAL
SODIUM SERPL-SCNC: 144 MMOL/L (ref 135–145)
SODIUM SERPL-SCNC: 147 MMOL/L (ref 135–145)
SOURCE SOURCE: NORMAL
SOURCE SOURCE: NORMAL
WBC # BLD AUTO: 9.4 K/UL (ref 4.8–10.8)

## 2023-10-10 PROCEDURE — 99291 CRITICAL CARE FIRST HOUR: CPT | Performed by: EMERGENCY MEDICINE

## 2023-10-10 PROCEDURE — 71045 X-RAY EXAM CHEST 1 VIEW: CPT

## 2023-10-10 PROCEDURE — A9270 NON-COVERED ITEM OR SERVICE: HCPCS | Performed by: INTERNAL MEDICINE

## 2023-10-10 PROCEDURE — 85025 COMPLETE CBC W/AUTO DIFF WBC: CPT

## 2023-10-10 PROCEDURE — 700111 HCHG RX REV CODE 636 W/ 250 OVERRIDE (IP): Mod: JZ | Performed by: EMERGENCY MEDICINE

## 2023-10-10 PROCEDURE — 700102 HCHG RX REV CODE 250 W/ 637 OVERRIDE(OP): Performed by: NURSE PRACTITIONER

## 2023-10-10 PROCEDURE — 700105 HCHG RX REV CODE 258: Performed by: INTERNAL MEDICINE

## 2023-10-10 PROCEDURE — 770022 HCHG ROOM/CARE - ICU (200)

## 2023-10-10 PROCEDURE — 84295 ASSAY OF SERUM SODIUM: CPT

## 2023-10-10 PROCEDURE — 99223 1ST HOSP IP/OBS HIGH 75: CPT | Performed by: PHYSICAL MEDICINE & REHABILITATION

## 2023-10-10 PROCEDURE — 84100 ASSAY OF PHOSPHORUS: CPT

## 2023-10-10 PROCEDURE — 92610 EVALUATE SWALLOWING FUNCTION: CPT

## 2023-10-10 PROCEDURE — 700102 HCHG RX REV CODE 250 W/ 637 OVERRIDE(OP): Performed by: INTERNAL MEDICINE

## 2023-10-10 PROCEDURE — A9270 NON-COVERED ITEM OR SERVICE: HCPCS | Performed by: NURSE PRACTITIONER

## 2023-10-10 PROCEDURE — 80053 COMPREHEN METABOLIC PANEL: CPT

## 2023-10-10 PROCEDURE — 700102 HCHG RX REV CODE 250 W/ 637 OVERRIDE(OP): Performed by: EMERGENCY MEDICINE

## 2023-10-10 PROCEDURE — 700111 HCHG RX REV CODE 636 W/ 250 OVERRIDE (IP): Mod: JZ | Performed by: INTERNAL MEDICINE

## 2023-10-10 PROCEDURE — 99233 SBSQ HOSP IP/OBS HIGH 50: CPT | Performed by: HOSPITALIST

## 2023-10-10 PROCEDURE — 83735 ASSAY OF MAGNESIUM: CPT

## 2023-10-10 PROCEDURE — 82962 GLUCOSE BLOOD TEST: CPT

## 2023-10-10 RX ORDER — MAGNESIUM SULFATE HEPTAHYDRATE 40 MG/ML
2 INJECTION, SOLUTION INTRAVENOUS ONCE
Status: COMPLETED | OUTPATIENT
Start: 2023-10-10 | End: 2023-10-10

## 2023-10-10 RX ADMIN — INSULIN HUMAN 3 UNITS: 100 INJECTION, SOLUTION PARENTERAL at 00:16

## 2023-10-10 RX ADMIN — PIPERACILLIN AND TAZOBACTAM 4.5 G: 4; .5 INJECTION, POWDER, FOR SOLUTION INTRAVENOUS at 13:13

## 2023-10-10 RX ADMIN — PIPERACILLIN AND TAZOBACTAM 4.5 G: 4; .5 INJECTION, POWDER, FOR SOLUTION INTRAVENOUS at 20:09

## 2023-10-10 RX ADMIN — LANSOPRAZOLE 30 MG: 30 TABLET, ORALLY DISINTEGRATING, DELAYED RELEASE ORAL at 05:13

## 2023-10-10 RX ADMIN — MEMANTINE 5 MG: 5 TABLET ORAL at 05:13

## 2023-10-10 RX ADMIN — MAGNESIUM SULFATE HEPTAHYDRATE 2 G: 2 INJECTION, SOLUTION INTRAVENOUS at 08:59

## 2023-10-10 RX ADMIN — AMLODIPINE BESYLATE 10 MG: 10 TABLET ORAL at 05:13

## 2023-10-10 RX ADMIN — DOCUSATE SODIUM 50 MG AND SENNOSIDES 8.6 MG 2 TABLET: 8.6; 5 TABLET, FILM COATED ORAL at 05:13

## 2023-10-10 RX ADMIN — MEMANTINE 5 MG: 5 TABLET ORAL at 17:06

## 2023-10-10 RX ADMIN — INSULIN HUMAN 7 UNITS: 100 INJECTION, SOLUTION PARENTERAL at 13:17

## 2023-10-10 RX ADMIN — INSULIN HUMAN 7 UNITS: 100 INJECTION, SOLUTION PARENTERAL at 17:08

## 2023-10-10 RX ADMIN — LOSARTAN POTASSIUM 100 MG: 50 TABLET, FILM COATED ORAL at 05:13

## 2023-10-10 RX ADMIN — PIPERACILLIN AND TAZOBACTAM 4.5 G: 4; .5 INJECTION, POWDER, FOR SOLUTION INTRAVENOUS at 05:04

## 2023-10-10 RX ADMIN — POLYETHYLENE GLYCOL 3350 1 PACKET: 17 POWDER, FOR SOLUTION ORAL at 05:13

## 2023-10-10 RX ADMIN — ATORVASTATIN CALCIUM 40 MG: 40 TABLET, FILM COATED ORAL at 17:06

## 2023-10-10 RX ADMIN — INSULIN GLARGINE-YFGN 39 UNITS: 100 INJECTION, SOLUTION SUBCUTANEOUS at 17:09

## 2023-10-10 RX ADMIN — INSULIN HUMAN 4 UNITS: 100 INJECTION, SOLUTION PARENTERAL at 05:06

## 2023-10-10 ASSESSMENT — PAIN DESCRIPTION - PAIN TYPE
TYPE: ACUTE PAIN

## 2023-10-10 NOTE — PROGRESS NOTES
Critical Care Progress Note    Date of admission  9/24/2023    Chief Complaint  68 y.o. male admitted 9/24/2023 with urosepsis.  He has a history of DM type II, primary hypertension, alcohol dependence and obesity.    Hospital Course  9/25: surgery was consulted Dr Hirsch. Continue resuscitation with IVF, antibiotics, If worsens, consider jamila tube. Patient was intubated acutely overnight.  9/26: requiring high levels of sedatives. Had to start a second vasopressor. Failed SAT and SBT due to agitation. Vasopressors are weaning down. Will start dvt chemical prophylaxis tonight.  9/27: changed propofol to precedex, hopes to move towards extubation. Consider tube feeds if  not extubated  9/28: he is now following commands, maximizing status for extubation.    9/29-9/30: Transferred to floor, under care with hospitalist.    9/30: patient was found with AMS, suspected ETOH withdrawal, s/p benzos. CT head showed parenchymal hemorrhage. Transferred to ICU. Intubated, CVC placed, vascular neurology and neurosurgery consulted    10/1 - Will work on extubation.  23% saline bolus.  Palliative consutled.  Fevers - reculture and empiric unasyn    10/2 -    vent day 3.  Force diuresis.  SAT/SBT.  Increase amlodipine.  10/3 -    vent day 4.  Force diuresis.  SAT/SBT.  10/4 -    vent day 5.  Force diuresis.  SAT/SBT.  Continue amlodipine, 10 mg daily.  Increase losartan, 75 mg daily.  Begin NPH, 10 units twice daily.  10/5 -    vent day 6.  Force diuresis.  SAT/SBT.  Continue amlodipine.  Increase losartan, 100 mg daily.  Stop NPH and sliding scale insulin.  Begin insulin drip.  Culture blood, sputum and check UA.  Stop Unasyn.  Begin empiric Zosyn and vancomycin.  Check RUQ ultrasound.  10/6 -    vent day 7.  Force diuresis.  SAT/SBT.  Continue amlodipine and losartan.  Continue insulin drip.  Begin salt tabs, 1 g every 8 hours.  Surgery opinion regarding cholecystostomy tube.  10/7 -    vent day 8.  Force diuresis.  SAT/SBT.   Continue amlodipine and losartan.  Continue insulin drip.  Stop salt tabs.  HIDA scan normal.  Stop vancomycin.  Continue Zosyn.  10/8 -    liberated from the ventilator yesterday.  Continue amlodipine and losartan.  Titrating insulin drip.  Continue Zosyn.  10/9- stable neuro exam, discontinued insulin gtt    Interval Problem Update  Reviewed last 24 hour events:      HR 50s-70s  BP 120s-150s      3L NC  SP02 95-97%    BM today  +275  UOP 1775      Abx Zosyn d6  WBC 9.5  Cx NGTD        Review of Systems  Review of Systems   Unable to perform ROS: Critical illness   All other systems reviewed and are negative.       Vital Signs for last 24 hours   Pulse:  [52-83] 58  Resp:  [14-31] 24  BP: (102-159)/(55-72) 144/67  SpO2:  [93 %-98 %] 97 %    Hemodynamic parameters for last 24 hours       Respiratory Information for the last 24 hours       Physical Exam   Physical Exam  Constitutional:       General: He is not in acute distress.     Appearance: He is ill-appearing.   HENT:      Mouth/Throat:      Mouth: Mucous membranes are dry.   Eyes:      Extraocular Movements: Extraocular movements intact.      Pupils: Pupils are equal, round, and reactive to light.   Cardiovascular:      Rate and Rhythm: Normal rate and regular rhythm.      Heart sounds: No murmur heard.  Pulmonary:      Effort: Pulmonary effort is normal.   Abdominal:      General: Abdomen is flat. There is no distension.   Musculoskeletal:      Right lower leg: No edema.      Left lower leg: No edema.   Skin:     General: Skin is warm.      Capillary Refill: Capillary refill takes less than 2 seconds.   Neurological:      Comments: Arousable and quite alert but non verbal    Follows occasionally  BTT present bilateral  LUE flaccid  RUE and RLE with good strength but doesn't follow consistently               Medications  Current Facility-Administered Medications   Medication Dose Route Frequency Provider Last Rate Last Admin    insulin GLARGINE  (Lantus,Semglee) injection  0.3 Units/kg/day Subcutaneous Q EVENING Mitch Nunn M.D.   39 Units at 10/09/23 1507    insulin regular (HumuLIN R,NovoLIN R) injection  3-14 Units Subcutaneous Q6HRS Mitch Nunn M.D.   7 Units at 10/10/23 1317    And    dextrose 50% (D50W) injection 25 g  25 g Intravenous Q15 MIN PRN Mitch Nunn M.D.        senna-docusate (Pericolace Or Senokot S) 8.6-50 MG per tablet 2 Tablet  2 Tablet Enteral Tube BID Jacey BEYER Latona   2 Tablet at 10/10/23 0513    And    polyethylene glycol/lytes (Miralax) PACKET 1 Packet  1 Packet Enteral Tube DAILY Jacey BEYER Latona   1 Packet at 10/10/23 0513    And    magnesium hydroxide (Milk Of Magnesia) suspension 30 mL  30 mL Enteral Tube QDAY PRN Jacey Villanueva        And    bisacodyl (Dulcolax) suppository 10 mg  10 mg Rectal QDAY PRN Jacey Villanueva        losartan (Cozaar) tablet 100 mg  100 mg Enteral Tube DAILY Pal Serna M.D.   100 mg at 10/10/23 0513    piperacillin-tazobactam (Zosyn) 4.5 g in  mL IVPB  4.5 g Intravenous Q8HRS Pal Serna M.D. 25 mL/hr at 10/10/23 1313 4.5 g at 10/10/23 1313    enalaprilat (Vasotec) injection 1.25 mg 1 mL  1.25 mg Intravenous Q6HRS PRN Jacey Villanueva        labetalol (Normodyne/Trandate) injection 10 mg  10 mg Intravenous Q6HRS PRN Jacey Villanueva   10 mg at 10/05/23 0832    amLODIPine (Norvasc) tablet 10 mg  10 mg Enteral Tube DAILY Pal Serna M.D.   10 mg at 10/10/23 0513    Pharmacy Consult: Enteral tube insertion - review meds/change route/product selection  1 Each Other PHARMACY TO DOSE Pal Serna M.D.        Respiratory Therapy Consult   Nebulization Continuous RT Josiah Casiano M.D.        MD Alert...ICU Electrolyte Replacement per Pharmacy   Other PHARMACY TO DOSE Josiah Casiano M.D.        acetaminophen (Tylenol) tablet 650 mg  650 mg Enteral Tube Q4HRS PRN Lizandro Greenwood M.D.   650 mg at 10/03/23 231    atorvastatin (Lipitor) tablet 40  mg  40 mg Enteral Tube Q EVENING Lizandro Greenwood M.D.   40 mg at 10/09/23 1803    memantine (Namenda) tablet 5 mg  5 mg Enteral Tube BID Lizandro Greenwood M.D.   5 mg at 10/10/23 0513    lansoprazole (Prevacid) solutab 30 mg  30 mg Enteral Tube DAILY Lizandro Greenwood M.D.   30 mg at 10/10/23 0513    ondansetron (Zofran ODT) dispertab 4 mg  4 mg Enteral Tube Q4HRS PRN Lizandro Greenwood M.D.        ondansetron (Zofran) syringe/vial injection 4 mg  4 mg Intravenous Q4HRS PRN Rhoda Argueta M.D.   4 mg at 09/29/23 0151    hydrALAZINE (Apresoline) injection 20 mg  20 mg Intravenous Q6HRS PRN Jacey BEYER Latona   20 mg at 10/05/23 1007       Fluids    Intake/Output Summary (Last 24 hours) at 10/10/2023 1627  Last data filed at 10/10/2023 1400  Gross per 24 hour   Intake 2029.19 ml   Output 1350 ml   Net 679.19 ml       Laboratory          Recent Labs     10/08/23  0615 10/08/23  1300 10/09/23  0600 10/09/23  1300 10/09/23  1800 10/10/23  0012 10/10/23  0456   SODIUM 150*   < > 147*   < > 148* 147* 144   POTASSIUM 4.2  --  3.9  --   --   --  4.1   CHLORIDE 113*  --  109  --   --   --  107   CO2 29  --  30  --   --   --  26   BUN 27*  --  31*  --   --   --  28*   CREATININE 0.73  --  0.76  --   --   --  0.79   MAGNESIUM 2.1  --  1.9  --   --   --  1.9   PHOSPHORUS 2.3*  --  2.9  --   --   --  2.8   CALCIUM 9.1  --  9.4  --   --   --  9.2    < > = values in this interval not displayed.     Recent Labs     10/08/23  0615 10/09/23  0600 10/10/23  0456   ALTSGPT 33 29 33   ASTSGOT 48* 42 56*   ALKPHOSPHAT 161* 158* 176*   TBILIRUBIN 0.9 0.8 0.8   GLUCOSE 219* 194* 233*     Recent Labs     10/08/23  0615 10/09/23  0600 10/10/23  0456   WBC 10.4 10.3 9.4   NEUTSPOLYS 75.00* 70.90 71.60   LYMPHOCYTES 14.70* 16.90* 16.80*   MONOCYTES 6.20 7.00 6.80   EOSINOPHILS 3.40 3.30 3.90   BASOPHILS 0.40 0.50 0.70   ASTSGOT 48* 42 56*   ALTSGPT 33 29 33   ALKPHOSPHAT 161* 158* 176*   TBILIRUBIN 0.9 0.8 0.8     Recent Labs      10/08/23  0615 10/09/23  0600 10/10/23  0456   RBC 4.44* 4.36* 4.40*   HEMOGLOBIN 13.4* 13.3* 13.3*   HEMATOCRIT 41.7* 40.5* 41.0*   PLATELETCT 251 270 255       Imaging  X-Ray:  I have personally reviewed the images and compared with prior images.    Assessment/Plan  * Intraparenchymal hemorrhage of brain (HCC)- (present on admission)  Assessment & Plan  CT on 9/24 with hazy left frontal SAH  CT on 9/30 with new large IPH in the left frontal lobe  Strict blood pressure control with goal SBP less than 160  Goal sodium 145-150 - I am titrating hypertonic saline to achieve sodium goal    Will deescalate HTS today and slowly correct    Neuro checks every 2 hours    Pneumonia due to infectious organism- (present on admission)  Assessment & Plan  Continue Zosyn    Acute respiratory failure with hypoxia (HCC)- (present on admission)  Assessment & Plan  Intubated 9/25-9/28  Reintubated 9/30-10/7  Continue oxygen    Pneumobilia- (present on admission)  Assessment & Plan  ACGS consulted, no indication for intervention  Completed 5 days of ceftriaxone and metronidazole on 9/28  RUQ ultrasound with minimal gallbladder wall thickening and possible sludge and minimal pericholecystic fluid  HIDA scan normal on 10/7  Continue empiric Zosyn and plan 7 days of therapy for now    Type 2 diabetes mellitus (HCC)- (present on admission)  Assessment & Plan  Glycohemoglobin 6.8  I am titrating an insulin drip to achieve glycemic control    Will attempt discontinuation of insulin infusion today    Primary hypertension- (present on admission)  Assessment & Plan  Goal SBP less than 160  Continue amlodipine, 10 mg daily  Continue losartan, 100 mg daily    BMI 40.0-44.9, adult (HCC)- (present on admission)  Assessment & Plan       Alcohol dependence (HCC)- (present on admission)  Assessment & Plan  Cessation counseling when clinically appropriate         VTE:  Contraindicated  Ulcer: PPI  Lines: Central Line  Ongoing indication addressed    I  have performed a physical exam and reviewed and updated ROS and Plan today (10/10/2023). In review of yesterday's note (10/9/2023), there are no changes except as documented above.     Discussed patient condition and risk of morbidity and/or mortality with Hospitalist, Family, RN, RT, Therapies, Pharmacy, and Patient  The patient remains critically ill.  Critical care time = 35 minutes in directly providing and coordinating critical care and extensive data review.  No time overlap and excludes procedures.

## 2023-10-10 NOTE — CARE PLAN
The patient is Watcher - Medium risk of patient condition declining or worsening    Shift Goals  Clinical Goals: Stable Hemodynamics  Patient Goals: DEENA  Family Goals: DEENA    Progress made toward(s) clinical / shift goals:    Problem: Pain - Standard  Goal: Alleviation of pain or a reduction in pain to the patient’s comfort goal  Description: Target End Date:  Prior to discharge or change in level of care    Document on Vitals flowsheet    1.  Document pain using the appropriate pain scale per order or unit policy  2.  Educate and implement non-pharmacologic comfort measures (i.e. relaxation, distraction, massage, cold/heat therapy, etc.)  3.  Pain management medications as ordered  4.  Reassess pain after pain med administration per policy  5.  If opiods administered assess patient's response to pain medication is appropriate per POSS sedation scale  6.  Follow pain management plan developed in collaboration with patient and interdisciplinary team (including palliative care or pain specialists if applicable)  Outcome: Progressing     Problem: Safety - Medical Restraint  Goal: Remains free of injury from restraints (Restraint for Interference with Medical Device)  Description: INTERVENTIONS:  1. Determine that other, less restrictive measures have been tried or would not be effective before applying the restraint  2. Evaluate the patient's condition at the time of restraint application  3. Educate patient/family regarding the reason for restraint  4. Q2H: Monitor safety, psychosocial status, comfort, circulation, respiratory status, LOC, nutrition and hydration  Outcome: Progressing       Patient is not progressing towards the following goals:

## 2023-10-10 NOTE — PROGRESS NOTES
[facility] Medicine Daily Progress Note    Date of Service  10/10/2023    Chief Complaint  Sharon Guardado is a 68 y.o. male admitted 9/24/2023 with altered mental status and ground-level fall    Hospital Course  68-year-old male with a past medical history of alcohol abuse, hypertension, obesity who presented with altered mental status and a ground-level fall on 9/24/2023.  On admission he was noted to have a small frontal subarachnoid hemorrhage on CT head.  He was also noted to have severe sepsis secondary to UTI.  She was treated with IV ceftriaxone.  Patient developed symptoms of alcohol withdrawal and was intubated.  Patient was subsequently extubated and transferred to telemetry on 9/30.  The patient subsequently had worsening mental status, on 9/30 found to have a left frontal lobe hemorrhage with surrounding vasogenic edema with mild mass effect, deemed nonoperative by neurosurgery  Was readmitted to the ICU level of care, intubated, treated with hypertonic saline, empirically treated with Unasyn for fever, overall state on mechanical ventilation for 9 days, during the course there was a concern for possible abdominal infection, gallbladder infection, surgery was consulted,  HIDA scan was obtained and was negative for acute cholecystitis, surgery not indicated as per general surgery opinion.  The patient found with significant right-sided weakness, aphasia.  On 10/10 patient again found appropriate to transfer out of ICU    Interval Problem Update  Patient seen and examined today.  Data, Medication data reviewed.  Case discussed with nursing as available.  Plan of Care reviewed with patient and notified of changes.   10/10/2023 the patient remains extremely weak, he opens his eyes, he does not follow, he is aphasic, patient with antigravity strength in the right arm, right leg, left lower extremity as well, left upper extremity is flaccid, the patient is being weaned off hypertonic saline, follow  sodium between 145 and 150, suspected pneumonia, ongoing antibiotic therapy  Evaluating for disposition, rehab consulted    I have discussed this patient's plan of care and discharge plan at IDT rounds today with Case Management, Nursing, Nursing leadership, and other members of the IDT team.    Consultants/Specialty  critical care and neurology  PMR    Code Status  Full Code    Disposition  The patient is not medically cleared for discharge to home or a post-acute facility.  Anticipate discharge to: skilled nursing facility    I have placed the appropriate orders for post-discharge needs.    Review of Systems  Review of Systems   Unable to perform ROS: Mental status change        Physical Exam  Pulse:  [46-83] 59  Resp:  [14-48] 17  BP: (118-159)/(56-72) 131/61  SpO2:  [93 %-98 %] 98 %    Physical Exam  Vitals and nursing note reviewed.   Constitutional:       General: He is not in acute distress.     Appearance: He is obese. He is ill-appearing.   HENT:      Head: Normocephalic.      Mouth/Throat:      Mouth: Mucous membranes are moist.   Eyes:      Pupils: Pupils are equal, round, and reactive to light.   Cardiovascular:      Rate and Rhythm: Normal rate and regular rhythm.      Pulses: Normal pulses.      Heart sounds: Normal heart sounds.   Pulmonary:      Breath sounds: Normal breath sounds.   Abdominal:      General: There is no distension.      Palpations: Abdomen is soft.      Tenderness: There is no abdominal tenderness.   Musculoskeletal:         General: No swelling.      Cervical back: Neck supple.      Right lower leg: No edema.      Left lower leg: No edema.   Skin:     General: Skin is warm.      Coloration: Skin is not jaundiced.   Neurological:      Comments: Lethargic  Nonverbal  Able to follow simple commands  Weakness in the right upper right lower extremity,  Left upper extremity also weak, 4-5   Psychiatric:      Comments: Unable to obtain         Fluids    Intake/Output Summary (Last 24  hours) at 10/10/2023 0843  Last data filed at 10/10/2023 0800  Gross per 24 hour   Intake 2190.23 ml   Output 1700 ml   Net 490.23 ml         Laboratory  Recent Labs     10/08/23  0615 10/09/23  0600 10/10/23  0456   WBC 10.4 10.3 9.4   RBC 4.44* 4.36* 4.40*   HEMOGLOBIN 13.4* 13.3* 13.3*   HEMATOCRIT 41.7* 40.5* 41.0*   MCV 93.9 92.9 93.2   MCH 30.2 30.5 30.2   MCHC 32.1* 32.8 32.4   RDW 45.4 44.9 44.4   PLATELETCT 251 270 255   MPV 11.2 12.1 11.2       Recent Labs     10/08/23  0615 10/08/23  1300 10/09/23  0600 10/09/23  1300 10/09/23  1800 10/10/23  0012 10/10/23  0456   SODIUM 150*   < > 147*   < > 148* 147* 144   POTASSIUM 4.2  --  3.9  --   --   --  4.1   CHLORIDE 113*  --  109  --   --   --  107   CO2 29  --  30  --   --   --  26   GLUCOSE 219*  --  194*  --   --   --  233*   BUN 27*  --  31*  --   --   --  28*   CREATININE 0.73  --  0.76  --   --   --  0.79   CALCIUM 9.1  --  9.4  --   --   --  9.2    < > = values in this interval not displayed.                     Imaging  DX-CHEST-PORTABLE (1 VIEW)   Final Result      Unchanged bibasilar underinflation atelectasis      DX-ABDOMEN FOR TUBE PLACEMENT   Final Result      Enteric tube tip projects over the gastric antrum or proximal duodenum      DX-CHEST-PORTABLE (1 VIEW)   Final Result      Improving bibasilar pulmonary opacities.      DX-CHEST-PORTABLE (1 VIEW)   Final Result         Findings on chest radiograph appear stable since the prior radiograph.  No new abnormalities are identified.      NM-HEPATOBILIARY SCAN   Final Result      There is visualization of the gallbladder after morphine administration, indicating the cystic duct is still patent. No scintigraphy evidence of acute cholecystitis.      DX-CHEST-PORTABLE (1 VIEW)   Final Result         1.  Mild pulmonary edema and/or infiltrates.   2.  Cardiomegaly   3.  Atherosclerosis      DX-CHEST-PORTABLE (1 VIEW)   Final Result         1.  No acute cardiopulmonary disease.   2.  Cardiomegaly   3.   Atherosclerosis      US-RUQ   Final Result      1.  Minimal gallbladder wall thickening and possible sludge.  Cholecystitis is not excluded.   2.  Minimal pericholecystic fluid may be due to gallbladder inflammation or liver disease.   3.  Enlarged echogenic liver suggesting fatty infiltration.   4.  Limited evaluation of the pancreas abdominal aorta.      DX-CHEST-PORTABLE (1 VIEW)   Final Result         1.  Pulmonary edema and/or infiltrates are identified, which are stable since the prior exam.   2.  Cardiomegaly   3.  Atherosclerosis      US-EXTREMITY VENOUS UPPER UNILAT RIGHT   Final Result      DX-CHEST-PORTABLE (1 VIEW)   Final Result         1.  Pulmonary edema and/or infiltrates are identified, which are stable since the prior exam.   2.  Cardiomegaly   3.  Atherosclerosis      DX-CHEST-PORTABLE (1 VIEW)   Final Result         1.  Pulmonary edema and/or infiltrates are identified, which are stable since the prior exam.   2.  Cardiomegaly   3.  Atherosclerosis      DX-CHEST-PORTABLE (1 VIEW)   Final Result         1.  Pulmonary edema and/or infiltrates are identified, which are somewhat decreased since the prior exam.   2.  Cardiomegaly   3.  Atherosclerosis      MR-MRA HEAD-W/O   Final Result         Normal intracranial MRA.      MR-BRAIN-WITH & W/O   Final Result         Left frontal lobe hemorrhage with surrounding vasogenic edema and mild mass effect upon the frontal horn of the left lateral ventricle. There is approximately 2 mm of midline shift to the right.      Age-related volume loss and chronic microvascular ischemic changes.      No abnormal intracranial enhancement is identified.      CT-HEAD W/O   Final Result         1. Stable 6 cm left frontal intraparenchymal hemorrhage with mild, 2 mm left-to-right subfalcine herniation.   2. No new abnormality or change.         DX-CHEST-PORTABLE (1 VIEW)   Final Result         1.  Pulmonary edema and/or infiltrates are identified, which appear somewhat  increased since the prior exam.   2.  Cardiomegaly   3.  Atherosclerosis      DX-ABDOMEN FOR TUBE PLACEMENT   Final Result      NG tube extends below the diaphragm and appears to extend through the region of the stomach with tip at the level of the proximal duodenum.      DX-CHEST-PORTABLE (1 VIEW)   Final Result      1.  Grossly satisfactory appearance of tubes and lines.   2.  No evidence of pneumothorax.   3.  Marked hypoinflation with probable bibasilar atelectasis.   4.  Enlarged cardiac silhouette with vascular congestion.      CT-HEAD W/O   Final Result         1.  New large parenchymal hemorrhage involving the frontal lobe is identified with some surrounding edema.      2.  Subarachnoid hemorrhage is also noted in the area.      3.  Minimal midline shift to the right side.      Findings are consistent with atrophy.  Decreased attenuation in the periventricular white matter likely indicates microvascular ischemic disease.      Based solely on CT findings, the brain injury guideline category is mBIG 3.      EDH   IVH   Displaced skull fx   SDH > 8mm   IPH > 8mm or multiple   SAH bi-hemispheric or > 3mm      The original BIG retrospective analysis found radiographic progression in 0% of BIG 1 patients and 2.6% BIG 2.      These findings were discussed with GEETA MOLINA on 09/30/2023.      DX-CHEST-PORTABLE (1 VIEW)   Final Result      Stable multiple pulmonary vascular dilation/congestion. Otherwise, negative.      DX-CHEST-PORTABLE (1 VIEW)   Final Result      1.  Increasing airspace disease at the left base.      DX-CHEST-LIMITED (1 VIEW)   Final Result         1.  Pulmonary edema and/or infiltrates are identified, which are somewhat decreased since the prior exam.   2.  Cardiomegaly   3.  Atherosclerosis      DX-CHEST-PORTABLE (1 VIEW)   Final Result         1.  Pulmonary edema and/or infiltrates are identified, which appear somewhat increased since the prior exam.   2.  Cardiomegaly   3.  Atherosclerosis       DX-CHEST-PORTABLE (1 VIEW)   Final Result         1.  Interstitial edema and/or infiltrates, slightly decreased since prior study.   2.  Cardiomegaly   3.  Atherosclerosis      DX-CHEST-LIMITED (1 VIEW)   Final Result      Right IJ catheter tip projects in the SVC. No pneumothorax.      CT-CTA NECK WITH & W/O-POST PROCESSING   Final Result      1.  Estimated 50-75% stenosis of the BILATERAL carotid arteries   2.  Estimated greater than 50% stenosis at the vertebral artery ostium on each side      CT-CTA HEAD WITH & W/O-POST PROCESS   Final Result      1.  No large vessel occlusion or aneurysm.   2.  Hazy left frontal lobe subarachnoid hemorrhage, decreased in conspicuity from prior. No new hemorrhage seen.   3.  Scattered opacifications of the ethmoid sinuses, likely related to support hardware.      DX-ABDOMEN FOR TUBE PLACEMENT   Final Result         1.  Nonspecific bowel gas pattern in the upper abdomen.   2.  Dobbhoff tube with tip terminating overlying the expected location of the first or second duodenal segment.      DX-CHEST-PORTABLE (1 VIEW)   Final Result         1.  Interstitial edema and/or infiltrates.   2.  Cardiomegaly   3.  Nasogastric tube terminates in the distal esophagus, recommend advancement   4.  Atherosclerosis      DX-ABDOMEN FOR TUBE PLACEMENT   Final Result         1.  Nonspecific bowel gas pattern in the upper abdomen.   2.  Nasogastric tube terminates just distal to the gastroesophageal junction, recommend advancement      CT-RENAL COLIC EVALUATION(A/P W/O)   Final Result         1.  Intrahepatic biliary air and nondependent air within the gallbladder, consider history of instrumentation or changes of cholangitis in the appropriate clinical setting.   2.  Large fat-containing bilateral inguinal hernias   3.  Enlarged prostate, consider causes of prostate enlargement with additional workup as clinically appropriate   4.  Diverticulosis   5.  Cholelithiasis   6.  Atherosclerosis       CT-HEAD W/O   Final Result         1.  Hazy left frontal subarachnoid hemorrhage.   2.  Nonspecific white matter changes, commonly associated with small vessel ischemic disease.  Associated mild cerebral atrophy is noted.      Based solely on CT findings, the brain injury guideline category is mBIG 1.      SDH < 4mm   IPH < 4mm   SAH < 3 sulci and < 1mm      The original BIG retrospective analysis found radiographic progression in 0% of BIG 1 patients and 2.6% BIG 2.      These findings were discussed with the patient's clinician, Madhu Zambrano, on 9/24/2023 10:10 PM.      DX-CHEST-PORTABLE (1 VIEW)   Final Result      No acute cardiopulmonary abnormality identified.             Assessment/Plan  * Intraparenchymal hemorrhage of brain (HCC)- (present on admission)  Assessment & Plan  CT on 9/24 with hazy left frontal SAH  CT on 9/30 with new large IPH in the left frontal lobe  Strict blood pressure control with goal SBP less than 160  Goal sodium 145-150 - I am titrating hypertonic saline to achieve sodium goal  Will deescalate HTS today and slowly correct  Neuro checks every 2 hours    Pneumonia due to infectious organism- (present on admission)  Assessment & Plan  Continue Zosyn    Acute respiratory failure with hypoxia (HCC)- (present on admission)  Assessment & Plan  Intubated 9/25-9/28  Reintubated 9/30-10/7  Continue oxygen    Thrombocytopenia (HCC)- (present on admission)  Assessment & Plan  Baseline ~110-125  Monitor, currently improved    Pneumobilia- (present on admission)  Assessment & Plan  As seen on CT renal (9/25/23).    9/26/23: No need for further interventions at this time per gen surg    Type 2 diabetes mellitus (HCC)- (present on admission)  Assessment & Plan  A1c 5.7% (6/2023)  Ongoing treatment with long and short acting insulin, adjust as indicated    Primary hypertension- (present on admission)  Assessment & Plan  Hypertensive at presentation   Goal for normotension currently multimodality  treatment      BMI 40.0-44.9, adult (HCC)- (present on admission)  Assessment & Plan  Future weight loss recommended    Alcohol dependence (HCC)- (present on admission)  Assessment & Plan  Multivitamin, folate, thiamine  Monitor, additional therapies as clinically indicated  9/29/2023 patient was at least 2-3 hard liquor drinks daily although this is unclear per patient and his wife's history.  .    Patient s/p treatment for acute alcohol withdrawal    Plan  Ongoing maxima supportive care  Glycemic control with long short acting insulin regimen  Antihypertensive regimen for goal pressures  Maintain normal sodium levels  PT OT SLP, disposition evaluation  See orders  Patient is has a high medical complexity, complex decision making and is at high risk for complication, morbidity, and mortality.  My total time spent caring for the patient on the day of the encounter was 56 minutes.   This does not include time spent on separately billable procedures/tests.    VTE prophylaxis: scd    I have performed a physical exam and reviewed and updated ROS and Plan today (10/10/2023). In review of yesterday's note (10/9/2023), there are no changes except as documented above.          Please note that this dictation was created using voice recognition software. I have made every reasonable attempt to correct obvious errors, but I expect that there are errors of grammar and possibly context that I did not discover before finalizing the note.

## 2023-10-10 NOTE — CONSULTS
Physical Medicine and Rehabilitation Consultation              Date of initial consultation: 10/10/2023  Requesting provider: ordered by Josiah Casiano M.D. at 10/10/23 0738   Consulting provider: Maria G Morgan D.O.  Reason for consultation: assess for acute inpatient rehab appropriateness  LOS: 16 Day(s)    Chief complaint: Confusion, intracranial hemorrhage    HPI: The patient is a 68 y.o.  male with a past medical history of hypertension, type 2 diabetes, history of LE, alcohol abuse history of nephrolithiasis;  who presented on 9/24/2023  5:22 PM with altered mental status.  Upon evaluation in the emergency department a CT head was obtained that revealed a small frontal subarachnoid hemorrhage, and evidence of sepsis with a lactate of 4.6 and purulent UA.  Given the history of nephrolithiasis CT abdomen pelvis was obtained which showed evidence of hepatic biliary air.  No nephrolithiasis seen.  Patient was admitted for worsening sepsis and possible alcohol withdrawal.  Patient required intubation.  Surgery was consulted for consideration for possible surgical needs due to hepatic biliary air.  General surgery recommending IV fluid resuscitation and antibiotics.  Patient completed 5-day course of ceftriaxone and metronidazole on 9/28.  HIDA scan was able to be completed on 10/7 which was normal.  Patient is on empiric Zosyn x7 days.  Patient has had prolonged ICU stay requiring sedatives for alcohol withdrawal, as well as vasopressors.  Patient was able to be extubated on 10/7.  In regards to patient's intraparenchymal hemorrhage, neurology consulted, blood pressure goal is less than 160, with a goal sodium of 1 45-1 40.  Repeat imaging done on 10/1 showed stable 6 cm left frontal intraparenchymal hemorrhage with mild 2 mm left to right subfalcine herniation.  Neurosurgery was consulted on 10/1, with recommendations for nonop management.  MRI brain obtained on 10/1 showed left frontal lobe hemorrhage with  surrounding vasogenic edema and mild mass effect upon the frontal horn of the left lateral ventricle.  With approximately 2 mm of midline shift to the right.  Given patient's prolonged intubation and requirement for sedatives due to alcohol withdrawal, he has not been able to participate with SLP, he remains n.p.o.  Pending SLP swallow eval.    Patient seen and examined at bedside. Patient very lethargic, opens eyes to stimuli, lifts left hand and moves LLE when stimulated with tactile touch. Does not follow commands to squeeze fingers or participate with MMT.     Social Hx:  Patient lives with his spouse in a two-story house with no stairs to enter.  Is 14 stairs to access upstairs.  Per documentation, patient can stay on first floor. Unable to confirm, no family in room during visit.   0 GEMA  At prior level of function patient was Independent with mobility and ADLs.     Tobacco: Denies  Alcohol: Regular Daily alcohol use  Drugs: Denies    THERAPY:  Restrictions: Fall risk  PT: Functional mobility   10/9 total assist bed mobility, total assist sit to stand, unable to participate in gait, limited by strong posterior lean seated edge of bed    OT: ADLs  10/9 total assist bed mobility, total assist upper body dressing, total assist lower body dressing, total assist toileting    SLP:   10/9, patient unable to participate with SLP due to lethargy, remains n.p.o.    IMAGING:  US-RUQ   Final Result       1.  Minimal gallbladder wall thickening and possible sludge.  Cholecystitis is not excluded.   2.  Minimal pericholecystic fluid may be due to gallbladder inflammation or liver disease.   3.  Enlarged echogenic liver suggesting fatty infiltration.   4.  Limited evaluation of the pancreas abdominal aorta.       DX-CHEST-PORTABLE (1 VIEW)   Final Result           1.  Pulmonary edema and/or infiltrates are identified, which are stable since the prior exam.   2.  Cardiomegaly   3.  Atherosclerosis       US-EXTREMITY VENOUS  UPPER UNILAT RIGHT   Final Result       DX-CHEST-PORTABLE (1 VIEW)   Final Result           1.  Pulmonary edema and/or infiltrates are identified, which are stable since the prior exam.   2.  Cardiomegaly   3.  Atherosclerosis       DX-CHEST-PORTABLE (1 VIEW)   Final Result           1.  Pulmonary edema and/or infiltrates are identified, which are stable since the prior exam.   2.  Cardiomegaly   3.  Atherosclerosis       DX-CHEST-PORTABLE (1 VIEW)   Final Result           1.  Pulmonary edema and/or infiltrates are identified, which are somewhat decreased since the prior exam.   2.  Cardiomegaly   3.  Atherosclerosis       MR-MRA HEAD-W/O   Final Result           Normal intracranial MRA.       MR-BRAIN-WITH & W/O   Final Result           Left frontal lobe hemorrhage with surrounding vasogenic edema and mild mass effect upon the frontal horn of the left lateral ventricle. There is approximately 2 mm of midline shift to the right.       Age-related volume loss and chronic microvascular ischemic changes.       No abnormal intracranial enhancement is identified.       CT-HEAD W/O   Final Result           1. Stable 6 cm left frontal intraparenchymal hemorrhage with mild, 2 mm left-to-right subfalcine herniation.   2. No new abnormality or change.           DX-CHEST-PORTABLE (1 VIEW)   Final Result           1.  Pulmonary edema and/or infiltrates are identified, which appear somewhat increased since the prior exam.   2.  Cardiomegaly   3.  Atherosclerosis       DX-ABDOMEN FOR TUBE PLACEMENT   Final Result       NG tube extends below the diaphragm and appears to extend through the region of the stomach with tip at the level of the proximal duodenum.       DX-CHEST-PORTABLE (1 VIEW)   Final Result       1.  Grossly satisfactory appearance of tubes and lines.   2.  No evidence of pneumothorax.   3.  Marked hypoinflation with probable bibasilar atelectasis.   4.  Enlarged cardiac silhouette with vascular congestion.        CT-HEAD W/O   Final Result           1.  New large parenchymal hemorrhage involving the frontal lobe is identified with some surrounding edema.       2.  Subarachnoid hemorrhage is also noted in the area.       3.  Minimal midline shift to the right side.       PROCEDURES:  None    PMH:  Past Medical History:   Diagnosis Date    LE (acute kidney injury), prerenal (CMS-HCC) 1/20/2018    Alcohol withdrawal (HCC) 1/22/2018    Bowel habit changes     constipation    CAD (coronary artery disease)     Cancer (HCC)     melanoma skin cancer on his back    Cellulitis of back except buttock 8/3/2018    HTN (hypertension)     Hyperglycemia 1/19/2018    Hypertension     Indigestion     Kidney stones     Near-syncope     worked up with neg results    Nerve compression     Tinnitus     pt states x 10 years and stopped tonight       PSH:  Past Surgical History:   Procedure Laterality Date    GASTROSCOPY  3/29/2018    Procedure: GASTROSCOPY - W/POSS BIOPSY, DILATION, POLYPECTOMY, CONTROL OF HEMORRHAGE;  Surgeon: Rich Collins M.D.;  Location: [facility];  Service: EUS    EGD W/ENDOSCOPIC ULTRASOUND  3/29/2018    Procedure: EGD W/ENDOSCOPIC ULTRASOUND;  Surgeon: Rich Collins M.D.;  Location: [facility];  Service: EUS    EGD WITH ASP/BX  3/29/2018    Procedure: EGD WITH ASP/BX - FNA;  Surgeon: Rich Collins M.D.;  Location: [facility];  Service: EUS       FHX:  History reviewed. No pertinent family history.    Medications:  Current Facility-Administered Medications   Medication Dose    insulin GLARGINE (Lantus,Semglee) injection  0.3 Units/kg/day    insulin regular (HumuLIN R,NovoLIN R) injection  3-14 Units    And    dextrose 50% (D50W) injection 25 g  25 g    HYDROmorphone (Dilaudid) injection 0.5-1 mg  0.5-1 mg    senna-docusate (Pericolace Or Senokot S) 8.6-50 MG per tablet 2 Tablet  2 Tablet    And    polyethylene glycol/lytes (Miralax) PACKET 1 Packet  1 Packet    And     "magnesium hydroxide (Milk Of Magnesia) suspension 30 mL  30 mL    And    bisacodyl (Dulcolax) suppository 10 mg  10 mg    losartan (Cozaar) tablet 100 mg  100 mg    piperacillin-tazobactam (Zosyn) 4.5 g in  mL IVPB  4.5 g    enalaprilat (Vasotec) injection 1.25 mg 1 mL  1.25 mg    labetalol (Normodyne/Trandate) injection 10 mg  10 mg    amLODIPine (Norvasc) tablet 10 mg  10 mg    Pharmacy Consult: Enteral tube insertion - review meds/change route/product selection  1 Each    Respiratory Therapy Consult      MD Alert...ICU Electrolyte Replacement per Pharmacy      acetaminophen (Tylenol) tablet 650 mg  650 mg    atorvastatin (Lipitor) tablet 40 mg  40 mg    memantine (Namenda) tablet 5 mg  5 mg    lansoprazole (Prevacid) solutab 30 mg  30 mg    ondansetron (Zofran ODT) dispertab 4 mg  4 mg    ondansetron (Zofran) syringe/vial injection 4 mg  4 mg    hydrALAZINE (Apresoline) injection 20 mg  20 mg       Allergies:  Allergies   Allergen Reactions    Ativan      Pt reports that he gets very agitated     Hydrocodone Rash     Confusion   Agitation     Lorazepam Unspecified     Agitation        Physical Exam:  Vitals: BP (!) 158/72   Pulse (!) 58   Temp 37.5 °C (99.5 °F) (Bladder)   Resp 17   Ht 1.829 m (6' 0.01\")   Wt (!) 130 kg (287 lb 11.2 oz)   SpO2 96%   Gen: NAD, laying comfortably in bed, no family in room   Head:  NC/AT  Eyes/ Nose/ Mouth: PERRLA, moist mucous membranes, NG in place   Cardio: RRR, good distal perfusion, warm extremities  Pulm: normal respiratory effort, no cyanosis, on 3 L   Abd: Soft NTND, does not grimace with abdo palpation but does move limbs   Ext: No peripheral edema. No calf tenderness. No clubbing. + soft restraints in place     Mental status: does not respond to questions, opens eyes to voice and tactile stimulation   Speech: does not make audible sound during visit, letthargic     CRANIAL NERVES:  2,3: visual acuity grossly intact, PERRL  3,4,6: EOMI bilaterally, no nystagmus " or diplopia  5: sensation intact to light touch bilaterally and symmetric  7: no facial asymmetry      Motor: Does not participate in MMT. Does attempt to lift left hand and LLE against gravity to stimulation        Sensory:   intact to light touch through out    DTRs: 2+ in bilateral  biceps,  No clonus at bilateral ankles  Negative Acuna b/l     Tone: no spasticity noted      Labs: Reviewed and significant for   Recent Labs     10/08/23  0615 10/09/23  0600 10/10/23  0456   RBC 4.44* 4.36* 4.40*   HEMOGLOBIN 13.4* 13.3* 13.3*   HEMATOCRIT 41.7* 40.5* 41.0*   PLATELETCT 251 270 255     Recent Labs     10/08/23  0615 10/08/23  1300 10/09/23  0600 10/09/23  1300 10/09/23  1800 10/10/23  0012 10/10/23  0456   SODIUM 150*   < > 147*   < > 148* 147* 144   POTASSIUM 4.2  --  3.9  --   --   --  4.1   CHLORIDE 113*  --  109  --   --   --  107   CO2 29  --  30  --   --   --  26   GLUCOSE 219*  --  194*  --   --   --  233*   BUN 27*  --  31*  --   --   --  28*   CREATININE 0.73  --  0.76  --   --   --  0.79   CALCIUM 9.1  --  9.4  --   --   --  9.2    < > = values in this interval not displayed.     Recent Results (from the past 24 hour(s))   POCT glucose device results    Collection Time: 10/09/23  8:29 AM   Result Value Ref Range    POC Glucose, Blood 198 (H) 65 - 99 mg/dL   POCT glucose device results    Collection Time: 10/09/23  9:24 AM   Result Value Ref Range    POC Glucose, Blood 189 (H) 65 - 99 mg/dL   POCT glucose device results    Collection Time: 10/09/23 11:47 AM   Result Value Ref Range    POC Glucose, Blood 157 (H) 65 - 99 mg/dL   SODIUM SERUM (NA)    Collection Time: 10/09/23  1:00 PM   Result Value Ref Range    Sodium 148 (H) 135 - 145 mmol/L   CRP QUANTITIVE (NON-CARDIAC)    Collection Time: 10/09/23  1:00 PM   Result Value Ref Range    Stat C-Reactive Protein 2.96 (H) 0.00 - 0.75 mg/dL   POCT glucose device results    Collection Time: 10/09/23  1:29 PM   Result Value Ref Range    POC Glucose, Blood 146  (H) 65 - 99 mg/dL   POCT glucose device results    Collection Time: 10/09/23  3:13 PM   Result Value Ref Range    POC Glucose, Blood 148 (H) 65 - 99 mg/dL   SODIUM SERUM (NA)    Collection Time: 10/09/23  6:00 PM   Result Value Ref Range    Sodium 148 (H) 135 - 145 mmol/L   CRP QUANTITIVE (NON-CARDIAC)    Collection Time: 10/09/23  6:00 PM   Result Value Ref Range    Stat C-Reactive Protein 3.01 (H) 0.00 - 0.75 mg/dL   POCT glucose device results    Collection Time: 10/09/23  6:01 PM   Result Value Ref Range    POC Glucose, Blood 147 (H) 65 - 99 mg/dL   POCT glucose device results    Collection Time: 10/10/23 12:11 AM   Result Value Ref Range    POC Glucose, Blood 175 (H) 65 - 99 mg/dL   SODIUM SERUM (NA)    Collection Time: 10/10/23 12:12 AM   Result Value Ref Range    Sodium 147 (H) 135 - 145 mmol/L   Phosphorus    Collection Time: 10/10/23  4:56 AM   Result Value Ref Range    Phosphorus 2.8 2.5 - 4.5 mg/dL   Magnesium    Collection Time: 10/10/23  4:56 AM   Result Value Ref Range    Magnesium 1.9 1.5 - 2.5 mg/dL   CBC WITH DIFFERENTIAL    Collection Time: 10/10/23  4:56 AM   Result Value Ref Range    WBC 9.4 4.8 - 10.8 K/uL    RBC 4.40 (L) 4.70 - 6.10 M/uL    Hemoglobin 13.3 (L) 14.0 - 18.0 g/dL    Hematocrit 41.0 (L) 42.0 - 52.0 %    MCV 93.2 81.4 - 97.8 fL    MCH 30.2 27.0 - 33.0 pg    MCHC 32.4 32.3 - 36.5 g/dL    RDW 44.4 35.9 - 50.0 fL    Platelet Count 255 164 - 446 K/uL    MPV 11.2 9.0 - 12.9 fL    Neutrophils-Polys 71.60 44.00 - 72.00 %    Lymphocytes 16.80 (L) 22.00 - 41.00 %    Monocytes 6.80 0.00 - 13.40 %    Eosinophils 3.90 0.00 - 6.90 %    Basophils 0.70 0.00 - 1.80 %    Immature Granulocytes 0.20 0.00 - 0.90 %    Nucleated RBC 0.00 0.00 - 0.20 /100 WBC    Neutrophils (Absolute) 6.74 1.82 - 7.42 K/uL    Lymphs (Absolute) 1.58 1.00 - 4.80 K/uL    Monos (Absolute) 0.64 0.00 - 0.85 K/uL    Eos (Absolute) 0.37 0.00 - 0.51 K/uL    Baso (Absolute) 0.07 0.00 - 0.12 K/uL    Immature Granulocytes (abs) 0.02  0.00 - 0.11 K/uL    NRBC (Absolute) 0.00 K/uL   Comp Metabolic Panel    Collection Time: 10/10/23  4:56 AM   Result Value Ref Range    Sodium 144 135 - 145 mmol/L    Potassium 4.1 3.6 - 5.5 mmol/L    Chloride 107 96 - 112 mmol/L    Co2 26 20 - 33 mmol/L    Anion Gap 11.0 7.0 - 16.0    Glucose 233 (H) 65 - 99 mg/dL    Bun 28 (H) 8 - 22 mg/dL    Creatinine 0.79 0.50 - 1.40 mg/dL    Calcium 9.2 8.5 - 10.5 mg/dL    Correct Calcium 9.8 8.5 - 10.5 mg/dL    AST(SGOT) 56 (H) 12 - 45 U/L    ALT(SGPT) 33 2 - 50 U/L    Alkaline Phosphatase 176 (H) 30 - 99 U/L    Total Bilirubin 0.8 0.1 - 1.5 mg/dL    Albumin 3.2 3.2 - 4.9 g/dL    Total Protein 6.9 6.0 - 8.2 g/dL    Globulin 3.7 (H) 1.9 - 3.5 g/dL    A-G Ratio 0.9 g/dL   ESTIMATED GFR    Collection Time: 10/10/23  4:56 AM   Result Value Ref Range    GFR (CKD-EPI) 95 >60 mL/min/1.73 m 2   POCT glucose device results    Collection Time: 10/10/23  5:02 AM   Result Value Ref Range    POC Glucose, Blood 216 (H) 65 - 99 mg/dL         ASSESSMENT:  Patient is a 68 y.o. male admitted with intraparenchymal hemorrhage    [facility] Code / Diagnosis to Support: 0001.2 - Stroke: Right Body Involvement (Left Brain)    Rehabilitation: Impaired ADLs and mobility  Patient is anticipated to be a candidate for inpatient rehab pending improvement in alertness and pending SLP eval.     Barriers to transfer include: Insurance authorization, TCCs to verify disposition, medical clearance and bed availability     Additional Recommendations:  Left frontal lobe intraparenchymal hemorrhage  - Admitted with altered mental status and signs of sepsis  - CT head showed large left frontal intraparenchymal hemorrhage  - Repeat imaging on 9/30 showed new large left frontal lobe intraparenchymal hemorrhage with increase in size  - Neurology and neurosurgery consulted  - Nonoperative management, and recommendations of sodium of 1 45-1 50  -SBP goal less than 160  - Has been able to be titrated off 3% hypertonic saline  -  10/1 MRI brain showed left frontal lobe hemorrhage with surrounding vasogenic edema with mild mass effect upon the frontal horn of the left lateral ventricle, hemorrhage is stable  - Continue with PT/OT/SLP    Alcohol withdrawal  -Prolonged ICU stay requiring sedatives and Precedex drip  - Required Precedex drip, has been weaned off  - Has had episodes of agitation requiring sedation and agitation control, however is most recently lethargic  - Is currently off sedation     Lethargy  - minimal interaction, and lethargy impairing ability for patient to participate withSLP   - recommend trial off namenda  - given lethargy off sedation, recommend trial of stimulant tomorrow morning if patient is not showing signs of agitation. Can start with low dose amantadine AM of 10/11   - will need lethargy to improve to be considered for inpatient rehab       Hypoxia, with respiratory failure  -Admitted with sepsis with respiratory failure  - Required prolonged intubation, was intubated 9/25 to 9/28, but required reintubation  - Was reintubated 9/3010/7  - Extubated on 10/7, has been weaned down to 3 L O2      Hepatobiliary air  -Seen on CT abdomen pelvis at time of admit  - General surgery consulted, recommending nonop management  - Patient completed 5 days of ceftriaxone and metronidazole 9/28  - Right upper quadrant ultrasound showed minimal gallbladder wall thickening and possible sludge  - Underwent HIDA scan which was normal on 10/7  - Is on empiric Zosyn x7 days, stop date is 10/12    Hypertension  - Currently on amlodipine and losartan, per neurology, goal for SBP is less than 160 given and brachial hemorrhage    Dysphagia  - Has not been able to be evaluated formally by SLP due to prolonged intubation as well as lethargy  - Pending repeat evaluation for SLP  - Currently n.p.o., pending formal swallow eval    Dispo:  - patient is currently functioning below their level of baseline, recommend post acute rehab  - potential  for IRF level therapy with 3hr of therapy 5 days per week ONLY IF wife can provide physical heavy assistance and patient's ability to participate with therapies improves  - if patient does not have adequate support at home OR if patient's ability to tolerat therapy does not improved will need SNF.   - PM&R will continue  to follow         Medical Complexity:  Left frontal lobe intraparenchymal hemorrhage  Alcohol withdrawal  Hypoxia  Hepatobiliary air  Diabetes  Hypertension  Dysphagia  Impaired mobility and ADLs      DVT PPX: SCDs      Thank you for allowing us to participate in the care of this patient.     Patient was seen for 81 minutes on unit/floor of which > 50% of time was spent on counseling and coordination of care regarding the above, including prognosis, risk reduction, benefits of treatment, and options for next stage of care.    Maria G Morgan D.O.   Physical Medicine and Rehabilitation     Please note that this dictation was created using voice recognition software. I have made every reasonable attempt to correct obvious errors, but there may be errors of grammar and possibly content that I did not discover before finalizing the note.

## 2023-10-10 NOTE — CARE PLAN
The patient is Stable - Low risk of patient condition declining or worsening    Shift Goals  Clinical Goals: Stable hemodynamics  Patient Goals: DEENA  Family Goals: updates, pt improvement    Progress made toward(s) clinical / shift goals:        Problem: Hemodynamics  Goal: Patient's hemodynamics, fluid balance and neurologic status will be stable or improve  Outcome: Progressing     Problem: Fluid Volume  Goal: Fluid volume balance will be maintained  Outcome: Progressing     Problem: Respiratory  Goal: Patient will achieve/maintain optimum respiratory ventilation and gas exchange  Outcome: Progressing     Problem: Skin Integrity  Goal: Skin integrity is maintained or improved  Outcome: Progressing     Problem: Fall Risk  Goal: Patient will remain free from falls  Outcome: Progressing     Problem: Pain - Standard  Goal: Alleviation of pain or a reduction in pain to the patient’s comfort goal  Outcome: Progressing     Problem: Risk for Aspiration  Goal: Patient's risk for aspiration will be absent or decrease  Outcome: Progressing     Problem: Neuro Status  Goal: Neuro status will remain stable or improve  Outcome: Progressing     Problem: Safety - Medical Restraint  Goal: Remains free of injury from restraints (Restraint for Interference with Medical Device)  Outcome: Progressing       Patient is not progressing towards the following goals:

## 2023-10-10 NOTE — THERAPY
Speech Language Pathology   Clinical Swallow Evaluation     Patient Name: Sharon Guardado  AGE:  68 y.o., SEX:  male  Medical Record #: [id number]  Date of Service: 10/10/2023      History of Present Illness  69 y/o M admit 9/24 w/ AMS & urosepsis. Admit head CT w/ small frontal SAH. Hospital stay c/b a prolonged ICU stay requiring sedatives for alcohol withdrawal & vasopressors. He was intubated 9/25- 9/28 (3 days), after which he was seen by ST services 9/29 post extubation w/ functional swallowing- but issues w/ mentation impacting mastication. He was re-intubated 9/30-10/7 (9 days).     MRI 10/1: left frontal lobe hemorrhage with surrounding vasogenic edema and mild mass effect upon the frontal horn of the left lateral ventricle.  With approximately 2 mm of midline shift to the right.    PMHx: HTN, DM II, history of LE, alcohol abuse, nephrolithiasis, obesity    General Information:  Vitals  O2 (LPM): 2  O2 Delivery Device: Nasal Cannula  Level of Consciousness: Awake  Patient Behaviors: Confused  Orientation:  (Pt remained nonverbal for session- unknown orientation)  Follows Directives: No    Prior Living Situation & Level of Function:  Prior Services: None  Housing / Facility: 2 Story House  Steps Into Home: 0  Steps In Home: 14  Bathroom Set up: Walk In Shower  Equipment Owned: Front-Wheel Walker  Lives with - Patient's Self Care Capacity: Spouse     Communication: WNL  Swallowing: WNL     Oral Mechanism Evaluation:  Dentition: Natural dentition, Some missing dentition   Facial Symmetry: Equal  Facial Sensation: Pt did not follow commands to assess     Labial Observations: Pt did not follow commands to assess   Lingual Observations: Pt did not follow commands to assess  Motor Speech: pt nonverbal for today's eval       Laryngeal Function:  Secretion Management: Excess secretions (Dried yellow secretions oral cavity)  Voice Quality:  (no verbalizations)     Cough: Perceptually weak     Subjective  Pt seen  at bedside, awake to mod tactile prompting, saturating on 2L NC and w/ large bore NGT in place. Pt was nonverbal and did not follow commands for today's eval    Assessment  Current Method of Nutrition: NPO until cleared by speech pathology, NGT  Positioning: [place]'s (60-90 degrees)  Bolus Administration: SLP  O2 (LPM): 2 O2 Delivery Device: Nasal Cannula  Factor(s) Affecting Performance: Impaired command following     Swallowing Trials:  Swallowing Trials  Ice: Impaired    Comments: Pt required mod-max verbal and tactile cueing to participate in limited (< 0.5 oz) trials of ice chips, fed 1:1 by SLP. Oral stage prolonged w/ bolus holding. To palpation, the swallow trigger appeared sevrely reduced and at times absent. There were 2x instances of delayed, weak coughing. There was thick brown phlegm noted in the oral cavity; pt was orally defensive to suction and oral care attempts so limited hygiene was able to be performed.    Clinical Impressions  Clinical signs of severe oropharyngeal dysphagia, likely acute-on-chronic and multifactorial in the context of a prolonged hospitalization w/ delirium, multiple intubations and neurological deficits from SAH. Swallow prognosis is unknown. Instrumentation is indicated; however pt is not yet appropriate to participate d/t reduced participation & severity if deficits. Given the pt's reduced mobility & acuity of illness, he demo's high risk for aspiration PNA; recommend for pt to remain NPO.     Recommendations  Diet Consistency: NPO w/ alternative nutrition/hydration  Instrumentation: Instrumental swallow study pending clinical progress  Medication: Non Oral   Oral Care: Q4h     SLP Treatment Plan  Treatment Plan: Dysphagia Treatment  SLP Frequency: 3x Per Week     Anticipated Discharge Needs  Discharge Recommendations: Recommend post-acute placement for additional speech therapy services prior to discharge home   Therapy Recommendations Upon DC: Dysphagia Training       Patient / Family Goals  Patient / Family Goal #1: Did note state  Short Term Goals  Short Term Goal # 1: Pt will maintain attention for 2-3 minutes to consume prefeeding trials to demonstrate readiness for PO intake    Deedee Stephens, SLP

## 2023-10-11 LAB
ALBUMIN SERPL BCP-MCNC: 3.3 G/DL (ref 3.2–4.9)
ALBUMIN/GLOB SERPL: 0.9 G/DL
ALP SERPL-CCNC: 188 U/L (ref 30–99)
ALT SERPL-CCNC: 37 U/L (ref 2–50)
ANION GAP SERPL CALC-SCNC: 11 MMOL/L (ref 7–16)
AST SERPL-CCNC: 51 U/L (ref 12–45)
BILIRUB SERPL-MCNC: 0.8 MG/DL (ref 0.1–1.5)
BUN SERPL-MCNC: 26 MG/DL (ref 8–22)
CALCIUM ALBUM COR SERPL-MCNC: 10.1 MG/DL (ref 8.5–10.5)
CALCIUM SERPL-MCNC: 9.5 MG/DL (ref 8.5–10.5)
CHLORIDE SERPL-SCNC: 106 MMOL/L (ref 96–112)
CO2 SERPL-SCNC: 27 MMOL/L (ref 20–33)
CREAT SERPL-MCNC: 0.87 MG/DL (ref 0.5–1.4)
ERYTHROCYTE [DISTWIDTH] IN BLOOD BY AUTOMATED COUNT: 42.8 FL (ref 35.9–50)
GFR SERPLBLD CREATININE-BSD FMLA CKD-EPI: 92 ML/MIN/1.73 M 2
GLOBULIN SER CALC-MCNC: 3.8 G/DL (ref 1.9–3.5)
GLUCOSE BLD STRIP.AUTO-MCNC: 178 MG/DL (ref 65–99)
GLUCOSE BLD STRIP.AUTO-MCNC: 201 MG/DL (ref 65–99)
GLUCOSE BLD STRIP.AUTO-MCNC: 202 MG/DL (ref 65–99)
GLUCOSE BLD STRIP.AUTO-MCNC: 214 MG/DL (ref 65–99)
GLUCOSE SERPL-MCNC: 198 MG/DL (ref 65–99)
HCT VFR BLD AUTO: 41.5 % (ref 42–52)
HGB BLD-MCNC: 13.6 G/DL (ref 14–18)
MAGNESIUM SERPL-MCNC: 2.1 MG/DL (ref 1.5–2.5)
MCH RBC QN AUTO: 30 PG (ref 27–33)
MCHC RBC AUTO-ENTMCNC: 32.8 G/DL (ref 32.3–36.5)
MCV RBC AUTO: 91.6 FL (ref 81.4–97.8)
PHOSPHATE SERPL-MCNC: 2.3 MG/DL (ref 2.5–4.5)
PLATELET # BLD AUTO: 273 K/UL (ref 164–446)
PMV BLD AUTO: 11.3 FL (ref 9–12.9)
POTASSIUM SERPL-SCNC: 4 MMOL/L (ref 3.6–5.5)
PROT SERPL-MCNC: 7.1 G/DL (ref 6–8.2)
RBC # BLD AUTO: 4.53 M/UL (ref 4.7–6.1)
SODIUM SERPL-SCNC: 144 MMOL/L (ref 135–145)
WBC # BLD AUTO: 9.7 K/UL (ref 4.8–10.8)

## 2023-10-11 PROCEDURE — 82962 GLUCOSE BLOOD TEST: CPT

## 2023-10-11 PROCEDURE — 80053 COMPREHEN METABOLIC PANEL: CPT

## 2023-10-11 PROCEDURE — 83735 ASSAY OF MAGNESIUM: CPT

## 2023-10-11 PROCEDURE — 700111 HCHG RX REV CODE 636 W/ 250 OVERRIDE (IP): Mod: JZ | Performed by: INTERNAL MEDICINE

## 2023-10-11 PROCEDURE — 84100 ASSAY OF PHOSPHORUS: CPT

## 2023-10-11 PROCEDURE — A9270 NON-COVERED ITEM OR SERVICE: HCPCS | Performed by: INTERNAL MEDICINE

## 2023-10-11 PROCEDURE — 700105 HCHG RX REV CODE 258: Performed by: INTERNAL MEDICINE

## 2023-10-11 PROCEDURE — 85027 COMPLETE CBC AUTOMATED: CPT

## 2023-10-11 PROCEDURE — 99233 SBSQ HOSP IP/OBS HIGH 50: CPT | Performed by: HOSPITALIST

## 2023-10-11 PROCEDURE — 700102 HCHG RX REV CODE 250 W/ 637 OVERRIDE(OP): Performed by: INTERNAL MEDICINE

## 2023-10-11 PROCEDURE — 92526 ORAL FUNCTION THERAPY: CPT

## 2023-10-11 PROCEDURE — 770020 HCHG ROOM/CARE - TELE (206)

## 2023-10-11 RX ADMIN — LOSARTAN POTASSIUM 100 MG: 50 TABLET, FILM COATED ORAL at 05:36

## 2023-10-11 RX ADMIN — INSULIN HUMAN 4 UNITS: 100 INJECTION, SOLUTION PARENTERAL at 06:20

## 2023-10-11 RX ADMIN — INSULIN HUMAN 3 UNITS: 100 INJECTION, SOLUTION PARENTERAL at 01:24

## 2023-10-11 RX ADMIN — INSULIN HUMAN 4 UNITS: 100 INJECTION, SOLUTION PARENTERAL at 12:15

## 2023-10-11 RX ADMIN — LABETALOL HYDROCHLORIDE 10 MG: 5 INJECTION, SOLUTION INTRAVENOUS at 08:07

## 2023-10-11 RX ADMIN — INSULIN GLARGINE-YFGN 39 UNITS: 100 INJECTION, SOLUTION SUBCUTANEOUS at 17:19

## 2023-10-11 RX ADMIN — PIPERACILLIN AND TAZOBACTAM 4.5 G: 4; .5 INJECTION, POWDER, FOR SOLUTION INTRAVENOUS at 04:48

## 2023-10-11 RX ADMIN — INSULIN HUMAN 4 UNITS: 100 INJECTION, SOLUTION PARENTERAL at 17:21

## 2023-10-11 RX ADMIN — AMLODIPINE BESYLATE 10 MG: 10 TABLET ORAL at 05:36

## 2023-10-11 RX ADMIN — MEMANTINE 5 MG: 5 TABLET ORAL at 17:04

## 2023-10-11 RX ADMIN — LANSOPRAZOLE 30 MG: 30 TABLET, ORALLY DISINTEGRATING, DELAYED RELEASE ORAL at 05:37

## 2023-10-11 RX ADMIN — MEMANTINE 5 MG: 5 TABLET ORAL at 05:36

## 2023-10-11 RX ADMIN — ATORVASTATIN CALCIUM 40 MG: 40 TABLET, FILM COATED ORAL at 17:03

## 2023-10-11 RX ADMIN — PIPERACILLIN AND TAZOBACTAM 4.5 G: 4; .5 INJECTION, POWDER, FOR SOLUTION INTRAVENOUS at 22:13

## 2023-10-11 RX ADMIN — PIPERACILLIN AND TAZOBACTAM 4.5 G: 4; .5 INJECTION, POWDER, FOR SOLUTION INTRAVENOUS at 12:07

## 2023-10-11 ASSESSMENT — FIBROSIS 4 INDEX
FIB4 SCORE: 2.15
FIB4 SCORE: 2.15

## 2023-10-11 ASSESSMENT — PATIENT HEALTH QUESTIONNAIRE - PHQ9
2. FEELING DOWN, DEPRESSED, IRRITABLE, OR HOPELESS: NOT AT ALL
1. LITTLE INTEREST OR PLEASURE IN DOING THINGS: NOT AT ALL
SUM OF ALL RESPONSES TO PHQ9 QUESTIONS 1 AND 2: 0

## 2023-10-11 NOTE — CARE PLAN
The patient is Stable - Low risk of patient condition declining or worsening    Shift Goals  Clinical Goals: Stable hemodynamics  Patient Goals: DEENA  Family Goals: Keep updated    Progress made toward(s) clinical / shift goals:        Problem: Hemodynamics  Goal: Patient's hemodynamics, fluid balance and neurologic status will be stable or improve  Outcome: Progressing     Problem: Fluid Volume  Goal: Fluid volume balance will be maintained  Outcome: Progressing     Problem: Urinary - Renal Perfusion  Goal: Ability to achieve and maintain adequate renal perfusion and functioning will improve  Outcome: Progressing     Problem: Respiratory  Goal: Patient will achieve/maintain optimum respiratory ventilation and gas exchange  Outcome: Progressing     Problem: Skin Integrity  Goal: Skin integrity is maintained or improved  Outcome: Progressing     Problem: Fall Risk  Goal: Patient will remain free from falls  Outcome: Progressing     Problem: Safety - Medical Restraint  Goal: Remains free of injury from restraints (Restraint for Interference with Medical Device)  Outcome: Progressing       Patient is not progressing towards the following goals:

## 2023-10-11 NOTE — PROGRESS NOTES
[facility] Medicine Daily Progress Note    Date of Service  10/11/2023    Chief Complaint  Sharon Gaurdado is a 68 y.o. male admitted 9/24/2023 with altered mental status and ground-level fall    Hospital Course  68-year-old male with a past medical history of alcohol abuse, hypertension, obesity who presented with altered mental status and a ground-level fall on 9/24/2023.  On admission he was noted to have a small frontal subarachnoid hemorrhage on CT head.  He was also noted to have severe sepsis secondary to UTI.  She was treated with IV ceftriaxone.  Patient developed symptoms of alcohol withdrawal and was intubated.  Patient was subsequently extubated and transferred to telemetry on 9/30.  The patient subsequently had worsening mental status, on 9/30 found to have a left frontal lobe hemorrhage with surrounding vasogenic edema with mild mass effect, deemed nonoperative by neurosurgery  Was readmitted to the ICU level of care, intubated, treated with hypertonic saline, empirically treated with Unasyn for fever, overall state on mechanical ventilation for 9 days, during the course there was a concern for possible abdominal infection, gallbladder infection, surgery was consulted,  HIDA scan was obtained and was negative for acute cholecystitis, surgery not indicated as per general surgery opinion.  The patient found with significant right-sided weakness, aphasia.  On 10/10 patient again found appropriate to transfer out of ICU    Interval Problem Update  Patient seen and examined today.  Data, Medication data reviewed.  Case discussed with nursing as available.  Plan of Care reviewed with patient and notified of changes.   10/10/2023 the patient remains extremely weak, he opens his eyes, he does not follow, he is aphasic, patient with antigravity strength in the right arm, right leg, left lower extremity as well, left upper extremity is flaccid, the patient is being weaned off hypertonic saline, follow  sodium between 145 and 150, suspected pneumonia, ongoing antibiotic therapy  Evaluating for disposition, rehab consulted  10/11 the patient remains lethargic, does not follow consistently, is currently afebrile, heart rate in the 60s, respiration unlabored around 17, saturating 92% on 2 L, blood pressure in the 130s to 160s over 70s, SLP follow-up noted, patient to remain n.p.o., likely will need a PEG tube for ongoing safe nutritional access  Wife updated at the bedside  The patient is currently not a historian    I have discussed this patient's plan of care and discharge plan at IDT rounds today with Case Management, Nursing, Nursing leadership, and other members of the IDT team.    Consultants/Specialty  critical care and neurology  PMR    Code Status  Full Code    Disposition  The patient is not medically cleared for discharge to home or a post-acute facility.  Anticipate discharge to: skilled nursing facility    I have placed the appropriate orders for post-discharge needs.    Review of Systems  Review of Systems   Unable to perform ROS: Mental status change        Physical Exam  Pulse:  [49-80] 57  Resp:  [11-29] 15  BP: (102-162)/(55-72) 160/68  SpO2:  [88 %-100 %] 90 %    Physical Exam  Vitals and nursing note reviewed.   Constitutional:       General: He is not in acute distress.     Appearance: He is obese. He is ill-appearing.   HENT:      Head: Normocephalic.      Mouth/Throat:      Mouth: Mucous membranes are moist.   Eyes:      Pupils: Pupils are equal, round, and reactive to light.   Cardiovascular:      Rate and Rhythm: Normal rate and regular rhythm.      Pulses: Normal pulses.      Heart sounds: Normal heart sounds.   Pulmonary:      Breath sounds: Normal breath sounds.   Abdominal:      General: There is no distension.      Palpations: Abdomen is soft.      Tenderness: There is no abdominal tenderness.   Musculoskeletal:         General: No swelling.      Cervical back: Neck supple.      Right  lower leg: No edema.      Left lower leg: No edema.   Skin:     General: Skin is warm.      Coloration: Skin is not jaundiced.   Neurological:      Comments: Lethargic  Nonverbal  Able to follow simple commands  Weakness in the right upper right lower extremity,  Left upper extremity also weak, 4-5   Psychiatric:      Comments: Unable to obtain         Fluids    Intake/Output Summary (Last 24 hours) at 10/11/2023 0811  Last data filed at 10/11/2023 0607  Gross per 24 hour   Intake 1780 ml   Output 2100 ml   Net -320 ml         Laboratory  Recent Labs     10/09/23  0600 10/10/23  0456 10/11/23  0121   WBC 10.3 9.4 9.7   RBC 4.36* 4.40* 4.53*   HEMOGLOBIN 13.3* 13.3* 13.6*   HEMATOCRIT 40.5* 41.0* 41.5*   MCV 92.9 93.2 91.6   MCH 30.5 30.2 30.0   MCHC 32.8 32.4 32.8   RDW 44.9 44.4 42.8   PLATELETCT 270 255 273   MPV 12.1 11.2 11.3       Recent Labs     10/09/23  0600 10/09/23  1300 10/10/23  0012 10/10/23  0456 10/11/23  0121   SODIUM 147*   < > 147* 144 144   POTASSIUM 3.9  --   --  4.1 4.0   CHLORIDE 109  --   --  107 106   CO2 30  --   --  26 27   GLUCOSE 194*  --   --  233* 198*   BUN 31*  --   --  28* 26*   CREATININE 0.76  --   --  0.79 0.87   CALCIUM 9.4  --   --  9.2 9.5    < > = values in this interval not displayed.                     Imaging  DX-CHEST-PORTABLE (1 VIEW)   Final Result      Unchanged bibasilar underinflation atelectasis      DX-ABDOMEN FOR TUBE PLACEMENT   Final Result      Enteric tube tip projects over the gastric antrum or proximal duodenum      DX-CHEST-PORTABLE (1 VIEW)   Final Result      Improving bibasilar pulmonary opacities.      DX-CHEST-PORTABLE (1 VIEW)   Final Result         Findings on chest radiograph appear stable since the prior radiograph.  No new abnormalities are identified.      NM-HEPATOBILIARY SCAN   Final Result      There is visualization of the gallbladder after morphine administration, indicating the cystic duct is still patent. No scintigraphy evidence of acute  cholecystitis.      DX-CHEST-PORTABLE (1 VIEW)   Final Result         1.  Mild pulmonary edema and/or infiltrates.   2.  Cardiomegaly   3.  Atherosclerosis      DX-CHEST-PORTABLE (1 VIEW)   Final Result         1.  No acute cardiopulmonary disease.   2.  Cardiomegaly   3.  Atherosclerosis      US-RUQ   Final Result      1.  Minimal gallbladder wall thickening and possible sludge.  Cholecystitis is not excluded.   2.  Minimal pericholecystic fluid may be due to gallbladder inflammation or liver disease.   3.  Enlarged echogenic liver suggesting fatty infiltration.   4.  Limited evaluation of the pancreas abdominal aorta.      DX-CHEST-PORTABLE (1 VIEW)   Final Result         1.  Pulmonary edema and/or infiltrates are identified, which are stable since the prior exam.   2.  Cardiomegaly   3.  Atherosclerosis      US-EXTREMITY VENOUS UPPER UNILAT RIGHT   Final Result      DX-CHEST-PORTABLE (1 VIEW)   Final Result         1.  Pulmonary edema and/or infiltrates are identified, which are stable since the prior exam.   2.  Cardiomegaly   3.  Atherosclerosis      DX-CHEST-PORTABLE (1 VIEW)   Final Result         1.  Pulmonary edema and/or infiltrates are identified, which are stable since the prior exam.   2.  Cardiomegaly   3.  Atherosclerosis      DX-CHEST-PORTABLE (1 VIEW)   Final Result         1.  Pulmonary edema and/or infiltrates are identified, which are somewhat decreased since the prior exam.   2.  Cardiomegaly   3.  Atherosclerosis      MR-MRA HEAD-W/O   Final Result         Normal intracranial MRA.      MR-BRAIN-WITH & W/O   Final Result         Left frontal lobe hemorrhage with surrounding vasogenic edema and mild mass effect upon the frontal horn of the left lateral ventricle. There is approximately 2 mm of midline shift to the right.      Age-related volume loss and chronic microvascular ischemic changes.      No abnormal intracranial enhancement is identified.      CT-HEAD W/O   Final Result         1.  Stable 6 cm left frontal intraparenchymal hemorrhage with mild, 2 mm left-to-right subfalcine herniation.   2. No new abnormality or change.         DX-CHEST-PORTABLE (1 VIEW)   Final Result         1.  Pulmonary edema and/or infiltrates are identified, which appear somewhat increased since the prior exam.   2.  Cardiomegaly   3.  Atherosclerosis      DX-ABDOMEN FOR TUBE PLACEMENT   Final Result      NG tube extends below the diaphragm and appears to extend through the region of the stomach with tip at the level of the proximal duodenum.      DX-CHEST-PORTABLE (1 VIEW)   Final Result      1.  Grossly satisfactory appearance of tubes and lines.   2.  No evidence of pneumothorax.   3.  Marked hypoinflation with probable bibasilar atelectasis.   4.  Enlarged cardiac silhouette with vascular congestion.      CT-HEAD W/O   Final Result         1.  New large parenchymal hemorrhage involving the frontal lobe is identified with some surrounding edema.      2.  Subarachnoid hemorrhage is also noted in the area.      3.  Minimal midline shift to the right side.      Findings are consistent with atrophy.  Decreased attenuation in the periventricular white matter likely indicates microvascular ischemic disease.      Based solely on CT findings, the brain injury guideline category is mBIG 3.      EDH   IVH   Displaced skull fx   SDH > 8mm   IPH > 8mm or multiple   SAH bi-hemispheric or > 3mm      The original BIG retrospective analysis found radiographic progression in 0% of BIG 1 patients and 2.6% BIG 2.      These findings were discussed with GEETA MOLINA on 09/30/2023.      DX-CHEST-PORTABLE (1 VIEW)   Final Result      Stable multiple pulmonary vascular dilation/congestion. Otherwise, negative.      DX-CHEST-PORTABLE (1 VIEW)   Final Result      1.  Increasing airspace disease at the left base.      DX-CHEST-LIMITED (1 VIEW)   Final Result         1.  Pulmonary edema and/or infiltrates are identified, which are somewhat  decreased since the prior exam.   2.  Cardiomegaly   3.  Atherosclerosis      DX-CHEST-PORTABLE (1 VIEW)   Final Result         1.  Pulmonary edema and/or infiltrates are identified, which appear somewhat increased since the prior exam.   2.  Cardiomegaly   3.  Atherosclerosis      DX-CHEST-PORTABLE (1 VIEW)   Final Result         1.  Interstitial edema and/or infiltrates, slightly decreased since prior study.   2.  Cardiomegaly   3.  Atherosclerosis      DX-CHEST-LIMITED (1 VIEW)   Final Result      Right IJ catheter tip projects in the SVC. No pneumothorax.      CT-CTA NECK WITH & W/O-POST PROCESSING   Final Result      1.  Estimated 50-75% stenosis of the BILATERAL carotid arteries   2.  Estimated greater than 50% stenosis at the vertebral artery ostium on each side      CT-CTA HEAD WITH & W/O-POST PROCESS   Final Result      1.  No large vessel occlusion or aneurysm.   2.  Hazy left frontal lobe subarachnoid hemorrhage, decreased in conspicuity from prior. No new hemorrhage seen.   3.  Scattered opacifications of the ethmoid sinuses, likely related to support hardware.      DX-ABDOMEN FOR TUBE PLACEMENT   Final Result         1.  Nonspecific bowel gas pattern in the upper abdomen.   2.  Dobbhoff tube with tip terminating overlying the expected location of the first or second duodenal segment.      DX-CHEST-PORTABLE (1 VIEW)   Final Result         1.  Interstitial edema and/or infiltrates.   2.  Cardiomegaly   3.  Nasogastric tube terminates in the distal esophagus, recommend advancement   4.  Atherosclerosis      DX-ABDOMEN FOR TUBE PLACEMENT   Final Result         1.  Nonspecific bowel gas pattern in the upper abdomen.   2.  Nasogastric tube terminates just distal to the gastroesophageal junction, recommend advancement      CT-RENAL COLIC EVALUATION(A/P W/O)   Final Result         1.  Intrahepatic biliary air and nondependent air within the gallbladder, consider history of instrumentation or changes of  cholangitis in the appropriate clinical setting.   2.  Large fat-containing bilateral inguinal hernias   3.  Enlarged prostate, consider causes of prostate enlargement with additional workup as clinically appropriate   4.  Diverticulosis   5.  Cholelithiasis   6.  Atherosclerosis      CT-HEAD W/O   Final Result         1.  Hazy left frontal subarachnoid hemorrhage.   2.  Nonspecific white matter changes, commonly associated with small vessel ischemic disease.  Associated mild cerebral atrophy is noted.      Based solely on CT findings, the brain injury guideline category is mBIG 1.      SDH < 4mm   IPH < 4mm   SAH < 3 sulci and < 1mm      The original BIG retrospective analysis found radiographic progression in 0% of BIG 1 patients and 2.6% BIG 2.      These findings were discussed with the patient's clinician, Madhu Zambrano, on 9/24/2023 10:10 PM.      DX-CHEST-PORTABLE (1 VIEW)   Final Result      No acute cardiopulmonary abnormality identified.             Assessment/Plan  * Intraparenchymal hemorrhage of brain (HCC)- (present on admission)  Assessment & Plan  CT on 9/24 with hazy left frontal SAH  CT on 9/30 with new large IPH in the left frontal lobe  Strict blood pressure control with goal SBP less than 160  Goal sodium 145-150 - I am titrating hypertonic saline to achieve sodium goal  Will deescalate HTS today and slowly correct  Neuro checks every 2 hours    Atelectasis- (present on admission)  Assessment & Plan  Patient unable to follow commands for incentive spirometry    Pneumonia due to infectious organism- (present on admission)  Assessment & Plan  Continue Zosyn    Acute respiratory failure with hypoxia (HCC)- (present on admission)  Assessment & Plan  Intubated 9/25-9/28  Reintubated 9/30-10/7  Continue oxygen    Thrombocytopenia (HCC)- (present on admission)  Assessment & Plan  Baseline ~110-125  Monitor, currently improved    Pneumobilia- (present on admission)  Assessment & Plan  As seen on CT  renal (9/25/23).    9/26/23: No need for further interventions at this time per gen surg    Type 2 diabetes mellitus (HCC)- (present on admission)  Assessment & Plan  A1c 5.7% (6/2023)  Ongoing treatment with long and short acting insulin, adjust as indicated    Primary hypertension- (present on admission)  Assessment & Plan  Hypertensive at presentation   Goal for normotension currently multimodality treatment      BMI 40.0-44.9, adult (HCC)- (present on admission)  Assessment & Plan  Future weight loss recommended    Alcohol dependence (HCC)- (present on admission)  Assessment & Plan  Multivitamin, folate, thiamine  Monitor, additional therapies as clinically indicated  9/29/2023 patient was at least 2-3 hard liquor drinks daily although this is unclear per patient and his wife's history.  .    Patient s/p treatment for acute alcohol withdrawal    Plan  Ongoing maximized supportive care  Will discuss with wife in terms of permanent enteral access, PEG tube  Glycemic control with long short acting insulin regimen  Antihypertensive regimen for goal pressures  Maintain normal sodium levels  PT OT SLP, disposition evaluation  See orders  Patient is has a high medical complexity, complex decision making and is at high risk for complication, morbidity, and mortality.  My total time spent caring for the patient on the day of the encounter was 58 minutes.   This does not include time spent on separately billable procedures/tests.    VTE prophylaxis: SCD, pharmacologic DVT prophylaxis contraindicated with intracranial hemorrhage    I have performed a physical exam and reviewed and updated ROS and Plan today (10/11/2023). In review of yesterday's note (10/10/2023), there are no changes except as documented above.          Please note that this dictation was created using voice recognition software. I have made every reasonable attempt to correct obvious errors, but I expect that there are errors of grammar and possibly  context that I did not discover before finalizing the note.

## 2023-10-11 NOTE — THERAPY
Speech Language Pathology   Daily Treatment     Patient Name: Sharon Guardado  AGE:  68 y.o., SEX:  male  Medical Record #: [id number]  Date of Service: 10/11/2023    Precautions:  Precautions: Fall Risk, Swallow Precautions, Nasogastric Tube     Subjective  Pt awoke to min verbal cueing; A&Ox0 for session in bilateral wrist restraints. NGT in situ, saturating on 2L NC.  Pt did not verbalize, nor follow any commands during the session    Assessment  Pt mildly orally defensive to oral care. Reduced labial seal for limited ice chip trials (<0.5 oz). To palpation, continue suspect delayed and at times absent swallow initiation. Congested coughing noted after all trials. Pt closing eyes and further trials d/c'd    Clinical Impressions  Continued clinical signs of severe oropharyngeal dysphagia, likely acute-on-chronic and multifactorial in the context of a prolonged hospitalization w/ delirium, multiple intubations and neurological deficits from SAH. Swallow prognosis is unknown. Instrumentation is indicated; however pt is not yet appropriate to participate d/t reduced participation & severity if deficits. Given the pt's reduced mobility & acuity of illness, he demo's high risk for aspiration PNA; recommend for pt to remain NPO.     Recommendations  Treatment Completed: Dysphagia Treatment     Dysphagia Treatment  Diet Consistency: NPO w/ alternative nutrition/hydration  Instrumentation: Instrumental swallow study pending clinical progress  Medication: Non Oral  Oral Care: Q4h    SLP Treatment Plan  Treatment Plan: Dysphagia Treatment  SLP Frequency: 3x Per Week     Anticipated Discharge Needs  Discharge Recommendations: Recommend post-acute placement for additional speech therapy services prior to discharge home  Therapy Recommendations Upon DC: Dysphagia Training    Patient / Family Goals  Patient / Family Goal #1: Did note state  Short Term Goals  Short Term Goal # 1: Pt will maintain attention for 2-3 minutes to  consume prefeeding trials to demonstrate readiness for PO intake  Goal Outcome # 1: Progressing slower than expected    Deedee Stephens, SLP

## 2023-10-11 NOTE — DISCHARGE PLANNING
Following for improvement with TX as well as mentation.  Sharon will need a permanent source of nutrition.

## 2023-10-12 ENCOUNTER — PATIENT OUTREACH (OUTPATIENT)
Dept: SCHEDULING | Facility: IMAGING CENTER | Age: 68
End: 2023-10-12
Payer: COMMERCIAL

## 2023-10-12 PROBLEM — Z71.89 ACP (ADVANCE CARE PLANNING): Status: ACTIVE | Noted: 2023-10-12

## 2023-10-12 LAB
GLUCOSE BLD STRIP.AUTO-MCNC: 195 MG/DL (ref 65–99)
GLUCOSE BLD STRIP.AUTO-MCNC: 214 MG/DL (ref 65–99)
GLUCOSE BLD STRIP.AUTO-MCNC: 221 MG/DL (ref 65–99)
GLUCOSE BLD STRIP.AUTO-MCNC: 242 MG/DL (ref 65–99)

## 2023-10-12 PROCEDURE — A9270 NON-COVERED ITEM OR SERVICE: HCPCS | Performed by: INTERNAL MEDICINE

## 2023-10-12 PROCEDURE — 700102 HCHG RX REV CODE 250 W/ 637 OVERRIDE(OP): Performed by: NURSE PRACTITIONER

## 2023-10-12 PROCEDURE — 700101 HCHG RX REV CODE 250: Performed by: STUDENT IN AN ORGANIZED HEALTH CARE EDUCATION/TRAINING PROGRAM

## 2023-10-12 PROCEDURE — 700111 HCHG RX REV CODE 636 W/ 250 OVERRIDE (IP): Mod: JZ | Performed by: INTERNAL MEDICINE

## 2023-10-12 PROCEDURE — 99497 ADVNCD CARE PLAN 30 MIN: CPT | Performed by: STUDENT IN AN ORGANIZED HEALTH CARE EDUCATION/TRAINING PROGRAM

## 2023-10-12 PROCEDURE — 99232 SBSQ HOSP IP/OBS MODERATE 35: CPT | Mod: 25 | Performed by: STUDENT IN AN ORGANIZED HEALTH CARE EDUCATION/TRAINING PROGRAM

## 2023-10-12 PROCEDURE — 700102 HCHG RX REV CODE 250 W/ 637 OVERRIDE(OP): Performed by: INTERNAL MEDICINE

## 2023-10-12 PROCEDURE — A9270 NON-COVERED ITEM OR SERVICE: HCPCS | Performed by: NURSE PRACTITIONER

## 2023-10-12 PROCEDURE — 770020 HCHG ROOM/CARE - TELE (206)

## 2023-10-12 PROCEDURE — 700105 HCHG RX REV CODE 258: Performed by: INTERNAL MEDICINE

## 2023-10-12 PROCEDURE — 82962 GLUCOSE BLOOD TEST: CPT | Mod: 91

## 2023-10-12 RX ADMIN — LOSARTAN POTASSIUM 100 MG: 50 TABLET, FILM COATED ORAL at 05:36

## 2023-10-12 RX ADMIN — ACETAMINOPHEN 650 MG: 325 TABLET, FILM COATED ORAL at 05:37

## 2023-10-12 RX ADMIN — PIPERACILLIN AND TAZOBACTAM 4.5 G: 4; .5 INJECTION, POWDER, FOR SOLUTION INTRAVENOUS at 05:33

## 2023-10-12 RX ADMIN — DOCUSATE SODIUM 50 MG AND SENNOSIDES 8.6 MG 2 TABLET: 8.6; 5 TABLET, FILM COATED ORAL at 05:36

## 2023-10-12 RX ADMIN — LANSOPRAZOLE 30 MG: 30 TABLET, ORALLY DISINTEGRATING, DELAYED RELEASE ORAL at 05:36

## 2023-10-12 RX ADMIN — INSULIN HUMAN 4 UNITS: 100 INJECTION, SOLUTION PARENTERAL at 16:51

## 2023-10-12 RX ADMIN — INSULIN HUMAN 4 UNITS: 100 INJECTION, SOLUTION PARENTERAL at 12:29

## 2023-10-12 RX ADMIN — POLYETHYLENE GLYCOL 3350 1 PACKET: 17 POWDER, FOR SOLUTION ORAL at 05:36

## 2023-10-12 RX ADMIN — INSULIN GLARGINE-YFGN 39 UNITS: 100 INJECTION, SOLUTION SUBCUTANEOUS at 16:50

## 2023-10-12 RX ADMIN — MEMANTINE 5 MG: 5 TABLET ORAL at 05:36

## 2023-10-12 RX ADMIN — PIPERACILLIN AND TAZOBACTAM 4.5 G: 4; .5 INJECTION, POWDER, FOR SOLUTION INTRAVENOUS at 12:21

## 2023-10-12 RX ADMIN — INSULIN HUMAN 3 UNITS: 100 INJECTION, SOLUTION PARENTERAL at 06:09

## 2023-10-12 RX ADMIN — ATORVASTATIN CALCIUM 40 MG: 40 TABLET, FILM COATED ORAL at 16:41

## 2023-10-12 RX ADMIN — AMLODIPINE BESYLATE 10 MG: 10 TABLET ORAL at 05:37

## 2023-10-12 RX ADMIN — DICLOFENAC SODIUM 2 G: 10 GEL TOPICAL at 16:46

## 2023-10-12 RX ADMIN — MEMANTINE 5 MG: 5 TABLET ORAL at 16:41

## 2023-10-12 RX ADMIN — INSULIN HUMAN 4 UNITS: 100 INJECTION, SOLUTION PARENTERAL at 00:10

## 2023-10-12 ASSESSMENT — PAIN DESCRIPTION - PAIN TYPE
TYPE: OTHER (COMMENT)
TYPE: OTHER (COMMENT)

## 2023-10-12 NOTE — CARE PLAN
The patient is Stable - Low risk of patient condition declining or worsening    Shift Goals  Clinical Goals: No complications related to neuro status;  Patient Goals: tin  Family Goals: updates    Progress made toward(s) clinical / shift goals:  Patient remains responsive to verbal and tactile stimuli. Patient remains non-verbal at this time. Neuro checks continued with no new complications observed. Patient received oxygen at 2L/min via nasal cannula. Patient continues to have adequate urinary output S/P Joseph removal. IV ABX continued, as ordered.  No signs of distress or discomfort are observed.      Problem: Hemodynamics  Goal: Patient's hemodynamics, fluid balance and neurologic status will be stable or improve  Outcome: Progressing     Problem: Fluid Volume  Goal: Fluid volume balance will be maintained  Outcome: Progressing     Problem: Urinary - Renal Perfusion  Goal: Ability to achieve and maintain adequate renal perfusion and functioning will improve  Outcome: Progressing     Problem: Respiratory  Goal: Patient will achieve/maintain optimum respiratory ventilation and gas exchange  Outcome: Progressing     Problem: Physical Regulation  Goal: Diagnostic test results will improve  Outcome: Progressing  Goal: Signs and symptoms of infection will decrease  Outcome: Progressing     Problem: Knowledge Deficit - Standard  Goal: Patient and family/care givers will demonstrate understanding of plan of care, disease process/condition, diagnostic tests and medications  Outcome: Progressing     Problem: Skin Integrity  Goal: Skin integrity is maintained or improved  Outcome: Progressing     Problem: Fall Risk  Goal: Patient will remain free from falls  Outcome: Progressing     Problem: Pain - Standard  Goal: Alleviation of pain or a reduction in pain to the patient’s comfort goal  Outcome: Progressing     Problem: Safety - Medical Restraint  Goal: Remains free of injury from restraints (Restraint for Interference with  Medical Device)  Outcome: Progressing  Goal: Free from restraint(s) (Restraint for Interference with Medical Device)  Outcome: Progressing     Problem: Seizure Precautions  Goal: Implementation of seizure precautions  Outcome: Progressing     Problem: Risk for Aspiration  Goal: Patient's risk for aspiration will be absent or decrease  Outcome: Progressing     Problem: Optimal Care of the Stroke Patient  Goal: Optimal emergency care for the stroke patient  Outcome: Progressing  Goal: Optimal acute care for the stroke patient  Outcome: Progressing     Problem: Discharge Planning - Stroke  Goal: Ensure Stroke Core Measures are met prior to discharge  Outcome: Progressing  Goal: Patient’s continuum of care needs will be met  Outcome: Progressing     Problem: Neuro Status  Goal: Neuro status will remain stable or improve  Outcome: Progressing       Patient is not progressing towards the following goals:

## 2023-10-12 NOTE — ASSESSMENT & PLAN NOTE
"From Dr. Field on 10/21:  I discussed advance care planning with the patient and family for at least 35 minutes, including diagnosis, prognosis, plan of care, risks and benefits of any therapies that could be offered, as well as alternatives including palliation and hospice, as appropriate.  DNR/I per patient's wife wishes, patient has gone from full code to dnr back to full code, now DNR/I. She does not want him to be attached to more machines. I explained he is high risk for developing further brain injuries following a code and he would likely not make it through and it would serve to prolong his suffering. She recognizes that and wants what is best for Sharon. She recognizes he has been in pain for \"a long time\" and does not want to see him suffer needlessly.     "

## 2023-10-12 NOTE — CARE PLAN
The patient is Stable - Low risk of patient condition declining or worsening    Shift Goals  Clinical Goals: safety  Patient Goals: tin  Family Goals: updates    Progress made toward(s) clinical / shift goals:  safety precautions in place.   Problem: Hemodynamics  Goal: Patient's hemodynamics, fluid balance and neurologic status will be stable or improve  Description: Target End Date:  Prior to discharge or change in level of care    Document on Assessment and I/O flowsheet templates    1.  Monitor vital signs, pulse oximetry and cardiac monitor per provider order and/or policy  2.  Maintain blood pressure per provider order  3.  Hemodynamic monitoring per provider order  4.  Manage IV fluids and IV infusions  5.  Monitor intake and output  6.  Daily weights per unit policy or provider order  7.  Assess peripheral pulses and capillary refill  8.  Assess color and body temperature  9.  Position patient for maximum circulation/cardiac output  10. Monitor for signs/symptoms of excessive bleeding  11. Assess mental status, restlessness and changes in level of consciousness  12. Monitor temperature and report fever or hypothermia to provider immediately. Consideration of targeted temperature management.  Outcome: Progressing     Problem: Fluid Volume  Goal: Fluid volume balance will be maintained  Description: Target End Date:  Prior to discharge or change in level of care    Document on I/O flowsheet    1.  Monitor intake and output as ordered  2.  Promote oral intake as appropriate  3.  Report inadequate intake or output to physician  4.  Administer IV therapy as ordered  5.  Weights per provider order  6.  Assess for signs and symptoms of bleeding  7.  Monitor for signs of fluid overload (respiratory changes, edema, weight gain, increased abdominal girth)  8.  Monitor of signs for inadequate fluid volume (poor skin turgor, dry mucous membranes)  9.  Instruct patient on adherence to fluid restrictions  Outcome:  Progressing     Problem: Physical Regulation  Goal: Diagnostic test results will improve  Description: Target End Date:  Prior to discharge or change in level of care    1.  Monitor lactic acid levels  2.  Monitor ABG's  3.  Monitor diagnostic test results  Outcome: Progressing     Problem: Lifestyle Changes  Goal: Patient's ability to identify lifestyle changes and available resources to help reduce recurrence of condition will improve  Description: Target End Date:  1 to 3 days    1.  Discuss recommended lifestyle changes  2.  Identify available resources and support systems  3.  Consider referral to substance abuse program  Outcome: Progressing       Patient is not progressing towards the following goals:

## 2023-10-12 NOTE — CARE PLAN
The patient is Stable - Low risk of patient condition declining or worsening    Shift Goals  Clinical Goals: stable hemodynamics, improved neuro exam  Patient Goals: tin  Family Goals: updates    Progress made toward(s) clinical / shift goals:    Problem: Hemodynamics  Goal: Patient's hemodynamics, fluid balance and neurologic status will be stable or improve  Description: Target End Date:  Prior to discharge or change in level of care    Document on Assessment and I/O flowsheet templates    1.  Monitor vital signs, pulse oximetry and cardiac monitor per provider order and/or policy  2.  Maintain blood pressure per provider order  3.  Hemodynamic monitoring per provider order  4.  Manage IV fluids and IV infusions  5.  Monitor intake and output  6.  Daily weights per unit policy or provider order  7.  Assess peripheral pulses and capillary refill  8.  Assess color and body temperature  9.  Position patient for maximum circulation/cardiac output  10. Monitor for signs/symptoms of excessive bleeding  11. Assess mental status, restlessness and changes in level of consciousness  12. Monitor temperature and report fever or hypothermia to provider immediately. Consideration of targeted temperature management.  Outcome: Progressing     Problem: Urinary - Renal Perfusion  Goal: Ability to achieve and maintain adequate renal perfusion and functioning will improve  Description: Target End Date:  Prior to discharge or change in level of care    Document on I/O and Assessment flowsheet    1.  Urine output will remain greater than 0.5ml/Kg/HR  2.  Monitor amount and/or characteristics of urine per order/policy. Specific gravity per order/policy  3.  Assess signs and symptoms of renal dysfunction  Outcome: Progressing     Problem: Respiratory  Goal: Patient will achieve/maintain optimum respiratory ventilation and gas exchange  Description: Target End Date:  Prior to discharge or change in level of care    Document on Assessment  flowsheet    1.  Assess and monitor rate, rhythm, depth and effort of respiration  2.  Breath sounds assessed qshift and/or as needed  3.  Assess O2 saturation, administer/titrate oxygen as ordered  4.  Position patient for maximum ventilatory efficiency  5.  Turn, cough, and deep breath with splinting to improve effectiveness  6.  Collaborate with RT to administer medication/treatments per order  7.  Encourage use of incentive spirometer and encourage patient to cough after use and utilize splinting techniques if applicable  8.  Airway suctioning  9.  Monitor sputum production for changes in color, consistency and frequency  10. Perform frequent oral hygiene  11. Alternate physical activity with rest periods  Outcome: Progressing     Problem: Knowledge Deficit - Standard  Goal: Patient and family/care givers will demonstrate understanding of plan of care, disease process/condition, diagnostic tests and medications  Description: Target End Date:  1-3 days or as soon as patient condition allows    Document in Patient Education    1.  Patient and family/caregiver oriented to unit, equipment, visitation policy and means for communicating concern  2.  Complete/review Learning Assessment  3.  Assess knowledge level of disease process/condition, treatment plan, diagnostic tests and medications  4.  Explain disease process/condition, treatment plan, diagnostic tests and medications  Outcome: Progressing     Problem: Skin Integrity  Goal: Skin integrity is maintained or improved  Description: Target End Date:  Prior to discharge or change in level of care    Document interventions on Skin Risk/Elvis flowsheet groups and corresponding LDA    1.  Assess and monitor skin integrity, appearance and/or temperature  2.  Assess risk factors for impaired skin integrity and/or pressures ulcers  3.  Implement precautions to protect skin integrity in collaboration with interdisciplinary team  4.  Implement pressure ulcer prevention  protocol if at risk for skin breakdown  5.  Confirm wound care consult if at risk for skin breakdown  6.  Ensure patient use of pressure relieving devices  (Low air loss bed, waffle overlay, heel protectors, ROHO cushion, etc)  Outcome: Progressing     Problem: Fall Risk  Goal: Patient will remain free from falls  Description: Target End Date:  Prior to discharge or change in level of care    Document interventions on the [facility] Fall Risk Assessment    1.  Assess for fall risk factors  2.  Implement fall precautions  Outcome: Progressing     Problem: Safety - Medical Restraint  Goal: Remains free of injury from restraints (Restraint for Interference with Medical Device)  Description: INTERVENTIONS:  1. Determine that other, less restrictive measures have been tried or would not be effective before applying the restraint  2. Evaluate the patient's condition at the time of restraint application  3. Educate patient/family regarding the reason for restraint  4. Q2H: Monitor safety, psychosocial status, comfort, circulation, respiratory status, LOC, nutrition and hydration  Outcome: Progressing

## 2023-10-12 NOTE — PROGRESS NOTES
Monitor summary: SB-SR 58-81, PA .21, QRS .09, QT .46, with rare PVCs per strip from monitor room.

## 2023-10-13 ENCOUNTER — APPOINTMENT (OUTPATIENT)
Dept: RADIOLOGY | Facility: MEDICAL CENTER | Age: 68
DRG: 870 | End: 2023-10-13
Attending: STUDENT IN AN ORGANIZED HEALTH CARE EDUCATION/TRAINING PROGRAM
Payer: MEDICARE

## 2023-10-13 LAB
GLUCOSE BLD STRIP.AUTO-MCNC: 184 MG/DL (ref 65–99)
GLUCOSE BLD STRIP.AUTO-MCNC: 198 MG/DL (ref 65–99)
GLUCOSE BLD STRIP.AUTO-MCNC: 207 MG/DL (ref 65–99)
GLUCOSE BLD STRIP.AUTO-MCNC: 234 MG/DL (ref 65–99)

## 2023-10-13 PROCEDURE — A9270 NON-COVERED ITEM OR SERVICE: HCPCS | Performed by: INTERNAL MEDICINE

## 2023-10-13 PROCEDURE — 97112 NEUROMUSCULAR REEDUCATION: CPT

## 2023-10-13 PROCEDURE — 770020 HCHG ROOM/CARE - TELE (206)

## 2023-10-13 PROCEDURE — 97530 THERAPEUTIC ACTIVITIES: CPT

## 2023-10-13 PROCEDURE — 99232 SBSQ HOSP IP/OBS MODERATE 35: CPT | Performed by: STUDENT IN AN ORGANIZED HEALTH CARE EDUCATION/TRAINING PROGRAM

## 2023-10-13 PROCEDURE — A9270 NON-COVERED ITEM OR SERVICE: HCPCS | Performed by: NURSE PRACTITIONER

## 2023-10-13 PROCEDURE — 700102 HCHG RX REV CODE 250 W/ 637 OVERRIDE(OP): Performed by: NURSE PRACTITIONER

## 2023-10-13 PROCEDURE — 82962 GLUCOSE BLOOD TEST: CPT

## 2023-10-13 PROCEDURE — 97535 SELF CARE MNGMENT TRAINING: CPT

## 2023-10-13 PROCEDURE — 700102 HCHG RX REV CODE 250 W/ 637 OVERRIDE(OP): Performed by: INTERNAL MEDICINE

## 2023-10-13 PROCEDURE — 92526 ORAL FUNCTION THERAPY: CPT

## 2023-10-13 RX ADMIN — LANSOPRAZOLE 30 MG: 30 TABLET, ORALLY DISINTEGRATING, DELAYED RELEASE ORAL at 05:35

## 2023-10-13 RX ADMIN — INSULIN HUMAN 4 UNITS: 100 INJECTION, SOLUTION PARENTERAL at 16:30

## 2023-10-13 RX ADMIN — DOCUSATE SODIUM 50 MG AND SENNOSIDES 8.6 MG 2 TABLET: 8.6; 5 TABLET, FILM COATED ORAL at 16:18

## 2023-10-13 RX ADMIN — MEMANTINE 5 MG: 5 TABLET ORAL at 16:18

## 2023-10-13 RX ADMIN — LOSARTAN POTASSIUM 100 MG: 50 TABLET, FILM COATED ORAL at 05:37

## 2023-10-13 RX ADMIN — INSULIN HUMAN 4 UNITS: 100 INJECTION, SOLUTION PARENTERAL at 12:22

## 2023-10-13 RX ADMIN — MEMANTINE 5 MG: 5 TABLET ORAL at 05:36

## 2023-10-13 RX ADMIN — AMLODIPINE BESYLATE 10 MG: 10 TABLET ORAL at 05:36

## 2023-10-13 RX ADMIN — INSULIN HUMAN 3 UNITS: 100 INJECTION, SOLUTION PARENTERAL at 00:03

## 2023-10-13 RX ADMIN — INSULIN HUMAN 3 UNITS: 100 INJECTION, SOLUTION PARENTERAL at 05:40

## 2023-10-13 RX ADMIN — ATORVASTATIN CALCIUM 40 MG: 40 TABLET, FILM COATED ORAL at 16:18

## 2023-10-13 RX ADMIN — INSULIN GLARGINE-YFGN 39 UNITS: 100 INJECTION, SOLUTION SUBCUTANEOUS at 16:30

## 2023-10-13 ASSESSMENT — COGNITIVE AND FUNCTIONAL STATUS - GENERAL
TOILETING: TOTAL
DRESSING REGULAR LOWER BODY CLOTHING: TOTAL
WALKING IN HOSPITAL ROOM: TOTAL
MOBILITY SCORE: 6
TURNING FROM BACK TO SIDE WHILE IN FLAT BAD: UNABLE
MOVING TO AND FROM BED TO CHAIR: UNABLE
EATING MEALS: TOTAL
HELP NEEDED FOR BATHING: TOTAL
SUGGESTED CMS G CODE MODIFIER DAILY ACTIVITY: CN
STANDING UP FROM CHAIR USING ARMS: TOTAL
PERSONAL GROOMING: TOTAL
DAILY ACTIVITIY SCORE: 6
SUGGESTED CMS G CODE MODIFIER MOBILITY: CN
DRESSING REGULAR UPPER BODY CLOTHING: TOTAL
CLIMB 3 TO 5 STEPS WITH RAILING: TOTAL
MOVING FROM LYING ON BACK TO SITTING ON SIDE OF FLAT BED: UNABLE

## 2023-10-13 ASSESSMENT — PAIN DESCRIPTION - PAIN TYPE: TYPE: ACUTE PAIN;CHRONIC PAIN

## 2023-10-13 ASSESSMENT — FIBROSIS 4 INDEX: FIB4 SCORE: 2.15

## 2023-10-13 ASSESSMENT — GAIT ASSESSMENTS: GAIT LEVEL OF ASSIST: UNABLE TO PARTICIPATE

## 2023-10-13 NOTE — PROGRESS NOTES
Monitor Summary: SB-SR 51-83, IN -0.19, QRS -0.09, QT -0.42, with rare PVC/PAC per strip from the monitor room.

## 2023-10-13 NOTE — PROGRESS NOTES
[facility] Medicine Daily Progress Note    Date of Service  10/12/2023    Chief Complaint  Sharon Guardado is a 68 y.o. male admitted 9/24/2023 with AMS    Hospital Course  68-year-old male with a past medical history of alcohol abuse, hypertension, obesity who presented with altered mental status and a ground-level fall on 9/24/2023.  On admission he was noted to have a small frontal subarachnoid hemorrhage on CT head.  He was also noted to have severe sepsis secondary to UTI.  She was treated with IV ceftriaxone.  Patient developed symptoms of alcohol withdrawal and was intubated.  Patient was subsequently extubated and transferred to telemetry on 9/30.  The patient subsequently had worsening mental status, on 9/30 found to have a left frontal lobe hemorrhage with surrounding vasogenic edema with mild mass effect, deemed nonoperative by neurosurgery  Was readmitted to the ICU level of care, intubated, treated with hypertonic saline, empirically treated with Unasyn for fever, overall state on mechanical ventilation for 9 days, during the course there was a concern for possible abdominal infection, gallbladder infection, surgery was consulted,  HIDA scan was obtained and was negative for acute cholecystitis, surgery not indicated as per general surgery opinion.  The patient found with significant right-sided weakness, aphasia.  On 10/10 patient again found appropriate to transfer out of ICU    Interval Problem Update  Alert awake, aphasia, R weakness.   follow commends intermittently  Care plan was discussed with the wife at bedside.     Transferred out of ICU 10/11    Discussed PEG tube placement. Pending palliative care discussion    VS stable.         I have discussed this patient's plan of care and discharge plan at IDT rounds today with Case Management, Nursing, Nursing leadership, and other members of the IDT team.    Consultants/Specialty      Code Status  DNAR/DNI    Disposition  The patient is not  medically cleared for discharge to home or a post-acute facility.      I have placed the appropriate orders for post-discharge needs.    Review of Systems  Review of Systems   Unable to perform ROS: Acuity of condition        Physical Exam  Temp:  [36.5 °C (97.7 °F)-37 °C (98.6 °F)] 37 °C (98.6 °F)  Pulse:  [54-89] 83  Resp:  [16-20] 19  BP: (103-137)/(48-71) 135/60  SpO2:  [91 %-97 %] 91 %    Physical Exam  Constitutional:       Appearance: He is obese. He is ill-appearing.   HENT:      Head: Normocephalic.      Mouth/Throat:      Mouth: Mucous membranes are moist.   Eyes:      Extraocular Movements: Extraocular movements intact.      Conjunctiva/sclera: Conjunctivae normal.   Cardiovascular:      Rate and Rhythm: Normal rate and regular rhythm.      Pulses: Normal pulses.      Heart sounds: Normal heart sounds.   Pulmonary:      Effort: Pulmonary effort is normal.      Breath sounds: Normal breath sounds.   Abdominal:      General: Bowel sounds are normal.      Palpations: Abdomen is soft.      Tenderness: There is no abdominal tenderness. There is no guarding.   Musculoskeletal:         General: No swelling or tenderness.      Cervical back: Normal range of motion and neck supple.   Neurological:      Mental Status: He is alert.      Comments: Aphasia, R weakness         Fluids    Intake/Output Summary (Last 24 hours) at 10/12/2023 1751  Last data filed at 10/12/2023 1200  Gross per 24 hour   Intake 600 ml   Output 60 ml   Net 540 ml       Laboratory  Recent Labs     10/10/23  0456 10/11/23  0121   WBC 9.4 9.7   RBC 4.40* 4.53*   HEMOGLOBIN 13.3* 13.6*   HEMATOCRIT 41.0* 41.5*   MCV 93.2 91.6   MCH 30.2 30.0   MCHC 32.4 32.8   RDW 44.4 42.8   PLATELETCT 255 273   MPV 11.2 11.3     Recent Labs     10/10/23  0012 10/10/23  0456 10/11/23  0121   SODIUM 147* 144 144   POTASSIUM  --  4.1 4.0   CHLORIDE  --  107 106   CO2  --  26 27   GLUCOSE  --  233* 198*   BUN  --  28* 26*   CREATININE  --  0.79 0.87   CALCIUM  --   9.2 9.5                   Imaging  DX-CHEST-PORTABLE (1 VIEW)   Final Result      Unchanged bibasilar underinflation atelectasis      DX-ABDOMEN FOR TUBE PLACEMENT   Final Result      Enteric tube tip projects over the gastric antrum or proximal duodenum      DX-CHEST-PORTABLE (1 VIEW)   Final Result      Improving bibasilar pulmonary opacities.      DX-CHEST-PORTABLE (1 VIEW)   Final Result         Findings on chest radiograph appear stable since the prior radiograph.  No new abnormalities are identified.      NM-HEPATOBILIARY SCAN   Final Result      There is visualization of the gallbladder after morphine administration, indicating the cystic duct is still patent. No scintigraphy evidence of acute cholecystitis.      DX-CHEST-PORTABLE (1 VIEW)   Final Result         1.  Mild pulmonary edema and/or infiltrates.   2.  Cardiomegaly   3.  Atherosclerosis      DX-CHEST-PORTABLE (1 VIEW)   Final Result         1.  No acute cardiopulmonary disease.   2.  Cardiomegaly   3.  Atherosclerosis      US-RUQ   Final Result      1.  Minimal gallbladder wall thickening and possible sludge.  Cholecystitis is not excluded.   2.  Minimal pericholecystic fluid may be due to gallbladder inflammation or liver disease.   3.  Enlarged echogenic liver suggesting fatty infiltration.   4.  Limited evaluation of the pancreas abdominal aorta.      DX-CHEST-PORTABLE (1 VIEW)   Final Result         1.  Pulmonary edema and/or infiltrates are identified, which are stable since the prior exam.   2.  Cardiomegaly   3.  Atherosclerosis      US-EXTREMITY VENOUS UPPER UNILAT RIGHT   Final Result      DX-CHEST-PORTABLE (1 VIEW)   Final Result         1.  Pulmonary edema and/or infiltrates are identified, which are stable since the prior exam.   2.  Cardiomegaly   3.  Atherosclerosis      DX-CHEST-PORTABLE (1 VIEW)   Final Result         1.  Pulmonary edema and/or infiltrates are identified, which are stable since the prior exam.   2.  Cardiomegaly   3.   Atherosclerosis      DX-CHEST-PORTABLE (1 VIEW)   Final Result         1.  Pulmonary edema and/or infiltrates are identified, which are somewhat decreased since the prior exam.   2.  Cardiomegaly   3.  Atherosclerosis      MR-MRA HEAD-W/O   Final Result         Normal intracranial MRA.      MR-BRAIN-WITH & W/O   Final Result         Left frontal lobe hemorrhage with surrounding vasogenic edema and mild mass effect upon the frontal horn of the left lateral ventricle. There is approximately 2 mm of midline shift to the right.      Age-related volume loss and chronic microvascular ischemic changes.      No abnormal intracranial enhancement is identified.      CT-HEAD W/O   Final Result         1. Stable 6 cm left frontal intraparenchymal hemorrhage with mild, 2 mm left-to-right subfalcine herniation.   2. No new abnormality or change.         DX-CHEST-PORTABLE (1 VIEW)   Final Result         1.  Pulmonary edema and/or infiltrates are identified, which appear somewhat increased since the prior exam.   2.  Cardiomegaly   3.  Atherosclerosis      DX-ABDOMEN FOR TUBE PLACEMENT   Final Result      NG tube extends below the diaphragm and appears to extend through the region of the stomach with tip at the level of the proximal duodenum.      DX-CHEST-PORTABLE (1 VIEW)   Final Result      1.  Grossly satisfactory appearance of tubes and lines.   2.  No evidence of pneumothorax.   3.  Marked hypoinflation with probable bibasilar atelectasis.   4.  Enlarged cardiac silhouette with vascular congestion.      CT-HEAD W/O   Final Result         1.  New large parenchymal hemorrhage involving the frontal lobe is identified with some surrounding edema.      2.  Subarachnoid hemorrhage is also noted in the area.      3.  Minimal midline shift to the right side.      Findings are consistent with atrophy.  Decreased attenuation in the periventricular white matter likely indicates microvascular ischemic disease.      Based solely on CT  findings, the brain injury guideline category is mBIG 3.      EDH   IVH   Displaced skull fx   SDH > 8mm   IPH > 8mm or multiple   SAH bi-hemispheric or > 3mm      The original BIG retrospective analysis found radiographic progression in 0% of BIG 1 patients and 2.6% BIG 2.      These findings were discussed with GEETA MOLINA on 09/30/2023.      DX-CHEST-PORTABLE (1 VIEW)   Final Result      Stable multiple pulmonary vascular dilation/congestion. Otherwise, negative.      DX-CHEST-PORTABLE (1 VIEW)   Final Result      1.  Increasing airspace disease at the left base.      DX-CHEST-LIMITED (1 VIEW)   Final Result         1.  Pulmonary edema and/or infiltrates are identified, which are somewhat decreased since the prior exam.   2.  Cardiomegaly   3.  Atherosclerosis      DX-CHEST-PORTABLE (1 VIEW)   Final Result         1.  Pulmonary edema and/or infiltrates are identified, which appear somewhat increased since the prior exam.   2.  Cardiomegaly   3.  Atherosclerosis      DX-CHEST-PORTABLE (1 VIEW)   Final Result         1.  Interstitial edema and/or infiltrates, slightly decreased since prior study.   2.  Cardiomegaly   3.  Atherosclerosis      DX-CHEST-LIMITED (1 VIEW)   Final Result      Right IJ catheter tip projects in the SVC. No pneumothorax.      CT-CTA NECK WITH & W/O-POST PROCESSING   Final Result      1.  Estimated 50-75% stenosis of the BILATERAL carotid arteries   2.  Estimated greater than 50% stenosis at the vertebral artery ostium on each side      CT-CTA HEAD WITH & W/O-POST PROCESS   Final Result      1.  No large vessel occlusion or aneurysm.   2.  Hazy left frontal lobe subarachnoid hemorrhage, decreased in conspicuity from prior. No new hemorrhage seen.   3.  Scattered opacifications of the ethmoid sinuses, likely related to support hardware.      DX-ABDOMEN FOR TUBE PLACEMENT   Final Result         1.  Nonspecific bowel gas pattern in the upper abdomen.   2.  Dobbhoff tube with tip terminating  overlying the expected location of the first or second duodenal segment.      DX-CHEST-PORTABLE (1 VIEW)   Final Result         1.  Interstitial edema and/or infiltrates.   2.  Cardiomegaly   3.  Nasogastric tube terminates in the distal esophagus, recommend advancement   4.  Atherosclerosis      DX-ABDOMEN FOR TUBE PLACEMENT   Final Result         1.  Nonspecific bowel gas pattern in the upper abdomen.   2.  Nasogastric tube terminates just distal to the gastroesophageal junction, recommend advancement      CT-RENAL COLIC EVALUATION(A/P W/O)   Final Result         1.  Intrahepatic biliary air and nondependent air within the gallbladder, consider history of instrumentation or changes of cholangitis in the appropriate clinical setting.   2.  Large fat-containing bilateral inguinal hernias   3.  Enlarged prostate, consider causes of prostate enlargement with additional workup as clinically appropriate   4.  Diverticulosis   5.  Cholelithiasis   6.  Atherosclerosis      CT-HEAD W/O   Final Result         1.  Hazy left frontal subarachnoid hemorrhage.   2.  Nonspecific white matter changes, commonly associated with small vessel ischemic disease.  Associated mild cerebral atrophy is noted.      Based solely on CT findings, the brain injury guideline category is mBIG 1.      SDH < 4mm   IPH < 4mm   SAH < 3 sulci and < 1mm      The original BIG retrospective analysis found radiographic progression in 0% of BIG 1 patients and 2.6% BIG 2.      These findings were discussed with the patient's clinician, Madhu Zambrano, on 9/24/2023 10:10 PM.      DX-CHEST-PORTABLE (1 VIEW)   Final Result      No acute cardiopulmonary abnormality identified.           Assessment/Plan  * Intraparenchymal hemorrhage of brain (HCC)- (present on admission)  Assessment & Plan  CT on 9/24 with hazy left frontal SAH  CT on 9/30 with new large IPH in the left frontal lobe  Strict blood pressure control with goal SBP less than 160  Goal sodium 145-150  - I am titrating hypertonic saline to achieve sodium goal  Will deescalate HTS today and slowly correct  Neuro checks every 2 hours    Atelectasis- (present on admission)  Assessment & Plan  Patient unable to follow commands for incentive spirometry    Pneumonia due to infectious organism- (present on admission)  Assessment & Plan  Continue Zosyn    Acute respiratory failure with hypoxia (HCC)- (present on admission)  Assessment & Plan  Intubated 9/25-9/28  Reintubated 9/30-10/7  Continue oxygen    Thrombocytopenia (HCC)- (present on admission)  Assessment & Plan  Baseline ~110-125  Monitor, currently improved    Pneumobilia- (present on admission)  Assessment & Plan  As seen on CT renal (9/25/23).    9/26/23: No need for further interventions at this time per gen surg    Type 2 diabetes mellitus (HCC)- (present on admission)  Assessment & Plan  A1c 5.7% (6/2023)  Ongoing treatment with long and short acting insulin, adjust as indicated    Primary hypertension- (present on admission)  Assessment & Plan  Hypertensive at presentation   Goal for normotension currently multimodality treatment      BMI 40.0-44.9, adult (HCC)- (present on admission)  Assessment & Plan  Future weight loss recommended    Alcohol dependence (HCC)- (present on admission)  Assessment & Plan  Multivitamin, folate, thiamine  Monitor, additional therapies as clinically indicated  9/29/2023 patient was at least 2-3 hard liquor drinks daily although this is unclear per patient and his wife's history.  .    Patient s/p treatment for acute alcohol withdrawal    ACP (advance care planning)  Assessment & Plan   I discussed advance care planning with the patient's wife at bedside, including diagnosis, prognosis, plan of care, risks and benefits of any therapies that could be offered.  We discussed regarding CODE STATUS, CPR and intubation with mechanical ventilation, discussed regarding risk and benefits, as well as alternatives including palliation and  hospice as appropriate.  After long discussion with patient, patient's wife has decided for DNR/DNI, patient CODE STATUS has been updated in epic with DNR/DNI as per patient's wishes.   Guarded recovery, I consulted palliative care. Will decide PEG tube placement depends on the goal of care  ACP 22min           VTE prophylaxis: scd    I have performed a physical exam and reviewed and updated ROS and Plan today (10/12/2023). In review of yesterday's note (10/11/2023), there are no changes except as documented above.

## 2023-10-13 NOTE — THERAPY
Speech Language Pathology   Daily Treatment     Patient Name: Sharon Guardado  AGE:  68 y.o., SEX:  male  Medical Record #: [id number]  Date of Service: 10/13/2023      Precautions:  Precautions: Fall Risk, Swallow Precautions, Nasogastric Tube       Subjective  RN cleared patient for dysphagia management. Pt awake, upright in bed. Wife at bedside who reported pt is lethargic from prior OT session. Also stated discussions of possible PEG placement.       Assessment  This clinician provided oral care due to presence of moderate secretions in oral cavity. PO trials of ice chips presented. Pt demo'd adequate oral bolus acceptance, containment, and clearance. Cough response following all trials. Pt unable to follow commands this date despite attempts from this clinician and wife. Discussed elevated risk of aspiration related to intubation, prolonged NPO status, and bedside presentation. Provided education regarding indication for FEES to further assess swallow function pending improved participation and command following. Wife endorsed wanting to hold on FEES.       Clinical Impressions  Pt presents with clinical signs of oropharyngeal dysphagia, likely acute on chronic related to prolonged intubation and NPO status, as well as neurological deficits from SAH. A diagnostic swallow study is indicated, however unable to complete this date d/t limited pt participation and command following as well as wife preferring to hold on instrumental study. Service to follow to determine pt appropriateness to participate in diagnostic swallow study prior to oral diet initiation. To mitigate risk of development of aspiration pneumonia, please continue diligent oral care.       Recommendations  Treatment Completed: Dysphagia Treatment     Dysphagia Treatment  Diet Consistency: NPO/NGT  Instrumentation: Instrumental swallow study pending clinical progress  Medication: Non Oral  Oral Care: Q4h      SLP Treatment Plan  Treatment Plan:  Dysphagia Treatment  SLP Frequency: 3x Per Week       Anticipated Discharge Needs  Discharge Recommendations: Recommend post-acute placement for additional speech therapy services prior to discharge home  Therapy Recommendations Upon DC: Dysphagia Training, Patient / Family / Caregiver Education      Patient / Family Goals  Patient / Family Goal #1: Did note state  Short Term Goals  Short Term Goal # 1: Pt will maintain attention for 2-3 minutes to consume prefeeding trials to demonstrate readiness for PO intake  Goal Outcome # 1: Progressing slower than expected      Ashia Wang, SLP

## 2023-10-13 NOTE — CARE PLAN
The patient is Stable - Low risk of patient condition declining or worsening    Shift Goals  Clinical Goals: Maintain skin integrity, improving neuro status  Patient Goals: DEENA  Family Goals: DEENA    Progress made toward(s) clinical / shift goals:    Problem: Skin Integrity  Goal: Skin integrity is maintained or improved  Outcome: Progressing   Q2 turns in place, waffle overlay used, mepelex over bony prominences, and skin protectant in use   Problem: Neuro Status  Goal: Neuro status will remain stable or improve  Outcome: Progressing   Q4hr neuro checks. Neuro status remains stable.    Problem: Safety - Medical Restraint  Goal: Remains free of injury from restraints (Restraint for Interference with Medical Device)  Outcome: Progressing   Hourly rounding and Q2 turns in place. ROM performed. Skin assessed under restraints, WDL. Pt denies any pain or discomfort.   Problem: Safety - Medical Restraint  Goal: Free from restraint(s) (Restraint for Interference with Medical Device)  Outcome: Progressing       Patient is not progressing towards the following goals:

## 2023-10-13 NOTE — CARE PLAN
The patient is Stable - Low risk of patient condition declining or worsening    Shift Goals  Clinical Goals: Safety/maintain skin integrity  Patient Goals: DEENA  Family Goals: DEENA    Progress made toward(s) clinical / shift goals:    Problem: Fall Risk  Goal: Patient will remain free from falls  Outcome: Progressing   Fall precautions in place. Call light, bedside table, and pt belongings within reach. Offered toileting during rounding.     Problem: Skin Integrity  Goal: Skin integrity is maintained or improved  Outcome: Progressing   Pt repositioned every 2 hours and as needed. Pt checked for incontinence during rounding.     Patient is not progressing towards the following goals:N/A

## 2023-10-13 NOTE — THERAPY
Physical Therapy   Daily Treatment     Patient Name: Sharon Guardado  Age:  68 y.o., Sex:  male  Medical Record #: [id number]  Today's Date: 10/13/2023     Precautions  Precautions: Fall Risk;Swallow Precautions  Comments: SBP<160; R hemiparesis, L gaze preference    Assessment  Pt alert to voice, unable to follow commands at this time. Max A to Total A for mobility, with spontaneous movement of LUE/LLE. Unable to participate in dynamic tasks while EOB, and unable to increase engagement with trial of gestures, and hand over hand assistance. Of note, pt received all three therapy disciplines this morning. Anticipate prolonged recovery process given severity of deficits, and would strongly consider SNF with bridge to IRF upon D/C to maximize time at intensive rehab once able to participate/tolerate more.     Plan    Treatment Plan Status: Continue Current Treatment Plan  Type of Treatment: Bed Mobility, Gait Training, Neuro Re-Education / Balance, Self Care / Home Evaluation, Therapeutic Activities, Therapeutic Exercise  Treatment Frequency: 3 Times per Week  Treatment Duration: Until Therapy Goals Met    DC Equipment Recommendations: Unable to determine at this time  Discharge Recommendations: Recommend post-acute placement for additional physical therapy services prior to discharge home      Subjective    Pt's wife in room and happy Sharon is getting so much therapy this morning.     Objective       10/13/23 1217   Charge Group   Charges  Yes   PT Therapeutic Activities (Units) 1   PT Neuromuscular Re-Education / Balance (Units) 1   Total Time Spent   PT Total Time Yes   PT Therapeutic Activities Time Spent (Mins) 10   PT Neuromuscular Re-Education/Balance Time Spent (Mins) 16   PT Total Time Spent (Calculated) 26    Services   Is patient using  services for this encounter? No  (wife at bedside to report any information needed.)   Precautions   Precautions Fall Risk;Swallow Precautions    Comments SBP<160; R hemiparesis, L gaze preference   Vitals   Pulse 69   Patient BP Position Supine   Blood Pressure  (!) 147/65   O2 (LPM) 2   O2 Delivery Device Silicone Nasal Cannula   Cognition    Cognition / Consciousness X   Speech/ Communication Unable to Communicate   Level of Consciousness Responds to voice   Ability To Follow Commands Unable to Follow 1 Step Commands   Safety Awareness Impaired;Impulsive   New Learning Impaired   Comments Pt unable to follow simple commands (stick tongue out, grasp, thumbs up, wiggle toes, grab item, etc)   Passive ROM Lower Body   Passive ROM Lower Body WDL   Active ROM Lower Body    Active ROM Lower Body  X   Comments R LE limited by weakness, L WDL   Strength Lower Body   Lower Body Strength  X   Comments no purposeful movement noted in R LE, L LE WDL   Lower Body Muscle Tone   Comments R UE slight resistance in elbow flexion   Neuro-Muscular Treatments   Neuro-Muscular Treatments Verbal Cuing;Tapping;Weight Shift Right;Weight Shift Left   Comments facilitation for sitting EOB with Min A, spontaneously reaches for bedrail with LUE, placement of BUE in lap facilitates pt's balance, able to self-correct during treatment. Pt unable to respond to simple commands, even with faciliation of gestures and positioning patient no parroting or mirroring of movement. Attempted to facilitate visual scanning. Able to scan to the R 1x while in bed, but unable to attend at midline while sitting EOB. Spontaneously moving LUE/LLE, but unable to kick/reach to target.   Neurological Concerns   Neurological Concerns Yes   Sitting Posture During ADL's Posterior Lean   Balance   Sitting Balance (Static) Poor   Sitting Balance (Dynamic) Poor -   Standing Balance (Static) Dependent   Standing Balance (Dynamic) Dependent   Weight Shift Sitting Poor   Weight Shift Standing Absent   Skilled Intervention Verbal Cuing;Tactile Cuing;Postural Facilitation   Comments Pt preference for leaning left  with gaze down to the left, facilitate hands in lap for postural control   Bed Mobility    Supine to Sit Maximal Assist   Sit to Supine Maximal Assist   Scooting Total Assist   Rolling Total Assist to Rt.;Total Assist to Lt.   Skilled Intervention Verbal Cuing;Tactile Cuing   Comments Pt grabbed PT's waist to assist while moving EOB to supine when hands are unrestrained.   Gait Analysis   Gait Level Of Assist Unable to Participate   Functional Mobility   Sit to Stand Unable to Participate   Mobility supine>EOB   How much difficulty does the patient currently have...   Turning over in bed (including adjusting bedclothes, sheets and blankets)? 1   Sitting down on and standing up from a chair with arms (e.g., wheelchair, bedside commode, etc.) 1   Moving from lying on back to sitting on the side of the bed? 1   How much help from another person does the patient currently need...   Moving to and from a bed to a chair (including a wheelchair)? 1   Need to walk in a hospital room? 1   Climbing 3-5 steps with a railing? 1   6 clicks Mobility Score 6   Activity Tolerance   Sitting Edge of Bed 15 min   Short Term Goals    Short Term Goal # 1 pt will be able to complete supine<>Sitting with min assist in 6tx in order to improve mobility   Goal Outcome # 1 goal not met   Short Term Goal # 2 pt will be able to complete functional transfers with mod assist in 6tx   Goal Outcome # 2 Goal not met   Short Term Goal # 3 pt will be able to ambulate 15ft with FWW and min assist in 6tx   Goal Outcome # 3 Goal not met   Education Group   Education Provided Role of Physical Therapist   Role of Physical Therapist Patient Response Patient;Acceptance;Demonstration;Action Demonstration;Family   Physical Therapy Treatment Plan   Physical Therapy Treatment Plan Continue Current Treatment Plan   Treatment Plan  Bed Mobility;Gait Training;Neuro Re-Education / Balance;Self Care / Home Evaluation;Therapeutic Activities;Therapeutic Exercise    Treatment Frequency 3 Times per Week   Duration Until Therapy Goals Met   Anticipated Discharge Equipment and Recommendations   DC Equipment Recommendations Unable to determine at this time   Discharge Recommendations Recommend post-acute placement for additional physical therapy services prior to discharge home   Interdisciplinary Plan of Care Collaboration   IDT Collaboration with  Nursing   Patient Position at End of Therapy In Bed;Bed Alarm On;Wrist Restraints Applied   Collaboration Comments RN aware   Session Information   Date / Session Number  10/13-4(1/3, 10/19)   Deb Bone, SPT

## 2023-10-13 NOTE — DISCHARGE PLANNING
Case Management Discharge Planning    Admission Date: 9/24/2023  GMLOS: 13.5  ALOS: 19    Anticipated Discharge Dispo: Discharge Disposition: Disch to IP rehab facility or distinct part unit (62)    DME Needed: No    Action(s) Taken:     Pt's case discussed during IDT rounds. Pt is pending definite plan for source of nutrition. Acute Rehab is following. Requested DPA send SNF referrals in case rehab declines.     Escalations Completed: None    Medically Clear: No    Next Steps: F/U with SNF referrals     Barriers to Discharge: Pending Placement    Is the patient up for discharge tomorrow: No

## 2023-10-13 NOTE — THERAPY
"Occupational Therapy  Daily Treatment     Patient Name: Sharon Guardado  Age:  68 y.o., Sex:  male  Medical Record #: [id number]  Today's Date: 10/13/2023     Precautions  Precautions: Fall Risk, Swallow Precautions  Comments: SBP<160    Assessment  This tx session focused on neuro-reeducation for sustaining static sitting balance while completing seated grooming. Pt required modA for bed mobility to sit EOB. Pt was able to remain upright for ~25 mins before showing signs of LOB or fatigue. Pt required maxA to carryout brushing his teeth d/t poor initiation and sequencing but modA for washing his face. Pt demonstrated increased command following but still inconsistent. Pt remains limited by: dynamic sitting balance, dynamic standing balance, impaired cognition, activity tolerance, and endurance necessary for completion of ADLs. Continue POC.    Plan  Treatment Plan Status: Continue Current Treatment Plan  Type of Treatment: Self Care / Activities of Daily Living, Manual Therapy Techniques, Neuro Re-Education / Balance, Therapeutic Exercises, Therapeutic Activity, Adaptive Equipment  Treatment Frequency: 3 Times per Week  Treatment Duration: Until Therapy Goals Met    DC Equipment Recommendations: Unable to determine at this time  Discharge Recommendations: Recommend post-acute placement for additional occupational therapy services prior to discharge home    Subjective  Pt nonverbal. However, pt's wife present and stated, \"I'm so happy to see him be able to sit up on his own.\"     Objective   10/13/23 0925   Precautions   Precautions Fall Risk;Nasogastric Tube;Swallow Precautions   Vitals   O2 (LPM) 2   O2 Delivery Device Silicone Nasal Cannula   Cognition    Cognition / Consciousness X   Speech/ Communication Unable to Communicate   Level of Consciousness Responds to voice   Ability To Follow Commands 1 Step  (Pt demonstrated increased command following. However, still inconsistent. Pt was able to follow " "commands such as \"Open your hand\", \"take the washcloth\", \"wash your face\" and \"open your mouth\" when completing seated grooming at EOB.)   Safety Awareness Impaired;Impulsive  (Pt observed to frequently attempt to pull at NG tube when wrist restraints were not attached.)   New Learning Impaired   Attention Impaired   Sequencing Impaired   Initiation Impaired   [place]o Scale Level 3  (Level 3 emerging 4)   Neuro-Muscular Treatments   Neuro-Muscular Treatments Verbal Cuing;Sequencing;Tactile Cuing;Postural Facilitation   Comments Pt demonstrated fair- sitting balance as he was able to sustain sitting EOB w/2 signs of LOB w/CGA for ~25 mins. Pt observed to spontaneously reach for NG tube and rub his R thigh. Pt required vc's to remain upright as he was observed to lean anteriorly. Pt required modA x2 person assistance to txf from semi fowlers position>EOB>supine>semi fowlers   Balance   Sitting Balance (Static) Fair -   Sitting Balance (Dynamic)   (NT)   Standing Balance (Static)   (NT)   Standing Balance (Dynamic)   (NT)   Weight Shift Sitting Poor   Bed Mobility    Supine to Sit Moderate Assist  (x2 person assist)   Activities of Daily Living   Grooming Seated;Maximal Assist  (washed his face and brushed his teeth while sitting EOB. Pt required maxA to initiate brushing his teeth. However, pt was only able to open his mouth and had difficulty carrying out the remainder of the task. However, pt required modA to wash his face.)   Lower Body Dressing Total Assist  (required totalA to don B socks)   Skilled Intervention Verbal Cuing;Sequencing;Postural Facilitation  (Pt requires vc's for sequencing tasks and initiating tasks as he displays delayed processing and requires extended time to respond.)   How much help from another person does the patient currently need...   Putting on and taking off regular lower body clothing? 1   Bathing (including washing, rinsing, and drying)? 1   Toileting, which includes using a toilet, " bedpan, or urinal? 1   Putting on and taking off regular upper body clothing? 1   Taking care of personal grooming such as brushing teeth? 1   Eating meals? 1   6 Clicks Daily Activity Score 6   Modified Ritchie (mRS)   Modified Ritchie Score 4   Functional Mobility   Comments   (EOB activity only)   Activity Tolerance   Sitting Edge of Bed ~25 mins   Edema Management   Other Edema Mgmt Treatments Pt displayed pitting edema in RUE specifically R hand. Post tx session pt's RUE was elevated on pillow to reduce edema.   Patient / Family Goals   Patient / Family Goal #1 Spouse wishes for pt to get off the vent   Goal #1 Outcome Goal met   Short Term Goals   Short Term Goal # 1 Pt will sit EOB and perform seated grooming w/ min A   Goal Outcome # 1 Progressing as expected   Short Term Goal # 2 Pt will demo 2/5  strength in R UE   Goal Outcome # 2 Goal not met   Short Term Goal # 3 Pt will demo UB dressing w/ mod A   Goal Outcome # 3 Goal not met   Short Term Goal # 4 Pt will demo ADL txf w/ mod A   Goal Outcome # 4 Goal not met   Education Group   Education Provided Role of Occupational Therapist;Activities of Daily Living   Role of Occupational Therapist Patient Response Patient;Acceptance;Explanation;No Learning Evidence;Significant Other   ADL Patient Response Patient;Acceptance;Explanation;Reinforcement Needed;Action Demonstration   Additional Comments Pt's wife present during OT tx session and received education regarding OT's role in POC and progression of pt's functional abilities when completing ADLs. Pt's wife verbalized understanding.   Occupational Therapy Treatment Plan    O.T. Treatment Plan Continue Current Treatment Plan   Anticipated Discharge Equipment and Recommendations   DC Equipment Recommendations Unable to determine at this time   Discharge Recommendations Recommend post-acute placement for additional occupational therapy services prior to discharge home   Interdisciplinary Plan of Care Collaboration    IDT Collaboration with  Nursing;Family / Caregiver   Patient Position at End of Therapy In Bed;Bed Alarm On;Call Light within Reach;[place] Applied;Tray Table within Reach;Phone within Reach;Family / Friend in Room   Collaboration Comments RN aware   Session Information   Date / Session Number  10/13 #4 (1/3, 10/20)

## 2023-10-13 NOTE — PROGRESS NOTES
Monitor summary: SR/SB, HR 52-88, MA 0.18, QRS 0.09, QT 0.43 with (R)PVCs & (R)PACs per strip from monitor room

## 2023-10-14 ENCOUNTER — APPOINTMENT (OUTPATIENT)
Dept: RADIOLOGY | Facility: MEDICAL CENTER | Age: 68
DRG: 870 | End: 2023-10-14
Attending: STUDENT IN AN ORGANIZED HEALTH CARE EDUCATION/TRAINING PROGRAM
Payer: MEDICARE

## 2023-10-14 PROBLEM — R00.1 BRADYCARDIA: Status: ACTIVE | Noted: 2023-10-14

## 2023-10-14 LAB
ALBUMIN SERPL BCP-MCNC: 4 G/DL (ref 3.2–4.9)
ALBUMIN/GLOB SERPL: 1.1 G/DL
ALP SERPL-CCNC: 182 U/L (ref 30–99)
ALT SERPL-CCNC: 41 U/L (ref 2–50)
ANION GAP SERPL CALC-SCNC: 11 MMOL/L (ref 7–16)
AST SERPL-CCNC: 62 U/L (ref 12–45)
BILIRUB SERPL-MCNC: 0.8 MG/DL (ref 0.1–1.5)
BUN SERPL-MCNC: 26 MG/DL (ref 8–22)
CALCIUM ALBUM COR SERPL-MCNC: 10.5 MG/DL (ref 8.5–10.5)
CALCIUM SERPL-MCNC: 10.5 MG/DL (ref 8.5–10.5)
CHLORIDE SERPL-SCNC: 106 MMOL/L (ref 96–112)
CO2 SERPL-SCNC: 27 MMOL/L (ref 20–33)
CREAT SERPL-MCNC: 0.77 MG/DL (ref 0.5–1.4)
ERYTHROCYTE [DISTWIDTH] IN BLOOD BY AUTOMATED COUNT: 43.7 FL (ref 35.9–50)
GFR SERPLBLD CREATININE-BSD FMLA CKD-EPI: 96 ML/MIN/1.73 M 2
GLOBULIN SER CALC-MCNC: 3.8 G/DL (ref 1.9–3.5)
GLUCOSE BLD STRIP.AUTO-MCNC: 191 MG/DL (ref 65–99)
GLUCOSE BLD STRIP.AUTO-MCNC: 223 MG/DL (ref 65–99)
GLUCOSE BLD STRIP.AUTO-MCNC: 224 MG/DL (ref 65–99)
GLUCOSE BLD STRIP.AUTO-MCNC: 231 MG/DL (ref 65–99)
GLUCOSE SERPL-MCNC: 262 MG/DL (ref 65–99)
HCT VFR BLD AUTO: 44.6 % (ref 42–52)
HGB BLD-MCNC: 14.5 G/DL (ref 14–18)
MAGNESIUM SERPL-MCNC: 2.1 MG/DL (ref 1.5–2.5)
MCH RBC QN AUTO: 29.9 PG (ref 27–33)
MCHC RBC AUTO-ENTMCNC: 32.5 G/DL (ref 32.3–36.5)
MCV RBC AUTO: 92 FL (ref 81.4–97.8)
PHOSPHATE SERPL-MCNC: 2.6 MG/DL (ref 2.5–4.5)
PLATELET # BLD AUTO: 250 K/UL (ref 164–446)
PMV BLD AUTO: 11.5 FL (ref 9–12.9)
POTASSIUM SERPL-SCNC: 4.7 MMOL/L (ref 3.6–5.5)
PROT SERPL-MCNC: 7.8 G/DL (ref 6–8.2)
RBC # BLD AUTO: 4.85 M/UL (ref 4.7–6.1)
SODIUM SERPL-SCNC: 144 MMOL/L (ref 135–145)
TSH SERPL DL<=0.005 MIU/L-ACNC: 0.57 UIU/ML (ref 0.38–5.33)
WBC # BLD AUTO: 10.5 K/UL (ref 4.8–10.8)

## 2023-10-14 PROCEDURE — 700102 HCHG RX REV CODE 250 W/ 637 OVERRIDE(OP): Performed by: NURSE PRACTITIONER

## 2023-10-14 PROCEDURE — 84100 ASSAY OF PHOSPHORUS: CPT

## 2023-10-14 PROCEDURE — 700102 HCHG RX REV CODE 250 W/ 637 OVERRIDE(OP): Performed by: INTERNAL MEDICINE

## 2023-10-14 PROCEDURE — 84443 ASSAY THYROID STIM HORMONE: CPT

## 2023-10-14 PROCEDURE — 83735 ASSAY OF MAGNESIUM: CPT

## 2023-10-14 PROCEDURE — 93005 ELECTROCARDIOGRAM TRACING: CPT | Performed by: STUDENT IN AN ORGANIZED HEALTH CARE EDUCATION/TRAINING PROGRAM

## 2023-10-14 PROCEDURE — A9270 NON-COVERED ITEM OR SERVICE: HCPCS | Performed by: INTERNAL MEDICINE

## 2023-10-14 PROCEDURE — 770020 HCHG ROOM/CARE - TELE (206)

## 2023-10-14 PROCEDURE — A9270 NON-COVERED ITEM OR SERVICE: HCPCS | Performed by: NURSE PRACTITIONER

## 2023-10-14 PROCEDURE — 99232 SBSQ HOSP IP/OBS MODERATE 35: CPT | Performed by: STUDENT IN AN ORGANIZED HEALTH CARE EDUCATION/TRAINING PROGRAM

## 2023-10-14 PROCEDURE — 36415 COLL VENOUS BLD VENIPUNCTURE: CPT

## 2023-10-14 PROCEDURE — 82962 GLUCOSE BLOOD TEST: CPT | Mod: 91

## 2023-10-14 PROCEDURE — 85027 COMPLETE CBC AUTOMATED: CPT

## 2023-10-14 PROCEDURE — 80053 COMPREHEN METABOLIC PANEL: CPT

## 2023-10-14 RX ORDER — GUAIFENESIN 200 MG/10ML
10 LIQUID ORAL EVERY 4 HOURS PRN
Status: DISCONTINUED | OUTPATIENT
Start: 2023-10-14 | End: 2023-10-28

## 2023-10-14 RX ADMIN — MEMANTINE 5 MG: 5 TABLET ORAL at 04:25

## 2023-10-14 RX ADMIN — DICLOFENAC SODIUM 2 G: 10 GEL TOPICAL at 12:59

## 2023-10-14 RX ADMIN — AMLODIPINE BESYLATE 10 MG: 10 TABLET ORAL at 04:27

## 2023-10-14 RX ADMIN — ATORVASTATIN CALCIUM 40 MG: 40 TABLET, FILM COATED ORAL at 16:15

## 2023-10-14 RX ADMIN — DOCUSATE SODIUM 50 MG AND SENNOSIDES 8.6 MG 2 TABLET: 8.6; 5 TABLET, FILM COATED ORAL at 16:15

## 2023-10-14 RX ADMIN — INSULIN HUMAN 4 UNITS: 100 INJECTION, SOLUTION PARENTERAL at 06:09

## 2023-10-14 RX ADMIN — INSULIN HUMAN 3 UNITS: 100 INJECTION, SOLUTION PARENTERAL at 00:34

## 2023-10-14 RX ADMIN — LANSOPRAZOLE 30 MG: 30 TABLET, ORALLY DISINTEGRATING, DELAYED RELEASE ORAL at 04:26

## 2023-10-14 RX ADMIN — LABETALOL HYDROCHLORIDE 10 MG: 5 INJECTION, SOLUTION INTRAVENOUS at 00:31

## 2023-10-14 RX ADMIN — INSULIN HUMAN 4 UNITS: 100 INJECTION, SOLUTION PARENTERAL at 16:28

## 2023-10-14 RX ADMIN — INSULIN HUMAN 4 UNITS: 100 INJECTION, SOLUTION PARENTERAL at 12:59

## 2023-10-14 RX ADMIN — LOSARTAN POTASSIUM 100 MG: 50 TABLET, FILM COATED ORAL at 04:28

## 2023-10-14 RX ADMIN — MEMANTINE 5 MG: 5 TABLET ORAL at 16:15

## 2023-10-14 ASSESSMENT — FIBROSIS 4 INDEX: FIB4 SCORE: 2.15

## 2023-10-14 ASSESSMENT — PAIN DESCRIPTION - PAIN TYPE
TYPE: ACUTE PAIN
TYPE: ACUTE PAIN

## 2023-10-14 NOTE — PROGRESS NOTES
Monitor Summary: sb-SR 47-66, [place] -0.21, QRS -0.07, QT -0.45, with in/out of junctional rhythm, HR down to 38 non-sustaining, 4 beats of vtach per strip from the monitor room.

## 2023-10-14 NOTE — PROGRESS NOTES
Hospital Medicine Daily Progress Note    Date of Service  10/13/2023    Chief Complaint  Sharon Guardado is a 68 y.o. male admitted 9/24/2023 with AMS    Hospital Course  68-year-old male with a past medical history of alcohol abuse, hypertension, obesity who presented with altered mental status and a ground-level fall on 9/24/2023.  On admission he was noted to have a small frontal subarachnoid hemorrhage on CT head.  He was also noted to have severe sepsis secondary to UTI.  She was treated with IV ceftriaxone.  Patient developed symptoms of alcohol withdrawal and was intubated.  Patient was subsequently extubated and transferred to telemetry on 9/30.  The patient subsequently had worsening mental status, on 9/30 found to have a left frontal lobe hemorrhage with surrounding vasogenic edema with mild mass effect, deemed nonoperative by neurosurgery  Was readmitted to the ICU level of care, intubated, treated with hypertonic saline, empirically treated with Unasyn for fever, overall state on mechanical ventilation for 9 days, during the course there was a concern for possible abdominal infection, gallbladder infection, surgery was consulted,  HIDA scan was obtained and was negative for acute cholecystitis, surgery not indicated as per general surgery opinion.  The patient found with significant right-sided weakness, aphasia.  On 10/10 patient again found appropriate to transfer out of ICU    Interval Problem Update  More Alert awake, still aphasia, R weakness.   follow commends intermittently  Care plan was discussed with the wife at bedside.     SLP evelu today, recommended NPO/NGT.   I discussed with the wife. The wife agrees with PEG tube placement if patients fails SLP evelu again.    RUE swelling - ordered u/s    I have discussed this patient's plan of care and discharge plan at IDT rounds today with Case Management, Nursing, Nursing leadership, and other members of the IDT  team.    Consultants/Specialty      Code Status  Full Code    Disposition  The patient is not medically cleared for discharge to home or a post-acute facility.      I have placed the appropriate orders for post-discharge needs.    Review of Systems  Review of Systems   Unable to perform ROS: Acuity of condition        Physical Exam  Temp:  [36.1 °C (97 °F)-37.1 °C (98.8 °F)] 36.6 °C (97.9 °F)  Pulse:  [60-75] 61  Resp:  [16-18] 16  BP: (108-152)/(55-69) 152/69  SpO2:  [92 %-95 %] 92 %    Physical Exam  Constitutional:       Appearance: He is obese. He is ill-appearing.   HENT:      Head: Normocephalic.      Mouth/Throat:      Mouth: Mucous membranes are moist.   Eyes:      Extraocular Movements: Extraocular movements intact.      Conjunctiva/sclera: Conjunctivae normal.   Cardiovascular:      Rate and Rhythm: Normal rate and regular rhythm.      Pulses: Normal pulses.      Heart sounds: Normal heart sounds.   Pulmonary:      Effort: Pulmonary effort is normal.      Breath sounds: Normal breath sounds.   Abdominal:      General: Bowel sounds are normal.      Palpations: Abdomen is soft.      Tenderness: There is no abdominal tenderness. There is no guarding.   Musculoskeletal:         General: No swelling or tenderness.      Cervical back: Normal range of motion and neck supple.   Neurological:      Mental Status: He is alert.      Comments: Aphasia, R weakness         Fluids    Intake/Output Summary (Last 24 hours) at 10/13/2023 2118  Last data filed at 10/13/2023 1948  Gross per 24 hour   Intake --   Output 90 ml   Net -90 ml       Laboratory  Recent Labs     10/11/23  0121   WBC 9.7   RBC 4.53*   HEMOGLOBIN 13.6*   HEMATOCRIT 41.5*   MCV 91.6   MCH 30.0   MCHC 32.8   RDW 42.8   PLATELETCT 273   MPV 11.3     Recent Labs     10/11/23  0121   SODIUM 144   POTASSIUM 4.0   CHLORIDE 106   CO2 27   GLUCOSE 198*   BUN 26*   CREATININE 0.87   CALCIUM 9.5                   Imaging  DX-ABDOMEN FOR TUBE PLACEMENT   Final  Result      1.  Enteric tube tip again projects over the 2nd portion of the duodenum.      DX-ABDOMEN FOR TUBE PLACEMENT   Final Result      Enteric tube tip projects over the second portion of the duodenum      DX-CHEST-PORTABLE (1 VIEW)   Final Result      Unchanged bibasilar underinflation atelectasis      DX-ABDOMEN FOR TUBE PLACEMENT   Final Result      Enteric tube tip projects over the gastric antrum or proximal duodenum      DX-CHEST-PORTABLE (1 VIEW)   Final Result      Improving bibasilar pulmonary opacities.      DX-CHEST-PORTABLE (1 VIEW)   Final Result         Findings on chest radiograph appear stable since the prior radiograph.  No new abnormalities are identified.      NM-HEPATOBILIARY SCAN   Final Result      There is visualization of the gallbladder after morphine administration, indicating the cystic duct is still patent. No scintigraphy evidence of acute cholecystitis.      DX-CHEST-PORTABLE (1 VIEW)   Final Result         1.  Mild pulmonary edema and/or infiltrates.   2.  Cardiomegaly   3.  Atherosclerosis      DX-CHEST-PORTABLE (1 VIEW)   Final Result         1.  No acute cardiopulmonary disease.   2.  Cardiomegaly   3.  Atherosclerosis      US-RUQ   Final Result      1.  Minimal gallbladder wall thickening and possible sludge.  Cholecystitis is not excluded.   2.  Minimal pericholecystic fluid may be due to gallbladder inflammation or liver disease.   3.  Enlarged echogenic liver suggesting fatty infiltration.   4.  Limited evaluation of the pancreas abdominal aorta.      DX-CHEST-PORTABLE (1 VIEW)   Final Result         1.  Pulmonary edema and/or infiltrates are identified, which are stable since the prior exam.   2.  Cardiomegaly   3.  Atherosclerosis      US-EXTREMITY VENOUS UPPER UNILAT RIGHT   Final Result      DX-CHEST-PORTABLE (1 VIEW)   Final Result         1.  Pulmonary edema and/or infiltrates are identified, which are stable since the prior exam.   2.  Cardiomegaly   3.   Atherosclerosis      DX-CHEST-PORTABLE (1 VIEW)   Final Result         1.  Pulmonary edema and/or infiltrates are identified, which are stable since the prior exam.   2.  Cardiomegaly   3.  Atherosclerosis      DX-CHEST-PORTABLE (1 VIEW)   Final Result         1.  Pulmonary edema and/or infiltrates are identified, which are somewhat decreased since the prior exam.   2.  Cardiomegaly   3.  Atherosclerosis      MR-MRA HEAD-W/O   Final Result         Normal intracranial MRA.      MR-BRAIN-WITH & W/O   Final Result         Left frontal lobe hemorrhage with surrounding vasogenic edema and mild mass effect upon the frontal horn of the left lateral ventricle. There is approximately 2 mm of midline shift to the right.      Age-related volume loss and chronic microvascular ischemic changes.      No abnormal intracranial enhancement is identified.      CT-HEAD W/O   Final Result         1. Stable 6 cm left frontal intraparenchymal hemorrhage with mild, 2 mm left-to-right subfalcine herniation.   2. No new abnormality or change.         DX-CHEST-PORTABLE (1 VIEW)   Final Result         1.  Pulmonary edema and/or infiltrates are identified, which appear somewhat increased since the prior exam.   2.  Cardiomegaly   3.  Atherosclerosis      DX-ABDOMEN FOR TUBE PLACEMENT   Final Result      NG tube extends below the diaphragm and appears to extend through the region of the stomach with tip at the level of the proximal duodenum.      DX-CHEST-PORTABLE (1 VIEW)   Final Result      1.  Grossly satisfactory appearance of tubes and lines.   2.  No evidence of pneumothorax.   3.  Marked hypoinflation with probable bibasilar atelectasis.   4.  Enlarged cardiac silhouette with vascular congestion.      CT-HEAD W/O   Final Result         1.  New large parenchymal hemorrhage involving the frontal lobe is identified with some surrounding edema.      2.  Subarachnoid hemorrhage is also noted in the area.      3.  Minimal midline shift to the  right side.      Findings are consistent with atrophy.  Decreased attenuation in the periventricular white matter likely indicates microvascular ischemic disease.      Based solely on CT findings, the brain injury guideline category is mBIG 3.      EDH   IVH   Displaced skull fx   SDH > 8mm   IPH > 8mm or multiple   SAH bi-hemispheric or > 3mm      The original BIG retrospective analysis found radiographic progression in 0% of BIG 1 patients and 2.6% BIG 2.      These findings were discussed with GEETA MOLINA on 09/30/2023.      DX-CHEST-PORTABLE (1 VIEW)   Final Result      Stable multiple pulmonary vascular dilation/congestion. Otherwise, negative.      DX-CHEST-PORTABLE (1 VIEW)   Final Result      1.  Increasing airspace disease at the left base.      DX-CHEST-LIMITED (1 VIEW)   Final Result         1.  Pulmonary edema and/or infiltrates are identified, which are somewhat decreased since the prior exam.   2.  Cardiomegaly   3.  Atherosclerosis      DX-CHEST-PORTABLE (1 VIEW)   Final Result         1.  Pulmonary edema and/or infiltrates are identified, which appear somewhat increased since the prior exam.   2.  Cardiomegaly   3.  Atherosclerosis      DX-CHEST-PORTABLE (1 VIEW)   Final Result         1.  Interstitial edema and/or infiltrates, slightly decreased since prior study.   2.  Cardiomegaly   3.  Atherosclerosis      DX-CHEST-LIMITED (1 VIEW)   Final Result      Right IJ catheter tip projects in the SVC. No pneumothorax.      CT-CTA NECK WITH & W/O-POST PROCESSING   Final Result      1.  Estimated 50-75% stenosis of the BILATERAL carotid arteries   2.  Estimated greater than 50% stenosis at the vertebral artery ostium on each side      CT-CTA HEAD WITH & W/O-POST PROCESS   Final Result      1.  No large vessel occlusion or aneurysm.   2.  Hazy left frontal lobe subarachnoid hemorrhage, decreased in conspicuity from prior. No new hemorrhage seen.   3.  Scattered opacifications of the ethmoid sinuses, likely  related to support hardware.      DX-ABDOMEN FOR TUBE PLACEMENT   Final Result         1.  Nonspecific bowel gas pattern in the upper abdomen.   2.  Dobbhoff tube with tip terminating overlying the expected location of the first or second duodenal segment.      DX-CHEST-PORTABLE (1 VIEW)   Final Result         1.  Interstitial edema and/or infiltrates.   2.  Cardiomegaly   3.  Nasogastric tube terminates in the distal esophagus, recommend advancement   4.  Atherosclerosis      DX-ABDOMEN FOR TUBE PLACEMENT   Final Result         1.  Nonspecific bowel gas pattern in the upper abdomen.   2.  Nasogastric tube terminates just distal to the gastroesophageal junction, recommend advancement      CT-RENAL COLIC EVALUATION(A/P W/O)   Final Result         1.  Intrahepatic biliary air and nondependent air within the gallbladder, consider history of instrumentation or changes of cholangitis in the appropriate clinical setting.   2.  Large fat-containing bilateral inguinal hernias   3.  Enlarged prostate, consider causes of prostate enlargement with additional workup as clinically appropriate   4.  Diverticulosis   5.  Cholelithiasis   6.  Atherosclerosis      CT-HEAD W/O   Final Result         1.  Hazy left frontal subarachnoid hemorrhage.   2.  Nonspecific white matter changes, commonly associated with small vessel ischemic disease.  Associated mild cerebral atrophy is noted.      Based solely on CT findings, the brain injury guideline category is mBIG 1.      SDH < 4mm   IPH < 4mm   SAH < 3 sulci and < 1mm      The original BIG retrospective analysis found radiographic progression in 0% of BIG 1 patients and 2.6% BIG 2.      These findings were discussed with the patient's clinician, Madhu Zambrano, on 9/24/2023 10:10 PM.      DX-CHEST-PORTABLE (1 VIEW)   Final Result      No acute cardiopulmonary abnormality identified.      US-EXTREMITY VENOUS UPPER UNILAT RIGHT    (Results Pending)        Assessment/Plan  *  Intraparenchymal hemorrhage of brain (HCC)- (present on admission)  Assessment & Plan  CT on 9/24 with hazy left frontal SAH  CT on 9/30 with new large IPH in the left frontal lobe  Strict blood pressure control with goal SBP less than 160  Goal sodium 145-150 - I am titrating hypertonic saline to achieve sodium goal  Will deescalate HTS today and slowly correct  Neuro checks every 2 hours    Atelectasis- (present on admission)  Assessment & Plan  Patient unable to follow commands for incentive spirometry    Pneumonia due to infectious organism- (present on admission)  Assessment & Plan  Continue Zosyn    Acute respiratory failure with hypoxia (HCC)- (present on admission)  Assessment & Plan  Intubated 9/25-9/28  Reintubated 9/30-10/7  Continue oxygen    Thrombocytopenia (HCC)- (present on admission)  Assessment & Plan  Baseline ~110-125  Monitor, currently improved    Pneumobilia- (present on admission)  Assessment & Plan  As seen on CT renal (9/25/23).    9/26/23: No need for further interventions at this time per gen surg    Type 2 diabetes mellitus (HCC)- (present on admission)  Assessment & Plan  A1c 5.7% (6/2023)  Ongoing treatment with long and short acting insulin, adjust as indicated    Primary hypertension- (present on admission)  Assessment & Plan  Hypertensive at presentation   Goal for normotension currently multimodality treatment      BMI 40.0-44.9, adult (HCC)- (present on admission)  Assessment & Plan  Future weight loss recommended    Alcohol dependence (HCC)- (present on admission)  Assessment & Plan  Multivitamin, folate, thiamine  Monitor, additional therapies as clinically indicated  9/29/2023 patient was at least 2-3 hard liquor drinks daily although this is unclear per patient and his wife's history.  .    Patient s/p treatment for acute alcohol withdrawal    ACP (advance care planning)  Assessment & Plan  10/12 I discussed advance care planning with the patient's wife at bedside, including  diagnosis, prognosis, plan of care, risks and benefits of any therapies that could be offered.  We discussed regarding CODE STATUS, CPR and intubation with mechanical ventilation, discussed regarding risk and benefits, as well as alternatives including palliation and hospice as appropriate.  After long discussion with patient, patient's wife has decided for DNR/DNI, patient CODE STATUS has een updated in epic with DNR/DNI as per patient's wishes.   Guarded recovery, I consulted palliative care. Will decide PEG tube placement depends on the goal of care  ACP 22min  10/13 patient is more alert and awake. The wife discussed with the other family members yesterday and wanted to change the code status back to full code. No pallitive care at this point. The wife agrees with PEG tube placement if patients fails SLP evelu again. I changed the code status back to full code. ACP 16min           VTE prophylaxis: scd    I have performed a physical exam and reviewed and updated ROS and Plan today (10/13/2023). In review of yesterday's note (10/12/2023), there are no changes except as documented above.

## 2023-10-14 NOTE — CARE PLAN
The patient is Stable - Low risk of patient condition declining or worsening    Shift Goals  Clinical Goals: Safety/maintain skin integrity  Patient Goals: DEENA  Family Goals: DEENA    Progress made toward(s) clinical / shift goals:    Problem: Skin Integrity  Goal: Skin integrity is maintained or improved  Outcome: Progressing   Pt repositioned every 2 hours and as needed. Pt checked for incontinence during rounding.     Problem: Fall Risk  Goal: Patient will remain free from falls  Outcome: Progressing   Fall precautions in place. Pt has call light, bedside table, and pt belongings within reach. Offered toileting during rounding.     Patient is not progressing towards the following goals:N/A

## 2023-10-14 NOTE — CARE PLAN
The patient is Stable - Low risk of patient condition declining or worsening    Shift Goals  Clinical Goals: Safety, maintain skin integrity  Patient Goals: DEENA  Family Goals: DEENA    Progress made toward(s) clinical / shift goals:    Problem: Neuro Status  Goal: Neuro status will remain stable or improve  Outcome: Progressing   Q4hr neuro checks. Neuro status remains stable.    Problem: Safety - Medical Restraint  Goal: Remains free of injury from restraints (Restraint for Interference with Medical Device)  Outcome: Progressing   Hourly rounding and Q2 turns in place. ROM performed. Skin assessed under restraints, wdl. Pt denies any pain or discomfort.   Problem: Safety - Medical Restraint  Goal: Free from restraint(s) (Restraint for Interference with Medical Device)  Outcome: Progressing   No evidence of learning.     Patient is not progressing towards the following goals:

## 2023-10-14 NOTE — PROGRESS NOTES
[facility] Medicine Daily Progress Note    Date of Service  10/14/2023    Chief Complaint  Sharon Guardado is a 68 y.o. male admitted 9/24/2023 with AMS    Hospital Course  68-year-old male with a past medical history of alcohol abuse, hypertension, obesity who presented with altered mental status and a ground-level fall on 9/24/2023.  On admission he was noted to have a small frontal subarachnoid hemorrhage on CT head.  He was also noted to have severe sepsis secondary to UTI.  She was treated with IV ceftriaxone.  Patient developed symptoms of alcohol withdrawal and was intubated.  Patient was subsequently extubated and transferred to telemetry on 9/30.  The patient subsequently had worsening mental status, on 9/30 found to have a left frontal lobe hemorrhage with surrounding vasogenic edema with mild mass effect, deemed nonoperative by neurosurgery  Was readmitted to the ICU level of care, intubated, treated with hypertonic saline, empirically treated with Unasyn for fever, overall state on mechanical ventilation for 9 days, during the course there was a concern for possible abdominal infection, gallbladder infection, surgery was consulted,  HIDA scan was obtained and was negative for acute cholecystitis, surgery not indicated as per general surgery opinion.  The patient found with significant right-sided weakness, aphasia.  On 10/10 patient again found appropriate to transfer out of ICU    Interval Problem Update  More Alert awake, still aphasia, R weakness.   follow commends intermittently  Care plan was discussed with the wife at bedside.       Telemetry reported bradycardia HR 40s for 4 minutes, possible junctional, asymptomatic, other vital signs stable  I ordered EKG, mag, Phos, and TSH  EKG showed sinus bradycardia    Continue NG tube feeding per SLP  I discussed with the wife. The wife agrees with PEG tube placement if patients fails SLP evelu again.  I discussed with SLP, agreed for PEG tube  placement    BG high, I increase the Lantus from 39 units to 42 units     RUE swelling - ordered u/s    I have discussed this patient's plan of care and discharge plan at IDT rounds today with Case Management, Nursing, Nursing leadership, and other members of the IDT team.    Consultants/Specialty      Code Status  Full Code    Disposition  The patient is not medically cleared for discharge to home or a post-acute facility.      I have placed the appropriate orders for post-discharge needs.    Review of Systems  Review of Systems   Unable to perform ROS: Acuity of condition        Physical Exam  Temp:  [36.2 °C (97.2 °F)-37.1 °C (98.8 °F)] 36.8 °C (98.2 °F)  Pulse:  [60-80] 77  Resp:  [16-18] 18  BP: (105-173)/(65-84) 117/82  SpO2:  [92 %-97 %] 96 %    Physical Exam  Constitutional:       Appearance: He is obese. He is ill-appearing.   HENT:      Head: Normocephalic.      Mouth/Throat:      Mouth: Mucous membranes are moist.   Eyes:      Extraocular Movements: Extraocular movements intact.      Conjunctiva/sclera: Conjunctivae normal.   Cardiovascular:      Rate and Rhythm: Normal rate and regular rhythm.      Pulses: Normal pulses.      Heart sounds: Normal heart sounds.   Pulmonary:      Effort: Pulmonary effort is normal.      Breath sounds: Normal breath sounds.   Abdominal:      General: Bowel sounds are normal.      Palpations: Abdomen is soft.      Tenderness: There is no abdominal tenderness. There is no guarding.   Musculoskeletal:         General: No swelling or tenderness.      Cervical back: Normal range of motion and neck supple.   Neurological:      Mental Status: He is alert.      Comments: Aphasia, R weakness         Fluids    Intake/Output Summary (Last 24 hours) at 10/14/2023 1553  Last data filed at 10/14/2023 1200  Gross per 24 hour   Intake --   Output 90 ml   Net -90 ml       Laboratory                            Imaging  DX-ABDOMEN FOR TUBE PLACEMENT   Final Result      Nasogastric tube tip  overlies the duodenal bulb      DX-ABDOMEN FOR TUBE PLACEMENT   Final Result      1.  Enteric tube tip again projects over the 2nd portion of the duodenum.      DX-ABDOMEN FOR TUBE PLACEMENT   Final Result      Enteric tube tip projects over the second portion of the duodenum      DX-CHEST-PORTABLE (1 VIEW)   Final Result      Unchanged bibasilar underinflation atelectasis      DX-ABDOMEN FOR TUBE PLACEMENT   Final Result      Enteric tube tip projects over the gastric antrum or proximal duodenum      DX-CHEST-PORTABLE (1 VIEW)   Final Result      Improving bibasilar pulmonary opacities.      DX-CHEST-PORTABLE (1 VIEW)   Final Result         Findings on chest radiograph appear stable since the prior radiograph.  No new abnormalities are identified.      NM-HEPATOBILIARY SCAN   Final Result      There is visualization of the gallbladder after morphine administration, indicating the cystic duct is still patent. No scintigraphy evidence of acute cholecystitis.      DX-CHEST-PORTABLE (1 VIEW)   Final Result         1.  Mild pulmonary edema and/or infiltrates.   2.  Cardiomegaly   3.  Atherosclerosis      DX-CHEST-PORTABLE (1 VIEW)   Final Result         1.  No acute cardiopulmonary disease.   2.  Cardiomegaly   3.  Atherosclerosis      US-RUQ   Final Result      1.  Minimal gallbladder wall thickening and possible sludge.  Cholecystitis is not excluded.   2.  Minimal pericholecystic fluid may be due to gallbladder inflammation or liver disease.   3.  Enlarged echogenic liver suggesting fatty infiltration.   4.  Limited evaluation of the pancreas abdominal aorta.      DX-CHEST-PORTABLE (1 VIEW)   Final Result         1.  Pulmonary edema and/or infiltrates are identified, which are stable since the prior exam.   2.  Cardiomegaly   3.  Atherosclerosis      US-EXTREMITY VENOUS UPPER UNILAT RIGHT   Final Result      DX-CHEST-PORTABLE (1 VIEW)   Final Result         1.  Pulmonary edema and/or infiltrates are identified, which  are stable since the prior exam.   2.  Cardiomegaly   3.  Atherosclerosis      DX-CHEST-PORTABLE (1 VIEW)   Final Result         1.  Pulmonary edema and/or infiltrates are identified, which are stable since the prior exam.   2.  Cardiomegaly   3.  Atherosclerosis      DX-CHEST-PORTABLE (1 VIEW)   Final Result         1.  Pulmonary edema and/or infiltrates are identified, which are somewhat decreased since the prior exam.   2.  Cardiomegaly   3.  Atherosclerosis      MR-MRA HEAD-W/O   Final Result         Normal intracranial MRA.      MR-BRAIN-WITH & W/O   Final Result         Left frontal lobe hemorrhage with surrounding vasogenic edema and mild mass effect upon the frontal horn of the left lateral ventricle. There is approximately 2 mm of midline shift to the right.      Age-related volume loss and chronic microvascular ischemic changes.      No abnormal intracranial enhancement is identified.      CT-HEAD W/O   Final Result         1. Stable 6 cm left frontal intraparenchymal hemorrhage with mild, 2 mm left-to-right subfalcine herniation.   2. No new abnormality or change.         DX-CHEST-PORTABLE (1 VIEW)   Final Result         1.  Pulmonary edema and/or infiltrates are identified, which appear somewhat increased since the prior exam.   2.  Cardiomegaly   3.  Atherosclerosis      DX-ABDOMEN FOR TUBE PLACEMENT   Final Result      NG tube extends below the diaphragm and appears to extend through the region of the stomach with tip at the level of the proximal duodenum.      DX-CHEST-PORTABLE (1 VIEW)   Final Result      1.  Grossly satisfactory appearance of tubes and lines.   2.  No evidence of pneumothorax.   3.  Marked hypoinflation with probable bibasilar atelectasis.   4.  Enlarged cardiac silhouette with vascular congestion.      CT-HEAD W/O   Final Result         1.  New large parenchymal hemorrhage involving the frontal lobe is identified with some surrounding edema.      2.  Subarachnoid hemorrhage is also  noted in the area.      3.  Minimal midline shift to the right side.      Findings are consistent with atrophy.  Decreased attenuation in the periventricular white matter likely indicates microvascular ischemic disease.      Based solely on CT findings, the brain injury guideline category is mBIG 3.      EDH   IVH   Displaced skull fx   SDH > 8mm   IPH > 8mm or multiple   SAH bi-hemispheric or > 3mm      The original BIG retrospective analysis found radiographic progression in 0% of BIG 1 patients and 2.6% BIG 2.      These findings were discussed with GEETA MOLINA on 09/30/2023.      DX-CHEST-PORTABLE (1 VIEW)   Final Result      Stable multiple pulmonary vascular dilation/congestion. Otherwise, negative.      DX-CHEST-PORTABLE (1 VIEW)   Final Result      1.  Increasing airspace disease at the left base.      DX-CHEST-LIMITED (1 VIEW)   Final Result         1.  Pulmonary edema and/or infiltrates are identified, which are somewhat decreased since the prior exam.   2.  Cardiomegaly   3.  Atherosclerosis      DX-CHEST-PORTABLE (1 VIEW)   Final Result         1.  Pulmonary edema and/or infiltrates are identified, which appear somewhat increased since the prior exam.   2.  Cardiomegaly   3.  Atherosclerosis      DX-CHEST-PORTABLE (1 VIEW)   Final Result         1.  Interstitial edema and/or infiltrates, slightly decreased since prior study.   2.  Cardiomegaly   3.  Atherosclerosis      DX-CHEST-LIMITED (1 VIEW)   Final Result      Right IJ catheter tip projects in the SVC. No pneumothorax.      CT-CTA NECK WITH & W/O-POST PROCESSING   Final Result      1.  Estimated 50-75% stenosis of the BILATERAL carotid arteries   2.  Estimated greater than 50% stenosis at the vertebral artery ostium on each side      CT-CTA HEAD WITH & W/O-POST PROCESS   Final Result      1.  No large vessel occlusion or aneurysm.   2.  Hazy left frontal lobe subarachnoid hemorrhage, decreased in conspicuity from prior. No new hemorrhage seen.   3.   Scattered opacifications of the ethmoid sinuses, likely related to support hardware.      DX-ABDOMEN FOR TUBE PLACEMENT   Final Result         1.  Nonspecific bowel gas pattern in the upper abdomen.   2.  Dobbhoff tube with tip terminating overlying the expected location of the first or second duodenal segment.      DX-CHEST-PORTABLE (1 VIEW)   Final Result         1.  Interstitial edema and/or infiltrates.   2.  Cardiomegaly   3.  Nasogastric tube terminates in the distal esophagus, recommend advancement   4.  Atherosclerosis      DX-ABDOMEN FOR TUBE PLACEMENT   Final Result         1.  Nonspecific bowel gas pattern in the upper abdomen.   2.  Nasogastric tube terminates just distal to the gastroesophageal junction, recommend advancement      CT-RENAL COLIC EVALUATION(A/P W/O)   Final Result         1.  Intrahepatic biliary air and nondependent air within the gallbladder, consider history of instrumentation or changes of cholangitis in the appropriate clinical setting.   2.  Large fat-containing bilateral inguinal hernias   3.  Enlarged prostate, consider causes of prostate enlargement with additional workup as clinically appropriate   4.  Diverticulosis   5.  Cholelithiasis   6.  Atherosclerosis      CT-HEAD W/O   Final Result         1.  Hazy left frontal subarachnoid hemorrhage.   2.  Nonspecific white matter changes, commonly associated with small vessel ischemic disease.  Associated mild cerebral atrophy is noted.      Based solely on CT findings, the brain injury guideline category is mBIG 1.      SDH < 4mm   IPH < 4mm   SAH < 3 sulci and < 1mm      The original BIG retrospective analysis found radiographic progression in 0% of BIG 1 patients and 2.6% BIG 2.      These findings were discussed with the patient's clinician, Madhu Zambrano, on 9/24/2023 10:10 PM.      DX-CHEST-PORTABLE (1 VIEW)   Final Result      No acute cardiopulmonary abnormality identified.      US-EXTREMITY VENOUS UPPER UNILAT RIGHT     (Results Pending)        Assessment/Plan  * Intraparenchymal hemorrhage of brain (HCC)- (present on admission)  Assessment & Plan  CT on 9/24 with hazy left frontal SAH  CT on 9/30 with new large IPH in the left frontal lobe  Strict blood pressure control with goal SBP less than 160  Goal sodium 145-150 - I am titrating hypertonic saline to achieve sodium goal  Will deescalate HTS today and slowly correct  Neuro checks every 2 hours    Bradycardia  Assessment & Plan  Telemetry reported bradycardia HR 40s for 4 minutes, asymptomatic  I ordered EKG, mag, Phos, and TSH  EKG showed sinus bradycardia    Continue telemetry monitor  Avoid medications that can further slow down the heart rate    Atelectasis- (present on admission)  Assessment & Plan  Patient unable to follow commands for incentive spirometry    Pneumonia due to infectious organism- (present on admission)  Assessment & Plan  Continue Zosyn    Acute respiratory failure with hypoxia (HCC)- (present on admission)  Assessment & Plan  Intubated 9/25-9/28  Reintubated 9/30-10/7  Continue oxygen    Thrombocytopenia (HCC)- (present on admission)  Assessment & Plan  Baseline ~110-125  Monitor, currently improved    Pneumobilia- (present on admission)  Assessment & Plan  As seen on CT renal (9/25/23).    9/26/23: No need for further interventions at this time per gen surg    Type 2 diabetes mellitus (HCC)- (present on admission)  Assessment & Plan  A1c 5.7% (6/2023)  Ongoing treatment with long and short acting insulin, adjust as indicated    10/14 BG high, I increase the Lantus from 39 units to 42 units   Continue to monitor  Hypoglycemia protocol    Primary hypertension- (present on admission)  Assessment & Plan  Hypertensive at presentation   Goal for normotension currently multimodality treatment      BMI 40.0-44.9, adult (HCC)- (present on admission)  Assessment & Plan  Future weight loss recommended    Alcohol dependence (HCC)- (present on admission)  Assessment  & Plan  Multivitamin, folate, thiamine  Monitor, additional therapies as clinically indicated  9/29/2023 patient was at least 2-3 hard liquor drinks daily although this is unclear per patient and his wife's history.  .    Patient s/p treatment for acute alcohol withdrawal    ACP (advance care planning)  Assessment & Plan  10/12 I discussed advance care planning with the patient's wife at bedside, including diagnosis, prognosis, plan of care, risks and benefits of any therapies that could be offered.  We discussed regarding CODE STATUS, CPR and intubation with mechanical ventilation, discussed regarding risk and benefits, as well as alternatives including palliation and hospice as appropriate.  After long discussion with patient, patient's wife has decided for DNR/DNI, patient CODE STATUS has een updated in epic with DNR/DNI as per patient's wishes.   Guarded recovery, I consulted palliative care. Will decide PEG tube placement depends on the goal of care  ACP 22min  10/13 patient is more alert and awake. The wife discussed with the other family members yesterday and wanted to change the code status back to full code. No pallitive care at this point. The wife agrees with PEG tube placement if patients fails SLP evelu again. I changed the code status back to full code. ACP 16min           VTE prophylaxis: scd    I have performed a physical exam and reviewed and updated ROS and Plan today (10/14/2023). In review of yesterday's note (10/13/2023), there are no changes except as documented above.

## 2023-10-15 ENCOUNTER — APPOINTMENT (OUTPATIENT)
Dept: RADIOLOGY | Facility: MEDICAL CENTER | Age: 68
DRG: 870 | End: 2023-10-15
Attending: STUDENT IN AN ORGANIZED HEALTH CARE EDUCATION/TRAINING PROGRAM
Payer: MEDICARE

## 2023-10-15 LAB
ANION GAP SERPL CALC-SCNC: 11 MMOL/L (ref 7–16)
BUN SERPL-MCNC: 28 MG/DL (ref 8–22)
CALCIUM SERPL-MCNC: 10.4 MG/DL (ref 8.5–10.5)
CHLORIDE SERPL-SCNC: 103 MMOL/L (ref 96–112)
CO2 SERPL-SCNC: 29 MMOL/L (ref 20–33)
CREAT SERPL-MCNC: 0.89 MG/DL (ref 0.5–1.4)
EKG IMPRESSION: NORMAL
ERYTHROCYTE [DISTWIDTH] IN BLOOD BY AUTOMATED COUNT: 43.1 FL (ref 35.9–50)
GFR SERPLBLD CREATININE-BSD FMLA CKD-EPI: 92 ML/MIN/1.73 M 2
GLUCOSE SERPL-MCNC: 251 MG/DL (ref 65–99)
HCT VFR BLD AUTO: 47.6 % (ref 42–52)
HGB BLD-MCNC: 15.8 G/DL (ref 14–18)
MCH RBC QN AUTO: 30.4 PG (ref 27–33)
MCHC RBC AUTO-ENTMCNC: 33.2 G/DL (ref 32.3–36.5)
MCV RBC AUTO: 91.5 FL (ref 81.4–97.8)
PLATELET # BLD AUTO: 243 K/UL (ref 164–446)
PMV BLD AUTO: 11.7 FL (ref 9–12.9)
POTASSIUM SERPL-SCNC: 4.4 MMOL/L (ref 3.6–5.5)
RBC # BLD AUTO: 5.2 M/UL (ref 4.7–6.1)
SODIUM SERPL-SCNC: 143 MMOL/L (ref 135–145)
WBC # BLD AUTO: 11.8 K/UL (ref 4.8–10.8)

## 2023-10-15 PROCEDURE — A9270 NON-COVERED ITEM OR SERVICE: HCPCS | Performed by: INTERNAL MEDICINE

## 2023-10-15 PROCEDURE — 36415 COLL VENOUS BLD VENIPUNCTURE: CPT

## 2023-10-15 PROCEDURE — 92526 ORAL FUNCTION THERAPY: CPT | Performed by: SPEECH-LANGUAGE PATHOLOGIST

## 2023-10-15 PROCEDURE — 85027 COMPLETE CBC AUTOMATED: CPT

## 2023-10-15 PROCEDURE — A9270 NON-COVERED ITEM OR SERVICE: HCPCS | Performed by: NURSE PRACTITIONER

## 2023-10-15 PROCEDURE — 93971 EXTREMITY STUDY: CPT | Mod: RT

## 2023-10-15 PROCEDURE — 93010 ELECTROCARDIOGRAM REPORT: CPT | Performed by: STUDENT IN AN ORGANIZED HEALTH CARE EDUCATION/TRAINING PROGRAM

## 2023-10-15 PROCEDURE — 80048 BASIC METABOLIC PNL TOTAL CA: CPT

## 2023-10-15 PROCEDURE — 700102 HCHG RX REV CODE 250 W/ 637 OVERRIDE(OP): Performed by: INTERNAL MEDICINE

## 2023-10-15 PROCEDURE — 770020 HCHG ROOM/CARE - TELE (206)

## 2023-10-15 PROCEDURE — 82962 GLUCOSE BLOOD TEST: CPT | Mod: 91

## 2023-10-15 PROCEDURE — 700102 HCHG RX REV CODE 250 W/ 637 OVERRIDE(OP): Performed by: NURSE PRACTITIONER

## 2023-10-15 PROCEDURE — 99233 SBSQ HOSP IP/OBS HIGH 50: CPT | Performed by: STUDENT IN AN ORGANIZED HEALTH CARE EDUCATION/TRAINING PROGRAM

## 2023-10-15 PROCEDURE — 97602 WOUND(S) CARE NON-SELECTIVE: CPT

## 2023-10-15 PROCEDURE — 92507 TX SP LANG VOICE COMM INDIV: CPT | Performed by: SPEECH-LANGUAGE PATHOLOGIST

## 2023-10-15 RX ORDER — ALLOPURINOL 300 MG/1
300 TABLET ORAL DAILY
Status: DISCONTINUED | OUTPATIENT
Start: 2023-10-16 | End: 2023-10-29

## 2023-10-15 RX ADMIN — MEMANTINE 5 MG: 5 TABLET ORAL at 16:54

## 2023-10-15 RX ADMIN — INSULIN HUMAN 4 UNITS: 100 INJECTION, SOLUTION PARENTERAL at 00:38

## 2023-10-15 RX ADMIN — ATORVASTATIN CALCIUM 40 MG: 40 TABLET, FILM COATED ORAL at 16:47

## 2023-10-15 RX ADMIN — LOSARTAN POTASSIUM 100 MG: 50 TABLET, FILM COATED ORAL at 04:49

## 2023-10-15 RX ADMIN — DOCUSATE SODIUM 50 MG AND SENNOSIDES 8.6 MG 2 TABLET: 8.6; 5 TABLET, FILM COATED ORAL at 16:47

## 2023-10-15 RX ADMIN — DICLOFENAC SODIUM 2 G: 10 GEL TOPICAL at 08:20

## 2023-10-15 RX ADMIN — MEMANTINE 5 MG: 5 TABLET ORAL at 04:49

## 2023-10-15 RX ADMIN — INSULIN HUMAN 4 UNITS: 100 INJECTION, SOLUTION PARENTERAL at 16:52

## 2023-10-15 RX ADMIN — INSULIN HUMAN 4 UNITS: 100 INJECTION, SOLUTION PARENTERAL at 06:02

## 2023-10-15 RX ADMIN — INSULIN HUMAN 4 UNITS: 100 INJECTION, SOLUTION PARENTERAL at 12:08

## 2023-10-15 RX ADMIN — LANSOPRAZOLE 30 MG: 30 TABLET, ORALLY DISINTEGRATING, DELAYED RELEASE ORAL at 04:49

## 2023-10-15 RX ADMIN — AMLODIPINE BESYLATE 10 MG: 10 TABLET ORAL at 04:49

## 2023-10-15 RX ADMIN — DICLOFENAC SODIUM 2 G: 10 GEL TOPICAL at 16:47

## 2023-10-15 ASSESSMENT — PAIN DESCRIPTION - PAIN TYPE
TYPE: ACUTE PAIN
TYPE: ACUTE PAIN

## 2023-10-15 ASSESSMENT — FIBROSIS 4 INDEX: FIB4 SCORE: 2.71

## 2023-10-15 NOTE — CARE PLAN
The patient is Stable - Low risk of patient condition declining or worsening    Shift Goals  Clinical Goals: Safety/maintain skin integrity  Patient Goals: DEENA  Family Goals: DEENA    Progress made toward(s) clinical / shift goals:    Problem: Skin Integrity  Goal: Skin integrity is maintained or improved  Outcome: Progressing   Pt repositioned every 2 hours and as needed. Checked pt for incontinence during rounding. Barrier paste applied, Tap system in place.     Problem: Fall Risk  Goal: Patient will remain free from falls  Outcome: Progressing   Fall precautions in place. Call light, bedside table, and pt belongings within reach. Wife at bedside, helps assist him.        Patient is not progressing towards the following goals: N/A

## 2023-10-15 NOTE — PROGRESS NOTES
Monitor Summary: IN & out junctional rhythm/SR 62-78, TN .20, QRS .08, QT .38  per strip from monitor room

## 2023-10-15 NOTE — WOUND TEAM
[facility]  Inpatient Services  Wound and Skin Care Brief Evaluation    Admission Date: 9/24/2023     Last order of IP CONSULT TO WOUND CARE was found on 10/15/2023 from Hospital Encounter on 9/24/2023     HPI, PMH, SH: Reviewed    Chief Complaint   Patient presents with    Head Injury     Pt fell and hit his head last night     ALOC     Pt arrives A&O1      Diagnosis: Severe sepsis (HCC) [A41.9, R65.20]    Unit where seen by Wound Team: S182/02     Wound consult placed regarding Sacrum. Chart and images reviewed. This discussed with bedside RN Betty. This clinician in to assess patient. Patient pleasant and agreeable. Sacrum. Non-selectively debrided with Perineal Wipes (Barrier wipes).     No pressure injuries or advanced wound care needs identified. Wound consult completed. No further follow up unless indicated and consulted.          PREVENTATIVE INTERVENTIONS:    Q shift Elvis - performed per nursing policy  Q shift pressure point assessments - performed per nursing policy    Surface/Positioning  Standard/trauma mattress - Currently in Place  Reposition q 2 hours - Currently in Place  TAPs Turning system - Currently in Place    Offloading/Redistribution  Heel offloading dressing (Silicone dressing) - Currently in Place  Float Heels off Bed with Pillows - Currently in Place           Respiratory  Silicone O2 tubing - Currently in Place  Gray Foam Ear protectors - Currently in Place    Containment/Moisture Prevention    Purwick/Condom Cath - Currently in Place  Barrier wipes - Currently in Place  Barrier paste - Currently in Place

## 2023-10-15 NOTE — THERAPY
Speech Language Pathology   Daily Treatment     Patient Name: Sharon Guardado  AGE:  68 y.o., SEX:  male  Medical Record #: [id number]  Date of Service: 10/15/2023      Precautions:  Precautions: Fall Risk, Swallow Precautions, Nasogastric Tube         Subjective  RN cleared pt to be seen. Pt was alert for session but made no attempt to follow commands, make eye contact, or engage in session.       Assessment  Pt found in his room laying below 30 degrees with the hiccups while his tube feed was running full at 80. He was re-positioned to 60 degrees and the hiccuping ceased. Pt educated to remain above 30 degrees when tube feed is running but he did not respond in any way to this SLP.   Pt did not follow 1-step commands X5 attempts and made no attempt to make eye contact with SLP. He was nonverbal for session and didn't attempt to make any sound this session.   Pt's oral cavity was visualized and noted to have thick dry secretions. Oral care completed with toothbrush and suction. Pt with an active gag reflex but made no attempt at vocalization with task.       Clinical Impressions  No trials were presented today due to pt's inability to follow commands and safety concerns due to his lack of interaction with SLP today. He would continue to benefit from an instrumental assessment to determine the physiology of his swallow.       Recommendations  Treatment Completed: Dysphagia Treatment  Dysphagia Treatment  Diet Consistency: NPO  Instrumentation: Instrumental swallow study pending clinical progress  Medication: Non Oral  Oral Care: Q2h           SLP Treatment Plan  Treatment Plan: Dysphagia Treatment, Speech-Language Treatment, Patient/Family/Caregiver Training  SLP Frequency: 3x Per Week  Estimated Duration: Until Therapy Goals Met      Anticipated Discharge Needs  Discharge Recommendations: Recommend post-acute placement for additional speech therapy services prior to discharge home  Therapy Recommendations Upon  DC: Dysphagia Training, Comprehension Training, Expression Training, Cognitive-Linguistic Training, AAC Training / Development, Community Re-Integration, Patient / Family / Caregiver Education      Patient / Family Goals  Patient / Family Goal #1: Did note state  Short Term Goals  Short Term Goal # 1: Pt will maintain attention for 2-3 minutes to consume prefeeding trials to demonstrate readiness for PO intake  Goal Outcome # 1: Progressing slower than expected      Alice Norris MS, CCC-SLP

## 2023-10-16 LAB
CRP SERPL HS-MCNC: 2.43 MG/DL (ref 0–0.75)
GLUCOSE BLD STRIP.AUTO-MCNC: 203 MG/DL (ref 65–99)
GLUCOSE BLD STRIP.AUTO-MCNC: 206 MG/DL (ref 65–99)
GLUCOSE BLD STRIP.AUTO-MCNC: 218 MG/DL (ref 65–99)
GLUCOSE BLD STRIP.AUTO-MCNC: 227 MG/DL (ref 65–99)
GLUCOSE BLD STRIP.AUTO-MCNC: 239 MG/DL (ref 65–99)
GLUCOSE BLD STRIP.AUTO-MCNC: 243 MG/DL (ref 65–99)
GLUCOSE BLD STRIP.AUTO-MCNC: 243 MG/DL (ref 65–99)
GLUCOSE BLD STRIP.AUTO-MCNC: 245 MG/DL (ref 65–99)
PREALB SERPL-MCNC: 22.5 MG/DL (ref 18–38)

## 2023-10-16 PROCEDURE — 99233 SBSQ HOSP IP/OBS HIGH 50: CPT | Performed by: STUDENT IN AN ORGANIZED HEALTH CARE EDUCATION/TRAINING PROGRAM

## 2023-10-16 PROCEDURE — 700101 HCHG RX REV CODE 250: Performed by: STUDENT IN AN ORGANIZED HEALTH CARE EDUCATION/TRAINING PROGRAM

## 2023-10-16 PROCEDURE — 700102 HCHG RX REV CODE 250 W/ 637 OVERRIDE(OP): Performed by: STUDENT IN AN ORGANIZED HEALTH CARE EDUCATION/TRAINING PROGRAM

## 2023-10-16 PROCEDURE — 92526 ORAL FUNCTION THERAPY: CPT

## 2023-10-16 PROCEDURE — A9270 NON-COVERED ITEM OR SERVICE: HCPCS | Performed by: INTERNAL MEDICINE

## 2023-10-16 PROCEDURE — 82962 GLUCOSE BLOOD TEST: CPT | Mod: 91

## 2023-10-16 PROCEDURE — 36415 COLL VENOUS BLD VENIPUNCTURE: CPT

## 2023-10-16 PROCEDURE — 700102 HCHG RX REV CODE 250 W/ 637 OVERRIDE(OP): Performed by: INTERNAL MEDICINE

## 2023-10-16 PROCEDURE — 770020 HCHG ROOM/CARE - TELE (206)

## 2023-10-16 PROCEDURE — 700102 HCHG RX REV CODE 250 W/ 637 OVERRIDE(OP): Performed by: EMERGENCY MEDICINE

## 2023-10-16 PROCEDURE — A9270 NON-COVERED ITEM OR SERVICE: HCPCS | Performed by: STUDENT IN AN ORGANIZED HEALTH CARE EDUCATION/TRAINING PROGRAM

## 2023-10-16 PROCEDURE — 86140 C-REACTIVE PROTEIN: CPT

## 2023-10-16 PROCEDURE — 84134 ASSAY OF PREALBUMIN: CPT

## 2023-10-16 RX ADMIN — DICLOFENAC SODIUM 2 G: 10 GEL TOPICAL at 12:07

## 2023-10-16 RX ADMIN — ATORVASTATIN CALCIUM 40 MG: 40 TABLET, FILM COATED ORAL at 17:18

## 2023-10-16 RX ADMIN — INSULIN HUMAN 4 UNITS: 100 INJECTION, SOLUTION PARENTERAL at 12:08

## 2023-10-16 RX ADMIN — MEMANTINE 5 MG: 5 TABLET ORAL at 17:18

## 2023-10-16 RX ADMIN — INSULIN HUMAN 4 UNITS: 100 INJECTION, SOLUTION PARENTERAL at 06:10

## 2023-10-16 RX ADMIN — LOSARTAN POTASSIUM 100 MG: 50 TABLET, FILM COATED ORAL at 06:02

## 2023-10-16 RX ADMIN — LANSOPRAZOLE 30 MG: 30 TABLET, ORALLY DISINTEGRATING, DELAYED RELEASE ORAL at 06:05

## 2023-10-16 RX ADMIN — MEMANTINE 5 MG: 5 TABLET ORAL at 06:01

## 2023-10-16 RX ADMIN — INSULIN GLARGINE-YFGN 50 UNITS: 100 INJECTION, SOLUTION SUBCUTANEOUS at 17:18

## 2023-10-16 RX ADMIN — INSULIN HUMAN 4 UNITS: 100 INJECTION, SOLUTION PARENTERAL at 00:14

## 2023-10-16 RX ADMIN — AMLODIPINE BESYLATE 10 MG: 10 TABLET ORAL at 06:02

## 2023-10-16 RX ADMIN — INSULIN HUMAN 4 UNITS: 100 INJECTION, SOLUTION PARENTERAL at 17:20

## 2023-10-16 RX ADMIN — DICLOFENAC SODIUM 2 G: 10 GEL TOPICAL at 17:18

## 2023-10-16 RX ADMIN — ALLOPURINOL 300 MG: 300 TABLET ORAL at 06:01

## 2023-10-16 ASSESSMENT — PAIN DESCRIPTION - PAIN TYPE: TYPE: ACUTE PAIN

## 2023-10-16 NOTE — DIETARY
Nutrition support weekly update:  Day 22 of admit.  Sharon Guardado is a 68 y.o. male with admitting DX of Severe sepsis.      Tube feeding initiated on 10/2. Current TF via IRIS feeding tube is Glucerna 1.2 with goal rate 80 ml/hr to provide 2304 kcals, 115 gm protein, and 1546 ml free water per day. Current free water order is 30 ml Q 4 hours.     Assessment:  Weight 10/15: 135 kg via bed scale. Weight overall stable.   Height: 6' (182.9 cm), BMI: 40.36 (obesity class III)      Evaluation:   Pt is receiving TF at goal rate. Failed SLP eval today.   IR consult for PEG tube.  Skin: L traumatic arm wound. Generalized edema noted.   Labs: Glucose 251, BUN 28  Meds: allopurinol, lipitor, glargine 42 units in evening + SSI, prevacid, bowel protocol  Last BM: 10/16  CHO-controlled formula remains appropriate for blood sugar control.     Malnutrition risk: No new risk.     Recommendations/Plan:  Continue TF Glucerna 1.2 with goal rate 80 ml/hr.   Fluids per MD.  Consider changing free water flushes to 150 ml TID to better meet estimated fluid needs, discussed with MD.   Diet upgrades per SLP.   If PEG tube placed and bolus regimen appropriate, Glucerna 1.2 with goal of 8 containers per day will provide 2280 kcals, 112 gm protein, and 1536 ml free water per day.     RD following.

## 2023-10-16 NOTE — PROGRESS NOTES
Monitor Summary: SR 80-96, WA .18, QRS .08, QT .35 with rare PVC per strip from monitor room

## 2023-10-16 NOTE — CARE PLAN
The patient is Watcher - Medium risk of patient condition declining or worsening    Shift Goals  Clinical Goals: neuro monitoring and safety  Patient Goals: DEENA  Family Goals: DEENA    Progress made toward(s) clinical / shift goals:    Problem: Respiratory  Goal: Patient will achieve/maintain optimum respiratory ventilation and gas exchange  Outcome: Progressing  Note: Patient on 2 L supplemental oxygen via nasal canula.      Problem: Skin Integrity  Goal: Skin integrity is maintained or improved  Outcome: Progressing  Note: Q 2 turns in place.      Problem: Fall Risk  Goal: Patient will remain free from falls  Outcome: Progressing  Note: Fall precautions in place. Bed locked and in the lowest position. Patient placed near nursing station.      Problem: Seizure Precautions  Goal: Implementation of seizure precautions  Outcome: Progressing  Note: Seizure precautions in place. Bed locked and in the lowest position. Rails padded x 4. Supplemental oxygen and suctioning available.      Problem: Neuro Status  Goal: Neuro status will remain stable or improve  Outcome: Progressing  Note: Q 4 neuro checks in place. Patient A&O x 0. Responds to painful stimuli x 4 extremities.      Problem: Safety - Medical Restraint  Goal: Remains free of injury from restraints (Restraint for Interference with Medical Device)  Outcome: Progressing  Note: Patient in bilateral soft wrist restraints due to pulling lines/drains.        Patient is not progressing towards the following goals: N/A

## 2023-10-16 NOTE — THERAPY
Speech Language Pathology   Daily Treatment     Patient Name: Sharon Guardado  AGE:  68 y.o., SEX:  male  Medical Record #: [id number]  Date of Service: 10/16/2023      Precautions:  Precautions: Fall Risk, Swallow Precautions, Nasogastric Tube         Assessment  Pt seen on this date for a re-evaluation per MD request. Pt with no verbalizations or vocalizations during this session. He did not follow commands or nod yes/no to questions. Pt intermittently opened mouth for PO presentation of ice chips, however, did not always open mouth all the way resulting in very minimal trials. Delayed cough and audible breath sounds noted which is concerning for airway invasion. Pt with fluctuating level of alertness during session in which he required mod verbal and tactile cues from this clinician and spouse.      Clinical Impressions  Pt continues to present with an acute dysphagia 2/2 CVA. He is not at the appropriate level for a diagnostic evaluation at this point given reduced participation. Continue to recommend NPO with NG for nutrition. MD updated on session and recommendations.      Recommendations  Treatment Completed: Dysphagia Treatment       Dysphagia Treatment  Diet Consistency: NPO with NG  Instrumentation: Instrumental swallow study pending clinical progress  Medication: Non Oral  Oral Care: Q2h         SLP Treatment Plan  Treatment Plan: Dysphagia Treatment  SLP Frequency: 3x Per Week  Estimated Duration: Until Therapy Goals Met      Anticipated Discharge Needs  Discharge Recommendations: Recommend post-acute placement for additional speech therapy services prior to discharge home  Therapy Recommendations Upon DC: Dysphagia Training, Community Re-Integration, Patient / Family / Caregiver Education      Patient / Family Goals  Patient / Family Goal #1: Did not state  Short Term Goals  Short Term Goal # 1: Pt will maintain attention for 2-3 minutes to consume prefeeding trials to demonstrate readiness for PO  intake  Goal Outcome # 1: Progressing slower than expected  Short Term Goal # 2: Pt will consume prefeeding trials with no overt s/sx of aspiration      Sydnye Wang, SLP

## 2023-10-16 NOTE — PROGRESS NOTES
Monitor summary: SR/SB 58-77, NV 0.17, QRS 0.10, QT 0.46, with rare PVCs, per strip from monitor room.

## 2023-10-16 NOTE — PROGRESS NOTES
[facility] Medicine Daily Progress Note    Date of Service  10/15/2023    Chief Complaint  Sharon Guardado is a 68 y.o. male admitted 9/24/2023 with AMS    Hospital Course  68-year-old male with a past medical history of alcohol abuse, hypertension, obesity who presented with altered mental status and a ground-level fall on 9/24/2023.  On admission he was noted to have a small frontal subarachnoid hemorrhage on CT head.  He was also noted to have severe sepsis secondary to UTI.  She was treated with IV ceftriaxone.  Patient developed symptoms of alcohol withdrawal and was intubated.  Patient was subsequently extubated and transferred to telemetry on 9/30.  The patient subsequently had worsening mental status, on 9/30 found to have a left frontal lobe hemorrhage with surrounding vasogenic edema with mild mass effect, deemed nonoperative by neurosurgery  Was readmitted to the ICU level of care, intubated, treated with hypertonic saline, empirically treated with Unasyn for fever, overall state on mechanical ventilation for 9 days, during the course there was a concern for possible abdominal infection, gallbladder infection, surgery was consulted,  HIDA scan was obtained and was negative for acute cholecystitis, surgery not indicated as per general surgery opinion.  The patient found with significant right-sided weakness, aphasia.  On 10/10 patient again found appropriate to transfer out of ICU    Interval Problem Update  10/15  Awake, not following commands for me, no communication/interaction with me, still aphasic, R weakness.   EKG showed sinus bradycardia  Continue NG tube feeding per SLP, IR consult placed for PEG tube.  BG high, Lantus just increased from 39 units to 42 units, will increase tomorrow if stays elevated still.    RUE swelling - US  1. No deep venous thrombosis. 2. Acute to subacute, occlusive thrombus in the median antecubital vein. Will need to discuss with neuro regarding timing of when  anticoagulation would be appropriate to resume.     I have discussed this patient's plan of care and discharge plan at IDT rounds today with Case Management, Nursing, Nursing leadership, and other members of the IDT team.    Consultants/Specialty      Code Status  Full Code    Disposition  The patient is not medically cleared for discharge to home or a post-acute facility.      I have placed the appropriate orders for post-discharge needs.    Review of Systems  Review of Systems   Unable to perform ROS: Acuity of condition        Physical Exam  Temp:  [36.1 °C (97 °F)-36.8 °C (98.2 °F)] 36.1 °C (97 °F)  Pulse:  [57-93] 93  Resp:  [16-19] 18  BP: (137-157)/(70-86) 137/74  SpO2:  [95 %-97 %] 95 %    Physical Exam  Constitutional:       Appearance: He is obese.      Comments: Awake, not responding to commands, moves left upper and lower   HENT:      Head: Normocephalic.      Mouth/Throat:      Mouth: Mucous membranes are moist.   Eyes:      Extraocular Movements: Extraocular movements intact.      Conjunctiva/sclera: Conjunctivae normal.   Cardiovascular:      Rate and Rhythm: Normal rate and regular rhythm.      Pulses: Normal pulses.      Heart sounds: Normal heart sounds.   Pulmonary:      Effort: Pulmonary effort is normal.      Breath sounds: Normal breath sounds.   Abdominal:      General: Bowel sounds are normal.      Palpations: Abdomen is soft.      Tenderness: There is no abdominal tenderness. There is no guarding.   Musculoskeletal:         General: No swelling or tenderness.      Cervical back: Normal range of motion and neck supple.   Neurological:      Comments: Aphasia, R weakness, not following commands         Fluids    Intake/Output Summary (Last 24 hours) at 10/15/2023 2330  Last data filed at 10/15/2023 1943  Gross per 24 hour   Intake 1020 ml   Output 820 ml   Net 200 ml         Laboratory  Recent Labs     10/14/23  1623 10/15/23  0810   WBC 10.5 11.8*   RBC 4.85 5.20   HEMOGLOBIN 14.5 15.8    HEMATOCRIT 44.6 47.6   MCV 92.0 91.5   MCH 29.9 30.4   MCHC 32.5 33.2   RDW 43.7 43.1   PLATELETCT 250 243   MPV 11.5 11.7       Recent Labs     10/14/23  1623 10/15/23  0810   SODIUM 144 143   POTASSIUM 4.7 4.4   CHLORIDE 106 103   CO2 27 29   GLUCOSE 262* 251*   BUN 26* 28*   CREATININE 0.77 0.89   CALCIUM 10.5 10.4                     Imaging  US-EXTREMITY VENOUS UPPER UNILAT RIGHT   Final Result      DX-ABDOMEN FOR TUBE PLACEMENT   Final Result      Nasogastric tube tip overlies the duodenal bulb      DX-ABDOMEN FOR TUBE PLACEMENT   Final Result      1.  Enteric tube tip again projects over the 2nd portion of the duodenum.      DX-ABDOMEN FOR TUBE PLACEMENT   Final Result      Enteric tube tip projects over the second portion of the duodenum      DX-CHEST-PORTABLE (1 VIEW)   Final Result      Unchanged bibasilar underinflation atelectasis      DX-ABDOMEN FOR TUBE PLACEMENT   Final Result      Enteric tube tip projects over the gastric antrum or proximal duodenum      DX-CHEST-PORTABLE (1 VIEW)   Final Result      Improving bibasilar pulmonary opacities.      DX-CHEST-PORTABLE (1 VIEW)   Final Result         Findings on chest radiograph appear stable since the prior radiograph.  No new abnormalities are identified.      NM-HEPATOBILIARY SCAN   Final Result      There is visualization of the gallbladder after morphine administration, indicating the cystic duct is still patent. No scintigraphy evidence of acute cholecystitis.      DX-CHEST-PORTABLE (1 VIEW)   Final Result         1.  Mild pulmonary edema and/or infiltrates.   2.  Cardiomegaly   3.  Atherosclerosis      DX-CHEST-PORTABLE (1 VIEW)   Final Result         1.  No acute cardiopulmonary disease.   2.  Cardiomegaly   3.  Atherosclerosis      US-RUQ   Final Result      1.  Minimal gallbladder wall thickening and possible sludge.  Cholecystitis is not excluded.   2.  Minimal pericholecystic fluid may be due to gallbladder inflammation or liver disease.    3.  Enlarged echogenic liver suggesting fatty infiltration.   4.  Limited evaluation of the pancreas abdominal aorta.      DX-CHEST-PORTABLE (1 VIEW)   Final Result         1.  Pulmonary edema and/or infiltrates are identified, which are stable since the prior exam.   2.  Cardiomegaly   3.  Atherosclerosis      US-EXTREMITY VENOUS UPPER UNILAT RIGHT   Final Result      DX-CHEST-PORTABLE (1 VIEW)   Final Result         1.  Pulmonary edema and/or infiltrates are identified, which are stable since the prior exam.   2.  Cardiomegaly   3.  Atherosclerosis      DX-CHEST-PORTABLE (1 VIEW)   Final Result         1.  Pulmonary edema and/or infiltrates are identified, which are stable since the prior exam.   2.  Cardiomegaly   3.  Atherosclerosis      DX-CHEST-PORTABLE (1 VIEW)   Final Result         1.  Pulmonary edema and/or infiltrates are identified, which are somewhat decreased since the prior exam.   2.  Cardiomegaly   3.  Atherosclerosis      MR-MRA HEAD-W/O   Final Result         Normal intracranial MRA.      MR-BRAIN-WITH & W/O   Final Result         Left frontal lobe hemorrhage with surrounding vasogenic edema and mild mass effect upon the frontal horn of the left lateral ventricle. There is approximately 2 mm of midline shift to the right.      Age-related volume loss and chronic microvascular ischemic changes.      No abnormal intracranial enhancement is identified.      CT-HEAD W/O   Final Result         1. Stable 6 cm left frontal intraparenchymal hemorrhage with mild, 2 mm left-to-right subfalcine herniation.   2. No new abnormality or change.         DX-CHEST-PORTABLE (1 VIEW)   Final Result         1.  Pulmonary edema and/or infiltrates are identified, which appear somewhat increased since the prior exam.   2.  Cardiomegaly   3.  Atherosclerosis      DX-ABDOMEN FOR TUBE PLACEMENT   Final Result      NG tube extends below the diaphragm and appears to extend through the region of the stomach with tip at the  level of the proximal duodenum.      DX-CHEST-PORTABLE (1 VIEW)   Final Result      1.  Grossly satisfactory appearance of tubes and lines.   2.  No evidence of pneumothorax.   3.  Marked hypoinflation with probable bibasilar atelectasis.   4.  Enlarged cardiac silhouette with vascular congestion.      CT-HEAD W/O   Final Result         1.  New large parenchymal hemorrhage involving the frontal lobe is identified with some surrounding edema.      2.  Subarachnoid hemorrhage is also noted in the area.      3.  Minimal midline shift to the right side.      Findings are consistent with atrophy.  Decreased attenuation in the periventricular white matter likely indicates microvascular ischemic disease.      Based solely on CT findings, the brain injury guideline category is mBIG 3.      EDH   IVH   Displaced skull fx   SDH > 8mm   IPH > 8mm or multiple   SAH bi-hemispheric or > 3mm      The original BIG retrospective analysis found radiographic progression in 0% of BIG 1 patients and 2.6% BIG 2.      These findings were discussed with GEETA MOLINA on 09/30/2023.      DX-CHEST-PORTABLE (1 VIEW)   Final Result      Stable multiple pulmonary vascular dilation/congestion. Otherwise, negative.      DX-CHEST-PORTABLE (1 VIEW)   Final Result      1.  Increasing airspace disease at the left base.      DX-CHEST-LIMITED (1 VIEW)   Final Result         1.  Pulmonary edema and/or infiltrates are identified, which are somewhat decreased since the prior exam.   2.  Cardiomegaly   3.  Atherosclerosis      DX-CHEST-PORTABLE (1 VIEW)   Final Result         1.  Pulmonary edema and/or infiltrates are identified, which appear somewhat increased since the prior exam.   2.  Cardiomegaly   3.  Atherosclerosis      DX-CHEST-PORTABLE (1 VIEW)   Final Result         1.  Interstitial edema and/or infiltrates, slightly decreased since prior study.   2.  Cardiomegaly   3.  Atherosclerosis      DX-CHEST-LIMITED (1 VIEW)   Final Result      Right IJ  catheter tip projects in the SVC. No pneumothorax.      CT-CTA NECK WITH & W/O-POST PROCESSING   Final Result      1.  Estimated 50-75% stenosis of the BILATERAL carotid arteries   2.  Estimated greater than 50% stenosis at the vertebral artery ostium on each side      CT-CTA HEAD WITH & W/O-POST PROCESS   Final Result      1.  No large vessel occlusion or aneurysm.   2.  Hazy left frontal lobe subarachnoid hemorrhage, decreased in conspicuity from prior. No new hemorrhage seen.   3.  Scattered opacifications of the ethmoid sinuses, likely related to support hardware.      DX-ABDOMEN FOR TUBE PLACEMENT   Final Result         1.  Nonspecific bowel gas pattern in the upper abdomen.   2.  Dobbhoff tube with tip terminating overlying the expected location of the first or second duodenal segment.      DX-CHEST-PORTABLE (1 VIEW)   Final Result         1.  Interstitial edema and/or infiltrates.   2.  Cardiomegaly   3.  Nasogastric tube terminates in the distal esophagus, recommend advancement   4.  Atherosclerosis      DX-ABDOMEN FOR TUBE PLACEMENT   Final Result         1.  Nonspecific bowel gas pattern in the upper abdomen.   2.  Nasogastric tube terminates just distal to the gastroesophageal junction, recommend advancement      CT-RENAL COLIC EVALUATION(A/P W/O)   Final Result         1.  Intrahepatic biliary air and nondependent air within the gallbladder, consider history of instrumentation or changes of cholangitis in the appropriate clinical setting.   2.  Large fat-containing bilateral inguinal hernias   3.  Enlarged prostate, consider causes of prostate enlargement with additional workup as clinically appropriate   4.  Diverticulosis   5.  Cholelithiasis   6.  Atherosclerosis      CT-HEAD W/O   Final Result         1.  Hazy left frontal subarachnoid hemorrhage.   2.  Nonspecific white matter changes, commonly associated with small vessel ischemic disease.  Associated mild cerebral atrophy is noted.      Based  solely on CT findings, the brain injury guideline category is mBIG 1.      SDH < 4mm   IPH < 4mm   SAH < 3 sulci and < 1mm      The original BIG retrospective analysis found radiographic progression in 0% of BIG 1 patients and 2.6% BIG 2.      These findings were discussed with the patient's clinician, Madhu Zambrano, on 9/24/2023 10:10 PM.      DX-CHEST-PORTABLE (1 VIEW)   Final Result      No acute cardiopulmonary abnormality identified.      IR-CONSULT AND TREAT    (Results Pending)        Assessment/Plan  * Intraparenchymal hemorrhage of brain (HCC)- (present on admission)  Assessment & Plan  CT on 9/24 with hazy left frontal SAH  CT on 9/30 with new large IPH in the left frontal lobe  Strict blood pressure control with goal SBP less than 160  Goal sodium 145-150 - I am titrating hypertonic saline to achieve sodium goal  Will deescalate HTS today and slowly correct  Neuro checks every 2 hours    Bradycardia  Assessment & Plan  Telemetry reported bradycardia HR 40s for 4 minutes, asymptomatic  I ordered EKG, mag, Phos, and TSH  EKG showed sinus bradycardia    Continue telemetry monitor  Avoid medications that can further slow down the heart rate    ACP (advance care planning)  Assessment & Plan  10/12 I discussed advance care planning with the patient's wife at bedside, including diagnosis, prognosis, plan of care, risks and benefits of any therapies that could be offered.  We discussed regarding CODE STATUS, CPR and intubation with mechanical ventilation, discussed regarding risk and benefits, as well as alternatives including palliation and hospice as appropriate.  After long discussion with patient, patient's wife has decided for DNR/DNI, patient CODE STATUS has een updated in epic with DNR/DNI as per patient's wishes.   Guarded recovery, I consulted palliative care. Will decide PEG tube placement depends on the goal of care  ACP 22min  10/13 patient is more alert and awake. The wife discussed with the other  family members yesterday and wanted to change the code status back to full code. No pallitive care at this point. The wife agrees with PEG tube placement if patients fails SLP evelu again. I changed the code status back to full code. ACP 16min      Atelectasis- (present on admission)  Assessment & Plan  Patient unable to follow commands for incentive spirometry    Pneumonia due to infectious organism- (present on admission)  Assessment & Plan  Continue Zosyn    Acute respiratory failure with hypoxia (HCC)- (present on admission)  Assessment & Plan  Intubated 9/25-9/28  Reintubated 9/30-10/7  Continue oxygen    Thrombocytopenia (HCC)- (present on admission)  Assessment & Plan  Baseline ~110-125  Monitor, currently improved    Pneumobilia- (present on admission)  Assessment & Plan  As seen on CT renal (9/25/23).    9/26/23: No need for further interventions at this time per gen surg    Type 2 diabetes mellitus (HCC)- (present on admission)  Assessment & Plan  A1c 5.7% (6/2023)  Ongoing treatment with long and short acting insulin, adjust as indicated    10/14 BG high, I increase the Lantus from 39 units to 42 units   Continue to monitor  Hypoglycemia protocol    Primary hypertension- (present on admission)  Assessment & Plan  Hypertensive at presentation   Goal for normotension currently multimodality treatment      BMI 40.0-44.9, adult (HCC)- (present on admission)  Assessment & Plan  Future weight loss recommended    Alcohol dependence (HCC)- (present on admission)  Assessment & Plan  Multivitamin, folate, thiamine  Monitor, additional therapies as clinically indicated  9/29/2023 patient was at least 2-3 hard liquor drinks daily although this is unclear per patient and his wife's history.  .    Patient s/p treatment for acute alcohol withdrawal         VTE prophylaxis: scd    I have performed a physical exam and reviewed and updated ROS and Plan today (10/15/2023). In review of yesterday's note (10/14/2023), there  are no changes except as documented above.    Total time spent 51 minutes. I spent greater than 50% of the time for patient care, counseling, and coordination on this date, including unit/floor time, and face-to-face time with the patient as per interval events, my own review of patient's imaging and lab analysis and developing my assessment and plan above.

## 2023-10-17 LAB
GLUCOSE BLD STRIP.AUTO-MCNC: 194 MG/DL (ref 65–99)
GLUCOSE BLD STRIP.AUTO-MCNC: 195 MG/DL (ref 65–99)
GLUCOSE BLD STRIP.AUTO-MCNC: 200 MG/DL (ref 65–99)
GLUCOSE BLD STRIP.AUTO-MCNC: 254 MG/DL (ref 65–99)
INR PPP: 1.1 (ref 0.87–1.13)
PROTHROMBIN TIME: 14.3 SEC (ref 12–14.6)

## 2023-10-17 PROCEDURE — 700102 HCHG RX REV CODE 250 W/ 637 OVERRIDE(OP): Performed by: STUDENT IN AN ORGANIZED HEALTH CARE EDUCATION/TRAINING PROGRAM

## 2023-10-17 PROCEDURE — A9270 NON-COVERED ITEM OR SERVICE: HCPCS | Performed by: NURSE PRACTITIONER

## 2023-10-17 PROCEDURE — 770020 HCHG ROOM/CARE - TELE (206)

## 2023-10-17 PROCEDURE — 700102 HCHG RX REV CODE 250 W/ 637 OVERRIDE(OP): Performed by: EMERGENCY MEDICINE

## 2023-10-17 PROCEDURE — 82962 GLUCOSE BLOOD TEST: CPT | Mod: 91

## 2023-10-17 PROCEDURE — A9270 NON-COVERED ITEM OR SERVICE: HCPCS | Performed by: INTERNAL MEDICINE

## 2023-10-17 PROCEDURE — 36415 COLL VENOUS BLD VENIPUNCTURE: CPT

## 2023-10-17 PROCEDURE — 700102 HCHG RX REV CODE 250 W/ 637 OVERRIDE(OP): Performed by: INTERNAL MEDICINE

## 2023-10-17 PROCEDURE — 700102 HCHG RX REV CODE 250 W/ 637 OVERRIDE(OP): Performed by: NURSE PRACTITIONER

## 2023-10-17 PROCEDURE — 99233 SBSQ HOSP IP/OBS HIGH 50: CPT | Performed by: STUDENT IN AN ORGANIZED HEALTH CARE EDUCATION/TRAINING PROGRAM

## 2023-10-17 PROCEDURE — A9270 NON-COVERED ITEM OR SERVICE: HCPCS | Performed by: STUDENT IN AN ORGANIZED HEALTH CARE EDUCATION/TRAINING PROGRAM

## 2023-10-17 PROCEDURE — 85610 PROTHROMBIN TIME: CPT

## 2023-10-17 RX ADMIN — ATORVASTATIN CALCIUM 40 MG: 40 TABLET, FILM COATED ORAL at 17:49

## 2023-10-17 RX ADMIN — INSULIN HUMAN 7 UNITS: 100 INJECTION, SOLUTION PARENTERAL at 00:13

## 2023-10-17 RX ADMIN — DICLOFENAC SODIUM 2 G: 10 GEL TOPICAL at 17:49

## 2023-10-17 RX ADMIN — INSULIN HUMAN 3 UNITS: 100 INJECTION, SOLUTION PARENTERAL at 18:20

## 2023-10-17 RX ADMIN — DICLOFENAC SODIUM 2 G: 10 GEL TOPICAL at 04:16

## 2023-10-17 RX ADMIN — INSULIN HUMAN 3 UNITS: 100 INJECTION, SOLUTION PARENTERAL at 06:16

## 2023-10-17 RX ADMIN — AMLODIPINE BESYLATE 10 MG: 10 TABLET ORAL at 04:15

## 2023-10-17 RX ADMIN — INSULIN HUMAN 4 UNITS: 100 INJECTION, SOLUTION PARENTERAL at 23:58

## 2023-10-17 RX ADMIN — LANSOPRAZOLE 30 MG: 30 TABLET, ORALLY DISINTEGRATING, DELAYED RELEASE ORAL at 04:16

## 2023-10-17 RX ADMIN — DICLOFENAC SODIUM 2 G: 10 GEL TOPICAL at 13:16

## 2023-10-17 RX ADMIN — MEMANTINE 5 MG: 5 TABLET ORAL at 04:15

## 2023-10-17 RX ADMIN — LOSARTAN POTASSIUM 100 MG: 50 TABLET, FILM COATED ORAL at 04:15

## 2023-10-17 RX ADMIN — ALLOPURINOL 300 MG: 300 TABLET ORAL at 04:16

## 2023-10-17 RX ADMIN — DOCUSATE SODIUM 50 MG AND SENNOSIDES 8.6 MG 2 TABLET: 8.6; 5 TABLET, FILM COATED ORAL at 17:48

## 2023-10-17 RX ADMIN — MEMANTINE 5 MG: 5 TABLET ORAL at 17:48

## 2023-10-17 ASSESSMENT — PAIN DESCRIPTION - PAIN TYPE
TYPE: ACUTE PAIN
TYPE: ACUTE PAIN

## 2023-10-17 NOTE — PROGRESS NOTES
Monitor Summary: SR 72-99, MD .14, QRS .10, QT .42 with rare PVC per strip from monitor room.

## 2023-10-17 NOTE — PROGRESS NOTES
Monitor Summary: SR/ST , AR 0.20, QRS 0.08, QT 0.34, with rare PVCs per strip from monitor room.

## 2023-10-17 NOTE — PROGRESS NOTES
[facility] Medicine Daily Progress Note    Date of Service  10/16/2023    Chief Complaint  Sharon Guardado is a 68 y.o. male admitted 9/24/2023 with AMS    Hospital Course  68-year-old male with a past medical history of alcohol abuse, hypertension, obesity who presented with altered mental status and a ground-level fall on 9/24/2023.  On admission he was noted to have a small frontal subarachnoid hemorrhage on CT head.  He was also noted to have severe sepsis secondary to UTI.  She was treated with IV ceftriaxone.  Patient developed symptoms of alcohol withdrawal and was intubated.  Patient was subsequently extubated and transferred to telemetry on 9/30.  The patient subsequently had worsening mental status, on 9/30 found to have a left frontal lobe hemorrhage with surrounding vasogenic edema with mild mass effect, deemed nonoperative by neurosurgery  Was readmitted to the ICU level of care, intubated, treated with hypertonic saline, empirically treated with Unasyn for fever, overall state on mechanical ventilation for 9 days, during the course there was a concern for possible abdominal infection, gallbladder infection, surgery was consulted,  HIDA scan was obtained and was negative for acute cholecystitis, surgery not indicated as per general surgery opinion.  The patient found with significant right-sided weakness, aphasia.  On 10/10 patient again found appropriate to transfer out of ICU    Interval Problem Update  10/15  Awake, not following commands for me, no communication/interaction with me, still aphasic, R weakness.   EKG showed sinus bradycardia  Continue NG tube feeding per SLP, IR consult placed for PEG tube.  BG high, Lantus just increased from 39 units to 42 units, will increase tomorrow if stays elevated still.    RUE swelling - US  1. No deep venous thrombosis. 2. Acute to subacute, occlusive thrombus in the median antecubital vein. Will need to discuss with neuro regarding timing of when  anticoagulation would be appropriate to resume.     10/16  Minimal improvement, Not following, not responding. PEG tube planned for tomorrow. Discharge planning ongoing. Vitals stable.     I have discussed this patient's plan of care and discharge plan at IDT rounds today with Case Management, Nursing, Nursing leadership, and other members of the IDT team.    Consultants/Specialty      Code Status  Full Code    Disposition    I have placed the appropriate orders for post-discharge needs.    Review of Systems  Review of Systems   Unable to perform ROS: Acuity of condition        Physical Exam  Temp:  [36.4 °C (97.5 °F)-37 °C (98.6 °F)] 36.6 °C (97.9 °F)  Pulse:  [] 103  Resp:  [14-18] 18  BP: (131-163)/(75-85) 135/82  SpO2:  [90 %-96 %] 94 %    Physical Exam  Constitutional:       Appearance: He is obese.      Comments: Awake, not responding to commands, moves left upper and lower   HENT:      Head: Normocephalic.      Mouth/Throat:      Mouth: Mucous membranes are moist.   Eyes:      Extraocular Movements: Extraocular movements intact.      Conjunctiva/sclera: Conjunctivae normal.   Cardiovascular:      Rate and Rhythm: Normal rate and regular rhythm.      Pulses: Normal pulses.      Heart sounds: Normal heart sounds.   Pulmonary:      Effort: Pulmonary effort is normal.      Breath sounds: Normal breath sounds.   Abdominal:      General: Bowel sounds are normal.      Palpations: Abdomen is soft.      Tenderness: There is no abdominal tenderness. There is no guarding.   Musculoskeletal:         General: No swelling or tenderness.      Cervical back: Normal range of motion and neck supple.   Skin:     General: Skin is warm and dry.   Neurological:      Comments: Aphasia, R weakness, not following commands         Fluids    Intake/Output Summary (Last 24 hours) at 10/16/2023 5853  Last data filed at 10/16/2023 1942  Gross per 24 hour   Intake 0 ml   Output 90 ml   Net -90 ml         Laboratory  Recent Labs      10/14/23  1623 10/15/23  0810   WBC 10.5 11.8*   RBC 4.85 5.20   HEMOGLOBIN 14.5 15.8   HEMATOCRIT 44.6 47.6   MCV 92.0 91.5   MCH 29.9 30.4   MCHC 32.5 33.2   RDW 43.7 43.1   PLATELETCT 250 243   MPV 11.5 11.7         Recent Labs     10/14/23  1623 10/15/23  0810   SODIUM 144 143   POTASSIUM 4.7 4.4   CHLORIDE 106 103   CO2 27 29   GLUCOSE 262* 251*   BUN 26* 28*   CREATININE 0.77 0.89   CALCIUM 10.5 10.4                       Imaging  US-EXTREMITY VENOUS UPPER UNILAT RIGHT   Final Result      DX-ABDOMEN FOR TUBE PLACEMENT   Final Result      Nasogastric tube tip overlies the duodenal bulb      DX-ABDOMEN FOR TUBE PLACEMENT   Final Result      1.  Enteric tube tip again projects over the 2nd portion of the duodenum.      DX-ABDOMEN FOR TUBE PLACEMENT   Final Result      Enteric tube tip projects over the second portion of the duodenum      DX-CHEST-PORTABLE (1 VIEW)   Final Result      Unchanged bibasilar underinflation atelectasis      DX-ABDOMEN FOR TUBE PLACEMENT   Final Result      Enteric tube tip projects over the gastric antrum or proximal duodenum      DX-CHEST-PORTABLE (1 VIEW)   Final Result      Improving bibasilar pulmonary opacities.      DX-CHEST-PORTABLE (1 VIEW)   Final Result         Findings on chest radiograph appear stable since the prior radiograph.  No new abnormalities are identified.      NM-HEPATOBILIARY SCAN   Final Result      There is visualization of the gallbladder after morphine administration, indicating the cystic duct is still patent. No scintigraphy evidence of acute cholecystitis.      DX-CHEST-PORTABLE (1 VIEW)   Final Result         1.  Mild pulmonary edema and/or infiltrates.   2.  Cardiomegaly   3.  Atherosclerosis      DX-CHEST-PORTABLE (1 VIEW)   Final Result         1.  No acute cardiopulmonary disease.   2.  Cardiomegaly   3.  Atherosclerosis      US-RUQ   Final Result      1.  Minimal gallbladder wall thickening and possible sludge.  Cholecystitis is not excluded.   2.   Minimal pericholecystic fluid may be due to gallbladder inflammation or liver disease.   3.  Enlarged echogenic liver suggesting fatty infiltration.   4.  Limited evaluation of the pancreas abdominal aorta.      DX-CHEST-PORTABLE (1 VIEW)   Final Result         1.  Pulmonary edema and/or infiltrates are identified, which are stable since the prior exam.   2.  Cardiomegaly   3.  Atherosclerosis      US-EXTREMITY VENOUS UPPER UNILAT RIGHT   Final Result      DX-CHEST-PORTABLE (1 VIEW)   Final Result         1.  Pulmonary edema and/or infiltrates are identified, which are stable since the prior exam.   2.  Cardiomegaly   3.  Atherosclerosis      DX-CHEST-PORTABLE (1 VIEW)   Final Result         1.  Pulmonary edema and/or infiltrates are identified, which are stable since the prior exam.   2.  Cardiomegaly   3.  Atherosclerosis      DX-CHEST-PORTABLE (1 VIEW)   Final Result         1.  Pulmonary edema and/or infiltrates are identified, which are somewhat decreased since the prior exam.   2.  Cardiomegaly   3.  Atherosclerosis      MR-MRA HEAD-W/O   Final Result         Normal intracranial MRA.      MR-BRAIN-WITH & W/O   Final Result         Left frontal lobe hemorrhage with surrounding vasogenic edema and mild mass effect upon the frontal horn of the left lateral ventricle. There is approximately 2 mm of midline shift to the right.      Age-related volume loss and chronic microvascular ischemic changes.      No abnormal intracranial enhancement is identified.      CT-HEAD W/O   Final Result         1. Stable 6 cm left frontal intraparenchymal hemorrhage with mild, 2 mm left-to-right subfalcine herniation.   2. No new abnormality or change.         DX-CHEST-PORTABLE (1 VIEW)   Final Result         1.  Pulmonary edema and/or infiltrates are identified, which appear somewhat increased since the prior exam.   2.  Cardiomegaly   3.  Atherosclerosis      DX-ABDOMEN FOR TUBE PLACEMENT   Final Result      NG tube extends below  the diaphragm and appears to extend through the region of the stomach with tip at the level of the proximal duodenum.      DX-CHEST-PORTABLE (1 VIEW)   Final Result      1.  Grossly satisfactory appearance of tubes and lines.   2.  No evidence of pneumothorax.   3.  Marked hypoinflation with probable bibasilar atelectasis.   4.  Enlarged cardiac silhouette with vascular congestion.      CT-HEAD W/O   Final Result         1.  New large parenchymal hemorrhage involving the frontal lobe is identified with some surrounding edema.      2.  Subarachnoid hemorrhage is also noted in the area.      3.  Minimal midline shift to the right side.      Findings are consistent with atrophy.  Decreased attenuation in the periventricular white matter likely indicates microvascular ischemic disease.      Based solely on CT findings, the brain injury guideline category is mBIG 3.      EDH   IVH   Displaced skull fx   SDH > 8mm   IPH > 8mm or multiple   SAH bi-hemispheric or > 3mm      The original BIG retrospective analysis found radiographic progression in 0% of BIG 1 patients and 2.6% BIG 2.      These findings were discussed with GEETA MOLINA on 09/30/2023.      DX-CHEST-PORTABLE (1 VIEW)   Final Result      Stable multiple pulmonary vascular dilation/congestion. Otherwise, negative.      DX-CHEST-PORTABLE (1 VIEW)   Final Result      1.  Increasing airspace disease at the left base.      DX-CHEST-LIMITED (1 VIEW)   Final Result         1.  Pulmonary edema and/or infiltrates are identified, which are somewhat decreased since the prior exam.   2.  Cardiomegaly   3.  Atherosclerosis      DX-CHEST-PORTABLE (1 VIEW)   Final Result         1.  Pulmonary edema and/or infiltrates are identified, which appear somewhat increased since the prior exam.   2.  Cardiomegaly   3.  Atherosclerosis      DX-CHEST-PORTABLE (1 VIEW)   Final Result         1.  Interstitial edema and/or infiltrates, slightly decreased since prior study.   2.   Cardiomegaly   3.  Atherosclerosis      DX-CHEST-LIMITED (1 VIEW)   Final Result      Right IJ catheter tip projects in the SVC. No pneumothorax.      CT-CTA NECK WITH & W/O-POST PROCESSING   Final Result      1.  Estimated 50-75% stenosis of the BILATERAL carotid arteries   2.  Estimated greater than 50% stenosis at the vertebral artery ostium on each side      CT-CTA HEAD WITH & W/O-POST PROCESS   Final Result      1.  No large vessel occlusion or aneurysm.   2.  Hazy left frontal lobe subarachnoid hemorrhage, decreased in conspicuity from prior. No new hemorrhage seen.   3.  Scattered opacifications of the ethmoid sinuses, likely related to support hardware.      DX-ABDOMEN FOR TUBE PLACEMENT   Final Result         1.  Nonspecific bowel gas pattern in the upper abdomen.   2.  Dobbhoff tube with tip terminating overlying the expected location of the first or second duodenal segment.      DX-CHEST-PORTABLE (1 VIEW)   Final Result         1.  Interstitial edema and/or infiltrates.   2.  Cardiomegaly   3.  Nasogastric tube terminates in the distal esophagus, recommend advancement   4.  Atherosclerosis      DX-ABDOMEN FOR TUBE PLACEMENT   Final Result         1.  Nonspecific bowel gas pattern in the upper abdomen.   2.  Nasogastric tube terminates just distal to the gastroesophageal junction, recommend advancement      CT-RENAL COLIC EVALUATION(A/P W/O)   Final Result         1.  Intrahepatic biliary air and nondependent air within the gallbladder, consider history of instrumentation or changes of cholangitis in the appropriate clinical setting.   2.  Large fat-containing bilateral inguinal hernias   3.  Enlarged prostate, consider causes of prostate enlargement with additional workup as clinically appropriate   4.  Diverticulosis   5.  Cholelithiasis   6.  Atherosclerosis      CT-HEAD W/O   Final Result         1.  Hazy left frontal subarachnoid hemorrhage.   2.  Nonspecific white matter changes, commonly associated  with small vessel ischemic disease.  Associated mild cerebral atrophy is noted.      Based solely on CT findings, the brain injury guideline category is mBIG 1.      SDH < 4mm   IPH < 4mm   SAH < 3 sulci and < 1mm      The original BIG retrospective analysis found radiographic progression in 0% of BIG 1 patients and 2.6% BIG 2.      These findings were discussed with the patient's clinician, Madhu Zambrano, on 9/24/2023 10:10 PM.      DX-CHEST-PORTABLE (1 VIEW)   Final Result      No acute cardiopulmonary abnormality identified.      IR-GASTROSTOMY PLACEMENT    (Results Pending)        Assessment/Plan  * Intraparenchymal hemorrhage of brain (HCC)- (present on admission)  Assessment & Plan  CT on 9/24 with hazy left frontal SAH  CT on 9/30 with new large IPH in the left frontal lobe  Strict blood pressure control with goal SBP less than 160  Goal sodium 145-150 - I am titrating hypertonic saline to achieve sodium goal  Will deescalate HTS today and slowly correct  Neuro checks every 2 hours    Bradycardia  Assessment & Plan  Telemetry reported bradycardia HR 40s for 4 minutes, asymptomatic  I ordered EKG, mag, Phos, and TSH  EKG showed sinus bradycardia    Continue telemetry monitor  Avoid medications that can further slow down the heart rate    ACP (advance care planning)  Assessment & Plan  10/12 I discussed advance care planning with the patient's wife at bedside, including diagnosis, prognosis, plan of care, risks and benefits of any therapies that could be offered.  We discussed regarding CODE STATUS, CPR and intubation with mechanical ventilation, discussed regarding risk and benefits, as well as alternatives including palliation and hospice as appropriate.  After long discussion with patient, patient's wife has decided for DNR/DNI, patient CODE STATUS has een updated in [facility] with DNR/DNI as per patient's wishes.   Guarded recovery, I consulted palliative care. Will decide PEG tube placement depends on the goal  of care  ACP 22min  10/13 patient is more alert and awake. The wife discussed with the other family members yesterday and wanted to change the code status back to full code. No pallitive care at this point. The wife agrees with PEG tube placement if patients fails SLP evelu again. I changed the code status back to full code. ACP 16min      Atelectasis- (present on admission)  Assessment & Plan  Patient unable to follow commands for incentive spirometry    Pneumonia due to infectious organism- (present on admission)  Assessment & Plan  Continue Zosyn    Acute respiratory failure with hypoxia (HCC)- (present on admission)  Assessment & Plan  Intubated 9/25-9/28  Reintubated 9/30-10/7  Continue oxygen    Thrombocytopenia (HCC)- (present on admission)  Assessment & Plan  Baseline ~110-125  Monitor, currently improved    Pneumobilia- (present on admission)  Assessment & Plan  As seen on CT renal (9/25/23).    9/26/23: No need for further interventions at this time per gen surg    Type 2 diabetes mellitus (HCC)- (present on admission)  Assessment & Plan  A1c 5.7% (6/2023)  Ongoing treatment with long and short acting insulin, adjust as indicated    10/14 BG high, I increase the Lantus from 39 units to 42 units   Continue to monitor  Hypoglycemia protocol    Primary hypertension- (present on admission)  Assessment & Plan  Hypertensive at presentation   Goal for normotension currently multimodality treatment      BMI 40.0-44.9, adult (HCC)- (present on admission)  Assessment & Plan  Future weight loss recommended    Alcohol dependence (HCC)- (present on admission)  Assessment & Plan  Multivitamin, folate, thiamine  Monitor, additional therapies as clinically indicated  9/29/2023 patient was at least 2-3 hard liquor drinks daily although this is unclear per patient and his wife's history.  .    Patient s/p treatment for acute alcohol withdrawal         VTE prophylaxis: scd    I have performed a physical exam and reviewed and  updated ROS and Plan today (10/16/2023). In review of yesterday's note (10/15/2023), there are no changes except as documented above.    Total time spent 52 minutes. I spent greater than 50% of the time for patient care, counseling, and coordination on this date, including unit/floor time, and face-to-face time with the patient as per interval events, my own review of patient's imaging and lab analysis and developing my assessment and plan above.

## 2023-10-17 NOTE — CARE PLAN
The patient is Watcher - Medium risk of patient condition declining or worsening    Shift Goals  Clinical Goals: safety and neuro monitoring  Patient Goals: DEENA  Family Goals: DEENA    Progress made toward(s) clinical / shift goals:    Problem: Respiratory  Goal: Patient will achieve/maintain optimum respiratory ventilation and gas exchange  Outcome: Progressing  Note: Patient on 2 L NC.      Problem: Skin Integrity  Goal: Skin integrity is maintained or improved  Outcome: Progressing  Note: Q 2 turns and TAPS in use.      Problem: Fall Risk  Goal: Patient will remain free from falls  Outcome: Progressing  Note: Fall precautions in place. Bed locked and in the lowest position. Side rails up x 3.      Problem: Seizure Precautions  Goal: Implementation of seizure precautions  Outcome: Progressing  Note: Seizure precautions in place. Bed locked and in the lowest position. Side rails padded x 4. Supplemental oxygen and suctioning available.      Problem: Neuro Status  Goal: Neuro status will remain stable or improve  Outcome: Progressing  Note: Q 4 neuro checks in place. Patient A&O x 0. Withdraws to pain.      Problem: Safety - Medical Restraint  Goal: Remains free of injury from restraints (Restraint for Interference with Medical Device)  Outcome: Progressing  Note: Bilateral soft wrist restrains in use.        Patient is not progressing towards the following goals: N/A

## 2023-10-18 ENCOUNTER — APPOINTMENT (OUTPATIENT)
Dept: RADIOLOGY | Facility: MEDICAL CENTER | Age: 68
DRG: 870 | End: 2023-10-18
Attending: STUDENT IN AN ORGANIZED HEALTH CARE EDUCATION/TRAINING PROGRAM
Payer: MEDICARE

## 2023-10-18 LAB
GLUCOSE BLD STRIP.AUTO-MCNC: 205 MG/DL (ref 65–99)
GLUCOSE BLD STRIP.AUTO-MCNC: 207 MG/DL (ref 65–99)
GLUCOSE BLD STRIP.AUTO-MCNC: 215 MG/DL (ref 65–99)

## 2023-10-18 PROCEDURE — 99232 SBSQ HOSP IP/OBS MODERATE 35: CPT | Performed by: STUDENT IN AN ORGANIZED HEALTH CARE EDUCATION/TRAINING PROGRAM

## 2023-10-18 PROCEDURE — 700102 HCHG RX REV CODE 250 W/ 637 OVERRIDE(OP): Performed by: INTERNAL MEDICINE

## 2023-10-18 PROCEDURE — 97530 THERAPEUTIC ACTIVITIES: CPT | Mod: CQ

## 2023-10-18 PROCEDURE — 700102 HCHG RX REV CODE 250 W/ 637 OVERRIDE(OP): Performed by: STUDENT IN AN ORGANIZED HEALTH CARE EDUCATION/TRAINING PROGRAM

## 2023-10-18 PROCEDURE — 97112 NEUROMUSCULAR REEDUCATION: CPT | Mod: CQ

## 2023-10-18 PROCEDURE — A9270 NON-COVERED ITEM OR SERVICE: HCPCS | Performed by: INTERNAL MEDICINE

## 2023-10-18 PROCEDURE — 770020 HCHG ROOM/CARE - TELE (206)

## 2023-10-18 PROCEDURE — A9270 NON-COVERED ITEM OR SERVICE: HCPCS | Performed by: STUDENT IN AN ORGANIZED HEALTH CARE EDUCATION/TRAINING PROGRAM

## 2023-10-18 PROCEDURE — 82962 GLUCOSE BLOOD TEST: CPT

## 2023-10-18 RX ADMIN — ALLOPURINOL 300 MG: 300 TABLET ORAL at 04:54

## 2023-10-18 RX ADMIN — DICLOFENAC SODIUM 2 G: 10 GEL TOPICAL at 18:08

## 2023-10-18 RX ADMIN — INSULIN HUMAN 4 UNITS: 100 INJECTION, SOLUTION PARENTERAL at 12:45

## 2023-10-18 RX ADMIN — MEMANTINE 5 MG: 5 TABLET ORAL at 18:07

## 2023-10-18 RX ADMIN — MEMANTINE 5 MG: 5 TABLET ORAL at 04:54

## 2023-10-18 RX ADMIN — INSULIN HUMAN 4 UNITS: 100 INJECTION, SOLUTION PARENTERAL at 05:14

## 2023-10-18 RX ADMIN — AMLODIPINE BESYLATE 10 MG: 10 TABLET ORAL at 04:54

## 2023-10-18 RX ADMIN — LOSARTAN POTASSIUM 100 MG: 50 TABLET, FILM COATED ORAL at 04:54

## 2023-10-18 RX ADMIN — DICLOFENAC SODIUM 2 G: 10 GEL TOPICAL at 04:55

## 2023-10-18 RX ADMIN — ACETAMINOPHEN 650 MG: 325 TABLET, FILM COATED ORAL at 04:54

## 2023-10-18 RX ADMIN — ATORVASTATIN CALCIUM 40 MG: 40 TABLET, FILM COATED ORAL at 18:07

## 2023-10-18 RX ADMIN — DICLOFENAC SODIUM 2 G: 10 GEL TOPICAL at 12:43

## 2023-10-18 ASSESSMENT — GAIT ASSESSMENTS: GAIT LEVEL OF ASSIST: UNABLE TO PARTICIPATE

## 2023-10-18 ASSESSMENT — COGNITIVE AND FUNCTIONAL STATUS - GENERAL
STANDING UP FROM CHAIR USING ARMS: TOTAL
CLIMB 3 TO 5 STEPS WITH RAILING: TOTAL
TURNING FROM BACK TO SIDE WHILE IN FLAT BAD: UNABLE
WALKING IN HOSPITAL ROOM: TOTAL
MOVING TO AND FROM BED TO CHAIR: UNABLE
MOVING FROM LYING ON BACK TO SITTING ON SIDE OF FLAT BED: UNABLE
MOBILITY SCORE: 6
SUGGESTED CMS G CODE MODIFIER MOBILITY: CN

## 2023-10-18 ASSESSMENT — PAIN DESCRIPTION - PAIN TYPE: TYPE: ACUTE PAIN

## 2023-10-18 NOTE — PROGRESS NOTES
Monitor summary: SR 63-93, IA 0.20, QRS 0.07, QT 0.41, with rare PVCs per strip from monitor room.

## 2023-10-18 NOTE — CARE PLAN
The patient is Watcher - Medium risk of patient condition declining or worsening    Shift Goals  Clinical Goals: No complications related to neuro status; NPO at midnight  Patient Goals: tin  Family Goals: tin    Progress made toward(s) clinical / shift goals:  Patient remains responsive to tactile stimuli. Neuro checks continued with no new complications determined. Patient has been NPO with tube feeding stopped since midnight.   Patient monitored for safety. Patient received oxygen at 2L/min via nasal cannula. Preventative skin measures continued. No signs of distress or discomfort have been observed.     Problem: Hemodynamics  Goal: Patient's hemodynamics, fluid balance and neurologic status will be stable or improve  Outcome: Progressing     Problem: Fluid Volume  Goal: Fluid volume balance will be maintained  Outcome: Progressing     Problem: Urinary - Renal Perfusion  Goal: Ability to achieve and maintain adequate renal perfusion and functioning will improve  Outcome: Progressing     Problem: Respiratory  Goal: Patient will achieve/maintain optimum respiratory ventilation and gas exchange  Outcome: Progressing     Problem: Physical Regulation  Goal: Diagnostic test results will improve  Outcome: Progressing  Goal: Signs and symptoms of infection will decrease  Outcome: Progressing     Problem: Knowledge Deficit - Standard  Goal: Patient and family/care givers will demonstrate understanding of plan of care, disease process/condition, diagnostic tests and medications  Outcome: Progressing     Problem: Skin Integrity  Goal: Skin integrity is maintained or improved  Outcome: Progressing     Problem: Fall Risk  Goal: Patient will remain free from falls  Outcome: Progressing     Problem: Pain - Standard  Goal: Alleviation of pain or a reduction in pain to the patient’s comfort goal  Outcome: Progressing     Problem: Safety - Medical Restraint  Goal: Remains free of injury from restraints (Restraint for Interference  with Medical Device)  Outcome: Progressing  Goal: Free from restraint(s) (Restraint for Interference with Medical Device)  Outcome: Progressing     Problem: Seizure Precautions  Goal: Implementation of seizure precautions  Outcome: Progressing     Problem: Psychosocial  Goal: Patient's level of anxiety will decrease  Outcome: Progressing  Goal: Spiritual and cultural needs incorporated into hospitalization  Outcome: Progressing     Problem: Risk for Aspiration  Goal: Patient's risk for aspiration will be absent or decrease  Outcome: Progressing     Problem: Optimal Care of the Stroke Patient  Goal: Optimal emergency care for the stroke patient  Outcome: Progressing  Goal: Optimal acute care for the stroke patient  Outcome: Progressing     Problem: Knowledge Deficit - Stroke Education  Goal: Patient's knowledge of stroke and risk factors will improve  Outcome: Progressing     Problem: Psychosocial - Patient Condition  Goal: Patient's ability to verbalize feelings about condition will improve  Outcome: Progressing  Goal: Patient's ability to re-evaluate and adapt role responsibilities will improve  Outcome: Progressing     Problem: Discharge Planning - Stroke  Goal: Ensure Stroke Core Measures are met prior to discharge  Outcome: Progressing  Goal: Patient’s continuum of care needs will be met  Outcome: Progressing     Problem: Neuro Status  Goal: Neuro status will remain stable or improve  Outcome: Progressing     Problem: Hemodynamic Monitoring  Goal: Patient's hemodynamics, fluid balance and neurologic status will be stable or improve  Outcome: Progressing     Problem: Respiratory - Stroke Patient  Goal: Patient will achieve/maintain optimum respiratory rate/effort  Outcome: Progressing     Problem: Dysphagia  Goal: Dysphagia will improve  Outcome: Progressing     Problem: Mobility - Stroke  Goal: Patient's capacity to carry out activities will improve  Outcome: Progressing  Goal: Spasticity will be prevented or  improved  Outcome: Progressing  Goal: Subluxation will be prevented or improved  Outcome: Progressing  Flowsheets (Taken 10/18/2023 4910)  Subluxation: Assisted with active range of motion         Patient is not progressing towards the following goals:

## 2023-10-18 NOTE — CARE PLAN
The patient is Watcher - Medium risk of patient condition declining or worsening    Shift Goals  Clinical Goals: PEG tube  Patient Goals: DEENA  Family Goals: to have patient be situated    Progress made toward(s) clinical / shift goals:  Patient continues to be nonverbal with non purposeful movements. Continues with bilateral wrist restraints while NGT is in place. Wife at bedside. NPO for peg tube that did not get placed today. Will attempt tomorrow.    Patient is not progressing towards the following goals:      Problem: Safety - Medical Restraint  Goal: Free from restraint(s) (Restraint for Interference with Medical Device)  Outcome: Not Progressing  Note: Continues with Bilateral wrist restraints

## 2023-10-18 NOTE — DISCHARGE PLANNING
Case Management Discharge Planning    Admission Date: 9/24/2023  GMLOS: 13.5  ALOS: 24    6-Clicks ADL Score: 6  6-Clicks Mobility Score: 6  PT and/or OT Eval ordered: Yes  Post-acute Referrals Ordered: Yes  Post-acute Choice Obtained: No  Has referral(s) been sent to post-acute provider:  Yes per protocol      Anticipated Discharge Dispo: Discharge Disposition: Disch to  rehab facility or distinct part unit (62)    DME Needed: No    Action(s) Taken: Updated Provider/Nurse on Discharge Plan and Patient Conference  CM reviewed chart and attended IDT rounds.  PEG placement possibly today; staff/MD report there is non purposeful movement on the R side.    Escalations Completed: None    Medically Clear: No    Next Steps: Procedures today; Medicaid screening request sent to [facility].    Barriers to Discharge: Medical clearance, Pending Placement, and Pending Procedures

## 2023-10-18 NOTE — THERAPY
Speech Language Therapy Contact Note    Patient Name: Sharon Guardado  Age:  68 y.o., Sex:  male  Medical Record #: [id number]  Today's Date: 10/18/2023       10/18/23 1000   Treatment Variance   Reason For Missed Therapy Medical - Patient  in Procedure   Interdisciplinary Plan of Care Collaboration   IDT Collaboration with  Other (See Comments)   Collaboration Comments Per EMR, pt is currently NPO for PEG placement. SLP to follow for dysphagia therapy on a later date.

## 2023-10-18 NOTE — THERAPY
Physical Therapy   Daily Treatment     Patient Name: Sharon Guardado  Age:  68 y.o., Sex:  male  Medical Record #: [id number]  Today's Date: 10/18/2023     Precautions  Precautions: Swallow Precautions;Nasogastric Tube  Comments: SBP < 160. Rt hemiparesis    Assessment    Pt w/eyes open, wife BS. Functionally the pt required total assist w/bed mobility and min->max assist for static sit w/Lft UE support. Pt inconsistent w/following commands, no active movement noted Rt UE/LE today. Oral care provided, significant amount of dried secretions removed from the roof of his mouth.   PT will cont to follow.     Plan    Treatment Plan Status: Continue Current Treatment Plan  Type of Treatment: Bed Mobility, Gait Training, Neuro Re-Education / Balance, Self Care / Home Evaluation, Therapeutic Activities, Therapeutic Exercise  Treatment Frequency: 3 Times per Week  Treatment Duration: Until Therapy Goals Met    DC Equipment Recommendations: Unable to determine at this time  Discharge Recommendations: Recommend post-acute placement for additional physical therapy services prior to discharge home    Objective       10/18/23 1140   Charge Group   PT Therapeutic Activities (Units) 3   PT Neuromuscular Re-Education / Balance (Units) 1   Total Time Spent   PT Therapeutic Activities Time Spent (Mins) 45   PT Neuromuscular Re-Education/Balance Time Spent (Mins) 10   PT Total Time Spent (Calculated) 55   Precautions   Precautions Swallow Precautions;Nasogastric Tube   Comments SBP < 160. Rt hemiparesis   Other Treatments   Other Treatments Provided EOB x 15 mins w/total assist for midline. Oral care provided, cleaned out mouth of large amounts of dried secretions.   Balance   Sitting Balance (Static) Trace +   Sitting Balance (Dynamic) Dependent   Weight Shift Sitting Absent   Bed Mobility    Supine to Sit Total Assist   Sit to Supine Total Assist   Scooting Total Assist   Rolling Total Assist to Rt.;Total Assist to Lt.   Skilled  Intervention Verbal Cuing;Postural Facilitation;Compensatory Strategies   Gait Analysis   Gait Level Of Assist Unable to Participate   Functional Mobility   Sit to Stand Unable to Participate   Bed, Chair, Wheelchair Transfer Unable to Participate   How much difficulty does the patient currently have...   Turning over in bed (including adjusting bedclothes, sheets and blankets)? 1   Sitting down on and standing up from a chair with arms (e.g., wheelchair, bedside commode, etc.) 1   Moving from lying on back to sitting on the side of the bed? 1   How much help from another person does the patient currently need...   Moving to and from a bed to a chair (including a wheelchair)? 1   Need to walk in a hospital room? 1   Climbing 3-5 steps with a railing? 1   6 clicks Mobility Score 6   Short Term Goals    Short Term Goal # 1 pt will be able to complete supine<>Sitting with min assist in 6tx in order to improve mobility   Goal Outcome # 1 goal not met   Short Term Goal # 2 pt will be able to complete functional transfers with mod assist in 6tx   Goal Outcome # 2 Goal not met   Short Term Goal # 3 pt will be able to ambulate 15ft with FWW and min assist in 6tx   Goal Outcome # 3 Goal not met   Education Group   Role of Physical Therapist Patient Response Patient;Acceptance;Explanation;Reinforcement Needed   Physical Therapy Treatment Plan   Physical Therapy Treatment Plan Continue Current Treatment Plan   Anticipated Discharge Equipment and Recommendations   DC Equipment Recommendations Unable to determine at this time   Discharge Recommendations Recommend post-acute placement for additional physical therapy services prior to discharge home   Interdisciplinary Plan of Care Collaboration   IDT Collaboration with  Family / Caregiver;Nursing   Patient Position at End of Therapy In Bed;Call Light within Reach;Tray Table within Reach;Phone within Reach   Collaboration Comments Nrsg notified of pts tx efforts   Session  Information   Date / Session Number  10/18--5 (1/3, 10/23) PTA/1   Supervising Physical Therapist (PTA Treatments Only)   Supervising Physical Therapist Gayle Gamboa

## 2023-10-18 NOTE — DISCHARGE PLANNING
PEG placement scheduled for today.  Would appreciate updated therapy notes when medically appropriate

## 2023-10-18 NOTE — PROGRESS NOTES
[facility] Medicine Daily Progress Note    Date of Service  10/17/2023    Chief Complaint  Sharon Guardado is a 68 y.o. male admitted 9/24/2023 with AMS    Hospital Course  68-year-old male with a past medical history of alcohol abuse, hypertension, obesity who presented with altered mental status and a ground-level fall on 9/24/2023.  On admission he was noted to have a small frontal subarachnoid hemorrhage on CT head.  He was also noted to have severe sepsis secondary to UTI.  She was treated with IV ceftriaxone.  Patient developed symptoms of alcohol withdrawal and was intubated.  Patient was subsequently extubated and transferred to telemetry on 9/30.  The patient subsequently had worsening mental status, on 9/30 found to have a left frontal lobe hemorrhage with surrounding vasogenic edema with mild mass effect, deemed nonoperative by neurosurgery  Was readmitted to the ICU level of care, intubated, treated with hypertonic saline, empirically treated with Unasyn for fever, overall state on mechanical ventilation for 9 days, during the course there was a concern for possible abdominal infection, gallbladder infection, surgery was consulted,  HIDA scan was obtained and was negative for acute cholecystitis, surgery not indicated as per general surgery opinion.  The patient found with significant right-sided weakness, aphasia.  On 10/10 patient again found appropriate to transfer out of ICU.    Very slow improvement. Awake, not following commands for me, no communication/interaction with me, still aphasic, R weakness. Therapy note on 10/13 notes he was sitting up in bed with their help and even tried brushing his teeth.     EKG showed sinus bradycardia  PEG tube  placement pending.  Lantus titrated to 50 units qhs.    RUE swelling - US  1. No deep venous thrombosis. 2. Acute to subacute, occlusive thrombus in the median antecubital vein. Try supportive care initially for SVT treatment with elevation, warm  compress, etc.     Interval Problem Update  10/17  Not following, not responding. Discussed with wife at bedside, was more active on Friday-sat, OT note from 10/13 that states he was sitting up in bed with OT help and was even trying to brush his teeth  PEG tube planned for tomorrow. Discharge planning ongoing. Vitals stable.     I have discussed this patient's plan of care and discharge plan at IDT rounds today with Case Management, Nursing, Nursing leadership, and other members of the IDT team.    Code Status  Full Code    Disposition    I have placed the appropriate orders for post-discharge needs.    Review of Systems  Review of Systems   Unable to perform ROS: Acuity of condition        Physical Exam  Temp:  [36.3 °C (97.3 °F)-36.8 °C (98.2 °F)] 36.3 °C (97.3 °F)  Pulse:  [] 95  Resp:  [16-20] 17  BP: (135-150)/(75-81) 149/81  SpO2:  [91 %-94 %] 94 %    Physical Exam  Constitutional:       Appearance: He is obese.      Comments: Awake, not responding to commands, moves left upper and lower   HENT:      Head: Normocephalic.      Mouth/Throat:      Mouth: Mucous membranes are moist.   Eyes:      Extraocular Movements: Extraocular movements intact.      Conjunctiva/sclera: Conjunctivae normal.   Cardiovascular:      Rate and Rhythm: Normal rate and regular rhythm.      Pulses: Normal pulses.      Heart sounds: Normal heart sounds.   Pulmonary:      Effort: Pulmonary effort is normal.      Breath sounds: Normal breath sounds.   Abdominal:      General: Bowel sounds are normal.      Palpations: Abdomen is soft.      Tenderness: There is no abdominal tenderness. There is no guarding.   Musculoskeletal:         General: No swelling or tenderness.      Cervical back: Normal range of motion and neck supple.   Skin:     General: Skin is warm and dry.   Neurological:      Comments: Aphasia, R weakness, not following commands, moving left side spontaneous         Fluids  No intake or output data in the 24 hours  ending 10/17/23 2100      Laboratory  Recent Labs     10/15/23  0810   WBC 11.8*   RBC 5.20   HEMOGLOBIN 15.8   HEMATOCRIT 47.6   MCV 91.5   MCH 30.4   MCHC 33.2   RDW 43.1   PLATELETCT 243   MPV 11.7         Recent Labs     10/15/23  0810   SODIUM 143   POTASSIUM 4.4   CHLORIDE 103   CO2 29   GLUCOSE 251*   BUN 28*   CREATININE 0.89   CALCIUM 10.4         Recent Labs     10/17/23  0945   INR 1.10               Imaging  US-EXTREMITY VENOUS UPPER UNILAT RIGHT   Final Result      DX-ABDOMEN FOR TUBE PLACEMENT   Final Result      Nasogastric tube tip overlies the duodenal bulb      DX-ABDOMEN FOR TUBE PLACEMENT   Final Result      1.  Enteric tube tip again projects over the 2nd portion of the duodenum.      DX-ABDOMEN FOR TUBE PLACEMENT   Final Result      Enteric tube tip projects over the second portion of the duodenum      DX-CHEST-PORTABLE (1 VIEW)   Final Result      Unchanged bibasilar underinflation atelectasis      DX-ABDOMEN FOR TUBE PLACEMENT   Final Result      Enteric tube tip projects over the gastric antrum or proximal duodenum      DX-CHEST-PORTABLE (1 VIEW)   Final Result      Improving bibasilar pulmonary opacities.      DX-CHEST-PORTABLE (1 VIEW)   Final Result         Findings on chest radiograph appear stable since the prior radiograph.  No new abnormalities are identified.      NM-HEPATOBILIARY SCAN   Final Result      There is visualization of the gallbladder after morphine administration, indicating the cystic duct is still patent. No scintigraphy evidence of acute cholecystitis.      DX-CHEST-PORTABLE (1 VIEW)   Final Result         1.  Mild pulmonary edema and/or infiltrates.   2.  Cardiomegaly   3.  Atherosclerosis      DX-CHEST-PORTABLE (1 VIEW)   Final Result         1.  No acute cardiopulmonary disease.   2.  Cardiomegaly   3.  Atherosclerosis      US-RUQ   Final Result      1.  Minimal gallbladder wall thickening and possible sludge.  Cholecystitis is not excluded.   2.  Minimal  pericholecystic fluid may be due to gallbladder inflammation or liver disease.   3.  Enlarged echogenic liver suggesting fatty infiltration.   4.  Limited evaluation of the pancreas abdominal aorta.      DX-CHEST-PORTABLE (1 VIEW)   Final Result         1.  Pulmonary edema and/or infiltrates are identified, which are stable since the prior exam.   2.  Cardiomegaly   3.  Atherosclerosis      US-EXTREMITY VENOUS UPPER UNILAT RIGHT   Final Result      DX-CHEST-PORTABLE (1 VIEW)   Final Result         1.  Pulmonary edema and/or infiltrates are identified, which are stable since the prior exam.   2.  Cardiomegaly   3.  Atherosclerosis      DX-CHEST-PORTABLE (1 VIEW)   Final Result         1.  Pulmonary edema and/or infiltrates are identified, which are stable since the prior exam.   2.  Cardiomegaly   3.  Atherosclerosis      DX-CHEST-PORTABLE (1 VIEW)   Final Result         1.  Pulmonary edema and/or infiltrates are identified, which are somewhat decreased since the prior exam.   2.  Cardiomegaly   3.  Atherosclerosis      MR-MRA HEAD-W/O   Final Result         Normal intracranial MRA.      MR-BRAIN-WITH & W/O   Final Result         Left frontal lobe hemorrhage with surrounding vasogenic edema and mild mass effect upon the frontal horn of the left lateral ventricle. There is approximately 2 mm of midline shift to the right.      Age-related volume loss and chronic microvascular ischemic changes.      No abnormal intracranial enhancement is identified.      CT-HEAD W/O   Final Result         1. Stable 6 cm left frontal intraparenchymal hemorrhage with mild, 2 mm left-to-right subfalcine herniation.   2. No new abnormality or change.         DX-CHEST-PORTABLE (1 VIEW)   Final Result         1.  Pulmonary edema and/or infiltrates are identified, which appear somewhat increased since the prior exam.   2.  Cardiomegaly   3.  Atherosclerosis      DX-ABDOMEN FOR TUBE PLACEMENT   Final Result      NG tube extends below the  diaphragm and appears to extend through the region of the stomach with tip at the level of the proximal duodenum.      DX-CHEST-PORTABLE (1 VIEW)   Final Result      1.  Grossly satisfactory appearance of tubes and lines.   2.  No evidence of pneumothorax.   3.  Marked hypoinflation with probable bibasilar atelectasis.   4.  Enlarged cardiac silhouette with vascular congestion.      CT-HEAD W/O   Final Result         1.  New large parenchymal hemorrhage involving the frontal lobe is identified with some surrounding edema.      2.  Subarachnoid hemorrhage is also noted in the area.      3.  Minimal midline shift to the right side.      Findings are consistent with atrophy.  Decreased attenuation in the periventricular white matter likely indicates microvascular ischemic disease.      Based solely on CT findings, the brain injury guideline category is mBIG 3.      EDH   IVH   Displaced skull fx   SDH > 8mm   IPH > 8mm or multiple   SAH bi-hemispheric or > 3mm      The original BIG retrospective analysis found radiographic progression in 0% of BIG 1 patients and 2.6% BIG 2.      These findings were discussed with GEETA MOLINA on 09/30/2023.      DX-CHEST-PORTABLE (1 VIEW)   Final Result      Stable multiple pulmonary vascular dilation/congestion. Otherwise, negative.      DX-CHEST-PORTABLE (1 VIEW)   Final Result      1.  Increasing airspace disease at the left base.      DX-CHEST-LIMITED (1 VIEW)   Final Result         1.  Pulmonary edema and/or infiltrates are identified, which are somewhat decreased since the prior exam.   2.  Cardiomegaly   3.  Atherosclerosis      DX-CHEST-PORTABLE (1 VIEW)   Final Result         1.  Pulmonary edema and/or infiltrates are identified, which appear somewhat increased since the prior exam.   2.  Cardiomegaly   3.  Atherosclerosis      DX-CHEST-PORTABLE (1 VIEW)   Final Result         1.  Interstitial edema and/or infiltrates, slightly decreased since prior study.   2.  Cardiomegaly    3.  Atherosclerosis      DX-CHEST-LIMITED (1 VIEW)   Final Result      Right IJ catheter tip projects in the SVC. No pneumothorax.      CT-CTA NECK WITH & W/O-POST PROCESSING   Final Result      1.  Estimated 50-75% stenosis of the BILATERAL carotid arteries   2.  Estimated greater than 50% stenosis at the vertebral artery ostium on each side      CT-CTA HEAD WITH & W/O-POST PROCESS   Final Result      1.  No large vessel occlusion or aneurysm.   2.  Hazy left frontal lobe subarachnoid hemorrhage, decreased in conspicuity from prior. No new hemorrhage seen.   3.  Scattered opacifications of the ethmoid sinuses, likely related to support hardware.      DX-ABDOMEN FOR TUBE PLACEMENT   Final Result         1.  Nonspecific bowel gas pattern in the upper abdomen.   2.  Dobbhoff tube with tip terminating overlying the expected location of the first or second duodenal segment.      DX-CHEST-PORTABLE (1 VIEW)   Final Result         1.  Interstitial edema and/or infiltrates.   2.  Cardiomegaly   3.  Nasogastric tube terminates in the distal esophagus, recommend advancement   4.  Atherosclerosis      DX-ABDOMEN FOR TUBE PLACEMENT   Final Result         1.  Nonspecific bowel gas pattern in the upper abdomen.   2.  Nasogastric tube terminates just distal to the gastroesophageal junction, recommend advancement      CT-RENAL COLIC EVALUATION(A/P W/O)   Final Result         1.  Intrahepatic biliary air and nondependent air within the gallbladder, consider history of instrumentation or changes of cholangitis in the appropriate clinical setting.   2.  Large fat-containing bilateral inguinal hernias   3.  Enlarged prostate, consider causes of prostate enlargement with additional workup as clinically appropriate   4.  Diverticulosis   5.  Cholelithiasis   6.  Atherosclerosis      CT-HEAD W/O   Final Result         1.  Hazy left frontal subarachnoid hemorrhage.   2.  Nonspecific white matter changes, commonly associated with small  vessel ischemic disease.  Associated mild cerebral atrophy is noted.      Based solely on CT findings, the brain injury guideline category is mBIG 1.      SDH < 4mm   IPH < 4mm   SAH < 3 sulci and < 1mm      The original BIG retrospective analysis found radiographic progression in 0% of BIG 1 patients and 2.6% BIG 2.      These findings were discussed with the patient's clinician, Madhu Zambrano, on 9/24/2023 10:10 PM.      DX-CHEST-PORTABLE (1 VIEW)   Final Result      No acute cardiopulmonary abnormality identified.      IR-GASTROSTOMY PLACEMENT    (Results Pending)        Assessment/Plan  * Intraparenchymal hemorrhage of brain (HCC)- (present on admission)  Assessment & Plan  CT on 9/24 with hazy left frontal SAH  CT on 9/30 with new large IPH in the left frontal lobe  Strict blood pressure control with goal SBP less than 160  Goal sodium 145-150 - I am titrating hypertonic saline to achieve sodium goal  Will deescalate HTS today and slowly correct  Neuro checks every 2 hours    Bradycardia  Assessment & Plan  Telemetry reported bradycardia HR 40s for 4 minutes, asymptomatic  I ordered EKG, mag, Phos, and TSH  EKG showed sinus bradycardia    Continue telemetry monitor  Avoid medications that can further slow down the heart rate    ACP (advance care planning)  Assessment & Plan  10/12 I discussed advance care planning with the patient's wife at bedside, including diagnosis, prognosis, plan of care, risks and benefits of any therapies that could be offered.  We discussed regarding CODE STATUS, CPR and intubation with mechanical ventilation, discussed regarding risk and benefits, as well as alternatives including palliation and hospice as appropriate.  After long discussion with patient, patient's wife has decided for DNR/DNI, patient CODE STATUS has een updated in [facility] with DNR/DNI as per patient's wishes.   Guarded recovery, I consulted palliative care. Will decide PEG tube placement depends on the goal of care   ACP 22min  10/13 patient is more alert and awake. The wife discussed with the other family members yesterday and wanted to change the code status back to full code. No pallitive care at this point. The wife agrees with PEG tube placement if patients fails SLP evelu again. I changed the code status back to full code. ACP 16min      Atelectasis- (present on admission)  Assessment & Plan  Patient unable to follow commands for incentive spirometry    Pneumonia due to infectious organism- (present on admission)  Assessment & Plan  Continue Zosyn    Acute respiratory failure with hypoxia (HCC)- (present on admission)  Assessment & Plan  Intubated 9/25-9/28  Reintubated 9/30-10/7  Continue oxygen    Thrombocytopenia (HCC)- (present on admission)  Assessment & Plan  Baseline ~110-125  Monitor, currently improved    Pneumobilia- (present on admission)  Assessment & Plan  As seen on CT renal (9/25/23).    9/26/23: No need for further interventions at this time per gen surg    Type 2 diabetes mellitus (HCC)- (present on admission)  Assessment & Plan  A1c 5.7% (6/2023)  Ongoing treatment with long and short acting insulin, adjust as indicated    10/14 BG high, I increase the Lantus from 39 units to 42 units   Continue to monitor  Hypoglycemia protocol    Primary hypertension- (present on admission)  Assessment & Plan  Hypertensive at presentation   Goal for normotension currently multimodality treatment      BMI 40.0-44.9, adult (HCC)- (present on admission)  Assessment & Plan  Future weight loss recommended    Alcohol dependence (HCC)- (present on admission)  Assessment & Plan  Multivitamin, folate, thiamine  Monitor, additional therapies as clinically indicated  9/29/2023 patient was at least 2-3 hard liquor drinks daily although this is unclear per patient and his wife's history.  .    Patient s/p treatment for acute alcohol withdrawal         VTE prophylaxis: scd    I have performed a physical exam and reviewed and updated  ROS and Plan today (10/17/2023). In review of yesterday's note (10/16/2023), there are no changes except as documented above.    Total time spent 53 minutes. I spent greater than 50% of the time for patient care, counseling, and coordination on this date, including unit/floor time, and face-to-face time with the patient as per interval events, my own review of patient's imaging and lab analysis and developing my assessment and plan above.    I certify that the patient requires continued medically necessary hospital services for the treatment of intraparenchymal hemorrhage, encephalopathy, debility and will remain in the hospital for 7 days.  Discharge plan is anticipated to be 10/24/23.

## 2023-10-18 NOTE — PROGRESS NOTES
Monitor summary: SR 90-98, MS .18, QRS .08, QT .37, with rare PVCs and PACs per strip from monitor room.

## 2023-10-19 ENCOUNTER — ANESTHESIA (OUTPATIENT)
Dept: SURGERY | Facility: MEDICAL CENTER | Age: 68
DRG: 870 | End: 2023-10-19
Payer: MEDICARE

## 2023-10-19 ENCOUNTER — ANESTHESIA EVENT (OUTPATIENT)
Dept: SURGERY | Facility: MEDICAL CENTER | Age: 68
DRG: 870 | End: 2023-10-19
Payer: MEDICARE

## 2023-10-19 PROBLEM — R13.10 DYSPHAGIA: Status: ACTIVE | Noted: 2023-10-19

## 2023-10-19 LAB — GLUCOSE BLD STRIP.AUTO-MCNC: 227 MG/DL (ref 65–99)

## 2023-10-19 PROCEDURE — 160041 HCHG SURGERY MINUTES - EA ADDL 1 MIN LEVEL 4: Performed by: SURGERY

## 2023-10-19 PROCEDURE — 160035 HCHG PACU - 1ST 60 MINS PHASE I: Performed by: SURGERY

## 2023-10-19 PROCEDURE — 700101 HCHG RX REV CODE 250: Performed by: ANESTHESIOLOGY

## 2023-10-19 PROCEDURE — 97535 SELF CARE MNGMENT TRAINING: CPT

## 2023-10-19 PROCEDURE — 770020 HCHG ROOM/CARE - TELE (206)

## 2023-10-19 PROCEDURE — 99222 1ST HOSP IP/OBS MODERATE 55: CPT | Mod: 57 | Performed by: SURGERY

## 2023-10-19 PROCEDURE — 160029 HCHG SURGERY MINUTES - 1ST 30 MINS LEVEL 4: Performed by: SURGERY

## 2023-10-19 PROCEDURE — 700111 HCHG RX REV CODE 636 W/ 250 OVERRIDE (IP): Mod: JZ | Performed by: ANESTHESIOLOGY

## 2023-10-19 PROCEDURE — 160036 HCHG PACU - EA ADDL 30 MINS PHASE I: Performed by: SURGERY

## 2023-10-19 PROCEDURE — 160002 HCHG RECOVERY MINUTES (STAT): Performed by: SURGERY

## 2023-10-19 PROCEDURE — 99233 SBSQ HOSP IP/OBS HIGH 50: CPT | Performed by: STUDENT IN AN ORGANIZED HEALTH CARE EDUCATION/TRAINING PROGRAM

## 2023-10-19 PROCEDURE — 160009 HCHG ANES TIME/MIN: Performed by: SURGERY

## 2023-10-19 PROCEDURE — 700101 HCHG RX REV CODE 250: Performed by: SURGERY

## 2023-10-19 PROCEDURE — 43653 LAPAROSCOPY GASTROSTOMY: CPT | Performed by: SURGERY

## 2023-10-19 PROCEDURE — 110371 HCHG SHELL REV 272: Performed by: SURGERY

## 2023-10-19 PROCEDURE — 82962 GLUCOSE BLOOD TEST: CPT

## 2023-10-19 PROCEDURE — 160048 HCHG OR STATISTICAL LEVEL 1-5: Performed by: SURGERY

## 2023-10-19 PROCEDURE — 0DH64UZ INSERTION OF FEEDING DEVICE INTO STOMACH, PERCUTANEOUS ENDOSCOPIC APPROACH: ICD-10-PCS | Performed by: SURGERY

## 2023-10-19 RX ORDER — ROCURONIUM BROMIDE 10 MG/ML
INJECTION, SOLUTION INTRAVENOUS PRN
Status: DISCONTINUED | OUTPATIENT
Start: 2023-10-19 | End: 2023-10-19 | Stop reason: SURG

## 2023-10-19 RX ORDER — HALOPERIDOL 5 MG/ML
1 INJECTION INTRAMUSCULAR
Status: DISCONTINUED | OUTPATIENT
Start: 2023-10-19 | End: 2023-10-19 | Stop reason: HOSPADM

## 2023-10-19 RX ORDER — DEXAMETHASONE SODIUM PHOSPHATE 4 MG/ML
INJECTION, SOLUTION INTRA-ARTICULAR; INTRALESIONAL; INTRAMUSCULAR; INTRAVENOUS; SOFT TISSUE PRN
Status: DISCONTINUED | OUTPATIENT
Start: 2023-10-19 | End: 2023-10-19 | Stop reason: SURG

## 2023-10-19 RX ORDER — BUPIVACAINE HYDROCHLORIDE AND EPINEPHRINE 5; 5 MG/ML; UG/ML
INJECTION, SOLUTION EPIDURAL; INTRACAUDAL; PERINEURAL
Status: DISCONTINUED | OUTPATIENT
Start: 2023-10-19 | End: 2023-10-19 | Stop reason: HOSPADM

## 2023-10-19 RX ORDER — EPHEDRINE SULFATE 50 MG/ML
INJECTION, SOLUTION INTRAVENOUS PRN
Status: DISCONTINUED | OUTPATIENT
Start: 2023-10-19 | End: 2023-10-19 | Stop reason: SURG

## 2023-10-19 RX ORDER — SODIUM CHLORIDE, SODIUM LACTATE, POTASSIUM CHLORIDE, CALCIUM CHLORIDE 600; 310; 30; 20 MG/100ML; MG/100ML; MG/100ML; MG/100ML
INJECTION, SOLUTION INTRAVENOUS CONTINUOUS
Status: DISCONTINUED | OUTPATIENT
Start: 2023-10-19 | End: 2023-10-19 | Stop reason: HOSPADM

## 2023-10-19 RX ORDER — IPRATROPIUM BROMIDE AND ALBUTEROL SULFATE 2.5; .5 MG/3ML; MG/3ML
3 SOLUTION RESPIRATORY (INHALATION)
Status: DISCONTINUED | OUTPATIENT
Start: 2023-10-19 | End: 2023-10-19 | Stop reason: HOSPADM

## 2023-10-19 RX ORDER — LIDOCAINE HYDROCHLORIDE 20 MG/ML
INJECTION, SOLUTION EPIDURAL; INFILTRATION; INTRACAUDAL; PERINEURAL PRN
Status: DISCONTINUED | OUTPATIENT
Start: 2023-10-19 | End: 2023-10-19 | Stop reason: SURG

## 2023-10-19 RX ORDER — ONDANSETRON 2 MG/ML
4 INJECTION INTRAMUSCULAR; INTRAVENOUS
Status: DISCONTINUED | OUTPATIENT
Start: 2023-10-19 | End: 2023-10-19 | Stop reason: HOSPADM

## 2023-10-19 RX ORDER — ONDANSETRON 2 MG/ML
INJECTION INTRAMUSCULAR; INTRAVENOUS PRN
Status: DISCONTINUED | OUTPATIENT
Start: 2023-10-19 | End: 2023-10-19 | Stop reason: SURG

## 2023-10-19 RX ORDER — OXYCODONE HCL 5 MG/5 ML
5 SOLUTION, ORAL ORAL
Status: DISCONTINUED | OUTPATIENT
Start: 2023-10-19 | End: 2023-10-19 | Stop reason: HOSPADM

## 2023-10-19 RX ORDER — OXYCODONE HCL 5 MG/5 ML
10 SOLUTION, ORAL ORAL
Status: DISCONTINUED | OUTPATIENT
Start: 2023-10-19 | End: 2023-10-19 | Stop reason: HOSPADM

## 2023-10-19 RX ORDER — CEFAZOLIN SODIUM 1 G/3ML
INJECTION, POWDER, FOR SOLUTION INTRAMUSCULAR; INTRAVENOUS PRN
Status: DISCONTINUED | OUTPATIENT
Start: 2023-10-19 | End: 2023-10-19 | Stop reason: SURG

## 2023-10-19 RX ADMIN — DEXAMETHASONE SODIUM PHOSPHATE 4 MG: 4 INJECTION INTRA-ARTICULAR; INTRALESIONAL; INTRAMUSCULAR; INTRAVENOUS; SOFT TISSUE at 12:37

## 2023-10-19 RX ADMIN — PROPOFOL 100 MG: 10 INJECTION, EMULSION INTRAVENOUS at 12:37

## 2023-10-19 RX ADMIN — DICLOFENAC SODIUM 2 G: 10 GEL TOPICAL at 18:00

## 2023-10-19 RX ADMIN — ONDANSETRON 4 MG: 2 INJECTION INTRAMUSCULAR; INTRAVENOUS at 12:37

## 2023-10-19 RX ADMIN — ROCURONIUM BROMIDE 50 MG: 50 INJECTION, SOLUTION INTRAVENOUS at 12:37

## 2023-10-19 RX ADMIN — EPHEDRINE SULFATE 10 MG: 50 INJECTION INTRAVENOUS at 13:06

## 2023-10-19 RX ADMIN — LIDOCAINE HYDROCHLORIDE 100 MG: 20 INJECTION, SOLUTION EPIDURAL; INFILTRATION; INTRACAUDAL at 12:37

## 2023-10-19 RX ADMIN — SUGAMMADEX 200 MG: 100 INJECTION, SOLUTION INTRAVENOUS at 13:16

## 2023-10-19 RX ADMIN — CEFAZOLIN 3 G: 1 INJECTION, POWDER, FOR SOLUTION INTRAMUSCULAR; INTRAVENOUS at 12:44

## 2023-10-19 ASSESSMENT — COGNITIVE AND FUNCTIONAL STATUS - GENERAL
DRESSING REGULAR LOWER BODY CLOTHING: TOTAL
DRESSING REGULAR UPPER BODY CLOTHING: TOTAL
TOILETING: TOTAL
PERSONAL GROOMING: TOTAL
EATING MEALS: TOTAL
DAILY ACTIVITIY SCORE: 6
HELP NEEDED FOR BATHING: TOTAL
SUGGESTED CMS G CODE MODIFIER DAILY ACTIVITY: CN

## 2023-10-19 ASSESSMENT — PAIN DESCRIPTION - PAIN TYPE
TYPE: ACUTE PAIN;SURGICAL PAIN
TYPE: ACUTE PAIN
TYPE: ACUTE PAIN

## 2023-10-19 ASSESSMENT — FIBROSIS 4 INDEX: FIB4 SCORE: 2.79

## 2023-10-19 NOTE — ANESTHESIA POSTPROCEDURE EVALUATION
Patient: Sharon Guardado    Procedure Summary     Date: 10/19/23 Room / Location: [facility] 09 / SURGERY [facility]    Anesthesia Start: 1232 Anesthesia Stop: 1334    Procedure: CREATION, GASTROSTOMY, LAPAROSCOPIC (Abdomen) Diagnosis: (Need for Tube feeding)    Surgeons: Mercy Toussaint M.D. Responsible Provider: Andrzej Bazan M.D.    Anesthesia Type: general ASA Status: 4 - Emergent          Final Anesthesia Type: general  Last vitals  BP   Blood Pressure : (!) 147/76    Temp   36.2 °C (97.1 °F)    Pulse   95   Resp   18    SpO2   96 %      Anesthesia Post Evaluation    Patient location during evaluation: PACU  Patient participation: complete - patient cannot participate  Level of consciousness: sleepy but conscious    Airway patency: patent  Anesthetic complications: no  Cardiovascular status: hemodynamically stable  Respiratory status: acceptable  Hydration status: euvolemic    PONV: none          No notable events documented.     Nurse Pain Score: 0 (NPRS)

## 2023-10-19 NOTE — THERAPY
Occupational Therapy  Daily Treatment     Patient Name: Sharon Guardado  Age:  68 y.o., Sex:  male  Medical Record #: [id number]  Today's Date: 10/19/2023     Precautions  Precautions: (P) Fall Risk, Swallow Precautions, Nasogastric Tube  Comments: SBP < 160. Rt hemiparesis    Assessment    Pt seen for follow up OT tx session, pt lethargic throughout session following minimal commands, still requiring total assist for mobility and ADLs at this time. Pt initiated at times with functional tasks but ultimately required significant assistance. Will continue to see for skilled therapy while admitted as well as recommend post-acute placement.    Plan    Treatment Plan Status: (P) Continue Current Treatment Plan  Type of Treatment: Self Care / Activities of Daily Living, Manual Therapy Techniques, Neuro Re-Education / Balance, Therapeutic Exercises, Therapeutic Activity, Adaptive Equipment  Treatment Frequency: 3 Times per Week  Treatment Duration: Until Therapy Goals Met    DC Equipment Recommendations: (P) Unable to determine at this time  Discharge Recommendations: (P) Recommend post-acute placement for additional occupational therapy services prior to discharge home      Objective       10/19/23 0924   Precautions   Precautions Fall Risk;Swallow Precautions;Nasogastric Tube   Balance   Sitting Balance (Static) Poor -   Sitting Balance (Dynamic) Dependent   Weight Shift Sitting Absent   Skilled Intervention Verbal Cuing;Facilitation   Bed Mobility    Supine to Sit Total Assist   Sit to Supine Total Assist   Scooting Total Assist   Rolling Total Assist to Rt.;Total Assist to Lt.   Skilled Intervention Verbal Cuing;Facilitation   Activities of Daily Living   Grooming Maximal Assist;Seated   Upper Body Dressing Total Assist   Lower Body Dressing Total Assist   Toileting Total Assist   Skilled Intervention Verbal Cuing;Facilitation   How much help from another person does the patient currently need...   Putting on and  taking off regular lower body clothing? 1   Bathing (including washing, rinsing, and drying)? 1   Toileting, which includes using a toilet, bedpan, or urinal? 1   Putting on and taking off regular upper body clothing? 1   Taking care of personal grooming such as brushing teeth? 1   Eating meals? 1   6 Clicks Daily Activity Score 6   Functional Mobility   Sit to Stand Unable to Participate   Bed, Chair, Wheelchair Transfer Unable to Participate   Mobility bed mobility, EOB only   Skilled Intervention Verbal Cuing;Compensatory Strategies   Activity Tolerance   Sitting Edge of Bed 12 min   Patient / Family Goals   Patient / Family Goal #1 Spouse wishes for pt to get off the vent   Goal #1 Outcome Goal met   Short Term Goals   Short Term Goal # 1 Pt will sit EOB and perform seated grooming w/ min A   Goal Outcome # 1 Progressing slower than expected   Short Term Goal # 2 Pt will demo 2/5  strength in R UE   Goal Outcome # 2 Progressing slower than expected   Short Term Goal # 3 Pt will demo UB dressing w/ mod A   Goal Outcome # 3 Goal not met   Short Term Goal # 4 Pt will demo ADL txf w/ mod A   Goal Outcome # 4 Progressing slower than expected   Education Group   Education Provided Role of Occupational Therapist   Role of Occupational Therapist Patient Response Patient;Nonacceptance;Explanation   Occupational Therapy Treatment Plan    O.T. Treatment Plan Continue Current Treatment Plan   Anticipated Discharge Equipment and Recommendations   DC Equipment Recommendations Unable to determine at this time   Discharge Recommendations Recommend post-acute placement for additional occupational therapy services prior to discharge home   Interdisciplinary Plan of Care Collaboration   IDT Collaboration with  Nursing;Therapy Tech   Patient Position at End of Therapy In Bed;Bed Alarm On;Wrist Restraints Applied;Call Light within Reach;Tray Table within Reach;Phone within Reach   Collaboration Comments RN updated

## 2023-10-19 NOTE — OP REPORT
DATE OF OPERATION:   10/19/2023     PREOPERATIVE DIAGNOSIS: Hemorrhagic stroke, need for tube feeding .    POSTOPERATIVE DIAGNOSIS: Hemorrhagic stroke, need for tube feeding..    PROCEDURE PERFORMED: Laparoscopic gastrostomy tube placement (16Fr. McKey)    SURGEON:    Mercy Toussaint M.D.    ASSISTANT:    MARYSE Carlson.     ANESTHESIOLOGIST:  Andrzej Bazan M.D.    ANESTHESIA:   General endotracheal anesthesia.    ASA CLASSIFICATION:  IV. Emergent.    INDICATIONS: The patient is a 68 year-old man status post hemorrhagic stroke with dysphagia and has not been making progress as quickly as would like.  He is tube feed dependent currently.  IR has been unable to place a G-tube percutaneously due to scheduling, and I was consulted for placement.  We discussed risk benefits and alternatives with risks including, but not limited to, bleeding, infection, damage surrounding structures including large small intestine, need for further surgery, leak from the G-tube site, need for an open procedure, prolonged hospital stay.    The nature of the surgical procedure warranted additional skilled operative assistance from an Advanced Registered Nurse Practitioner (ARNP). The assistant was present during the entire operation. The surgical assistant performed the following: provided assistance with optimal surgical exposure of the operative field, provided high complexity, subspecialty decision making input, operated the camera for the laparoscopic portion of the procedure, and performed the skin closure and dressing application.     FINDINGS: normal anatomy    WOUND CLASSIFICATION:  Class II, Clean Contaminated.    SPECIMEN:    none.    ESTIMATED BLOOD LOSS:  5 mL.    PROCEDURE: Following  informed   consent from the patient's wife, the patient was properly identified, taken to the operating room and placed in supine position where general endotracheal anesthesia was administered. Intravenous antibiotics were  administered by the anesthesiologist in correct time interval. The patient voided prior to surgery. A urinary catheter was not placed. Sequential compression devices were employed. The abdomen was prepped and draped into a sterile field. A timeout was conducted with the full attention and participation of all operating room personnel.    Marcaine 0.5% was used to infiltrate the port sites. A 5 mm periumbilical incision was made and the subcutaneous tissues spread bluntly. The fascia was elevated with a hook and a Veress needle was atraumatically inserted. Carbon dioxide pneumoperitoneum was instilled. A 5 mm separator port was passed. A 5 mm 30 degree lens and camera was passed into the peritoneal cavity. A 5 mm port was placed in the right upper quadrant under direct vision.     The patient was placed in a head up position.  I was easily able to locate his stomach.  I planned a gastrostomy tube site where the stomach would reach easily without tension.  The insufflation was lowered to 11 mmHg.  I placed 4 T-fasteners through the abdominal wall into the stomach, holding the stomach up creating a pocket in a Uday fashion.  A needle was placed in the stomach in between these, and a wire was placed.  The needle was removed and a dilating sheath was placed in  a Seldinger fashion.  A 16 Latvian Michele tube was placed through this and the peel-away sheath was removed.  The balloon was insufflated and the stomach was pulled up against the abdominal wall.  I pulled up the T-fasteners bumpers and secured these.  On laparoscopic vision there was no evidence of exposure of the entry site.  There is no evidence of CO2 coming out of the tube.  This was placed to gravity.    The flange of the gastrostomy tube was secured against the skin using interrupted 3-0 nylon's.  The skin on the incisions was closed with 4-0 Monocryl after insufflation and the trocars were let out of the abdomen. These were dressed with dry dressings.        The patient tolerated the procedure well, and there were no apparent complications. All sponge, needle, and instrument counts were correct on 2 separate occasions. The patient was awakened, extubated, and transferred to  to the post anesthesia care unit (PACU) in satisfactory condition.       ____________________________________     Mercy Toussaint M.D.    DD: 10/19/2023  1:26 PM

## 2023-10-19 NOTE — ANESTHESIA PROCEDURE NOTES
Airway    Date/Time: 10/19/2023 12:41 PM    Performed by: Andrzej Bazan M.D.  Authorized by: Andrzej Bazan M.D.    Location:  OR  Urgency:  Elective  Indications for Airway Management:  Anesthesia      Spontaneous Ventilation: absent    Sedation Level:  Deep  Preoxygenated: Yes    Patient Position:  Sniffing  Mask Difficulty Assessment:  0 - not attempted  Final Airway Type:  Endotracheal airway  Final Endotracheal Airway:  ETT  Cuffed: Yes    Technique Used for Successful ETT Placement:  Direct laryngoscopy    Insertion Site:  Oral  Blade Type:  Perales  Laryngoscope Blade/Videolaryngoscope Blade Size:  2  ETT Size (mm):  7.0  Measured from:  Teeth  ETT to Teeth (cm):  24  Placement Verified by: auscultation and capnometry    Cormack-Lehane Classification:  Grade IIa - partial view of glottis  Number of Attempts at Approach:  1

## 2023-10-19 NOTE — CARE PLAN
The patient is Stable - Low risk of patient condition declining or worsening    Shift Goals  Clinical Goals: NPO at midnight; Safety;  Patient Goals: DEENA  Family Goals: to have patient be situated    Progress made toward(s) clinical / shift goals:  Patient has been NPO with tube feeding held since midnight. Patient scheduled for PEG placement.  Patient received oxygen at 2L/min via nasal cannula. Patient monitored for safety. Patient assisted with ADLs, as needed. Preventative skin interventions continued.     Problem: Hemodynamics  Goal: Patient's hemodynamics, fluid balance and neurologic status will be stable or improve  Outcome: Progressing     Problem: Fluid Volume  Goal: Fluid volume balance will be maintained  Outcome: Progressing     Problem: Urinary - Renal Perfusion  Goal: Ability to achieve and maintain adequate renal perfusion and functioning will improve  Outcome: Progressing     Problem: Respiratory  Goal: Patient will achieve/maintain optimum respiratory ventilation and gas exchange  Outcome: Progressing     Problem: Physical Regulation  Goal: Diagnostic test results will improve  Outcome: Progressing     Problem: Skin Integrity  Goal: Skin integrity is maintained or improved  Outcome: Progressing     Problem: Fall Risk  Goal: Patient will remain free from falls  Outcome: Progressing     Problem: Pain - Standard  Goal: Alleviation of pain or a reduction in pain to the patient’s comfort goal  Outcome: Progressing     Problem: Safety - Medical Restraint  Goal: Remains free of injury from restraints (Restraint for Interference with Medical Device)  Outcome: Progressing  Goal: Free from restraint(s) (Restraint for Interference with Medical Device)  Outcome: Progressing     Problem: Seizure Precautions  Goal: Implementation of seizure precautions  Outcome: Progressing     Problem: Risk for Aspiration  Goal: Patient's risk for aspiration will be absent or decrease  Outcome: Progressing     Problem: Optimal  Care of the Stroke Patient  Goal: Optimal emergency care for the stroke patient  Outcome: Progressing  Goal: Optimal acute care for the stroke patient  Outcome: Progressing     Problem: Discharge Planning - Stroke  Goal: Ensure Stroke Core Measures are met prior to discharge  Outcome: Progressing  Goal: Patient’s continuum of care needs will be met  Outcome: Progressing     Problem: Neuro Status  Goal: Neuro status will remain stable or improve  Outcome: Progressing     Problem: Mobility - Stroke  Goal: Patient's capacity to carry out activities will improve  Outcome: Progressing  Goal: Spasticity will be prevented or improved  Outcome: Progressing  Goal: Subluxation will be prevented or improved  Outcome: Progressing       Patient is not progressing towards the following goals:

## 2023-10-19 NOTE — OR NURSING
1331: Pt arrived via gurney with anesthesia and RN. VSS. Gastric tube in place, open to gravity. Dressing CDI. Orders reviewed and initiated.  1515: Report called to KOFI Floyd. All questions answered. VSS. Dressing CDI. Gastric tube in place. No patient distress. Nonverbal, baseline per report. Pt transported to room in stable condition on 2L oxymask with no personal belongings. Family updated.

## 2023-10-19 NOTE — DISCHARGE PLANNING
"PEG placement rescheduled for today.   [facility] no longer following as patient \"has not made any significant gain TX\".   [facility] SNF considering, pending response from [facility].   Declined by all other [place] SNFs; Barriers to SNF placement:  Total assist/care exceeding capacity, unable to follow commands, weight, current drug/alcohol abuse.      **1527 Hrs - Case discussed in IDT rounds earlier today & with [facility] liaison, Melina:  No significant gain noted with therapies, remains total assist; Anticipating need for Long Term Care placement.    Second request sent to [facility] requesting assistance with MARGIE screening.    PASRR completed; PASRR [id number]  "

## 2023-10-19 NOTE — PROGRESS NOTES
Monitor summary: SR/ST , MD 0.19, QRS 0.10, QT 0.35, with rare PVC/PAC per strip from monitor room.

## 2023-10-19 NOTE — THERAPY
Physical Therapy Contact Note    Patient Name: Sharon Guardado  Age:  68 y.o., Sex:  male  Medical Record #: [id number]  Today's Date: 10/19/2023       10/19/23 1250   Treatment Variance   Reason For Missed Therapy Medical - Patient  in Procedure   Other Treatments   Other Treatments Provided Pt unavailable for PT, in sx for PEG placement.   Session Information   Date / Session Number  10/18--5 (1/3, 10/23) PTA/1 PEG 10/19   Supervising Physical Therapist (PTA Treatments Only)   Supervising Physical Therapist Gayle Gamboa

## 2023-10-19 NOTE — DISCHARGE PLANNING
Unfortunately, Sharon has not made any significant gain TX.  [facility] will no longer follow.  Please reach out to myself with any questions.

## 2023-10-19 NOTE — ANESTHESIA PREPROCEDURE EVALUATION
" Case: [id number] Date/Time: 10/19/23 1300    Procedure: CREATION, GASTROSTOMY, LAPAROSCOPIC    Location: [facility] OR 09 / SURGERY [facility]    Surgeons: Mercy Toussaint M.D.      Complex history.     Minimally responsive.     Per spouse, no significant issues with prior anesthetics. \"Takes awhile for drugs to get out of his system.\"     Relevant Problems   PULMONARY   (positive) Pneumonia due to infectious organism      NEURO   (positive) Stroke (HCC)      CARDIAC   (positive) CAD (coronary artery disease)   (positive) Primary hypertension      GI   (positive) GERD (gastroesophageal reflux disease)         (positive) Hepatic steatosis      ENDO   (positive) Type 2 diabetes mellitus (HCC)       Physical Exam    Airway - unable to assess  TM distance: >3 FB  Neck ROM: full       Cardiovascular - normal exam  Rhythm: regular  Rate: normal  (-) murmur     Dental - normal exam      Very poor dentition  Facial Hair   Pulmonary - normal exam  Breath sounds clear to auscultation     Abdominal    Neurological - abnormal exam                 Anesthesia Plan    ASA 4- EMERGENT   ASA physical status 4 criteria: CVA or TIA - recent (< 3 months)ASA physical status emergent criteria: compromised vital organ, limb or tissue    Plan - general       Airway plan will be ETT          Induction: intravenous    Postoperative Plan: Postoperative administration of opioids is intended.    Pertinent diagnostic labs and testing reviewed    Informed Consent:    Anesthetic plan and risks discussed with patient.    Use of blood products discussed with: patient whom consented to blood products.         "

## 2023-10-19 NOTE — PROGRESS NOTES
Monitor summary: SR, HR 81-91, MN .18, QRS .09, QT .37 with r pac, r pvc per strip from monitor room

## 2023-10-19 NOTE — ANESTHESIA TIME REPORT
Anesthesia Start and Stop Event Times     Date Time Event    10/19/2023 1225 Ready for Procedure     1232 Anesthesia Start     1334 Anesthesia Stop        Responsible Staff  10/19/23    Name Role Begin End    Andrzej Bazan M.D. Anesth 1232 1334        Overtime Reason:  no overtime (within assigned shift)    Comments:

## 2023-10-19 NOTE — PROGRESS NOTES
[facility] Medicine Daily Progress Note    Date of Service  10/18/2023    Chief Complaint  Sharon Guardado is a 68 y.o. male admitted 9/24/2023 with AMS    Hospital Course  68-year-old male with a past medical history of alcohol abuse, hypertension, obesity who presented with altered mental status and a ground-level fall on 9/24/2023.  On admission he was noted to have a small frontal subarachnoid hemorrhage on CT head.  He was also noted to have severe sepsis secondary to UTI.  She was treated with IV ceftriaxone.  Patient developed symptoms of alcohol withdrawal and was intubated.  Patient was subsequently extubated and transferred to telemetry on 9/30.  The patient subsequently had worsening mental status, on 9/30 found to have a left frontal lobe hemorrhage with surrounding vasogenic edema with mild mass effect, deemed nonoperative by neurosurgery  Was readmitted to the ICU level of care, intubated, treated with hypertonic saline, empirically treated with Unasyn for fever, overall state on mechanical ventilation for 9 days, during the course there was a concern for possible abdominal infection, gallbladder infection, surgery was consulted,  HIDA scan was obtained and was negative for acute cholecystitis, surgery not indicated as per general surgery opinion.  The patient found with significant right-sided weakness, aphasia.  On 10/10 patient again found appropriate to transfer out of ICU.    Very slow improvement. Awake, not following commands for me, no communication/interaction with me, still aphasic, R weakness. Therapy note on 10/13 notes he was sitting up in bed with their help and even tried brushing his teeth.     EKG showed sinus bradycardia  PEG tube  placement pending.  Lantus titrated to 50 units qhs.    RUE swelling - US  1. No deep venous thrombosis. 2. Acute to subacute, occlusive thrombus in the median antecubital vein. Try supportive care initially for SVT treatment with elevation, warm  compress, etc.     Interval Problem Update  10/17  Not following, not responding. Discussed with wife at bedside, was more active on Friday-sat, OT note from 10/13 that states he was sitting up in bed with OT help and was even trying to brush his teeth  PEG tube planned for tomorrow. Discharge planning ongoing. Vitals stable.     10/18  Unfortunately PEG tube delayed again, set for tomorrow. No significant clinical change. SBP improved today. Likely SNF on discharge given failure to make any significant improvement.    I have discussed this patient's plan of care and discharge plan at IDT rounds today with Case Management, Nursing, Nursing leadership, and other members of the IDT team.    Code Status  Full Code    Disposition    I have placed the appropriate orders for post-discharge needs.    Review of Systems  Review of Systems   Unable to perform ROS: Acuity of condition        Physical Exam  Temp:  [36.5 °C (97.7 °F)-36.9 °C (98.4 °F)] 36.9 °C (98.4 °F)  Pulse:  [88-96] 91  Resp:  [16-20] 16  BP: (116-144)/(69-79) 132/77  SpO2:  [89 %-96 %] 94 %    Physical Exam  Constitutional:       Appearance: He is obese.      Comments: Awake, not responding to commands, moves left upper and lower   HENT:      Head: Normocephalic.      Mouth/Throat:      Mouth: Mucous membranes are dry.      Pharynx: No posterior oropharyngeal erythema.   Eyes:      Extraocular Movements: Extraocular movements intact.      Conjunctiva/sclera: Conjunctivae normal.   Cardiovascular:      Rate and Rhythm: Normal rate and regular rhythm.      Pulses: Normal pulses.      Heart sounds: Normal heart sounds.   Pulmonary:      Effort: Pulmonary effort is normal.      Breath sounds: Normal breath sounds.   Abdominal:      General: Bowel sounds are normal.      Palpations: Abdomen is soft.      Tenderness: There is no abdominal tenderness. There is no guarding.   Musculoskeletal:         General: No swelling or tenderness.      Cervical back: Normal  range of motion and neck supple.   Skin:     General: Skin is warm and dry.   Neurological:      Comments: Aphasia, R weakness, not following commands, moving left side spontaneous         Fluids    Intake/Output Summary (Last 24 hours) at 10/18/2023 2248  Last data filed at 10/18/2023 0000  Gross per 24 hour   Intake 594 ml   Output 0 ml   Net 594 ml         Laboratory                  Recent Labs     10/17/23  0945   INR 1.10                 Imaging  US-EXTREMITY VENOUS UPPER UNILAT RIGHT   Final Result      DX-ABDOMEN FOR TUBE PLACEMENT   Final Result      Nasogastric tube tip overlies the duodenal bulb      DX-ABDOMEN FOR TUBE PLACEMENT   Final Result      1.  Enteric tube tip again projects over the 2nd portion of the duodenum.      DX-ABDOMEN FOR TUBE PLACEMENT   Final Result      Enteric tube tip projects over the second portion of the duodenum      DX-CHEST-PORTABLE (1 VIEW)   Final Result      Unchanged bibasilar underinflation atelectasis      DX-ABDOMEN FOR TUBE PLACEMENT   Final Result      Enteric tube tip projects over the gastric antrum or proximal duodenum      DX-CHEST-PORTABLE (1 VIEW)   Final Result      Improving bibasilar pulmonary opacities.      DX-CHEST-PORTABLE (1 VIEW)   Final Result         Findings on chest radiograph appear stable since the prior radiograph.  No new abnormalities are identified.      NM-HEPATOBILIARY SCAN   Final Result      There is visualization of the gallbladder after morphine administration, indicating the cystic duct is still patent. No scintigraphy evidence of acute cholecystitis.      DX-CHEST-PORTABLE (1 VIEW)   Final Result         1.  Mild pulmonary edema and/or infiltrates.   2.  Cardiomegaly   3.  Atherosclerosis      DX-CHEST-PORTABLE (1 VIEW)   Final Result         1.  No acute cardiopulmonary disease.   2.  Cardiomegaly   3.  Atherosclerosis      US-RUQ   Final Result      1.  Minimal gallbladder wall thickening and possible sludge.  Cholecystitis is not  excluded.   2.  Minimal pericholecystic fluid may be due to gallbladder inflammation or liver disease.   3.  Enlarged echogenic liver suggesting fatty infiltration.   4.  Limited evaluation of the pancreas abdominal aorta.      DX-CHEST-PORTABLE (1 VIEW)   Final Result         1.  Pulmonary edema and/or infiltrates are identified, which are stable since the prior exam.   2.  Cardiomegaly   3.  Atherosclerosis      US-EXTREMITY VENOUS UPPER UNILAT RIGHT   Final Result      DX-CHEST-PORTABLE (1 VIEW)   Final Result         1.  Pulmonary edema and/or infiltrates are identified, which are stable since the prior exam.   2.  Cardiomegaly   3.  Atherosclerosis      DX-CHEST-PORTABLE (1 VIEW)   Final Result         1.  Pulmonary edema and/or infiltrates are identified, which are stable since the prior exam.   2.  Cardiomegaly   3.  Atherosclerosis      DX-CHEST-PORTABLE (1 VIEW)   Final Result         1.  Pulmonary edema and/or infiltrates are identified, which are somewhat decreased since the prior exam.   2.  Cardiomegaly   3.  Atherosclerosis      MR-MRA HEAD-W/O   Final Result         Normal intracranial MRA.      MR-BRAIN-WITH & W/O   Final Result         Left frontal lobe hemorrhage with surrounding vasogenic edema and mild mass effect upon the frontal horn of the left lateral ventricle. There is approximately 2 mm of midline shift to the right.      Age-related volume loss and chronic microvascular ischemic changes.      No abnormal intracranial enhancement is identified.      CT-HEAD W/O   Final Result         1. Stable 6 cm left frontal intraparenchymal hemorrhage with mild, 2 mm left-to-right subfalcine herniation.   2. No new abnormality or change.         DX-CHEST-PORTABLE (1 VIEW)   Final Result         1.  Pulmonary edema and/or infiltrates are identified, which appear somewhat increased since the prior exam.   2.  Cardiomegaly   3.  Atherosclerosis      DX-ABDOMEN FOR TUBE PLACEMENT   Final Result      NG  tube extends below the diaphragm and appears to extend through the region of the stomach with tip at the level of the proximal duodenum.      DX-CHEST-PORTABLE (1 VIEW)   Final Result      1.  Grossly satisfactory appearance of tubes and lines.   2.  No evidence of pneumothorax.   3.  Marked hypoinflation with probable bibasilar atelectasis.   4.  Enlarged cardiac silhouette with vascular congestion.      CT-HEAD W/O   Final Result         1.  New large parenchymal hemorrhage involving the frontal lobe is identified with some surrounding edema.      2.  Subarachnoid hemorrhage is also noted in the area.      3.  Minimal midline shift to the right side.      Findings are consistent with atrophy.  Decreased attenuation in the periventricular white matter likely indicates microvascular ischemic disease.      Based solely on CT findings, the brain injury guideline category is mBIG 3.      EDH   IVH   Displaced skull fx   SDH > 8mm   IPH > 8mm or multiple   SAH bi-hemispheric or > 3mm      The original BIG retrospective analysis found radiographic progression in 0% of BIG 1 patients and 2.6% BIG 2.      These findings were discussed with GEETA MOLINA on 09/30/2023.      DX-CHEST-PORTABLE (1 VIEW)   Final Result      Stable multiple pulmonary vascular dilation/congestion. Otherwise, negative.      DX-CHEST-PORTABLE (1 VIEW)   Final Result      1.  Increasing airspace disease at the left base.      DX-CHEST-LIMITED (1 VIEW)   Final Result         1.  Pulmonary edema and/or infiltrates are identified, which are somewhat decreased since the prior exam.   2.  Cardiomegaly   3.  Atherosclerosis      DX-CHEST-PORTABLE (1 VIEW)   Final Result         1.  Pulmonary edema and/or infiltrates are identified, which appear somewhat increased since the prior exam.   2.  Cardiomegaly   3.  Atherosclerosis      DX-CHEST-PORTABLE (1 VIEW)   Final Result         1.  Interstitial edema and/or infiltrates, slightly decreased since prior  study.   2.  Cardiomegaly   3.  Atherosclerosis      DX-CHEST-LIMITED (1 VIEW)   Final Result      Right IJ catheter tip projects in the SVC. No pneumothorax.      CT-CTA NECK WITH & W/O-POST PROCESSING   Final Result      1.  Estimated 50-75% stenosis of the BILATERAL carotid arteries   2.  Estimated greater than 50% stenosis at the vertebral artery ostium on each side      CT-CTA HEAD WITH & W/O-POST PROCESS   Final Result      1.  No large vessel occlusion or aneurysm.   2.  Hazy left frontal lobe subarachnoid hemorrhage, decreased in conspicuity from prior. No new hemorrhage seen.   3.  Scattered opacifications of the ethmoid sinuses, likely related to support hardware.      DX-ABDOMEN FOR TUBE PLACEMENT   Final Result         1.  Nonspecific bowel gas pattern in the upper abdomen.   2.  Dobbhoff tube with tip terminating overlying the expected location of the first or second duodenal segment.      DX-CHEST-PORTABLE (1 VIEW)   Final Result         1.  Interstitial edema and/or infiltrates.   2.  Cardiomegaly   3.  Nasogastric tube terminates in the distal esophagus, recommend advancement   4.  Atherosclerosis      DX-ABDOMEN FOR TUBE PLACEMENT   Final Result         1.  Nonspecific bowel gas pattern in the upper abdomen.   2.  Nasogastric tube terminates just distal to the gastroesophageal junction, recommend advancement      CT-RENAL COLIC EVALUATION(A/P W/O)   Final Result         1.  Intrahepatic biliary air and nondependent air within the gallbladder, consider history of instrumentation or changes of cholangitis in the appropriate clinical setting.   2.  Large fat-containing bilateral inguinal hernias   3.  Enlarged prostate, consider causes of prostate enlargement with additional workup as clinically appropriate   4.  Diverticulosis   5.  Cholelithiasis   6.  Atherosclerosis      CT-HEAD W/O   Final Result         1.  Hazy left frontal subarachnoid hemorrhage.   2.  Nonspecific white matter changes,  commonly associated with small vessel ischemic disease.  Associated mild cerebral atrophy is noted.      Based solely on CT findings, the brain injury guideline category is mBIG 1.      SDH < 4mm   IPH < 4mm   SAH < 3 sulci and < 1mm      The original BIG retrospective analysis found radiographic progression in 0% of BIG 1 patients and 2.6% BIG 2.      These findings were discussed with the patient's clinician, Madhu Zambrano, on 9/24/2023 10:10 PM.      DX-CHEST-PORTABLE (1 VIEW)   Final Result      No acute cardiopulmonary abnormality identified.      IR-GASTROSTOMY PLACEMENT    (Results Pending)        Assessment/Plan  * Intraparenchymal hemorrhage of brain (HCC)- (present on admission)  Assessment & Plan  CT on 9/24 with hazy left frontal SAH  CT on 9/30 with new large IPH in the left frontal lobe  Strict blood pressure control with goal SBP less than 160  Goal sodium 145-150 - I am titrating hypertonic saline to achieve sodium goal  Will deescalate HTS today and slowly correct  Neuro checks every 2 hours    Bradycardia  Assessment & Plan  Telemetry reported bradycardia HR 40s for 4 minutes, asymptomatic  I ordered EKG, mag, Phos, and TSH  EKG showed sinus bradycardia    Continue telemetry monitor  Avoid medications that can further slow down the heart rate    ACP (advance care planning)  Assessment & Plan  10/12 I discussed advance care planning with the patient's wife at bedside, including diagnosis, prognosis, plan of care, risks and benefits of any therapies that could be offered.  We discussed regarding CODE STATUS, CPR and intubation with mechanical ventilation, discussed regarding risk and benefits, as well as alternatives including palliation and hospice as appropriate.  After long discussion with patient, patient's wife has decided for DNR/DNI, patient CODE STATUS has een updated in epic with DNR/DNI as per patient's wishes.   Guarded recovery, I consulted palliative care. Will decide PEG tube placement  depends on the goal of care  ACP 22min  10/13 patient is more alert and awake. The wife discussed with the other family members yesterday and wanted to change the code status back to full code. No pallitive care at this point. The wife agrees with PEG tube placement if patients fails SLP evelu again. I changed the code status back to full code. ACP 16min      Atelectasis- (present on admission)  Assessment & Plan  Patient unable to follow commands for incentive spirometry    Pneumonia due to infectious organism- (present on admission)  Assessment & Plan  Continue Zosyn    Acute respiratory failure with hypoxia (HCC)- (present on admission)  Assessment & Plan  Intubated 9/25-9/28  Reintubated 9/30-10/7  Continue oxygen    Thrombocytopenia (HCC)- (present on admission)  Assessment & Plan  Baseline ~110-125  Monitor, currently improved    Pneumobilia- (present on admission)  Assessment & Plan  As seen on CT renal (9/25/23).    9/26/23: No need for further interventions at this time per gen surg    Type 2 diabetes mellitus (HCC)- (present on admission)  Assessment & Plan  A1c 5.7% (6/2023)  Ongoing treatment with long and short acting insulin, adjust as indicated    10/14 BG high, I increase the Lantus from 39 units to 42 units   Continue to monitor  Hypoglycemia protocol    Primary hypertension- (present on admission)  Assessment & Plan  Hypertensive at presentation   Goal for normotension currently multimodality treatment      BMI 40.0-44.9, adult (HCC)- (present on admission)  Assessment & Plan  Future weight loss recommended    Alcohol dependence (HCC)- (present on admission)  Assessment & Plan  Multivitamin, folate, thiamine  Monitor, additional therapies as clinically indicated  9/29/2023 patient was at least 2-3 hard liquor drinks daily although this is unclear per patient and his wife's history.  .    Patient s/p treatment for acute alcohol withdrawal         VTE prophylaxis: scd    I have performed a physical  exam and reviewed and updated ROS and Plan today (10/18/2023). In review of yesterday's note (10/17/2023), there are no changes except as documented above.    Total time spent 37 minutes. I spent greater than 50% of the time for patient care, counseling, and coordination on this date, including unit/floor time, and face-to-face time with the patient as per interval events, my own review of patient's imaging and lab analysis and developing my assessment and plan above.    I certify that the patient requires continued medically necessary hospital services for the treatment of intraparenchymal hemorrhage, encephalopathy, debility and will remain in the hospital for 7 days.  Discharge plan is anticipated to be 10/24/23.

## 2023-10-19 NOTE — CONSULTS
DATE OF CONSULTATION:  10/19/2023     REFERRING PHYSICIAN:   Bin Field M.D.     CONSULTING PHYSICIAN:  Mercy Toussaint M.D.     REASON FOR CONSULTATION:  I have been asked by  to see the patient in surgical consultation for evaluation of gastrostomy tube.    HISTORY OF PRESENT ILLNESS: The patient is a 68-year-old man with a complex recent past medical history who had a ground-level fall on September 24 of this year.  Had a small subarachnoid hemorrhage and severe sepsis secondary to UTI.  He also developed alcohol withdrawal symptoms.  He initially did well and was extubated and transferred to telemetry on 930.  Unfortunately he had a change in mental status and was found to have a left frontal lobe hemorrhagic stroke.  Since then he has had very slow improvement.  He is not following commands or communicating.  He remains aphasic.  He has been    Receiving tube feeds via nasogastric tube.  He is at the point where he is ready to start looking for a more long-term care solution and needs a gastrostomy tube placed.  Interventional radiology was initially consulted but has not been able to do that for 3 days and thus I was consulted for assistance.    PAST MEDICAL HISTORY:  has a past medical history of LE (acute kidney injury), prerenal (CMS-HCC) (1/20/2018), Alcohol withdrawal (HCC) (1/22/2018), Bowel habit changes, CAD (coronary artery disease), Cancer (HCC), Cellulitis of back except buttock (8/3/2018), HTN (hypertension), Hyperglycemia (1/19/2018), Hypertension, Indigestion, Kidney stones, Near-syncope, Nerve compression, and Tinnitus.    PAST SURGICAL HISTORY:  has a past surgical history that includes gastroscopy (3/29/2018); egd w/endoscopic ultrasound (3/29/2018); and egd with asp/bx (3/29/2018).    ALLERGIES:   Allergies   Allergen Reactions    Ativan      Pt reports that he gets very agitated     Hydrocodone Rash     Confusion   Agitation     Lorazepam Unspecified     Agitation         CURRENT MEDICATIONS:    Home Medications       Reviewed by Maribell Conway R.N. (Registered Nurse) on 09/30/23 at 1052  Med List Status: Complete     Medication Last Dose Status   allopurinol (ZYLOPRIM) 300 MG Tab 9/24/2023 Active   amLODIPine (NORVASC) 5 MG Tab 9/24/2023 Active   aspirin EC (ECOTRIN) 81 MG Tablet Delayed Response 9/24/2023 Active   diclofenac sodium (VOLTAREN) 1 % Gel 9/23/2023 Active   furosemide (LASIX) 20 MG Tab 9/24/2023 Active   Galcanezumab-gnlm (EMGALITY) 120 MG/ML Solution Auto-injector 9/21/2023 Active   losartan (COZAAR) 50 MG Tab 9/24/2023 Active   memantine (NAMENDA) 5 MG Tab 9/24/2023 Active   metFORMIN (GLUCOPHAGE) 500 MG Tab 9/24/2023 Active   naproxen (NAPROSYN) 500 MG Tab 9/24/2023 Active   omeprazole (PRILOSEC) 20 MG delayed-release capsule 9/24/2023 Active   pioglitazone (ACTOS) 15 MG Tab 9/24/2023 Active   potassium chloride SA (K-DUR) 10 MEQ Tab CR 9/24/2023 Active   simvastatin (ZOCOR) 40 MG Tab 9/23/2023 Active                    FAMILY HISTORY: family history is not on file.    SOCIAL HISTORY:  reports that he has never smoked. He has never used smokeless tobacco. He reports current alcohol use. He reports that he does not use drugs.    REVIEW OF SYSTEMS: Unable to obtain, patient aphasic    PHYSICAL EXAMINATION:    Physical Exam  Constitutional:       Comments: Not responding. Opens eyes spontaneously but does not make eye contact.    HENT:      Right Ear: External ear normal.      Left Ear: External ear normal.      Mouth/Throat:      Mouth: Mucous membranes are moist.   Eyes:      Extraocular Movements: Extraocular movements intact.   Cardiovascular:      Rate and Rhythm: Normal rate and regular rhythm.   Pulmonary:      Effort: Pulmonary effort is normal.   Abdominal:      General: Abdomen is flat. There is no distension.      Tenderness: There is no abdominal tenderness.   Musculoskeletal:         General: Normal range of motion.   Skin:     General: Skin is warm  and dry.      Capillary Refill: Capillary refill takes less than 2 seconds.   Neurological:      General: No focal deficit present.      Mental Status: He is oriented to person, place, and time.   Psychiatric:         Mood and Affect: Mood normal.         Behavior: Behavior normal.         LABORATORY VALUES:           Recent Labs     10/17/23  0945   INR 1.10     Recent Labs     10/17/23  0945   INR 1.10        IMAGING:   US-EXTREMITY VENOUS UPPER UNILAT RIGHT   Final Result      DX-ABDOMEN FOR TUBE PLACEMENT   Final Result      Nasogastric tube tip overlies the duodenal bulb      DX-ABDOMEN FOR TUBE PLACEMENT   Final Result      1.  Enteric tube tip again projects over the 2nd portion of the duodenum.      DX-ABDOMEN FOR TUBE PLACEMENT   Final Result      Enteric tube tip projects over the second portion of the duodenum      DX-CHEST-PORTABLE (1 VIEW)   Final Result      Unchanged bibasilar underinflation atelectasis      DX-ABDOMEN FOR TUBE PLACEMENT   Final Result      Enteric tube tip projects over the gastric antrum or proximal duodenum      DX-CHEST-PORTABLE (1 VIEW)   Final Result      Improving bibasilar pulmonary opacities.      DX-CHEST-PORTABLE (1 VIEW)   Final Result         Findings on chest radiograph appear stable since the prior radiograph.  No new abnormalities are identified.      NM-HEPATOBILIARY SCAN   Final Result      There is visualization of the gallbladder after morphine administration, indicating the cystic duct is still patent. No scintigraphy evidence of acute cholecystitis.      DX-CHEST-PORTABLE (1 VIEW)   Final Result         1.  Mild pulmonary edema and/or infiltrates.   2.  Cardiomegaly   3.  Atherosclerosis      DX-CHEST-PORTABLE (1 VIEW)   Final Result         1.  No acute cardiopulmonary disease.   2.  Cardiomegaly   3.  Atherosclerosis      US-RUQ   Final Result      1.  Minimal gallbladder wall thickening and possible sludge.  Cholecystitis is not excluded.   2.  Minimal  pericholecystic fluid may be due to gallbladder inflammation or liver disease.   3.  Enlarged echogenic liver suggesting fatty infiltration.   4.  Limited evaluation of the pancreas abdominal aorta.      DX-CHEST-PORTABLE (1 VIEW)   Final Result         1.  Pulmonary edema and/or infiltrates are identified, which are stable since the prior exam.   2.  Cardiomegaly   3.  Atherosclerosis      US-EXTREMITY VENOUS UPPER UNILAT RIGHT   Final Result      DX-CHEST-PORTABLE (1 VIEW)   Final Result         1.  Pulmonary edema and/or infiltrates are identified, which are stable since the prior exam.   2.  Cardiomegaly   3.  Atherosclerosis      DX-CHEST-PORTABLE (1 VIEW)   Final Result         1.  Pulmonary edema and/or infiltrates are identified, which are stable since the prior exam.   2.  Cardiomegaly   3.  Atherosclerosis      DX-CHEST-PORTABLE (1 VIEW)   Final Result         1.  Pulmonary edema and/or infiltrates are identified, which are somewhat decreased since the prior exam.   2.  Cardiomegaly   3.  Atherosclerosis      MR-MRA HEAD-W/O   Final Result         Normal intracranial MRA.      MR-BRAIN-WITH & W/O   Final Result         Left frontal lobe hemorrhage with surrounding vasogenic edema and mild mass effect upon the frontal horn of the left lateral ventricle. There is approximately 2 mm of midline shift to the right.      Age-related volume loss and chronic microvascular ischemic changes.      No abnormal intracranial enhancement is identified.      CT-HEAD W/O   Final Result         1. Stable 6 cm left frontal intraparenchymal hemorrhage with mild, 2 mm left-to-right subfalcine herniation.   2. No new abnormality or change.         DX-CHEST-PORTABLE (1 VIEW)   Final Result         1.  Pulmonary edema and/or infiltrates are identified, which appear somewhat increased since the prior exam.   2.  Cardiomegaly   3.  Atherosclerosis      DX-ABDOMEN FOR TUBE PLACEMENT   Final Result      NG tube extends below the  diaphragm and appears to extend through the region of the stomach with tip at the level of the proximal duodenum.      DX-CHEST-PORTABLE (1 VIEW)   Final Result      1.  Grossly satisfactory appearance of tubes and lines.   2.  No evidence of pneumothorax.   3.  Marked hypoinflation with probable bibasilar atelectasis.   4.  Enlarged cardiac silhouette with vascular congestion.      CT-HEAD W/O   Final Result         1.  New large parenchymal hemorrhage involving the frontal lobe is identified with some surrounding edema.      2.  Subarachnoid hemorrhage is also noted in the area.      3.  Minimal midline shift to the right side.      Findings are consistent with atrophy.  Decreased attenuation in the periventricular white matter likely indicates microvascular ischemic disease.      Based solely on CT findings, the brain injury guideline category is mBIG 3.      EDH   IVH   Displaced skull fx   SDH > 8mm   IPH > 8mm or multiple   SAH bi-hemispheric or > 3mm      The original BIG retrospective analysis found radiographic progression in 0% of BIG 1 patients and 2.6% BIG 2.      These findings were discussed with GEETA MOLINA on 09/30/2023.      DX-CHEST-PORTABLE (1 VIEW)   Final Result      Stable multiple pulmonary vascular dilation/congestion. Otherwise, negative.      DX-CHEST-PORTABLE (1 VIEW)   Final Result      1.  Increasing airspace disease at the left base.      DX-CHEST-LIMITED (1 VIEW)   Final Result         1.  Pulmonary edema and/or infiltrates are identified, which are somewhat decreased since the prior exam.   2.  Cardiomegaly   3.  Atherosclerosis      DX-CHEST-PORTABLE (1 VIEW)   Final Result         1.  Pulmonary edema and/or infiltrates are identified, which appear somewhat increased since the prior exam.   2.  Cardiomegaly   3.  Atherosclerosis      DX-CHEST-PORTABLE (1 VIEW)   Final Result         1.  Interstitial edema and/or infiltrates, slightly decreased since prior study.   2.  Cardiomegaly    3.  Atherosclerosis      DX-CHEST-LIMITED (1 VIEW)   Final Result      Right IJ catheter tip projects in the SVC. No pneumothorax.      CT-CTA NECK WITH & W/O-POST PROCESSING   Final Result      1.  Estimated 50-75% stenosis of the BILATERAL carotid arteries   2.  Estimated greater than 50% stenosis at the vertebral artery ostium on each side      CT-CTA HEAD WITH & W/O-POST PROCESS   Final Result      1.  No large vessel occlusion or aneurysm.   2.  Hazy left frontal lobe subarachnoid hemorrhage, decreased in conspicuity from prior. No new hemorrhage seen.   3.  Scattered opacifications of the ethmoid sinuses, likely related to support hardware.      DX-ABDOMEN FOR TUBE PLACEMENT   Final Result         1.  Nonspecific bowel gas pattern in the upper abdomen.   2.  Dobbhoff tube with tip terminating overlying the expected location of the first or second duodenal segment.      DX-CHEST-PORTABLE (1 VIEW)   Final Result         1.  Interstitial edema and/or infiltrates.   2.  Cardiomegaly   3.  Nasogastric tube terminates in the distal esophagus, recommend advancement   4.  Atherosclerosis      DX-ABDOMEN FOR TUBE PLACEMENT   Final Result         1.  Nonspecific bowel gas pattern in the upper abdomen.   2.  Nasogastric tube terminates just distal to the gastroesophageal junction, recommend advancement      CT-RENAL COLIC EVALUATION(A/P W/O)   Final Result         1.  Intrahepatic biliary air and nondependent air within the gallbladder, consider history of instrumentation or changes of cholangitis in the appropriate clinical setting.   2.  Large fat-containing bilateral inguinal hernias   3.  Enlarged prostate, consider causes of prostate enlargement with additional workup as clinically appropriate   4.  Diverticulosis   5.  Cholelithiasis   6.  Atherosclerosis      CT-HEAD W/O   Final Result         1.  Hazy left frontal subarachnoid hemorrhage.   2.  Nonspecific white matter changes, commonly associated with small  vessel ischemic disease.  Associated mild cerebral atrophy is noted.      Based solely on CT findings, the brain injury guideline category is mBIG 1.      SDH < 4mm   IPH < 4mm   SAH < 3 sulci and < 1mm      The original BIG retrospective analysis found radiographic progression in 0% of BIG 1 patients and 2.6% BIG 2.      These findings were discussed with the patient's clinician, Madhu Zambrano, on 9/24/2023 10:10 PM.      DX-CHEST-PORTABLE (1 VIEW)   Final Result      No acute cardiopulmonary abnormality identified.      IR-GASTROSTOMY PLACEMENT    (Results Pending)   CT of the abdomen was reviewed.  The stomach is superficial, close to the abdominal wall.    ASSESSMENT AND PLAN:     Pneumobilia- (present on admission)  Assessment & Plan  Admitted with sepsis, now off vasopressor however increasing blood sugars over past day.  Mild tenderness in right-upper quadrant on exam.  Mild leukocytosis, no acidosis, no significant elevation of LFTs.  Unclear if he has a history of sphincterotomy, he has had an EGD with EUS and biopsy for evaluation of pancreatitis.  Ultrasound with mild wall thickening not definitive for cholecystitis, pericholecystitic fluid also could be result of resuscitation.  HIDA with filling of the gallbladder not consistent with cholecystitis, no indication for cholecystostomy tube at this time.    A gastrostomy tube is indicated for long-term nutrition.  I discussed with the patient's wife who is his decision maker, the risks including, but not limited to, bleeding, infection, damage surrounding structures including large small intestine, need for further surgeries, leak, need for an open procedure.  She understands and agrees to proceed.       ____________________________________     Mercy Toussaint M.D.    DD: 10/19/2023  10:39 AM    AAST Grading System for EGS Conditions  ACS NSQIP Surgical Risk Calculator

## 2023-10-20 LAB
ALBUMIN SERPL BCP-MCNC: 3.8 G/DL (ref 3.2–4.9)
ALBUMIN/GLOB SERPL: 1 G/DL
ALP SERPL-CCNC: 178 U/L (ref 30–99)
ALT SERPL-CCNC: 31 U/L (ref 2–50)
ANION GAP SERPL CALC-SCNC: 12 MMOL/L (ref 7–16)
AST SERPL-CCNC: 40 U/L (ref 12–45)
BASOPHILS # BLD AUTO: 0.3 % (ref 0–1.8)
BASOPHILS # BLD: 0.03 K/UL (ref 0–0.12)
BILIRUB SERPL-MCNC: 1.2 MG/DL (ref 0.1–1.5)
BUN SERPL-MCNC: 45 MG/DL (ref 8–22)
CALCIUM ALBUM COR SERPL-MCNC: 10.3 MG/DL (ref 8.5–10.5)
CALCIUM SERPL-MCNC: 10.1 MG/DL (ref 8.5–10.5)
CHLORIDE SERPL-SCNC: 108 MMOL/L (ref 96–112)
CO2 SERPL-SCNC: 27 MMOL/L (ref 20–33)
CREAT SERPL-MCNC: 1.12 MG/DL (ref 0.5–1.4)
EOSINOPHIL # BLD AUTO: 0.04 K/UL (ref 0–0.51)
EOSINOPHIL NFR BLD: 0.4 % (ref 0–6.9)
ERYTHROCYTE [DISTWIDTH] IN BLOOD BY AUTOMATED COUNT: 42.4 FL (ref 35.9–50)
GFR SERPLBLD CREATININE-BSD FMLA CKD-EPI: 70 ML/MIN/1.73 M 2
GLOBULIN SER CALC-MCNC: 3.9 G/DL (ref 1.9–3.5)
GLUCOSE BLD STRIP.AUTO-MCNC: 247 MG/DL (ref 65–99)
GLUCOSE BLD STRIP.AUTO-MCNC: 282 MG/DL (ref 65–99)
GLUCOSE SERPL-MCNC: 260 MG/DL (ref 65–99)
HCT VFR BLD AUTO: 46.6 % (ref 42–52)
HGB BLD-MCNC: 15.4 G/DL (ref 14–18)
IMM GRANULOCYTES # BLD AUTO: 0.04 K/UL (ref 0–0.11)
IMM GRANULOCYTES NFR BLD AUTO: 0.4 % (ref 0–0.9)
LYMPHOCYTES # BLD AUTO: 2.36 K/UL (ref 1–4.8)
LYMPHOCYTES NFR BLD: 21.1 % (ref 22–41)
MAGNESIUM SERPL-MCNC: 2.2 MG/DL (ref 1.5–2.5)
MCH RBC QN AUTO: 30 PG (ref 27–33)
MCHC RBC AUTO-ENTMCNC: 33 G/DL (ref 32.3–36.5)
MCV RBC AUTO: 90.7 FL (ref 81.4–97.8)
MONOCYTES # BLD AUTO: 0.85 K/UL (ref 0–0.85)
MONOCYTES NFR BLD AUTO: 7.6 % (ref 0–13.4)
NEUTROPHILS # BLD AUTO: 7.86 K/UL (ref 1.82–7.42)
NEUTROPHILS NFR BLD: 70.2 % (ref 44–72)
NRBC # BLD AUTO: 0 K/UL
NRBC BLD-RTO: 0 /100 WBC (ref 0–0.2)
PHOSPHATE SERPL-MCNC: 2 MG/DL (ref 2.5–4.5)
PLATELET # BLD AUTO: 251 K/UL (ref 164–446)
PMV BLD AUTO: 11.8 FL (ref 9–12.9)
POTASSIUM SERPL-SCNC: 4.1 MMOL/L (ref 3.6–5.5)
PROT SERPL-MCNC: 7.7 G/DL (ref 6–8.2)
RBC # BLD AUTO: 5.14 M/UL (ref 4.7–6.1)
SODIUM SERPL-SCNC: 147 MMOL/L (ref 135–145)
WBC # BLD AUTO: 11.2 K/UL (ref 4.8–10.8)

## 2023-10-20 PROCEDURE — 83735 ASSAY OF MAGNESIUM: CPT

## 2023-10-20 PROCEDURE — 700111 HCHG RX REV CODE 636 W/ 250 OVERRIDE (IP): Performed by: NURSE PRACTITIONER

## 2023-10-20 PROCEDURE — 84100 ASSAY OF PHOSPHORUS: CPT

## 2023-10-20 PROCEDURE — A9270 NON-COVERED ITEM OR SERVICE: HCPCS | Performed by: INTERNAL MEDICINE

## 2023-10-20 PROCEDURE — 700102 HCHG RX REV CODE 250 W/ 637 OVERRIDE(OP): Performed by: INTERNAL MEDICINE

## 2023-10-20 PROCEDURE — 85025 COMPLETE CBC W/AUTO DIFF WBC: CPT

## 2023-10-20 PROCEDURE — A9270 NON-COVERED ITEM OR SERVICE: HCPCS | Performed by: NURSE PRACTITIONER

## 2023-10-20 PROCEDURE — 99232 SBSQ HOSP IP/OBS MODERATE 35: CPT | Performed by: STUDENT IN AN ORGANIZED HEALTH CARE EDUCATION/TRAINING PROGRAM

## 2023-10-20 PROCEDURE — 80053 COMPREHEN METABOLIC PANEL: CPT

## 2023-10-20 PROCEDURE — 82962 GLUCOSE BLOOD TEST: CPT

## 2023-10-20 PROCEDURE — 92526 ORAL FUNCTION THERAPY: CPT

## 2023-10-20 PROCEDURE — 770020 HCHG ROOM/CARE - TELE (206)

## 2023-10-20 PROCEDURE — 700102 HCHG RX REV CODE 250 W/ 637 OVERRIDE(OP): Performed by: NURSE PRACTITIONER

## 2023-10-20 RX ADMIN — INSULIN HUMAN 4 UNITS: 100 INJECTION, SOLUTION PARENTERAL at 12:38

## 2023-10-20 RX ADMIN — DICLOFENAC SODIUM 2 G: 10 GEL TOPICAL at 17:39

## 2023-10-20 RX ADMIN — DOCUSATE SODIUM 50 MG AND SENNOSIDES 8.6 MG 2 TABLET: 8.6; 5 TABLET, FILM COATED ORAL at 17:39

## 2023-10-20 RX ADMIN — INSULIN HUMAN 7 UNITS: 100 INJECTION, SOLUTION PARENTERAL at 17:44

## 2023-10-20 RX ADMIN — INSULIN GLARGINE-YFGN 50 UNITS: 100 INJECTION, SOLUTION SUBCUTANEOUS at 17:43

## 2023-10-20 RX ADMIN — MEMANTINE 5 MG: 5 TABLET ORAL at 17:39

## 2023-10-20 RX ADMIN — HYDRALAZINE HYDROCHLORIDE 20 MG: 20 INJECTION, SOLUTION INTRAMUSCULAR; INTRAVENOUS at 04:26

## 2023-10-20 RX ADMIN — DICLOFENAC SODIUM 2 G: 10 GEL TOPICAL at 06:32

## 2023-10-20 RX ADMIN — ATORVASTATIN CALCIUM 40 MG: 40 TABLET, FILM COATED ORAL at 17:39

## 2023-10-20 RX ADMIN — DICLOFENAC SODIUM 2 G: 10 GEL TOPICAL at 12:37

## 2023-10-20 ASSESSMENT — FIBROSIS 4 INDEX: FIB4 SCORE: 2.79

## 2023-10-20 ASSESSMENT — PAIN DESCRIPTION - PAIN TYPE: TYPE: ACUTE PAIN

## 2023-10-20 NOTE — CARE PLAN
Problem: Respiratory  Goal: Patient will achieve/maintain optimum respiratory ventilation and gas exchange  Description: Target End Date:  Prior to discharge or change in level of care    Document on Assessment flowsheet    1.  Assess and monitor rate, rhythm, depth and effort of respiration  2.  Breath sounds assessed qshift and/or as needed  3.  Assess O2 saturation, administer/titrate oxygen as ordered  4.  Position patient for maximum ventilatory efficiency  5.  Turn, cough, and deep breath with splinting to improve effectiveness  6.  Collaborate with RT to administer medication/treatments per order  7.  Encourage use of incentive spirometer and encourage patient to cough after use and utilize splinting techniques if applicable  8.  Airway suctioning  9.  Monitor sputum production for changes in color, consistency and frequency  10. Perform frequent oral hygiene  11. Alternate physical activity with rest periods  Outcome: Progressing     Problem: Skin Integrity  Goal: Skin integrity is maintained or improved  Description: Target End Date:  Prior to discharge or change in level of care    Document interventions on Skin Risk/Elvis flowsheet groups and corresponding LDA    1.  Assess and monitor skin integrity, appearance and/or temperature  2.  Assess risk factors for impaired skin integrity and/or pressures ulcers  3.  Implement precautions to protect skin integrity in collaboration with interdisciplinary team  4.  Implement pressure ulcer prevention protocol if at risk for skin breakdown  5.  Confirm wound care consult if at risk for skin breakdown  6.  Ensure patient use of pressure relieving devices  (Low air loss bed, waffle overlay, heel protectors, ROHO cushion, etc)  Outcome: Progressing   The patient is Watcher - Medium risk of patient condition declining or worsening    Shift Goals  Clinical Goals: peg  Patient Goals: tin  Family Goals: peg    Progress made toward(s) clinical / shift goals:       Patient is not progressing towards the following goals:

## 2023-10-20 NOTE — CARE PLAN
The patient is Stable - Low risk of patient condition declining or worsening    Shift Goals  Clinical Goals: No complications related to neuro status; Safety; SpO2 will remain above 90%  Patient Goals: tin  Family Goals: peg    Progress made toward(s) clinical / shift goals:  Patient remains responsive to verbal and tactile stimuli. Patient opening eyes more frequently. Oxygen administered at 2L/min via nasal cannula. Patient S/P G-Tube placement. Tube feedings will resume today. Patient assisted with ADLs, as needed. Patient monitored and assessed for safety/injury.     Problem: Hemodynamics  Goal: Patient's hemodynamics, fluid balance and neurologic status will be stable or improve  Outcome: Progressing     Problem: Fluid Volume  Goal: Fluid volume balance will be maintained  Outcome: Progressing     Problem: Respiratory  Goal: Patient will achieve/maintain optimum respiratory ventilation and gas exchange  Outcome: Progressing     Problem: Physical Regulation  Goal: Diagnostic test results will improve  Outcome: Progressing  Goal: Signs and symptoms of infection will decrease  Outcome: Progressing     Problem: Knowledge Deficit - Standard  Goal: Patient and family/care givers will demonstrate understanding of plan of care, disease process/condition, diagnostic tests and medications  Outcome: Progressing     Problem: Skin Integrity  Goal: Skin integrity is maintained or improved  Outcome: Progressing     Problem: Fall Risk  Goal: Patient will remain free from falls  Outcome: Progressing     Problem: Pain - Standard  Goal: Alleviation of pain or a reduction in pain to the patient’s comfort goal  Outcome: Progressing     Problem: Safety - Medical Restraint  Goal: Remains free of injury from restraints (Restraint for Interference with Medical Device)  Outcome: Progressing  Flowsheets (Taken 10/20/2023 0759)  Addressed this shift: Remains free of injury from restraints (restraint for interference with medical  device):   Evaluate the patient's condition at the time of restraint application   Every 2 hours: Monitor safety, psychosocial status, comfort, nutrition and hydration   Determine that other, less restrictive measures have been tried or would not be effective before applying the restraint   Inform patient/family regarding the reason for restraint  Note: Patient tolerating unilateral LEFT wrist restraint  Goal: Free from restraint(s) (Restraint for Interference with Medical Device)  Outcome: Progressing     Problem: Seizure Precautions  Goal: Implementation of seizure precautions  Outcome: Progressing     Problem: Lifestyle Changes  Goal: Patient's ability to identify lifestyle changes and available resources to help reduce recurrence of condition will improve  Outcome: Progressing     Problem: Psychosocial  Goal: Patient's level of anxiety will decrease  Outcome: Progressing  Goal: Spiritual and cultural needs incorporated into hospitalization  Outcome: Progressing     Problem: Risk for Aspiration  Goal: Patient's risk for aspiration will be absent or decrease  Outcome: Progressing     Problem: Optimal Care of the Stroke Patient  Goal: Optimal emergency care for the stroke patient  Outcome: Progressing  Goal: Optimal acute care for the stroke patient  Outcome: Progressing     Problem: Knowledge Deficit - Stroke Education  Goal: Patient's knowledge of stroke and risk factors will improve  Outcome: Progressing     Problem: Psychosocial - Patient Condition  Goal: Patient's ability to verbalize feelings about condition will improve  Outcome: Progressing  Goal: Patient's ability to re-evaluate and adapt role responsibilities will improve  Outcome: Progressing     Problem: Discharge Planning - Stroke  Goal: Ensure Stroke Core Measures are met prior to discharge  Outcome: Progressing  Goal: Patient’s continuum of care needs will be met  Outcome: Progressing     Problem: Neuro Status  Goal: Neuro status will remain stable  or improve  Outcome: Progressing     Problem: Hemodynamic Monitoring  Goal: Patient's hemodynamics, fluid balance and neurologic status will be stable or improve  Outcome: Progressing     Problem: Respiratory - Stroke Patient  Goal: Patient will achieve/maintain optimum respiratory rate/effort  Outcome: Progressing     Problem: Dysphagia  Goal: Dysphagia will improve  Outcome: Progressing     Problem: Urinary Elimination  Goal: Establish and maintain regular urinary output  Outcome: Progressing     Problem: Bowel Elimination  Goal: Establish and maintain regular bowel function  Outcome: Progressing     Problem: Mobility - Stroke  Goal: Patient's capacity to carry out activities will improve  Outcome: Progressing  Goal: Spasticity will be prevented or improved  Outcome: Progressing  Goal: Subluxation will be prevented or improved  Outcome: Progressing     Problem: Self Care  Goal: Patient will have the ability to perform ADLs independently or with assistance (bathe, groom, dress, toilet and feed)  Outcome: Progressing       Patient is not progressing towards the following goals:

## 2023-10-20 NOTE — PROGRESS NOTES
Monitor summary: SR/ST , GA -0.18, QRS -0.10, QT -0.38, with rare PVCs per strip from the monitor room.

## 2023-10-20 NOTE — PROGRESS NOTES
DATE: 10/20/2023    Post Operative Day  1  Lap G tube .    INTERVAL EVENTS:  No events, tube in good position.    PHYSICAL EXAMINATION:  Vital Signs: /62   Pulse (!) 102   Temp 36.7 °C (98.1 °F) (Temporal)   Resp 18   Ht 1.829 m (6')   Wt (!) 130 kg (287 lb 4.2 oz)   SpO2 93%     G tube well positioned, sutures intact.    LABORATORY VALUES:  Recent Labs     10/20/23  0639   WBC 11.2*   RBC 5.14   HEMOGLOBIN 15.4   HEMATOCRIT 46.6   MCV 90.7   MCH 30.0   MCHC 33.0   RDW 42.4   PLATELETCT 251   MPV 11.8     Recent Labs     10/20/23  0639   SODIUM 147*   POTASSIUM 4.1   CHLORIDE 108   CO2 27   GLUCOSE 260*   BUN 45*   CREATININE 1.12   CALCIUM 10.1     Recent Labs     10/17/23  0945 10/20/23  0639   ASTSGOT  --  40   ALTSGPT  --  31   TBILIRUBIN  --  1.2   ALKPHOSPHAT  --  178*   GLOBULIN  --  3.9*   INR 1.10  --      Recent Labs     10/17/23  0945   INR 1.10        IMAGING:  US-EXTREMITY VENOUS UPPER UNILAT RIGHT   Final Result      DX-ABDOMEN FOR TUBE PLACEMENT   Final Result      Nasogastric tube tip overlies the duodenal bulb      DX-ABDOMEN FOR TUBE PLACEMENT   Final Result      1.  Enteric tube tip again projects over the 2nd portion of the duodenum.      DX-ABDOMEN FOR TUBE PLACEMENT   Final Result      Enteric tube tip projects over the second portion of the duodenum      DX-CHEST-PORTABLE (1 VIEW)   Final Result      Unchanged bibasilar underinflation atelectasis      DX-ABDOMEN FOR TUBE PLACEMENT   Final Result      Enteric tube tip projects over the gastric antrum or proximal duodenum      DX-CHEST-PORTABLE (1 VIEW)   Final Result      Improving bibasilar pulmonary opacities.      DX-CHEST-PORTABLE (1 VIEW)   Final Result         Findings on chest radiograph appear stable since the prior radiograph.  No new abnormalities are identified.      NM-HEPATOBILIARY SCAN   Final Result      There is visualization of the gallbladder after morphine administration, indicating the cystic duct is still  patent. No scintigraphy evidence of acute cholecystitis.      DX-CHEST-PORTABLE (1 VIEW)   Final Result         1.  Mild pulmonary edema and/or infiltrates.   2.  Cardiomegaly   3.  Atherosclerosis      DX-CHEST-PORTABLE (1 VIEW)   Final Result         1.  No acute cardiopulmonary disease.   2.  Cardiomegaly   3.  Atherosclerosis      US-RUQ   Final Result      1.  Minimal gallbladder wall thickening and possible sludge.  Cholecystitis is not excluded.   2.  Minimal pericholecystic fluid may be due to gallbladder inflammation or liver disease.   3.  Enlarged echogenic liver suggesting fatty infiltration.   4.  Limited evaluation of the pancreas abdominal aorta.      DX-CHEST-PORTABLE (1 VIEW)   Final Result         1.  Pulmonary edema and/or infiltrates are identified, which are stable since the prior exam.   2.  Cardiomegaly   3.  Atherosclerosis      US-EXTREMITY VENOUS UPPER UNILAT RIGHT   Final Result      DX-CHEST-PORTABLE (1 VIEW)   Final Result         1.  Pulmonary edema and/or infiltrates are identified, which are stable since the prior exam.   2.  Cardiomegaly   3.  Atherosclerosis      DX-CHEST-PORTABLE (1 VIEW)   Final Result         1.  Pulmonary edema and/or infiltrates are identified, which are stable since the prior exam.   2.  Cardiomegaly   3.  Atherosclerosis      DX-CHEST-PORTABLE (1 VIEW)   Final Result         1.  Pulmonary edema and/or infiltrates are identified, which are somewhat decreased since the prior exam.   2.  Cardiomegaly   3.  Atherosclerosis      MR-MRA HEAD-W/O   Final Result         Normal intracranial MRA.      MR-BRAIN-WITH & W/O   Final Result         Left frontal lobe hemorrhage with surrounding vasogenic edema and mild mass effect upon the frontal horn of the left lateral ventricle. There is approximately 2 mm of midline shift to the right.      Age-related volume loss and chronic microvascular ischemic changes.      No abnormal intracranial enhancement is identified.       CT-HEAD W/O   Final Result         1. Stable 6 cm left frontal intraparenchymal hemorrhage with mild, 2 mm left-to-right subfalcine herniation.   2. No new abnormality or change.         DX-CHEST-PORTABLE (1 VIEW)   Final Result         1.  Pulmonary edema and/or infiltrates are identified, which appear somewhat increased since the prior exam.   2.  Cardiomegaly   3.  Atherosclerosis      DX-ABDOMEN FOR TUBE PLACEMENT   Final Result      NG tube extends below the diaphragm and appears to extend through the region of the stomach with tip at the level of the proximal duodenum.      DX-CHEST-PORTABLE (1 VIEW)   Final Result      1.  Grossly satisfactory appearance of tubes and lines.   2.  No evidence of pneumothorax.   3.  Marked hypoinflation with probable bibasilar atelectasis.   4.  Enlarged cardiac silhouette with vascular congestion.      CT-HEAD W/O   Final Result         1.  New large parenchymal hemorrhage involving the frontal lobe is identified with some surrounding edema.      2.  Subarachnoid hemorrhage is also noted in the area.      3.  Minimal midline shift to the right side.      Findings are consistent with atrophy.  Decreased attenuation in the periventricular white matter likely indicates microvascular ischemic disease.      Based solely on CT findings, the brain injury guideline category is mBIG 3.      EDH   IVH   Displaced skull fx   SDH > 8mm   IPH > 8mm or multiple   SAH bi-hemispheric or > 3mm      The original BIG retrospective analysis found radiographic progression in 0% of BIG 1 patients and 2.6% BIG 2.      These findings were discussed with GEETA MOLINA on 09/30/2023.      DX-CHEST-PORTABLE (1 VIEW)   Final Result      Stable multiple pulmonary vascular dilation/congestion. Otherwise, negative.      DX-CHEST-PORTABLE (1 VIEW)   Final Result      1.  Increasing airspace disease at the left base.      DX-CHEST-LIMITED (1 VIEW)   Final Result         1.  Pulmonary edema and/or infiltrates  are identified, which are somewhat decreased since the prior exam.   2.  Cardiomegaly   3.  Atherosclerosis      DX-CHEST-PORTABLE (1 VIEW)   Final Result         1.  Pulmonary edema and/or infiltrates are identified, which appear somewhat increased since the prior exam.   2.  Cardiomegaly   3.  Atherosclerosis      DX-CHEST-PORTABLE (1 VIEW)   Final Result         1.  Interstitial edema and/or infiltrates, slightly decreased since prior study.   2.  Cardiomegaly   3.  Atherosclerosis      DX-CHEST-LIMITED (1 VIEW)   Final Result      Right IJ catheter tip projects in the SVC. No pneumothorax.      CT-CTA NECK WITH & W/O-POST PROCESSING   Final Result      1.  Estimated 50-75% stenosis of the BILATERAL carotid arteries   2.  Estimated greater than 50% stenosis at the vertebral artery ostium on each side      CT-CTA HEAD WITH & W/O-POST PROCESS   Final Result      1.  No large vessel occlusion or aneurysm.   2.  Hazy left frontal lobe subarachnoid hemorrhage, decreased in conspicuity from prior. No new hemorrhage seen.   3.  Scattered opacifications of the ethmoid sinuses, likely related to support hardware.      DX-ABDOMEN FOR TUBE PLACEMENT   Final Result         1.  Nonspecific bowel gas pattern in the upper abdomen.   2.  Dobbhoff tube with tip terminating overlying the expected location of the first or second duodenal segment.      DX-CHEST-PORTABLE (1 VIEW)   Final Result         1.  Interstitial edema and/or infiltrates.   2.  Cardiomegaly   3.  Nasogastric tube terminates in the distal esophagus, recommend advancement   4.  Atherosclerosis      DX-ABDOMEN FOR TUBE PLACEMENT   Final Result         1.  Nonspecific bowel gas pattern in the upper abdomen.   2.  Nasogastric tube terminates just distal to the gastroesophageal junction, recommend advancement      CT-RENAL COLIC EVALUATION(A/P W/O)   Final Result         1.  Intrahepatic biliary air and nondependent air within the gallbladder, consider history of  instrumentation or changes of cholangitis in the appropriate clinical setting.   2.  Large fat-containing bilateral inguinal hernias   3.  Enlarged prostate, consider causes of prostate enlargement with additional workup as clinically appropriate   4.  Diverticulosis   5.  Cholelithiasis   6.  Atherosclerosis      CT-HEAD W/O   Final Result         1.  Hazy left frontal subarachnoid hemorrhage.   2.  Nonspecific white matter changes, commonly associated with small vessel ischemic disease.  Associated mild cerebral atrophy is noted.      Based solely on CT findings, the brain injury guideline category is mBIG 1.      SDH < 4mm   IPH < 4mm   SAH < 3 sulci and < 1mm      The original BIG retrospective analysis found radiographic progression in 0% of BIG 1 patients and 2.6% BIG 2.      These findings were discussed with the patient's clinician, Madhu Zambrano, on 9/24/2023 10:10 PM.      DX-CHEST-PORTABLE (1 VIEW)   Final Result      No acute cardiopulmonary abnormality identified.          ASSESSMENT AND PLAN:  S/p laparoscopic G tube    Okay to use for all meds and tube feeds  Surgery will sign off at this time  Sutures can be removed in 7 days         ____________________________________     Med Koo M.D.    DD: 10/20/2023  9:40 AM

## 2023-10-20 NOTE — THERAPY
Speech Language Pathology   Daily Treatment     Patient Name: Sharon Guardado  AGE:  68 y.o., SEX:  male  Medical Record #: [id number]  Date of Service: 10/20/2023      Precautions:  Precautions: Fall Risk, Swallow Precautions, Nasogastric Tube         Assessment  Pt with vocalization x1, however, unable to replicated despite cues. Absent swallow appreciated in 4/5 trials, but triggering swallow consistently with teaspoon sips of thin liquids. Delayed coughing noted following sips of thin liquids which is concerning for airway invasion. Unable to assess vocal quality given poor direction following. Pt appropriately opening mouth when spoon was presented but unable to follow directives to close lips around spoon to strip bolus. Pt began to become more lethargic as session progressed.      Clinical Impressions  Currently pt is presenting with an acute dysphagia 2/2 CVA. Recommend NPO with non-oral source of nutrition. Pt had better participation today compared to previous sessions.      Recommendations  Treatment Completed: Dysphagia Treatment     Dysphagia Treatment  Diet Consistency: NPO with PEG  Instrumentation: Instrumental swallow study pending clinical progress  Medication: Non Oral  Oral Care: Q2h      SLP Treatment Plan  Treatment Plan: Dysphagia Treatment  SLP Frequency: 3x Per Week  Estimated Duration: Until Therapy Goals Met      Anticipated Discharge Needs  Discharge Recommendations: Recommend post-acute placement for additional speech therapy services prior to discharge home  Therapy Recommendations Upon DC: Dysphagia Training, Community Re-Integration, Patient / Family / Caregiver Education      Patient / Family Goals  Patient / Family Goal #1: Did not state  Goal #1 Outcome: Goal not met  Short Term Goals  Short Term Goal # 1: Pt will maintain attention for 2-3 minutes to consume prefeeding trials to demonstrate readiness for PO intake  Goal Outcome # 1: Progressing slower than expected  Short Term  Goal # 2: Pt will consume prefeeding trials with no overt s/sx of aspiration  Goal Outcome # 2 : Progressing as expected      Sydney Wang, SLP

## 2023-10-20 NOTE — DIETARY
Nutrition Services: Brief Update    Pt had Lap G-tube placed by Gen surgery yesterday, NG feeding tube removed. Previous bolus tube feed recommendations remain appropriate, when able to start using G-tube for feeding start with 1/2 container of Glucerna 1.2 and advance by 1/2 container per feed until goal reached of 8 containers per day (suggest 2 with meal times and HS) to provide 2304 kcals, 115 gm protein, and 1546 ml free water per day.     RD following, will have kitchen send formula to floor.

## 2023-10-21 PROBLEM — I95.9 HYPOTENSION: Status: ACTIVE | Noted: 2023-10-21

## 2023-10-21 LAB
ALBUMIN SERPL BCP-MCNC: 3.7 G/DL (ref 3.2–4.9)
ALBUMIN/GLOB SERPL: 1.1 G/DL
ALP SERPL-CCNC: 187 U/L (ref 30–99)
ALT SERPL-CCNC: 33 U/L (ref 2–50)
ANION GAP SERPL CALC-SCNC: 12 MMOL/L (ref 7–16)
AST SERPL-CCNC: 38 U/L (ref 12–45)
BILIRUB SERPL-MCNC: 1 MG/DL (ref 0.1–1.5)
BUN SERPL-MCNC: 51 MG/DL (ref 8–22)
CALCIUM ALBUM COR SERPL-MCNC: 10.3 MG/DL (ref 8.5–10.5)
CALCIUM SERPL-MCNC: 10.1 MG/DL (ref 8.5–10.5)
CHLORIDE SERPL-SCNC: 109 MMOL/L (ref 96–112)
CK SERPL-CCNC: 102 U/L (ref 0–154)
CO2 SERPL-SCNC: 28 MMOL/L (ref 20–33)
CORTIS SERPL-MCNC: 30.2 UG/DL (ref 0–23)
CREAT SERPL-MCNC: 1.62 MG/DL (ref 0.5–1.4)
EKG IMPRESSION: NORMAL
ERYTHROCYTE [DISTWIDTH] IN BLOOD BY AUTOMATED COUNT: 44.5 FL (ref 35.9–50)
GFR SERPLBLD CREATININE-BSD FMLA CKD-EPI: 45 ML/MIN/1.73 M 2
GLOBULIN SER CALC-MCNC: 3.4 G/DL (ref 1.9–3.5)
GLUCOSE BLD STRIP.AUTO-MCNC: 167 MG/DL (ref 65–99)
GLUCOSE BLD STRIP.AUTO-MCNC: 223 MG/DL (ref 65–99)
GLUCOSE BLD STRIP.AUTO-MCNC: 240 MG/DL (ref 65–99)
GLUCOSE BLD STRIP.AUTO-MCNC: 284 MG/DL (ref 65–99)
GLUCOSE BLD STRIP.AUTO-MCNC: 286 MG/DL (ref 65–99)
GLUCOSE SERPL-MCNC: 307 MG/DL (ref 65–99)
HCT VFR BLD AUTO: 47.8 % (ref 42–52)
HGB BLD-MCNC: 15.3 G/DL (ref 14–18)
LACTATE SERPL-SCNC: 2.1 MMOL/L (ref 0.5–2)
LACTATE SERPL-SCNC: 2.3 MMOL/L (ref 0.5–2)
LACTATE SERPL-SCNC: 3.6 MMOL/L (ref 0.5–2)
MCH RBC QN AUTO: 30.1 PG (ref 27–33)
MCHC RBC AUTO-ENTMCNC: 32 G/DL (ref 32.3–36.5)
MCV RBC AUTO: 93.9 FL (ref 81.4–97.8)
PLATELET # BLD AUTO: 268 K/UL (ref 164–446)
PMV BLD AUTO: 11.7 FL (ref 9–12.9)
POTASSIUM SERPL-SCNC: 3.9 MMOL/L (ref 3.6–5.5)
PROT SERPL-MCNC: 7.1 G/DL (ref 6–8.2)
RBC # BLD AUTO: 5.09 M/UL (ref 4.7–6.1)
SODIUM SERPL-SCNC: 149 MMOL/L (ref 135–145)
WBC # BLD AUTO: 12.9 K/UL (ref 4.8–10.8)

## 2023-10-21 PROCEDURE — 700105 HCHG RX REV CODE 258: Performed by: STUDENT IN AN ORGANIZED HEALTH CARE EDUCATION/TRAINING PROGRAM

## 2023-10-21 PROCEDURE — 770000 HCHG ROOM/CARE - INTERMEDIATE ICU *

## 2023-10-21 PROCEDURE — A9270 NON-COVERED ITEM OR SERVICE: HCPCS | Performed by: INTERNAL MEDICINE

## 2023-10-21 PROCEDURE — 700102 HCHG RX REV CODE 250 W/ 637 OVERRIDE(OP): Performed by: INTERNAL MEDICINE

## 2023-10-21 PROCEDURE — 87077 CULTURE AEROBIC IDENTIFY: CPT

## 2023-10-21 PROCEDURE — 82962 GLUCOSE BLOOD TEST: CPT | Mod: 91

## 2023-10-21 PROCEDURE — 700105 HCHG RX REV CODE 258

## 2023-10-21 PROCEDURE — 80053 COMPREHEN METABOLIC PANEL: CPT

## 2023-10-21 PROCEDURE — 700101 HCHG RX REV CODE 250

## 2023-10-21 PROCEDURE — 87015 SPECIMEN INFECT AGNT CONCNTJ: CPT

## 2023-10-21 PROCEDURE — 85027 COMPLETE CBC AUTOMATED: CPT

## 2023-10-21 PROCEDURE — 700102 HCHG RX REV CODE 250 W/ 637 OVERRIDE(OP): Performed by: STUDENT IN AN ORGANIZED HEALTH CARE EDUCATION/TRAINING PROGRAM

## 2023-10-21 PROCEDURE — 82533 TOTAL CORTISOL: CPT

## 2023-10-21 PROCEDURE — 87040 BLOOD CULTURE FOR BACTERIA: CPT

## 2023-10-21 PROCEDURE — 99291 CRITICAL CARE FIRST HOUR: CPT | Performed by: STUDENT IN AN ORGANIZED HEALTH CARE EDUCATION/TRAINING PROGRAM

## 2023-10-21 PROCEDURE — 700102 HCHG RX REV CODE 250 W/ 637 OVERRIDE(OP): Performed by: EMERGENCY MEDICINE

## 2023-10-21 PROCEDURE — 93010 ELECTROCARDIOGRAM REPORT: CPT | Performed by: INTERNAL MEDICINE

## 2023-10-21 PROCEDURE — 700111 HCHG RX REV CODE 636 W/ 250 OVERRIDE (IP): Performed by: STUDENT IN AN ORGANIZED HEALTH CARE EDUCATION/TRAINING PROGRAM

## 2023-10-21 PROCEDURE — 93005 ELECTROCARDIOGRAM TRACING: CPT | Performed by: STUDENT IN AN ORGANIZED HEALTH CARE EDUCATION/TRAINING PROGRAM

## 2023-10-21 PROCEDURE — 82550 ASSAY OF CK (CPK): CPT

## 2023-10-21 PROCEDURE — 83605 ASSAY OF LACTIC ACID: CPT

## 2023-10-21 PROCEDURE — A9270 NON-COVERED ITEM OR SERVICE: HCPCS | Performed by: STUDENT IN AN ORGANIZED HEALTH CARE EDUCATION/TRAINING PROGRAM

## 2023-10-21 PROCEDURE — 700101 HCHG RX REV CODE 250: Performed by: STUDENT IN AN ORGANIZED HEALTH CARE EDUCATION/TRAINING PROGRAM

## 2023-10-21 RX ORDER — PHENYLEPHRINE HCL IN 0.9% NACL 0.5 MG/5ML
200 SYRINGE (ML) INTRAVENOUS ONCE
Status: COMPLETED | OUTPATIENT
Start: 2023-10-21 | End: 2023-10-21

## 2023-10-21 RX ORDER — SODIUM CHLORIDE, SODIUM LACTATE, POTASSIUM CHLORIDE, AND CALCIUM CHLORIDE .6; .31; .03; .02 G/100ML; G/100ML; G/100ML; G/100ML
1000 INJECTION, SOLUTION INTRAVENOUS ONCE
Status: COMPLETED | OUTPATIENT
Start: 2023-10-21 | End: 2023-10-21

## 2023-10-21 RX ORDER — NOREPINEPHRINE BITARTRATE 0.03 MG/ML
0-1 INJECTION, SOLUTION INTRAVENOUS CONTINUOUS
Status: DISCONTINUED | OUTPATIENT
Start: 2023-10-21 | End: 2023-10-22

## 2023-10-21 RX ORDER — SODIUM CHLORIDE, SODIUM LACTATE, POTASSIUM CHLORIDE, CALCIUM CHLORIDE 600; 310; 30; 20 MG/100ML; MG/100ML; MG/100ML; MG/100ML
INJECTION, SOLUTION INTRAVENOUS CONTINUOUS
Status: ACTIVE | OUTPATIENT
Start: 2023-10-21 | End: 2023-10-21

## 2023-10-21 RX ORDER — NOREPINEPHRINE BITARTRATE 0.03 MG/ML
INJECTION, SOLUTION INTRAVENOUS
Status: COMPLETED
Start: 2023-10-21 | End: 2023-10-21

## 2023-10-21 RX ORDER — METRONIDAZOLE 500 MG/100ML
500 INJECTION, SOLUTION INTRAVENOUS EVERY 12 HOURS
Status: DISCONTINUED | OUTPATIENT
Start: 2023-10-21 | End: 2023-10-22

## 2023-10-21 RX ADMIN — DICLOFENAC SODIUM 2 G: 10 GEL TOPICAL at 15:17

## 2023-10-21 RX ADMIN — LANSOPRAZOLE 30 MG: 30 TABLET, ORALLY DISINTEGRATING, DELAYED RELEASE ORAL at 05:25

## 2023-10-21 RX ADMIN — INSULIN HUMAN 7 UNITS: 100 INJECTION, SOLUTION PARENTERAL at 00:38

## 2023-10-21 RX ADMIN — Medication 200 MCG: at 09:30

## 2023-10-21 RX ADMIN — NOREPINEPHRINE BITARTRATE 0.05 MCG/KG/MIN: 1 INJECTION, SOLUTION, CONCENTRATE INTRAVENOUS at 10:44

## 2023-10-21 RX ADMIN — Medication 200 MCG: at 10:26

## 2023-10-21 RX ADMIN — Medication 200 MCG: at 10:01

## 2023-10-21 RX ADMIN — METRONIDAZOLE 500 MG: 500 INJECTION, SOLUTION INTRAVENOUS at 17:07

## 2023-10-21 RX ADMIN — ALLOPURINOL 300 MG: 300 TABLET ORAL at 05:19

## 2023-10-21 RX ADMIN — INSULIN HUMAN 4 UNITS: 100 INJECTION, SOLUTION PARENTERAL at 05:32

## 2023-10-21 RX ADMIN — CEFTRIAXONE SODIUM 2000 MG: 10 INJECTION, POWDER, FOR SOLUTION INTRAVENOUS at 10:45

## 2023-10-21 RX ADMIN — METRONIDAZOLE 500 MG: 500 INJECTION, SOLUTION INTRAVENOUS at 11:41

## 2023-10-21 RX ADMIN — INSULIN HUMAN 3 UNITS: 100 INJECTION, SOLUTION PARENTERAL at 17:12

## 2023-10-21 RX ADMIN — AMLODIPINE BESYLATE 10 MG: 10 TABLET ORAL at 05:19

## 2023-10-21 RX ADMIN — MEMANTINE 5 MG: 5 TABLET ORAL at 05:20

## 2023-10-21 RX ADMIN — INSULIN GLARGINE-YFGN 50 UNITS: 100 INJECTION, SOLUTION SUBCUTANEOUS at 17:12

## 2023-10-21 RX ADMIN — SODIUM CHLORIDE, POTASSIUM CHLORIDE, SODIUM LACTATE AND CALCIUM CHLORIDE 1000 ML: 600; 310; 30; 20 INJECTION, SOLUTION INTRAVENOUS at 10:57

## 2023-10-21 RX ADMIN — DICLOFENAC SODIUM 2 G: 10 GEL TOPICAL at 17:15

## 2023-10-21 RX ADMIN — DICLOFENAC SODIUM 2 G: 10 GEL TOPICAL at 05:28

## 2023-10-21 RX ADMIN — NOREPINEPHRINE BITARTRATE 8 MG: 1 INJECTION, SOLUTION, CONCENTRATE INTRAVENOUS at 10:43

## 2023-10-21 RX ADMIN — LOSARTAN POTASSIUM 100 MG: 50 TABLET, FILM COATED ORAL at 05:20

## 2023-10-21 RX ADMIN — ATORVASTATIN CALCIUM 40 MG: 40 TABLET, FILM COATED ORAL at 17:03

## 2023-10-21 RX ADMIN — SODIUM CHLORIDE, POTASSIUM CHLORIDE, SODIUM LACTATE AND CALCIUM CHLORIDE 1000 ML: 600; 310; 30; 20 INJECTION, SOLUTION INTRAVENOUS at 12:17

## 2023-10-21 RX ADMIN — SODIUM CHLORIDE, POTASSIUM CHLORIDE, SODIUM LACTATE AND CALCIUM CHLORIDE: 600; 310; 30; 20 INJECTION, SOLUTION INTRAVENOUS at 12:53

## 2023-10-21 RX ADMIN — INSULIN HUMAN 4 UNITS: 100 INJECTION, SOLUTION PARENTERAL at 11:35

## 2023-10-21 RX ADMIN — MEMANTINE 5 MG: 5 TABLET ORAL at 17:03

## 2023-10-21 ASSESSMENT — PAIN DESCRIPTION - PAIN TYPE
TYPE: ACUTE PAIN

## 2023-10-21 ASSESSMENT — FIBROSIS 4 INDEX
FIB4 SCORE: 1.73
FIB4 SCORE: 1.73

## 2023-10-21 NOTE — PROGRESS NOTES
Pt arrived to unit via hospital bed at 1045.  BP 70/41 with a MAP of 51. 3 bolus of LR were administered per MAR. Norepi was initiated and is now running at 0.05 mcg/kg/min. Pt is not following commands, unable to track. Responds to pain in RUE and is able to move LUE. Pt LLE is able to move. RLE responds to pain. DEENA orientation. Tele-monitor placed and monitor room notified. Call light within reach; fall precautions in place.

## 2023-10-21 NOTE — PROGRESS NOTES
Wife came out of room very upset and crying  Pt diaphoretic b/p 60/30. Bs 289. Pt not responding to painful or verbal stimuli Doctor Rashida notified. called rapid.Rapid team arrived.

## 2023-10-21 NOTE — PROGRESS NOTES
Monitor summary: SR/ST , SD .16, QRS .07, QT .34 with rare PVC and couplet per strip from monitor room.

## 2023-10-21 NOTE — CARE PLAN
The patient is Stable - Low risk of patient condition declining or worsening    Shift Goals  Clinical Goals: Stable neuro status, resume tube feedings  Patient Goals: DEENA  Family Goals: peg    Progress made toward(s) clinical / shift goals:    Problem: Neuro Status  Goal: Neuro status will remain stable or improve  Outcome: Progressing  Note: Neuro status remains stable.      Problem: Skin Integrity  Goal: Skin integrity is maintained or improved  Outcome: Progressing  Note: Pt on waffle overlay, taps system in place, q2 turns performed, heel float boots ordered.       Patient is not progressing towards the following goals:      Problem: Knowledge Deficit - Stroke Education  Goal: Patient's knowledge of stroke and risk factors will improve  Outcome: Not Progressing  Note: Pt unable to demonstrate understanding of plan of care.      Problem: Self Care  Goal: Patient will have the ability to perform ADLs independently or with assistance (bathe, groom, dress, toilet and feed)  Outcome: Not Progressing  Note: Pt unable to assist with ADLs at this time.

## 2023-10-21 NOTE — CARE PLAN
Problem: Skin Integrity  Goal: Skin integrity is maintained or improved  Outcome: Progressing  Note: Skin assessment completed, Q2 turns in place.  Pillows in use for positions and to float heels, silicone oxygen tubing in use.  Frequent linen changes to ensure patient stays dry.  Hourly rounding in place.      The patient is Stable - Low risk of patient condition declining or worsening    Shift Goals  Clinical Goals: Stable neuro status, resume tube feedings  Patient Goals: DEENA  Family Goals: peg

## 2023-10-21 NOTE — CODE DOCUMENTATION
Pt now moving L leg and arm spontaneously. Opening eyes and moving head side to side spontaneously.

## 2023-10-21 NOTE — PROGRESS NOTES
Rapid Response Summary     Rapid response called at 0922 for: Altered mental status  and Hypotension     VS: Hypotensive (See Vitals Flowsheet)  Additional info: pt admitted with sepsis and ICH from fall. Scott baseline opening eyes to voice, not following commands, moving L arm and leg spontaneously. Pt unresponsive, hypotensive SBP 50s. Return to neuro baseline.   MD Paged: Rashida  Interventions:    Imaging/Tests: EKG    Labs: CBC, CMP, and Lactic Acid   Medications: Fluid, Phenylephrine 200mcg   Other: reverse trendelenburg  Disposition: Improved mentation, BP did not improve with rapid response team interventions. Primary RN updated on plan of care. Patient transferred to [facility].

## 2023-10-21 NOTE — PROGRESS NOTES
Pulm/CC    IMCU request for hypotension and needing pressors  69 yo male presented with ground level fall on 9/24/2023 initially with small subarachnoid hemm and sepsis due to uti.9/20 9/30 AMS found to have AMS with left frontal lobe hemm.with aphasia and right sided weakness which is persistent.  Had PEG placed 10/19 and has had minimal po intake   Was tentatively ready for discharge but hypotensive today   Responding to fluids but needing close follow up and likely pressors till adequately resuscitated  No signs of infections  Agree with IMCU transfer  Please consult if needed  Code status is DNR/DNI

## 2023-10-21 NOTE — PROGRESS NOTES
[facility] Medicine Daily Progress Note    Date of Service  10/20/2023    Chief Complaint  Sharon Guardado is a 68 y.o. male admitted 9/24/2023 with AMS    Hospital Course  68-year-old male with a past medical history of alcohol abuse, hypertension, obesity who presented with altered mental status and a ground-level fall on 9/24/2023.  On admission he was noted to have a small frontal subarachnoid hemorrhage on CT head.  He was also noted to have severe sepsis secondary to UTI.  She was treated with IV ceftriaxone.  Patient developed symptoms of alcohol withdrawal and was intubated.  Patient was subsequently extubated and transferred to telemetry on 9/30.  The patient subsequently had worsening mental status, on 9/30 found to have a left frontal lobe hemorrhage with surrounding vasogenic edema with mild mass effect, deemed nonoperative by neurosurgery  Was readmitted to the ICU level of care, intubated, treated with hypertonic saline, empirically treated with Unasyn for fever, overall state on mechanical ventilation for 9 days, during the course there was a concern for possible abdominal infection, gallbladder infection, surgery was consulted,  HIDA scan was obtained and was negative for acute cholecystitis, surgery not indicated as per general surgery opinion.  The patient found with significant right-sided weakness, aphasia.  On 10/10 patient again found appropriate to transfer out of ICU.    Very slow improvement. Awake, not following commands for me, no communication/interaction with me, still aphasic, R weakness. Therapy note on 10/13 notes he was sitting up in bed with their help and even tried brushing his teeth.     EKG showed sinus bradycardia  PEG tube  placement pending.  Lantus titrated to 50 units qhs.    RUE swelling - US  1. No deep venous thrombosis. 2. Acute to subacute, occlusive thrombus in the median antecubital vein. Try supportive care initially for SVT treatment with elevation, warm  compress, etc.     10/17- Not following, not responding at all for me and staff. Discussed with wife at bedside, was more active on Friday-sat, OT note from 10/13 that states he was sitting up in bed with OT help and was maybe trying to brush his teeth? Have not seen anything close to that level of purposeful activity since that one. PEG tube placed, cleared for use.       Interval Problem Update  PEG tube placed yesterday, cleared by surgery to use for all feeds and medicines.  Still not following, I have not observed any meaningful recovery or purposeful movements.  At times does seem to track with his eyes intermittently, occasionally will squeeze finger when you place your hand into his palm but when asked to repeat any of these movements he cannot, does not, this is over multiple days and multiple rechecks.  I suspect these are more reflexive actions as opposed to purposeful movements.  Continue discharge planning.    I have discussed this patient's plan of care and discharge plan at IDT rounds today with Case Management, Nursing, Nursing leadership, and other members of the IDT team.    Code Status  Full Code    Disposition    I have placed the appropriate orders for post-discharge needs.    Review of Systems  Review of Systems   Unable to perform ROS: Acuity of condition        Physical Exam  Temp:  [36 °C (96.8 °F)-36.9 °C (98.4 °F)] 36 °C (96.8 °F)  Pulse:  [] 116  Resp:  [18-19] 18  BP: (110-159)/(62-91) 147/89  SpO2:  [92 %-94 %] 94 %    Physical Exam  Constitutional:       Appearance: He is obese. He is not toxic-appearing.      Comments: Awake, not responding to commands, moves left upper and lower   HENT:      Head: Normocephalic.      Nose: No rhinorrhea.      Mouth/Throat:      Mouth: Mucous membranes are dry.      Pharynx: No posterior oropharyngeal erythema.   Eyes:      General: No scleral icterus.     Extraocular Movements: Extraocular movements intact.      Conjunctiva/sclera: Conjunctivae  normal.   Cardiovascular:      Rate and Rhythm: Normal rate and regular rhythm.      Pulses: Normal pulses.      Heart sounds: Normal heart sounds.   Pulmonary:      Effort: Pulmonary effort is normal.      Breath sounds: Normal breath sounds.   Abdominal:      General: Bowel sounds are normal.      Palpations: Abdomen is soft.      Tenderness: There is no abdominal tenderness. There is no guarding.   Musculoskeletal:         General: No swelling or tenderness.      Cervical back: Normal range of motion and neck supple.   Skin:     General: Skin is warm and dry.      Coloration: Skin is not jaundiced.   Neurological:      Comments: Aphasia, R weakness, not following commands, moving left side spontaneous         Fluids    Intake/Output Summary (Last 24 hours) at 10/20/2023 1857  Last data filed at 10/20/2023 1800  Gross per 24 hour   Intake 575 ml   Output --   Net 575 ml         Laboratory  Recent Labs     10/20/23  0639   WBC 11.2*   RBC 5.14   HEMOGLOBIN 15.4   HEMATOCRIT 46.6   MCV 90.7   MCH 30.0   MCHC 33.0   RDW 42.4   PLATELETCT 251   MPV 11.8         Recent Labs     10/20/23  0639   SODIUM 147*   POTASSIUM 4.1   CHLORIDE 108   CO2 27   GLUCOSE 260*   BUN 45*   CREATININE 1.12   CALCIUM 10.1                         Imaging  US-EXTREMITY VENOUS UPPER UNILAT RIGHT   Final Result      DX-ABDOMEN FOR TUBE PLACEMENT   Final Result      Nasogastric tube tip overlies the duodenal bulb      DX-ABDOMEN FOR TUBE PLACEMENT   Final Result      1.  Enteric tube tip again projects over the 2nd portion of the duodenum.      DX-ABDOMEN FOR TUBE PLACEMENT   Final Result      Enteric tube tip projects over the second portion of the duodenum      DX-CHEST-PORTABLE (1 VIEW)   Final Result      Unchanged bibasilar underinflation atelectasis      DX-ABDOMEN FOR TUBE PLACEMENT   Final Result      Enteric tube tip projects over the gastric antrum or proximal duodenum      DX-CHEST-PORTABLE (1 VIEW)   Final Result      Improving  bibasilar pulmonary opacities.      DX-CHEST-PORTABLE (1 VIEW)   Final Result         Findings on chest radiograph appear stable since the prior radiograph.  No new abnormalities are identified.      NM-HEPATOBILIARY SCAN   Final Result      There is visualization of the gallbladder after morphine administration, indicating the cystic duct is still patent. No scintigraphy evidence of acute cholecystitis.      DX-CHEST-PORTABLE (1 VIEW)   Final Result         1.  Mild pulmonary edema and/or infiltrates.   2.  Cardiomegaly   3.  Atherosclerosis      DX-CHEST-PORTABLE (1 VIEW)   Final Result         1.  No acute cardiopulmonary disease.   2.  Cardiomegaly   3.  Atherosclerosis      US-RUQ   Final Result      1.  Minimal gallbladder wall thickening and possible sludge.  Cholecystitis is not excluded.   2.  Minimal pericholecystic fluid may be due to gallbladder inflammation or liver disease.   3.  Enlarged echogenic liver suggesting fatty infiltration.   4.  Limited evaluation of the pancreas abdominal aorta.      DX-CHEST-PORTABLE (1 VIEW)   Final Result         1.  Pulmonary edema and/or infiltrates are identified, which are stable since the prior exam.   2.  Cardiomegaly   3.  Atherosclerosis      US-EXTREMITY VENOUS UPPER UNILAT RIGHT   Final Result      DX-CHEST-PORTABLE (1 VIEW)   Final Result         1.  Pulmonary edema and/or infiltrates are identified, which are stable since the prior exam.   2.  Cardiomegaly   3.  Atherosclerosis      DX-CHEST-PORTABLE (1 VIEW)   Final Result         1.  Pulmonary edema and/or infiltrates are identified, which are stable since the prior exam.   2.  Cardiomegaly   3.  Atherosclerosis      DX-CHEST-PORTABLE (1 VIEW)   Final Result         1.  Pulmonary edema and/or infiltrates are identified, which are somewhat decreased since the prior exam.   2.  Cardiomegaly   3.  Atherosclerosis      MR-MRA HEAD-W/O   Final Result         Normal intracranial MRA.      MR-BRAIN-WITH & W/O    Final Result         Left frontal lobe hemorrhage with surrounding vasogenic edema and mild mass effect upon the frontal horn of the left lateral ventricle. There is approximately 2 mm of midline shift to the right.      Age-related volume loss and chronic microvascular ischemic changes.      No abnormal intracranial enhancement is identified.      CT-HEAD W/O   Final Result         1. Stable 6 cm left frontal intraparenchymal hemorrhage with mild, 2 mm left-to-right subfalcine herniation.   2. No new abnormality or change.         DX-CHEST-PORTABLE (1 VIEW)   Final Result         1.  Pulmonary edema and/or infiltrates are identified, which appear somewhat increased since the prior exam.   2.  Cardiomegaly   3.  Atherosclerosis      DX-ABDOMEN FOR TUBE PLACEMENT   Final Result      NG tube extends below the diaphragm and appears to extend through the region of the stomach with tip at the level of the proximal duodenum.      DX-CHEST-PORTABLE (1 VIEW)   Final Result      1.  Grossly satisfactory appearance of tubes and lines.   2.  No evidence of pneumothorax.   3.  Marked hypoinflation with probable bibasilar atelectasis.   4.  Enlarged cardiac silhouette with vascular congestion.      CT-HEAD W/O   Final Result         1.  New large parenchymal hemorrhage involving the frontal lobe is identified with some surrounding edema.      2.  Subarachnoid hemorrhage is also noted in the area.      3.  Minimal midline shift to the right side.      Findings are consistent with atrophy.  Decreased attenuation in the periventricular white matter likely indicates microvascular ischemic disease.      Based solely on CT findings, the brain injury guideline category is mBIG 3.      EDH   IVH   Displaced skull fx   SDH > 8mm   IPH > 8mm or multiple   SAH bi-hemispheric or > 3mm      The original BIG retrospective analysis found radiographic progression in 0% of BIG 1 patients and 2.6% BIG 2.      These findings were discussed with  GEETA MOLINA on 09/30/2023.      DX-CHEST-PORTABLE (1 VIEW)   Final Result      Stable multiple pulmonary vascular dilation/congestion. Otherwise, negative.      DX-CHEST-PORTABLE (1 VIEW)   Final Result      1.  Increasing airspace disease at the left base.      DX-CHEST-LIMITED (1 VIEW)   Final Result         1.  Pulmonary edema and/or infiltrates are identified, which are somewhat decreased since the prior exam.   2.  Cardiomegaly   3.  Atherosclerosis      DX-CHEST-PORTABLE (1 VIEW)   Final Result         1.  Pulmonary edema and/or infiltrates are identified, which appear somewhat increased since the prior exam.   2.  Cardiomegaly   3.  Atherosclerosis      DX-CHEST-PORTABLE (1 VIEW)   Final Result         1.  Interstitial edema and/or infiltrates, slightly decreased since prior study.   2.  Cardiomegaly   3.  Atherosclerosis      DX-CHEST-LIMITED (1 VIEW)   Final Result      Right IJ catheter tip projects in the SVC. No pneumothorax.      CT-CTA NECK WITH & W/O-POST PROCESSING   Final Result      1.  Estimated 50-75% stenosis of the BILATERAL carotid arteries   2.  Estimated greater than 50% stenosis at the vertebral artery ostium on each side      CT-CTA HEAD WITH & W/O-POST PROCESS   Final Result      1.  No large vessel occlusion or aneurysm.   2.  Hazy left frontal lobe subarachnoid hemorrhage, decreased in conspicuity from prior. No new hemorrhage seen.   3.  Scattered opacifications of the ethmoid sinuses, likely related to support hardware.      DX-ABDOMEN FOR TUBE PLACEMENT   Final Result         1.  Nonspecific bowel gas pattern in the upper abdomen.   2.  Dobbhoff tube with tip terminating overlying the expected location of the first or second duodenal segment.      DX-CHEST-PORTABLE (1 VIEW)   Final Result         1.  Interstitial edema and/or infiltrates.   2.  Cardiomegaly   3.  Nasogastric tube terminates in the distal esophagus, recommend advancement   4.  Atherosclerosis      DX-ABDOMEN FOR TUBE  PLACEMENT   Final Result         1.  Nonspecific bowel gas pattern in the upper abdomen.   2.  Nasogastric tube terminates just distal to the gastroesophageal junction, recommend advancement      CT-RENAL COLIC EVALUATION(A/P W/O)   Final Result         1.  Intrahepatic biliary air and nondependent air within the gallbladder, consider history of instrumentation or changes of cholangitis in the appropriate clinical setting.   2.  Large fat-containing bilateral inguinal hernias   3.  Enlarged prostate, consider causes of prostate enlargement with additional workup as clinically appropriate   4.  Diverticulosis   5.  Cholelithiasis   6.  Atherosclerosis      CT-HEAD W/O   Final Result         1.  Hazy left frontal subarachnoid hemorrhage.   2.  Nonspecific white matter changes, commonly associated with small vessel ischemic disease.  Associated mild cerebral atrophy is noted.      Based solely on CT findings, the brain injury guideline category is mBIG 1.      SDH < 4mm   IPH < 4mm   SAH < 3 sulci and < 1mm      The original BIG retrospective analysis found radiographic progression in 0% of BIG 1 patients and 2.6% BIG 2.      These findings were discussed with the patient's clinician, Madhu Zambrano, on 9/24/2023 10:10 PM.      DX-CHEST-PORTABLE (1 VIEW)   Final Result      No acute cardiopulmonary abnormality identified.           Assessment/Plan  * Intraparenchymal hemorrhage of brain (HCC)- (present on admission)  Assessment & Plan  CT on 9/24 with hazy left frontal SAH  CT on 9/30 with new large IPH in the left frontal lobe  Strict blood pressure control with goal SBP less than 160  Goal sodium 145-150 - I am titrating hypertonic saline to achieve sodium goal  Will deescalate HTS today and slowly correct  Neuro checks every 2 hours    Bradycardia  Assessment & Plan  Telemetry reported bradycardia HR 40s for 4 minutes, asymptomatic  I ordered EKG, mag, Phos, and TSH  EKG showed sinus bradycardia    Continue  telemetry monitor  Avoid medications that can further slow down the heart rate    ACP (advance care planning)  Assessment & Plan  10/12 I discussed advance care planning with the patient's wife at bedside, including diagnosis, prognosis, plan of care, risks and benefits of any therapies that could be offered.  We discussed regarding CODE STATUS, CPR and intubation with mechanical ventilation, discussed regarding risk and benefits, as well as alternatives including palliation and hospice as appropriate.  After long discussion with patient, patient's wife has decided for DNR/DNI, patient CODE STATUS has een updated in epic with DNR/DNI as per patient's wishes.   Guarded recovery, I consulted palliative care. Will decide PEG tube placement depends on the goal of care  ACP 22min  10/13 patient is more alert and awake. The wife discussed with the other family members yesterday and wanted to change the code status back to full code. No pallitive care at this point. The wife agrees with PEG tube placement if patients fails SLP evelu again. I changed the code status back to full code. ACP 16min      Atelectasis- (present on admission)  Assessment & Plan  Patient unable to follow commands for incentive spirometry    Pneumonia due to infectious organism- (present on admission)  Assessment & Plan  Continue Zosyn    Acute respiratory failure with hypoxia (HCC)- (present on admission)  Assessment & Plan  Intubated 9/25-9/28  Reintubated 9/30-10/7  Continue oxygen    Thrombocytopenia (HCC)- (present on admission)  Assessment & Plan  Baseline ~110-125  Monitor, currently improved    Pneumobilia- (present on admission)  Assessment & Plan  As seen on CT renal (9/25/23).    9/26/23: No need for further interventions at this time per gen surg    Type 2 diabetes mellitus (HCC)- (present on admission)  Assessment & Plan  A1c 5.7% (6/2023)  Ongoing treatment with long and short acting insulin, adjust as indicated    10/14 BG high, I  increase the Lantus from 39 units to 42 units   Continue to monitor  Hypoglycemia protocol    Primary hypertension- (present on admission)  Assessment & Plan  Hypertensive at presentation   Goal for normotension currently multimodality treatment      BMI 40.0-44.9, adult (HCC)- (present on admission)  Assessment & Plan  Future weight loss recommended    Alcohol dependence (HCC)- (present on admission)  Assessment & Plan  Multivitamin, folate, thiamine  Monitor, additional therapies as clinically indicated  9/29/2023 patient was at least 2-3 hard liquor drinks daily although this is unclear per patient and his wife's history.  .    Patient s/p treatment for acute alcohol withdrawal         VTE prophylaxis: scd    I have performed a physical exam and reviewed and updated ROS and Plan today (10/20/2023). In review of yesterday's note (10/19/2023), there are no changes except as documented above.    Total time spent 47 minutes. I spent greater than 50% of the time for patient care, counseling, and coordination on this date, including unit/floor time, and face-to-face time with the patient as per interval events, my own review of patient's imaging and lab analysis and developing my assessment and plan above.    I certify that the patient requires continued medically necessary hospital services for the treatment of intraparenchymal hemorrhage, encephalopathy, debility and will remain in the hospital for 7 days.  Discharge plan is anticipated to be 10/24/23.

## 2023-10-21 NOTE — ASSESSMENT & PLAN NOTE
Suspect hypovolumic although septic is on differential.  hypotensive, rapid response called. No longer responding to stimuli, not having any movement at all, hypotensive with blood pressure into the 50s and 60s systolic.  Immediately started IV fluid, he was not tachycardic he has not been afebrile.  I ordered for 2 L IV fluid bolus, labs to include CBC, CMP, blood cultures, lactic acid, urinalysis.   He had his PEG tube placed 2 days prior, CBC with a day following that was stable, no evidence of large volume intra-abdominal bleeding.  Patient's blood pressures initially responsive did start coming down again after initial 1.4 L fluid were in, decision was made to transfer to [facility] on low-dose Levophed.    Empirically started ceftriaxone and Flagyl with thoughts that there could be abdominal source from recent PEG insertion.   Has been on 0 to 2 L nasal cannula and that is unchanged, no increased respiratory rate, no diarrhea.  Differential could be septic shock, new CNS process, hypovolemia due to delayed PEG tube insertion, each midnight for the 4 days prior to PEG tube insertion he was n.p.o. at midnight and then tube feeds only restarted later in the afternoon when it was determined he was not getting over the PEG tube that day.    Following his PEG tube placement tube feedings were also temporarily held.  He was having good urine output through all of this, I suspect he is dry.   Does have LE.  Mild leukocytosis 12.9, lactic acid 3.6.   After the 2 L IV fluid started on maintenance LR.    He was on Levophed for short time, blood pressure in the 60s on arrival to [facility], ultimately he was not on the vasopressor for very long, started 10:44 AM stopped at 1402.  Trend lactate, continue IVF, check cortisol

## 2023-10-21 NOTE — PROGRESS NOTES
[facility] Medicine Daily Progress Note    Date of Service  10/21/2023    Chief Complaint  Sharon Guardado is a 68 y.o. male admitted 9/24/2023 with AMS    Hospital Course  68-year-old male with a past medical history of alcohol abuse, hypertension, obesity who presented with altered mental status and a ground-level fall on 9/24/2023.  On admission he was noted to have a small frontal subarachnoid hemorrhage on CT head.  He was also noted to have severe sepsis secondary to UTI.  She was treated with IV ceftriaxone.  Patient developed symptoms of alcohol withdrawal and was intubated.  Patient was subsequently extubated and transferred to telemetry on 9/30.  The patient subsequently had worsening mental status, on 9/30 found to have a left frontal lobe hemorrhage with surrounding vasogenic edema with mild mass effect, deemed nonoperative by neurosurgery  Was readmitted to the ICU level of care, intubated, treated with hypertonic saline, empirically treated with Unasyn for fever, overall state on mechanical ventilation for 9 days, during the course there was a concern for possible abdominal infection, gallbladder infection, surgery was consulted,  HIDA scan was obtained and was negative for acute cholecystitis, surgery not indicated as per general surgery opinion.  The patient found with significant right-sided weakness, aphasia.  On 10/10 patient again found appropriate to transfer out of ICU.    Very slow improvement. Awake, not following commands for me, no communication/interaction with me, still aphasic, R weakness. Therapy note on 10/13 notes he was sitting up in bed with their help and even tried brushing his teeth.     EKG showed sinus bradycardia  PEG tube  placement pending.  Lantus titrated to 50 units qhs.    RUE swelling - US  1. No deep venous thrombosis. 2. Acute to subacute, occlusive thrombus in the median antecubital vein. Try supportive care initially for SVT treatment with elevation, warm  compress, etc.     10/17- Not following, not responding at all for me and staff. Discussed with wife at bedside, was more active on Friday-sat, OT note from 10/13 that states he was sitting up in bed with OT help and was maybe trying to brush his teeth? Have not seen anything close to that level of purposeful activity since that one. PEG tube placed, cleared for use.       Interval Problem Update  I was notified this AM that patient was hypotensive, rapid response called. I immediately went to bedside and patient previously spontaneously moving his left upper and lower extremities, previously with eyes open and tracking, and generally awake.  Patient was not responding to stimuli, not having any movement at all, hypotensive with blood pressure into the 50s and 60s systolic.  Immediately started IV fluid, he was nontachycardic he has not been afebrile.  I ordered for 2 L IV fluid bolus, labs to include CBC, CMP, blood cultures, lactic acid, urinalysis.  Family was at bedside.  He had his PEG tube placed 2 days prior, CBC with a day following that was stable, no evidence of large volume intra-abdominal bleeding.  Patient's blood pressures initially responsive did start coming down again after initial 1.4 L fluid were in, decision was made to transfer to IMCU on low-dose Levophed.  Empirically started ceftriaxone and Flagyl with thoughts that there could be abdominal source from recent PEG insertion.  Has been on 0 to 2 L nasal cannula and that is unchanged, no increased respiratory rate, no diarrhea.  Differential could be septic shock, new CNS process, hypovolemia due to delayed PEG tube insertion, each midnight for the 4 days prior to PEG tube insertion he was n.p.o. at midnight and then tube feeds only restarted later in the afternoon when it was determined he was not getting over the PEG tube that day.  Following his PEG tube placement tube feedings were also temporarily held.  He was having good urine output  through all of this, I suspect he is dry.  Does have LE.  Mild leukocytosis 12.9, lactic acid 3.6.  After the 2 L IV fluid started on maintenance LR.  He was on Levophed for short time, blood pressure in the 60s on arrival to [facility], ultimately he was not on the vasopressor for very long, started 10:44 AM stopped at 1402.  I discussed case with intensivist Dr. Stapleton prior to transfer, discussed case with hospitalist Dr. Major who will be taking over care of patient tomorrow.  Family updated at bedside all questions answered.    The very real possibility of a deterioration of this patient’s condition required the highest level of my preparedness for sudden, emergent intervention. I provided critical care services, which included evaluating and treating causes of hypotension and monitoring blood pressure, medication orders, frequent reevaluations of the patient’s condition and response to treatment, ordering and reviewing test results, and discussing the case with various consultants. Started vasopressors, IVF resuscitation, treating shock.The critical care time associated with the care of the patient was 55 minutes. Review chart for interventions. This time is exclusive of any other billable procedures      I have discussed this patient's plan of care and discharge plan at IDT rounds today with Case Management, Nursing, Nursing leadership, and other members of the IDT team.    Code Status  DNAR/DNI    Disposition    I have placed the appropriate orders for post-discharge needs.    Review of Systems  Review of Systems   Unable to perform ROS: Acuity of condition        Physical Exam  Temp:  [35.9 °C (96.6 °F)-36.9 °C (98.4 °F)] 35.9 °C (96.6 °F)  Pulse:  [] 86  Resp:  [17-19] 18  BP: ()/(41-90) 75/46  SpO2:  [93 %-97 %] 94 %    Physical Exam  Constitutional:       Appearance: He is obese. He is ill-appearing.      Comments: Awake, not responding to commands or moving extremities, not responding to any  stimuli while hypotensive   HENT:      Head: Normocephalic.      Nose: No rhinorrhea.      Mouth/Throat:      Mouth: Mucous membranes are dry.      Pharynx: No posterior oropharyngeal erythema.   Eyes:      General: No scleral icterus.     Extraocular Movements: Extraocular movements intact.      Conjunctiva/sclera: Conjunctivae normal.   Cardiovascular:      Rate and Rhythm: Normal rate and regular rhythm.      Pulses: Normal pulses.      Heart sounds: Normal heart sounds.   Pulmonary:      Effort: Pulmonary effort is normal.      Breath sounds: Rales (bibasilar) present.   Abdominal:      General: Bowel sounds are normal.      Palpations: Abdomen is soft.      Tenderness: There is no abdominal tenderness. There is no guarding.   Musculoskeletal:         General: No swelling or tenderness.      Cervical back: Normal range of motion and neck supple.   Skin:     General: Skin is warm and dry.      Capillary Refill: Capillary refill takes 2 to 3 seconds.      Coloration: Skin is not jaundiced.   Neurological:      Comments: Aphasia, R weakness, not following commands or reacting to stimuli while hypotensive         Fluids    Intake/Output Summary (Last 24 hours) at 10/21/2023 1057  Last data filed at 10/21/2023 0800  Gross per 24 hour   Intake 1331 ml   Output --   Net 1331 ml         Laboratory  Recent Labs     10/20/23  0639 10/21/23  0949   WBC 11.2* 12.9*   RBC 5.14 5.09   HEMOGLOBIN 15.4 15.3   HEMATOCRIT 46.6 47.8   MCV 90.7 93.9   MCH 30.0 30.1   MCHC 33.0 32.0*   RDW 42.4 44.5   PLATELETCT 251 268   MPV 11.8 11.7         Recent Labs     10/20/23  0639 10/21/23  0949   SODIUM 147* 149*   POTASSIUM 4.1 3.9   CHLORIDE 108 109   CO2 27 28   GLUCOSE 260* 307*   BUN 45* 51*   CREATININE 1.12 1.62*   CALCIUM 10.1 10.1                         Imaging  US-EXTREMITY VENOUS UPPER UNILAT RIGHT   Final Result      DX-ABDOMEN FOR TUBE PLACEMENT   Final Result      Nasogastric tube tip overlies the duodenal bulb       DX-ABDOMEN FOR TUBE PLACEMENT   Final Result      1.  Enteric tube tip again projects over the 2nd portion of the duodenum.      DX-ABDOMEN FOR TUBE PLACEMENT   Final Result      Enteric tube tip projects over the second portion of the duodenum      DX-CHEST-PORTABLE (1 VIEW)   Final Result      Unchanged bibasilar underinflation atelectasis      DX-ABDOMEN FOR TUBE PLACEMENT   Final Result      Enteric tube tip projects over the gastric antrum or proximal duodenum      DX-CHEST-PORTABLE (1 VIEW)   Final Result      Improving bibasilar pulmonary opacities.      DX-CHEST-PORTABLE (1 VIEW)   Final Result         Findings on chest radiograph appear stable since the prior radiograph.  No new abnormalities are identified.      NM-HEPATOBILIARY SCAN   Final Result      There is visualization of the gallbladder after morphine administration, indicating the cystic duct is still patent. No scintigraphy evidence of acute cholecystitis.      DX-CHEST-PORTABLE (1 VIEW)   Final Result         1.  Mild pulmonary edema and/or infiltrates.   2.  Cardiomegaly   3.  Atherosclerosis      DX-CHEST-PORTABLE (1 VIEW)   Final Result         1.  No acute cardiopulmonary disease.   2.  Cardiomegaly   3.  Atherosclerosis      US-RUQ   Final Result      1.  Minimal gallbladder wall thickening and possible sludge.  Cholecystitis is not excluded.   2.  Minimal pericholecystic fluid may be due to gallbladder inflammation or liver disease.   3.  Enlarged echogenic liver suggesting fatty infiltration.   4.  Limited evaluation of the pancreas abdominal aorta.      DX-CHEST-PORTABLE (1 VIEW)   Final Result         1.  Pulmonary edema and/or infiltrates are identified, which are stable since the prior exam.   2.  Cardiomegaly   3.  Atherosclerosis      US-EXTREMITY VENOUS UPPER UNILAT RIGHT   Final Result      DX-CHEST-PORTABLE (1 VIEW)   Final Result         1.  Pulmonary edema and/or infiltrates are identified, which are stable since the prior  exam.   2.  Cardiomegaly   3.  Atherosclerosis      DX-CHEST-PORTABLE (1 VIEW)   Final Result         1.  Pulmonary edema and/or infiltrates are identified, which are stable since the prior exam.   2.  Cardiomegaly   3.  Atherosclerosis      DX-CHEST-PORTABLE (1 VIEW)   Final Result         1.  Pulmonary edema and/or infiltrates are identified, which are somewhat decreased since the prior exam.   2.  Cardiomegaly   3.  Atherosclerosis      MR-MRA HEAD-W/O   Final Result         Normal intracranial MRA.      MR-BRAIN-WITH & W/O   Final Result         Left frontal lobe hemorrhage with surrounding vasogenic edema and mild mass effect upon the frontal horn of the left lateral ventricle. There is approximately 2 mm of midline shift to the right.      Age-related volume loss and chronic microvascular ischemic changes.      No abnormal intracranial enhancement is identified.      CT-HEAD W/O   Final Result         1. Stable 6 cm left frontal intraparenchymal hemorrhage with mild, 2 mm left-to-right subfalcine herniation.   2. No new abnormality or change.         DX-CHEST-PORTABLE (1 VIEW)   Final Result         1.  Pulmonary edema and/or infiltrates are identified, which appear somewhat increased since the prior exam.   2.  Cardiomegaly   3.  Atherosclerosis      DX-ABDOMEN FOR TUBE PLACEMENT   Final Result      NG tube extends below the diaphragm and appears to extend through the region of the stomach with tip at the level of the proximal duodenum.      DX-CHEST-PORTABLE (1 VIEW)   Final Result      1.  Grossly satisfactory appearance of tubes and lines.   2.  No evidence of pneumothorax.   3.  Marked hypoinflation with probable bibasilar atelectasis.   4.  Enlarged cardiac silhouette with vascular congestion.      CT-HEAD W/O   Final Result         1.  New large parenchymal hemorrhage involving the frontal lobe is identified with some surrounding edema.      2.  Subarachnoid hemorrhage is also noted in the area.      3.   Minimal midline shift to the right side.      Findings are consistent with atrophy.  Decreased attenuation in the periventricular white matter likely indicates microvascular ischemic disease.      Based solely on CT findings, the brain injury guideline category is mBIG 3.      EDH   IVH   Displaced skull fx   SDH > 8mm   IPH > 8mm or multiple   SAH bi-hemispheric or > 3mm      The original BIG retrospective analysis found radiographic progression in 0% of BIG 1 patients and 2.6% BIG 2.      These findings were discussed with GEETA MOLINA on 09/30/2023.      DX-CHEST-PORTABLE (1 VIEW)   Final Result      Stable multiple pulmonary vascular dilation/congestion. Otherwise, negative.      DX-CHEST-PORTABLE (1 VIEW)   Final Result      1.  Increasing airspace disease at the left base.      DX-CHEST-LIMITED (1 VIEW)   Final Result         1.  Pulmonary edema and/or infiltrates are identified, which are somewhat decreased since the prior exam.   2.  Cardiomegaly   3.  Atherosclerosis      DX-CHEST-PORTABLE (1 VIEW)   Final Result         1.  Pulmonary edema and/or infiltrates are identified, which appear somewhat increased since the prior exam.   2.  Cardiomegaly   3.  Atherosclerosis      DX-CHEST-PORTABLE (1 VIEW)   Final Result         1.  Interstitial edema and/or infiltrates, slightly decreased since prior study.   2.  Cardiomegaly   3.  Atherosclerosis      DX-CHEST-LIMITED (1 VIEW)   Final Result      Right IJ catheter tip projects in the SVC. No pneumothorax.      CT-CTA NECK WITH & W/O-POST PROCESSING   Final Result      1.  Estimated 50-75% stenosis of the BILATERAL carotid arteries   2.  Estimated greater than 50% stenosis at the vertebral artery ostium on each side      CT-CTA HEAD WITH & W/O-POST PROCESS   Final Result      1.  No large vessel occlusion or aneurysm.   2.  Hazy left frontal lobe subarachnoid hemorrhage, decreased in conspicuity from prior. No new hemorrhage seen.   3.  Scattered opacifications  of the ethmoid sinuses, likely related to support hardware.      DX-ABDOMEN FOR TUBE PLACEMENT   Final Result         1.  Nonspecific bowel gas pattern in the upper abdomen.   2.  Dobbhoff tube with tip terminating overlying the expected location of the first or second duodenal segment.      DX-CHEST-PORTABLE (1 VIEW)   Final Result         1.  Interstitial edema and/or infiltrates.   2.  Cardiomegaly   3.  Nasogastric tube terminates in the distal esophagus, recommend advancement   4.  Atherosclerosis      DX-ABDOMEN FOR TUBE PLACEMENT   Final Result         1.  Nonspecific bowel gas pattern in the upper abdomen.   2.  Nasogastric tube terminates just distal to the gastroesophageal junction, recommend advancement      CT-RENAL COLIC EVALUATION(A/P W/O)   Final Result         1.  Intrahepatic biliary air and nondependent air within the gallbladder, consider history of instrumentation or changes of cholangitis in the appropriate clinical setting.   2.  Large fat-containing bilateral inguinal hernias   3.  Enlarged prostate, consider causes of prostate enlargement with additional workup as clinically appropriate   4.  Diverticulosis   5.  Cholelithiasis   6.  Atherosclerosis      CT-HEAD W/O   Final Result         1.  Hazy left frontal subarachnoid hemorrhage.   2.  Nonspecific white matter changes, commonly associated with small vessel ischemic disease.  Associated mild cerebral atrophy is noted.      Based solely on CT findings, the brain injury guideline category is mBIG 1.      SDH < 4mm   IPH < 4mm   SAH < 3 sulci and < 1mm      The original BIG retrospective analysis found radiographic progression in 0% of BIG 1 patients and 2.6% BIG 2.      These findings were discussed with the patient's clinician, Madhu Zambrano, on 9/24/2023 10:10 PM.      DX-CHEST-PORTABLE (1 VIEW)   Final Result      No acute cardiopulmonary abnormality identified.           Assessment/Plan  * Intraparenchymal hemorrhage of brain (HCC)-  (present on admission)  Assessment & Plan  CT on 9/24 with hazy left frontal SAH  CT on 9/30 with new large IPH in the left frontal lobe  Strict blood pressure control with goal SBP less than 160  Goal sodium 145-150 - I am titrating hypertonic saline to achieve sodium goal  Will deescalate HTS today and slowly correct  Neuro checks every 2 hours    Hypotension  Assessment & Plan  Suspect hypovolumic although septic is on differential.  hypotensive, rapid response called. No longer responding to stimuli, not having any movement at all, hypotensive with blood pressure into the 50s and 60s systolic.  Immediately started IV fluid, he was not tachycardic he has not been afebrile.  I ordered for 2 L IV fluid bolus, labs to include CBC, CMP, blood cultures, lactic acid, urinalysis.   He had his PEG tube placed 2 days prior, CBC with a day following that was stable, no evidence of large volume intra-abdominal bleeding.  Patient's blood pressures initially responsive did start coming down again after initial 1.4 L fluid were in, decision was made to transfer to [facility] on low-dose Levophed.    Empirically started ceftriaxone and Flagyl with thoughts that there could be abdominal source from recent PEG insertion.   Has been on 0 to 2 L nasal cannula and that is unchanged, no increased respiratory rate, no diarrhea.  Differential could be septic shock, new CNS process, hypovolemia due to delayed PEG tube insertion, each midnight for the 4 days prior to PEG tube insertion he was n.p.o. at midnight and then tube feeds only restarted later in the afternoon when it was determined he was not getting over the PEG tube that day.    Following his PEG tube placement tube feedings were also temporarily held.  He was having good urine output through all of this, I suspect he is dry.   Does have LE.  Mild leukocytosis 12.9, lactic acid 3.6.   After the 2 L IV fluid started on maintenance LR.    He was on Levophed for short time, blood  "pressure in the 60s on arrival to [facility], ultimately he was not on the vasopressor for very long, started 10:44 AM stopped at 1402.  Trend lactate, continue IVF, check cortisol    Bradycardia  Assessment & Plan  Telemetry reported bradycardia HR 40s for 4 minutes, asymptomatic  I ordered EKG, mag, Phos, and TSH  EKG showed sinus bradycardia    Continue telemetry monitor  Avoid medications that can further slow down the heart rate    ACP (advance care planning)  Assessment & Plan  10/21  I discussed advance care planning with the patient and family for at least 35 minutes, including diagnosis, prognosis, plan of care, risks and benefits of any therapies that could be offered, as well as alternatives including palliation and hospice, as appropriate.  DNR/I per patient's wife wishes, patient has gone from full code to dnr back to full code, now DNR/I. She does not want him to be attached to more machines. I explained he is high risk for developing further brain injuries following a code and he would likely not make it through and it would serve to prolong his suffering. She recognizes that and wants what is best for Sharon. She recognizes he has been in pain for \"a long time\" and does not want to see him suffer needlessly.       Atelectasis- (present on admission)  Assessment & Plan  Patient unable to follow commands for incentive spirometry    Pneumonia due to infectious organism- (present on admission)  Assessment & Plan  Continue Zosyn    Acute respiratory failure with hypoxia (HCC)- (present on admission)  Assessment & Plan  Intubated 9/25-9/28  Reintubated 9/30-10/7  Continue oxygen    Thrombocytopenia (HCC)- (present on admission)  Assessment & Plan  Baseline ~110-125  Monitor, currently improved    Pneumobilia- (present on admission)  Assessment & Plan  As seen on CT renal (9/25/23).    9/26/23: No need for further interventions at this time per gen surg    Type 2 diabetes mellitus (HCC)- (present on " admission)  Assessment & Plan  A1c 5.7% (6/2023)  Ongoing treatment with long and short acting insulin, adjust as indicated    10/14 BG high, I increase the Lantus from 39 units to 42 units   Continue to monitor  Hypoglycemia protocol    LE (acute kidney injury), prerenal (CMS-[facility])  Assessment & Plan  suspecting Pre renal etiology   Check U/a & CPK  Rule out post obstruction  IVF  Renal dose meds and avoid nephrotoxins  Monitor I&O's  Follow renal function      Primary hypertension- (present on admission)  Assessment & Plan  Hypertensive at presentation   Goal for normotension currently multimodality treatment      BMI 40.0-44.9, adult ([facility])- (present on admission)  Assessment & Plan  Future weight loss recommended    Alcohol dependence ([facility])- (present on admission)  Assessment & Plan  Multivitamin, folate, thiamine  Monitor, additional therapies as clinically indicated  9/29/2023 patient was at least 2-3 hard liquor drinks daily although this is unclear per patient and his wife's history.  .    Patient s/p treatment for acute alcohol withdrawal         VTE prophylaxis: scd    I have performed a physical exam and reviewed and updated ROS and Plan today (10/20/2023). In review of yesterday's note (10/19/2023), there are no changes except as documented above.    Total time spent 55 minutes. I spent greater than 50% of the time for patient care, counseling, and coordination on this date, including unit/floor time, and face-to-face time with the patient as per interval events, my own review of patient's imaging and lab analysis and developing my assessment and plan above.

## 2023-10-21 NOTE — CODE DOCUMENTATION
Pt came in for sepsis, fall prior to admission w/ subarachnoid hemorrhage. Pt moves L arm, opens eyes to voice baseline post fall. Last known well 0850. Pt currently unresponsive to painful stimuli.

## 2023-10-21 NOTE — CARE PLAN
The patient is Watcher - Medium risk of patient condition declining or worsening    Shift Goals  Clinical Goals: Pt will remain hemodynamically stable during shift  Patient Goals: tin  Family Goals: peg    Progress made toward(s) clinical / shift goals:    IVF running, BP stabilized, VS taken Q4 hours, pt on continuous cardiac monitoring. Daily labs drawn.      Problem: Knowledge Deficit - Standard  Goal: Patient and family/care givers will demonstrate understanding of plan of care, disease process/condition, diagnostic tests and medications  Outcome: Progressing  Note: Pt educated regarding plan of care and medications. All questions answered.       Problem: Skin Integrity  Goal: Skin integrity is maintained or improved  Outcome: Progressing  Note: Pt turned and positioned every two hours. Incontinence care provided and barrier paste applied as needed.       Problem: Pain - Standard  Goal: Alleviation of pain or a reduction in pain to the patient’s comfort goal  Outcome: Progressing  Note: Pt assessed for pain regularly and medicated PRN per MAR.

## 2023-10-22 PROBLEM — E87.0 HYPERNATREMIA: Status: ACTIVE | Noted: 2023-10-22

## 2023-10-22 PROBLEM — R57.9 SHOCK (HCC): Status: ACTIVE | Noted: 2023-10-22

## 2023-10-22 PROBLEM — I95.9 HYPOTENSION: Status: RESOLVED | Noted: 2023-10-21 | Resolved: 2023-10-22

## 2023-10-22 LAB
ALBUMIN SERPL BCP-MCNC: 3.3 G/DL (ref 3.2–4.9)
ALBUMIN/GLOB SERPL: 1 G/DL
ALP SERPL-CCNC: 181 U/L (ref 30–99)
ALT SERPL-CCNC: 30 U/L (ref 2–50)
ANION GAP SERPL CALC-SCNC: 11 MMOL/L (ref 7–16)
AST SERPL-CCNC: 44 U/L (ref 12–45)
BASOPHILS # BLD AUTO: 0.3 % (ref 0–1.8)
BASOPHILS # BLD: 0.03 K/UL (ref 0–0.12)
BILIRUB SERPL-MCNC: 0.5 MG/DL (ref 0.1–1.5)
BUN SERPL-MCNC: 39 MG/DL (ref 8–22)
CALCIUM ALBUM COR SERPL-MCNC: 10.5 MG/DL (ref 8.5–10.5)
CALCIUM SERPL-MCNC: 9.9 MG/DL (ref 8.5–10.5)
CHLORIDE SERPL-SCNC: 113 MMOL/L (ref 96–112)
CO2 SERPL-SCNC: 26 MMOL/L (ref 20–33)
CREAT SERPL-MCNC: 0.96 MG/DL (ref 0.5–1.4)
EOSINOPHIL # BLD AUTO: 0.33 K/UL (ref 0–0.51)
EOSINOPHIL NFR BLD: 3 % (ref 0–6.9)
ERYTHROCYTE [DISTWIDTH] IN BLOOD BY AUTOMATED COUNT: 43.8 FL (ref 35.9–50)
GFR SERPLBLD CREATININE-BSD FMLA CKD-EPI: 85 ML/MIN/1.73 M 2
GLOBULIN SER CALC-MCNC: 3.4 G/DL (ref 1.9–3.5)
GLUCOSE BLD STRIP.AUTO-MCNC: 112 MG/DL (ref 65–99)
GLUCOSE BLD STRIP.AUTO-MCNC: 148 MG/DL (ref 65–99)
GLUCOSE BLD STRIP.AUTO-MCNC: 156 MG/DL (ref 65–99)
GLUCOSE BLD STRIP.AUTO-MCNC: 200 MG/DL (ref 65–99)
GLUCOSE SERPL-MCNC: 201 MG/DL (ref 65–99)
HCT VFR BLD AUTO: 45 % (ref 42–52)
HGB BLD-MCNC: 14.2 G/DL (ref 14–18)
IMM GRANULOCYTES # BLD AUTO: 0.04 K/UL (ref 0–0.11)
IMM GRANULOCYTES NFR BLD AUTO: 0.4 % (ref 0–0.9)
LYMPHOCYTES # BLD AUTO: 2.69 K/UL (ref 1–4.8)
LYMPHOCYTES NFR BLD: 24.9 % (ref 22–41)
MAGNESIUM SERPL-MCNC: 1.9 MG/DL (ref 1.5–2.5)
MCH RBC QN AUTO: 29.3 PG (ref 27–33)
MCHC RBC AUTO-ENTMCNC: 31.6 G/DL (ref 32.3–36.5)
MCV RBC AUTO: 92.8 FL (ref 81.4–97.8)
MONOCYTES # BLD AUTO: 0.68 K/UL (ref 0–0.85)
MONOCYTES NFR BLD AUTO: 6.3 % (ref 0–13.4)
NEUTROPHILS # BLD AUTO: 7.05 K/UL (ref 1.82–7.42)
NEUTROPHILS NFR BLD: 65.1 % (ref 44–72)
NRBC # BLD AUTO: 0 K/UL
NRBC BLD-RTO: 0 /100 WBC (ref 0–0.2)
PHOSPHATE SERPL-MCNC: 2.9 MG/DL (ref 2.5–4.5)
PLATELET # BLD AUTO: 181 K/UL (ref 164–446)
PMV BLD AUTO: 11.8 FL (ref 9–12.9)
POTASSIUM SERPL-SCNC: 3.7 MMOL/L (ref 3.6–5.5)
PROT SERPL-MCNC: 6.7 G/DL (ref 6–8.2)
RBC # BLD AUTO: 4.85 M/UL (ref 4.7–6.1)
SODIUM SERPL-SCNC: 150 MMOL/L (ref 135–145)
WBC # BLD AUTO: 10.8 K/UL (ref 4.8–10.8)

## 2023-10-22 PROCEDURE — A9270 NON-COVERED ITEM OR SERVICE: HCPCS | Performed by: STUDENT IN AN ORGANIZED HEALTH CARE EDUCATION/TRAINING PROGRAM

## 2023-10-22 PROCEDURE — 80053 COMPREHEN METABOLIC PANEL: CPT

## 2023-10-22 PROCEDURE — 85025 COMPLETE CBC W/AUTO DIFF WBC: CPT

## 2023-10-22 PROCEDURE — 770001 HCHG ROOM/CARE - MED/SURG/GYN PRIV*

## 2023-10-22 PROCEDURE — 82962 GLUCOSE BLOOD TEST: CPT | Mod: 91

## 2023-10-22 PROCEDURE — 700102 HCHG RX REV CODE 250 W/ 637 OVERRIDE(OP): Performed by: INTERNAL MEDICINE

## 2023-10-22 PROCEDURE — 83735 ASSAY OF MAGNESIUM: CPT

## 2023-10-22 PROCEDURE — A9270 NON-COVERED ITEM OR SERVICE: HCPCS | Performed by: INTERNAL MEDICINE

## 2023-10-22 PROCEDURE — 700111 HCHG RX REV CODE 636 W/ 250 OVERRIDE (IP): Performed by: STUDENT IN AN ORGANIZED HEALTH CARE EDUCATION/TRAINING PROGRAM

## 2023-10-22 PROCEDURE — 99232 SBSQ HOSP IP/OBS MODERATE 35: CPT | Performed by: HOSPITALIST

## 2023-10-22 PROCEDURE — 700102 HCHG RX REV CODE 250 W/ 637 OVERRIDE(OP): Performed by: STUDENT IN AN ORGANIZED HEALTH CARE EDUCATION/TRAINING PROGRAM

## 2023-10-22 PROCEDURE — 84100 ASSAY OF PHOSPHORUS: CPT

## 2023-10-22 RX ADMIN — INSULIN HUMAN 3 UNITS: 100 INJECTION, SOLUTION PARENTERAL at 16:49

## 2023-10-22 RX ADMIN — DICLOFENAC SODIUM 2 G: 10 GEL TOPICAL at 06:00

## 2023-10-22 RX ADMIN — DICLOFENAC SODIUM 2 G: 10 GEL TOPICAL at 11:03

## 2023-10-22 RX ADMIN — ATORVASTATIN CALCIUM 40 MG: 40 TABLET, FILM COATED ORAL at 16:50

## 2023-10-22 RX ADMIN — ALLOPURINOL 300 MG: 300 TABLET ORAL at 05:26

## 2023-10-22 RX ADMIN — LANSOPRAZOLE 30 MG: 30 TABLET, ORALLY DISINTEGRATING, DELAYED RELEASE ORAL at 05:26

## 2023-10-22 RX ADMIN — INSULIN GLARGINE-YFGN 50 UNITS: 100 INJECTION, SOLUTION SUBCUTANEOUS at 16:48

## 2023-10-22 RX ADMIN — DICLOFENAC SODIUM 2 G: 10 GEL TOPICAL at 16:56

## 2023-10-22 RX ADMIN — INSULIN HUMAN 3 UNITS: 100 INJECTION, SOLUTION PARENTERAL at 00:24

## 2023-10-22 RX ADMIN — MEMANTINE 5 MG: 5 TABLET ORAL at 16:51

## 2023-10-22 RX ADMIN — MEMANTINE 5 MG: 5 TABLET ORAL at 05:26

## 2023-10-22 RX ADMIN — CEFTRIAXONE SODIUM 2000 MG: 10 INJECTION, POWDER, FOR SOLUTION INTRAVENOUS at 05:27

## 2023-10-22 RX ADMIN — METRONIDAZOLE 500 MG: 500 INJECTION, SOLUTION INTRAVENOUS at 05:29

## 2023-10-22 ASSESSMENT — PAIN DESCRIPTION - PAIN TYPE
TYPE: ACUTE PAIN

## 2023-10-22 NOTE — CARE PLAN
The patient is Stable - Low risk of patient condition declining or worsening    Shift Goals  Clinical Goals: Stable hemodynamics  Patient Goals: DEENA  Family Goals: Updates    Progress made toward(s) clinical / shift goals:      Problem: Pain - Standard  Goal: Alleviation of pain or a reduction in pain to the patient’s comfort goal  Outcome: Progressing       Patient is not progressing towards the following goals:

## 2023-10-22 NOTE — ASSESSMENT & PLAN NOTE
Na has gone up to 150  Increase free water via G-tube flushes to 250 mL 4 times a day and check a basic metabolic panel in the morning    10/24/23  Repeat sodium levels ordered.  Increase free water flushes to 300 mL every 6 hours.    10/25/23  Resolved with sodium of 144.   Decreasing free water flushes to 200 mL every 6 hours.    10/27/23  Resolved.  Continue free water flushes 200 mL every 6 hours.

## 2023-10-22 NOTE — CARE PLAN
The patient is Watcher - Medium risk of patient condition declining or worsening    Shift Goals  Clinical Goals: Stable hemodynamics  Patient Goals: DEENA  Family Goals: Updates    Progress made toward(s) clinical / shift goals:      Problem: Hemodynamics  Goal: Patient's hemodynamics, fluid balance and neurologic status will be stable or improve  Outcome: Progressing  Note: IVF running, BP stabilized, VS taken Q4 hours, pt on continuous cardiac monitoring. Daily labs drawn.         Problem: Knowledge Deficit - Standard  Goal: Patient and family/care givers will demonstrate understanding of plan of care, disease process/condition, diagnostic tests and medications  Outcome: Progressing  Note: Pt educated regarding plan of care and medications. All questions answered.       Problem: Pain - Standard  Goal: Alleviation of pain or a reduction in pain to the patient’s comfort goal  Outcome: Progressing  Note: Pt assessed for pain regularly and medicated PRN per MAR.

## 2023-10-22 NOTE — PROGRESS NOTES
[facility] Medicine Daily Progress Note    Date of Service  10/22/2023    Chief Complaint  Sharon Guardado is a 68 y.o. male admitted 9/24/2023 with altered mental status and fall.    Hospital Course  Mr. Guardado is a 68-year-old male with a past medical history of alcohol abuse, hypertension, obesity who presented with altered mental status and a ground-level fall on 9/24/2023.  On admission he was noted to have a small frontal subarachnoid hemorrhage on CT head.  He was also noted to have severe sepsis secondary to UTI.  She was treated with IV ceftriaxone.  Patient developed symptoms of alcohol withdrawal and was intubated.  Patient was subsequently extubated and transferred to telemetry on 9/30.  The patient subsequently had worsening mental status, on 9/30 found to have a left frontal lobe hemorrhage with surrounding vasogenic edema with mild mass effect, deemed nonoperative by neurosurgery  Was readmitted to the ICU level of care, intubated, treated with hypertonic saline, empirically treated with Unasyn for fever, overall state on mechanical ventilation for 9 days, during the course there was a concern for possible abdominal infection, gallbladder infection, surgery was consulted,  HIDA scan was obtained and was negative for acute cholecystitis, surgery not indicated as per general surgery opinion.  He has flaccid right arm and severe weakness right leg with expressive aphasia.  On 10/10 patient again found appropriate to transfer out of ICU.  Due to dysphagia, he had been NPO and due to scheduling issues there were delays. A G tube was placed by Dr. Toussaint surgery on 10/19. On 10/21 his blood pressure dropped and he was transferred to the IMCU for IV fluids and IV pressors.   Interval Problem Update  10/22: Mr. Guardado was evaluated and examined in the IMCU. He is off IV pressors.  His sodium is up to 150 today therefore free water flushes will be increased.  His wife, Alessandra is at bedside. She  "states that Mr. Guardado said \"yeah\" to her and apparently the night RN heard the word \"ow\" earlier. He moves the right leg spontaneously but not to command and is flaccid on the right arm. I had a long discussion with Alessandra at bedside about his guarded condition. She understand that he is right arm is unlikely to ever move but she is optimistic about the right leg and possibly speech. She will continue to work with him and we discussed her moving his right leg and arm while he watches and focuses on those extremities and she will also spend more time on the right side so he does not develop more of a right-sided neglect.  35 minutes were spent that of which over 50% time was spent in counseling about his stroke with right-sided deficits and rehab strategies with patient and his wife.    I have discussed this patient's plan of care and discharge plan at IDT rounds today with Case Management, Nursing, Nursing leadership, and other members of the IDT team.    Consultants/Specialty  Neurology  Neurosurgery  Critical care    Code Status  DNAR/DNI    Disposition  The patient is not medically cleared for discharge to home or a post-acute facility.  Anticipate discharge to: skilled nursing facility    I have placed the appropriate orders for post-discharge needs.    Review of Systems  Review of Systems   Unable to perform ROS: Medical condition        Physical Exam  Temp:  [35.9 °C (96.6 °F)-37.1 °C (98.8 °F)] 37 °C (98.6 °F)  Pulse:  [56-98] 77  Resp:  [8-33] 18  BP: ()/(41-70) 119/61  SpO2:  [89 %-100 %] 92 %    Physical Exam  Vitals and nursing note reviewed.   Cardiovascular:      Rate and Rhythm: Normal rate and regular rhythm.   Pulmonary:      Effort: Pulmonary effort is normal.      Breath sounds: Normal breath sounds.   Abdominal:      General: There is no distension.      Tenderness: There is no abdominal tenderness.      Comments: G tube   Genitourinary:     Comments: Condom cath  Musculoskeletal:      " Right lower leg: No edema.      Left lower leg: No edema.   Neurological:      Comments: Non-verbal  He moves the left leg and arm to command  He moves the right leg spontaneously though not to command  No movement right arm.         Fluids    Intake/Output Summary (Last 24 hours) at 10/22/2023 0724  Last data filed at 10/22/2023 0400  Gross per 24 hour   Intake 4294.48 ml   Output --   Net 4294.48 ml       Laboratory  Recent Labs     10/20/23  0639 10/21/23  0949 10/22/23  0217   WBC 11.2* 12.9* 10.8   RBC 5.14 5.09 4.85   HEMOGLOBIN 15.4 15.3 14.2   HEMATOCRIT 46.6 47.8 45.0   MCV 90.7 93.9 92.8   MCH 30.0 30.1 29.3   MCHC 33.0 32.0* 31.6*   RDW 42.4 44.5 43.8   PLATELETCT 251 268 181   MPV 11.8 11.7 11.8     Recent Labs     10/20/23  0639 10/21/23  0949 10/22/23  0217   SODIUM 147* 149* 150*   POTASSIUM 4.1 3.9 3.7   CHLORIDE 108 109 113*   CO2 27 28 26   GLUCOSE 260* 307* 201*   BUN 45* 51* 39*   CREATININE 1.12 1.62* 0.96   CALCIUM 10.1 10.1 9.9                   Imaging  US-EXTREMITY VENOUS UPPER UNILAT RIGHT   Final Result      DX-ABDOMEN FOR TUBE PLACEMENT   Final Result      Nasogastric tube tip overlies the duodenal bulb      DX-ABDOMEN FOR TUBE PLACEMENT   Final Result      1.  Enteric tube tip again projects over the 2nd portion of the duodenum.      DX-ABDOMEN FOR TUBE PLACEMENT   Final Result      Enteric tube tip projects over the second portion of the duodenum      DX-CHEST-PORTABLE (1 VIEW)   Final Result      Unchanged bibasilar underinflation atelectasis      DX-ABDOMEN FOR TUBE PLACEMENT   Final Result      Enteric tube tip projects over the gastric antrum or proximal duodenum      DX-CHEST-PORTABLE (1 VIEW)   Final Result      Improving bibasilar pulmonary opacities.      DX-CHEST-PORTABLE (1 VIEW)   Final Result         Findings on chest radiograph appear stable since the prior radiograph.  No new abnormalities are identified.      NM-HEPATOBILIARY SCAN   Final Result      There is  visualization of the gallbladder after morphine administration, indicating the cystic duct is still patent. No scintigraphy evidence of acute cholecystitis.      DX-CHEST-PORTABLE (1 VIEW)   Final Result         1.  Mild pulmonary edema and/or infiltrates.   2.  Cardiomegaly   3.  Atherosclerosis      DX-CHEST-PORTABLE (1 VIEW)   Final Result         1.  No acute cardiopulmonary disease.   2.  Cardiomegaly   3.  Atherosclerosis      US-RUQ   Final Result      1.  Minimal gallbladder wall thickening and possible sludge.  Cholecystitis is not excluded.   2.  Minimal pericholecystic fluid may be due to gallbladder inflammation or liver disease.   3.  Enlarged echogenic liver suggesting fatty infiltration.   4.  Limited evaluation of the pancreas abdominal aorta.      DX-CHEST-PORTABLE (1 VIEW)   Final Result         1.  Pulmonary edema and/or infiltrates are identified, which are stable since the prior exam.   2.  Cardiomegaly   3.  Atherosclerosis      US-EXTREMITY VENOUS UPPER UNILAT RIGHT   Final Result      DX-CHEST-PORTABLE (1 VIEW)   Final Result         1.  Pulmonary edema and/or infiltrates are identified, which are stable since the prior exam.   2.  Cardiomegaly   3.  Atherosclerosis      DX-CHEST-PORTABLE (1 VIEW)   Final Result         1.  Pulmonary edema and/or infiltrates are identified, which are stable since the prior exam.   2.  Cardiomegaly   3.  Atherosclerosis      DX-CHEST-PORTABLE (1 VIEW)   Final Result         1.  Pulmonary edema and/or infiltrates are identified, which are somewhat decreased since the prior exam.   2.  Cardiomegaly   3.  Atherosclerosis      MR-MRA HEAD-W/O   Final Result         Normal intracranial MRA.      MR-BRAIN-WITH & W/O   Final Result         Left frontal lobe hemorrhage with surrounding vasogenic edema and mild mass effect upon the frontal horn of the left lateral ventricle. There is approximately 2 mm of midline shift to the right.      Age-related volume loss and  chronic microvascular ischemic changes.      No abnormal intracranial enhancement is identified.      CT-HEAD W/O   Final Result         1. Stable 6 cm left frontal intraparenchymal hemorrhage with mild, 2 mm left-to-right subfalcine herniation.   2. No new abnormality or change.         DX-CHEST-PORTABLE (1 VIEW)   Final Result         1.  Pulmonary edema and/or infiltrates are identified, which appear somewhat increased since the prior exam.   2.  Cardiomegaly   3.  Atherosclerosis      DX-ABDOMEN FOR TUBE PLACEMENT   Final Result      NG tube extends below the diaphragm and appears to extend through the region of the stomach with tip at the level of the proximal duodenum.      DX-CHEST-PORTABLE (1 VIEW)   Final Result      1.  Grossly satisfactory appearance of tubes and lines.   2.  No evidence of pneumothorax.   3.  Marked hypoinflation with probable bibasilar atelectasis.   4.  Enlarged cardiac silhouette with vascular congestion.      CT-HEAD W/O   Final Result         1.  New large parenchymal hemorrhage involving the frontal lobe is identified with some surrounding edema.      2.  Subarachnoid hemorrhage is also noted in the area.      3.  Minimal midline shift to the right side.      Findings are consistent with atrophy.  Decreased attenuation in the periventricular white matter likely indicates microvascular ischemic disease.      Based solely on CT findings, the brain injury guideline category is mBIG 3.      EDH   IVH   Displaced skull fx   SDH > 8mm   IPH > 8mm or multiple   SAH bi-hemispheric or > 3mm      The original BIG retrospective analysis found radiographic progression in 0% of BIG 1 patients and 2.6% BIG 2.      These findings were discussed with GEETA MOLINA on 09/30/2023.      DX-CHEST-PORTABLE (1 VIEW)   Final Result      Stable multiple pulmonary vascular dilation/congestion. Otherwise, negative.      DX-CHEST-PORTABLE (1 VIEW)   Final Result      1.  Increasing airspace disease at the left  base.      DX-CHEST-LIMITED (1 VIEW)   Final Result         1.  Pulmonary edema and/or infiltrates are identified, which are somewhat decreased since the prior exam.   2.  Cardiomegaly   3.  Atherosclerosis      DX-CHEST-PORTABLE (1 VIEW)   Final Result         1.  Pulmonary edema and/or infiltrates are identified, which appear somewhat increased since the prior exam.   2.  Cardiomegaly   3.  Atherosclerosis      DX-CHEST-PORTABLE (1 VIEW)   Final Result         1.  Interstitial edema and/or infiltrates, slightly decreased since prior study.   2.  Cardiomegaly   3.  Atherosclerosis      DX-CHEST-LIMITED (1 VIEW)   Final Result      Right IJ catheter tip projects in the SVC. No pneumothorax.      CT-CTA NECK WITH & W/O-POST PROCESSING   Final Result      1.  Estimated 50-75% stenosis of the BILATERAL carotid arteries   2.  Estimated greater than 50% stenosis at the vertebral artery ostium on each side      CT-CTA HEAD WITH & W/O-POST PROCESS   Final Result      1.  No large vessel occlusion or aneurysm.   2.  Hazy left frontal lobe subarachnoid hemorrhage, decreased in conspicuity from prior. No new hemorrhage seen.   3.  Scattered opacifications of the ethmoid sinuses, likely related to support hardware.      DX-ABDOMEN FOR TUBE PLACEMENT   Final Result         1.  Nonspecific bowel gas pattern in the upper abdomen.   2.  Dobbhoff tube with tip terminating overlying the expected location of the first or second duodenal segment.      DX-CHEST-PORTABLE (1 VIEW)   Final Result         1.  Interstitial edema and/or infiltrates.   2.  Cardiomegaly   3.  Nasogastric tube terminates in the distal esophagus, recommend advancement   4.  Atherosclerosis      DX-ABDOMEN FOR TUBE PLACEMENT   Final Result         1.  Nonspecific bowel gas pattern in the upper abdomen.   2.  Nasogastric tube terminates just distal to the gastroesophageal junction, recommend advancement      CT-RENAL COLIC EVALUATION(A/P W/O)   Final Result          1.  Intrahepatic biliary air and nondependent air within the gallbladder, consider history of instrumentation or changes of cholangitis in the appropriate clinical setting.   2.  Large fat-containing bilateral inguinal hernias   3.  Enlarged prostate, consider causes of prostate enlargement with additional workup as clinically appropriate   4.  Diverticulosis   5.  Cholelithiasis   6.  Atherosclerosis      CT-HEAD W/O   Final Result         1.  Hazy left frontal subarachnoid hemorrhage.   2.  Nonspecific white matter changes, commonly associated with small vessel ischemic disease.  Associated mild cerebral atrophy is noted.      Based solely on CT findings, the brain injury guideline category is mBIG 1.      SDH < 4mm   IPH < 4mm   SAH < 3 sulci and < 1mm      The original BIG retrospective analysis found radiographic progression in 0% of BIG 1 patients and 2.6% BIG 2.      These findings were discussed with the patient's clinician, Madhu Zambrano, on 9/24/2023 10:10 PM.      DX-CHEST-PORTABLE (1 VIEW)   Final Result      No acute cardiopulmonary abnormality identified.           Assessment/Plan  * Intraparenchymal hemorrhage of brain (HCC)- (present on admission)  Assessment & Plan  CT on 9/24 with hazy left frontal SAH  CT on 9/30 with new large IPH in the left frontal lobe which neurosurgery was consulted and it is nonoperable  With right sided hemiparesis and expressive aphasia  He may benefit from skilled nursing facility but certainly will require 24-hour care upon discharge.    Hypernatremia  Assessment & Plan  Na has gone up to 150  Increase free water via G-tube flushes to 250 mL 4 times a day and check a basic metabolic panel in the morning    Shock (HCC)  Assessment & Plan  On 10/21/2023 patient developed severe hypotension and shock for which she was transferred to the [facility] placed on IV fluids and IV pressors.  Cultures were negative this was consistent with hypovolemic shock which has now  resolved.    Bradycardia  Assessment & Plan  This has resolved    Atelectasis- (present on admission)  Assessment & Plan  Patient unable to follow commands for incentive spirometry    Pneumonia due to infectious organism- (present on admission)  Assessment & Plan  Continue Zosyn    Acute respiratory failure with hypoxia (HCC)- (present on admission)  Assessment & Plan  Intubated 9/25-9/28  Reintubated 9/30-10/7  Now on room air    Thrombocytopenia (HCC)- (present on admission)  Assessment & Plan  This resolved    Pneumobilia- (present on admission)  Assessment & Plan  As seen on CT renal (9/25/23).    9/26/23: No need for further interventions at this time per gen surg    Type 2 diabetes mellitus (HCC)- (present on admission)  Assessment & Plan  A1c 5.7% (6/2023)  Lantus insulin to 50 units in the evening with sliding scale throughout the day every 6 hours with tube feeds    LE (acute kidney injury), prerenal (CMS-HCC)  Assessment & Plan  Secondary due to dehydration, creatinine has normalized now with IV fluids      Primary hypertension- (present on admission)  Assessment & Plan  Hypertensive at presentation   Goal for normotension currently multimodality treatment      BMI 40.0-44.9, adult (HCC)- (present on admission)  Assessment & Plan  Tube feeding    Alcohol dependence (HCC)- (present on admission)  Assessment & Plan  Multivitamin, folate, thiamine  Monitor, additional therapies as clinically indicated  9/29/2023 patient was at least 2-3 hard liquor drinks daily although this is unclear per patient and his wife's history.  .    Patient s/p treatment for acute alcohol withdrawal    ACP (advance care planning)  Assessment & Plan  From Dr. Field on 10/21:  I discussed advance care planning with the patient and family for at least 35 minutes, including diagnosis, prognosis, plan of care, risks and benefits of any therapies that could be offered, as well as alternatives including palliation and hospice, as  "appropriate.  DNR/I per patient's wife wishes, patient has gone from full code to dnr back to full code, now DNR/I. She does not want him to be attached to more machines. I explained he is high risk for developing further brain injuries following a code and he would likely not make it through and it would serve to prolong his suffering. She recognizes that and wants what is best for Sharon. She recognizes he has been in pain for \"a long time\" and does not want to see him suffer needlessly.            VTE prophylaxis:   SCDs/TEDs      I have performed a physical exam and reviewed and updated ROS and Plan today (10/22/2023). In review of yesterday's note (10/21/2023), there are no changes except as documented above.        "

## 2023-10-22 NOTE — ASSESSMENT & PLAN NOTE
On 10/21/2023 patient developed severe hypotension and shock for which she was transferred to the [facility] placed on IV fluids and IV pressors.  Cultures were negative this was consistent with hypovolemic shock which has now resolved.

## 2023-10-22 NOTE — PROGRESS NOTES
4 Eyes Skin Assessment Completed by KOFI Schreiber and KOFI Barger.    Head WDL  Ears WDL  Nose WDL  Mouth Discoloration and Ulcer(s)  Neck WDL  Breast/Chest WDL  Shoulder Blades WDL  Spine WDL  (R) Arm/Elbow/Hand WDL  (L) Arm/Elbow/Hand WDL  Abdomen WDL  Groin WDL  Scrotum/Coccyx/Buttocks Redness and Blanching  (R) Leg WDL  (L) Leg WDL  (R) Heel/Foot/Toe WDL  (L) Heel/Foot/Toe WDL          Devices In Places ECG, Blood Pressure Cuff, Pulse Ox, and Nasal Cannula      Interventions In Place Pillows and Q2 Turns    Possible Skin Injury Yes    Pictures Uploaded Into Epic Yes  Wound Consult Placed N/A  RN Wound Prevention Protocol Ordered Yes

## 2023-10-22 NOTE — PROGRESS NOTES
Monitors called and pt had a 15 second run of PSVT. Pt is not SR in the 70's. Pt is asymptomatic at this time.

## 2023-10-22 NOTE — WOUND TEAM
[facility]  Inpatient Services  Wound and Skin Care Brief Evaluation    Admission Date: 9/24/2023     Last order of IP CONSULT TO WOUND CARE was found on 10/21/2023 from Hospital Encounter on 9/24/2023     HPI, PMH, SH: Reviewed    Chief Complaint   Patient presents with    Head Injury     Pt fell and hit his head last night     ALOC     Pt arrives A&O1      Diagnosis: Severe sepsis (HCC) [A41.9, R65.20]    Unit where seen by Wound Team: [id number]     Wound consult placed regarding tongue and sacrum. Chart and images reviewed. This discussed with bedside RN Abdoul. This clinician in to assess patient. Patient non-verbal and unable to assist. Sacrum assessed. It was non-selectively debrided with Moist warm washcloth.     No pressure injuries or advanced wound care needs identified. Wound consult completed. No further follow up unless indicated and consulted. Pt has dry skin to tongue. His lips are dry. He is a mouth breather so his tongue is dry.  Discussed with RN and Nsg is performing oral care. Discussed care with spouse.         PREVENTATIVE INTERVENTIONS:    Q shift Elvis - performed per nursing policy  Q shift pressure point assessments - performed per nursing policy    Surface/Positioning  ICU Low Airloss - Currently in Place  Reposition q 2 hours - Currently in Place  TAPs Turning system - Currently in Place    Offloading/Redistribution  Sacral offloading dressing (Silicone dressing) - Currently in Place  Float Heels off Bed with Pillows - Currently in Place           Respiratory  N/A  RA    Containment/Moisture Prevention    Purwick/Condom Cath - Currently in Place

## 2023-10-22 NOTE — ASSESSMENT & PLAN NOTE
Secondary due to dehydration, creatinine has normalized now with IV fluids    10/24/23  I have ordered repeat lab studies for tomorrow

## 2023-10-23 LAB
ANION GAP SERPL CALC-SCNC: 11 MMOL/L (ref 7–16)
BUN SERPL-MCNC: 28 MG/DL (ref 8–22)
CALCIUM SERPL-MCNC: 9.5 MG/DL (ref 8.5–10.5)
CHLORIDE SERPL-SCNC: 114 MMOL/L (ref 96–112)
CO2 SERPL-SCNC: 24 MMOL/L (ref 20–33)
CREAT SERPL-MCNC: 0.72 MG/DL (ref 0.5–1.4)
CRP SERPL HS-MCNC: 0.75 MG/DL (ref 0–0.75)
GFR SERPLBLD CREATININE-BSD FMLA CKD-EPI: 98 ML/MIN/1.73 M 2
GLUCOSE BLD STRIP.AUTO-MCNC: 84 MG/DL (ref 65–99)
GLUCOSE BLD STRIP.AUTO-MCNC: 94 MG/DL (ref 65–99)
GLUCOSE SERPL-MCNC: 89 MG/DL (ref 65–99)
POTASSIUM SERPL-SCNC: 3.5 MMOL/L (ref 3.6–5.5)
PREALB SERPL-MCNC: 19.6 MG/DL (ref 18–38)
SODIUM SERPL-SCNC: 149 MMOL/L (ref 135–145)

## 2023-10-23 PROCEDURE — 92526 ORAL FUNCTION THERAPY: CPT

## 2023-10-23 PROCEDURE — 700102 HCHG RX REV CODE 250 W/ 637 OVERRIDE(OP): Performed by: INTERNAL MEDICINE

## 2023-10-23 PROCEDURE — 700102 HCHG RX REV CODE 250 W/ 637 OVERRIDE(OP): Performed by: STUDENT IN AN ORGANIZED HEALTH CARE EDUCATION/TRAINING PROGRAM

## 2023-10-23 PROCEDURE — 84134 ASSAY OF PREALBUMIN: CPT

## 2023-10-23 PROCEDURE — 86140 C-REACTIVE PROTEIN: CPT

## 2023-10-23 PROCEDURE — 99233 SBSQ HOSP IP/OBS HIGH 50: CPT | Performed by: STUDENT IN AN ORGANIZED HEALTH CARE EDUCATION/TRAINING PROGRAM

## 2023-10-23 PROCEDURE — 82962 GLUCOSE BLOOD TEST: CPT | Mod: 91

## 2023-10-23 PROCEDURE — A9270 NON-COVERED ITEM OR SERVICE: HCPCS | Performed by: INTERNAL MEDICINE

## 2023-10-23 PROCEDURE — 770006 HCHG ROOM/CARE - MED/SURG/GYN SEMI*

## 2023-10-23 PROCEDURE — A9270 NON-COVERED ITEM OR SERVICE: HCPCS | Performed by: STUDENT IN AN ORGANIZED HEALTH CARE EDUCATION/TRAINING PROGRAM

## 2023-10-23 PROCEDURE — 80048 BASIC METABOLIC PNL TOTAL CA: CPT

## 2023-10-23 PROCEDURE — 36415 COLL VENOUS BLD VENIPUNCTURE: CPT

## 2023-10-23 RX ADMIN — INSULIN GLARGINE-YFGN 50 UNITS: 100 INJECTION, SOLUTION SUBCUTANEOUS at 17:05

## 2023-10-23 RX ADMIN — ALLOPURINOL 300 MG: 300 TABLET ORAL at 06:24

## 2023-10-23 RX ADMIN — DICLOFENAC SODIUM 2 G: 10 GEL TOPICAL at 17:02

## 2023-10-23 RX ADMIN — DICLOFENAC SODIUM 2 G: 10 GEL TOPICAL at 11:52

## 2023-10-23 RX ADMIN — INSULIN HUMAN 4 UNITS: 100 INJECTION, SOLUTION PARENTERAL at 17:05

## 2023-10-23 RX ADMIN — ATORVASTATIN CALCIUM 40 MG: 40 TABLET, FILM COATED ORAL at 16:48

## 2023-10-23 RX ADMIN — MEMANTINE 5 MG: 5 TABLET ORAL at 16:46

## 2023-10-23 RX ADMIN — MEMANTINE 5 MG: 5 TABLET ORAL at 06:24

## 2023-10-23 RX ADMIN — LANSOPRAZOLE 30 MG: 30 TABLET, ORALLY DISINTEGRATING, DELAYED RELEASE ORAL at 06:24

## 2023-10-23 ASSESSMENT — PAIN DESCRIPTION - PAIN TYPE: TYPE: ACUTE PAIN

## 2023-10-23 NOTE — PROGRESS NOTES
Report was given to RN at this time. Pt transferred to [address]. All belongings with pt at this time. Kitchen was called regarding request to send glucemia cartons 1.2 to [address]

## 2023-10-23 NOTE — DISCHARGE PLANNING
Case Management Discharge Planning    Admission Date: 9/24/2023  GMLOS: 13.5  ALOS: 29    6-Clicks ADL Score: 6  6-Clicks Mobility Score: 6  PT and/or OT Eval ordered: Yes  Post-acute Referrals Ordered: Yes  Post-acute Choice Obtained: Yes  Has referral(s) been sent to post-acute provider:  Yes      Anticipated Discharge Dispo: Discharge Disposition: D/T to SNF with Medicare cert in anticipation of skilled care (03)    DME Needed: No    Action(s) Taken: OTHER    LSW messaged PFA to screen pt for Medicaid as a secondary payer for LTC.     Escalations Completed: None    Medically Clear: No    Next Steps:  f/u with pt and medical team to discuss dc needs and barriers.     Barriers to Discharge: Medical clearance, Pending Placement, and Transportation

## 2023-10-23 NOTE — CARE PLAN
Problem: Hemodynamics  Goal: Patient's hemodynamics, fluid balance and neurologic status will be stable or improve  Outcome: Progressing     Problem: Fluid Volume  Goal: Fluid volume balance will be maintained  Outcome: Progressing     Problem: Urinary - Renal Perfusion  Goal: Ability to achieve and maintain adequate renal perfusion and functioning will improve  Outcome: Progressing     Problem: Respiratory  Goal: Patient will achieve/maintain optimum respiratory ventilation and gas exchange  Outcome: Progressing     Problem: Physical Regulation  Goal: Diagnostic test results will improve  Outcome: Progressing  Goal: Signs and symptoms of infection will decrease  Outcome: Progressing     Problem: Knowledge Deficit - Standard  Goal: Patient and family/care givers will demonstrate understanding of plan of care, disease process/condition, diagnostic tests and medications  Outcome: Not Progressing   The patient is Stable - Low risk of patient condition declining or worsening    Shift Goals  Clinical Goals: maintain skin integrity  Patient Goals: tin  Family Goals: tin    Progress made toward(s) clinical / shift goals:  skin and fall precautions in place.     Patient is not progressing towards the following goals: continued npo, aphasic.      Problem: Knowledge Deficit - Standard  Goal: Patient and family/care givers will demonstrate understanding of plan of care, disease process/condition, diagnostic tests and medications  Outcome: Not Progressing

## 2023-10-23 NOTE — PROGRESS NOTES
Hospitalist, Henok Lawson notified about blood cultures showing gram positive bacteria. No new orders at this time. Patient is asymptomatic and other cultures are not positive. May be a contaminant.

## 2023-10-23 NOTE — THERAPY
"Speech Language Pathology   Daily Treatment     Patient Name: Sharon Guardado  AGE:  68 y.o., SEX:  male  Medical Record #: [id number]  Date of Service: 10/23/2023    Precautions:  Precautions: Fall Risk, Swallow Precautions, PEG Tube     Subjective  Pt seen alert & saturating on room air. He vocalized \"yes\" in response to a question during the session, but otherwise remained nonverbal and did not follow commands. He demo'd inconsistent object identification by function as evidenced by spontaneously using objects which were placed into his hand (e.g., cup, face towel).     Assessment  Pt orally accepted boluses presented by the SLP consistently during the session. A suspected swallow trigger was noted in response to all PO trials. Vocal quality could not be assessed d/t pt's mentation- no overt upper airway wetness or coughing was noted. Further trials were deferred for instrumentation.     Clinical Impressions  - Continued clinical signs of oropharyngeal dysphagia in the context of a prolonged hospitalization and acute CVA. Pt's mentation impacts his swallow function and participation in dx + tx as well. His participation has been improving over previous sessions (I.e., today, orally accepting boluses consistently)- recommend to attempt a FEES to determine further ST POC.     Recommendations  Treatment Completed: Dysphagia Treatment     Dysphagia Treatment  Diet Consistency: NPO w/ alternate nutrition/hydration (pt has PEG)  Instrumentation: FEES  Medication: Non Oral  Supervision:  (N/A)  Positioning:  (N/A)  Risk Management : None  Oral Care: Q4h    SLP Treatment Plan  Treatment Plan: Dysphagia Treatment  SLP Frequency: 3x Per Week  Estimated Duration: Until Therapy Goals Met    Anticipated Discharge Needs  Discharge Recommendations: Recommend post-acute placement for additional speech therapy services prior to discharge home  Therapy Recommendations Upon DC: Dysphagia Training, Patient / Family / Caregiver " Education    Patient / Family Goals  Patient / Family Goal #1: Did not state  Goal #1 Outcome: Goal not met  Short Term Goals  Short Term Goal # 1: Pt will maintain attention for 2-3 minutes to consume prefeeding trials to demonstrate readiness for PO intake  Goal Outcome # 1: Goal met  Short Term Goal # 2: Pt will consume prefeeding trials with no overt s/sx of aspiration  Goal Outcome # 2 : Goal met  Short Term Goal # 3: Pt will participate in an instrumental swallow study to define swallow physiology and determine further ST POC    Deedee Stephens, SLP

## 2023-10-23 NOTE — DIETARY
Nutrition support weekly update:  Day 29 of admit.  Sharon Guardado is a 68 y.o. male with admitting DX of severe sepsis.    Tube feeding initiated on 10/2. Current TF via G-tube is 8 containers of Glucerna 1.2 per day to provide 2280 kcal, 114 g protein, and 1536 mL free water.    Assessment:  Weight 127 kg (281 lb 1.4 oz) taken via bed scale on 10/21.  Weight to be used in calculations: admit wt of 136 kg (299 lb 13.2 oz)    Calculations/Equation:  REE per MSJ = 2160 kcal  11 - 14 kcal/kg ABW = 1496 - 1904 kcal    Calories/day: 1900 - 2160 kcal (14 - 16 kcal/kg/d ABW)  Protein/day: 109 - 136 (0.8 - 1 g/kg/d ABW)    Evaluation:   Pt had g-tube placed on 10/19 and has been tolerating bolus feeds since placement.  Free water flushes increased given elevated sodium.   Labs: sodium 149, potassium 3.5, chloride 114, BUN 28  Meds: Lipitor, SSI, prevacid, BM protocol  Last BM: 10/23  Current feeding of carb-controlled formula remains appropriate given pt's hx of T2DM and need for glycemic control.     Malnutrition risk: no new risk identified    Recommendations/Plan:  Change to cartons of Glucerna 1.5. Give 6 cartons per day to provide 2136 kcals, 118 g protein, and 1080 mL of free water.  Fluids per MD, needs increased given decreased volume of formula.  Monitor wt    RD following

## 2023-10-23 NOTE — CARE PLAN
Problem: Hemodynamics  Goal: Patient's hemodynamics, fluid balance and neurologic status will be stable or improve  Outcome: Progressing     Problem: Fluid Volume  Goal: Fluid volume balance will be maintained  Outcome: Progressing     Problem: Urinary - Renal Perfusion  Goal: Ability to achieve and maintain adequate renal perfusion and functioning will improve  Outcome: Progressing     Problem: Respiratory  Goal: Patient will achieve/maintain optimum respiratory ventilation and gas exchange  Outcome: Progressing     Problem: Physical Regulation  Goal: Diagnostic test results will improve  Outcome: Progressing     Problem: Physical Regulation  Goal: Signs and symptoms of infection will decrease  Outcome: Progressing     Problem: Knowledge Deficit - Standard  Goal: Patient and family/care givers will demonstrate understanding of plan of care, disease process/condition, diagnostic tests and medications  Outcome: Progressing     Problem: Knowledge Deficit - Standard  Goal: Patient and family/care givers will demonstrate understanding of plan of care, disease process/condition, diagnostic tests and medications  Outcome: Progressing     Problem: Skin Integrity  Goal: Skin integrity is maintained or improved  Outcome: Progressing     Problem: Fall Risk  Goal: Patient will remain free from falls  Outcome: Progressing     Problem: Pain - Standard  Goal: Alleviation of pain or a reduction in pain to the patient’s comfort goal  Outcome: Progressing     Problem: Safety - Medical Restraint  Goal: Remains free of injury from restraints (Restraint for Interference with Medical Device)  Outcome: Progressing     Problem: Safety - Medical Restraint  Goal: Free from restraint(s) (Restraint for Interference with Medical Device)  Outcome: Progressing     Problem: Seizure Precautions  Goal: Implementation of seizure precautions  Outcome: Progressing     Problem: Lifestyle Changes  Goal: Patient's ability to identify lifestyle changes  and available resources to help reduce recurrence of condition will improve  Outcome: Progressing     The patient is Watcher - Medium risk of patient condition declining or worsening    Shift Goals  Clinical Goals: Keeping patient comfortable, maintain skin integrity.  Patient Goals: Rest.  Family Goals: Updates    Progress made toward(s) clinical / shift goals:  yes    No improvement in condition. Still nonverbal. He does move his legs around and will try to inch his way off the bed, so the alarm was put on. He will make eye contact and make simple sounds, but no verbalization.

## 2023-10-24 PROCEDURE — 700102 HCHG RX REV CODE 250 W/ 637 OVERRIDE(OP): Performed by: INTERNAL MEDICINE

## 2023-10-24 PROCEDURE — 700102 HCHG RX REV CODE 250 W/ 637 OVERRIDE(OP): Performed by: STUDENT IN AN ORGANIZED HEALTH CARE EDUCATION/TRAINING PROGRAM

## 2023-10-24 PROCEDURE — A9270 NON-COVERED ITEM OR SERVICE: HCPCS | Performed by: STUDENT IN AN ORGANIZED HEALTH CARE EDUCATION/TRAINING PROGRAM

## 2023-10-24 PROCEDURE — 770006 HCHG ROOM/CARE - MED/SURG/GYN SEMI*

## 2023-10-24 PROCEDURE — 82962 GLUCOSE BLOOD TEST: CPT | Mod: 91

## 2023-10-24 PROCEDURE — A9270 NON-COVERED ITEM OR SERVICE: HCPCS | Performed by: INTERNAL MEDICINE

## 2023-10-24 PROCEDURE — 99232 SBSQ HOSP IP/OBS MODERATE 35: CPT | Performed by: STUDENT IN AN ORGANIZED HEALTH CARE EDUCATION/TRAINING PROGRAM

## 2023-10-24 PROCEDURE — 97530 THERAPEUTIC ACTIVITIES: CPT | Mod: CQ

## 2023-10-24 PROCEDURE — 97112 NEUROMUSCULAR REEDUCATION: CPT | Mod: CQ

## 2023-10-24 RX ORDER — CARBOXYMETHYLCELLULOSE SODIUM 5 MG/ML
1 SOLUTION/ DROPS OPHTHALMIC PRN
Status: DISCONTINUED | OUTPATIENT
Start: 2023-10-24 | End: 2023-10-30 | Stop reason: HOSPADM

## 2023-10-24 RX ADMIN — INSULIN HUMAN 4 UNITS: 100 INJECTION, SOLUTION PARENTERAL at 00:03

## 2023-10-24 RX ADMIN — MEMANTINE 5 MG: 5 TABLET ORAL at 18:01

## 2023-10-24 RX ADMIN — DICLOFENAC SODIUM 2 G: 10 GEL TOPICAL at 17:53

## 2023-10-24 RX ADMIN — DICLOFENAC SODIUM 2 G: 10 GEL TOPICAL at 12:30

## 2023-10-24 RX ADMIN — INSULIN HUMAN 3 UNITS: 100 INJECTION, SOLUTION PARENTERAL at 17:55

## 2023-10-24 RX ADMIN — DICLOFENAC SODIUM 2 G: 10 GEL TOPICAL at 04:16

## 2023-10-24 RX ADMIN — ALLOPURINOL 300 MG: 300 TABLET ORAL at 04:15

## 2023-10-24 RX ADMIN — INSULIN GLARGINE-YFGN 50 UNITS: 100 INJECTION, SOLUTION SUBCUTANEOUS at 17:55

## 2023-10-24 RX ADMIN — ATORVASTATIN CALCIUM 40 MG: 40 TABLET, FILM COATED ORAL at 18:01

## 2023-10-24 RX ADMIN — INSULIN HUMAN 3 UNITS: 100 INJECTION, SOLUTION PARENTERAL at 12:27

## 2023-10-24 RX ADMIN — CARBOXYMETHYLCELLULOSE SODIUM 1 DROP: 5 SOLUTION/ DROPS OPHTHALMIC at 14:04

## 2023-10-24 RX ADMIN — MEMANTINE 5 MG: 5 TABLET ORAL at 04:15

## 2023-10-24 RX ADMIN — LANSOPRAZOLE 30 MG: 30 TABLET, ORALLY DISINTEGRATING, DELAYED RELEASE ORAL at 04:16

## 2023-10-24 ASSESSMENT — COGNITIVE AND FUNCTIONAL STATUS - GENERAL
DRESSING REGULAR UPPER BODY CLOTHING: TOTAL
CLIMB 3 TO 5 STEPS WITH RAILING: TOTAL
TOILETING: TOTAL
MOVING TO AND FROM BED TO CHAIR: UNABLE
PERSONAL GROOMING: TOTAL
DRESSING REGULAR LOWER BODY CLOTHING: TOTAL
STANDING UP FROM CHAIR USING ARMS: TOTAL
TURNING FROM BACK TO SIDE WHILE IN FLAT BAD: UNABLE
MOVING TO AND FROM BED TO CHAIR: UNABLE
SUGGESTED CMS G CODE MODIFIER DAILY ACTIVITY: CN
WALKING IN HOSPITAL ROOM: TOTAL
MOBILITY SCORE: 6
DAILY ACTIVITIY SCORE: 6
SUGGESTED CMS G CODE MODIFIER MOBILITY: CN
TURNING FROM BACK TO SIDE WHILE IN FLAT BAD: UNABLE
MOVING FROM LYING ON BACK TO SITTING ON SIDE OF FLAT BED: UNABLE
HELP NEEDED FOR BATHING: TOTAL
STANDING UP FROM CHAIR USING ARMS: TOTAL
MOVING FROM LYING ON BACK TO SITTING ON SIDE OF FLAT BED: UNABLE
WALKING IN HOSPITAL ROOM: TOTAL
CLIMB 3 TO 5 STEPS WITH RAILING: TOTAL
MOBILITY SCORE: 6
SUGGESTED CMS G CODE MODIFIER MOBILITY: CN
EATING MEALS: TOTAL

## 2023-10-24 ASSESSMENT — GAIT ASSESSMENTS: GAIT LEVEL OF ASSIST: UNABLE TO PARTICIPATE

## 2023-10-24 NOTE — PROGRESS NOTES
[facility] Medicine Daily Progress Note    Date of Service  10/23/2023    Chief Complaint  Sharon Guardado is a 68 y.o. male admitted 9/24/2023 with altered mental status and fall.    Hospital Course  Mr. Guardado is a 68-year-old male with a past medical history of alcohol abuse, hypertension, obesity who presented with altered mental status and a ground-level fall on 9/24/2023.  On admission he was noted to have a small frontal subarachnoid hemorrhage on CT head.  He was also noted to have severe sepsis secondary to UTI.  She was treated with IV ceftriaxone.  Patient developed symptoms of alcohol withdrawal and was intubated.  Patient was subsequently extubated and transferred to telemetry on 9/30.  The patient subsequently had worsening mental status, on 9/30 found to have a left frontal lobe hemorrhage with surrounding vasogenic edema with mild mass effect, deemed nonoperative by neurosurgery  Was readmitted to the ICU level of care, intubated, treated with hypertonic saline, empirically treated with Unasyn for fever, overall state on mechanical ventilation for 9 days, during the course there was a concern for possible abdominal infection, gallbladder infection, surgery was consulted,  HIDA scan was obtained and was negative for acute cholecystitis, surgery not indicated as per general surgery opinion.  He has flaccid right arm and severe weakness right leg with expressive aphasia.  On 10/10 patient again found appropriate to transfer out of ICU.  Due to dysphagia, he had been NPO and due to scheduling issues there were delays. A G tube was placed by Dr. Toussaint surgery on 10/19. On 10/21 his blood pressure dropped and he was transferred to the IMCU for IV fluids and IV pressors.   10/22: On IV pressors for ~4 hours on the 21st. Na up to 150, free water flushes increased.     Interval Problem Update  10/23  Afebrile today, HR 71-89, RR 16-18, SpO2 92-93% on room air    I have discussed this patient's  plan of care and discharge plan at IDT rounds today with Case Management, Nursing, Nursing leadership, and other members of the IDT team.    Consultants/Specialty  Neurology  Neurosurgery  Critical care    Code Status  DNAR/DNI    Disposition  Medically Cleared  I have placed the appropriate orders for post-discharge needs.    Review of Systems  Review of Systems   Unable to perform ROS: Medical condition        Physical Exam  Temp:  [36.6 °C (97.8 °F)-36.9 °C (98.5 °F)] 36.6 °C (97.8 °F)  Pulse:  [50-89] 71  Resp:  [16-18] 16  BP: (130-154)/(61-82) 154/61  SpO2:  [92 %-96 %] 93 %    Physical Exam  Vitals and nursing note reviewed.   HENT:      Head: Normocephalic and atraumatic.   Eyes:      Extraocular Movements: Extraocular movements intact.   Cardiovascular:      Rate and Rhythm: Normal rate and regular rhythm.   Pulmonary:      Effort: Pulmonary effort is normal.      Breath sounds: Normal breath sounds.   Abdominal:      General: There is no distension.      Tenderness: There is no abdominal tenderness.      Comments: G tube   Genitourinary:     Comments: Condom cath  Musculoskeletal:      Right lower leg: No edema.      Left lower leg: No edema.   Neurological:      Comments: Non-verbal  He moves the left leg and arm to command  He moves the right leg spontaneously though not to command  No movement right arm.         Fluids    Intake/Output Summary (Last 24 hours) at 10/23/2023 2159  Last data filed at 10/23/2023 2014  Gross per 24 hour   Intake 2372 ml   Output 950 ml   Net 1422 ml         Laboratory  Recent Labs     10/21/23  0949 10/22/23  0217   WBC 12.9* 10.8   RBC 5.09 4.85   HEMOGLOBIN 15.3 14.2   HEMATOCRIT 47.8 45.0   MCV 93.9 92.8   MCH 30.1 29.3   MCHC 32.0* 31.6*   RDW 44.5 43.8   PLATELETCT 268 181   MPV 11.7 11.8       Recent Labs     10/21/23  0949 10/22/23  0217 10/23/23  0133   SODIUM 149* 150* 149*   POTASSIUM 3.9 3.7 3.5*   CHLORIDE 109 113* 114*   CO2 28 26 24   GLUCOSE 307* 201* 89   BUN  51* 39* 28*   CREATININE 1.62* 0.96 0.72   CALCIUM 10.1 9.9 9.5                     Imaging  US-EXTREMITY VENOUS UPPER UNILAT RIGHT   Final Result      DX-ABDOMEN FOR TUBE PLACEMENT   Final Result      Nasogastric tube tip overlies the duodenal bulb      DX-ABDOMEN FOR TUBE PLACEMENT   Final Result      1.  Enteric tube tip again projects over the 2nd portion of the duodenum.      DX-ABDOMEN FOR TUBE PLACEMENT   Final Result      Enteric tube tip projects over the second portion of the duodenum      DX-CHEST-PORTABLE (1 VIEW)   Final Result      Unchanged bibasilar underinflation atelectasis      DX-ABDOMEN FOR TUBE PLACEMENT   Final Result      Enteric tube tip projects over the gastric antrum or proximal duodenum      DX-CHEST-PORTABLE (1 VIEW)   Final Result      Improving bibasilar pulmonary opacities.      DX-CHEST-PORTABLE (1 VIEW)   Final Result         Findings on chest radiograph appear stable since the prior radiograph.  No new abnormalities are identified.      NM-HEPATOBILIARY SCAN   Final Result      There is visualization of the gallbladder after morphine administration, indicating the cystic duct is still patent. No scintigraphy evidence of acute cholecystitis.      DX-CHEST-PORTABLE (1 VIEW)   Final Result         1.  Mild pulmonary edema and/or infiltrates.   2.  Cardiomegaly   3.  Atherosclerosis      DX-CHEST-PORTABLE (1 VIEW)   Final Result         1.  No acute cardiopulmonary disease.   2.  Cardiomegaly   3.  Atherosclerosis      US-RUQ   Final Result      1.  Minimal gallbladder wall thickening and possible sludge.  Cholecystitis is not excluded.   2.  Minimal pericholecystic fluid may be due to gallbladder inflammation or liver disease.   3.  Enlarged echogenic liver suggesting fatty infiltration.   4.  Limited evaluation of the pancreas abdominal aorta.      DX-CHEST-PORTABLE (1 VIEW)   Final Result         1.  Pulmonary edema and/or infiltrates are identified, which are stable since the  prior exam.   2.  Cardiomegaly   3.  Atherosclerosis      US-EXTREMITY VENOUS UPPER UNILAT RIGHT   Final Result      DX-CHEST-PORTABLE (1 VIEW)   Final Result         1.  Pulmonary edema and/or infiltrates are identified, which are stable since the prior exam.   2.  Cardiomegaly   3.  Atherosclerosis      DX-CHEST-PORTABLE (1 VIEW)   Final Result         1.  Pulmonary edema and/or infiltrates are identified, which are stable since the prior exam.   2.  Cardiomegaly   3.  Atherosclerosis      DX-CHEST-PORTABLE (1 VIEW)   Final Result         1.  Pulmonary edema and/or infiltrates are identified, which are somewhat decreased since the prior exam.   2.  Cardiomegaly   3.  Atherosclerosis      MR-MRA HEAD-W/O   Final Result         Normal intracranial MRA.      MR-BRAIN-WITH & W/O   Final Result         Left frontal lobe hemorrhage with surrounding vasogenic edema and mild mass effect upon the frontal horn of the left lateral ventricle. There is approximately 2 mm of midline shift to the right.      Age-related volume loss and chronic microvascular ischemic changes.      No abnormal intracranial enhancement is identified.      CT-HEAD W/O   Final Result         1. Stable 6 cm left frontal intraparenchymal hemorrhage with mild, 2 mm left-to-right subfalcine herniation.   2. No new abnormality or change.         DX-CHEST-PORTABLE (1 VIEW)   Final Result         1.  Pulmonary edema and/or infiltrates are identified, which appear somewhat increased since the prior exam.   2.  Cardiomegaly   3.  Atherosclerosis      DX-ABDOMEN FOR TUBE PLACEMENT   Final Result      NG tube extends below the diaphragm and appears to extend through the region of the stomach with tip at the level of the proximal duodenum.      DX-CHEST-PORTABLE (1 VIEW)   Final Result      1.  Grossly satisfactory appearance of tubes and lines.   2.  No evidence of pneumothorax.   3.  Marked hypoinflation with probable bibasilar atelectasis.   4.  Enlarged  cardiac silhouette with vascular congestion.      CT-HEAD W/O   Final Result         1.  New large parenchymal hemorrhage involving the frontal lobe is identified with some surrounding edema.      2.  Subarachnoid hemorrhage is also noted in the area.      3.  Minimal midline shift to the right side.      Findings are consistent with atrophy.  Decreased attenuation in the periventricular white matter likely indicates microvascular ischemic disease.      Based solely on CT findings, the brain injury guideline category is mBIG 3.      EDH   IVH   Displaced skull fx   SDH > 8mm   IPH > 8mm or multiple   SAH bi-hemispheric or > 3mm      The original BIG retrospective analysis found radiographic progression in 0% of BIG 1 patients and 2.6% BIG 2.      These findings were discussed with GEETA MOLINA on 09/30/2023.      DX-CHEST-PORTABLE (1 VIEW)   Final Result      Stable multiple pulmonary vascular dilation/congestion. Otherwise, negative.      DX-CHEST-PORTABLE (1 VIEW)   Final Result      1.  Increasing airspace disease at the left base.      DX-CHEST-LIMITED (1 VIEW)   Final Result         1.  Pulmonary edema and/or infiltrates are identified, which are somewhat decreased since the prior exam.   2.  Cardiomegaly   3.  Atherosclerosis      DX-CHEST-PORTABLE (1 VIEW)   Final Result         1.  Pulmonary edema and/or infiltrates are identified, which appear somewhat increased since the prior exam.   2.  Cardiomegaly   3.  Atherosclerosis      DX-CHEST-PORTABLE (1 VIEW)   Final Result         1.  Interstitial edema and/or infiltrates, slightly decreased since prior study.   2.  Cardiomegaly   3.  Atherosclerosis      DX-CHEST-LIMITED (1 VIEW)   Final Result      Right IJ catheter tip projects in the SVC. No pneumothorax.      CT-CTA NECK WITH & W/O-POST PROCESSING   Final Result      1.  Estimated 50-75% stenosis of the BILATERAL carotid arteries   2.  Estimated greater than 50% stenosis at the vertebral artery ostium on  each side      CT-CTA HEAD WITH & W/O-POST PROCESS   Final Result      1.  No large vessel occlusion or aneurysm.   2.  Hazy left frontal lobe subarachnoid hemorrhage, decreased in conspicuity from prior. No new hemorrhage seen.   3.  Scattered opacifications of the ethmoid sinuses, likely related to support hardware.      DX-ABDOMEN FOR TUBE PLACEMENT   Final Result         1.  Nonspecific bowel gas pattern in the upper abdomen.   2.  Dobbhoff tube with tip terminating overlying the expected location of the first or second duodenal segment.      DX-CHEST-PORTABLE (1 VIEW)   Final Result         1.  Interstitial edema and/or infiltrates.   2.  Cardiomegaly   3.  Nasogastric tube terminates in the distal esophagus, recommend advancement   4.  Atherosclerosis      DX-ABDOMEN FOR TUBE PLACEMENT   Final Result         1.  Nonspecific bowel gas pattern in the upper abdomen.   2.  Nasogastric tube terminates just distal to the gastroesophageal junction, recommend advancement      CT-RENAL COLIC EVALUATION(A/P W/O)   Final Result         1.  Intrahepatic biliary air and nondependent air within the gallbladder, consider history of instrumentation or changes of cholangitis in the appropriate clinical setting.   2.  Large fat-containing bilateral inguinal hernias   3.  Enlarged prostate, consider causes of prostate enlargement with additional workup as clinically appropriate   4.  Diverticulosis   5.  Cholelithiasis   6.  Atherosclerosis      CT-HEAD W/O   Final Result         1.  Hazy left frontal subarachnoid hemorrhage.   2.  Nonspecific white matter changes, commonly associated with small vessel ischemic disease.  Associated mild cerebral atrophy is noted.      Based solely on CT findings, the brain injury guideline category is mBIG 1.      SDH < 4mm   IPH < 4mm   SAH < 3 sulci and < 1mm      The original BIG retrospective analysis found radiographic progression in 0% of BIG 1 patients and 2.6% BIG 2.      These findings  "were discussed with the patient's clinician, Madhu Zambrano, on 9/24/2023 10:10 PM.      DX-CHEST-PORTABLE (1 VIEW)   Final Result      No acute cardiopulmonary abnormality identified.           Assessment/Plan  * Intraparenchymal hemorrhage of brain (HCC)- (present on admission)  Assessment & Plan  CT on 9/24 with hazy left frontal SAH  CT on 9/30 with new large IPH in the left frontal lobe which neurosurgery was consulted and it is nonoperable  With right sided hemiparesis and expressive aphasia  He may benefit from skilled nursing facility but certainly will require 24-hour care upon discharge.    Hypernatremia  Assessment & Plan  Na has gone up to 150  Increase free water via G-tube flushes to 250 mL 4 times a day and check a basic metabolic panel in the morning    Shock (HCC)  Assessment & Plan  On 10/21/2023 patient developed severe hypotension and shock for which she was transferred to the [facility] placed on IV fluids and IV pressors.  Cultures were negative this was consistent with hypovolemic shock which has now resolved.    Bradycardia  Assessment & Plan  This has resolved    ACP (advance care planning)  Assessment & Plan  From Dr. Field on 10/21:  I discussed advance care planning with the patient and family for at least 35 minutes, including diagnosis, prognosis, plan of care, risks and benefits of any therapies that could be offered, as well as alternatives including palliation and hospice, as appropriate.  DNR/I per patient's wife wishes, patient has gone from full code to dnr back to full code, now DNR/I. She does not want him to be attached to more machines. I explained he is high risk for developing further brain injuries following a code and he would likely not make it through and it would serve to prolong his suffering. She recognizes that and wants what is best for Sharon. She recognizes he has been in pain for \"a long time\" and does not want to see him suffer needlessly.       Atelectasis- (present " on admission)  Assessment & Plan  Patient unable to follow commands for incentive spirometry    Pneumonia due to infectious organism- (present on admission)  Assessment & Plan  Continue Zosyn    Acute respiratory failure with hypoxia (HCC)- (present on admission)  Assessment & Plan  Intubated 9/25-9/28  Reintubated 9/30-10/7  Now on room air    Thrombocytopenia (HCC)- (present on admission)  Assessment & Plan  This resolved    Pneumobilia- (present on admission)  Assessment & Plan  As seen on CT renal (9/25/23).    9/26/23: No need for further interventions at this time per gen surg    Type 2 diabetes mellitus (HCC)- (present on admission)  Assessment & Plan  A1c 5.7% (6/2023)  Lantus insulin to 50 units in the evening with sliding scale throughout the day every 6 hours with tube feeds    LE (acute kidney injury), prerenal (CMS-HCC)  Assessment & Plan  Secondary due to dehydration, creatinine has normalized now with IV fluids      Primary hypertension- (present on admission)  Assessment & Plan  Hypertensive at presentation   Goal for normotension currently multimodality treatment      BMI 40.0-44.9, adult (HCC)- (present on admission)  Assessment & Plan  Tube feeding    Alcohol dependence (HCC)- (present on admission)  Assessment & Plan  Multivitamin, folate, thiamine  Monitor, additional therapies as clinically indicated  9/29/2023 patient was at least 2-3 hard liquor drinks daily although this is unclear per patient and his wife's history.  .    Patient s/p treatment for acute alcohol withdrawal         VTE prophylaxis:   SCDs/TEDs      I have performed a physical exam and reviewed and updated ROS and Plan today (10/23/2023). In review of yesterday's note (10/22/2023), there are no changes except as documented above.    Total time spent 51 minutes. I spent greater than 50% of the time for patient care, counseling, and coordination on this date, including unit/floor time, and face-to-face time with the patient as  per interval events, my own review of patient's imaging and lab analysis and developing my assessment and plan above.

## 2023-10-24 NOTE — CARE PLAN
The patient is Stable - Low risk of patient condition declining or worsening    Shift Goals  Clinical Goals: Maintain skin integrity, ROM  Patient Goals: DEENA  Family Goals: DEENA    Progress made toward(s) clinical / shift goals:  Unable to assess patients neurological status. Patient is mostly non-verbal. Bolus feeds in place. Q6 accu-checks in place. Q2 hour turns. Patient changed frequently due to incontinence. HOB at 30 degrees. Q6 250 mL flushes. Bed is low and locked. Bed alarm on. Call light within reach. Hourly rounding continues.     Patient is not progressing towards the following goals:      Problem: Knowledge Deficit - Standard  Goal: Patient and family/care givers will demonstrate understanding of plan of care, disease process/condition, diagnostic tests and medications  Outcome: Not Progressing  Note: Patient demonstrates no evidence of learning.      Problem: Knowledge Deficit - Stroke Education  Goal: Patient's knowledge of stroke and risk factors will improve  Outcome: Not Progressing  Note: Unable to assess patients knowledge deficits.     Problem: Neuro Status  Goal: Neuro status will remain stable or improve  Outcome: Not Progressing  Note: Unable to assess patients neurological status.      Problem: Self Care  Goal: Patient will have the ability to perform ADLs independently or with assistance (bathe, groom, dress, toilet and feed)  Outcome: Not Progressing  Note: Patient requires assistance with ADL's.

## 2023-10-24 NOTE — CARE PLAN
The patient is Watcher - Medium risk of patient condition declining or worsening    Shift Goals  Clinical Goals: Maintain skin integrity  Patient Goals: DEENA  Family Goals: DEENA    Progress made toward(s) clinical / shift goals:  q2hour turns in place. Up to cardiac chair this morning.     Patient is not progressing towards the following goals:      Problem: Knowledge Deficit - Standard  Goal: Patient and family/care givers will demonstrate understanding of plan of care, disease process/condition, diagnostic tests and medications  Outcome: Not Progressing

## 2023-10-24 NOTE — THERAPY
Physical Therapy   Daily Treatment     Patient Name: Sharon Guardado  Age:  68 y.o., Sex:  male  Medical Record #: [id number]  Today's Date: 10/24/2023     Precautions  Precautions: (P) Fall Risk;Swallow Precautions  Comments: (P) SBO < 160 Rt hemiparesis, UE> LE    Assessment    The pt more alert upon entry into the room. Improvement noted w/movement of Rt LE, still no automatic movement Rt UE. Functionally the pt conts to require max<->total assist w/sup<->sit transitions. The pt rolling to his Rt w/SBA only @ the end of tx session wo/railing, resists any movement to his Lft. Improvement seated balance, CGA w/wo Lft UE support, tolerating 10 mins. The pt not following commands even w/initiation of tasks. Aphasia, no voicing of words today.   PT will cont to follow.     Plan    Treatment Plan Status: (P) Continue Current Treatment Plan  Type of Treatment: Bed Mobility, Gait Training, Neuro Re-Education / Balance, Self Care / Home Evaluation, Therapeutic Activities, Therapeutic Exercise  Treatment Frequency: 3 Times per Week  Treatment Duration: Until Therapy Goals Met    DC Equipment Recommendations: (P) Unable to determine at this time  Discharge Recommendations: (P) Recommend post-acute placement for additional physical therapy services prior to discharge home     Objective       10/24/23 1025   Charge Group   PT Therapeutic Activities (Units) 2   PT Neuromuscular Re-Education / Balance (Units) 1   Total Time Spent   PT Therapeutic Activities Time Spent (Mins) 30   PT Neuromuscular Re-Education/Balance Time Spent (Mins) 20   Precautions   Precautions Fall Risk;Swallow Precautions   Comments SBO < 160 Rt hemiparesis, UE> LE   Other Treatments   Other Treatments Provided EOB x 10 mins w/Lft UE support, CGA<->min assist for balance support.   Balance   Sitting Balance (Static) Poor +   Sitting Balance (Dynamic) Poor   Standing Balance (Static) Dependent   Standing Balance (Dynamic) Dependent   Weight Shift  Sitting Poor   Weight Shift Standing Absent   Bed Mobility    Supine to Sit Maximal Assist   Sit to Supine Total Assist   Scooting Total Assist   Rolling Total Assist to Lt.;Minimal Assist to Rt.   Skilled Intervention Verbal Cuing;Postural Facilitation;Compensatory Strategies   Comments Multiple rolls today for pericare. The pt resists rolling to the Lft side requiring total assist for the task. Sup->sit, the pt initiating w/trunk the task conts to require max assist.   Gait Analysis   Gait Level Of Assist Unable to Participate   Functional Mobility   Sit to Stand Unable to Participate   Bed, Chair, Wheelchair Transfer Total Assist  (bed->cardiac chair)   Transfer Method Slide Board  (supine slide board)   Skilled Intervention Verbal Cuing;Postural Facilitation;Compensatory Strategies   Comments Initiated transfer to cardiac chair today to work on upright tolerance.   How much difficulty does the patient currently have...   Turning over in bed (including adjusting bedclothes, sheets and blankets)? 1   Sitting down on and standing up from a chair with arms (e.g., wheelchair, bedside commode, etc.) 1   Moving from lying on back to sitting on the side of the bed? 1   How much help from another person does the patient currently need...   Moving to and from a bed to a chair (including a wheelchair)? 1   Need to walk in a hospital room? 1   Climbing 3-5 steps with a railing? 1   6 clicks Mobility Score 6   Short Term Goals    Short Term Goal # 1 pt will be able to complete supine<>Sitting with min assist in 6tx in order to improve mobility   Goal Outcome # 1 goal not met   Short Term Goal # 2 pt will be able to complete functional transfers with mod assist in 6tx   Goal Outcome # 2 Goal not met   Short Term Goal # 3 pt will be able to ambulate 15ft with FWW and min assist in 6tx   Goal Outcome # 3 Goal not met   Physical Therapy Treatment Plan   Physical Therapy Treatment Plan Continue Current Treatment Plan    Anticipated Discharge Equipment and Recommendations   DC Equipment Recommendations Unable to determine at this time   Discharge Recommendations Recommend post-acute placement for additional physical therapy services prior to discharge home   Interdisciplinary Plan of Care Collaboration   IDT Collaboration with  Nursing   Patient Position at End of Therapy Seated;Family / Friend in Room   Collaboration Comments Nrsg assisted pt to cardiac chair, pt's wife BS.   Session Information   Date / Session Number  10/24--6 (1/3, 10/30) PTA/2   Supervising Physical Therapist (PTA Treatments Only)   Supervising Physical Therapist Yu Soares

## 2023-10-25 PROBLEM — R40.2420 GLASGOW COMA SCALE TOTAL SCORE 9-12: Status: ACTIVE | Noted: 2023-10-25

## 2023-10-25 PROBLEM — G81.91 RIGHT HEMIPLEGIA (HCC): Status: ACTIVE | Noted: 2023-10-25

## 2023-10-25 LAB
ALBUMIN SERPL BCP-MCNC: 3.4 G/DL (ref 3.2–4.9)
ALBUMIN/GLOB SERPL: 1.3 G/DL
ALP SERPL-CCNC: 175 U/L (ref 30–99)
ALT SERPL-CCNC: 34 U/L (ref 2–50)
ANION GAP SERPL CALC-SCNC: 9 MMOL/L (ref 7–16)
AST SERPL-CCNC: 38 U/L (ref 12–45)
BACTERIA BLD CULT: ABNORMAL
BACTERIA BLD CULT: ABNORMAL
BASOPHILS # BLD AUTO: 0.2 % (ref 0–1.8)
BASOPHILS # BLD: 0.02 K/UL (ref 0–0.12)
BILIRUB SERPL-MCNC: 0.4 MG/DL (ref 0.1–1.5)
BUN SERPL-MCNC: 20 MG/DL (ref 8–22)
CALCIUM ALBUM COR SERPL-MCNC: 9.9 MG/DL (ref 8.5–10.5)
CALCIUM SERPL-MCNC: 9.4 MG/DL (ref 8.5–10.5)
CHLORIDE SERPL-SCNC: 108 MMOL/L (ref 96–112)
CO2 SERPL-SCNC: 27 MMOL/L (ref 20–33)
CREAT SERPL-MCNC: 0.71 MG/DL (ref 0.5–1.4)
EOSINOPHIL # BLD AUTO: 0.38 K/UL (ref 0–0.51)
EOSINOPHIL NFR BLD: 4.2 % (ref 0–6.9)
ERYTHROCYTE [DISTWIDTH] IN BLOOD BY AUTOMATED COUNT: 41.3 FL (ref 35.9–50)
GFR SERPLBLD CREATININE-BSD FMLA CKD-EPI: 98 ML/MIN/1.73 M 2
GLOBULIN SER CALC-MCNC: 2.7 G/DL (ref 1.9–3.5)
GLUCOSE BLD STRIP.AUTO-MCNC: 111 MG/DL (ref 65–99)
GLUCOSE BLD STRIP.AUTO-MCNC: 136 MG/DL (ref 65–99)
GLUCOSE BLD STRIP.AUTO-MCNC: 138 MG/DL (ref 65–99)
GLUCOSE BLD STRIP.AUTO-MCNC: 150 MG/DL (ref 65–99)
GLUCOSE BLD STRIP.AUTO-MCNC: 160 MG/DL (ref 65–99)
GLUCOSE BLD STRIP.AUTO-MCNC: 161 MG/DL (ref 65–99)
GLUCOSE BLD STRIP.AUTO-MCNC: 166 MG/DL (ref 65–99)
GLUCOSE BLD STRIP.AUTO-MCNC: 179 MG/DL (ref 65–99)
GLUCOSE BLD STRIP.AUTO-MCNC: 200 MG/DL (ref 65–99)
GLUCOSE BLD STRIP.AUTO-MCNC: 205 MG/DL (ref 65–99)
GLUCOSE BLD STRIP.AUTO-MCNC: 210 MG/DL (ref 65–99)
GLUCOSE SERPL-MCNC: 182 MG/DL (ref 65–99)
HCT VFR BLD AUTO: 39 % (ref 42–52)
HGB BLD-MCNC: 13 G/DL (ref 14–18)
IMM GRANULOCYTES # BLD AUTO: 0.05 K/UL (ref 0–0.11)
IMM GRANULOCYTES NFR BLD AUTO: 0.6 % (ref 0–0.9)
LYMPHOCYTES # BLD AUTO: 2.69 K/UL (ref 1–4.8)
LYMPHOCYTES NFR BLD: 29.6 % (ref 22–41)
MAGNESIUM SERPL-MCNC: 1.7 MG/DL (ref 1.5–2.5)
MCH RBC QN AUTO: 29.7 PG (ref 27–33)
MCHC RBC AUTO-ENTMCNC: 33.3 G/DL (ref 32.3–36.5)
MCV RBC AUTO: 89.2 FL (ref 81.4–97.8)
MONOCYTES # BLD AUTO: 0.56 K/UL (ref 0–0.85)
MONOCYTES NFR BLD AUTO: 6.2 % (ref 0–13.4)
NEUTROPHILS # BLD AUTO: 5.38 K/UL (ref 1.82–7.42)
NEUTROPHILS NFR BLD: 59.2 % (ref 44–72)
NRBC # BLD AUTO: 0 K/UL
NRBC BLD-RTO: 0 /100 WBC (ref 0–0.2)
PLATELET # BLD AUTO: 139 K/UL (ref 164–446)
PMV BLD AUTO: 11.7 FL (ref 9–12.9)
POTASSIUM SERPL-SCNC: 4 MMOL/L (ref 3.6–5.5)
PROT SERPL-MCNC: 6.1 G/DL (ref 6–8.2)
RBC # BLD AUTO: 4.37 M/UL (ref 4.7–6.1)
SIGNIFICANT IND 70042: ABNORMAL
SITE SITE: ABNORMAL
SODIUM SERPL-SCNC: 144 MMOL/L (ref 135–145)
SOURCE SOURCE: ABNORMAL
WBC # BLD AUTO: 9.1 K/UL (ref 4.8–10.8)

## 2023-10-25 PROCEDURE — 700102 HCHG RX REV CODE 250 W/ 637 OVERRIDE(OP): Performed by: NURSE PRACTITIONER

## 2023-10-25 PROCEDURE — 700102 HCHG RX REV CODE 250 W/ 637 OVERRIDE(OP): Performed by: STUDENT IN AN ORGANIZED HEALTH CARE EDUCATION/TRAINING PROGRAM

## 2023-10-25 PROCEDURE — 85025 COMPLETE CBC W/AUTO DIFF WBC: CPT

## 2023-10-25 PROCEDURE — 82962 GLUCOSE BLOOD TEST: CPT | Mod: 91

## 2023-10-25 PROCEDURE — 97112 NEUROMUSCULAR REEDUCATION: CPT

## 2023-10-25 PROCEDURE — 83735 ASSAY OF MAGNESIUM: CPT

## 2023-10-25 PROCEDURE — 97535 SELF CARE MNGMENT TRAINING: CPT

## 2023-10-25 PROCEDURE — 96105 ASSESSMENT OF APHASIA: CPT

## 2023-10-25 PROCEDURE — 99232 SBSQ HOSP IP/OBS MODERATE 35: CPT | Performed by: STUDENT IN AN ORGANIZED HEALTH CARE EDUCATION/TRAINING PROGRAM

## 2023-10-25 PROCEDURE — A9270 NON-COVERED ITEM OR SERVICE: HCPCS

## 2023-10-25 PROCEDURE — A9270 NON-COVERED ITEM OR SERVICE: HCPCS | Performed by: INTERNAL MEDICINE

## 2023-10-25 PROCEDURE — A9270 NON-COVERED ITEM OR SERVICE: HCPCS | Performed by: STUDENT IN AN ORGANIZED HEALTH CARE EDUCATION/TRAINING PROGRAM

## 2023-10-25 PROCEDURE — 80053 COMPREHEN METABOLIC PANEL: CPT

## 2023-10-25 PROCEDURE — 36415 COLL VENOUS BLD VENIPUNCTURE: CPT

## 2023-10-25 PROCEDURE — A9270 NON-COVERED ITEM OR SERVICE: HCPCS | Performed by: NURSE PRACTITIONER

## 2023-10-25 PROCEDURE — 700102 HCHG RX REV CODE 250 W/ 637 OVERRIDE(OP)

## 2023-10-25 PROCEDURE — 700102 HCHG RX REV CODE 250 W/ 637 OVERRIDE(OP): Performed by: INTERNAL MEDICINE

## 2023-10-25 PROCEDURE — 770006 HCHG ROOM/CARE - MED/SURG/GYN SEMI*

## 2023-10-25 RX ORDER — DIPHENHYDRAMINE HCL 25 MG
25 TABLET ORAL ONCE
Status: COMPLETED | OUTPATIENT
Start: 2023-10-25 | End: 2023-10-25

## 2023-10-25 RX ORDER — MAGNESIUM SULFATE HEPTAHYDRATE 40 MG/ML
4 INJECTION, SOLUTION INTRAVENOUS ONCE
Status: DISCONTINUED | OUTPATIENT
Start: 2023-10-25 | End: 2023-10-25

## 2023-10-25 RX ORDER — LANOLIN ALCOHOL/MO/W.PET/CERES
400 CREAM (GRAM) TOPICAL DAILY
Status: DISCONTINUED | OUTPATIENT
Start: 2023-10-26 | End: 2023-10-29

## 2023-10-25 RX ADMIN — DOCUSATE SODIUM 50 MG AND SENNOSIDES 8.6 MG 2 TABLET: 8.6; 5 TABLET, FILM COATED ORAL at 17:27

## 2023-10-25 RX ADMIN — ATORVASTATIN CALCIUM 40 MG: 40 TABLET, FILM COATED ORAL at 17:28

## 2023-10-25 RX ADMIN — LANSOPRAZOLE 30 MG: 30 TABLET, ORALLY DISINTEGRATING, DELAYED RELEASE ORAL at 05:22

## 2023-10-25 RX ADMIN — DICLOFENAC SODIUM 2 G: 10 GEL TOPICAL at 17:28

## 2023-10-25 RX ADMIN — INSULIN HUMAN 3 UNITS: 100 INJECTION, SOLUTION PARENTERAL at 12:26

## 2023-10-25 RX ADMIN — DICLOFENAC SODIUM 2 G: 10 GEL TOPICAL at 05:22

## 2023-10-25 RX ADMIN — DICLOFENAC SODIUM 2 G: 10 GEL TOPICAL at 12:34

## 2023-10-25 RX ADMIN — ALLOPURINOL 300 MG: 300 TABLET ORAL at 05:22

## 2023-10-25 RX ADMIN — DIPHENHYDRAMINE HYDROCHLORIDE 25 MG: 25 TABLET ORAL at 23:36

## 2023-10-25 RX ADMIN — INSULIN HUMAN 3 UNITS: 100 INJECTION, SOLUTION PARENTERAL at 00:31

## 2023-10-25 RX ADMIN — MEMANTINE 5 MG: 5 TABLET ORAL at 05:22

## 2023-10-25 RX ADMIN — INSULIN HUMAN 3 UNITS: 100 INJECTION, SOLUTION PARENTERAL at 17:29

## 2023-10-25 RX ADMIN — MEMANTINE 5 MG: 5 TABLET ORAL at 17:28

## 2023-10-25 ASSESSMENT — PAIN DESCRIPTION - PAIN TYPE: TYPE: ACUTE PAIN

## 2023-10-25 NOTE — CARE PLAN
The patient is Stable - Low risk of patient condition declining or worsening    Shift Goals  Clinical Goals: Maintain skin integrity  Patient Goals: DEENA  Family Goals: DEENA    Progress made toward(s) clinical / shift goals:  pt turned q2hrs. Remains safe from falls.     Patient is not progressing towards the following goals:      Problem: Skin Integrity  Goal: Skin integrity is maintained or improved  Outcome: Progressing     Problem: Fall Risk  Goal: Patient will remain free from falls  Outcome: Progressing

## 2023-10-25 NOTE — PROGRESS NOTES
[facility] Daily Progress Note    Date of Service  10/24/2023    Chief Complaint  Sharon Guardado is a 68 y.o. male admitted 9/24/2023 with altered mental status and fall.    Hospital Course  Mr. Guardado is a 68-year-old male with a past medical history of alcohol abuse, hypertension, obesity who presented with altered mental status and a ground-level fall on 9/24/2023.  On admission he was noted to have a small frontal subarachnoid hemorrhage on CT head.  He was also noted to have severe sepsis secondary to UTI.  She was treated with IV ceftriaxone.  Patient developed symptoms of alcohol withdrawal and was intubated.  Patient was subsequently extubated and transferred to telemetry on 9/30.  The patient subsequently had worsening mental status, on 9/30 found to have a left frontal lobe hemorrhage with surrounding vasogenic edema with mild mass effect, deemed nonoperative by neurosurgery  Was readmitted to the ICU level of care, intubated, treated with hypertonic saline, empirically treated with Unasyn for fever, overall state on mechanical ventilation for 9 days, during the course there was a concern for possible abdominal infection, gallbladder infection, surgery was consulted,  HIDA scan was obtained and was negative for acute cholecystitis, surgery not indicated as per general surgery opinion.  He has flaccid right arm and severe weakness right leg with expressive aphasia.  On 10/10 patient again found appropriate to transfer out of ICU.  Due to dysphagia, he had been NPO and due to scheduling issues there were delays. A G tube was placed by Dr. Toussaint surgery on 10/19. On 10/21 his blood pressure dropped and he was transferred to the [facility] for IV fluids and IV pressors.   10/22: On IV pressors for ~4 hours on the 21st. Na up to 150, free water flushes increased.   10/23  Afebrile today, HR 71-89, RR 16-18, SpO2 92-93% on room air    Interval Problem Update  10/24/23  Blood pressure is remaining  stable.  Patient is lethargic today very sleepy.  Moving all extremities to command.  Awaiting acceptance to long-term care skilled nursing facility.  I have ordered laboratory studies for tomorrow for his recently high sodium levels.        I have discussed this patient's plan of care and discharge plan at IDT rounds today with Case Management, Nursing, Nursing leadership, and other members of the IDT team.    Consultants/Specialty  Neurology  Neurosurgery  Critical care    Code Status  DNAR/DNI    Disposition  The patient is not medically cleared for discharge to home or a post-acute facility.  Anticipate discharge to: skilled nursing facility    I have placed the appropriate orders for post-discharge needs.    Review of Systems  Review of Systems   Unable to perform ROS: Medical condition        Physical Exam  Temp:  [36.6 °C (97.8 °F)-37.3 °C (99.1 °F)] 37.3 °C (99.1 °F)  Pulse:  [58-88] 82  Resp:  [16-19] 18  BP: (111-154)/(61-67) 148/67  SpO2:  [93 %-97 %] 93 %    Physical Exam  Vitals and nursing note reviewed.   HENT:      Head: Normocephalic and atraumatic.   Eyes:      General:         Right eye: No discharge.         Left eye: Discharge present.     Conjunctiva/sclera: Conjunctivae normal.      Pupils: Pupils are equal, round, and reactive to light.   Cardiovascular:      Rate and Rhythm: Normal rate and regular rhythm.      Pulses: Normal pulses.      Heart sounds: Normal heart sounds. No murmur heard.  Pulmonary:      Effort: Pulmonary effort is normal.      Breath sounds: Normal breath sounds.   Abdominal:      General: There is no distension.      Tenderness: There is no abdominal tenderness.      Comments: G tube   Genitourinary:     Comments: Condom cath  Musculoskeletal:      Cervical back: Neck supple. No tenderness.      Right lower leg: No edema.      Left lower leg: No edema.   Skin:     General: Skin is warm and dry.   Neurological:      Comments: Non-verbal  Intermittently moves all  extremities to command.   Psychiatric:      Comments: Unable to assess         Fluids    Intake/Output Summary (Last 24 hours) at 10/24/2023 1835  Last data filed at 10/24/2023 1800  Gross per 24 hour   Intake 2896 ml   Output 950 ml   Net 1946 ml       Laboratory  Recent Labs     10/22/23  0217   WBC 10.8   RBC 4.85   HEMOGLOBIN 14.2   HEMATOCRIT 45.0   MCV 92.8   MCH 29.3   MCHC 31.6*   RDW 43.8   PLATELETCT 181   MPV 11.8     Recent Labs     10/22/23  0217 10/23/23  0133   SODIUM 150* 149*   POTASSIUM 3.7 3.5*   CHLORIDE 113* 114*   CO2 26 24   GLUCOSE 201* 89   BUN 39* 28*   CREATININE 0.96 0.72   CALCIUM 9.9 9.5                   Imaging  US-EXTREMITY VENOUS UPPER UNILAT RIGHT   Final Result      DX-ABDOMEN FOR TUBE PLACEMENT   Final Result      Nasogastric tube tip overlies the duodenal bulb      DX-ABDOMEN FOR TUBE PLACEMENT   Final Result      1.  Enteric tube tip again projects over the 2nd portion of the duodenum.      DX-ABDOMEN FOR TUBE PLACEMENT   Final Result      Enteric tube tip projects over the second portion of the duodenum      DX-CHEST-PORTABLE (1 VIEW)   Final Result      Unchanged bibasilar underinflation atelectasis      DX-ABDOMEN FOR TUBE PLACEMENT   Final Result      Enteric tube tip projects over the gastric antrum or proximal duodenum      DX-CHEST-PORTABLE (1 VIEW)   Final Result      Improving bibasilar pulmonary opacities.      DX-CHEST-PORTABLE (1 VIEW)   Final Result         Findings on chest radiograph appear stable since the prior radiograph.  No new abnormalities are identified.      NM-HEPATOBILIARY SCAN   Final Result      There is visualization of the gallbladder after morphine administration, indicating the cystic duct is still patent. No scintigraphy evidence of acute cholecystitis.      DX-CHEST-PORTABLE (1 VIEW)   Final Result         1.  Mild pulmonary edema and/or infiltrates.   2.  Cardiomegaly   3.  Atherosclerosis      DX-CHEST-PORTABLE (1 VIEW)   Final Result          1.  No acute cardiopulmonary disease.   2.  Cardiomegaly   3.  Atherosclerosis      US-RUQ   Final Result      1.  Minimal gallbladder wall thickening and possible sludge.  Cholecystitis is not excluded.   2.  Minimal pericholecystic fluid may be due to gallbladder inflammation or liver disease.   3.  Enlarged echogenic liver suggesting fatty infiltration.   4.  Limited evaluation of the pancreas abdominal aorta.      DX-CHEST-PORTABLE (1 VIEW)   Final Result         1.  Pulmonary edema and/or infiltrates are identified, which are stable since the prior exam.   2.  Cardiomegaly   3.  Atherosclerosis      US-EXTREMITY VENOUS UPPER UNILAT RIGHT   Final Result      DX-CHEST-PORTABLE (1 VIEW)   Final Result         1.  Pulmonary edema and/or infiltrates are identified, which are stable since the prior exam.   2.  Cardiomegaly   3.  Atherosclerosis      DX-CHEST-PORTABLE (1 VIEW)   Final Result         1.  Pulmonary edema and/or infiltrates are identified, which are stable since the prior exam.   2.  Cardiomegaly   3.  Atherosclerosis      DX-CHEST-PORTABLE (1 VIEW)   Final Result         1.  Pulmonary edema and/or infiltrates are identified, which are somewhat decreased since the prior exam.   2.  Cardiomegaly   3.  Atherosclerosis      MR-MRA HEAD-W/O   Final Result         Normal intracranial MRA.      MR-BRAIN-WITH & W/O   Final Result         Left frontal lobe hemorrhage with surrounding vasogenic edema and mild mass effect upon the frontal horn of the left lateral ventricle. There is approximately 2 mm of midline shift to the right.      Age-related volume loss and chronic microvascular ischemic changes.      No abnormal intracranial enhancement is identified.      CT-HEAD W/O   Final Result         1. Stable 6 cm left frontal intraparenchymal hemorrhage with mild, 2 mm left-to-right subfalcine herniation.   2. No new abnormality or change.         DX-CHEST-PORTABLE (1 VIEW)   Final Result         1.  Pulmonary  edema and/or infiltrates are identified, which appear somewhat increased since the prior exam.   2.  Cardiomegaly   3.  Atherosclerosis      DX-ABDOMEN FOR TUBE PLACEMENT   Final Result      NG tube extends below the diaphragm and appears to extend through the region of the stomach with tip at the level of the proximal duodenum.      DX-CHEST-PORTABLE (1 VIEW)   Final Result      1.  Grossly satisfactory appearance of tubes and lines.   2.  No evidence of pneumothorax.   3.  Marked hypoinflation with probable bibasilar atelectasis.   4.  Enlarged cardiac silhouette with vascular congestion.      CT-HEAD W/O   Final Result         1.  New large parenchymal hemorrhage involving the frontal lobe is identified with some surrounding edema.      2.  Subarachnoid hemorrhage is also noted in the area.      3.  Minimal midline shift to the right side.      Findings are consistent with atrophy.  Decreased attenuation in the periventricular white matter likely indicates microvascular ischemic disease.      Based solely on CT findings, the brain injury guideline category is mBIG 3.      EDH   IVH   Displaced skull fx   SDH > 8mm   IPH > 8mm or multiple   SAH bi-hemispheric or > 3mm      The original BIG retrospective analysis found radiographic progression in 0% of BIG 1 patients and 2.6% BIG 2.      These findings were discussed with GEETA MOLINA on 09/30/2023.      DX-CHEST-PORTABLE (1 VIEW)   Final Result      Stable multiple pulmonary vascular dilation/congestion. Otherwise, negative.      DX-CHEST-PORTABLE (1 VIEW)   Final Result      1.  Increasing airspace disease at the left base.      DX-CHEST-LIMITED (1 VIEW)   Final Result         1.  Pulmonary edema and/or infiltrates are identified, which are somewhat decreased since the prior exam.   2.  Cardiomegaly   3.  Atherosclerosis      DX-CHEST-PORTABLE (1 VIEW)   Final Result         1.  Pulmonary edema and/or infiltrates are identified, which appear somewhat increased  since the prior exam.   2.  Cardiomegaly   3.  Atherosclerosis      DX-CHEST-PORTABLE (1 VIEW)   Final Result         1.  Interstitial edema and/or infiltrates, slightly decreased since prior study.   2.  Cardiomegaly   3.  Atherosclerosis      DX-CHEST-LIMITED (1 VIEW)   Final Result      Right IJ catheter tip projects in the SVC. No pneumothorax.      CT-CTA NECK WITH & W/O-POST PROCESSING   Final Result      1.  Estimated 50-75% stenosis of the BILATERAL carotid arteries   2.  Estimated greater than 50% stenosis at the vertebral artery ostium on each side      CT-CTA HEAD WITH & W/O-POST PROCESS   Final Result      1.  No large vessel occlusion or aneurysm.   2.  Hazy left frontal lobe subarachnoid hemorrhage, decreased in conspicuity from prior. No new hemorrhage seen.   3.  Scattered opacifications of the ethmoid sinuses, likely related to support hardware.      DX-ABDOMEN FOR TUBE PLACEMENT   Final Result         1.  Nonspecific bowel gas pattern in the upper abdomen.   2.  Dobbhoff tube with tip terminating overlying the expected location of the first or second duodenal segment.      DX-CHEST-PORTABLE (1 VIEW)   Final Result         1.  Interstitial edema and/or infiltrates.   2.  Cardiomegaly   3.  Nasogastric tube terminates in the distal esophagus, recommend advancement   4.  Atherosclerosis      DX-ABDOMEN FOR TUBE PLACEMENT   Final Result         1.  Nonspecific bowel gas pattern in the upper abdomen.   2.  Nasogastric tube terminates just distal to the gastroesophageal junction, recommend advancement      CT-RENAL COLIC EVALUATION(A/P W/O)   Final Result         1.  Intrahepatic biliary air and nondependent air within the gallbladder, consider history of instrumentation or changes of cholangitis in the appropriate clinical setting.   2.  Large fat-containing bilateral inguinal hernias   3.  Enlarged prostate, consider causes of prostate enlargement with additional workup as clinically appropriate   4.   Diverticulosis   5.  Cholelithiasis   6.  Atherosclerosis      CT-HEAD W/O   Final Result         1.  Hazy left frontal subarachnoid hemorrhage.   2.  Nonspecific white matter changes, commonly associated with small vessel ischemic disease.  Associated mild cerebral atrophy is noted.      Based solely on CT findings, the brain injury guideline category is mBIG 1.      SDH < 4mm   IPH < 4mm   SAH < 3 sulci and < 1mm      The original BIG retrospective analysis found radiographic progression in 0% of BIG 1 patients and 2.6% BIG 2.      These findings were discussed with the patient's clinician, Madhu Zambrano, on 9/24/2023 10:10 PM.      DX-CHEST-PORTABLE (1 VIEW)   Final Result      No acute cardiopulmonary abnormality identified.           Assessment/Plan  * Intraparenchymal hemorrhage of brain (HCC)- (present on admission)  Assessment & Plan  CT on 9/24 with hazy left frontal SAH  CT on 9/30 with new large IPH in the left frontal lobe which neurosurgery was consulted and it is nonoperable  With right sided hemiparesis and expressive aphasia  He may benefit from skilled nursing facility but certainly will require 24-hour care upon discharge.    10/24/23  Patient is awaiting acceptance to long-term care skilled nursing facility.    Hypernatremia  Assessment & Plan  Na has gone up to 150  Increase free water via G-tube flushes to 250 mL 4 times a day and check a basic metabolic panel in the morning    10/24/23  Repeat sodium levels ordered.  Increase free water flushes to 300 mL every 6 hours.    Type 2 diabetes mellitus (HCC)- (present on admission)  Assessment & Plan  A1c 5.7% (6/2023)  Lantus insulin to 50 units in the evening with sliding scale throughout the day every 6 hours with tube feeds    10/24/23  Patient becoming hypoglycemic with blood glucose in the 90s.  Decrease glargine insulin to 50 units in the evenings.  Continue regular insulin sign scale.      LE (acute kidney injury), prerenal  "(CMS-[facility])  Assessment & Plan  Secondary due to dehydration, creatinine has normalized now with IV fluids    10/24/23  I have ordered repeat lab studies for tomorrow    Shock (HCC)  Assessment & Plan  On 10/21/2023 patient developed severe hypotension and shock for which she was transferred to the [facility] placed on IV fluids and IV pressors.  Cultures were negative this was consistent with hypovolemic shock which has now resolved.    Bradycardia  Assessment & Plan  This has resolved    ACP (advance care planning)  Assessment & Plan  From Dr. Field on 10/21:  I discussed advance care planning with the patient and family for at least 35 minutes, including diagnosis, prognosis, plan of care, risks and benefits of any therapies that could be offered, as well as alternatives including palliation and hospice, as appropriate.  DNR/I per patient's wife wishes, patient has gone from full code to dnr back to full code, now DNR/I. She does not want him to be attached to more machines. I explained he is high risk for developing further brain injuries following a code and he would likely not make it through and it would serve to prolong his suffering. She recognizes that and wants what is best for Sharon. She recognizes he has been in pain for \"a long time\" and does not want to see him suffer needlessly.       Atelectasis- (present on admission)  Assessment & Plan  Patient unable to follow commands for incentive spirometry    Pneumonia due to infectious organism- (present on admission)  Assessment & Plan  Continue Zosyn    Acute respiratory failure with hypoxia (HCC)- (present on admission)  Assessment & Plan  Intubated 9/25-9/28  Reintubated 9/30-10/7  Now on room air    Thrombocytopenia (HCC)- (present on admission)  Assessment & Plan  This resolved    Pneumobilia- (present on admission)  Assessment & Plan  As seen on CT renal (9/25/23).    9/26/23: No need for further interventions at this time per gen surg    Primary " hypertension- (present on admission)  Assessment & Plan  Hypertensive at presentation   Goal for normotension currently multimodality treatment      BMI 40.0-44.9, adult (HCC)- (present on admission)  Assessment & Plan  Tube feeding    Alcohol dependence (HCC)- (present on admission)  Assessment & Plan  Multivitamin, folate, thiamine  Monitor, additional therapies as clinically indicated  9/29/2023 patient was at least 2-3 hard liquor drinks daily although this is unclear per patient and his wife's history.  .    Patient s/p treatment for acute alcohol withdrawal         VTE prophylaxis:   SCDs/TEDs   pharmacologic prophylaxis contraindicated due to Intracranial hemorrhage      I have performed a physical exam and reviewed and updated ROS and Plan today (10/24/2023). In review of yesterday's note (10/23/2023), there are no changes except as documented above.

## 2023-10-25 NOTE — THERAPY
Speech Language Pathology   Communication Evaluation     Patient Name: Sharon Guardado  AGE:  68 y.o., SEX:  male  Medical Record #: [id number]  Date of Service: 10/25/2023      History of Present Illness  Pt admitted with ALOC s/p fall. Dx of small frontal SAH and severe sepsis r/t UTI. Intubated r/t ETOH withdrawl. Gtube placed 10/19.  MRI-Left frontal lobe hemorrhage with surrounding vasogenic edema and mild mass effect upon the frontal horn of the left lateral ventricle. There is approximately 2 mm of midline shift to the right.    MRI-Left frontal lobe hemorrhage with surrounding vasogenic edema and mild mass effect upon the frontal horn of the left lateral ventricle. There is approximately 2 mm of midline shift to the right.     Age-related volume loss and chronic microvascular ischemic changes.     No abnormal intracranial enhancement is identified.           Exam Ended: 10/01/23 17:48             PMHx:  ETOH. See EMR.     General Information  Vitals  O2 Delivery Device: (P) None - Room Air  Level of Consciousness: (P) Alert (restless)  Patient Behaviors: (P) Restless  Orientation: (P)  (no vocalizations)  Follows Directives: (P) Other (see comments) (some direction following in context and some localized responses with model for simple directives.)      Subjective   Pt restless and did not vocalize. Per RN, pt has said occasional words to his wife per wife's report.       Communication Domain(s)  Expressive Language: (P) Profound  Receptive Language: (P) Profound  Cognitive-Linguistic: (P)  (unable to determine r/t pt not vocalizing and with current lower level responses.)  Reading: (P)  (no response at the word level for familar words.)         Assessment  Aphasia eval completed with PTA noting probable apraxia and pt is non-verbal. The patient was seen this date for a nonstandard lower level aphasia/responsiveness evaluation. Pt did not vocalize during session. Pt with brief visual attention to common  "and familiar words written on write on board and viewed at midline. Pt did not imitate the words when model provided. Pt with localized responses and following some simple directives during linen change and turning with max verbal cues. Localized responses 3/7 simple directives with model, such as pt moving his hands with directive \"raise your hands\" and model provided. Max assist for hand over hand to trace at the letter level.      Clinical Impressions  Pt demonstrating current profound language and communication deficits. Pt's needs currently need to be anticipated.     FEES per last SLP note. Pt moving head to head, facial grimace, and red facial color when SLP inserted Q-tip to right naris. Probable decreased toleration of FEES currently. Pt is not at the level for MBS r/t body habitus and concerns for safety with restlessness noted and pt nearly out of bed at beginning of eval with RN informed.       NOTE: It is not within the scope of practice of Speech-Language Pathologists to determine patient capacity. Please defer to the physician or psych to complete this assessment.       Recommendations  Supervision Needs Upons Discharge: (P) Direct assistance with IADLs (see below)  IADLs: (P) Medication management, Financial management, Appointment management, Household chores, Cooking         SLP Treatment Plan  Treatment Plan: (P) Speech-Language Treatment  SLP Frequency: (P) 3x Per Week  Estimated Duration: (P) Until Therapy Goals Met      Anticipated Discharge Needs  Discharge Recommendations: (P) Recommend post-acute placement for additional speech therapy services prior to discharge home  Therapy Recommendations Upon DC: (P) Dysphagia Training, Comprehension Training, Expression Training, Cognitive-Linguistic Training, Patient / Family / Caregiver Education         Short Term Goal # 1: Pt will maintain attention for 2-3 minutes to consume prefeeding trials to demonstrate readiness for PO intake  Goal Outcome # " 1: Goal met  Short Term Goal # 2: Pt will consume prefeeding trials with no overt s/sx of aspiration  Goal Outcome # 2 : Goal met  Short Term Goal # 3: Pt will participate in an instrumental swallow study to define swallow physiology and determine further ST POC  Short Term Goal # 4: (P) Pt will follow simple directives with max cues and model at 2 unit.  Goal Outcome  # 4: (P) Goal not met  Short Term Goal # 5: (P) Pt will communicate using facial expression, words, and vocalizing with max cues and stimulation.  Goal Outcome  # 5: (P) Goal not met      Adelina Lundy, SLP

## 2023-10-25 NOTE — THERAPY
"Occupational Therapy  Daily Treatment     Patient Name: Sharon Guardado  Age:  68 y.o., Sex:  male  Medical Record #: [id number]  Today's Date: 10/25/2023     Precautions  Precautions: Fall Risk, PEG Tube  Comments: SBO < 160 Rt hemiparesis, UE> LE    Assessment  This tx session focused on static sitting balance at EOB, weight bearing through BUE for midline orientation, postural control, and following commands to initiate tasks/sequence tasks. Pt continues to have difficulty with following simple 1 step commands. Pt continues to require maxA-totalA for bed mobility. Continue POC.    Plan  Treatment Plan Status: Continue Current Treatment Plan  Type of Treatment: Self Care / Activities of Daily Living, Manual Therapy Techniques, Neuro Re-Education / Balance, Therapeutic Exercises, Therapeutic Activity, Adaptive Equipment  Treatment Frequency: 3 Times per Week  Treatment Duration: Until Therapy Goals Met    DC Equipment Recommendations: Unable to determine at this time  Discharge Recommendations: Recommend post-acute placement for additional occupational therapy services prior to discharge home    Subjective  Pt is unable to communicate. However, pt's wife present and stated, \"He's doing a lot better now with sitting up.\"     Objective   10/25/23 1030   Precautions   Precautions Fall Risk;PEG Tube   Vitals   O2 Delivery Device None - Room Air   Cognition    Cognition / Consciousness X   Speech/ Communication Unable to Communicate   Level of Consciousness Alert   Ability To Follow Commands Unable to Follow 1 Step Commands   Safety Awareness Impaired   New Learning Impaired   Attention Impaired   Sequencing Impaired   Initiation Impaired   Comments Pt improving on ability to follow simple 1 step commands intermittently.For example, pt was able to take washcloth from therapist's hand but when asked to initiate face washing pt only held washcloth. Pt continues to demonstrate delayed processing, decreased attention, " initiation, and sequencing.   Active ROM Upper Body   Active ROM Upper Body  X   Dominant Hand Right   Comments no AROM of RUE, LUE spontaneously reaching for objects   Strength Upper Body   Upper Body Strength  X   Comments   (Pt was able to bear weight through R elbow and return to midline by using LUE and bedrails.)   Neuro-Muscular Treatments   Comments This tx session focused on static sitting balance at EOB, weight bearing through BUE for midline orientation, postural control, and following commands to initiate tasks/sequence tasks. Pt required maxA for bed mobility x2 person assistance. Pt was able to tolerate sitting EOB ~15 minutes with 1-2 signs of LOB but was able to self correct. Pt was able to weightbear through R elbow to facilitate midline orientation by using LUE and bedrail to return to midline. However, when weight bearing through L elbow to facilitate midline orientation pt displayed resistance as it was more difficult to return to midline and displayed posterior lean.   Balance   Sitting Balance (Static) Fair -   Sitting Balance (Dynamic) Poor   Standing Balance (Static) Dependent   Weight Shift Sitting Poor   Weight Shift Standing Absent   Skilled Intervention Verbal Cuing;Sequencing;Tactile Cuing;Postural Facilitation   Comments vc's/tactile cues to initiate STS   Bed Mobility    Supine to Sit Maximal Assist   Sit to Supine Total Assist   Scooting Total Assist   Rolling Total Assist to Lt.;Maximal Assist to Rt.   Comments   (Pt requires x2 person assist)   Activities of Daily Living   Grooming Maximal Assist;Seated  (Pt attempted to wash his face but ultimately required maxA to wash his face with HOHA. Pt was asked to retrieve toothbrush to initiate brushing his teeth. However, pt was observed to take toothbrush and hold it in his LUE and then drop it on the floor. Pt was then asked to retrieve hairbrush. Pt had difficulty following command and observed to stare at hairbrush requiring physical  assist to place hairbrush into his hand. Pt had difficulty initiating grooming task with hairbrush ultimately requiring HOHA to brush his hair.)   Skilled Intervention Verbal Cuing;Tactile Cuing;Sequencing;Postural Facilitation;Facilitation  (vc's to facilitate brushing his hair/teeth and washing his face)   Comments No other ADLs were completed at this time secondary to pt's limited cognition and activity tolerance.   Functional Mobility   Sit to Stand Total Assist  (STS x1 trial w/2 person HHA; Pt was unable to lock BLE out to stand upright)   Comments EOB activity only   Activity Tolerance   Sitting Edge of Bed ~15 mins   Standing <1 min   Patient / Family Goals   Patient / Family Goal #1 Spouse wishes for pt to get off the vent   Goal #1 Outcome Goal met   Short Term Goals   Short Term Goal # 1 Pt will sit EOB and perform seated grooming w/ min A   Goal Outcome # 1 Goal not met   Short Term Goal # 2 Pt will demo 2/5  strength in R UE   Goal Outcome # 2 Goal not met   Short Term Goal # 3 Pt will demo UB dressing w/ mod A   Goal Outcome # 3 Goal not met   Education Group   Education Provided Role of Occupational Therapist;Activities of Daily Living   Role of Occupational Therapist Patient Response Family;Acceptance;Explanation;Verbal Demonstration   ADL Patient Response Patient;Nonacceptance;Explanation;No Learning Evidence   Occupational Therapy Treatment Plan    O.T. Treatment Plan Continue Current Treatment Plan   Anticipated Discharge Equipment and Recommendations   DC Equipment Recommendations Unable to determine at this time   Discharge Recommendations Recommend post-acute placement for additional occupational therapy services prior to discharge home   Interdisciplinary Plan of Care Collaboration   IDT Collaboration with  Nursing;Family / Caregiver;Therapy Tech   Patient Position at End of Therapy In Bed;Bed Alarm On;Call Light within Reach;Tray Table within Reach;Phone within Reach;Family / Friend in Room    Collaboration Comments RN aware   Session Information   Date / Session Number  10/25 #6 (1/3, 11/1)

## 2023-10-26 ENCOUNTER — APPOINTMENT (OUTPATIENT)
Dept: RADIOLOGY | Facility: MEDICAL CENTER | Age: 68
DRG: 870 | End: 2023-10-26
Attending: STUDENT IN AN ORGANIZED HEALTH CARE EDUCATION/TRAINING PROGRAM
Payer: MEDICARE

## 2023-10-26 LAB
ALBUMIN SERPL BCP-MCNC: 3.5 G/DL (ref 3.2–4.9)
ALBUMIN/GLOB SERPL: 1.3 G/DL
ALP SERPL-CCNC: 148 U/L (ref 30–99)
ALT SERPL-CCNC: 31 U/L (ref 2–50)
ANION GAP SERPL CALC-SCNC: 7 MMOL/L (ref 7–16)
AST SERPL-CCNC: 37 U/L (ref 12–45)
BACTERIA BLD CULT: NORMAL
BILIRUB SERPL-MCNC: 0.5 MG/DL (ref 0.1–1.5)
BUN SERPL-MCNC: 14 MG/DL (ref 8–22)
CALCIUM ALBUM COR SERPL-MCNC: 10 MG/DL (ref 8.5–10.5)
CALCIUM SERPL-MCNC: 9.6 MG/DL (ref 8.5–10.5)
CHLORIDE SERPL-SCNC: 108 MMOL/L (ref 96–112)
CO2 SERPL-SCNC: 28 MMOL/L (ref 20–33)
CREAT SERPL-MCNC: 0.75 MG/DL (ref 0.5–1.4)
GFR SERPLBLD CREATININE-BSD FMLA CKD-EPI: 97 ML/MIN/1.73 M 2
GLOBULIN SER CALC-MCNC: 2.8 G/DL (ref 1.9–3.5)
GLUCOSE BLD STRIP.AUTO-MCNC: 146 MG/DL (ref 65–99)
GLUCOSE BLD STRIP.AUTO-MCNC: 183 MG/DL (ref 65–99)
GLUCOSE SERPL-MCNC: 180 MG/DL (ref 65–99)
MAGNESIUM SERPL-MCNC: 1.8 MG/DL (ref 1.5–2.5)
POTASSIUM SERPL-SCNC: 4.4 MMOL/L (ref 3.6–5.5)
PROT SERPL-MCNC: 6.3 G/DL (ref 6–8.2)
SIGNIFICANT IND 70042: NORMAL
SITE SITE: NORMAL
SODIUM SERPL-SCNC: 143 MMOL/L (ref 135–145)
SOURCE SOURCE: NORMAL

## 2023-10-26 PROCEDURE — 82962 GLUCOSE BLOOD TEST: CPT

## 2023-10-26 PROCEDURE — 99232 SBSQ HOSP IP/OBS MODERATE 35: CPT | Performed by: STUDENT IN AN ORGANIZED HEALTH CARE EDUCATION/TRAINING PROGRAM

## 2023-10-26 PROCEDURE — 700102 HCHG RX REV CODE 250 W/ 637 OVERRIDE(OP): Performed by: STUDENT IN AN ORGANIZED HEALTH CARE EDUCATION/TRAINING PROGRAM

## 2023-10-26 PROCEDURE — 700102 HCHG RX REV CODE 250 W/ 637 OVERRIDE(OP): Performed by: INTERNAL MEDICINE

## 2023-10-26 PROCEDURE — 92507 TX SP LANG VOICE COMM INDIV: CPT

## 2023-10-26 PROCEDURE — 36415 COLL VENOUS BLD VENIPUNCTURE: CPT

## 2023-10-26 PROCEDURE — A9270 NON-COVERED ITEM OR SERVICE: HCPCS | Performed by: INTERNAL MEDICINE

## 2023-10-26 PROCEDURE — 70450 CT HEAD/BRAIN W/O DYE: CPT

## 2023-10-26 PROCEDURE — 92526 ORAL FUNCTION THERAPY: CPT

## 2023-10-26 PROCEDURE — 97112 NEUROMUSCULAR REEDUCATION: CPT

## 2023-10-26 PROCEDURE — A9270 NON-COVERED ITEM OR SERVICE: HCPCS | Performed by: STUDENT IN AN ORGANIZED HEALTH CARE EDUCATION/TRAINING PROGRAM

## 2023-10-26 PROCEDURE — 770006 HCHG ROOM/CARE - MED/SURG/GYN SEMI*

## 2023-10-26 PROCEDURE — 83735 ASSAY OF MAGNESIUM: CPT

## 2023-10-26 PROCEDURE — 80053 COMPREHEN METABOLIC PANEL: CPT

## 2023-10-26 PROCEDURE — A9270 NON-COVERED ITEM OR SERVICE: HCPCS | Performed by: NURSE PRACTITIONER

## 2023-10-26 PROCEDURE — 97530 THERAPEUTIC ACTIVITIES: CPT

## 2023-10-26 PROCEDURE — 700102 HCHG RX REV CODE 250 W/ 637 OVERRIDE(OP): Performed by: NURSE PRACTITIONER

## 2023-10-26 RX ORDER — HYDRALAZINE HYDROCHLORIDE 25 MG/1
25 TABLET, FILM COATED ORAL EVERY 4 HOURS PRN
Status: DISCONTINUED | OUTPATIENT
Start: 2023-10-26 | End: 2023-10-29

## 2023-10-26 RX ORDER — MODAFINIL 100 MG/1
50 TABLET ORAL EVERY MORNING
Status: DISCONTINUED | OUTPATIENT
Start: 2023-10-26 | End: 2023-10-27

## 2023-10-26 RX ADMIN — ATORVASTATIN CALCIUM 40 MG: 40 TABLET, FILM COATED ORAL at 17:26

## 2023-10-26 RX ADMIN — DICLOFENAC SODIUM 2 G: 10 GEL TOPICAL at 17:44

## 2023-10-26 RX ADMIN — DICLOFENAC SODIUM 2 G: 10 GEL TOPICAL at 12:08

## 2023-10-26 RX ADMIN — DICLOFENAC SODIUM 2 G: 10 GEL TOPICAL at 05:34

## 2023-10-26 RX ADMIN — LANSOPRAZOLE 30 MG: 30 TABLET, ORALLY DISINTEGRATING, DELAYED RELEASE ORAL at 05:33

## 2023-10-26 RX ADMIN — MODAFINIL 50 MG: 100 TABLET ORAL at 11:41

## 2023-10-26 RX ADMIN — ALLOPURINOL 300 MG: 300 TABLET ORAL at 05:33

## 2023-10-26 RX ADMIN — POLYETHYLENE GLYCOL 3350 1 PACKET: 17 POWDER, FOR SOLUTION ORAL at 05:33

## 2023-10-26 RX ADMIN — MEMANTINE 5 MG: 5 TABLET ORAL at 05:33

## 2023-10-26 RX ADMIN — INSULIN HUMAN 3 UNITS: 100 INJECTION, SOLUTION PARENTERAL at 17:40

## 2023-10-26 RX ADMIN — Medication 400 MG: at 05:33

## 2023-10-26 RX ADMIN — INSULIN HUMAN 3 UNITS: 100 INJECTION, SOLUTION PARENTERAL at 12:06

## 2023-10-26 RX ADMIN — DOCUSATE SODIUM 50 MG AND SENNOSIDES 8.6 MG 2 TABLET: 8.6; 5 TABLET, FILM COATED ORAL at 05:33

## 2023-10-26 ASSESSMENT — COGNITIVE AND FUNCTIONAL STATUS - GENERAL
CLIMB 3 TO 5 STEPS WITH RAILING: TOTAL
MOVING FROM LYING ON BACK TO SITTING ON SIDE OF FLAT BED: UNABLE
MOVING TO AND FROM BED TO CHAIR: UNABLE
MOBILITY SCORE: 6
SUGGESTED CMS G CODE MODIFIER MOBILITY: CN
STANDING UP FROM CHAIR USING ARMS: TOTAL
WALKING IN HOSPITAL ROOM: TOTAL
TURNING FROM BACK TO SIDE WHILE IN FLAT BAD: UNABLE

## 2023-10-26 ASSESSMENT — PAIN DESCRIPTION - PAIN TYPE: TYPE: ACUTE PAIN

## 2023-10-26 ASSESSMENT — GAIT ASSESSMENTS: GAIT LEVEL OF ASSIST: UNABLE TO PARTICIPATE

## 2023-10-26 NOTE — ASSESSMENT & PLAN NOTE
10/25/23  Continues to remain difficult to arouse.  Protecting his airway.  Withdrawing to pain.    10/27/23  Patient is more alert today.  He is following commands in the bilateral lower extremities wiggling his toes to command.  Continue modafinil 50 mg daily.  Increase to 100 mg tomorrow morning.    10/28/23  Patient sleepy today.  Continue modafinil 100 mg daily.

## 2023-10-26 NOTE — CARE PLAN
The patient is Stable - Low risk of patient condition declining or worsening    Shift Goals  Clinical Goals: Maintain skin integrity, safety  Patient Goals: DEENA  Family Goals: DEENA    Progress made toward(s) clinical / shift goals:  Patient is confused and unable to communicate his needs. Education provided on safety, no evidence of learning noted. Will continue to redirect as needed. Bed low, locked and call light in reach.    Problem: Skin Integrity  Goal: Skin integrity is maintained or improved  Outcome: Progressing  Note: Patient is on a waffle mattress, Q2 hour turns in place to maintain skin integrity. Skin remains intact  1.  Assess and monitor skin integrity, appearance and/or temperature  2.  Assess risk factors for impaired skin integrity and/or pressures ulcers  3.  Implement precautions to protect skin integrity in collaboration with interdisciplinary team  4.  Implement pressure ulcer prevention protocol if at risk for skin breakdown  5.  Confirm wound care consult if at risk for skin breakdown  6.  Ensure patient use of pressure relieving devices  (Low air loss bed, waffle overlay, heel protectors, ROHO cushion, etc)     Patient is not progressing towards the following goals:    Problem: Knowledge Deficit - Standard  Goal: Patient and family/care givers will demonstrate understanding of plan of care, disease process/condition, diagnostic tests and medications  Outcome: Not Progressing  Note: Patient remains confused and unable to communicate, will continue to monitor neuro status  1.  Patient and family/caregiver oriented to unit, equipment, visitation policy and means for communicating concern  2.  Complete/review Learning Assessment  3.  Assess knowledge level of disease process/condition, treatment plan, diagnostic tests and medications  4.  Explain disease process/condition, treatment plan, diagnostic tests and medications     Problem: Neuro Status  Goal: Neuro status will remain stable or  improve  Outcome: Not Progressing  Note: Patient remains confused, he is unable to communicate his needs. Will continue to encourage communication

## 2023-10-26 NOTE — THERAPY
Physical Therapy   Daily Treatment     Patient Name: Sharon Guardado  Age:  68 y.o., Sex:  male  Medical Record #: [id number]  Today's Date: 10/26/2023     Precautions  Precautions: Fall Risk;Swallow Precautions;PEG Tube  Comments: SBO<160 rt hemiparesis, severe aphasia, apraxia    Assessment    Pt demonstrates increased spontaneous movement with bed mobility and standing tasks this date compared to previous sessions. Spontaneous movement of RUE>RLE, but pt unable to follow 1 step commands due to severe apraxia. Noted low mood and disengagement throughout session, and discussed findings w/Dr. Pinto and potential consideration of anti-depressant medication. Recommend post-acute placement, will continue to follow.     Plan    Treatment Plan Status: Continue Current Treatment Plan  Type of Treatment: Bed Mobility, Gait Training, Manual Therapy, Stair Training, Therapeutic Activities, Therapeutic Exercise, Neuro Re-Education / Balance  Treatment Frequency: 4 Times per Week  Treatment Duration: Until Therapy Goals Met    DC Equipment Recommendations: Unable to determine at this time  Discharge Recommendations: Recommend post-acute placement for additional physical therapy services prior to discharge home      Subjective    Pt alert and able to participate in PT tx session.     Objective       10/26/23 0932   Charge Group   Charges  Yes   PT Therapeutic Activities (Units) 1   PT Neuromuscular Re-Education / Balance (Units) 1   Total Time Spent   PT Total Time Yes   PT Therapeutic Activities Time Spent (Mins) 15   PT Neuromuscular Re-Education/Balance Time Spent (Mins) 17   PT Total Time Spent (Calculated) 32    Services   Is patient using  services for this encounter? No   Precautions   Precautions Fall Risk;Swallow Precautions;PEG Tube   Comments SBO<160 rt hemiparesis, severe aphasia, apraxia   Vitals   Pulse 79   Patient BP Position Supine   Blood Pressure  113/62   Respiration 19   Pulse  Oximetry 92 %   O2 (LPM) 0   O2 Delivery Device None - Room Air   Pain 0 - 10 Group   Pain Rating Scale (NPRS) 0   Cognition    Cognition / Consciousness X   Level of Consciousness Alert   Ability To Follow Commands Unable to Follow 1 Step Commands   Safety Awareness Impaired   New Learning Impaired   Attention Impaired   Sequencing Impaired   Initiation Impaired   Comments Pt alert throughout tx with no display of agitation. Pt unable to follow 1 step commands and inconsistently recognizes own name with gestures and head movement. Pt demonstrates low initiation, reaction and participation  to all activities throughout session. Low mood throughout.   Active ROM Lower Body    Active ROM Lower Body  X   Comments RLE spontaneous movement but not purposeful with R hip and knee flexion, cannot replicate on command, LLE WDL   Strength Lower Body   Lower Body Strength  X   Comments pt resists knee flexion/extension with RLE in supine, no purposeful movement noted, LLE WDL   Neuro-Muscular Treatments   Neuro-Muscular Treatments Facilitation;Joint Approximation;Tactile Cuing;Verbal Cuing   Comments This tx session focused on engaging pt in command following and facilitating purposeful movement vs spontaneous movement EOB. Facilitated hand over hand reaching and placing cups with LUE, no initation or carryover with activity. Pt spontaneously can hold large ball with BUE, LUE spontaneously grab small ball and hand back not purposeful. Pt unable to follow tasks of kicking a ball with LLE, even with addition VC, tactile cues and gesturing.   Neurological Concerns   Neurological Concerns Yes   Comments apraxic   Balance   Sitting Balance (Static) Fair -   Sitting Balance (Dynamic) Fair -   Standing Balance (Static) Trace +   Standing Balance (Dynamic) Dependent   Weight Shift Sitting Poor   Weight Shift Standing Absent   Skilled Intervention Verbal Cuing;Tactile Cuing;Sequencing   Comments pt demonstrates increased ability to sit  unassisted with LUE holding bed rail. Increased postural control and self-correct when posture out of midline. Pt able to stand for 5 secs with Max x2.   Bed Mobility    Supine to Sit Moderate Assist   Sit to Supine Maximal Assist   Scooting Maximal Assist   Rolling Maximum Assist to Lt.;Maximal Assist to Rt.   Skilled Intervention Tactile Cuing;Verbal Cuing   Comments Pt demonstrated motivation to sit up in bed and at EOB. Pt initiated supine to sit, with LUE on bed rail, RUE hand held  with HOB raised.   Gait Analysis   Gait Level Of Assist Unable to Participate   Functional Mobility   Sit to Stand Maximal Assist  (Attempted 5x, improved initiation and execution of task with repetition. Able to achieve ~80% of upright posture.)   Mobility EOB   Skilled Intervention Verbal Cuing;Tactile Cuing   Comments EOB>stand   How much difficulty does the patient currently have...   Turning over in bed (including adjusting bedclothes, sheets and blankets)? 1   Sitting down on and standing up from a chair with arms (e.g., wheelchair, bedside commode, etc.) 1   Moving from lying on back to sitting on the side of the bed? 1   How much help from another person does the patient currently need...   Moving to and from a bed to a chair (including a wheelchair)? 1   Need to walk in a hospital room? 1   Climbing 3-5 steps with a railing? 1   6 clicks Mobility Score 6   Activity Tolerance   Sitting Edge of Bed ~20 mins   Standing ~1 min   Short Term Goals    Short Term Goal # 1 pt will be able to complete supine<>Sitting with min assist in 6tx in order to improve mobility   Goal Outcome # 1 goal not met   Short Term Goal # 2 pt will be able to complete functional transfers with mod assist in 6tx   Goal Outcome # 2 Goal not met   Short Term Goal # 3 pt will be able to ambulate 15ft with FWW and min assist in 6tx   Goal Outcome # 3 Goal not met   Education Group   Education Provided Role of Physical Therapist   Role of Physical  Therapist Patient Response Patient;Acceptance;Explanation;Reinforcement Needed   Physical Therapy Treatment Plan   Physical Therapy Treatment Plan Continue Current Treatment Plan   Treatment Plan  Bed Mobility;Gait Training;Manual Therapy;Stair Training;Therapeutic Activities;Therapeutic Exercise;Neuro Re-Education / Balance   Treatment Frequency 4 Times per Week   Duration Until Therapy Goals Met   Anticipated Discharge Equipment and Recommendations   DC Equipment Recommendations Unable to determine at this time   Discharge Recommendations Recommend post-acute placement for additional physical therapy services prior to discharge home   Interdisciplinary Plan of Care Collaboration   IDT Collaboration with  Nursing   Patient Position at End of Therapy In Bed;Bed Alarm On;Call Light within Reach   Session Information   Date / Session Number  10/26-7(1/4,10/31)   Deb Bone, SPT

## 2023-10-26 NOTE — PROGRESS NOTES
[facility] Medicine Daily Progress Note    Date of Service  10/25/2023    Chief Complaint  Sharon Guardado is a 68 y.o. male admitted 9/24/2023 with altered mental status and fall.    Hospital Course  Mr. Guardado is a 68-year-old male with a past medical history of alcohol abuse, hypertension, obesity who presented with altered mental status and a ground-level fall on 9/24/2023.  On admission he was noted to have a small frontal subarachnoid hemorrhage on CT head.  He was also noted to have severe sepsis secondary to UTI.  She was treated with IV ceftriaxone.  Patient developed symptoms of alcohol withdrawal and was intubated.  Patient was subsequently extubated and transferred to telemetry on 9/30.  The patient subsequently had worsening mental status, on 9/30 found to have a left frontal lobe hemorrhage with surrounding vasogenic edema with mild mass effect, deemed nonoperative by neurosurgery  Was readmitted to the ICU level of care, intubated, treated with hypertonic saline, empirically treated with Unasyn for fever, overall state on mechanical ventilation for 9 days, during the course there was a concern for possible abdominal infection, gallbladder infection, surgery was consulted,  HIDA scan was obtained and was negative for acute cholecystitis, surgery not indicated as per general surgery opinion.  He has flaccid right arm and severe weakness right leg with expressive aphasia.  On 10/10 patient again found appropriate to transfer out of ICU.  Due to dysphagia, he had been NPO and due to scheduling issues there were delays. A G tube was placed by Dr. Toussaint surgery on 10/19. On 10/21 his blood pressure dropped and he was transferred to the [facility] for IV fluids and IV pressors.   10/22: On IV pressors for ~4 hours on the 21st. Na up to 150, free water flushes increased.   10/23  Afebrile today, HR 71-89, RR 16-18, SpO2 92-93% on room air    Interval Problem Update  10/24/23  Blood pressure is remaining  stable.  Patient is lethargic today very sleepy.  Moving all extremities to command.  Awaiting acceptance to long-term care skilled nursing facility.  I have ordered laboratory studies for tomorrow for his recently high sodium levels.    10/25/23  Blood pressure remaining stable.  Stable right hemiplegia.  Continues to remain very lethargic not following any commands.  Magnesium low 1.7 being replaced orally.  Skilled nursing facilities expanded to [facility].  Looking for long-term care skilled nursing facility.        I have discussed this patient's plan of care and discharge plan at IDT rounds today with Case Management, Nursing, Nursing leadership, and other members of the IDT team.    Consultants/Specialty  Neurology  Neurosurgery  Critical care    Code Status  DNAR/DNI    Disposition  The patient is not medically cleared for discharge to home or a post-acute facility.  Anticipate discharge to: skilled nursing facility    I have placed the appropriate orders for post-discharge needs.    Review of Systems  Review of Systems   Unable to perform ROS: Mental acuity        Physical Exam  Temp:  [36.7 °C (98 °F)-37.1 °C (98.7 °F)] 36.8 °C (98.3 °F)  Pulse:  [57-79] 70  Resp:  [16-18] 18  BP: (113-161)/(51-85) 156/85  SpO2:  [92 %-97 %] 94 %    Physical Exam  Vitals and nursing note reviewed.   Constitutional:       General: He is sleeping.      Appearance: He is obese. He is ill-appearing.      Comments: Difficult to arouse   HENT:      Head: Normocephalic and atraumatic.   Eyes:      General:         Right eye: No discharge.         Left eye: No discharge.      Conjunctiva/sclera: Conjunctivae normal.   Cardiovascular:      Rate and Rhythm: Normal rate and regular rhythm.      Pulses: Normal pulses.      Heart sounds: Normal heart sounds. No murmur heard.  Pulmonary:      Effort: Pulmonary effort is normal.      Breath sounds: Normal breath sounds.   Abdominal:      General: There is no distension.      Tenderness:  There is no abdominal tenderness.      Comments: G tube   Genitourinary:     Comments: Condom cath  Musculoskeletal:      Cervical back: Neck supple. No tenderness.      Right lower leg: No edema.      Left lower leg: No edema.   Skin:     General: Skin is warm and dry.   Neurological:      Mental Status: He is lethargic.      Motor: Weakness present.      Comments: Non-verbal   Not following any commands.  Withdrawing to pain on the left side.  Stable right hemiplegia.   Psychiatric:      Comments: Unable to assess         Fluids    Intake/Output Summary (Last 24 hours) at 10/25/2023 1759  Last data filed at 10/25/2023 1755  Gross per 24 hour   Intake 3624 ml   Output 1150 ml   Net 2474 ml       Laboratory  Recent Labs     10/25/23  0156   WBC 9.1   RBC 4.37*   HEMOGLOBIN 13.0*   HEMATOCRIT 39.0*   MCV 89.2   MCH 29.7   MCHC 33.3   RDW 41.3   PLATELETCT 139*   MPV 11.7     Recent Labs     10/23/23  0133 10/25/23  0156   SODIUM 149* 144   POTASSIUM 3.5* 4.0   CHLORIDE 114* 108   CO2 24 27   GLUCOSE 89 182*   BUN 28* 20   CREATININE 0.72 0.71   CALCIUM 9.5 9.4                   Imaging  US-EXTREMITY VENOUS UPPER UNILAT RIGHT   Final Result      DX-ABDOMEN FOR TUBE PLACEMENT   Final Result      Nasogastric tube tip overlies the duodenal bulb      DX-ABDOMEN FOR TUBE PLACEMENT   Final Result      1.  Enteric tube tip again projects over the 2nd portion of the duodenum.      DX-ABDOMEN FOR TUBE PLACEMENT   Final Result      Enteric tube tip projects over the second portion of the duodenum      DX-CHEST-PORTABLE (1 VIEW)   Final Result      Unchanged bibasilar underinflation atelectasis      DX-ABDOMEN FOR TUBE PLACEMENT   Final Result      Enteric tube tip projects over the gastric antrum or proximal duodenum      DX-CHEST-PORTABLE (1 VIEW)   Final Result      Improving bibasilar pulmonary opacities.      DX-CHEST-PORTABLE (1 VIEW)   Final Result         Findings on chest radiograph appear stable since the prior  radiograph.  No new abnormalities are identified.      NM-HEPATOBILIARY SCAN   Final Result      There is visualization of the gallbladder after morphine administration, indicating the cystic duct is still patent. No scintigraphy evidence of acute cholecystitis.      DX-CHEST-PORTABLE (1 VIEW)   Final Result         1.  Mild pulmonary edema and/or infiltrates.   2.  Cardiomegaly   3.  Atherosclerosis      DX-CHEST-PORTABLE (1 VIEW)   Final Result         1.  No acute cardiopulmonary disease.   2.  Cardiomegaly   3.  Atherosclerosis      US-RUQ   Final Result      1.  Minimal gallbladder wall thickening and possible sludge.  Cholecystitis is not excluded.   2.  Minimal pericholecystic fluid may be due to gallbladder inflammation or liver disease.   3.  Enlarged echogenic liver suggesting fatty infiltration.   4.  Limited evaluation of the pancreas abdominal aorta.      DX-CHEST-PORTABLE (1 VIEW)   Final Result         1.  Pulmonary edema and/or infiltrates are identified, which are stable since the prior exam.   2.  Cardiomegaly   3.  Atherosclerosis      US-EXTREMITY VENOUS UPPER UNILAT RIGHT   Final Result      DX-CHEST-PORTABLE (1 VIEW)   Final Result         1.  Pulmonary edema and/or infiltrates are identified, which are stable since the prior exam.   2.  Cardiomegaly   3.  Atherosclerosis      DX-CHEST-PORTABLE (1 VIEW)   Final Result         1.  Pulmonary edema and/or infiltrates are identified, which are stable since the prior exam.   2.  Cardiomegaly   3.  Atherosclerosis      DX-CHEST-PORTABLE (1 VIEW)   Final Result         1.  Pulmonary edema and/or infiltrates are identified, which are somewhat decreased since the prior exam.   2.  Cardiomegaly   3.  Atherosclerosis      MR-MRA HEAD-W/O   Final Result         Normal intracranial MRA.      MR-BRAIN-WITH & W/O   Final Result         Left frontal lobe hemorrhage with surrounding vasogenic edema and mild mass effect upon the frontal horn of the left lateral  ventricle. There is approximately 2 mm of midline shift to the right.      Age-related volume loss and chronic microvascular ischemic changes.      No abnormal intracranial enhancement is identified.      CT-HEAD W/O   Final Result         1. Stable 6 cm left frontal intraparenchymal hemorrhage with mild, 2 mm left-to-right subfalcine herniation.   2. No new abnormality or change.         DX-CHEST-PORTABLE (1 VIEW)   Final Result         1.  Pulmonary edema and/or infiltrates are identified, which appear somewhat increased since the prior exam.   2.  Cardiomegaly   3.  Atherosclerosis      DX-ABDOMEN FOR TUBE PLACEMENT   Final Result      NG tube extends below the diaphragm and appears to extend through the region of the stomach with tip at the level of the proximal duodenum.      DX-CHEST-PORTABLE (1 VIEW)   Final Result      1.  Grossly satisfactory appearance of tubes and lines.   2.  No evidence of pneumothorax.   3.  Marked hypoinflation with probable bibasilar atelectasis.   4.  Enlarged cardiac silhouette with vascular congestion.      CT-HEAD W/O   Final Result         1.  New large parenchymal hemorrhage involving the frontal lobe is identified with some surrounding edema.      2.  Subarachnoid hemorrhage is also noted in the area.      3.  Minimal midline shift to the right side.      Findings are consistent with atrophy.  Decreased attenuation in the periventricular white matter likely indicates microvascular ischemic disease.      Based solely on CT findings, the brain injury guideline category is mBIG 3.      EDH   IVH   Displaced skull fx   SDH > 8mm   IPH > 8mm or multiple   SAH bi-hemispheric or > 3mm      The original BIG retrospective analysis found radiographic progression in 0% of BIG 1 patients and 2.6% BIG 2.      These findings were discussed with GEETA MOLINA on 09/30/2023.      DX-CHEST-PORTABLE (1 VIEW)   Final Result      Stable multiple pulmonary vascular dilation/congestion. Otherwise,  negative.      DX-CHEST-PORTABLE (1 VIEW)   Final Result      1.  Increasing airspace disease at the left base.      DX-CHEST-LIMITED (1 VIEW)   Final Result         1.  Pulmonary edema and/or infiltrates are identified, which are somewhat decreased since the prior exam.   2.  Cardiomegaly   3.  Atherosclerosis      DX-CHEST-PORTABLE (1 VIEW)   Final Result         1.  Pulmonary edema and/or infiltrates are identified, which appear somewhat increased since the prior exam.   2.  Cardiomegaly   3.  Atherosclerosis      DX-CHEST-PORTABLE (1 VIEW)   Final Result         1.  Interstitial edema and/or infiltrates, slightly decreased since prior study.   2.  Cardiomegaly   3.  Atherosclerosis      DX-CHEST-LIMITED (1 VIEW)   Final Result      Right IJ catheter tip projects in the SVC. No pneumothorax.      CT-CTA NECK WITH & W/O-POST PROCESSING   Final Result      1.  Estimated 50-75% stenosis of the BILATERAL carotid arteries   2.  Estimated greater than 50% stenosis at the vertebral artery ostium on each side      CT-CTA HEAD WITH & W/O-POST PROCESS   Final Result      1.  No large vessel occlusion or aneurysm.   2.  Hazy left frontal lobe subarachnoid hemorrhage, decreased in conspicuity from prior. No new hemorrhage seen.   3.  Scattered opacifications of the ethmoid sinuses, likely related to support hardware.      DX-ABDOMEN FOR TUBE PLACEMENT   Final Result         1.  Nonspecific bowel gas pattern in the upper abdomen.   2.  Dobbhoff tube with tip terminating overlying the expected location of the first or second duodenal segment.      DX-CHEST-PORTABLE (1 VIEW)   Final Result         1.  Interstitial edema and/or infiltrates.   2.  Cardiomegaly   3.  Nasogastric tube terminates in the distal esophagus, recommend advancement   4.  Atherosclerosis      DX-ABDOMEN FOR TUBE PLACEMENT   Final Result         1.  Nonspecific bowel gas pattern in the upper abdomen.   2.  Nasogastric tube terminates just distal to the  gastroesophageal junction, recommend advancement      CT-RENAL COLIC EVALUATION(A/P W/O)   Final Result         1.  Intrahepatic biliary air and nondependent air within the gallbladder, consider history of instrumentation or changes of cholangitis in the appropriate clinical setting.   2.  Large fat-containing bilateral inguinal hernias   3.  Enlarged prostate, consider causes of prostate enlargement with additional workup as clinically appropriate   4.  Diverticulosis   5.  Cholelithiasis   6.  Atherosclerosis      CT-HEAD W/O   Final Result         1.  Hazy left frontal subarachnoid hemorrhage.   2.  Nonspecific white matter changes, commonly associated with small vessel ischemic disease.  Associated mild cerebral atrophy is noted.      Based solely on CT findings, the brain injury guideline category is mBIG 1.      SDH < 4mm   IPH < 4mm   SAH < 3 sulci and < 1mm      The original BIG retrospective analysis found radiographic progression in 0% of BIG 1 patients and 2.6% BIG 2.      These findings were discussed with the patient's clinician, Madhu Zambrano, on 9/24/2023 10:10 PM.      DX-CHEST-PORTABLE (1 VIEW)   Final Result      No acute cardiopulmonary abnormality identified.           Assessment/Plan  * Intraparenchymal hemorrhage of brain (HCC)- (present on admission)  Assessment & Plan  CT on 9/24 with hazy left frontal SAH  CT on 9/30 with new large IPH in the left frontal lobe which neurosurgery was consulted and it is nonoperable  With right sided hemiparesis and expressive aphasia  He may benefit from skilled nursing facility but certainly will require 24-hour care upon discharge.    10/24/23  Patient is awaiting acceptance to long-term care skilled nursing facility.    10/25/23  Skilled nursing facility expanded to [facility].    Right hemiplegia (HCC)- (present on admission)  Assessment & Plan  Secondary to intracranial hemorrhage.  Stable.  Pressure ulcer precautions.    Brandon coma scale total score  9-12- (present on admission)  Assessment & Plan  10/25/23  Continues to remain difficult to arouse.  Protecting his airway.  Withdrawing to pain.    Hypernatremia  Assessment & Plan  Na has gone up to 150  Increase free water via G-tube flushes to 250 mL 4 times a day and check a basic metabolic panel in the morning    10/24/23  Repeat sodium levels ordered.  Increase free water flushes to 300 mL every 6 hours.    10/25/23  Resolved with sodium of 144.  Decreasing free water flushes to 200 mL every 6 hours.    Type 2 diabetes mellitus (HCC)- (present on admission)  Assessment & Plan  A1c 5.7% (6/2023)  Lantus insulin to 50 units in the evening with sliding scale throughout the day every 6 hours with tube feeds    10/24/23  Patient becoming hypoglycemic with blood glucose in the 90s.  Decrease glargine insulin to 50 units in the evenings.  Continue regular insulin sign scale.      LE (acute kidney injury), prerenal (CMS-HCC)  Assessment & Plan  Secondary due to dehydration, creatinine has normalized now with IV fluids    10/24/23  I have ordered repeat lab studies for tomorrow    Shock (HCC)  Assessment & Plan  On 10/21/2023 patient developed severe hypotension and shock for which she was transferred to the [facility] placed on IV fluids and IV pressors.  Cultures were negative this was consistent with hypovolemic shock which has now resolved.    Bradycardia  Assessment & Plan  This has resolved    ACP (advance care planning)  Assessment & Plan  From Dr. Field on 10/21:  I discussed advance care planning with the patient and family for at least 35 minutes, including diagnosis, prognosis, plan of care, risks and benefits of any therapies that could be offered, as well as alternatives including palliation and hospice, as appropriate.  DNR/I per patient's wife wishes, patient has gone from full code to dnr back to full code, now DNR/I. She does not want him to be attached to more machines. I explained he is high risk for  "developing further brain injuries following a code and he would likely not make it through and it would serve to prolong his suffering. She recognizes that and wants what is best for Sharon. She recognizes he has been in pain for \"a long time\" and does not want to see him suffer needlessly.       Atelectasis- (present on admission)  Assessment & Plan  Patient unable to follow commands for incentive spirometry    Pneumonia due to infectious organism- (present on admission)  Assessment & Plan  Continue Zosyn    Acute respiratory failure with hypoxia (HCC)- (present on admission)  Assessment & Plan  Intubated 9/25-9/28  Reintubated 9/30-10/7  Now on room air    Thrombocytopenia (HCC)- (present on admission)  Assessment & Plan  This resolved    Pneumobilia- (present on admission)  Assessment & Plan  As seen on CT renal (9/25/23).    9/26/23: No need for further interventions at this time per gen surg    Primary hypertension- (present on admission)  Assessment & Plan  Hypertensive at presentation   Goal for normotension currently multimodality treatment      BMI 40.0-44.9, adult (HCC)- (present on admission)  Assessment & Plan  Tube feeding    Alcohol dependence (HCC)- (present on admission)  Assessment & Plan  Multivitamin, folate, thiamine  Monitor, additional therapies as clinically indicated  9/29/2023 patient was at least 2-3 hard liquor drinks daily although this is unclear per patient and his wife's history.  .    Patient s/p treatment for acute alcohol withdrawal         VTE prophylaxis:   SCDs/TEDs   pharmacologic prophylaxis contraindicated due to Intracranial hemorrhage      I have performed a physical exam and reviewed and updated ROS and Plan today (10/25/2023). In review of yesterday's note (10/24/2023), there are no changes except as documented above.          "

## 2023-10-26 NOTE — PROGRESS NOTES
[facility] Medicine Daily Progress Note    Date of Service  10/26/2023    Chief Complaint  Sharon Guardado is a 68 y.o. male admitted 9/24/2023 with altered mental status and fall.    Hospital Course  Mr. Guardado is a 68-year-old male with a past medical history of alcohol abuse, hypertension, obesity who presented with altered mental status and a ground-level fall on 9/24/2023.  On admission he was noted to have a small frontal subarachnoid hemorrhage on CT head.  He was also noted to have severe sepsis secondary to UTI.  She was treated with IV ceftriaxone.  Patient developed symptoms of alcohol withdrawal and was intubated.  Patient was subsequently extubated and transferred to telemetry on 9/30.  The patient subsequently had worsening mental status, on 9/30 found to have a left frontal lobe hemorrhage with surrounding vasogenic edema with mild mass effect, deemed nonoperative by neurosurgery  Was readmitted to the ICU level of care, intubated, treated with hypertonic saline, empirically treated with Unasyn for fever, overall state on mechanical ventilation for 9 days, during the course there was a concern for possible abdominal infection, gallbladder infection, surgery was consulted,  HIDA scan was obtained and was negative for acute cholecystitis, surgery not indicated as per general surgery opinion.  He has flaccid right arm and severe weakness right leg with expressive aphasia.  On 10/10 patient again found appropriate to transfer out of ICU.  Due to dysphagia, he had been NPO and due to scheduling issues there were delays. A G tube was placed by Dr. Toussaint surgery on 10/19. On 10/21 his blood pressure dropped and he was transferred to the [facility] for IV fluids and IV pressors.   10/22: On IV pressors for ~4 hours on the 21st. Na up to 150, free water flushes increased.   10/23  Afebrile today, HR 71-89, RR 16-18, SpO2 92-93% on room air    Interval Problem Update  10/24/23  Blood pressure is remaining  stable.  Patient is lethargic today very sleepy.  Moving all extremities to command.  Awaiting acceptance to long-term care skilled nursing facility.  I have ordered laboratory studies for tomorrow for his recently high sodium levels.    10/25/23  Blood pressure remaining stable.  Stable right hemiplegia.  Continues to remain very lethargic not following any commands.  Magnesium low 1.7 being replaced orally.  Skilled nursing facilities expanded to [place].  Looking for long-term care skilled nursing facility.    10/26/23  Per discussion with the occupational therapist, concerned that the patient is feeling depressed.  He is more interactive today.  Occasionally following commands.  Started on modafinil 50 mg daily.  Memantine discontinued.  Hydralazine 25 mg as needed for SBP greater than 160.  He does not have Medicaid therefore does not qualify for long-term care skilled nursing facility.  Looking at other options such as group home with case management.  Repeat head CT ordered today due to persistent encephalopathy in setting of recent intracranial hemorrhage.        I have discussed this patient's plan of care and discharge plan at IDT rounds today with Case Management, Nursing, Nursing leadership, and other members of the IDT team.    Consultants/Specialty  Neurology  Neurosurgery  Critical care    Code Status  DNAR/DNI    Disposition  The patient is not medically cleared for discharge to home or a post-acute facility.  Anticipate discharge to: skilled nursing facility    I have placed the appropriate orders for post-discharge needs.    Review of Systems  Review of Systems   Unable to perform ROS: Mental acuity        Physical Exam  Temp:  [36.2 °C (97.1 °F)-36.9 °C (98.4 °F)] 36.9 °C (98.4 °F)  Pulse:  [61-84] 79  Resp:  [18-19] 19  BP: (113-162)/(51-85) 113/62  SpO2:  [92 %-94 %] 92 %    Physical Exam  Vitals and nursing note reviewed.   Constitutional:       Appearance: He is obese. He is ill-appearing.       Comments: Difficult to arouse   HENT:      Head: Normocephalic and atraumatic.   Eyes:      General:         Right eye: No discharge.         Left eye: No discharge.      Conjunctiva/sclera: Conjunctivae normal.   Cardiovascular:      Rate and Rhythm: Normal rate and regular rhythm.      Pulses: Normal pulses.      Heart sounds: Normal heart sounds. No murmur heard.  Pulmonary:      Effort: Pulmonary effort is normal.      Breath sounds: Normal breath sounds.   Abdominal:      General: There is no distension.      Tenderness: There is no abdominal tenderness.      Comments: G tube   Genitourinary:     Comments: Condom cath  Musculoskeletal:      Cervical back: Neck supple. No tenderness.      Right lower leg: No edema.      Left lower leg: No edema.   Skin:     General: Skin is warm and dry.   Neurological:      Mental Status: He is alert.      Motor: Weakness present.      Comments: Non-verbal   Not following any commands.  Withdrawing to pain on the left side.  Stable right hemiplegia.   Psychiatric:         Mood and Affect: Affect is blunt and flat.         Cognition and Memory: Cognition is impaired.      Comments: Unable to assess         Fluids    Intake/Output Summary (Last 24 hours) at 10/26/2023 1139  Last data filed at 10/26/2023 0600  Gross per 24 hour   Intake 2360 ml   Output 600 ml   Net 1760 ml       Laboratory  Recent Labs     10/25/23  0156   WBC 9.1   RBC 4.37*   HEMOGLOBIN 13.0*   HEMATOCRIT 39.0*   MCV 89.2   MCH 29.7   MCHC 33.3   RDW 41.3   PLATELETCT 139*   MPV 11.7     Recent Labs     10/25/23  0156 10/26/23  0212   SODIUM 144 143   POTASSIUM 4.0 4.4   CHLORIDE 108 108   CO2 27 28   GLUCOSE 182* 180*   BUN 20 14   CREATININE 0.71 0.75   CALCIUM 9.4 9.6                   Imaging  US-EXTREMITY VENOUS UPPER UNILAT RIGHT   Final Result      DX-ABDOMEN FOR TUBE PLACEMENT   Final Result      Nasogastric tube tip overlies the duodenal bulb      DX-ABDOMEN FOR TUBE PLACEMENT   Final Result       1.  Enteric tube tip again projects over the 2nd portion of the duodenum.      DX-ABDOMEN FOR TUBE PLACEMENT   Final Result      Enteric tube tip projects over the second portion of the duodenum      DX-CHEST-PORTABLE (1 VIEW)   Final Result      Unchanged bibasilar underinflation atelectasis      DX-ABDOMEN FOR TUBE PLACEMENT   Final Result      Enteric tube tip projects over the gastric antrum or proximal duodenum      DX-CHEST-PORTABLE (1 VIEW)   Final Result      Improving bibasilar pulmonary opacities.      DX-CHEST-PORTABLE (1 VIEW)   Final Result         Findings on chest radiograph appear stable since the prior radiograph.  No new abnormalities are identified.      NM-HEPATOBILIARY SCAN   Final Result      There is visualization of the gallbladder after morphine administration, indicating the cystic duct is still patent. No scintigraphy evidence of acute cholecystitis.      DX-CHEST-PORTABLE (1 VIEW)   Final Result         1.  Mild pulmonary edema and/or infiltrates.   2.  Cardiomegaly   3.  Atherosclerosis      DX-CHEST-PORTABLE (1 VIEW)   Final Result         1.  No acute cardiopulmonary disease.   2.  Cardiomegaly   3.  Atherosclerosis      US-RUQ   Final Result      1.  Minimal gallbladder wall thickening and possible sludge.  Cholecystitis is not excluded.   2.  Minimal pericholecystic fluid may be due to gallbladder inflammation or liver disease.   3.  Enlarged echogenic liver suggesting fatty infiltration.   4.  Limited evaluation of the pancreas abdominal aorta.      DX-CHEST-PORTABLE (1 VIEW)   Final Result         1.  Pulmonary edema and/or infiltrates are identified, which are stable since the prior exam.   2.  Cardiomegaly   3.  Atherosclerosis      US-EXTREMITY VENOUS UPPER UNILAT RIGHT   Final Result      DX-CHEST-PORTABLE (1 VIEW)   Final Result         1.  Pulmonary edema and/or infiltrates are identified, which are stable since the prior exam.   2.  Cardiomegaly   3.  Atherosclerosis       DX-CHEST-PORTABLE (1 VIEW)   Final Result         1.  Pulmonary edema and/or infiltrates are identified, which are stable since the prior exam.   2.  Cardiomegaly   3.  Atherosclerosis      DX-CHEST-PORTABLE (1 VIEW)   Final Result         1.  Pulmonary edema and/or infiltrates are identified, which are somewhat decreased since the prior exam.   2.  Cardiomegaly   3.  Atherosclerosis      MR-MRA HEAD-W/O   Final Result         Normal intracranial MRA.      MR-BRAIN-WITH & W/O   Final Result         Left frontal lobe hemorrhage with surrounding vasogenic edema and mild mass effect upon the frontal horn of the left lateral ventricle. There is approximately 2 mm of midline shift to the right.      Age-related volume loss and chronic microvascular ischemic changes.      No abnormal intracranial enhancement is identified.      CT-HEAD W/O   Final Result         1. Stable 6 cm left frontal intraparenchymal hemorrhage with mild, 2 mm left-to-right subfalcine herniation.   2. No new abnormality or change.         DX-CHEST-PORTABLE (1 VIEW)   Final Result         1.  Pulmonary edema and/or infiltrates are identified, which appear somewhat increased since the prior exam.   2.  Cardiomegaly   3.  Atherosclerosis      DX-ABDOMEN FOR TUBE PLACEMENT   Final Result      NG tube extends below the diaphragm and appears to extend through the region of the stomach with tip at the level of the proximal duodenum.      DX-CHEST-PORTABLE (1 VIEW)   Final Result      1.  Grossly satisfactory appearance of tubes and lines.   2.  No evidence of pneumothorax.   3.  Marked hypoinflation with probable bibasilar atelectasis.   4.  Enlarged cardiac silhouette with vascular congestion.      CT-HEAD W/O   Final Result         1.  New large parenchymal hemorrhage involving the frontal lobe is identified with some surrounding edema.      2.  Subarachnoid hemorrhage is also noted in the area.      3.  Minimal midline shift to the right side.       Findings are consistent with atrophy.  Decreased attenuation in the periventricular white matter likely indicates microvascular ischemic disease.      Based solely on CT findings, the brain injury guideline category is mBIG 3.      EDH   IVH   Displaced skull fx   SDH > 8mm   IPH > 8mm or multiple   SAH bi-hemispheric or > 3mm      The original BIG retrospective analysis found radiographic progression in 0% of BIG 1 patients and 2.6% BIG 2.      These findings were discussed with GEETA MOLINA on 09/30/2023.      DX-CHEST-PORTABLE (1 VIEW)   Final Result      Stable multiple pulmonary vascular dilation/congestion. Otherwise, negative.      DX-CHEST-PORTABLE (1 VIEW)   Final Result      1.  Increasing airspace disease at the left base.      DX-CHEST-LIMITED (1 VIEW)   Final Result         1.  Pulmonary edema and/or infiltrates are identified, which are somewhat decreased since the prior exam.   2.  Cardiomegaly   3.  Atherosclerosis      DX-CHEST-PORTABLE (1 VIEW)   Final Result         1.  Pulmonary edema and/or infiltrates are identified, which appear somewhat increased since the prior exam.   2.  Cardiomegaly   3.  Atherosclerosis      DX-CHEST-PORTABLE (1 VIEW)   Final Result         1.  Interstitial edema and/or infiltrates, slightly decreased since prior study.   2.  Cardiomegaly   3.  Atherosclerosis      DX-CHEST-LIMITED (1 VIEW)   Final Result      Right IJ catheter tip projects in the SVC. No pneumothorax.      CT-CTA NECK WITH & W/O-POST PROCESSING   Final Result      1.  Estimated 50-75% stenosis of the BILATERAL carotid arteries   2.  Estimated greater than 50% stenosis at the vertebral artery ostium on each side      CT-CTA HEAD WITH & W/O-POST PROCESS   Final Result      1.  No large vessel occlusion or aneurysm.   2.  Hazy left frontal lobe subarachnoid hemorrhage, decreased in conspicuity from prior. No new hemorrhage seen.   3.  Scattered opacifications of the ethmoid sinuses, likely related to  support hardware.      DX-ABDOMEN FOR TUBE PLACEMENT   Final Result         1.  Nonspecific bowel gas pattern in the upper abdomen.   2.  Dobbhoff tube with tip terminating overlying the expected location of the first or second duodenal segment.      DX-CHEST-PORTABLE (1 VIEW)   Final Result         1.  Interstitial edema and/or infiltrates.   2.  Cardiomegaly   3.  Nasogastric tube terminates in the distal esophagus, recommend advancement   4.  Atherosclerosis      DX-ABDOMEN FOR TUBE PLACEMENT   Final Result         1.  Nonspecific bowel gas pattern in the upper abdomen.   2.  Nasogastric tube terminates just distal to the gastroesophageal junction, recommend advancement      CT-RENAL COLIC EVALUATION(A/P W/O)   Final Result         1.  Intrahepatic biliary air and nondependent air within the gallbladder, consider history of instrumentation or changes of cholangitis in the appropriate clinical setting.   2.  Large fat-containing bilateral inguinal hernias   3.  Enlarged prostate, consider causes of prostate enlargement with additional workup as clinically appropriate   4.  Diverticulosis   5.  Cholelithiasis   6.  Atherosclerosis      CT-HEAD W/O   Final Result         1.  Hazy left frontal subarachnoid hemorrhage.   2.  Nonspecific white matter changes, commonly associated with small vessel ischemic disease.  Associated mild cerebral atrophy is noted.      Based solely on CT findings, the brain injury guideline category is mBIG 1.      SDH < 4mm   IPH < 4mm   SAH < 3 sulci and < 1mm      The original BIG retrospective analysis found radiographic progression in 0% of BIG 1 patients and 2.6% BIG 2.      These findings were discussed with the patient's clinician, Madhu Zambrano, on 9/24/2023 10:10 PM.      DX-CHEST-PORTABLE (1 VIEW)   Final Result      No acute cardiopulmonary abnormality identified.      CT-HEAD W/O    (Results Pending)        Assessment/Plan  * Intraparenchymal hemorrhage of brain (HCC)- (present  on admission)  Assessment & Plan  CT on 9/24 with hazy left frontal SAH  CT on 9/30 with new large IPH in the left frontal lobe which neurosurgery was consulted and it is nonoperable  With right sided hemiparesis and expressive aphasia  He may benefit from skilled nursing facility but certainly will require 24-hour care upon discharge.    10/24/23  Patient is awaiting acceptance to long-term care skilled nursing facility.    10/25/23  Skilled nursing facility expanded to [place].    Right hemiplegia (HCC)- (present on admission)  Assessment & Plan  Secondary to intracranial hemorrhage.  Stable.  Pressure ulcer precautions.    Lakia coma scale total score 9-12- (present on admission)  Assessment & Plan  10/25/23  Continues to remain difficult to arouse.  Protecting his airway.  Withdrawing to pain.    Hypernatremia  Assessment & Plan  Na has gone up to 150  Increase free water via G-tube flushes to 250 mL 4 times a day and check a basic metabolic panel in the morning    10/24/23  Repeat sodium levels ordered.  Increase free water flushes to 300 mL every 6 hours.    10/25/23  Resolved with sodium of 144.  Decreasing free water flushes to 200 mL every 6 hours.    Acute respiratory failure with hypoxia (HCC)- (present on admission)  Assessment & Plan  Intubated 9/25-9/28  Reintubated 9/30-10/7  Now on room air    Type 2 diabetes mellitus (HCC)- (present on admission)  Assessment & Plan  A1c 5.7% (6/2023)  Lantus insulin to 50 units in the evening with sliding scale throughout the day every 6 hours with tube feeds    10/24/23  Patient becoming hypoglycemic with blood glucose in the 90s.  Decrease glargine insulin to 50 units in the evenings.  Continue regular insulin sign scale.      Acute encephalopathy  Assessment & Plan  10/26/23  Patient is appearing more awake but depressed.  Starting modafinil 50 mg daily.  Continue Seroquel 50 mg in the evenings.  Discontinue memantine.    LE (acute kidney injury), prerenal  "(CMS-HCC)  Assessment & Plan  Secondary due to dehydration, creatinine has normalized now with IV fluids    10/24/23  I have ordered repeat lab studies for tomorrow    Shock ([facility])  Assessment & Plan  On 10/21/2023 patient developed severe hypotension and shock for which she was transferred to the [facility] placed on IV fluids and IV pressors.  Cultures were negative this was consistent with hypovolemic shock which has now resolved.    Bradycardia  Assessment & Plan  This has resolved    ACP (advance care planning)  Assessment & Plan  From Dr. Field on 10/21:  I discussed advance care planning with the patient and family for at least 35 minutes, including diagnosis, prognosis, plan of care, risks and benefits of any therapies that could be offered, as well as alternatives including palliation and hospice, as appropriate.  DNR/I per patient's wife wishes, patient has gone from full code to dnr back to full code, now DNR/I. She does not want him to be attached to more machines. I explained he is high risk for developing further brain injuries following a code and he would likely not make it through and it would serve to prolong his suffering. She recognizes that and wants what is best for Sharon. She recognizes he has been in pain for \"a long time\" and does not want to see him suffer needlessly.       Atelectasis- (present on admission)  Assessment & Plan  Patient unable to follow commands for incentive spirometry    Pneumonia due to infectious organism- (present on admission)  Assessment & Plan  Continue Zosyn    Thrombocytopenia (HCC)- (present on admission)  Assessment & Plan  This resolved    Pneumobilia- (present on admission)  Assessment & Plan  As seen on CT renal (9/25/23).    9/26/23: No need for further interventions at this time per gen surg    Primary hypertension- (present on admission)  Assessment & Plan  Hypertensive at presentation   Goal for normotension currently multimodality treatment      BMI " 40.0-44.9, adult (HCC)- (present on admission)  Assessment & Plan  Tube feeding    Alcohol dependence (HCC)- (present on admission)  Assessment & Plan  Multivitamin, folate, thiamine  Monitor, additional therapies as clinically indicated  9/29/2023 patient was at least 2-3 hard liquor drinks daily although this is unclear per patient and his wife's history.  .    Patient s/p treatment for acute alcohol withdrawal         VTE prophylaxis:   SCDs/TEDs   pharmacologic prophylaxis contraindicated due to Intracranial hemorrhage      I have performed a physical exam and reviewed and updated ROS and Plan today (10/26/2023). In review of yesterday's note (10/25/2023), there are no changes except as documented above.

## 2023-10-26 NOTE — THERAPY
Speech Language Pathology   Daily Treatment     Patient Name: Sharon Guardado  AGE:  68 y.o., SEX:  male  Medical Record #: [id number]  Date of Service: 10/26/2023    Precautions:  Precautions: Fall Risk, Swallow Precautions, PEG Tube     Subjective  Pt seen alert at bedside; remained nonverbal for session.     Assessment  See description below for speech tx  Dysphagia: Pt assisted w/ self-feeding as he initiated use of the cup when it was placed into his hand. Pt orally accepted a presented bolus consistently. No upper airway wetness, throat clearing or coughing was noted across limited PO trials of thin liquids + ice chips.     Clinical Impressions  - Continued clinical signs of oropharyngeal dysphagia in the context of a prolonged hospitalization and acute CVA. Recommend instrumentation (FEES, as pt is not appropriate for MBS r/t body habitus and not following simple commands). Pt was not appropriate for FEES per 10/25 attempt/note d/t reduced participation. Continue to re-assess pt's candidacy for instrumentation.      - Continued signs of profound expressive and receptive language deficits. Pt currently depends on his conversational partner to anticipate all basic wants/needs.     Recommendations  Treatment Completed: Dysphagia Treatment, Speech-Language Treatment     Dysphagia Treatment  Diet Consistency: NPO w/ alternate nutrition/hydration (pt has PEG)  Instrumentation: FEES  Medication: Non Oral  Risk Management : None  Oral Care: Q4h    Speech-Language Treatment  Speech-Language Treatment: One step commands : +0/5 independently, +1/5 max (verbal, tactile & modeling), Automatics: +0/3 max cueing, identifying object by function: +1/3 max cueing (pt only initiating use with a cup, not other common objects such as towel or spoon)    Cognitive Treatment  Supervision Needs Upons Discharge: Direct assistance with IADLs (see below)  IADLs: Medication management, Financial management, Appointment management,  Household chores, Cooking    SLP Treatment Plan  Treatment Plan: Dysphagia Treatment, Speech-Language Treatment  SLP Frequency: 3x Per Week  Estimated Duration: Until Therapy Goals Met    Anticipated Discharge Needs  Discharge Recommendations: Recommend post-acute placement for additional speech therapy services prior to discharge home  Therapy Recommendations Upon DC: Dysphagia Training, Comprehension Training    Patient / Family Goals  Patient / Family Goal #1: Did not state  Goal #1 Outcome: Goal not met  Short Term Goals  Short Term Goal # 1: Pt will maintain attention for 2-3 minutes to consume prefeeding trials to demonstrate readiness for PO intake  Goal Outcome # 1: Goal met  Short Term Goal # 2: Pt will consume prefeeding trials with no overt s/sx of aspiration  Goal Outcome # 2 : Goal met  Short Term Goal # 3: Pt will participate in an instrumental swallow study to define swallow physiology and determine further ST POC  Goal Outcome  # 3: Progressing slower than expected  Short Term Goal # 4: Pt will follow simple directives with max cues and model at 2 unit.  Goal Outcome  # 4: Goal not met  Short Term Goal # 5: Pt will communicate using facial expression, words, and vocalizing with max cues and stimulation.  Goal Outcome  # 5: Goal not met    Deedee Stephens, SLP

## 2023-10-27 LAB
GLUCOSE BLD STRIP.AUTO-MCNC: 126 MG/DL (ref 65–99)
GLUCOSE BLD STRIP.AUTO-MCNC: 147 MG/DL (ref 65–99)
GLUCOSE BLD STRIP.AUTO-MCNC: 156 MG/DL (ref 65–99)
GLUCOSE BLD STRIP.AUTO-MCNC: 157 MG/DL (ref 65–99)
GLUCOSE BLD STRIP.AUTO-MCNC: 163 MG/DL (ref 65–99)
GLUCOSE BLD STRIP.AUTO-MCNC: 170 MG/DL (ref 65–99)

## 2023-10-27 PROCEDURE — 700102 HCHG RX REV CODE 250 W/ 637 OVERRIDE(OP)

## 2023-10-27 PROCEDURE — A9270 NON-COVERED ITEM OR SERVICE: HCPCS

## 2023-10-27 PROCEDURE — A9270 NON-COVERED ITEM OR SERVICE: HCPCS | Performed by: INTERNAL MEDICINE

## 2023-10-27 PROCEDURE — 700102 HCHG RX REV CODE 250 W/ 637 OVERRIDE(OP): Performed by: STUDENT IN AN ORGANIZED HEALTH CARE EDUCATION/TRAINING PROGRAM

## 2023-10-27 PROCEDURE — A9270 NON-COVERED ITEM OR SERVICE: HCPCS | Performed by: STUDENT IN AN ORGANIZED HEALTH CARE EDUCATION/TRAINING PROGRAM

## 2023-10-27 PROCEDURE — 770001 HCHG ROOM/CARE - MED/SURG/GYN PRIV*

## 2023-10-27 PROCEDURE — 86480 TB TEST CELL IMMUN MEASURE: CPT

## 2023-10-27 PROCEDURE — 82962 GLUCOSE BLOOD TEST: CPT

## 2023-10-27 PROCEDURE — 92526 ORAL FUNCTION THERAPY: CPT

## 2023-10-27 PROCEDURE — 36415 COLL VENOUS BLD VENIPUNCTURE: CPT

## 2023-10-27 PROCEDURE — 92612 ENDOSCOPY SWALLOW (FEES) VID: CPT

## 2023-10-27 PROCEDURE — 99233 SBSQ HOSP IP/OBS HIGH 50: CPT | Performed by: STUDENT IN AN ORGANIZED HEALTH CARE EDUCATION/TRAINING PROGRAM

## 2023-10-27 PROCEDURE — 700102 HCHG RX REV CODE 250 W/ 637 OVERRIDE(OP): Performed by: INTERNAL MEDICINE

## 2023-10-27 RX ORDER — INSULIN LISPRO 100 [IU]/ML
1-6 INJECTION, SOLUTION INTRAVENOUS; SUBCUTANEOUS
Status: DISCONTINUED | OUTPATIENT
Start: 2023-10-27 | End: 2023-10-30 | Stop reason: HOSPADM

## 2023-10-27 RX ORDER — OMEPRAZOLE 20 MG/1
20 CAPSULE, DELAYED RELEASE ORAL 2 TIMES DAILY
Status: DISCONTINUED | OUTPATIENT
Start: 2023-10-27 | End: 2023-10-28

## 2023-10-27 RX ORDER — DIPHENHYDRAMINE HCL 25 MG
25 TABLET ORAL ONCE
Status: COMPLETED | OUTPATIENT
Start: 2023-10-27 | End: 2023-10-27

## 2023-10-27 RX ORDER — MODAFINIL 100 MG/1
100 TABLET ORAL EVERY MORNING
Status: DISCONTINUED | OUTPATIENT
Start: 2023-10-28 | End: 2023-10-28

## 2023-10-27 RX ADMIN — OMEPRAZOLE 20 MG: 20 CAPSULE, DELAYED RELEASE ORAL at 17:50

## 2023-10-27 RX ADMIN — MODAFINIL 50 MG: 100 TABLET ORAL at 05:33

## 2023-10-27 RX ADMIN — ALLOPURINOL 300 MG: 300 TABLET ORAL at 05:33

## 2023-10-27 RX ADMIN — INSULIN HUMAN 3 UNITS: 100 INJECTION, SOLUTION PARENTERAL at 13:08

## 2023-10-27 RX ADMIN — DICLOFENAC SODIUM 2 G: 10 GEL TOPICAL at 05:34

## 2023-10-27 RX ADMIN — METFORMIN HYDROCHLORIDE 500 MG: 500 TABLET ORAL at 17:50

## 2023-10-27 RX ADMIN — DIPHENHYDRAMINE HYDROCHLORIDE 25 MG: 25 TABLET ORAL at 02:50

## 2023-10-27 RX ADMIN — DICLOFENAC SODIUM 2 G: 10 GEL TOPICAL at 13:12

## 2023-10-27 RX ADMIN — Medication 400 MG: at 05:33

## 2023-10-27 RX ADMIN — ATORVASTATIN CALCIUM 40 MG: 40 TABLET, FILM COATED ORAL at 17:50

## 2023-10-27 RX ADMIN — INSULIN LISPRO 1 UNITS: 100 INJECTION, SOLUTION INTRAVENOUS; SUBCUTANEOUS at 17:55

## 2023-10-27 RX ADMIN — DICLOFENAC SODIUM 2 G: 10 GEL TOPICAL at 17:56

## 2023-10-27 RX ADMIN — INSULIN HUMAN 3 UNITS: 100 INJECTION, SOLUTION PARENTERAL at 06:23

## 2023-10-27 RX ADMIN — LANSOPRAZOLE 30 MG: 30 TABLET, ORALLY DISINTEGRATING, DELAYED RELEASE ORAL at 05:33

## 2023-10-27 ASSESSMENT — PAIN DESCRIPTION - PAIN TYPE: TYPE: ACUTE PAIN

## 2023-10-27 NOTE — PROGRESS NOTES
[facility] Medicine Daily Progress Note    Date of Service  10/27/2023    Chief Complaint  Sharon Guardado is a 68 y.o. male admitted 9/24/2023 with altered mental status and fall.    Hospital Course  Mr. Guardado is a 68-year-old male with a past medical history of alcohol abuse, hypertension, obesity who presented with altered mental status and a ground-level fall on 9/24/2023.  On admission he was noted to have a small frontal subarachnoid hemorrhage on CT head.  He was also noted to have severe sepsis secondary to UTI.  She was treated with IV ceftriaxone.  Patient developed symptoms of alcohol withdrawal and was intubated.  Patient was subsequently extubated and transferred to telemetry on 9/30.  The patient subsequently had worsening mental status, on 9/30 found to have a left frontal lobe hemorrhage with surrounding vasogenic edema with mild mass effect, deemed nonoperative by neurosurgery  Was readmitted to the ICU level of care, intubated, treated with hypertonic saline, empirically treated with Unasyn for fever, overall state on mechanical ventilation for 9 days, during the course there was a concern for possible abdominal infection, gallbladder infection, surgery was consulted,  HIDA scan was obtained and was negative for acute cholecystitis, surgery not indicated as per general surgery opinion.  He has flaccid right arm and severe weakness right leg with expressive aphasia.  On 10/10 patient again found appropriate to transfer out of ICU.  Due to dysphagia, he had been NPO and due to scheduling issues there were delays. A G tube was placed by Dr. Toussaint surgery on 10/19. On 10/21 his blood pressure dropped and he was transferred to the [facility] for IV fluids and IV pressors.   10/22: On IV pressors for ~4 hours on the 21st. Na up to 150, free water flushes increased.   10/23  Afebrile today, HR 71-89, RR 16-18, SpO2 92-93% on room air    Interval Problem Update  10/24/23  Blood pressure is remaining  stable.  Patient is lethargic today very sleepy.  Moving all extremities to command.  Awaiting acceptance to long-term care skilled nursing facility.  I have ordered laboratory studies for tomorrow for his recently high sodium levels.    10/25/23  Blood pressure remaining stable.  Stable right hemiplegia.  Continues to remain very lethargic not following any commands.  Magnesium low 1.7 being replaced orally.  Skilled nursing facilities expanded to [place].  Looking for long-term care skilled nursing facility.    10/26/23  Per discussion with the occupational therapist, concerned that the patient is feeling depressed.  He is more interactive today.  Occasionally following commands.  Started on modafinil 50 mg daily.  Memantine discontinued.  Hydralazine 25 mg as needed for SBP greater than 160.  He does not have Medicaid therefore does not qualify for long-term care skilled nursing facility.  Looking at other options such as group home with case management.  Repeat head CT ordered today due to persistent encephalopathy in setting of recent intracranial hemorrhage.    10/27/23  Patient is appearing more alert today.  He is wiggling his toes in the bilateral extremities.  He continues to be nonverbal.  Continuing modafinil 50 mg daily.  Underwent FEES.  Diet being advanced.  Insulin adjusted as to with meals.  Tolerating long-acting Lantus 40 units daily.  Discontinuing Lantus and starting metformin.  Titrate up for blood glucose control.        I have discussed this patient's plan of care and discharge plan at IDT rounds today with Case Management, Nursing, Nursing leadership, and other members of the IDT team.    Consultants/Specialty  Neurology  Neurosurgery  Critical care    Code Status  DNAR/DNI    Disposition  The patient is not medically cleared for discharge to home or a post-acute facility.  Anticipate discharge to: skilled nursing facility    I have placed the appropriate orders for post-discharge  needs.    Review of Systems  Review of Systems   Unable to perform ROS: Patient nonverbal        Physical Exam  Temp:  [36.4 °C (97.5 °F)-37.2 °C (98.9 °F)] 36.6 °C (97.8 °F)  Pulse:  [61-83] 82  Resp:  [17-19] 18  BP: (105-142)/(57-81) 124/75  SpO2:  [92 %-93 %] 92 %    Physical Exam  Vitals and nursing note reviewed.   Constitutional:       General: He is not in acute distress.     Appearance: He is obese. He is not ill-appearing.   HENT:      Head: Normocephalic and atraumatic.   Eyes:      General:         Right eye: No discharge.         Left eye: No discharge.      Conjunctiva/sclera: Conjunctivae normal.   Cardiovascular:      Rate and Rhythm: Normal rate and regular rhythm.      Pulses: Normal pulses.      Heart sounds: Normal heart sounds. No murmur heard.  Pulmonary:      Effort: Pulmonary effort is normal.      Breath sounds: Normal breath sounds.   Abdominal:      General: Bowel sounds are normal. There is no distension.      Tenderness: There is no abdominal tenderness.      Comments: G tube with abdominal binder in place   Genitourinary:     Comments: Condom cath  Musculoskeletal:      Cervical back: Neck supple. No tenderness.      Right lower leg: No edema.      Left lower leg: No edema.   Skin:     General: Skin is warm and dry.   Neurological:      Mental Status: He is alert.      Motor: Weakness present.      Comments: Remains nonverbal.  Wiggling his toes in the bilateral extremities to command.   Psychiatric:         Mood and Affect: Affect is blunt and flat.         Cognition and Memory: Cognition is impaired.      Comments: Unable to assess         Fluids    Intake/Output Summary (Last 24 hours) at 10/27/2023 1700  Last data filed at 10/27/2023 0600  Gross per 24 hour   Intake 1560 ml   Output no documentation   Net 1560 ml       Laboratory  Recent Labs     10/25/23  0156   WBC 9.1   RBC 4.37*   HEMOGLOBIN 13.0*   HEMATOCRIT 39.0*   MCV 89.2   MCH 29.7   MCHC 33.3   RDW 41.3   PLATELETCT 139*    MPV 11.7     Recent Labs     10/25/23  0156 10/26/23  0212   SODIUM 144 143   POTASSIUM 4.0 4.4   CHLORIDE 108 108   CO2 27 28   GLUCOSE 182* 180*   BUN 20 14   CREATININE 0.71 0.75   CALCIUM 9.4 9.6                   Imaging  CT-HEAD W/O   Final Result      1.  Significant interval improvement in recent large left frontal intraparenchymal hemorrhage.      2.  Age-related cerebral atrophy.         US-EXTREMITY VENOUS UPPER UNILAT RIGHT   Final Result      DX-ABDOMEN FOR TUBE PLACEMENT   Final Result      Nasogastric tube tip overlies the duodenal bulb      DX-ABDOMEN FOR TUBE PLACEMENT   Final Result      1.  Enteric tube tip again projects over the 2nd portion of the duodenum.      DX-ABDOMEN FOR TUBE PLACEMENT   Final Result      Enteric tube tip projects over the second portion of the duodenum      DX-CHEST-PORTABLE (1 VIEW)   Final Result      Unchanged bibasilar underinflation atelectasis      DX-ABDOMEN FOR TUBE PLACEMENT   Final Result      Enteric tube tip projects over the gastric antrum or proximal duodenum      DX-CHEST-PORTABLE (1 VIEW)   Final Result      Improving bibasilar pulmonary opacities.      DX-CHEST-PORTABLE (1 VIEW)   Final Result         Findings on chest radiograph appear stable since the prior radiograph.  No new abnormalities are identified.      NM-HEPATOBILIARY SCAN   Final Result      There is visualization of the gallbladder after morphine administration, indicating the cystic duct is still patent. No scintigraphy evidence of acute cholecystitis.      DX-CHEST-PORTABLE (1 VIEW)   Final Result         1.  Mild pulmonary edema and/or infiltrates.   2.  Cardiomegaly   3.  Atherosclerosis      DX-CHEST-PORTABLE (1 VIEW)   Final Result         1.  No acute cardiopulmonary disease.   2.  Cardiomegaly   3.  Atherosclerosis      US-RUQ   Final Result      1.  Minimal gallbladder wall thickening and possible sludge.  Cholecystitis is not excluded.   2.  Minimal pericholecystic fluid may be  due to gallbladder inflammation or liver disease.   3.  Enlarged echogenic liver suggesting fatty infiltration.   4.  Limited evaluation of the pancreas abdominal aorta.      DX-CHEST-PORTABLE (1 VIEW)   Final Result         1.  Pulmonary edema and/or infiltrates are identified, which are stable since the prior exam.   2.  Cardiomegaly   3.  Atherosclerosis      US-EXTREMITY VENOUS UPPER UNILAT RIGHT   Final Result      DX-CHEST-PORTABLE (1 VIEW)   Final Result         1.  Pulmonary edema and/or infiltrates are identified, which are stable since the prior exam.   2.  Cardiomegaly   3.  Atherosclerosis      DX-CHEST-PORTABLE (1 VIEW)   Final Result         1.  Pulmonary edema and/or infiltrates are identified, which are stable since the prior exam.   2.  Cardiomegaly   3.  Atherosclerosis      DX-CHEST-PORTABLE (1 VIEW)   Final Result         1.  Pulmonary edema and/or infiltrates are identified, which are somewhat decreased since the prior exam.   2.  Cardiomegaly   3.  Atherosclerosis      MR-MRA HEAD-W/O   Final Result         Normal intracranial MRA.      MR-BRAIN-WITH & W/O   Final Result         Left frontal lobe hemorrhage with surrounding vasogenic edema and mild mass effect upon the frontal horn of the left lateral ventricle. There is approximately 2 mm of midline shift to the right.      Age-related volume loss and chronic microvascular ischemic changes.      No abnormal intracranial enhancement is identified.      CT-HEAD W/O   Final Result         1. Stable 6 cm left frontal intraparenchymal hemorrhage with mild, 2 mm left-to-right subfalcine herniation.   2. No new abnormality or change.         DX-CHEST-PORTABLE (1 VIEW)   Final Result         1.  Pulmonary edema and/or infiltrates are identified, which appear somewhat increased since the prior exam.   2.  Cardiomegaly   3.  Atherosclerosis      DX-ABDOMEN FOR TUBE PLACEMENT   Final Result      NG tube extends below the diaphragm and appears to extend  through the region of the stomach with tip at the level of the proximal duodenum.      DX-CHEST-PORTABLE (1 VIEW)   Final Result      1.  Grossly satisfactory appearance of tubes and lines.   2.  No evidence of pneumothorax.   3.  Marked hypoinflation with probable bibasilar atelectasis.   4.  Enlarged cardiac silhouette with vascular congestion.      CT-HEAD W/O   Final Result         1.  New large parenchymal hemorrhage involving the frontal lobe is identified with some surrounding edema.      2.  Subarachnoid hemorrhage is also noted in the area.      3.  Minimal midline shift to the right side.      Findings are consistent with atrophy.  Decreased attenuation in the periventricular white matter likely indicates microvascular ischemic disease.      Based solely on CT findings, the brain injury guideline category is mBIG 3.      EDH   IVH   Displaced skull fx   SDH > 8mm   IPH > 8mm or multiple   SAH bi-hemispheric or > 3mm      The original BIG retrospective analysis found radiographic progression in 0% of BIG 1 patients and 2.6% BIG 2.      These findings were discussed with GEETA MOLINA on 09/30/2023.      DX-CHEST-PORTABLE (1 VIEW)   Final Result      Stable multiple pulmonary vascular dilation/congestion. Otherwise, negative.      DX-CHEST-PORTABLE (1 VIEW)   Final Result      1.  Increasing airspace disease at the left base.      DX-CHEST-LIMITED (1 VIEW)   Final Result         1.  Pulmonary edema and/or infiltrates are identified, which are somewhat decreased since the prior exam.   2.  Cardiomegaly   3.  Atherosclerosis      DX-CHEST-PORTABLE (1 VIEW)   Final Result         1.  Pulmonary edema and/or infiltrates are identified, which appear somewhat increased since the prior exam.   2.  Cardiomegaly   3.  Atherosclerosis      DX-CHEST-PORTABLE (1 VIEW)   Final Result         1.  Interstitial edema and/or infiltrates, slightly decreased since prior study.   2.  Cardiomegaly   3.  Atherosclerosis       DX-CHEST-LIMITED (1 VIEW)   Final Result      Right IJ catheter tip projects in the SVC. No pneumothorax.      CT-CTA NECK WITH & W/O-POST PROCESSING   Final Result      1.  Estimated 50-75% stenosis of the BILATERAL carotid arteries   2.  Estimated greater than 50% stenosis at the vertebral artery ostium on each side      CT-CTA HEAD WITH & W/O-POST PROCESS   Final Result      1.  No large vessel occlusion or aneurysm.   2.  Hazy left frontal lobe subarachnoid hemorrhage, decreased in conspicuity from prior. No new hemorrhage seen.   3.  Scattered opacifications of the ethmoid sinuses, likely related to support hardware.      DX-ABDOMEN FOR TUBE PLACEMENT   Final Result         1.  Nonspecific bowel gas pattern in the upper abdomen.   2.  Dobbhoff tube with tip terminating overlying the expected location of the first or second duodenal segment.      DX-CHEST-PORTABLE (1 VIEW)   Final Result         1.  Interstitial edema and/or infiltrates.   2.  Cardiomegaly   3.  Nasogastric tube terminates in the distal esophagus, recommend advancement   4.  Atherosclerosis      DX-ABDOMEN FOR TUBE PLACEMENT   Final Result         1.  Nonspecific bowel gas pattern in the upper abdomen.   2.  Nasogastric tube terminates just distal to the gastroesophageal junction, recommend advancement      CT-RENAL COLIC EVALUATION(A/P W/O)   Final Result         1.  Intrahepatic biliary air and nondependent air within the gallbladder, consider history of instrumentation or changes of cholangitis in the appropriate clinical setting.   2.  Large fat-containing bilateral inguinal hernias   3.  Enlarged prostate, consider causes of prostate enlargement with additional workup as clinically appropriate   4.  Diverticulosis   5.  Cholelithiasis   6.  Atherosclerosis      CT-HEAD W/O   Final Result         1.  Hazy left frontal subarachnoid hemorrhage.   2.  Nonspecific white matter changes, commonly associated with small vessel ischemic disease.   Associated mild cerebral atrophy is noted.      Based solely on CT findings, the brain injury guideline category is mBIG 1.      SDH < 4mm   IPH < 4mm   SAH < 3 sulci and < 1mm      The original BIG retrospective analysis found radiographic progression in 0% of BIG 1 patients and 2.6% BIG 2.      These findings were discussed with the patient's clinician, Madhu Zambrano, on 9/24/2023 10:10 PM.      DX-CHEST-PORTABLE (1 VIEW)   Final Result      No acute cardiopulmonary abnormality identified.           Assessment/Plan  * Intraparenchymal hemorrhage of brain (HCC)- (present on admission)  Assessment & Plan  CT on 9/24 with hazy left frontal SAH  CT on 9/30 with new large IPH in the left frontal lobe which neurosurgery was consulted and it is nonoperable  With right sided hemiparesis and expressive aphasia  He may benefit from skilled nursing facility but certainly will require 24-hour care upon discharge.    10/24/23  Patient is awaiting acceptance to long-term care skilled nursing facility.    10/25/23  Skilled nursing facility expanded to [place].    10/27/23  Pending acceptance to skilled nursing facility  Case management also working on group home.  QuantiFeron Gold ordered.    Right hemiplegia (HCC)- (present on admission)  Assessment & Plan  Secondary to intracranial hemorrhage.  Stable.  Pressure ulcer precautions.    Closplint coma scale total score 9-12- (present on admission)  Assessment & Plan  10/25/23  Continues to remain difficult to arouse.  Protecting his airway.  Withdrawing to pain.    10/27/23  Patient is more alert today.  He is following commands in the bilateral lower extremities wiggling his toes to command.  Continue modafinil 50 mg daily.  Increase to 100 mg tomorrow morning.    Hypernatremia  Assessment & Plan  Na has gone up to 150  Increase free water via G-tube flushes to 250 mL 4 times a day and check a basic metabolic panel in the morning    10/24/23  Repeat sodium levels  ordered.  Increase free water flushes to 300 mL every 6 hours.    10/25/23  Resolved with sodium of 144.   Decreasing free water flushes to 200 mL every 6 hours.    10/27/23  Resolved.  Continue free water flushes 200 mL every 6 hours.    Acute respiratory failure with hypoxia (HCC)- (present on admission)  Assessment & Plan  Intubated 9/25-9/28  Reintubated 9/30-10/7    10/27/23  Resolved.  Continues to be on room air.    Type 2 diabetes mellitus (HCC)- (present on admission)  Assessment & Plan  A1c 5.7% (6/2023)  Lantus insulin to 50 units in the evening with sliding scale throughout the day every 6 hours with tube feeds    10/24/23  Patient becoming hypoglycemic with blood glucose in the 90s.  Decrease glargine insulin to 50 units in the evenings.  Continue regular insulin sign scale.    10/27/23  Discontinue glargine insulin since patient may go to group home.  Start metformin 850 mg twice a day.  Change regular insulin to lispro insulin 3 times a day with meals.  Hypoglycemia protocol      Acute encephalopathy  Assessment & Plan  10/26/23  Patient is appearing more awake but depressed.  Starting modafinil 50 mg daily.  Continue Seroquel 50 mg in the evenings.  Discontinue memantine.    10/27/23  Appears to be improving.  More alert today.  Following commands in the bilateral extremities wiggling his toes.  Continue modafinil 50 mg daily and Seroquel 50 mg in the evenings.    LE (acute kidney injury), prerenal (CMS-[facility])  Assessment & Plan  Secondary due to dehydration, creatinine has normalized now with IV fluids    10/24/23  I have ordered repeat lab studies for tomorrow    Shock ([facility])  Assessment & Plan  On 10/21/2023 patient developed severe hypotension and shock for which she was transferred to the [facility] placed on IV fluids and IV pressors.  Cultures were negative this was consistent with hypovolemic shock which has now resolved.    Bradycardia  Assessment & Plan  This has resolved    ACP (advance care  "planning)  Assessment & Plan  From Dr. Field on 10/21:  I discussed advance care planning with the patient and family for at least 35 minutes, including diagnosis, prognosis, plan of care, risks and benefits of any therapies that could be offered, as well as alternatives including palliation and hospice, as appropriate.  DNR/I per patient's wife wishes, patient has gone from full code to dnr back to full code, now DNR/I. She does not want him to be attached to more machines. I explained he is high risk for developing further brain injuries following a code and he would likely not make it through and it would serve to prolong his suffering. She recognizes that and wants what is best for Sharon. She recognizes he has been in pain for \"a long time\" and does not want to see him suffer needlessly.       Atelectasis- (present on admission)  Assessment & Plan  Patient unable to follow commands for incentive spirometry    Pneumonia due to infectious organism- (present on admission)  Assessment & Plan  Continue Zosyn    Thrombocytopenia (HCC)- (present on admission)  Assessment & Plan  This resolved    Pneumobilia- (present on admission)  Assessment & Plan  As seen on CT renal (9/25/23).    9/26/23: No need for further interventions at this time per gen surg    Primary hypertension- (present on admission)  Assessment & Plan  Hypertensive at presentation   Goal for normotension currently multimodality treatment      BMI 40.0-44.9, adult (HCC)- (present on admission)  Assessment & Plan  Tube feeding    Alcohol dependence (HCC)- (present on admission)  Assessment & Plan  Multivitamin, folate, thiamine  Monitor, additional therapies as clinically indicated  9/29/2023 patient was at least 2-3 hard liquor drinks daily although this is unclear per patient and his wife's history.  .    Patient s/p treatment for acute alcohol withdrawal         VTE prophylaxis:   SCDs/TEDs   pharmacologic prophylaxis contraindicated due to " Intracranial hemorrhage      I have performed a physical exam and reviewed and updated ROS and Plan today (10/27/2023). In review of yesterday's note (10/26/2023), there are no changes except as documented above.      Greater than 52 minutes spent prepping to see patient (e.g. review of tests) obtaining and/or reviewing separately obtained history. Performing a medically appropriate examination and evaluation.  Counseling and educating the patient/family/caregiver.  Ordering medications, tests, or procedures.  Referring and communicating with other health care professionals.  Documenting clinical information in EPIC.  Independently interpreting results and communicating results to patient/family/caregiver.  Care coordination.

## 2023-10-27 NOTE — DISCHARGE PLANNING
Case Management Discharge Planning    Admission Date: 9/24/2023  GMLOS: 13.5  ALOS: 33    6-Clicks ADL Score: 6  6-Clicks Mobility Score: 6  PT and/or OT Eval ordered: Yes  Post-acute Referrals Ordered: Yes  Post-acute Choice Obtained: Yes  Has referral(s) been sent to post-acute provider:  Yes      Anticipated Discharge Dispo: Discharge Disposition: D/T to SNF with Medicare cert in anticipation of skilled care (03)    DME Needed: No    Action(s) Taken: OTHER    Pt was discussed in IDT rounds today with Dr. Pinto, Pt has been medically cleared for SNF. Pt is pending response from [facility] and [place]. Declined by all other [facility]; Barriers to SNF placement: Total assist/care exceeding capacity, unable to follow commands, weight, current drug/alcohol abuse.     LSW called pt wife Alessandra to discuss the pt DC plan. No answer, LSW left  for call back.       Escalations Completed: None    Medically Clear: Yes    Next Steps:  f/u with pt and medical team to discuss dc needs and barriers.     Barriers to Discharge: Pending Placement and Transportation    Is the patient up for discharge tomorrow: No

## 2023-10-27 NOTE — THERAPY
Speech Language Pathology   Daily Treatment     Patient Name: Sharon Guardado  AGE:  68 y.o., SEX:  male  Medical Record #: [id number]  Date of Service: 10/27/2023      Precautions:  Precautions: Fall Risk, PEG Tube, Swallow Precautions         Subjective  Patient cleared by RN for evaluation. Pt received awake, repositioned HOB 90*. Pt needing frequent redirection and max encouragement to participate in session.       Assessment  Patient seen on this date for dysphagia management with focus on assessing readiness for diagnostic swallow study. PO trial of ice, thin liquids and liquidized assessed. Pt needing 1:1 feeding A during session. No overt s/sx of aspiration appreciated. Pt is appropriate to participate in diagnostic study (FEES) on this date.       Clinical Impressions  Patient presents with clinical indicators and is at high risk for oropharyngeal dysphagia 2/2 prolonged hospitalization/ NPO status and acute CVA. Recommend FEES study to objectively assess swallow function and informed POC.      Recommendations  Treatment Completed: Dysphagia Treatment       Dysphagia Treatment  Diet Consistency: NPO/PEG, prfdng trials with SLP only  Instrumentation: FEES  Medication: Non Oral  Oral Care: Q4h                     SLP Treatment Plan  Treatment Plan: Dysphagia Treatment, Cognitive Treatment, Patient/Family/Caregiver Training  SLP Frequency: 3x Per Week  Estimated Duration: Until Therapy Goals Met      Anticipated Discharge Needs  Discharge Recommendations: Recommend post-acute placement for additional speech therapy services prior to discharge home  Therapy Recommendations Upon DC: Dysphagia Training, Cognitive-Linguistic Training, Patient / Family / Caregiver Education, Community Re-Integration, Comprehension Training      Patient / Family Goals  Patient / Family Goal #1: Did not state  Goal #1 Outcome: Goal not met  Short Term Goals  Short Term Goal # 1: Pt will maintain attention for 2-3 minutes to  consume prefeeding trials to demonstrate readiness for PO intake  Goal Outcome # 1: Goal met  Short Term Goal # 2: Pt will consume prefeeding trials with no overt s/sx of aspiration  Goal Outcome # 2 : Goal met  Short Term Goal # 5: Pt will communicate using facial expression, words, and vocalizing with max cues and stimulation.  Goal Outcome  # 5: Goal not met      Luisa Moreno MS,CCC-SLP

## 2023-10-27 NOTE — THERAPY
Speech Language Pathology   Flexible Endoscopic Evaluation of Swallowing (FEES)        Patient Name: Sharon Guardado  AGE:  68 y.o., SEX:  male  Medical Record #: [id number]  Date of Service: 10/27/2023      History of Present Illness  Pt admitted with ALOC s/p fall. Dx of small frontal SAH and severe sepsis r/t UTI. Intubated r/t ETOH withdrawl. Gtube placed 10/19.  MRI-Left frontal lobe hemorrhage with surrounding vasogenic edema and mild mass effect upon the frontal horn of the left lateral ventricle. There is approximately 2 mm of midline shift to the right.     MRI-Left frontal lobe hemorrhage with surrounding vasogenic edema and mild mass effect upon the frontal horn of the left lateral ventricle. There is approximately 2 mm of midline shift to the right.     Age-related volume loss and chronic microvascular ischemic changes.     No abnormal intracranial enhancement is identified.    Pertinent Information  Current Method of Nutrition: NPO until cleared by speech pathology, PEG tube  Patient Behaviors: Restless, Flat Affect   Dentition: Natural dentition, Some missing dentition   Feeding Tube: PEG tube   Tracheostomy: No       Factor(s) Affecting Performance: Impaired mental status, Impaired command following     Discussed the risks, benefits, and alternatives of the FEES procedure. Patient/family acknowledged and agreed to proceed.      Assessment  Flexible Endoscopic Evaluation of Swallowing (FEES) completed at bedside today. The endoscope was passed transnasally via Right nare to evaluate the anatomy and physiology of swallowing. Pt tolerated the procedure with no apparent distress, occasionally reaching for scope, no able to follow commands.    Anatomic Findings:  Edematous bilateral arytenoid cartilage R>L, space occupying lesion on R VF       Vocal Fold Motion: Bilateral movement  Secretion Management: Excess secretions  PO Trials: Thin Liquid, Mildly Thick Liquid, Liquidised, Pudding, Soft & Bite  Sized, Regular Solid, Mixed      Consistency PAS Score Timing Residue Comments   Thin Liquid 1 N/A Vallecular Residue: Trace (1%-5%)  Pyriform Sinus Residue: Trace (1%-5%) Tsp, cup, straw   Mildly Thick 1 N/A Vallecular Residue: Trace (1%-5%)  Pyriform Sinus Residue: Trace (1%-5%) Tsp, cup   Liquidised 1 N/A Vallecular Residue: Trace (1%-5%)  Pyriform Sinus Residue: Trace (1%-5%)    Pudding 1 N/A Vallecular Residue: Mild (5%-25%)  Pyriform Sinus Residue: Trace (1%-5%)    Soft & Bite Sized 1 N/A Vallecular Residue: Mild (5%-25%)  Pyriform Sinus Residue: None (0%)    Regular Solid 1 N/A Vallecular Residue: Mild (5%-25%)  Pyriform Sinus Residue: Mild (5%-25%)    Mixed 2 During Swallow  (Inferred) Vallecular Residue: Mild (5%-25%)  Pyriform Sinus Residue: None (0%) Flash penetration from juice     Penetration-Aspiration Scale (PAS)  1     No contrast enters airway  2     Contrast enters the airway, remains above the vocal folds, and is ejected from the airway (not seen in the airway at the end of the swallow).  3     Contrast enters the airway, remains above the vocal folds, and is not ejected from the airway (is seen in the airway after the swallow).  4     Contrast enters the airway, contacts the vocal folds, and is ejected from the airway.  5     Contrast enters the airway, contacts the vocal folds, and is not ejected from the airway  6     Contrast enters the airway, crosses the plane of the vocal folds, and is ejected from the airway.  7     Contrast enters the airway, crosses the plane of the vocal folds, and is not ejected from the airway despite effort.  8     Contrast enters the airway, crosses the plane of the vocal folds, is not ejected from the airway and there is no response to aspiration.      Oral phase: Pt needing max verbal/tactile cues for oral acceptance. Adequate oral containment. Suspected piecemeal deglutition with trials of solid consistencies, evidenced by continued oral manipulation after  initial swallow followed by additional swallow. Prolonged mastication with trials of soft/bite sized and regular solids.      Pharyngeal phase:  - Incomplete laryngeal vestibule closure resulted in inferred flash penetration with juice of mixed trials liquids during the swallow.   - Reduced base of tongue retraction resulted in trace-mild vallecular residue across all trials.   - Impaired pharyngeal shortening resulted in trace pyriform sinus residue with thin liquids, mildly thick liquids and liquidized. Mild piriform sinus residue with trials of regular and mixed.  -No aspiration visualized during study.      Esophageal phase:  Unable to visualize esophagus; however, pt had bubbling in the post-cricoid space/UES after PO trials. Pt penetrated refluxed material; however, was sensate to events and able to clear with spontaneous cough.     Compensatory Strategies:  -Cleansing swallow - effective to clear residue  -Thin liquid rinse - effective to clear residue  -Spontaneous cough - cleared penetrate    Severity Rating:  Severity Rating: THADDEUS     THADDEUS: Mild      Clinical Impressions  Patient presents with a mild oropharyngeal dysphagia, likely acute sub-acute 2/2 2/2 prolonged hospitalization/ NPO status and acute CVA. Swallow safety and efficiency are both mildly impaired. Primary deficits include incomplete laryngeal vestibule closure, reduced base of tongue retraction, and impaired pharyngeal shortening. Deficits are best managed with diet modification and compensatory strategies. Patient is a guarded candidate for behavioral and exercise-based swallow rehabilitation. Okay to advance solids as tolerated. Consider training the following evidence-based swallowing exercises in therapy based on patient-specific pathophysiology: effortful swallow and tongue pull back.      Recommendations  Diet Consistency: Minced & moist solids (MM5), thin liquids (TN0)  Medication: Crush with applesauce, as appropriate, As  tolerated  Supervision: 1:1 feeding with constant supervision  Positioning: Fully upright and midline during oral intake, Meals sitting upright in a chair, as tolerated, Remain upright for 30-45 minutes after oral intake  Strategies: Multiple swallows (2-3) per bite/sips, Slow rate of intake, Alternate bites and sips, Small bites/sips  Oral Care: BID  Additional Instrumentation: None         SLP Treatment Plan  Treatment Plan: Dysphagia Treatment, Patient/Family/Caregiver Training  SLP Frequency: 3x Per Week  Estimated Duration: Until Therapy Goals Met      Anticipated Discharge Needs  Discharge Recommendations: Recommend post-acute placement for additional speech therapy services prior to discharge home   Therapy Recommendations Upon DC: Dysphagia Training, Comprehension Training, Cognitive-Linguistic Training, Patient / Family / Caregiver Education, Community Re-Integration       Patient / Family Goals  Patient / Family Goal #1: Did not state  Goal #1 Outcome: Goal not met  Short Term Goal # 1: Pt will maintain attention for 2-3 minutes to consume prefeeding trials to demonstrate readiness for PO intake  Goal Outcome # 1: Goal met  Short Term Goal # 2: Pt will consume prefeeding trials with no overt s/sx of aspiration  Goal Outcome # 2 : Goal met  Short Term Goal # 3: Pt will participate in an instrumental swallow study to define swallow physiology and determine further ST POC  Goal Outcome  # 3: Goal met, new goal added  Short Term Goal # 3 B : Pt will consume a MM/TN diet with no overt s/sx of aspiration with adherence to swallow/reflux precautions  Short Term Goal # 4: Pt will follow simple directives with max cues and model at 2 unit.  Goal Outcome  # 4: Goal not met  Short Term Goal # 5: Pt will communicate using facial expression, words, and vocalizing with max cues and stimulation.  Goal Outcome  # 5: Goal not met      Luisa Moreno MS,CCC-SLP

## 2023-10-27 NOTE — CARE PLAN
The patient is Stable - Low risk of patient condition declining or worsening    Shift Goals  Clinical Goals: Improve neuro status, maintain skin integrity, safety  Patient Goals: DEENA  Family Goals: DEENA    Progress made toward(s) clinical / shift goals:  Patient non-verbal, unable to communicate his needs. Will continue to encourage communication. Bed low, locked and call light in reach.    Problem: Skin Integrity  Goal: Skin integrity is maintained or improved  Outcome: Progressing  Note: Patient moved to bariatric bed for more room, Q2 hour turns remain in place to protect skin from breakdown  1.  Assess and monitor skin integrity, appearance and/or temperature  2.  Assess risk factors for impaired skin integrity and/or pressures ulcers  3.  Implement precautions to protect skin integrity in collaboration with interdisciplinary team  4.  Implement pressure ulcer prevention protocol if at risk for skin breakdown  5.  Confirm wound care consult if at risk for skin breakdown  6.  Ensure patient use of pressure relieving devices  (Low air loss bed, waffle overlay, heel protectors, ROHO cushion, etc)    Patient is not progressing towards the following goals:    Problem: Psychosocial  Goal: Patient's level of anxiety will decrease  Outcome: Not Progressing  Note: Patient is restless, comforting measures in place, will continue to provided emotional support     Problem: Risk for Aspiration  Goal: Patient's risk for aspiration will be absent or decrease  Outcome: Not Progressing  Note: Patient is currently NPO, patient has a PEG due to aspiration

## 2023-10-27 NOTE — DIETARY
Nutrition Services:  Day 33 of admit.  Sharon Guardado is a 68 y.o. male with admitting DX of Severe sepsis (HCC) [A41.9, R65.20]    RD received a message from RN stating that pt has now started a diet and RN is wondering if to continue or d/c bolus feeds if pt is eating.     Pt's current tube feeding via G-tube is Glucerna 1.2 with goal of 8 containers daily (suggested 2 at meal times and HS) which provides 2304 kcals, 115g protein, 1546 ml free water daily.     PO diet started today. Pt's current diet is level 5- minced and moist with level 0- thin liquids. PO intake 50-75% of lunch today per ADL's.     Plan/Recommendation:  Continue current PO diet per SLP  Continue Bolus Feeds of Glucerna 1.2 as needed  If patient consumes < 50% at meal time, bolus 2 containers following meal.    If pt eats > 50%, no bolus needed following meal.  If patient is eating nothing by mouth, pt will require 8 containers per day to meet 100% of estimated nutrition providing 2304 kcals, 115 g protein and 1546 mL of free water in a day  Encourage intake of meals  Document intake of all meals as % taken in ADL's to provide interdisciplinary communication across all shifts.   Monitor weight.  Nutrition rep will continue to see patient for ongoing meal and snack preferences.  Obtain supplement order per RD as needed.    RD following.

## 2023-10-28 LAB
ALBUMIN SERPL BCP-MCNC: 3.6 G/DL (ref 3.2–4.9)
ALBUMIN/GLOB SERPL: 1.1 G/DL
ALP SERPL-CCNC: 137 U/L (ref 30–99)
ALT SERPL-CCNC: 37 U/L (ref 2–50)
ANION GAP SERPL CALC-SCNC: 11 MMOL/L (ref 7–16)
AST SERPL-CCNC: 48 U/L (ref 12–45)
BASOPHILS # BLD AUTO: 0.2 % (ref 0–1.8)
BASOPHILS # BLD: 0.02 K/UL (ref 0–0.12)
BILIRUB SERPL-MCNC: 0.9 MG/DL (ref 0.1–1.5)
BUN SERPL-MCNC: 12 MG/DL (ref 8–22)
CALCIUM ALBUM COR SERPL-MCNC: 9.9 MG/DL (ref 8.5–10.5)
CALCIUM SERPL-MCNC: 9.6 MG/DL (ref 8.5–10.5)
CHLORIDE SERPL-SCNC: 105 MMOL/L (ref 96–112)
CO2 SERPL-SCNC: 24 MMOL/L (ref 20–33)
CREAT SERPL-MCNC: 0.72 MG/DL (ref 0.5–1.4)
EOSINOPHIL # BLD AUTO: 0.25 K/UL (ref 0–0.51)
EOSINOPHIL NFR BLD: 2.8 % (ref 0–6.9)
ERYTHROCYTE [DISTWIDTH] IN BLOOD BY AUTOMATED COUNT: 41.1 FL (ref 35.9–50)
GFR SERPLBLD CREATININE-BSD FMLA CKD-EPI: 98 ML/MIN/1.73 M 2
GLOBULIN SER CALC-MCNC: 3.2 G/DL (ref 1.9–3.5)
GLUCOSE BLD STRIP.AUTO-MCNC: 142 MG/DL (ref 65–99)
GLUCOSE BLD STRIP.AUTO-MCNC: 160 MG/DL (ref 65–99)
GLUCOSE BLD STRIP.AUTO-MCNC: 197 MG/DL (ref 65–99)
GLUCOSE SERPL-MCNC: 151 MG/DL (ref 65–99)
HCT VFR BLD AUTO: 45.4 % (ref 42–52)
HGB BLD-MCNC: 15.5 G/DL (ref 14–18)
IMM GRANULOCYTES # BLD AUTO: 0.04 K/UL (ref 0–0.11)
IMM GRANULOCYTES NFR BLD AUTO: 0.5 % (ref 0–0.9)
LYMPHOCYTES # BLD AUTO: 2.08 K/UL (ref 1–4.8)
LYMPHOCYTES NFR BLD: 23.4 % (ref 22–41)
MAGNESIUM SERPL-MCNC: 1.7 MG/DL (ref 1.5–2.5)
MCH RBC QN AUTO: 30 PG (ref 27–33)
MCHC RBC AUTO-ENTMCNC: 34.1 G/DL (ref 32.3–36.5)
MCV RBC AUTO: 88 FL (ref 81.4–97.8)
MONOCYTES # BLD AUTO: 0.55 K/UL (ref 0–0.85)
MONOCYTES NFR BLD AUTO: 6.2 % (ref 0–13.4)
NEUTROPHILS # BLD AUTO: 5.93 K/UL (ref 1.82–7.42)
NEUTROPHILS NFR BLD: 66.9 % (ref 44–72)
NRBC # BLD AUTO: 0 K/UL
NRBC BLD-RTO: 0 /100 WBC (ref 0–0.2)
PLATELET # BLD AUTO: 108 K/UL (ref 164–446)
PMV BLD AUTO: 12.7 FL (ref 9–12.9)
POTASSIUM SERPL-SCNC: 4.1 MMOL/L (ref 3.6–5.5)
PROT SERPL-MCNC: 6.8 G/DL (ref 6–8.2)
RBC # BLD AUTO: 5.16 M/UL (ref 4.7–6.1)
SODIUM SERPL-SCNC: 140 MMOL/L (ref 135–145)
WBC # BLD AUTO: 8.9 K/UL (ref 4.8–10.8)

## 2023-10-28 PROCEDURE — 700102 HCHG RX REV CODE 250 W/ 637 OVERRIDE(OP): Performed by: INTERNAL MEDICINE

## 2023-10-28 PROCEDURE — 700102 HCHG RX REV CODE 250 W/ 637 OVERRIDE(OP): Performed by: STUDENT IN AN ORGANIZED HEALTH CARE EDUCATION/TRAINING PROGRAM

## 2023-10-28 PROCEDURE — A9270 NON-COVERED ITEM OR SERVICE: HCPCS | Performed by: STUDENT IN AN ORGANIZED HEALTH CARE EDUCATION/TRAINING PROGRAM

## 2023-10-28 PROCEDURE — 82962 GLUCOSE BLOOD TEST: CPT | Mod: 91

## 2023-10-28 PROCEDURE — 80053 COMPREHEN METABOLIC PANEL: CPT

## 2023-10-28 PROCEDURE — 700102 HCHG RX REV CODE 250 W/ 637 OVERRIDE(OP): Performed by: NURSE PRACTITIONER

## 2023-10-28 PROCEDURE — 99232 SBSQ HOSP IP/OBS MODERATE 35: CPT | Performed by: STUDENT IN AN ORGANIZED HEALTH CARE EDUCATION/TRAINING PROGRAM

## 2023-10-28 PROCEDURE — 85025 COMPLETE CBC W/AUTO DIFF WBC: CPT

## 2023-10-28 PROCEDURE — 83735 ASSAY OF MAGNESIUM: CPT

## 2023-10-28 PROCEDURE — A9270 NON-COVERED ITEM OR SERVICE: HCPCS | Performed by: INTERNAL MEDICINE

## 2023-10-28 PROCEDURE — 770001 HCHG ROOM/CARE - MED/SURG/GYN PRIV*

## 2023-10-28 PROCEDURE — 36415 COLL VENOUS BLD VENIPUNCTURE: CPT

## 2023-10-28 PROCEDURE — 83880 ASSAY OF NATRIURETIC PEPTIDE: CPT

## 2023-10-28 PROCEDURE — A9270 NON-COVERED ITEM OR SERVICE: HCPCS | Performed by: NURSE PRACTITIONER

## 2023-10-28 RX ORDER — MODAFINIL 100 MG/1
100 TABLET ORAL EVERY MORNING
Status: DISCONTINUED | OUTPATIENT
Start: 2023-10-29 | End: 2023-10-29

## 2023-10-28 RX ORDER — MAGNESIUM SULFATE HEPTAHYDRATE 40 MG/ML
4 INJECTION, SOLUTION INTRAVENOUS ONCE
Status: DISCONTINUED | OUTPATIENT
Start: 2023-10-28 | End: 2023-10-28

## 2023-10-28 RX ORDER — GAUZE BANDAGE 2" X 2"
100 BANDAGE TOPICAL DAILY
Status: DISCONTINUED | OUTPATIENT
Start: 2023-10-28 | End: 2023-10-30 | Stop reason: HOSPADM

## 2023-10-28 RX ORDER — QUETIAPINE FUMARATE 25 MG/1
50 TABLET, FILM COATED ORAL EVERY EVENING
Status: DISCONTINUED | OUTPATIENT
Start: 2023-10-28 | End: 2023-10-29

## 2023-10-28 RX ORDER — LANSOPRAZOLE 30 MG/1
30 TABLET, ORALLY DISINTEGRATING, DELAYED RELEASE ORAL 2 TIMES DAILY
Status: DISCONTINUED | OUTPATIENT
Start: 2023-10-29 | End: 2023-10-29

## 2023-10-28 RX ORDER — FOLIC ACID 1 MG/1
1 TABLET ORAL DAILY
Status: DISCONTINUED | OUTPATIENT
Start: 2023-10-28 | End: 2023-10-28

## 2023-10-28 RX ORDER — FOLIC ACID 1 MG/1
1 TABLET ORAL DAILY
Status: DISCONTINUED | OUTPATIENT
Start: 2023-10-29 | End: 2023-10-29

## 2023-10-28 RX ADMIN — THERA TABS 1 TABLET: TAB at 16:44

## 2023-10-28 RX ADMIN — METFORMIN HYDROCHLORIDE 850 MG: 850 TABLET ORAL at 16:44

## 2023-10-28 RX ADMIN — DICLOFENAC SODIUM 2 G: 10 GEL TOPICAL at 04:17

## 2023-10-28 RX ADMIN — Medication 400 MG: at 04:18

## 2023-10-28 RX ADMIN — OMEPRAZOLE 20 MG: 20 CAPSULE, DELAYED RELEASE ORAL at 16:44

## 2023-10-28 RX ADMIN — OMEPRAZOLE 20 MG: 20 CAPSULE, DELAYED RELEASE ORAL at 04:18

## 2023-10-28 RX ADMIN — QUETIAPINE FUMARATE 50 MG: 25 TABLET ORAL at 17:54

## 2023-10-28 RX ADMIN — FOLIC ACID 1 MG: 1 TABLET ORAL at 16:44

## 2023-10-28 RX ADMIN — DICLOFENAC SODIUM 2 G: 10 GEL TOPICAL at 17:54

## 2023-10-28 RX ADMIN — INSULIN LISPRO 1 UNITS: 100 INJECTION, SOLUTION INTRAVENOUS; SUBCUTANEOUS at 12:56

## 2023-10-28 RX ADMIN — MODAFINIL 100 MG: 100 TABLET ORAL at 05:17

## 2023-10-28 RX ADMIN — HYDRALAZINE HYDROCHLORIDE 25 MG: 25 TABLET, FILM COATED ORAL at 20:39

## 2023-10-28 RX ADMIN — METFORMIN HYDROCHLORIDE 500 MG: 500 TABLET ORAL at 08:41

## 2023-10-28 RX ADMIN — INSULIN LISPRO 1 UNITS: 100 INJECTION, SOLUTION INTRAVENOUS; SUBCUTANEOUS at 17:57

## 2023-10-28 RX ADMIN — ALLOPURINOL 300 MG: 300 TABLET ORAL at 04:18

## 2023-10-28 RX ADMIN — Medication 100 MG: at 16:44

## 2023-10-28 RX ADMIN — ACETAMINOPHEN 650 MG: 325 TABLET, FILM COATED ORAL at 02:12

## 2023-10-28 RX ADMIN — ATORVASTATIN CALCIUM 40 MG: 40 TABLET, FILM COATED ORAL at 16:44

## 2023-10-28 RX ADMIN — DICLOFENAC SODIUM 2 G: 10 GEL TOPICAL at 12:59

## 2023-10-28 RX ADMIN — DOCUSATE SODIUM 50 MG AND SENNOSIDES 8.6 MG 2 TABLET: 8.6; 5 TABLET, FILM COATED ORAL at 04:18

## 2023-10-28 RX ADMIN — POLYETHYLENE GLYCOL 3350 1 PACKET: 17 POWDER, FOR SOLUTION ORAL at 04:17

## 2023-10-28 ASSESSMENT — PAIN DESCRIPTION - PAIN TYPE
TYPE: ACUTE PAIN

## 2023-10-28 NOTE — PROGRESS NOTES
Patient increasingly agitated and restless. Multiple attempts to climb over side rail. Cleaned and repositioned and given PO Tylenol for pain.    0330 - patient finally resting and sleeping. Turn q2 and provide incont care when needed. Tele sitter at bedside for safety.

## 2023-10-28 NOTE — PROGRESS NOTES
[facility] Medicine Daily Progress Note    Date of Service  10/28/2023    Chief Complaint  Sharon Guardado is a 68 y.o. male admitted 9/24/2023 with altered mental status and fall.    Hospital Course  Mr. Guardado is a 68-year-old male with a past medical history of alcohol abuse, hypertension, obesity who presented with altered mental status and a ground-level fall on 9/24/2023.  On admission he was noted to have a small frontal subarachnoid hemorrhage on CT head.  He was also noted to have severe sepsis secondary to UTI.  She was treated with IV ceftriaxone.  Patient developed symptoms of alcohol withdrawal and was intubated.  Patient was subsequently extubated and transferred to telemetry on 9/30.  The patient subsequently had worsening mental status, on 9/30 found to have a left frontal lobe hemorrhage with surrounding vasogenic edema with mild mass effect, deemed nonoperative by neurosurgery  Was readmitted to the ICU level of care, intubated, treated with hypertonic saline, empirically treated with Unasyn for fever, overall state on mechanical ventilation for 9 days, during the course there was a concern for possible abdominal infection, gallbladder infection, surgery was consulted,  HIDA scan was obtained and was negative for acute cholecystitis, surgery not indicated as per general surgery opinion.  He has flaccid right arm and severe weakness right leg with expressive aphasia.  On 10/10 patient again found appropriate to transfer out of ICU.  Due to dysphagia, he had been NPO and due to scheduling issues there were delays. A G tube was placed by Dr. Toussaint surgery on 10/19. On 10/21 his blood pressure dropped and he was transferred to the [facility] for IV fluids and IV pressors.   10/22: On IV pressors for ~4 hours on the 21st. Na up to 150, free water flushes increased.   10/23  Afebrile today, HR 71-89, RR 16-18, SpO2 92-93% on room air    Interval Problem Update  10/24/23  Blood pressure is remaining  stable.  Patient is lethargic today very sleepy.  Moving all extremities to command.  Awaiting acceptance to long-term care skilled nursing facility.  I have ordered laboratory studies for tomorrow for his recently high sodium levels.    10/25/23  Blood pressure remaining stable.  Stable right hemiplegia.  Continues to remain very lethargic not following any commands.  Magnesium low 1.7 being replaced orally.  Skilled nursing facilities expanded to [place].  Looking for long-term care skilled nursing facility.    10/26/23  Per discussion with the occupational therapist, concerned that the patient is feeling depressed.  He is more interactive today.  Occasionally following commands.  Started on modafinil 50 mg daily.  Memantine discontinued.  Hydralazine 25 mg as needed for SBP greater than 160.  He does not have Medicaid therefore does not qualify for long-term care skilled nursing facility.  Looking at other options such as group home with case management.  Repeat head CT ordered today due to persistent encephalopathy in setting of recent intracranial hemorrhage.    10/27/23  Patient is appearing more alert today.  He is wiggling his toes in the bilateral extremities.  He continues to be nonverbal.  Continuing modafinil 50 mg daily.  Underwent FEES.  Diet being advanced.  Insulin adjusted as to with meals.  Tolerating long-acting Lantus 40 units daily.  Discontinuing Lantus and starting metformin.  Titrate up for blood glucose control.    10/28/23  Patient is very sleepy this morning.  Continues to be nonverbal.  Not following commands this morning.  Continuing on modafinil 100 mg daily.  Tolerating diet.  Blood glucose increasing to 197.  Increasing metformin to 850 twice a day.  Continues on lispro insulin sign scale.  Starting multivitamin, folic acid, and thiamine supplementation.  Magnesium still low at 1.7.  Continuing on magnesium oxide.        I have discussed this patient's plan of care and discharge  plan at IDT rounds today with Case Management, Nursing, Nursing leadership, and other members of the IDT team.    Consultants/Specialty  Neurology  Neurosurgery  Critical care    Code Status  DNAR/DNI    Disposition  The patient is not medically cleared for discharge to home or a post-acute facility.  Anticipate discharge to: skilled nursing facility    I have placed the appropriate orders for post-discharge needs.    Review of Systems  Review of Systems   Unable to perform ROS: Patient nonverbal        Physical Exam  Temp:  [36.3 °C (97.3 °F)-36.9 °C (98.5 °F)] 36.3 °C (97.3 °F)  Pulse:  [68-92] 68  Resp:  [17-18] 17  BP: (124-158)/(75-91) 141/91  SpO2:  [94 %-96 %] 95 %    Physical Exam  Vitals and nursing note reviewed.   Constitutional:       General: He is sleeping. He is not in acute distress.     Appearance: He is obese. He is not ill-appearing.   HENT:      Head: Normocephalic and atraumatic.   Eyes:      General:         Right eye: No discharge.         Left eye: No discharge.      Conjunctiva/sclera: Conjunctivae normal.   Cardiovascular:      Rate and Rhythm: Normal rate and regular rhythm.      Pulses: Normal pulses.      Heart sounds: Normal heart sounds. No murmur heard.  Pulmonary:      Effort: Pulmonary effort is normal.      Breath sounds: Normal breath sounds.   Abdominal:      General: Bowel sounds are normal. There is no distension.      Tenderness: There is no abdominal tenderness.      Comments: G tube with abdominal binder in place   Genitourinary:     Comments: Condom cath  Musculoskeletal:      Cervical back: Neck supple. No tenderness.      Right lower leg: No edema.      Left lower leg: No edema.   Skin:     General: Skin is warm and dry.   Neurological:      Mental Status: He is lethargic.      Motor: Weakness present.      Comments: Remains nonverbal.  Difficult to arouse this morning.  He is moving all extremities.   Psychiatric:         Mood and Affect: Affect is blunt and flat.          Cognition and Memory: Cognition is impaired.      Comments: Unable to assess         Fluids    Intake/Output Summary (Last 24 hours) at 10/28/2023 1534  Last data filed at 10/28/2023 0500  Gross per 24 hour   Intake 700 ml   Output no documentation   Net 700 ml         Laboratory  Recent Labs     10/28/23  0250   WBC 8.9   RBC 5.16   HEMOGLOBIN 15.5   HEMATOCRIT 45.4   MCV 88.0   MCH 30.0   MCHC 34.1   RDW 41.1   PLATELETCT 108*   MPV 12.7       Recent Labs     10/26/23  0212 10/28/23  0250   SODIUM 143 140   POTASSIUM 4.4 4.1   CHLORIDE 108 105   CO2 28 24   GLUCOSE 180* 151*   BUN 14 12   CREATININE 0.75 0.72   CALCIUM 9.6 9.6                     Imaging  CT-HEAD W/O   Final Result      1.  Significant interval improvement in recent large left frontal intraparenchymal hemorrhage.      2.  Age-related cerebral atrophy.         US-EXTREMITY VENOUS UPPER UNILAT RIGHT   Final Result      DX-ABDOMEN FOR TUBE PLACEMENT   Final Result      Nasogastric tube tip overlies the duodenal bulb      DX-ABDOMEN FOR TUBE PLACEMENT   Final Result      1.  Enteric tube tip again projects over the 2nd portion of the duodenum.      DX-ABDOMEN FOR TUBE PLACEMENT   Final Result      Enteric tube tip projects over the second portion of the duodenum      DX-CHEST-PORTABLE (1 VIEW)   Final Result      Unchanged bibasilar underinflation atelectasis      DX-ABDOMEN FOR TUBE PLACEMENT   Final Result      Enteric tube tip projects over the gastric antrum or proximal duodenum      DX-CHEST-PORTABLE (1 VIEW)   Final Result      Improving bibasilar pulmonary opacities.      DX-CHEST-PORTABLE (1 VIEW)   Final Result         Findings on chest radiograph appear stable since the prior radiograph.  No new abnormalities are identified.      NM-HEPATOBILIARY SCAN   Final Result      There is visualization of the gallbladder after morphine administration, indicating the cystic duct is still patent. No scintigraphy evidence of acute cholecystitis.       DX-CHEST-PORTABLE (1 VIEW)   Final Result         1.  Mild pulmonary edema and/or infiltrates.   2.  Cardiomegaly   3.  Atherosclerosis      DX-CHEST-PORTABLE (1 VIEW)   Final Result         1.  No acute cardiopulmonary disease.   2.  Cardiomegaly   3.  Atherosclerosis      US-RUQ   Final Result      1.  Minimal gallbladder wall thickening and possible sludge.  Cholecystitis is not excluded.   2.  Minimal pericholecystic fluid may be due to gallbladder inflammation or liver disease.   3.  Enlarged echogenic liver suggesting fatty infiltration.   4.  Limited evaluation of the pancreas abdominal aorta.      DX-CHEST-PORTABLE (1 VIEW)   Final Result         1.  Pulmonary edema and/or infiltrates are identified, which are stable since the prior exam.   2.  Cardiomegaly   3.  Atherosclerosis      US-EXTREMITY VENOUS UPPER UNILAT RIGHT   Final Result      DX-CHEST-PORTABLE (1 VIEW)   Final Result         1.  Pulmonary edema and/or infiltrates are identified, which are stable since the prior exam.   2.  Cardiomegaly   3.  Atherosclerosis      DX-CHEST-PORTABLE (1 VIEW)   Final Result         1.  Pulmonary edema and/or infiltrates are identified, which are stable since the prior exam.   2.  Cardiomegaly   3.  Atherosclerosis      DX-CHEST-PORTABLE (1 VIEW)   Final Result         1.  Pulmonary edema and/or infiltrates are identified, which are somewhat decreased since the prior exam.   2.  Cardiomegaly   3.  Atherosclerosis      MR-MRA HEAD-W/O   Final Result         Normal intracranial MRA.      MR-BRAIN-WITH & W/O   Final Result         Left frontal lobe hemorrhage with surrounding vasogenic edema and mild mass effect upon the frontal horn of the left lateral ventricle. There is approximately 2 mm of midline shift to the right.      Age-related volume loss and chronic microvascular ischemic changes.      No abnormal intracranial enhancement is identified.      CT-HEAD W/O   Final Result         1. Stable 6 cm left  frontal intraparenchymal hemorrhage with mild, 2 mm left-to-right subfalcine herniation.   2. No new abnormality or change.         DX-CHEST-PORTABLE (1 VIEW)   Final Result         1.  Pulmonary edema and/or infiltrates are identified, which appear somewhat increased since the prior exam.   2.  Cardiomegaly   3.  Atherosclerosis      DX-ABDOMEN FOR TUBE PLACEMENT   Final Result      NG tube extends below the diaphragm and appears to extend through the region of the stomach with tip at the level of the proximal duodenum.      DX-CHEST-PORTABLE (1 VIEW)   Final Result      1.  Grossly satisfactory appearance of tubes and lines.   2.  No evidence of pneumothorax.   3.  Marked hypoinflation with probable bibasilar atelectasis.   4.  Enlarged cardiac silhouette with vascular congestion.      CT-HEAD W/O   Final Result         1.  New large parenchymal hemorrhage involving the frontal lobe is identified with some surrounding edema.      2.  Subarachnoid hemorrhage is also noted in the area.      3.  Minimal midline shift to the right side.      Findings are consistent with atrophy.  Decreased attenuation in the periventricular white matter likely indicates microvascular ischemic disease.      Based solely on CT findings, the brain injury guideline category is mBIG 3.      EDH   IVH   Displaced skull fx   SDH > 8mm   IPH > 8mm or multiple   SAH bi-hemispheric or > 3mm      The original BIG retrospective analysis found radiographic progression in 0% of BIG 1 patients and 2.6% BIG 2.      These findings were discussed with GEETA MOLINA on 09/30/2023.      DX-CHEST-PORTABLE (1 VIEW)   Final Result      Stable multiple pulmonary vascular dilation/congestion. Otherwise, negative.      DX-CHEST-PORTABLE (1 VIEW)   Final Result      1.  Increasing airspace disease at the left base.      DX-CHEST-LIMITED (1 VIEW)   Final Result         1.  Pulmonary edema and/or infiltrates are identified, which are somewhat decreased since the  prior exam.   2.  Cardiomegaly   3.  Atherosclerosis      DX-CHEST-PORTABLE (1 VIEW)   Final Result         1.  Pulmonary edema and/or infiltrates are identified, which appear somewhat increased since the prior exam.   2.  Cardiomegaly   3.  Atherosclerosis      DX-CHEST-PORTABLE (1 VIEW)   Final Result         1.  Interstitial edema and/or infiltrates, slightly decreased since prior study.   2.  Cardiomegaly   3.  Atherosclerosis      DX-CHEST-LIMITED (1 VIEW)   Final Result      Right IJ catheter tip projects in the SVC. No pneumothorax.      CT-CTA NECK WITH & W/O-POST PROCESSING   Final Result      1.  Estimated 50-75% stenosis of the BILATERAL carotid arteries   2.  Estimated greater than 50% stenosis at the vertebral artery ostium on each side      CT-CTA HEAD WITH & W/O-POST PROCESS   Final Result      1.  No large vessel occlusion or aneurysm.   2.  Hazy left frontal lobe subarachnoid hemorrhage, decreased in conspicuity from prior. No new hemorrhage seen.   3.  Scattered opacifications of the ethmoid sinuses, likely related to support hardware.      DX-ABDOMEN FOR TUBE PLACEMENT   Final Result         1.  Nonspecific bowel gas pattern in the upper abdomen.   2.  Dobbhoff tube with tip terminating overlying the expected location of the first or second duodenal segment.      DX-CHEST-PORTABLE (1 VIEW)   Final Result         1.  Interstitial edema and/or infiltrates.   2.  Cardiomegaly   3.  Nasogastric tube terminates in the distal esophagus, recommend advancement   4.  Atherosclerosis      DX-ABDOMEN FOR TUBE PLACEMENT   Final Result         1.  Nonspecific bowel gas pattern in the upper abdomen.   2.  Nasogastric tube terminates just distal to the gastroesophageal junction, recommend advancement      CT-RENAL COLIC EVALUATION(A/P W/O)   Final Result         1.  Intrahepatic biliary air and nondependent air within the gallbladder, consider history of instrumentation or changes of cholangitis in the  appropriate clinical setting.   2.  Large fat-containing bilateral inguinal hernias   3.  Enlarged prostate, consider causes of prostate enlargement with additional workup as clinically appropriate   4.  Diverticulosis   5.  Cholelithiasis   6.  Atherosclerosis      CT-HEAD W/O   Final Result         1.  Hazy left frontal subarachnoid hemorrhage.   2.  Nonspecific white matter changes, commonly associated with small vessel ischemic disease.  Associated mild cerebral atrophy is noted.      Based solely on CT findings, the brain injury guideline category is mBIG 1.      SDH < 4mm   IPH < 4mm   SAH < 3 sulci and < 1mm      The original BIG retrospective analysis found radiographic progression in 0% of BIG 1 patients and 2.6% BIG 2.      These findings were discussed with the patient's clinician, Madhu Zambrano, on 9/24/2023 10:10 PM.      DX-CHEST-PORTABLE (1 VIEW)   Final Result      No acute cardiopulmonary abnormality identified.           Assessment/Plan  * Intraparenchymal hemorrhage of brain (HCC)- (present on admission)  Assessment & Plan  CT on 9/24 with hazy left frontal SAH  CT on 9/30 with new large IPH in the left frontal lobe which neurosurgery was consulted and it is nonoperable  With right sided hemiparesis and expressive aphasia  He may benefit from skilled nursing facility but certainly will require 24-hour care upon discharge.    10/24/23  Patient is awaiting acceptance to long-term care skilled nursing facility.    10/25/23  Skilled nursing facility expanded to [place].    10/27/23  Pending acceptance to skilled nursing facility  Case management also working on group home.  QuantiFeron Gold ordered.    10/28/23  Pending acceptance to skilled nursing facility.  Versus group home.    Right hemiplegia (HCC)- (present on admission)  Assessment & Plan  Secondary to intracranial hemorrhage.  Stable.  Pressure ulcer precautions.    [place] coma scale total score 9-12- (present on admission)  Assessment  & Plan  10/25/23  Continues to remain difficult to arouse.  Protecting his airway.  Withdrawing to pain.    10/27/23  Patient is more alert today.  He is following commands in the bilateral lower extremities wiggling his toes to command.  Continue modafinil 50 mg daily.  Increase to 100 mg tomorrow morning.    10/28/23  Patient sleepy today.  Continue modafinil 100 mg daily.    Hypernatremia  Assessment & Plan  Na has gone up to 150  Increase free water via G-tube flushes to 250 mL 4 times a day and check a basic metabolic panel in the morning    10/24/23  Repeat sodium levels ordered.  Increase free water flushes to 300 mL every 6 hours.    10/25/23  Resolved with sodium of 144.   Decreasing free water flushes to 200 mL every 6 hours.    10/27/23  Resolved.  Continue free water flushes 200 mL every 6 hours.    Acute respiratory failure with hypoxia (HCC)- (present on admission)  Assessment & Plan  Intubated 9/25-9/28  Reintubated 9/30-10/7    10/27/23  Resolved.  Continues to be on room air.    Type 2 diabetes mellitus (HCC)- (present on admission)  Assessment & Plan  A1c 5.7% (6/2023)  Lantus insulin to 50 units in the evening with sliding scale throughout the day every 6 hours with tube feeds    10/24/23  Patient becoming hypoglycemic with blood glucose in the 90s.  Decrease glargine insulin to 50 units in the evenings.  Continue regular insulin sign scale.    10/27/23  Discontinue glargine insulin since patient may go to group home.  Start metformin 850 mg twice a day.  Change regular insulin to lispro insulin 3 times a day with meals.  Hypoglycemia protocol      Acute encephalopathy  Assessment & Plan  10/26/23  Patient is appearing more awake but depressed.  Starting modafinil 50 mg daily. Discontinue memantine.    10/27/23  Appears to be improving.  More alert today.  Following commands in the bilateral extremities wiggling his toes.  Continue modafinil 50 mg daily       10/28/23  Appears very sleepy this  "morning.  Continue modafinil 100 mg daily in the mornings.   Start Seroquel 50 mg in the evenings to restore circadian rhythm.    LE (acute kidney injury), prerenal (CMS-[facility])  Assessment & Plan  Secondary due to dehydration, creatinine has normalized now with IV fluids    10/24/23  I have ordered repeat lab studies for tomorrow    Shock ([facility])  Assessment & Plan  On 10/21/2023 patient developed severe hypotension and shock for which she was transferred to the IMCU placed on IV fluids and IV pressors.  Cultures were negative this was consistent with hypovolemic shock which has now resolved.    Bradycardia  Assessment & Plan  This has resolved    ACP (advance care planning)  Assessment & Plan  From Dr. Field on 10/21:  I discussed advance care planning with the patient and family for at least 35 minutes, including diagnosis, prognosis, plan of care, risks and benefits of any therapies that could be offered, as well as alternatives including palliation and hospice, as appropriate.  DNR/I per patient's wife wishes, patient has gone from full code to dnr back to full code, now DNR/I. She does not want him to be attached to more machines. I explained he is high risk for developing further brain injuries following a code and he would likely not make it through and it would serve to prolong his suffering. She recognizes that and wants what is best for Sharon. She recognizes he has been in pain for \"a long time\" and does not want to see him suffer needlessly.       Atelectasis- (present on admission)  Assessment & Plan  Patient unable to follow commands for incentive spirometry    Pneumonia due to infectious organism- (present on admission)  Assessment & Plan  Continue Zosyn    Thrombocytopenia (HCC)- (present on admission)  Assessment & Plan  This resolved    Pneumobilia- (present on admission)  Assessment & Plan  As seen on CT renal (9/25/23).    9/26/23: No need for further interventions at this time per gen " surg    Primary hypertension- (present on admission)  Assessment & Plan  Hypertensive at presentation   Goal for normotension currently multimodality treatment      BMI 40.0-44.9, adult (HCC)- (present on admission)  Assessment & Plan  Tube feeding    Alcohol dependence (HCC)- (present on admission)  Assessment & Plan  Multivitamin, folate, thiamine  Monitor, additional therapies as clinically indicated  9/29/2023 patient was at least 2-3 hard liquor drinks daily although this is unclear per patient and his wife's history.  .    Patient s/p treatment for acute alcohol withdrawal         VTE prophylaxis:   SCDs/TEDs   pharmacologic prophylaxis contraindicated due to Intracranial hemorrhage      I have performed a physical exam and reviewed and updated ROS and Plan today (10/28/2023). In review of yesterday's note (10/27/2023), there are no changes except as documented above.

## 2023-10-28 NOTE — CARE PLAN
The patient is Watcher - Medium risk of patient condition declining or worsening    Shift Goals  Clinical Goals: monitor for neuro changes, mobility, nutrition, skin integrity  Patient Goals: DEENA  Family Goals: DEENA    Progress made toward(s) clinical / shift goals:      Problem: Hemodynamics  Goal: Patient's hemodynamics, fluid balance and neurologic status will be stable or improve  Outcome: Progressing       Patient is not progressing towards the following goals:      Problem: Knowledge Deficit - Standard  Goal: Patient and family/care givers will demonstrate understanding of plan of care, disease process/condition, diagnostic tests and medications  Outcome: Not Met     Continue to provide patient education    Problem: Psychosocial  Goal: Patient's level of anxiety will decrease  Outcome: Not Met     Redirect and reassure.    Problem: Mobility - Stroke  Goal: Patient's capacity to carry out activities will improve  Outcome: Not Met     Turn q2    Problem: Self Care  Goal: Patient will have the ability to perform ADLs independently or with assistance (bathe, groom, dress, toilet and feed)  Outcome: Not Met     Remains total care

## 2023-10-29 LAB
GLUCOSE BLD STRIP.AUTO-MCNC: 180 MG/DL (ref 65–99)
GLUCOSE BLD STRIP.AUTO-MCNC: 207 MG/DL (ref 65–99)
GLUCOSE BLD STRIP.AUTO-MCNC: 218 MG/DL (ref 65–99)
NT-PROBNP SERPL IA-MCNC: 42 PG/ML (ref 0–125)

## 2023-10-29 PROCEDURE — 770001 HCHG ROOM/CARE - MED/SURG/GYN PRIV*

## 2023-10-29 PROCEDURE — A9270 NON-COVERED ITEM OR SERVICE: HCPCS | Performed by: STUDENT IN AN ORGANIZED HEALTH CARE EDUCATION/TRAINING PROGRAM

## 2023-10-29 PROCEDURE — 99232 SBSQ HOSP IP/OBS MODERATE 35: CPT | Performed by: STUDENT IN AN ORGANIZED HEALTH CARE EDUCATION/TRAINING PROGRAM

## 2023-10-29 PROCEDURE — 700102 HCHG RX REV CODE 250 W/ 637 OVERRIDE(OP): Performed by: STUDENT IN AN ORGANIZED HEALTH CARE EDUCATION/TRAINING PROGRAM

## 2023-10-29 PROCEDURE — 82962 GLUCOSE BLOOD TEST: CPT | Mod: 91

## 2023-10-29 RX ORDER — ALLOPURINOL 300 MG/1
300 TABLET ORAL DAILY
Status: DISCONTINUED | OUTPATIENT
Start: 2023-10-30 | End: 2023-10-30 | Stop reason: HOSPADM

## 2023-10-29 RX ORDER — FUROSEMIDE 20 MG/1
20 TABLET ORAL DAILY
Status: DISCONTINUED | OUTPATIENT
Start: 2023-10-29 | End: 2023-10-29

## 2023-10-29 RX ORDER — LANOLIN ALCOHOL/MO/W.PET/CERES
400 CREAM (GRAM) TOPICAL DAILY
Status: DISCONTINUED | OUTPATIENT
Start: 2023-10-30 | End: 2023-10-30 | Stop reason: HOSPADM

## 2023-10-29 RX ORDER — POTASSIUM CHLORIDE 20 MEQ/1
10 TABLET, EXTENDED RELEASE ORAL DAILY
Status: DISCONTINUED | OUTPATIENT
Start: 2023-10-29 | End: 2023-10-30 | Stop reason: HOSPADM

## 2023-10-29 RX ORDER — FOLIC ACID 1 MG/1
1 TABLET ORAL DAILY
Status: DISCONTINUED | OUTPATIENT
Start: 2023-10-30 | End: 2023-10-30 | Stop reason: HOSPADM

## 2023-10-29 RX ORDER — QUETIAPINE FUMARATE 25 MG/1
50 TABLET, FILM COATED ORAL EVERY EVENING
Status: DISCONTINUED | OUTPATIENT
Start: 2023-10-29 | End: 2023-10-30 | Stop reason: HOSPADM

## 2023-10-29 RX ORDER — BISACODYL 10 MG
10 SUPPOSITORY, RECTAL RECTAL
Status: DISCONTINUED | OUTPATIENT
Start: 2023-10-29 | End: 2023-10-30 | Stop reason: HOSPADM

## 2023-10-29 RX ORDER — ATORVASTATIN CALCIUM 40 MG/1
40 TABLET, FILM COATED ORAL EVERY EVENING
Status: DISCONTINUED | OUTPATIENT
Start: 2023-10-29 | End: 2023-10-30 | Stop reason: HOSPADM

## 2023-10-29 RX ORDER — AMOXICILLIN 250 MG
2 CAPSULE ORAL 2 TIMES DAILY
Status: DISCONTINUED | OUTPATIENT
Start: 2023-10-29 | End: 2023-10-30 | Stop reason: HOSPADM

## 2023-10-29 RX ORDER — ACETAMINOPHEN 325 MG/1
650 TABLET ORAL EVERY 4 HOURS PRN
Status: DISCONTINUED | OUTPATIENT
Start: 2023-10-29 | End: 2023-10-30 | Stop reason: HOSPADM

## 2023-10-29 RX ORDER — OMEPRAZOLE 20 MG/1
20 CAPSULE, DELAYED RELEASE ORAL 2 TIMES DAILY
Status: DISCONTINUED | OUTPATIENT
Start: 2023-10-29 | End: 2023-10-30 | Stop reason: HOSPADM

## 2023-10-29 RX ORDER — MODAFINIL 100 MG/1
100 TABLET ORAL EVERY MORNING
Status: DISCONTINUED | OUTPATIENT
Start: 2023-10-30 | End: 2023-10-30 | Stop reason: HOSPADM

## 2023-10-29 RX ORDER — AMLODIPINE BESYLATE 5 MG/1
5 TABLET ORAL DAILY
Status: DISCONTINUED | OUTPATIENT
Start: 2023-10-29 | End: 2023-10-30 | Stop reason: HOSPADM

## 2023-10-29 RX ORDER — PIOGLITAZONEHYDROCHLORIDE 30 MG/1
15 TABLET ORAL DAILY
Status: DISCONTINUED | OUTPATIENT
Start: 2023-10-29 | End: 2023-10-29

## 2023-10-29 RX ORDER — HYDRALAZINE HYDROCHLORIDE 25 MG/1
25 TABLET, FILM COATED ORAL EVERY 4 HOURS PRN
Status: DISCONTINUED | OUTPATIENT
Start: 2023-10-29 | End: 2023-10-30 | Stop reason: HOSPADM

## 2023-10-29 RX ORDER — POLYETHYLENE GLYCOL 3350 17 G/17G
1 POWDER, FOR SOLUTION ORAL DAILY
Status: DISCONTINUED | OUTPATIENT
Start: 2023-10-30 | End: 2023-10-30 | Stop reason: HOSPADM

## 2023-10-29 RX ORDER — LOSARTAN POTASSIUM 50 MG/1
50 TABLET ORAL DAILY
Status: DISCONTINUED | OUTPATIENT
Start: 2023-10-29 | End: 2023-10-30 | Stop reason: HOSPADM

## 2023-10-29 RX ADMIN — DOCUSATE SODIUM 50 MG AND SENNOSIDES 8.6 MG 2 TABLET: 8.6; 5 TABLET, FILM COATED ORAL at 17:42

## 2023-10-29 RX ADMIN — LANSOPRAZOLE 30 MG: 30 TABLET, ORALLY DISINTEGRATING, DELAYED RELEASE ORAL at 06:09

## 2023-10-29 RX ADMIN — QUETIAPINE FUMARATE 50 MG: 25 TABLET ORAL at 17:42

## 2023-10-29 RX ADMIN — ATORVASTATIN CALCIUM 40 MG: 40 TABLET, FILM COATED ORAL at 17:42

## 2023-10-29 RX ADMIN — FUROSEMIDE 20 MG: 20 TABLET ORAL at 09:29

## 2023-10-29 RX ADMIN — METFORMIN HYDROCHLORIDE 850 MG: 850 TABLET ORAL at 09:28

## 2023-10-29 RX ADMIN — THERA TABS 1 TABLET: TAB at 06:09

## 2023-10-29 RX ADMIN — DICLOFENAC SODIUM 2 G: 10 GEL TOPICAL at 06:10

## 2023-10-29 RX ADMIN — METFORMIN HYDROCHLORIDE 1000 MG: 500 TABLET ORAL at 17:42

## 2023-10-29 RX ADMIN — Medication 100 MG: at 06:09

## 2023-10-29 RX ADMIN — LOSARTAN POTASSIUM 50 MG: 50 TABLET, FILM COATED ORAL at 09:29

## 2023-10-29 RX ADMIN — INSULIN LISPRO 2 UNITS: 100 INJECTION, SOLUTION INTRAVENOUS; SUBCUTANEOUS at 12:55

## 2023-10-29 RX ADMIN — OMEPRAZOLE 20 MG: 20 CAPSULE, DELAYED RELEASE ORAL at 17:42

## 2023-10-29 RX ADMIN — INSULIN LISPRO 1 UNITS: 100 INJECTION, SOLUTION INTRAVENOUS; SUBCUTANEOUS at 09:26

## 2023-10-29 RX ADMIN — FOLIC ACID 1 MG: 1 TABLET ORAL at 06:09

## 2023-10-29 RX ADMIN — MODAFINIL 100 MG: 100 TABLET ORAL at 06:09

## 2023-10-29 RX ADMIN — DICLOFENAC SODIUM 2 G: 10 GEL TOPICAL at 17:42

## 2023-10-29 RX ADMIN — INSULIN LISPRO 2 UNITS: 100 INJECTION, SOLUTION INTRAVENOUS; SUBCUTANEOUS at 17:29

## 2023-10-29 RX ADMIN — ALLOPURINOL 300 MG: 300 TABLET ORAL at 06:09

## 2023-10-29 RX ADMIN — DICLOFENAC SODIUM 2 G: 10 GEL TOPICAL at 12:57

## 2023-10-29 RX ADMIN — AMLODIPINE BESYLATE 5 MG: 5 TABLET ORAL at 09:29

## 2023-10-29 RX ADMIN — POTASSIUM CHLORIDE 10 MEQ: 1500 TABLET, EXTENDED RELEASE ORAL at 09:28

## 2023-10-29 RX ADMIN — Medication 400 MG: at 06:09

## 2023-10-29 ASSESSMENT — PAIN DESCRIPTION - PAIN TYPE
TYPE: ACUTE PAIN
TYPE: ACUTE PAIN

## 2023-10-29 NOTE — PROGRESS NOTES
Pharmacy Pharmacotherapy Consult for LOS >30 days    Admit Date: 9/24/2023      Medications were reviewed for appropriateness and ongoing need.     Current Facility-Administered Medications   Medication Dose Route Frequency Provider Last Rate Last Admin    metFORMIN (Glucophage) tablet 850 mg  850 mg Enteral Tube BID WITH MEALS Shaggy Pinto M.D.   850 mg at 10/28/23 1644    thiamine (Vitamin B-1) tablet 100 mg  100 mg Oral DAILY Shaggy Pinto M.D.   100 mg at 10/28/23 1644    QUEtiapine (SEROquel) tablet 50 mg  50 mg Enteral Tube Q EVENING Shaggy Pinto M.D.        [START ON 10/29/2023] multivitamin tablet 1 Tablet  1 Tablet Enteral Tube DAILY Shaggy Pinto M.D.        [START ON 10/29/2023] modafinil (Provigil) tablet 100 mg  100 mg Enteral Tube QAM Shaggy Pinto M.D.        [START ON 10/29/2023] folic acid (Folvite) tablet 1 mg  1 mg Enteral Tube DAILY Shaggy Pinto M.D.        [START ON 10/29/2023] lansoprazole (Prevacid) solutab 30 mg  30 mg Enteral Tube BID Shaggy Pinto M.D.        insulin lispro (HumaLOG,AdmeLOG) injection  1-6 Units Subcutaneous TID WITH MEALS Shaggy Pinto M.D.   1 Units at 10/28/23 1256    dextrose 10 % BOLUS 25 g  25 g Intravenous Q15 MIN PRN Shaggy Pinto M.D.        hydrALAZINE (Apresoline) tablet 25 mg  25 mg Enteral Tube Q4HRS PRN Shaggy Pinto M.D.        magnesium oxide tablet 400 mg  400 mg Enteral Tube DAILY Shaggy Pinto M.D.   400 mg at 10/28/23 0418    carboxymethylcellulose (Refresh Tears) 0.5 % ophthalmic drops 1 Drop  1 Drop Both Eyes PRN Shaggy Pinto M.D.   1 Drop at 10/24/23 1404    diclofenac sodium (Voltaren) 1 % gel 2 g  2 g Topical TID Shaggy Pinto M.D.   2 g at 10/28/23 1259    allopurinol (Zyloprim) tablet 300 mg  300 mg Enteral Tube DAILY Shaggy Pinto M.D.   300 mg at 10/28/23 0418    senna-docusate (Pericolace Or Senokot S) 8.6-50 MG per tablet 2 Tablet  2 Tablet Enteral Tube BID Shaggy Pinto M.D.   2 Tablet at 10/28/23 0418    And    polyethylene glycol/lytes (Miralax)  PACKET 1 Packet  1 Packet Enteral Tube DAILY Shaggy Pinto M.D.   1 Packet at 10/28/23 0417    And    magnesium hydroxide (Milk Of Magnesia) suspension 30 mL  30 mL Enteral Tube QDAY PRN Shaggy Pinto M.D.        And    bisacodyl (Dulcolax) suppository 10 mg  10 mg Rectal QDAY PRN Sahggy Pinto M.D.        Pharmacy Consult: Enteral tube insertion - review meds/change route/product selection  1 Each Other PHARMACY TO DOSE Shaggy Pinto M.D.        Respiratory Therapy Consult   Nebulization Continuous RT Shaggy Pinto M.D.        acetaminophen (Tylenol) tablet 650 mg  650 mg Enteral Tube Q4HRS PRN Shaggy Pinto M.D.   650 mg at 10/28/23 0212    atorvastatin (Lipitor) tablet 40 mg  40 mg Enteral Tube Q EVENING Shaggy Pinto M.D.   40 mg at 10/28/23 1644       Recommendations:  Recommend discontinuing ondansetron as has not used since 09/29.   Recommend discontinuing robitussin as not used since ordered 10/14.   Recommendations discussed with primary physician.     All other medications reviewed appropriate at this time.

## 2023-10-29 NOTE — CARE PLAN
The patient is Stable - Low risk of patient condition declining or worsening    Shift Goals  Clinical Goals: stable neuro checks, skin integrity  Patient Goals: DEENA  Family Goals: DEENA    Progress made toward(s) clinical / shift goals:  Neurological status has remained stable. Patient is often agitated when attempting to take vitals or roll the patient.     Patient is progressing towards the following goals:    Problem: Skin Integrity  Goal: Skin integrity is maintained or improved  Outcome: Progressing  Note: Patient is on a bariatric bed and turned every two hours to assist with maintaining skin integrity.      Problem: Fall Risk  Goal: Patient will remain free from falls  Outcome: Progressing  Note: Patient has a telesitter in the room that helps alert this RN of possible falls. Call light within reach and distraction methods being implemented to keep the patient in the bed.

## 2023-10-29 NOTE — PROGRESS NOTES
[facility] Medicine Daily Progress Note    Date of Service  10/29/2023    Chief Complaint  Sharon Guardado is a 68 y.o. male admitted 9/24/2023 with altered mental status and fall.    Hospital Course  Mr. Guardado is a 68-year-old male with a past medical history of alcohol abuse, hypertension, obesity who presented with altered mental status and a ground-level fall on 9/24/2023.  On admission he was noted to have a small frontal subarachnoid hemorrhage on CT head.  He was also noted to have severe sepsis secondary to UTI.  She was treated with IV ceftriaxone.  Patient developed symptoms of alcohol withdrawal and was intubated.  Patient was subsequently extubated and transferred to telemetry on 9/30.  The patient subsequently had worsening mental status, on 9/30 found to have a left frontal lobe hemorrhage with surrounding vasogenic edema with mild mass effect, deemed nonoperative by neurosurgery  Was readmitted to the ICU level of care, intubated, treated with hypertonic saline, empirically treated with Unasyn for fever, overall state on mechanical ventilation for 9 days, during the course there was a concern for possible abdominal infection, gallbladder infection, surgery was consulted,  HIDA scan was obtained and was negative for acute cholecystitis, surgery not indicated as per general surgery opinion.  He has flaccid right arm and severe weakness right leg with expressive aphasia.  On 10/10 patient again found appropriate to transfer out of ICU.  Due to dysphagia, he had been NPO and due to scheduling issues there were delays. A G tube was placed by Dr. Toussaint surgery on 10/19. On 10/21 his blood pressure dropped and he was transferred to the [facility] for IV fluids and IV pressors.   10/22: On IV pressors for ~4 hours on the 21st. Na up to 150, free water flushes increased.   10/23  Afebrile today, HR 71-89, RR 16-18, SpO2 92-93% on room air    Interval Problem Update  10/24/23  Blood pressure is remaining  stable.  Patient is lethargic today very sleepy.  Moving all extremities to command.  Awaiting acceptance to long-term care skilled nursing facility.  I have ordered laboratory studies for tomorrow for his recently high sodium levels.    10/25/23  Blood pressure remaining stable.  Stable right hemiplegia.  Continues to remain very lethargic not following any commands.  Magnesium low 1.7 being replaced orally.  Skilled nursing facilities expanded to [place].  Looking for long-term care skilled nursing facility.    10/26/23  Per discussion with the occupational therapist, concerned that the patient is feeling depressed.  He is more interactive today.  Occasionally following commands.  Started on modafinil 50 mg daily.  Memantine discontinued.  Hydralazine 25 mg as needed for SBP greater than 160.  He does not have Medicaid therefore does not qualify for long-term care skilled nursing facility.  Looking at other options such as group home with case management.  Repeat head CT ordered today due to persistent encephalopathy in setting of recent intracranial hemorrhage.    10/27/23  Patient is appearing more alert today.  He is wiggling his toes in the bilateral extremities.  He continues to be nonverbal.  Continuing modafinil 50 mg daily.  Underwent FEES.  Diet being advanced.  Insulin adjusted as to with meals.  Tolerating long-acting Lantus 40 units daily.  Discontinuing Lantus and starting metformin.  Titrate up for blood glucose control.    10/28/23  Patient is very sleepy this morning.  Continues to be nonverbal.  Not following commands this morning.  Continuing on modafinil 100 mg daily.  Tolerating diet.  Blood glucose increasing to 197.  Increasing metformin to 850 twice a day.  Continues on lispro insulin sign scale.  Starting multivitamin, folic acid, and thiamine supplementation.  Magnesium still low at 1.7.  Continuing on magnesium oxide.    10/29/23  Patient is more alert and awake this morning.   Currently on modafinil 100 mg daily.  He is wiggling his toes to command but not his upper extremities.  He remains nonverbal.  Resuming home amlodipine and losartan.  Given furosemide 20 mg by mouth once.  Continue hydralazine 25 mg every 4 hours for SBP greater than 163.  Tolerating metformin 850 mg twice a day.  Lispro insulin sign scale.  Increasing metformin to 1000 mg twice a day.        I have discussed this patient's plan of care and discharge plan at IDT rounds today with Case Management, Nursing, Nursing leadership, and other members of the IDT team.    Consultants/Specialty  Neurology  Neurosurgery  Critical care    Code Status  DNAR/DNI    Disposition  The patient is medically cleared for discharge to home or a post-acute facility.  Anticipate discharge to: skilled nursing facility    I have placed the appropriate orders for post-discharge needs.    Review of Systems  Review of Systems   Unable to perform ROS: Patient nonverbal        Physical Exam  Temp:  [36.5 °C (97.7 °F)-36.8 °C (98.2 °F)] 36.8 °C (98.2 °F)  Pulse:  [] 101  Resp:  [18-19] 18  BP: (107-185)/() 107/61  SpO2:  [93 %-95 %] 95 %    Physical Exam  Vitals and nursing note reviewed.   Constitutional:       General: He is sleeping. He is not in acute distress.     Appearance: He is obese. He is not ill-appearing.   HENT:      Head: Normocephalic and atraumatic.   Eyes:      General:         Right eye: No discharge.         Left eye: No discharge.      Conjunctiva/sclera: Conjunctivae normal.   Cardiovascular:      Rate and Rhythm: Normal rate and regular rhythm.      Pulses: Normal pulses.      Heart sounds: Normal heart sounds. No murmur heard.  Pulmonary:      Effort: Pulmonary effort is normal.      Breath sounds: Normal breath sounds.   Abdominal:      General: Bowel sounds are normal. There is no distension.      Tenderness: There is no abdominal tenderness.      Comments: G tube with abdominal binder in place    Genitourinary:     Comments: Condom cath  Musculoskeletal:      Cervical back: Neck supple. No tenderness.      Right lower leg: No edema.      Left lower leg: No edema.   Skin:     General: Skin is warm and dry.   Neurological:      Mental Status: He is lethargic.      Motor: Weakness present.      Comments: Remains nonverbal.  Difficult to arouse this morning.  He is moving all extremities.   Psychiatric:         Mood and Affect: Affect is blunt and flat.         Cognition and Memory: Cognition is impaired.      Comments: Unable to assess       Fluids    Intake/Output Summary (Last 24 hours) at 10/29/2023 1623  Last data filed at 10/29/2023 1100  Gross per 24 hour   Intake 640 ml   Output 600 ml   Net 40 ml         Laboratory  Recent Labs     10/28/23  0250   WBC 8.9   RBC 5.16   HEMOGLOBIN 15.5   HEMATOCRIT 45.4   MCV 88.0   MCH 30.0   MCHC 34.1   RDW 41.1   PLATELETCT 108*   MPV 12.7       Recent Labs     10/28/23  0250   SODIUM 140   POTASSIUM 4.1   CHLORIDE 105   CO2 24   GLUCOSE 151*   BUN 12   CREATININE 0.72   CALCIUM 9.6                     Imaging  CT-HEAD W/O   Final Result      1.  Significant interval improvement in recent large left frontal intraparenchymal hemorrhage.      2.  Age-related cerebral atrophy.         US-EXTREMITY VENOUS UPPER UNILAT RIGHT   Final Result      DX-ABDOMEN FOR TUBE PLACEMENT   Final Result      Nasogastric tube tip overlies the duodenal bulb      DX-ABDOMEN FOR TUBE PLACEMENT   Final Result      1.  Enteric tube tip again projects over the 2nd portion of the duodenum.      DX-ABDOMEN FOR TUBE PLACEMENT   Final Result      Enteric tube tip projects over the second portion of the duodenum      DX-CHEST-PORTABLE (1 VIEW)   Final Result      Unchanged bibasilar underinflation atelectasis      DX-ABDOMEN FOR TUBE PLACEMENT   Final Result      Enteric tube tip projects over the gastric antrum or proximal duodenum      DX-CHEST-PORTABLE (1 VIEW)   Final Result      Improving  bibasilar pulmonary opacities.      DX-CHEST-PORTABLE (1 VIEW)   Final Result         Findings on chest radiograph appear stable since the prior radiograph.  No new abnormalities are identified.      NM-HEPATOBILIARY SCAN   Final Result      There is visualization of the gallbladder after morphine administration, indicating the cystic duct is still patent. No scintigraphy evidence of acute cholecystitis.      DX-CHEST-PORTABLE (1 VIEW)   Final Result         1.  Mild pulmonary edema and/or infiltrates.   2.  Cardiomegaly   3.  Atherosclerosis      DX-CHEST-PORTABLE (1 VIEW)   Final Result         1.  No acute cardiopulmonary disease.   2.  Cardiomegaly   3.  Atherosclerosis      US-RUQ   Final Result      1.  Minimal gallbladder wall thickening and possible sludge.  Cholecystitis is not excluded.   2.  Minimal pericholecystic fluid may be due to gallbladder inflammation or liver disease.   3.  Enlarged echogenic liver suggesting fatty infiltration.   4.  Limited evaluation of the pancreas abdominal aorta.      DX-CHEST-PORTABLE (1 VIEW)   Final Result         1.  Pulmonary edema and/or infiltrates are identified, which are stable since the prior exam.   2.  Cardiomegaly   3.  Atherosclerosis      US-EXTREMITY VENOUS UPPER UNILAT RIGHT   Final Result      DX-CHEST-PORTABLE (1 VIEW)   Final Result         1.  Pulmonary edema and/or infiltrates are identified, which are stable since the prior exam.   2.  Cardiomegaly   3.  Atherosclerosis      DX-CHEST-PORTABLE (1 VIEW)   Final Result         1.  Pulmonary edema and/or infiltrates are identified, which are stable since the prior exam.   2.  Cardiomegaly   3.  Atherosclerosis      DX-CHEST-PORTABLE (1 VIEW)   Final Result         1.  Pulmonary edema and/or infiltrates are identified, which are somewhat decreased since the prior exam.   2.  Cardiomegaly   3.  Atherosclerosis      MR-MRA HEAD-W/O   Final Result         Normal intracranial MRA.      MR-BRAIN-WITH & W/O    Final Result         Left frontal lobe hemorrhage with surrounding vasogenic edema and mild mass effect upon the frontal horn of the left lateral ventricle. There is approximately 2 mm of midline shift to the right.      Age-related volume loss and chronic microvascular ischemic changes.      No abnormal intracranial enhancement is identified.      CT-HEAD W/O   Final Result         1. Stable 6 cm left frontal intraparenchymal hemorrhage with mild, 2 mm left-to-right subfalcine herniation.   2. No new abnormality or change.         DX-CHEST-PORTABLE (1 VIEW)   Final Result         1.  Pulmonary edema and/or infiltrates are identified, which appear somewhat increased since the prior exam.   2.  Cardiomegaly   3.  Atherosclerosis      DX-ABDOMEN FOR TUBE PLACEMENT   Final Result      NG tube extends below the diaphragm and appears to extend through the region of the stomach with tip at the level of the proximal duodenum.      DX-CHEST-PORTABLE (1 VIEW)   Final Result      1.  Grossly satisfactory appearance of tubes and lines.   2.  No evidence of pneumothorax.   3.  Marked hypoinflation with probable bibasilar atelectasis.   4.  Enlarged cardiac silhouette with vascular congestion.      CT-HEAD W/O   Final Result         1.  New large parenchymal hemorrhage involving the frontal lobe is identified with some surrounding edema.      2.  Subarachnoid hemorrhage is also noted in the area.      3.  Minimal midline shift to the right side.      Findings are consistent with atrophy.  Decreased attenuation in the periventricular white matter likely indicates microvascular ischemic disease.      Based solely on CT findings, the brain injury guideline category is mBIG 3.      EDH   IVH   Displaced skull fx   SDH > 8mm   IPH > 8mm or multiple   SAH bi-hemispheric or > 3mm      The original BIG retrospective analysis found radiographic progression in 0% of BIG 1 patients and 2.6% BIG 2.      These findings were discussed with  GEETA MOLINA on 09/30/2023.      DX-CHEST-PORTABLE (1 VIEW)   Final Result      Stable multiple pulmonary vascular dilation/congestion. Otherwise, negative.      DX-CHEST-PORTABLE (1 VIEW)   Final Result      1.  Increasing airspace disease at the left base.      DX-CHEST-LIMITED (1 VIEW)   Final Result         1.  Pulmonary edema and/or infiltrates are identified, which are somewhat decreased since the prior exam.   2.  Cardiomegaly   3.  Atherosclerosis      DX-CHEST-PORTABLE (1 VIEW)   Final Result         1.  Pulmonary edema and/or infiltrates are identified, which appear somewhat increased since the prior exam.   2.  Cardiomegaly   3.  Atherosclerosis      DX-CHEST-PORTABLE (1 VIEW)   Final Result         1.  Interstitial edema and/or infiltrates, slightly decreased since prior study.   2.  Cardiomegaly   3.  Atherosclerosis      DX-CHEST-LIMITED (1 VIEW)   Final Result      Right IJ catheter tip projects in the SVC. No pneumothorax.      CT-CTA NECK WITH & W/O-POST PROCESSING   Final Result      1.  Estimated 50-75% stenosis of the BILATERAL carotid arteries   2.  Estimated greater than 50% stenosis at the vertebral artery ostium on each side      CT-CTA HEAD WITH & W/O-POST PROCESS   Final Result      1.  No large vessel occlusion or aneurysm.   2.  Hazy left frontal lobe subarachnoid hemorrhage, decreased in conspicuity from prior. No new hemorrhage seen.   3.  Scattered opacifications of the ethmoid sinuses, likely related to support hardware.      DX-ABDOMEN FOR TUBE PLACEMENT   Final Result         1.  Nonspecific bowel gas pattern in the upper abdomen.   2.  Dobbhoff tube with tip terminating overlying the expected location of the first or second duodenal segment.      DX-CHEST-PORTABLE (1 VIEW)   Final Result         1.  Interstitial edema and/or infiltrates.   2.  Cardiomegaly   3.  Nasogastric tube terminates in the distal esophagus, recommend advancement   4.  Atherosclerosis      DX-ABDOMEN FOR TUBE  PLACEMENT   Final Result         1.  Nonspecific bowel gas pattern in the upper abdomen.   2.  Nasogastric tube terminates just distal to the gastroesophageal junction, recommend advancement      CT-RENAL COLIC EVALUATION(A/P W/O)   Final Result         1.  Intrahepatic biliary air and nondependent air within the gallbladder, consider history of instrumentation or changes of cholangitis in the appropriate clinical setting.   2.  Large fat-containing bilateral inguinal hernias   3.  Enlarged prostate, consider causes of prostate enlargement with additional workup as clinically appropriate   4.  Diverticulosis   5.  Cholelithiasis   6.  Atherosclerosis      CT-HEAD W/O   Final Result         1.  Hazy left frontal subarachnoid hemorrhage.   2.  Nonspecific white matter changes, commonly associated with small vessel ischemic disease.  Associated mild cerebral atrophy is noted.      Based solely on CT findings, the brain injury guideline category is mBIG 1.      SDH < 4mm   IPH < 4mm   SAH < 3 sulci and < 1mm      The original BIG retrospective analysis found radiographic progression in 0% of BIG 1 patients and 2.6% BIG 2.      These findings were discussed with the patient's clinician, Madhu Zambrano, on 9/24/2023 10:10 PM.      DX-CHEST-PORTABLE (1 VIEW)   Final Result      No acute cardiopulmonary abnormality identified.           Assessment/Plan  * Intraparenchymal hemorrhage of brain (HCC)- (present on admission)  Assessment & Plan  CT on 9/24 with hazy left frontal SAH  CT on 9/30 with new large IPH in the left frontal lobe which neurosurgery was consulted and it is nonoperable  With right sided hemiparesis and expressive aphasia  He may benefit from skilled nursing facility but certainly will require 24-hour care upon discharge.    10/24/23  Patient is awaiting acceptance to long-term care skilled nursing facility.    10/25/23  Skilled nursing facility expanded to [place].    10/27/23  Pending acceptance to  skilled nursing facility  Case management also working on group home.  QuantiFeron Gold ordered.    10/28/23  Pending acceptance to skilled nursing facility.  Versus group home.    10/29/23  Pending acceptance to skilled nursing facility versus group home.    Right hemiplegia (HCC)- (present on admission)  Assessment & Plan  Secondary to intracranial hemorrhage.  Stable.  Pressure ulcer precautions.    Ambrose coma scale total score 9-12- (present on admission)  Assessment & Plan  10/25/23  Continues to remain difficult to arouse.  Protecting his airway.  Withdrawing to pain.    10/27/23  Patient is more alert today.  He is following commands in the bilateral lower extremities wiggling his toes to command.  Continue modafinil 50 mg daily.  Increase to 100 mg tomorrow morning.    10/28/23  Patient sleepy today.  Continue modafinil 100 mg daily.    Hypernatremia  Assessment & Plan  Na has gone up to 150  Increase free water via G-tube flushes to 250 mL 4 times a day and check a basic metabolic panel in the morning    10/24/23  Repeat sodium levels ordered.  Increase free water flushes to 300 mL every 6 hours.    10/25/23  Resolved with sodium of 144.   Decreasing free water flushes to 200 mL every 6 hours.    10/27/23  Resolved.  Continue free water flushes 200 mL every 6 hours.    Acute respiratory failure with hypoxia (HCC)- (present on admission)  Assessment & Plan  Intubated 9/25-9/28  Reintubated 9/30-10/7    10/27/23  Resolved.  Continues to be on room air.    Type 2 diabetes mellitus (HCC)- (present on admission)  Assessment & Plan  A1c 5.7% (6/2023)  Lantus insulin to 50 units in the evening with sliding scale throughout the day every 6 hours with tube feeds    10/24/23  Patient becoming hypoglycemic with blood glucose in the 90s.  Decrease glargine insulin to 50 units in the evenings.  Continue regular insulin sign scale.    10/27/23  Discontinue glargine insulin since patient may go to group home.  Start  metformin 850 mg twice a day.  Change regular insulin to lispro insulin 3 times a day with meals.  Hypoglycemia protocol      Acute encephalopathy  Assessment & Plan  10/26/23  Patient is appearing more awake but depressed.  Starting modafinil 50 mg daily. Discontinue memantine.    10/27/23  Appears to be improving.  More alert today.  Following commands in the bilateral extremities wiggling his toes.  Continue modafinil 50 mg daily       10/28/23  Appears very sleepy this morning.  Continue modafinil 100 mg daily in the mornings.   Start Seroquel 50 mg in the evenings to restore circadian rhythm.    10/29/23  Improving.  More alert this morning.  Continue modafinil 100 mg in the mornings and Seroquel 50 mg in the evenings.    LE (acute kidney injury), prerenal (CMS-HCC)  Assessment & Plan  Secondary due to dehydration, creatinine has normalized now with IV fluids    10/24/23  I have ordered repeat lab studies for tomorrow    Shock (HCC)  Assessment & Plan  On 10/21/2023 patient developed severe hypotension and shock for which she was transferred to the [facility] placed on IV fluids and IV pressors.  Cultures were negative this was consistent with hypovolemic shock which has now resolved.    Bradycardia  Assessment & Plan  This has resolved    ACP (advance care planning)  Assessment & Plan  From Dr. Field on 10/21:  I discussed advance care planning with the patient and family for at least 35 minutes, including diagnosis, prognosis, plan of care, risks and benefits of any therapies that could be offered, as well as alternatives including palliation and hospice, as appropriate.  DNR/I per patient's wife wishes, patient has gone from full code to dnr back to full code, now DNR/I. She does not want him to be attached to more machines. I explained he is high risk for developing further brain injuries following a code and he would likely not make it through and it would serve to prolong his suffering. She recognizes that  "and wants what is best for Sharon. She recognizes he has been in pain for \"a long time\" and does not want to see him suffer needlessly.       Atelectasis- (present on admission)  Assessment & Plan  Patient unable to follow commands for incentive spirometry    Pneumonia due to infectious organism- (present on admission)  Assessment & Plan  Continue Zosyn    Thrombocytopenia (HCC)- (present on admission)  Assessment & Plan  This resolved    Pneumobilia- (present on admission)  Assessment & Plan  As seen on CT renal (9/25/23).    9/26/23: No need for further interventions at this time per gen surg    Primary hypertension- (present on admission)  Assessment & Plan  Hypertensive at presentation   Goal for normotension currently multimodality treatment      BMI 40.0-44.9, adult (HCC)- (present on admission)  Assessment & Plan  Tube feeding    Alcohol dependence (HCC)- (present on admission)  Assessment & Plan  Multivitamin, folate, thiamine  Monitor, additional therapies as clinically indicated  9/29/2023 patient was at least 2-3 hard liquor drinks daily although this is unclear per patient and his wife's history.  .    Patient s/p treatment for acute alcohol withdrawal         VTE prophylaxis:   SCDs/TEDs   pharmacologic prophylaxis contraindicated due to Intracranial hemorrhage.      I have performed a physical exam and reviewed and updated ROS and Plan today (10/29/2023). In review of yesterday's note (10/28/2023), there are no changes except as documented above.    "

## 2023-10-29 NOTE — PROGRESS NOTES
"2x 30-45\" abdominal binders sent to tube station 18. Hospital is out of stock of 62-74\" abdominal binders, these two connected binders work as a substitute.    Contact traction for any questions or concerns regarding these DME.  "

## 2023-10-30 VITALS
TEMPERATURE: 98.1 F | WEIGHT: 281.09 LBS | OXYGEN SATURATION: 93 % | HEIGHT: 72 IN | BODY MASS INDEX: 38.07 KG/M2 | DIASTOLIC BLOOD PRESSURE: 76 MMHG | HEART RATE: 103 BPM | SYSTOLIC BLOOD PRESSURE: 119 MMHG | RESPIRATION RATE: 18 BRPM

## 2023-10-30 PROBLEM — S06.2X0S MIDLINE SHIFT OF BRAIN DUE TO HEMATOMA (HCC): Status: ACTIVE | Noted: 2023-10-30

## 2023-10-30 PROBLEM — J96.01 ACUTE RESPIRATORY FAILURE WITH HYPOXIA (HCC): Status: RESOLVED | Noted: 2023-09-25 | Resolved: 2023-10-30

## 2023-10-30 PROBLEM — J98.11 ATELECTASIS: Status: RESOLVED | Noted: 2023-10-05 | Resolved: 2023-10-30

## 2023-10-30 PROBLEM — K83.8 PNEUMOBILIA: Status: RESOLVED | Noted: 2023-09-25 | Resolved: 2023-10-30

## 2023-10-30 PROBLEM — Z71.89 ACP (ADVANCE CARE PLANNING): Status: RESOLVED | Noted: 2023-10-12 | Resolved: 2023-10-30

## 2023-10-30 PROBLEM — G93.5 MIDLINE SHIFT OF BRAIN WITH BRAIN COMPRESSION (HCC): Status: ACTIVE | Noted: 2023-10-30

## 2023-10-30 PROBLEM — E11.65 TYPE 2 DIABETES MELLITUS WITH HYPERGLYCEMIA, WITHOUT LONG-TERM CURRENT USE OF INSULIN (HCC): Status: ACTIVE | Noted: 2022-07-16

## 2023-10-30 PROBLEM — G93.89 MIDLINE SHIFT OF BRAIN DUE TO HEMATOMA (HCC): Status: ACTIVE | Noted: 2023-10-30

## 2023-10-30 PROBLEM — R47.01 GLOBAL APHASIA: Status: ACTIVE | Noted: 2023-10-30

## 2023-10-30 PROBLEM — E87.0 HYPERNATREMIA: Status: RESOLVED | Noted: 2023-10-22 | Resolved: 2023-10-30

## 2023-10-30 PROBLEM — J18.9 PNEUMONIA DUE TO INFECTIOUS ORGANISM: Status: RESOLVED | Noted: 2023-09-29 | Resolved: 2023-10-30

## 2023-10-30 PROBLEM — G93.41 ACUTE METABOLIC ENCEPHALOPATHY: Status: ACTIVE | Noted: 2018-08-01

## 2023-10-30 PROBLEM — G81.91 RIGHT HEMIPLEGIA (HCC): Status: RESOLVED | Noted: 2023-10-25 | Resolved: 2023-10-30

## 2023-10-30 PROBLEM — R57.9 SHOCK (HCC): Status: RESOLVED | Noted: 2023-10-22 | Resolved: 2023-10-30

## 2023-10-30 PROBLEM — R40.20 COMA (HCC): Status: ACTIVE | Noted: 2023-10-30

## 2023-10-30 PROBLEM — F10.932 ALCOHOL WITHDRAWAL SYNDROME WITH PERCEPTUAL DISTURBANCE (HCC): Status: ACTIVE | Noted: 2018-01-22

## 2023-10-30 PROBLEM — N17.9 AKI (ACUTE KIDNEY INJURY) (HCC): Status: RESOLVED | Noted: 2018-01-20 | Resolved: 2023-10-30

## 2023-10-30 PROBLEM — R40.20 COMA (HCC): Status: RESOLVED | Noted: 2023-10-30 | Resolved: 2023-10-30

## 2023-10-30 LAB
GAMMA INTERFERON BACKGROUND BLD IA-ACNC: 0.05 IU/ML
GLUCOSE BLD STRIP.AUTO-MCNC: 178 MG/DL (ref 65–99)
GLUCOSE BLD STRIP.AUTO-MCNC: 180 MG/DL (ref 65–99)
M TB IFN-G BLD-IMP: NEGATIVE
M TB IFN-G CD4+ BCKGRND COR BLD-ACNC: 0.01 IU/ML
MITOGEN IGNF BCKGRD COR BLD-ACNC: 1.81 IU/ML
QFT TB2 - NIL TBQ2: -0.01 IU/ML

## 2023-10-30 PROCEDURE — 82962 GLUCOSE BLOOD TEST: CPT | Mod: 91

## 2023-10-30 PROCEDURE — 97530 THERAPEUTIC ACTIVITIES: CPT | Mod: CQ

## 2023-10-30 PROCEDURE — 700102 HCHG RX REV CODE 250 W/ 637 OVERRIDE(OP): Performed by: STUDENT IN AN ORGANIZED HEALTH CARE EDUCATION/TRAINING PROGRAM

## 2023-10-30 PROCEDURE — 99239 HOSP IP/OBS DSCHRG MGMT >30: CPT | Performed by: STUDENT IN AN ORGANIZED HEALTH CARE EDUCATION/TRAINING PROGRAM

## 2023-10-30 PROCEDURE — A9270 NON-COVERED ITEM OR SERVICE: HCPCS | Performed by: STUDENT IN AN ORGANIZED HEALTH CARE EDUCATION/TRAINING PROGRAM

## 2023-10-30 RX ORDER — POLYETHYLENE GLYCOL 3350 17 G/17G
17 POWDER, FOR SOLUTION ORAL DAILY
Refills: 3 | Status: SHIPPED
Start: 2023-10-31

## 2023-10-30 RX ORDER — LANOLIN ALCOHOL/MO/W.PET/CERES
100 CREAM (GRAM) TOPICAL DAILY
Qty: 30 TABLET | Status: SHIPPED
Start: 2023-10-31

## 2023-10-30 RX ORDER — MODAFINIL 100 MG/1
100 TABLET ORAL EVERY MORNING
Qty: 30 TABLET | Refills: 0 | Status: SHIPPED
Start: 2023-10-31 | End: 2023-11-30

## 2023-10-30 RX ORDER — FOLIC ACID 1 MG/1
1 TABLET ORAL DAILY
Qty: 30 TABLET | Status: SHIPPED
Start: 2023-10-31

## 2023-10-30 RX ORDER — CARBOXYMETHYLCELLULOSE SODIUM 5 MG/ML
1 SOLUTION/ DROPS OPHTHALMIC PRN
Status: SHIPPED
Start: 2023-10-30

## 2023-10-30 RX ORDER — PIOGLITAZONEHYDROCHLORIDE 30 MG/1
15 TABLET ORAL DAILY
Status: DISCONTINUED | OUTPATIENT
Start: 2023-10-30 | End: 2023-10-30 | Stop reason: HOSPADM

## 2023-10-30 RX ORDER — ATORVASTATIN CALCIUM 40 MG/1
40 TABLET, FILM COATED ORAL EVERY EVENING
Qty: 30 TABLET | Status: SHIPPED
Start: 2023-10-30

## 2023-10-30 RX ORDER — QUETIAPINE FUMARATE 50 MG/1
50 TABLET, FILM COATED ORAL EVERY EVENING
Qty: 60 TABLET | Refills: 3 | Status: SHIPPED
Start: 2023-10-30

## 2023-10-30 RX ORDER — AMOXICILLIN 250 MG
2 CAPSULE ORAL 2 TIMES DAILY
Qty: 30 TABLET | Refills: 0 | Status: SHIPPED
Start: 2023-10-30

## 2023-10-30 RX ORDER — LANOLIN ALCOHOL/MO/W.PET/CERES
400 CREAM (GRAM) TOPICAL DAILY
Qty: 30 TABLET | Status: SHIPPED
Start: 2023-10-31

## 2023-10-30 RX ADMIN — POLYETHYLENE GLYCOL 3350 1 PACKET: 17 POWDER, FOR SOLUTION ORAL at 06:21

## 2023-10-30 RX ADMIN — AMLODIPINE BESYLATE 5 MG: 5 TABLET ORAL at 06:22

## 2023-10-30 RX ADMIN — MODAFINIL 100 MG: 100 TABLET ORAL at 06:21

## 2023-10-30 RX ADMIN — DOCUSATE SODIUM 50 MG AND SENNOSIDES 8.6 MG 2 TABLET: 8.6; 5 TABLET, FILM COATED ORAL at 06:20

## 2023-10-30 RX ADMIN — PIOGLITAZONE 15 MG: 30 TABLET ORAL at 08:28

## 2023-10-30 RX ADMIN — DICLOFENAC SODIUM 2 G: 10 GEL TOPICAL at 11:28

## 2023-10-30 RX ADMIN — POTASSIUM CHLORIDE 10 MEQ: 1500 TABLET, EXTENDED RELEASE ORAL at 06:22

## 2023-10-30 RX ADMIN — LOSARTAN POTASSIUM 50 MG: 50 TABLET, FILM COATED ORAL at 06:21

## 2023-10-30 RX ADMIN — DICLOFENAC SODIUM 2 G: 10 GEL TOPICAL at 06:20

## 2023-10-30 RX ADMIN — ALLOPURINOL 300 MG: 300 TABLET ORAL at 06:22

## 2023-10-30 RX ADMIN — OMEPRAZOLE 20 MG: 20 CAPSULE, DELAYED RELEASE ORAL at 06:21

## 2023-10-30 RX ADMIN — FOLIC ACID 1 MG: 1 TABLET ORAL at 06:22

## 2023-10-30 RX ADMIN — Medication 400 MG: at 06:21

## 2023-10-30 RX ADMIN — INSULIN LISPRO 1 UNITS: 100 INJECTION, SOLUTION INTRAVENOUS; SUBCUTANEOUS at 08:24

## 2023-10-30 RX ADMIN — THERA TABS 1 TABLET: TAB at 06:22

## 2023-10-30 RX ADMIN — INSULIN LISPRO 1 UNITS: 100 INJECTION, SOLUTION INTRAVENOUS; SUBCUTANEOUS at 11:25

## 2023-10-30 RX ADMIN — METFORMIN HYDROCHLORIDE 1000 MG: 500 TABLET ORAL at 08:28

## 2023-10-30 RX ADMIN — Medication 100 MG: at 06:21

## 2023-10-30 ASSESSMENT — COGNITIVE AND FUNCTIONAL STATUS - GENERAL
CLIMB 3 TO 5 STEPS WITH RAILING: TOTAL
MOVING FROM LYING ON BACK TO SITTING ON SIDE OF FLAT BED: UNABLE
STANDING UP FROM CHAIR USING ARMS: TOTAL
SUGGESTED CMS G CODE MODIFIER MOBILITY: CN
TURNING FROM BACK TO SIDE WHILE IN FLAT BAD: UNABLE
MOBILITY SCORE: 6
MOVING TO AND FROM BED TO CHAIR: UNABLE
WALKING IN HOSPITAL ROOM: TOTAL

## 2023-10-30 ASSESSMENT — PAIN DESCRIPTION - PAIN TYPE: TYPE: ACUTE PAIN

## 2023-10-30 ASSESSMENT — GAIT ASSESSMENTS: GAIT LEVEL OF ASSIST: UNABLE TO PARTICIPATE

## 2023-10-30 NOTE — THERAPY
Physical Therapy   Daily Treatment     Patient Name: Sharon Guardado  Age:  68 y.o., Sex:  male  Medical Record #: [id number]  Today's Date: 10/30/2023     Precautions  Precautions: (P) Fall Risk;Swallow Precautions  Comments: (P) Rt side deficits. Aphasia and Apraxia    Assessment    The pt now actively moving Rt UE/LE, nothing on command. The pt remains aphasic and apraxic. Functionally the pt needed mod assist w/sup<->sit transition w/initiation of LE's. Once seated he was able to hold midline w/bilat UE support w/CGA<->min assist. Unfortunately the pt was put into a bariatric low air loss bed which limited the pt's ability to get his feet onto the floor. The pt did manage 2 STS w/max x 2, unable to hold for any duration or achieve full upright posture.   PT will cont to follow    Plan    Treatment Plan Status: (P) Continue Current Treatment Plan  Type of Treatment: Bed Mobility, Gait Training, Manual Therapy, Stair Training, Therapeutic Activities, Therapeutic Exercise, Neuro Re-Education / Balance  Treatment Frequency: 4 Times per Week  Treatment Duration: Until Therapy Goals Met    DC Equipment Recommendations: (P) Unable to determine at this time  Discharge Recommendations: (P) Recommend post-acute placement for additional physical therapy services prior to discharge home    Objective       10/30/23 1149   Charge Group   PT Therapeutic Activities (Units) 3   Total Time Spent   PT Therapeutic Activities Time Spent (Mins) 38   PT Total Time Spent (Calculated) 38   Precautions   Precautions Fall Risk;Swallow Precautions   Comments Rt side deficits. Aphasia and Apraxia   Pain 0 - 10 Group   Pain Rating Scale (NPRS) 0   Other Treatments   Other Treatments Provided EOB x 10 mins w/bilat UE support w/CGA<->min assist.   Balance   Sitting Balance (Static) Fair -   Sitting Balance (Dynamic) Fair -   Standing Balance (Static) Trace +   Standing Balance (Dynamic) Dependent   Weight Shift Sitting Poor   Weight Shift  Standing Absent   Bed Mobility    Supine to Sit Moderate Assist   Sit to Supine Moderate Assist   Scooting Maximal Assist  (seated)   Rolling Moderate Assist to Rt.;Total Assist to Lt.   Gait Analysis   Gait Level Of Assist Unable to Participate   Functional Mobility   Sit to Stand Maximal Assist  (x2)   Bed, Chair, Wheelchair Transfer Total Assist  (bed->cardiac chair)   Transfer Method Slide Board  (supine)   Skilled Intervention Verbal Cuing;Postural Facilitation;Compensatory Strategies   Comments The pt tolerated 2 STS from EOB, unable to come to full upright stand. Unfortunately the pt was moved to a bariatric low airloss bed which did not allow for the pt to safely get both feet on the floor.   How much difficulty does the patient currently have...   Turning over in bed (including adjusting bedclothes, sheets and blankets)? 1   Sitting down on and standing up from a chair with arms (e.g., wheelchair, bedside commode, etc.) 1   Moving from lying on back to sitting on the side of the bed? 1   How much help from another person does the patient currently need...   Moving to and from a bed to a chair (including a wheelchair)? 1   Need to walk in a hospital room? 1   Climbing 3-5 steps with a railing? 1   6 clicks Mobility Score 6   Short Term Goals    Short Term Goal # 1 pt will be able to complete supine<>Sitting with min assist in 6tx in order to improve mobility   Goal Outcome # 1 goal not met   Short Term Goal # 2 pt will be able to complete functional transfers with mod assist in 6tx   Goal Outcome # 2 Goal not met   Short Term Goal # 3 pt will be able to ambulate 15ft with FWW and min assist in 6tx   Goal Outcome # 3 Goal not met   Education Group   Role of Physical Therapist Patient Response Patient;Acceptance;Explanation;Reinforcement Needed;No Learning Evidence   Physical Therapy Treatment Plan   Physical Therapy Treatment Plan Continue Current Treatment Plan   Anticipated Discharge Equipment and  Recommendations   DC Equipment Recommendations Unable to determine at this time   Discharge Recommendations Recommend post-acute placement for additional physical therapy services prior to discharge home   Interdisciplinary Plan of Care Collaboration   IDT Collaboration with  Nursing   Patient Position at End of Therapy Seated;Self Releasing Lap Belt Applied  (in cardiac chair next to nurses station)   Collaboration Comments nrsg notifief of pts tx efforts   Session Information   Date / Session Number  10/30--8 (2/4, 11/1) PTA/1   Supervising Physical Therapist (PTA Treatments Only)   Supervising Physical Therapist Torie Teixeira

## 2023-10-30 NOTE — DISCHARGE PLANNING
Care Transition Team Final Discharge Disposition    Actual Discharge Information  Discharge Disposition: D/T to SNF with Medicare cert in anticipation of skilled care (03)    Pt cleared for DC today to SNF  MD reports his mentation has improved and requested for the SNF referrals to be resent  Received update from PATRIA reporting pt has been accepted to [facility] and bed confirmed for admission today  Updated Dr. Pinto to above  RN CM spoke with pts wife, Alessandra Guardado, and discussed above  She agrees with the DC plan, reviewed and provided a copy of the important message from Medicare  Transport request was sent to [facility] by Fern[facility] ESPA[facility]A, goal for transport ETA [id number]

## 2023-10-30 NOTE — DISCHARGE PLANNING
DC Transport Scheduled    Received request at: 10/30/2023 at 1247    Transport Company Scheduled:  [employer]  Spoke with Nidhi at [facility] to schedule transport.    Scheduled Date: 10/30/2023  Scheduled Time: 1630    Destination: [facility] at [address] [place] [address] Joesph CARRANZA     Notified care team of scheduled transport via [employer].     If there are any changes needed to the DC transportation scheduled, please contact [facility] at ext. [phone] between the hours of 7821-5842 Mon-Fri. If outside those hours, contact the ED Case Manager at ext. [phone].

## 2023-10-30 NOTE — CARE PLAN
The patient is Stable - Low risk of patient condition declining or worsening    Shift Goals  Clinical Goals: monitor neuro status  Patient Goals: DEENA  Family Goals: DEENA    Progress made toward(s) clinical / shift goals:  Patient is alert unable to respond with simple questions and instructions. Hourly rounds done to ensure safety. No acute change noted. Care ongoing.    Patient is not progressing towards the following goals:

## 2023-10-30 NOTE — DISCHARGE SUMMARY
Discharge Summary    CHIEF COMPLAINT ON ADMISSION  Chief Complaint   Patient presents with    Head Injury     Pt fell and hit his head last night     ALOC     Pt arrives A&O1        Reason for Admission  Traumatic brain injury with subarachnoid hemorrhage following ground-level fall    Admission Date  9/24/2023     CODE STATUS  DNAR/DNI    HPI & HOSPITAL COURSE  Mr. Guardado is a 68-year-old male who presented on 9/24/2023 with altered mental status following a ground-level fall.  This is a pleasant gentleman with a history of alcohol use disorder with dependence, hypertension, diabetes mellitus type 2, and morbid obesity.  He was brought to [facility] by EMS.  He was noted to be fully weightbearing at home prior to his presentation.  In the emergency room, patient was noted to be febrile and tachycardic.  Head CT revealed a small frontal subarachnoid hemorrhage.  Lactic acid was 4.6 with a leukocytosis in setting of purulent urine with many RBCs.  He had a history of nephrolithiasis and a CT scan of the abdomen and pelvis were performed to exclude an infected stone.  Nephrolithiasis was noted.  However, there was also hepatic biliary air.   He had septic shock due to complicated urinary tract infection.      Patient was initially admitted to the neurointensive care unit, where he required pressor support and was intubated.  He went to significant alcohol withdrawal.  Patient was subsequently extubated on 9/28/2023 and was transferred to the telemetry floor onto the hospitalist service.  Following the transfer, patient had a sudden mental status change and was found to have a large left frontal hemorrhagic stroke with significant brain edema and 2 mm of left-to-right midline shift due to brain compression from the hematoma.  Patient became comatose and was not following any commands or communicating.  He became aphasic.  Patient was transferred back to the intensive care unit and was intubated  again.      He required tube feeds via nasogastric tube.  General surgery was consulted for gastrostomy tube due to a 3-day delay from interventional radiology.  Patient was taken to the operating room for laparoscopic gastrostomy tube placement by Dr. Mercy Toussaint on 10/19/2023.  Patient tolerated the procedure well and was readmitted to the telemetry floor.    Patient did have a transient episode of hypotension requiring pressors for approximately 4 hours on 10/21/2023.  Sodium increased to the 150, which was corrected with increasing free water flushes and D5W.    Patient had some disturbances in his circadian rhythm, which was restored with initiation of quetiapine 50 mg in the evenings.  Due to persistent lethargy, he was started on modafinil 50 mg daily on 10/26/2023, which was increased to 100 mg daily on 10/28/2023 with significant improvement of his alertness.  He started tracking well and moving all extremities.  He was wiggling his toes sometimes to command in the bilateral lower extremities on the day of his discharge.  He continued to be nonverbal.  He was started on multivitamin supplementation as well as thiamine and folic acid due to his history of alcoholism.  Notably, patient has had an EGD and EUS and biopsy for evaluation of pancreatitis during a previous hospitalization.  Unclear whether he had a history of sphincterotomy.    Patient was initially treated with long and short acting insulin, which was transitioned back to to his home metformin as well as pioglitazone.  His pioglitazone was resumed on the day of his discharge.  Blood glucose was in the 190s to 200s on metformin 1000 mg twice daily.    Patient was evaluated by occupational, physical therapy, and speech therapy, who recommended postacute therapies.  Patient was evaluated by physical medicine and rehabilitation, who felt that the patient was not a good candidate for inpatient rehabitation.    Patient continued to recover well  remaining medically stable and was accepted to [facility] skilled nursing facility.    Therefore, he is discharged in fair and stable condition to skilled nursing facility ([facility])    The patient met 2-midnight criteria for an inpatient stay at the time of discharge.    Discharge Date  10/30/2023     FOLLOW UP ITEMS POST DISCHARGE  -As per [facility] skilled nursing facility.  -Follow-up with Dr. Vishnu Granados of [facility].    DISCHARGE DIAGNOSES  Principal Problem:    Intraparenchymal hemorrhage of brain (HCC) (POA: Yes)  Active Problems:    Alcohol withdrawal syndrome with perceptual disturbance (HCC) (POA: Unknown)    Acute metabolic encephalopathy (POA: Yes)    Type 2 diabetes mellitus with hyperglycemia, without long-term current use of insulin (HCC) (POA: Yes)      Overview: This is a chronic condition.      Current medications:        - Glipizide 10 mg daily, metformin 500 mg twice daily.      Last A1c:       Lab Results       Component Value Date/Time        HBA1C 8.8 (H) 07/16/2022 0105        AVGLUC 206 07/16/2022 0105                   Fasting sugars: 90-160s      Last diabetic foot exam: 7/27/2022      Last retinal eye exam: 3 weeks ago, records requested       ACEi/ARB? Losartan      Statin: simvastatin      Aspirin:  81 mg       Concomitant HTN: Yes       Nightly foot checks: advised           Lakia coma scale total score 9-12 (POA: Yes)    Midline shift of brain due to hematoma (HCC) (POA: Yes)    Global aphasia (POA: Yes)    Dysphagia (POA: Yes)    Alcohol dependence (HCC) (POA: Yes)    Morbid obesity with BMI of 40.0-44.9, adult (HCC) (POA: Yes)    Primary hypertension (POA: Yes)      Overview: This is a chronic problem, controlled on amlodipine 5 and losartan 40 mg       daily.     Thrombocytopenia (HCC) (POA: Yes)    Bradycardia (POA: Yes)    Midline shift of brain with brain compression (HCC) (POA: Unknown)  Resolved Problems:    Acute respiratory failure with hypoxia (HCC) (POA:  Yes)    Hypernatremia (POA: Yes)    Right hemiplegia (HCC) (POA: Yes)    LE (acute kidney injury), prerenal (CMS-HCC) (POA: Yes)    SAH (subarachnoid hemorrhage) (HCC) (POA: Yes)    Pneumobilia (POA: Yes)    Pneumonia due to infectious organism (POA: Yes)    Atelectasis (POA: Yes)    ACP (advance care planning) (POA: Unknown)    Hypotension (POA: Unknown)    Shock (HCC) (POA: Yes)    Coma (HCC) (POA: Yes)      FOLLOW UP  No future appointments.  Juan Manuel FLANAGAN M.D.  [address]  [facility] [id number] [phone]    Follow up  Please call your primary care provider to schedule a hospital follow up. Thank you.    [facility] [address] [address]  [phone]        Vishnu Granados M.D.  [address] [facility] [address]  [phone]            MEDICATIONS ON DISCHARGE     Medication List        Start taking these medications        Instructions   atorvastatin 40 MG Tabs  Commonly known as: Lipitor   Take 1 Tablet by mouth every evening.  Dose: 40 mg     carboxymethylcellulose 0.5 % Soln  Commonly known as: Refresh Tears   Administer 1 Drop into both eyes as needed (dry eyes).  Dose: 1 Drop     folic acid 1 MG Tabs  Start taking on: October 31, 2023  Commonly known as: Folvite   Take 1 Tablet by mouth every day.  Dose: 1 mg     magnesium oxide 400 (240 Mg) MG Tabs  Start taking on: October 31, 2023   Take 1 Tablet by mouth every day.  Dose: 400 mg     modafinil 100 MG Tabs  Start taking on: October 31, 2023  Commonly known as: Provigil   Take 1 Tablet by mouth every morning for 30 days.  Dose: 100 mg     multivitamin Tabs  Start taking on: October 31, 2023   Take 1 Tablet by mouth every day.  Dose: 1 Tablet     polyethylene glycol/lytes 17 g Pack  Start taking on: October 31, 2023  Commonly known as: Miralax   Take 1 Packet by mouth every day.  Dose: 17 g     QUEtiapine 50 MG tablet  Commonly known as: SEROquel   Take 1 Tablet by mouth every evening.  Dose:  50 mg     senna-docusate 8.6-50 MG Tabs  Commonly known as: Pericolace Or Senokot S   Take 2 Tablets by mouth 2 times a day.  Dose: 2 Tablet     thiamine 100 MG tablet  Start taking on: October 31, 2023  Commonly known as: Thiamine   Take 1 Tablet by mouth every day.  Dose: 100 mg            Change how you take these medications        Instructions   metformin 1000 MG tablet  What changed:   medication strength  how much to take  when to take this  Commonly known as: Glucophage   Take 1 Tablet by mouth 2 times a day with meals.  Dose: 1,000 mg            Continue taking these medications        Instructions   allopurinol 300 MG Tabs  Commonly known as: Zyloprim   Take 300 mg by mouth every day.  Dose: 300 mg     amLODIPine 5 MG Tabs  Commonly known as: Norvasc   TAKE 1 TABLET BY MOUTH EVERY DAY  Dose: 5 mg     diclofenac sodium 1 % Gel  Commonly known as: Voltaren   Apply 4 g topically 4 times a day as needed. * apply to joints*   Indications: Joint Damage causing Pain and Loss of Function  Dose: 4 g     losartan 50 MG Tabs  Commonly known as: Cozaar   Take 1 Tablet by mouth every day.  Dose: 50 mg     omeprazole 20 MG delayed-release capsule  Commonly known as: PriLOSEC   Take 20 mg by mouth every day.  Dose: 20 mg     pioglitazone 15 MG Tabs  Commonly known as: Actos   Take 15 mg by mouth every day.  Dose: 15 mg     potassium chloride SA 10 MEQ Tbcr  Commonly known as: K-Dur   Take 10 mEq by mouth every day.  Dose: 10 mEq            Stop taking these medications      aspirin EC 81 MG Tbec  Commonly known as: Ecotrin     Emgality 120 MG/ML Soaj  Generic drug: Galcanezumab-gnlm     furosemide 20 MG Tabs  Commonly known as: Lasix     Namenda 5 MG Tabs  Generic drug: memantine     naproxen 500 MG Tabs  Commonly known as: Naprosyn     simvastatin 40 MG Tabs  Commonly known as: Zocor              Allergies  Allergies   Allergen Reactions    Ativan      Pt reports that he gets very agitated     Hydrocodone Rash      Confusion   Agitation     Lorazepam Unspecified     Agitation        DIET  Orders Placed This Encounter   Procedures    Diet Order Diet: Level 5 - Minced and Moist; Liquid level: Level 0 - Thin; Tray Modifications (optional): SLP - 1:1 Supervision by Nursing     Standing Status:   Standing     Number of Occurrences:   1     Order Specific Question:   Diet:     Answer:   Level 5 - Minced and Moist [24]     Order Specific Question:   Liquid level     Answer:   Level 0 - Thin     Order Specific Question:   Tray Modifications (optional)     Answer:   SLP - 1:1 Supervision by Nursing    Diet: Diet Tube Feed; Formula: Bolus Only; Select Bolus (If Indicated): Other (specify bolus comments in the field to the right) (Glucerna 1.2 Cartons); Bolus Frequency: QID (Suggest 2 containers with meal times (B, L, D) and HS); Number of Carton...     If patient consumes < 50% at meal time, bolus 2 containers following meal.    If pt eats > 50%, no bolus needed following meal.  If pt not eating, would need a total of 8 containers per day to meet needs.     Standing Status:   Standing     Number of Occurrences:   1     Order Specific Question:   Diet     Answer:   Diet Tube Feed [35]     Order Specific Question:   Formula:     Answer:   Bolus Only     Order Specific Question:   Route     Answer:   Gtube/PEG     Order Specific Question:   Select Bolus (If Indicated):     Answer:   Other (specify bolus comments in the field to the right)     Comments:   Glucerna 1.2 Cartons     Order Specific Question:   Bolus Frequency     Answer:   QID     Comments:   Suggest 2 containers with meal times (B, L, D) and HS     Order Specific Question:   Number of Cartons Per Day:     Answer:   Eight       ACTIVITY  As tolerated and directed by skilled nursing.  Weight bearing as tolerated    LINES, DRAINS, AND WOUNDS  This is an automated list. Peripheral IVs will be removed prior to discharge.       Wound 10/11/23 Traumatic Arm Distal;Dorsal Left  (Active)   Wound Image    10/12/23 0653   Site Assessment Clean;Dry;Intact 10/30/23 0800   Periwound Assessment Clean;Dry;Intact 10/30/23 0800   Closure Approximated 10/28/23 2000   Drainage Amount None 10/28/23 2000   Dressing Status Open to Air 10/30/23 0800       Wound 10/19/23 Incision Abdomen Dermabond [id number] sponge (Active)   Site Assessment Dry;Clean;Intact 10/30/23 0800   Periwound Assessment Clean;Dry;Intact 10/28/23 2000   Margins Attached edges 10/28/23 2000   Closure Dermabond 10/28/23 2000   Drainage Amount None 10/28/23 2000   Dressing Status Clean;Dry;Intact 10/28/23 2000   Dressing Options Dry Gauze 10/28/23 2000                  MENTAL STATUS ON TRANSFER  Nonverbal.  Moving all extremities purposefully but not to command consistently.  Wiggling his toes at times.  4/5 weakness in the right upper and lower extremities.  Left side full strength 5/5.        CONSULTATIONS  Critical care  Trauma and acute care surgery  Hospital medicine services  Neurology  Neurosurgery  General surgery  Physical medicine and rehabilitation    PROCEDURES  Emergent orotracheal intubation on 9/25/2023  Diagnostic and therapeutic bronchoscopy with bronchioloalveolar lavage on 9/25/2023  Video electroencephalogram 9/25/2023, no evidence of seizures  Central line placement on 9/25/2023  Endotracheal intubation on 9/30/2023  Central line placement on 9/30/2023  Diagnostic and therapeutic flexible fiberoptic bronchoscopy with bronchoalveolar lavage on 10/5/2023  Laparoscopic gastrostomy tube placement (16Fr. McKey) on 10/19/2023    LABORATORY  Lab Results   Component Value Date    SODIUM 140 10/28/2023    POTASSIUM 4.1 10/28/2023    CHLORIDE 105 10/28/2023    CO2 24 10/28/2023    GLUCOSE 151 (H) 10/28/2023    BUN 12 10/28/2023    CREATININE 0.72 10/28/2023    CREATININE 1.0 05/13/2009        Lab Results   Component Value Date    WBC 8.9 10/28/2023    HEMOGLOBIN 15.5 10/28/2023    HEMATOCRIT 45.4 10/28/2023    PLATELETCT 108  (L) 10/28/2023        Total time of the discharge process 52 minutes.

## 2023-10-30 NOTE — DISCHARGE PLANNING
DPA resent [facility] SNF referrals     [phone]    DPA printed and manually faxed SNF referral to [facility] due to size     1236    Agency/Facility Name: [facility]  Spoke To: Nandini  Outcome: DPA call facility to ask for bed availability. Per Nandini, she is going to find out if facility has anymore beds and she will call DPA back       1245    Agency/Facility Name: [facility]  Spoke To: Nandini  Outcome: DPA received call back from facility. Per Nandini, facility has a bed today and pt can go to facility.   DPA to call back with confirmed transport time       1322    Agency/Facility Name: [facility]  Spoke To: Abdoul  Outcome: DPA called facility and gave confirmed transport time

## 2023-10-31 NOTE — PROGRESS NOTES
Approx 1720: Patient discharged with [facility] without incident. VSS. Only belongings found at bedside were glasses, glasses taken with [facility].

## 2023-11-02 ENCOUNTER — TELEPHONE (OUTPATIENT)
Dept: HEALTH INFORMATION MANAGEMENT | Facility: OTHER | Age: 68
End: 2023-11-02

## 2024-01-17 ENCOUNTER — HOSPITAL ENCOUNTER (OUTPATIENT)
Facility: MEDICAL CENTER | Age: 69
End: 2024-01-17
Attending: INTERNAL MEDICINE
Payer: MEDICARE

## 2024-01-17 LAB
ALBUMIN SERPL BCP-MCNC: 4.3 G/DL (ref 3.2–4.9)
ALBUMIN/GLOB SERPL: 1.7 G/DL
ALP SERPL-CCNC: 114 U/L (ref 30–99)
ALT SERPL-CCNC: 12 U/L (ref 2–50)
ANION GAP SERPL CALC-SCNC: 15 MMOL/L (ref 7–16)
AST SERPL-CCNC: 21 U/L (ref 12–45)
BASOPHILS # BLD AUTO: 0.2 % (ref 0–1.8)
BASOPHILS # BLD: 0.02 K/UL (ref 0–0.12)
BILIRUB SERPL-MCNC: 0.6 MG/DL (ref 0.1–1.5)
BUN SERPL-MCNC: 11 MG/DL (ref 8–22)
CALCIUM ALBUM COR SERPL-MCNC: 9.7 MG/DL (ref 8.5–10.5)
CALCIUM SERPL-MCNC: 9.9 MG/DL (ref 8.4–10.2)
CHLORIDE SERPL-SCNC: 104 MMOL/L (ref 96–112)
CO2 SERPL-SCNC: 22 MMOL/L (ref 20–33)
CREAT SERPL-MCNC: 0.75 MG/DL (ref 0.5–1.4)
EOSINOPHIL # BLD AUTO: 0.13 K/UL (ref 0–0.51)
EOSINOPHIL NFR BLD: 1.5 % (ref 0–6.9)
ERYTHROCYTE [DISTWIDTH] IN BLOOD BY AUTOMATED COUNT: 46.3 FL (ref 35.9–50)
GFR SERPLBLD CREATININE-BSD FMLA CKD-EPI: 97 ML/MIN/1.73 M 2
GLOBULIN SER CALC-MCNC: 2.5 G/DL (ref 1.9–3.5)
GLUCOSE SERPL-MCNC: 103 MG/DL (ref 65–99)
HCT VFR BLD AUTO: 43 % (ref 42–52)
HGB BLD-MCNC: 14.1 G/DL (ref 14–18)
IMM GRANULOCYTES # BLD AUTO: 0.02 K/UL (ref 0–0.11)
IMM GRANULOCYTES NFR BLD AUTO: 0.2 % (ref 0–0.9)
LYMPHOCYTES # BLD AUTO: 1.98 K/UL (ref 1–4.8)
LYMPHOCYTES NFR BLD: 22.8 % (ref 22–41)
MAGNESIUM SERPL-MCNC: 1.4 MG/DL (ref 1.5–2.5)
MCH RBC QN AUTO: 28.1 PG (ref 27–33)
MCHC RBC AUTO-ENTMCNC: 32.8 G/DL (ref 32.3–36.5)
MCV RBC AUTO: 85.7 FL (ref 81.4–97.8)
MONOCYTES # BLD AUTO: 0.51 K/UL (ref 0–0.85)
MONOCYTES NFR BLD AUTO: 5.9 % (ref 0–13.4)
NEUTROPHILS # BLD AUTO: 6.02 K/UL (ref 1.82–7.42)
NEUTROPHILS NFR BLD: 69.4 % (ref 44–72)
NRBC # BLD AUTO: 0 K/UL
NRBC BLD-RTO: 0 /100 WBC (ref 0–0.2)
PHOSPHATE SERPL-MCNC: 2.7 MG/DL (ref 2.5–4.5)
PLATELET # BLD AUTO: 161 K/UL (ref 164–446)
PMV BLD AUTO: 11.3 FL (ref 9–12.9)
POTASSIUM SERPL-SCNC: 4.3 MMOL/L (ref 3.6–5.5)
PROT SERPL-MCNC: 6.8 G/DL (ref 6–8.2)
RBC # BLD AUTO: 5.02 M/UL (ref 4.7–6.1)
SODIUM SERPL-SCNC: 141 MMOL/L (ref 135–145)
WBC # BLD AUTO: 8.7 K/UL (ref 4.8–10.8)

## 2024-01-17 PROCEDURE — 83735 ASSAY OF MAGNESIUM: CPT

## 2024-01-17 PROCEDURE — 85025 COMPLETE CBC W/AUTO DIFF WBC: CPT

## 2024-01-17 PROCEDURE — 84100 ASSAY OF PHOSPHORUS: CPT

## 2024-01-17 PROCEDURE — 80053 COMPREHEN METABOLIC PANEL: CPT

## 2024-01-31 NOTE — ASSESSMENT & PLAN NOTE
- Body mass index is 40.33 kg/m²..  - outpatient referral for outpatient weight management.  
- Continue allopurinol.  
- Etiology unclear. ??etoh vs viral  rina looks ok  MRI unable to be tolerated  Pain resolved.   
- Likely viral or etoh hepatitis. Workup normal. Numbers consistent with hepatitis related to etoh. Improving. Unable to tolerate MRCP, may be reasonable to try again once we control will improve.   - Continue to trend. Continue IV fluids.  
- Now in withdrawal.  
- Replaced. Continue to trend.  
- Resolved with IV fluids    
- Resume home dose lisinopril. PRN IV labetalol   
???real. His delirium started with ativen given for MRI.   Holding librium has helped a lot  Stop depakote/ativan now and try to not use haldol if p[ossible      
Reviewed ultrasound and this does show a mass. He cant tolerate an MRI- likely needs outpatient followup with urology.   
reactive from pancreatitis. Continue to monitor.  
no

## 2024-02-20 NOTE — PROGRESS NOTES
Pt denies pain, nausea or vomiting at this time. Pt going to MRI at this time.    Provider pre-printed instructions given

## 2024-04-11 ENCOUNTER — PATIENT MESSAGE (OUTPATIENT)
Dept: HEALTH INFORMATION MANAGEMENT | Facility: OTHER | Age: 69
End: 2024-04-11

## 2024-04-11 DIAGNOSIS — Z13.5 SCREENING FOR DIABETIC RETINOPATHY: ICD-10-CM

## (undated) DEVICE — SYRINGE SAFETY 5 ML 18 GA X 1-1/2 BLUNT LL (100/BX 4BX/CA)

## (undated) DEVICE — GLOVE, LITE (PAIR)

## (undated) DEVICE — SYRINGE SAFETY 10 ML 18 GA X 1 1/2 BLUNT LL (100/BX 4BX/CA)

## (undated) DEVICE — CLOSURE SKIN STRIP 1/2 X 4 IN - (STERI STRIP) (50/BX 4BX/CA)

## (undated) DEVICE — SET EXTENSION WITH 2 PORTS (48EA/CA) ***PART #2C8610 IS A SUBSTITUTE*****

## (undated) DEVICE — BAG DRAINAGE URINARY CLOSED 2000ML (20EA/CA)

## (undated) DEVICE — SENSOR OXIMETER ADULT SPO2 RD SET (20EA/BX)

## (undated) DEVICE — COVER LIGHT HANDLE ALC PLUS DISP (18EA/BX)

## (undated) DEVICE — PACK LAP CHOLE OR - (2EA/CA)

## (undated) DEVICE — GLOVE BIOGEL SZ 6.5 SURGICAL PF LTX (50PR/BX 4BX/CA)

## (undated) DEVICE — CANISTER SUCTION 3000ML MECHANICAL FILTER AUTO SHUTOFF MEDI-VAC NONSTERILE LF DISP  (40EA/CA)

## (undated) DEVICE — DERMABOND ADVANCED - (12EA/BX)

## (undated) DEVICE — SET LEADWIRE 5 LEAD BEDSIDE DISPOSABLE ECG (1SET OF 5/EA)

## (undated) DEVICE — KIT  I.V. START (100EA/CA)

## (undated) DEVICE — TUBE CONNECTING SUCTION - CLEAR PLASTIC STERILE 72 IN (50EA/CA)

## (undated) DEVICE — LACTATED RINGERS INJ 1000 ML - (14EA/CA 60CA/PF)

## (undated) DEVICE — TUBING CLEARLINK DUO-VENT - C-FLO (48EA/CA)

## (undated) DEVICE — DRESSING TRANSPARENT FILM TEGADERM 2.375 X 2.75"  (100EA/BX)"

## (undated) DEVICE — SYRINGE 50ML CATHETER TIP (40EA/BX 4BX/CA)

## (undated) DEVICE — MASK WITH FACE SHIELD (25/BX 4BX/CA)

## (undated) DEVICE — ELECTRODE 850 FOAM ADHESIVE - HYDROGEL RADIOTRNSPRNT (50/PK)

## (undated) DEVICE — SUCTION INSTRUMENT YANKAUER BULBOUS TIP W/O VENT (50EA/CA)

## (undated) DEVICE — ELECTRODE DUAL RETURN W/ CORD - (50/PK)

## (undated) DEVICE — JELLY SURGILUBE STERILE FOIL 5 GM (150EA/BX)

## (undated) DEVICE — TUBE GASTROSTOMY 16FR 20CC (1/DS)

## (undated) DEVICE — SLEEVE VASO CALF MED - (10PR/CA)

## (undated) DEVICE — BITE BLOCK ADULT 60FR (100EA/CA)

## (undated) DEVICE — SUTURE 4-0 MONOCRYL PLUS PS-2 - 27 INCH (36/BX)

## (undated) DEVICE — CANNULA W/SEAL 5X100 Z-THRE - ADED KII (12/BX)

## (undated) DEVICE — NEPTUNE 4 PORT MANIFOLD - (20/PK)

## (undated) DEVICE — GOWN WARMING STANDARD FLEX - (30/CA)

## (undated) DEVICE — GOWN SURGEONS LARGE - (32/CA)

## (undated) DEVICE — NEEDLE INSFL 120MM 14GA VRRS - (20/BX)

## (undated) DEVICE — CHLORAPREP 26 ML APPLICATOR - ORANGE TINT(25/CA)

## (undated) DEVICE — SYRINGE SAFETY 3 ML 18 GA X 1 1/2 BLUNT LL (100/BX 8BX/CA)

## (undated) DEVICE — NEEDLE EUS DELIVERY SYSTEM FNB PRE LOADED 22 GA

## (undated) DEVICE — SUTURE 3-0 ETHILON FS-1 - (36/BX) 30 INCH

## (undated) DEVICE — SUTURE GENERAL

## (undated) DEVICE — CANNULA W/ SUPPLY TUBING O2 - (50/CA)

## (undated) DEVICE — KIT 18FR INITIAL PLACEMENT - (1/CA)

## (undated) DEVICE — SYRINGE DISP. 50CC LS - (40/BX)

## (undated) DEVICE — BASIN EMESIS DISP. - (250/CA)

## (undated) DEVICE — GLOVE BIOGEL INDICATOR SZ 7SURGICAL PF LTX - (50/BX 4BX/CA)

## (undated) DEVICE — SET TUBING PNEUMOCLEAR HIGH FLOW SMOKE EVACUATION (10EA/BX)

## (undated) DEVICE — TROCAR 5X100 NON BLADED Z-TH - READ KII (6/BX)

## (undated) DEVICE — STERI STRIP COMPOUND BENZOIN - TINCTURE 0.6ML WITH APPLICATOR (40EA/BX)

## (undated) DEVICE — SPONGE DRAIN 4 X 4IN 6-PLY - (2/PK25PK/BX12BX/CS)

## (undated) DEVICE — TUBE SUCTION YANKAUER  1/4 X 6FT (20EA/CA)"

## (undated) DEVICE — SPONGE GAUZE NON-STERILE 4X4 - (2000/CA 10PK/CA)

## (undated) DEVICE — SODIUM CHL IRRIGATION 0.9% 1000ML (12EA/CA)

## (undated) DEVICE — TUBE GASTROSTOMY 18FR 20CC